# Patient Record
Sex: MALE | Race: WHITE | NOT HISPANIC OR LATINO | Employment: OTHER | URBAN - METROPOLITAN AREA
[De-identification: names, ages, dates, MRNs, and addresses within clinical notes are randomized per-mention and may not be internally consistent; named-entity substitution may affect disease eponyms.]

---

## 2017-02-06 RX ORDER — SUCRALFATE 1 G/1
TABLET ORAL
Qty: 60 TABLET | Refills: 0 | Status: SHIPPED | OUTPATIENT
Start: 2017-02-06 | End: 2017-08-15 | Stop reason: SDUPTHER

## 2017-02-07 RX ORDER — SUCRALFATE 1 G/1
TABLET ORAL
Qty: 60 TABLET | Refills: 0 | OUTPATIENT
Start: 2017-02-07

## 2017-02-07 NOTE — TELEPHONE ENCOUNTER
Duplicate request. Per [facility] pharmacy, they have Dr. Perrin's approval for the 1st request of Sucralfate 1 g tablet from 2/6/17.

## 2017-02-14 ENCOUNTER — TRANSCRIBE ORDERS (OUTPATIENT)
Dept: REHABILITATION | Facility: HOSPITAL | Age: 62
End: 2017-02-14

## 2017-02-14 DIAGNOSIS — Z47.89 ENCOUNTER FOR PROSTHETIC GAIT TRAINING: Primary | ICD-10-CM

## 2017-02-14 DIAGNOSIS — Z89.612 HISTORY OF LEFT ABOVE KNEE AMPUTATION (HCC): ICD-10-CM

## 2017-02-17 ENCOUNTER — HOSPITAL ENCOUNTER (OUTPATIENT)
Dept: PHYSICAL THERAPY | Facility: HOSPITAL | Age: 62
Setting detail: THERAPIES SERIES
Discharge: HOME | End: 2017-02-17
Attending: PHYSICAL MEDICINE & REHABILITATION

## 2017-02-17 DIAGNOSIS — Z47.89 ENCOUNTER FOR PROSTHETIC GAIT TRAINING: ICD-10-CM

## 2017-02-17 DIAGNOSIS — R26.89 FUNCTIONAL GAIT ABNORMALITY: ICD-10-CM

## 2017-02-17 DIAGNOSIS — Z89.612 STATUS POST ABOVE KNEE AMPUTATION OF LEFT LOWER EXTREMITY: Primary | ICD-10-CM

## 2017-02-17 PROCEDURE — 97162 PT EVAL MOD COMPLEX 30 MIN: CPT

## 2017-02-17 NOTE — PROGRESS NOTES
Outpatient Physical Therapy Neuro Initial Evaluation  [facility]     Patient Name: Sim Agustin  : 1955  MRN: [id number]  Today's Date: 2017      Visit Date: 2017    Patient Active Problem List   Diagnosis   • H/O angiodysplasia of intestinal tract   • Hypotension   • ARF (acute renal failure)   • COPD (chronic obstructive pulmonary disease)   • Atelectasis, left   • S/P colectomy   • Myonecrosis   • Peripheral arteriosclerosis   • S/P AKA (above knee amputation)   • Elevated troponin   • Chest pain   • CAD (coronary artery disease)   • Sepsis   • Nosocomial pneumonia   • ESRD (end stage renal disease) on dialysis   • Anemia in chronic kidney disease   • PVD (peripheral vascular disease)   • Hyperkalemia   • Pleural effusion on left   • S/P thoracentesis   • Acute upper respiratory infection   • Chronic obstructive pulmonary disease with acute exacerbation   • Urticaria   • Hypertension   • Fracture of patella        Past Medical History   Diagnosis Date   • [facility]      [facility]   • ARF (acute renal failure)      ON DIALYSIS SPRING 2016. HAS NOW SINCE RESOLVED. [facility]   • CAD (coronary artery disease) 2016   • Cataract    • CHF (congestive heart failure)      HISTORY..YRS AGO. D/T ANEMIA   • Colostomy in place      AS RESULT OF ISCHEMIC COLITIS   • Compartment syndrome      AFTER ILIAC SURGERY. ABOVE KNEE AMPUTATION   • COPD (chronic obstructive pulmonary disease)    • Cough      SINCE APRIL WITH PNEUMONIA.    • Disease of thyroid gland      HYPOTHYRODISM   • Gastrointestinal bleed      2016. AORTODUOD. FISTULA   • Hypertension    • Nausea    • Phantom limb pain      SL, WITH LEFT ABOUVE KNEE   • Pneumonia      SPRING 2016  AFTER SURGERY   • PVD (peripheral vascular disease)    • Sepsis      2016 AFTER ILIAC ARTERY SURGERY        Past Surgical History   Procedure Laterality Date   • Iliac artery stent Bilateral    • Iliac artery -  "femoral artery bypass graft Bilateral 2002   • Arterial thrombectomy  2001     throbectomy of arterial graft   • Above knee amputation Left 3/16/2016     Procedure: LT AMPUTATION ABOVE KNEE;  Surgeon: Jose Swann MD;  Location: [facility] MAIN OR;  Service:    • Thoracoscopy Left 4/18/2016     Procedure: LT THORACOSCOPY VIDEO ASSISTED WITH DECORDICATION;  Surgeon: Genaro Davila III, MD;  Location: [facility] MAIN OR;  Service:    • Colonoscopy N/A 2015     Dr. Laughlin   • Joint replacement Left    • Hemicolectomy w/ ostomy Left 02/28/2016   • Total hip arthroplasty Left    • Cataract extraction w/  intraocular lens implant     • Cataract extraction w/  intraocular lens implant       LEFT AND RIGHT   • Vascular surgery       MULTIPLE.   • Colonoscopy N/A 10/12/2016     Procedure: COLONOSCOPY TO 20 CM AND PER STOMA TO 30CM;  Surgeon: Genaro Perrin MD;  Location: [facility] ENDOSCOPY;  Service:    • Colostomy closure N/A 10/13/2016     Procedure: COLOSTOMY TAKEDOWN OR CLOSURE, APPENDECTOMY;  Surgeon: Genaro Perrin MD;  Location: [facility] MAIN OR;  Service:    • Endoscopy N/A 10/21/2016     Procedure: ESOPHAGOGASTRODUODENOSCOPY DONE AT BEDSIDE;  Surgeon: Genaro Perrin MD;  [facility];  Service:    • Colonoscopy N/A 10/21/2016     Procedure: COLONOSCOPY DONE AT BEDSIDE ONTO CECEM;  Surgeon: Genaro Perrin MD;  Location: [facility] ENDOSCOPY;  Service:          Visit Dx:     ICD-10-CM ICD-9-CM   1. Status post above knee amputation of left lower extremity Z89.612 V49.76   2. Encounter for prosthetic gait training Z47.89 V57.81   3. Functional gait abnormality R26.89 781.2             Patient History       02/17/17 0932          History    Chief Complaint Difficulty Walking;Other 1 (comment)   \"Doing good until I got this thing.\" (new socket)   -LB      Date Current Problem(s) Began 03/16/16   Pt received his new prosthetic socket on 02/08/2017   -LB      Brief Description of Current " "Complaint Pt received his new socket for his above knee prosthesis and reports ahs been having some trouble with walking. Orders for prosthetic training.   -LB      Onset Date- PT 02/17/2017  -LB      Patient/Caregiver Goals Other (comment)   \"Walking better.\" \"I'd like to walk without a cane.\"   -LB      Patient seeing anyone else for problem(s)? Yes   Osman Elams & [facility]  -LB      Pain     Pain Location Back  -LB      Pain at Present 4  -LB      Pain at Best 0  -LB      Pain Frequency Intermittent  -LB      What Performance Factors Make the Current Problem(s) WORSE? Being up and doing alot of activity.  -LB      Fall Risk Assessment    Any falls in the past year: Yes  -LB      Number of falls reported in the last 12 months 2   -LB      Factors that contributed to the fall: Other (comment);Fatigue   Pt reports not being injured.   -LB      Does patient have a fear of falling No  -LB      Services    Are you currently receiving Home Health services No  -LB      Daily Activities    Primary Language English  -LB      How does patient learn best? Listening  -LB      Barriers to learning None  -LB      Pt Participated in POC and Goals Yes  -LB      Safety    Are you being hurt, hit, or frightened by anyone at home or in your life? No  -LB      Are you being neglected by a caregiver No  -LB        User Key  (r) = Recorded By, (t) = Taken By, (c) = Cosigned By    Initials Name Provider Type    LB Amanda Hancock, PT Physical Therapist                    PT Neuro       02/17/17 0932          Subjective Comments    Subjective Comments \"I am not walking as well since I got this.\" (new socket) \"I have no trouble putting the prosthesis on.\"  -LB      Precautions and Contraindications    Precautions pt reports per vascular MD pt not allowed to use a gait belt   -LB      Subjective Pain    Able to rate subjective pain? yes  -LB      Pre-Treatment Pain Level 0  -LB      Post-Treatment Pain " Level 0  -LB      Pain Assessment    Pain Assessment No/denies pain  -LB      Home Living    Living Arrangements house  -LB      Home Accessibility stairs to enter home  -LB      Number of Stairs to Enter Home 4  -LB      Stair Railings at Home none  -LB      Home Equipment Rolling walker;Cane  -LB      Vision-Basic Assessment    Current Vision No visual deficits  -LB      Cognition    Overall Cognitive Status WFL  -LB      Orientation Level Oriented X4  -LB      Safety Judgment Good awareness of safety precautions  -LB      Sensation    Sensation WNL? WNL  -LB      ROM (Range of Motion)    General ROM no range of motion deficits identified  -LB      MMT (Manual Muscle Testing)    General MMT Assessment no strength deficits identified  -LB      Bed Mobility, Assessment/Treatment    Bed Mob, Supine to Sit, [place] independent  -LB      Bed Mob, Sit to Supine, [place] independent  -LB      Transfers    Transfers, Sit-Stand [place] supervision required   from armless chair  definite need of UE's   -LB      Transfers, Stand-Sit [place] independent   to armless chair (definite need to UE's)   -LB      Transfers, Sit-Stand-Sit, Assist Device straight cane  -LB      Transfer, Comment Requires use of UE's due to above knee prosthesis  -LB      Gait Assessment/Treatment    Gait, [place] Level conditional independence  -LB      Gait, Assistive Device prosthesis;straight cane  -LB      Gait, Distance (Feet) 150  -LB      Gait, Gait Deviations forward flexed posture;right:;step length decreased;left:;swing-to-stance ratio decreased;other (see comments);weight-shifting ability decreased   strong initial contact Left LE (prosthetic limb)   -LB      Gait, Comment Pt starts ambulation with R LE consistently with extremely large step length.   -LB      Stairs Assessment/Treatment    Number of Stairs 1   curb   -LB      Stairs, [place] Level contact guard assist  -LB      Stairs, Assistive Device  straight cane  -LB      Stairs, Impairments impaired balance  -LB      Orthotics Prosthetics    Additional Documentation Prosthetic Management (Group)  -LB      Prosthetic Management    Device (Prosthetic Management) transfemoral (above knee) prosthesis, left   new suction socket 02/08/2017, microprocessor knee   -LB      Training (Prosthetic Management) other (see comments)   advised pt to advance R LE first w/ ambulation when possible  -LB      Detail (Prosthetic Management) residual limb well healed with small invagination posteriorly   -LB        User Key  (r) = Recorded By, (t) = Taken By, (c) = Cosigned By    Initials Name Provider Type    DAKOTA Hancock PT Physical Therapist                        Therapy Education       02/17/17 1631    Therapy Education    Given Mobility training   Recommended pt step with R LE first when ambulating when possible to promote wt shift to the prosthetic limb and achieve left knee flexion with swing phase.   -LB    How Provided Verbal  -LB    Provided to Patient  -LB    Level of Understanding Verbalized;Demonstrated  -LB      User Key  (r) = Recorded By, (t) = Taken By, (c) = Cosigned By    Initials Name Provider Type    DAKOTA Hancock PT Physical Therapist                PT OP Goals       02/17/17 1600          PT Short Term Goals    STG Date to Achieve 03/17/17  -LB      STG 1 Pt to step with R LE first when ambulating ~ 75% during the PT session.   -LB      STG 2 Pt to score a 11/24 on the Dynamic Gait index.  -LB      STG 3 Pt to score a 13/28 on the Tinetti Balance Assessment Tool.  -LB      STG 4 Pt to ascend and descend a curb independently with a cane.   -LB      STG 5 Pt to ambulate with decreased left step length ~ 50%.   -LB      STG 6 Pt to ambualte with decreased trunk flexion with ambulation.   -LB      Long Term Goals    LTG Date to Achieve 04/28/17  -LB      LTG 1 Pt to score a 14/28 on the Dynamic Gait Index.  -LB      LTG 2 Pt to score a 17/28 on the  "Tinetti Balance Assessment Tool.   -LB      LTG 3 Pt to ascend and descend 3 steps independently with cane and no rails.   -LB      LTG 4 Pt to ambulate with increased right step length ~ 2\" past left LE ~ 50%.   -LB      LTG 5 Pt to ambualte with decreaed left step length 75%.  -LB      LTG 6 Pt to ambulate independently short distances within his home without a device .  -LB        User Key  (r) = Recorded By, (t) = Taken By, (c) = Cosigned By    Initials Name Provider Type    DAKOTA Hancock PT Physical Therapist                PT Assessment/Plan       02/17/17 1640          PT Assessment    Functional Limitations Impaired gait;Limitation in home management;Limitations in community activities  -LB      Impairments Balance;Gait  -LB      Assessment Comments Pt received a new socket for his left  above knee prosthesis and reports having trouble with walking. Pt has multiple gait deviations including extremely long step with hsi prosthesis with decreased weight shift over his prosthesis. Pt demonstrating deficits with gait and balance as indicated by the Tinetti and the Dynamic Gait Index.   -LB      Please refer to paper survey for additional self-reported information Yes  -LB      Rehab Potential Good  -LB      Patient/caregiver participated in establishment of treatment plan and goals Yes  -LB      Patient would benefit from skilled therapy intervention Yes  -LB      PT Plan    PT Frequency 1x/week  -LB      Predicted Duration of Therapy Intervention (days/wks) 10 weeks   -LB        User Key  (r) = Recorded By, (t) = Taken By, (c) = Cosigned By    Initials Name Provider Type    DAKOTA Hancock PT Physical Therapist                   Exercises       02/17/17 0932          Subjective Comments    Subjective Comments \"I am not walking as well since I got this.\" (new socket) \"I have no trouble putting the prosthesis on.\"  -LB      Subjective Pain    Able to rate subjective pain? yes  -LB      Pre-Treatment Pain " "Level 0  -LB      Post-Treatment Pain Level 0  -LB        User Key  (r) = Recorded By, (t) = Taken By, (c) = Cosigned By    Initials Name Provider Type    LB Amanda Hancock PT Physical Therapist                            Outcome Measures       02/17/17 1600          Dynamic Gait Index (DGI)    Gait Level Surface 2  -LB      Change in Gait Speed 1  -LB      Gait with Horizontal Head Turns 1  -LB      Gait with Vertical Head Turns 1  -LB      Gait and Pivot Turn 2  -LB      Step Over Obstacle 0  -LB      Step Around Obstacles 1  -LB      Steps 1  -LB      Dynamic Gait Index Score 9  -LB      Dynamic Gait Index Comments with straight cane   -LB      Tinetti Assessment    Sitting Balance 1  -LB      Arises 1  -LB      Attempts to Rise 2  -LB      Immediate Standing Balance (first 5 sec) 2  -LB      Standing Balance 1  -LB      Sternal Nudge (feet close together) 0  -LB      Eyes Closed (feet close together) 1  -LB      Turning 360 Degrees- Steps 0  -LB      Turning 360 Degrees- Steadiness 0  -LB      Sitting Down 1  -LB      Tinetti Balance Score 9  -LB      Gait Initiation (immediate after told \"go\") 0  -LB      Step Length- Right Swing 0  -LB      Step Length- Left Swing 1  -LB      Foot Clearance- Right Foot 1  -LB      Foot Clearance- Left Foot 1  -LB      Step Symmetry 0  -LB      Step Continuity 1  -LB      Path (excursion) 0  -LB      Trunk 0  -LB      Base of Support 0  -LB      Gait Score 4  -LB      Tinetti Total Score 13  -LB      Functional Assessment    Outcome Measure Options Dynamic Gait Index;Tinetti  -LB        User Key  (r) = Recorded By, (t) = Taken By, (c) = Cosigned By    Initials Name Provider Type    LB Amanda Hancock PT Physical Therapist          Time Calculation:   Start Time: 0932  Stop Time: 1017  Time Calculation (min): 45 min     Therapy Charges for Today     Code Description Service Date Service Provider Modifiers Qty    22957632169  PT EVAL MOD COMPLEXITY 3 2/17/2017 Amanda Hancock PT " GP 1          PT G-Codes  Outcome Measure Options: Dynamic Gait Index, Shea Hancock, PT  2/17/2017

## 2017-02-24 ENCOUNTER — HOSPITAL ENCOUNTER (OUTPATIENT)
Dept: PHYSICAL THERAPY | Facility: HOSPITAL | Age: 62
Setting detail: THERAPIES SERIES
Discharge: HOME | End: 2017-02-24
Attending: PHYSICAL MEDICINE & REHABILITATION

## 2017-02-24 DIAGNOSIS — Z89.612 STATUS POST ABOVE KNEE AMPUTATION OF LEFT LOWER EXTREMITY: Primary | ICD-10-CM

## 2017-02-24 DIAGNOSIS — Z47.89 ENCOUNTER FOR PROSTHETIC GAIT TRAINING: ICD-10-CM

## 2017-02-24 DIAGNOSIS — R26.89 FUNCTIONAL GAIT ABNORMALITY: ICD-10-CM

## 2017-02-24 PROCEDURE — 97116 GAIT TRAINING THERAPY: CPT

## 2017-02-24 NOTE — PROGRESS NOTES
"    Outpatient Physical Therapy Neuro Treatment Note  [facility]     Patient Name: Sim Agustin  : 1955  MRN: [id number]  Today's Date: 2017      Visit Date: 2017    Visit Dx:    ICD-10-CM ICD-9-CM   1. Status post above knee amputation of left lower extremity Z89.612 V49.76   2. Encounter for prosthetic gait training Z47.89 V57.81   3. Functional gait abnormality R26.89 781.2       Patient Active Problem List   Diagnosis   • H/O angiodysplasia of intestinal tract   • Hypotension   • ARF (acute renal failure)   • COPD (chronic obstructive pulmonary disease)   • Atelectasis, left   • S/P colectomy   • Myonecrosis   • Peripheral arteriosclerosis   • S/P AKA (above knee amputation)   • Elevated troponin   • Chest pain   • CAD (coronary artery disease)   • Sepsis   • Nosocomial pneumonia   • ESRD (end stage renal disease) on dialysis   • Anemia in chronic kidney disease   • PVD (peripheral vascular disease)   • Hyperkalemia   • Pleural effusion on left   • S/P thoracentesis   • Acute upper respiratory infection   • Chronic obstructive pulmonary disease with acute exacerbation   • Urticaria   • Hypertension   • Fracture of patella                 PT Neuro       17 0931          Subjective Comments    Subjective Comments \"I saw Kailash (Prosthetist) and he said to just wear 1 sock ply and it feels better.\"   -LB      Precautions and Contraindications    Precautions pt reports per vascular MD pt not allowed to use a gait belt   -LB      Subjective Pain    Able to rate subjective pain? yes  -LB      Pre-Treatment Pain Level 0  -LB      Post-Treatment Pain Level 0  -LB      Transfers    Transfers, Sit-Stand [place] stand by assist;verbal cues required  -LB      Transfers, Stand-Sit [place] stand by assist;verbal cues required  -LB      Transfers, Sit-Stand-Sit, Assist Device straight cane   with light weight on the cane and chair seat   -LB      Gait Assessment/Treatment    Gait, " [place] Level minimum assist (75% patient effort)  -LB      Gait, Assistive Device prosthesis  -LB      Gait, Distance (Feet) 60   x 3   -LB      Gait, Gait Deviations forward flexed posture;right:;step length decreased;left:;weight-shifting ability decreased;swing-to-stance ratio decreased  -LB      Gait, Impairments postural control impaired  -LB      Gait, Comment Pt ambulated ~ 100' x 2 with single point cane.  -LB      Stairs Assessment/Treatment    Number of Stairs 3  -LB      Stairs, Handrail Location none  -LB      Stairs, [place] Level minimum assist (75% patient effort)   HHA   -LB      Stairs, Assistive Device straight cane  -LB      Stairs, Technique Used step to step (ascending);step to step (descending)  -LB      Stairs, Safety Issues weight-shifting ability decreased  -LB      Stairs, Impairments impaired balance  -LB      Stairs, Comment curb with cane with CGA   -LB      Balance Skills Training    Standing-Level of Assistance Contact guard  -LB      Static Standing Balance Support No upper extremity supported  -LB      Standing-Balance Activities Reaching across midline    feet together   -LB      Orthotics Prosthetics    Additional Documentation Prosthetic Management (Group)  -LB      Prosthetic Management    Device (Prosthetic Management) transfemoral (above knee) prosthesis, left  -LB      Adjustment Detail (Prosthetic Management) per pt he was advised to only wear a one ply sock   -LB      Training (Prosthetic Management) other (see comments)   (advised pt to advance R LE first w/ ambulation when possibl  -LB      Detail (Prosthetic Management) residual limb well healed with small invagination posteriorly   -LB        User Key  (r) = Recorded By, (t) = Taken By, (c) = Cosigned By    Initials Name Provider Type    LB Amanda Hancock, PT Physical Therapist                        PT Assessment/Plan       02/24/17 1550       PT Assessment    Assessment Comments Pt was able to step length on  "the left LE ~ 50% with ambulaiton with the cane. Pt consistetnly flexing his prosthetic with the first step when leading with the R LE the first step.  -LB       User Key  (r) = Recorded By, (t) = Taken By, (c) = Cosigned By    Initials Name Provider Type    DAKOTA Hancock PT Physical Therapist                     Exercises       02/24/17 0931          Subjective Comments    Subjective Comments \"I saw Kailash (Prosthetist) and he said to just wear 1 sock ply and it feels better.\"   -LB      Subjective Pain    Able to rate subjective pain? yes  -LB      Pre-Treatment Pain Level 0  -LB      Post-Treatment Pain Level 0  -LB        User Key  (r) = Recorded By, (t) = Taken By, (c) = Cosigned By    Initials Name Provider Type    DAKOTA Hancock PT Physical Therapist                                  Therapy Education       02/24/17 1550          Therapy Education    Given Mobility training;Fall prevention and home safety  -LB      How Provided Verbal  -LB      Provided to Patient  -LB      Level of Understanding Verbalized;Demonstrated;Teach back education performed  -LB        User Key  (r) = Recorded By, (t) = Taken By, (c) = Cosigned By    Initials Name Provider Type    DAKOTA Hancock PT Physical Therapist                Time Calculation:   Start Time: 0931  Stop Time: 1016  Time Calculation (min): 45 min     Therapy Charges for Today     Code Description Service Date Service Provider Modifiers Qty    [id number] HC GAIT TRAINING EA 15 MIN 2/24/2017 Amanda Hancock, PT GP 3                    Amanda Hancock PT  2/24/2017     "

## 2017-03-03 ENCOUNTER — HOSPITAL ENCOUNTER (OUTPATIENT)
Dept: PHYSICAL THERAPY | Facility: HOSPITAL | Age: 62
Setting detail: THERAPIES SERIES
Discharge: HOME | End: 2017-03-03
Attending: PHYSICAL MEDICINE & REHABILITATION

## 2017-03-03 DIAGNOSIS — Z89.612 STATUS POST ABOVE KNEE AMPUTATION OF LEFT LOWER EXTREMITY: Primary | ICD-10-CM

## 2017-03-03 DIAGNOSIS — Z47.89 ENCOUNTER FOR PROSTHETIC GAIT TRAINING: ICD-10-CM

## 2017-03-03 DIAGNOSIS — R26.89 FUNCTIONAL GAIT ABNORMALITY: ICD-10-CM

## 2017-03-03 PROCEDURE — 97116 GAIT TRAINING THERAPY: CPT

## 2017-03-10 ENCOUNTER — APPOINTMENT (OUTPATIENT)
Dept: PHYSICAL THERAPY | Facility: HOSPITAL | Age: 62
End: 2017-03-10
Attending: PHYSICAL MEDICINE & REHABILITATION

## 2017-03-17 ENCOUNTER — HOSPITAL ENCOUNTER (OUTPATIENT)
Dept: PHYSICAL THERAPY | Facility: HOSPITAL | Age: 62
Setting detail: THERAPIES SERIES
Discharge: HOME | End: 2017-03-17
Attending: PHYSICAL MEDICINE & REHABILITATION

## 2017-03-17 PROCEDURE — 97116 GAIT TRAINING THERAPY: CPT

## 2017-03-17 NOTE — PROGRESS NOTES
"    Outpatient Physical Therapy Neuro Treatment Note  [facility]     Patient Name: Sim Agustin  : 1955  MRN: [id number]  Today's Date: 3/17/2017      Visit Date: 2017    Visit Dx:  No diagnosis found.    Patient Active Problem List   Diagnosis   • H/O angiodysplasia of intestinal tract   • Hypotension   • ARF (acute renal failure)   • COPD (chronic obstructive pulmonary disease)   • Atelectasis, left   • S/P colectomy   • Myonecrosis   • Peripheral arteriosclerosis   • S/P AKA (above knee amputation)   • Elevated troponin   • Chest pain   • CAD (coronary artery disease)   • Sepsis   • Nosocomial pneumonia   • ESRD (end stage renal disease) on dialysis   • Anemia in chronic kidney disease   • PVD (peripheral vascular disease)   • Hyperkalemia   • Pleural effusion on left   • S/P thoracentesis   • Acute upper respiratory infection   • Chronic obstructive pulmonary disease with acute exacerbation   • Urticaria   • Hypertension   • Fracture of patella                 PT Neuro       17 0929          Subjective Comments    Subjective Comments \"things are going okay, wearing 1 ply sock.  I will have the leg on most of the day\"  -LB      Precautions and Contraindications    Precautions pt reports per vascular MD pt not allowed to use a gait belt   -LB      Subjective Pain    Able to rate subjective pain? yes  -LB      Pre-Treatment Pain Level 4  -LB      Post-Treatment Pain Level 4  -LB      Subjective Pain Comment My back hurts if I wear my leg more  -LB      Transfers    Transfers, Sit-Stand [place] supervision required;conditional independence  -LB      Transfers, Stand-Sit [place] supervision required;conditional independence  -LB      Transfers, Sit-Stand-Sit, Assist Device straight cane;prosthesis  -LB      Gait Assessment/Treatment    Gait, [place] Level contact guard assist;minimum assist (75% patient effort)  -LB      Gait, Assistive Device prosthesis;other (see comments) "   HHA on the right  -LB      Gait, Distance (Feet) 50   80  -LB      Gait, Gait Deviations forward flexed posture;right:;step length decreased;left:;weight-shifting ability decreased   trunk flexion with stance on L LE (prosthesis)  -LB      Gait, Safety Issues weight-shifting ability decreased  -LB      Gait, Impairments postural control impaired   flex during stance on prosthesis  -LB      Gait, Comment pt amb 200 ft with STC, prosthesis and SBA with emphasis of hip extension and decreased weight bearing on STC  -LB      Balance Skills Training    Standing-Level of Assistance Close supervision;Contact guard  -LB      Static Standing Balance Support No upper extremity supported  -LB      Standing-Balance Activities Weight Shift A-P;Weight Shift R-L;Reaching for objects  -LB      Standing Balance # of Minutes 5   times 3  -LB      Stepping Strategy Assessment (Balance Skills) stood next to hemibars and had pt place right LE up on small stool with trying to keep L hip extension.  Performed times 10 reps - pt did have to place R UE on bar at times for support  -LB      Prosthetic Management    Device (Prosthetic Management) transfemoral (above knee) prosthesis, left  -LB      Detail (Prosthetic Management) pt is wearing 1 sock ply all the time, no c/o pain and feels fit is good.  Pt states he may wear prosthesis for about 6-7 hours today  -LB        User Key  (r) = Recorded By, (t) = Taken By, (c) = Cosigned By    Initials Name Provider Type    LB Shikha Lora, PT Physical Therapist                        PT Assessment/Plan       03/17/17 1126       PT Assessment    Assessment Comments Pt able to start with R LE (non-prosthetic) at least 75% of the time even when halting gait and restarting.  Pt demonstrating decreased weight shift and weight bearing on left prosthesis but is aware and attempting to increase step length slightly on R LE.    -LB       User Key  (r) = Recorded By, (t) = Taken By, (c) = Cosigned By     "Initials Name Provider Type    DAKOTA Lora PT Physical Therapist                     Exercises       03/17/17 0929          Subjective Comments    Subjective Comments \"things are going okay, wearing 1 ply sock.  I will have the leg on most of the day\"  -LB      Subjective Pain    Able to rate subjective pain? yes  -LB      Pre-Treatment Pain Level 4  -LB      Post-Treatment Pain Level 4  -LB      Subjective Pain Comment My back hurts if I wear my leg more  -LB        User Key  (r) = Recorded By, (t) = Taken By, (c) = Cosigned By    Initials Name Provider Type    DAKOTA Lora PT Physical Therapist                              PT OP Goals       03/17/17 1130       Time Calculation    PT Goal Re-Cert Due Date 03/24/17  -LB       User Key  (r) = Recorded By, (t) = Taken By, (c) = Cosigned By    Initials Name Provider Type    DAKOTA Lora PT Physical Therapist                Therapy Education       03/17/17 1118          Therapy Education    Given HEP   hip extension in standing without prosthesis  -LB      Program Reinforced  -LB      How Provided Verbal  -LB      Provided to Patient  -LB      Level of Understanding Verbalized  -LB        User Key  (r) = Recorded By, (t) = Taken By, (c) = Cosigned By    Initials Name Provider Type    DAKOTA Lora PT Physical Therapist                Time Calculation:   Start Time: 0916  Stop Time: 1003  Time Calculation (min): 47 min     Therapy Charges for Today     Code Description Service Date Service Provider Modifiers Qty    41884185901 HC GAIT TRAINING EA 15 MIN 3/17/2017 Shikha Lora, PT GP 3                    Shikha Lora PT  3/17/2017     "

## 2017-03-24 ENCOUNTER — HOSPITAL ENCOUNTER (OUTPATIENT)
Dept: PHYSICAL THERAPY | Facility: HOSPITAL | Age: 62
Setting detail: THERAPIES SERIES
Discharge: HOME | End: 2017-03-24
Attending: PHYSICAL MEDICINE & REHABILITATION

## 2017-03-24 DIAGNOSIS — R26.89 FUNCTIONAL GAIT ABNORMALITY: ICD-10-CM

## 2017-03-24 DIAGNOSIS — Z89.612 STATUS POST ABOVE KNEE AMPUTATION OF LEFT LOWER EXTREMITY: Primary | ICD-10-CM

## 2017-03-24 DIAGNOSIS — Z47.89 ENCOUNTER FOR PROSTHETIC GAIT TRAINING: ICD-10-CM

## 2017-03-24 PROCEDURE — 97116 GAIT TRAINING THERAPY: CPT

## 2017-03-24 NOTE — PROGRESS NOTES
Outpatient Physical Therapy Neuro Re-Evaluation  [facility]     Patient Name: Sim Agustin  : 1955  MRN: [id number]  Today's Date: 3/24/2017      Visit Date: 2017    Patient Active Problem List   Diagnosis   • H/O angiodysplasia of intestinal tract   • Hypotension   • ARF (acute renal failure)   • COPD (chronic obstructive pulmonary disease)   • Atelectasis, left   • S/P colectomy   • Myonecrosis   • Peripheral arteriosclerosis   • S/P AKA (above knee amputation)   • Elevated troponin   • Chest pain   • CAD (coronary artery disease)   • Sepsis   • Nosocomial pneumonia   • ESRD (end stage renal disease) on dialysis   • Anemia in chronic kidney disease   • PVD (peripheral vascular disease)   • Hyperkalemia   • Pleural effusion on left   • S/P thoracentesis   • Acute upper respiratory infection   • Chronic obstructive pulmonary disease with acute exacerbation   • Urticaria   • Hypertension   • Fracture of patella        Past Medical History:   Diagnosis Date   • Anemia     [facility]   • ARF (acute renal failure)     ON DIALYSIS SPRING 2016. HAS NOW SINCE RESOLVED. [facility]   • CAD (coronary artery disease) 2016   • Cataract    • CHF (congestive heart failure)     HISTORY..YRS AGO. D/T ANEMIA   • Colostomy in place     AS RESULT OF ISCHEMIC COLITIS   • Compartment syndrome     AFTER ILIAC SURGERY. ABOVE KNEE AMPUTATION   • COPD (chronic obstructive pulmonary disease)    • Cough     SINCE APRIL WITH PNEUMONIA.    • Disease of thyroid gland     HYPOTHYRODISM   • Gastrointestinal bleed     2016. AORTODUOD. FISTULA   • Hypertension    • Nausea    • Phantom limb pain     SL, WITH LEFT ABOUVE KNEE   • Pneumonia     SPRING 2016  AFTER SURGERY   • PVD (peripheral vascular disease)    • Sepsis     2016 AFTER ILIAC ARTERY SURGERY        Past Surgical History:   Procedure Laterality Date   • ABOVE KNEE AMPUTATION Left 3/16/2016    Procedure: LT AMPUTATION  "ABOVE KNEE;  Surgeon: Jose Swann MD;  Location: [facility] MAIN OR;  Service:    • ARTERIAL THROMBECTOMY  2001    throbectomy of arterial graft   • CATARACT EXTRACTION WITH INTRAOCULAR LENS IMPLANT     • CATARACT EXTRACTION WITH INTRAOCULAR LENS IMPLANT      LEFT AND RIGHT   • COLON RESECTION WITH COLOSTOMY Left 02/28/2016   • COLONOSCOPY N/A 2015    Dr. Laughlin   • COLONOSCOPY N/A 10/12/2016    Procedure: COLONOSCOPY TO 20 CM AND PER STOMA TO 30CM;  Surgeon: Genaro Perrin MD;  [facility];  Service:    • COLONOSCOPY N/A 10/21/2016    Procedure: COLONOSCOPY DONE AT BEDSIDE ONTO CECEM;  Surgeon: Genaro Perrin MD;  [facility];  Service:    • COLOSTOMY CLOSURE N/A 10/13/2016    Procedure: COLOSTOMY TAKEDOWN OR CLOSURE, APPENDECTOMY;  Surgeon: Genaro Perrin MD;  Location: [facility] MAIN OR;  Service:    • ENDOSCOPY N/A 10/21/2016    Procedure: ESOPHAGOGASTRODUODENOSCOPY DONE AT BEDSIDE;  Surgeon: Genaro Perrin MD;  Location: [facility] ENDOSCOPY;  Service:    • ILIAC ARTERY - FEMORAL ARTERY BYPASS GRAFT Bilateral 2002   • ILIAC ARTERY STENT Bilateral 2001   • JOINT REPLACEMENT Left    • THORACOSCOPY Left 4/18/2016    Procedure: LT THORACOSCOPY VIDEO ASSISTED WITH DECORDICATION;  Surgeon: Genaro Davila III, MD;  Location: [facility] MAIN OR;  Service:    • TOTAL HIP ARTHROPLASTY Left    • VASCULAR SURGERY      MULTIPLE.         Visit Dx:     ICD-10-CM ICD-9-CM   1. Status post above knee amputation of left lower extremity Z89.612 V49.76   2. Encounter for prosthetic gait training Z47.89 V57.81   3. Functional gait abnormality R26.89 781.2                     PT Neuro       03/24/17 0931          Subjective Comments    Subjective Comments \"I use the cane all the time.\"   -LB      Precautions and Contraindications    Precautions pt reports per vascular MD pt not allowed to use a gait belt   -LB      Subjective Pain    Able to rate subjective pain? yes  -LB      " Pre-Treatment Pain Level 0  -LB      Post-Treatment Pain Level 0  -LB      Subjective Pain Comment Pt reported pain in his low back during 2nd walk without the cane. Once returned to ambulating with a cane pt reported no pain.   -LB      Bed Mobility, Assessment/Treatment    Bed Mobility, Comment deferred   -LB      Transfers    Transfers, Sit-Stand [place] supervision required  -LB      Transfers, Stand-Sit [place] supervision required  -LB      Transfers, Sit-Stand-Sit, Assist Device prosthesis   w/o a device   -LB      Transfer, Comment Pt came to stand with cane independently.  -LB      Gait Assessment/Treatment    Gait, [place] Level contact guard assist  -LB      Gait, Assistive Device prosthesis   w/o a device   -LB      Gait, Distance (Feet) 70   x 1, then 30' x 1   -LB      Gait, Gait Deviations forward flexed posture;right:;step length decreased   trunk flexion noted when pt ambulating without a device  -LB      Gait, Safety Issues weight-shifting ability decreased  -LB      Gait, Impairments postural control impaired  -LB      Gait, Comment Pt ambulated ~ 90' x 3 with supervision and verbal cues.   -LB      Stairs Assessment/Treatment    Number of Stairs 1  -LB      Stairs, [place] Level contact guard assist   CGA 1st time, SBA 2nd time  -LB      Stairs, Assistive Device straight cane  -LB      Stairs, Impairments impaired balance  -LB      Balance Skills Training    Standing-Level of Assistance Close supervision  -LB      Static Standing Balance Support No upper extremity supported  -LB      Standing-Balance Activities Weight Shift A-P;Weight Shift R-L;Reaching for objects  -LB        User Key  (r) = Recorded By, (t) = Taken By, (c) = Cosigned By    Initials Name Provider Type    DAKOTA Hancock, PT Physical Therapist                        Therapy Education       03/24/17 1224          Therapy Education    Given Mobility training;Fall prevention and home safety   Discussed with pt  "the importance of weight shifting forward over his prosthetic limb.   -LB      How Provided Verbal  -LB      Provided to Patient  -LB      Level of Understanding Verbalized  -LB        User Key  (r) = Recorded By, (t) = Taken By, (c) = Cosigned By    Initials Name Provider Type    LB Amanda Hancock, PT Physical Therapist                PT OP Goals       03/24/17 1200       PT Short Term Goals    STG 1 Pt to step with R LE first when ambulating ~ 75% during the PT session.   -LB     STG 2 Pt to score a 11/24 on the Dynamic Gait index.  -LB     STG 2 Progress Ongoing  -LB     STG 3 Pt to score a 13/28 on the Tinetti Balance Assessment Tool.  -LB     STG 3 Progress Met  -LB     STG 3 Progress Comments Pt scored a 16/28.  -LB     STG 4 Pt to ascend and descend a curb independently with a cane.   -LB     STG 4 Progress Ongoing;Progressing  -LB     STG 5 Pt to ambulate with decreased left step length ~ 50%.   -LB     STG 5 Progress Met  -LB     STG 6 Pt to ambualte with decreased trunk flexion with ambulation.   -LB     STG 6 Progress Partially Met;Ongoing  -LB     Long Term Goals    LTG Date to Achieve 04/07/17  -LB     LTG 1 Pt to score a 14/28 on the Dynamic Gait Index.  -LB     LTG 1 Progress Ongoing  -LB     LTG 2 Pt to score a 17/28 on the Tinetti Balance Assessment Tool.   -LB     LTG 2 Progress Ongoing;Progressing  -LB     LTG 2 Progress Comments Pt scored a 16/28.   -LB     LTG 3 Pt to ascend and descend 3 steps independently with cane and no rails.   -LB     LTG 3 Progress Ongoing;Progressing  -LB     LTG 4 Pt to ambulate with increased right step length ~ 2\" past left LE ~ 50%.   -LB     LTG 4 Progress Ongoing;Progressing  -LB     LTG 5 Pt to ambualte with decreaed left step length 75%.  -LB     LTG 5 Progress Met  -LB     LTG 6 Pt to ambulate independently short distances within his home without a device .  -LB     LTG 6 Progress --   Due to pain & increased lat trunk flex w/o a cane goal d/c.  -LB       User " "Key  (r) = Recorded By, (t) = Taken By, (c) = Cosigned By    Initials Name Provider Type    DAKOTA Hancock PT Physical Therapist                PT Assessment/Plan       03/24/17 1233       PT Assessment    Assessment Comments Pt dmeonstrating improved gait and balance as indicated by the improved score on the Tinetti. Pt compelted the Tinetti gait section with a cane. Pt demonstrates increased gait deviations and reported residual limb when ambulating without a device. Therefore, the goal for ambulation without a cane was discontinued.   -LB     PT Plan    PT Frequency --  -LB     Predicted Duration of Therapy Intervention (days/wks) 2-3 weeks  -LB       User Key  (r) = Recorded By, (t) = Taken By, (c) = Cosigned By    Initials Name Provider Type    DAKOTA Hancock PT Physical Therapist                   Exercises       03/24/17 0931          Subjective Comments    Subjective Comments \"I use the cane all the time.\"   -LB      Subjective Pain    Able to rate subjective pain? yes  -LB      Pre-Treatment Pain Level 0  -LB      Post-Treatment Pain Level 0  -LB      Subjective Pain Comment Pt reported pain in his low back during 2nd walk without the cane. Once returned to ambulating with a cane pt reported no pain.   -LB        User Key  (r) = Recorded By, (t) = Taken By, (c) = Cosigned By    Initials Name Provider Type    DAKOTA Hancock PT Physical Therapist                            Outcome Measures       03/24/17 1200          Tinetti Assessment    Sitting Balance 1  -LB      Arises 1  -LB      Attempts to Rise 2  -LB      Immediate Standing Balance (first 5 sec) 2  -LB      Standing Balance 1  -LB      Sternal Nudge (feet close together) 0  -LB      Eyes Closed (feet close together) 1  -LB      Turning 360 Degrees- Steps 0  -LB      Turning 360 Degrees- Steadiness 1  -LB      Sitting Down 1  -LB      Tinetti Balance Score 10  -LB      Gait Initiation (immediate after told \"go\") 0  -LB      Step Length- Right " Swing 1  -LB      Step Length- Left Swing 1  -LB      Foot Clearance- Right Foot 1  -LB      Foot Clearance- Left Foot 1  -LB      Step Symmetry 0  -LB      Step Continuity 1  -LB      Path (excursion) 1  -LB      Trunk 0  -LB      Base of Support 0  -LB      Gait Score 6  -LB      Tinetti Total Score 16  -LB      Tinetti Assistive Device straight cane  -LB      Timed Up and Go (TUG)    TUG Test 1 29 seconds  -LB      Timed Up and Go Comments 26  -LB        User Key  (r) = Recorded By, (t) = Taken By, (c) = Cosigned By    Initials Name Provider Type    LB Amanda Hancock, PT Physical Therapist          Time Calculation:   Start Time: 0931  Stop Time: 1017  Time Calculation (min): 46 min     Therapy Charges for Today     Code Description Service Date Service Provider Modifiers Qty    [id number] HC GAIT TRAINING EA 15 MIN 3/24/2017 Amanda Hancock, PT GP 3                    Amanda Hancock, PT  3/24/2017

## 2017-03-31 ENCOUNTER — HOSPITAL ENCOUNTER (OUTPATIENT)
Dept: PHYSICAL THERAPY | Facility: HOSPITAL | Age: 62
Setting detail: THERAPIES SERIES
Discharge: HOME | End: 2017-03-31
Attending: PHYSICAL MEDICINE & REHABILITATION

## 2017-03-31 DIAGNOSIS — Z89.612 STATUS POST ABOVE KNEE AMPUTATION OF LEFT LOWER EXTREMITY: Primary | ICD-10-CM

## 2017-03-31 DIAGNOSIS — Z47.89 ENCOUNTER FOR PROSTHETIC GAIT TRAINING: ICD-10-CM

## 2017-03-31 DIAGNOSIS — R26.89 FUNCTIONAL GAIT ABNORMALITY: ICD-10-CM

## 2017-03-31 PROCEDURE — 97116 GAIT TRAINING THERAPY: CPT

## 2017-04-07 ENCOUNTER — HOSPITAL ENCOUNTER (OUTPATIENT)
Dept: PHYSICAL THERAPY | Facility: HOSPITAL | Age: 62
Setting detail: THERAPIES SERIES
Discharge: HOME | End: 2017-04-07
Attending: PHYSICAL MEDICINE & REHABILITATION

## 2017-04-07 DIAGNOSIS — R26.89 FUNCTIONAL GAIT ABNORMALITY: ICD-10-CM

## 2017-04-07 DIAGNOSIS — Z89.612 STATUS POST ABOVE KNEE AMPUTATION OF LEFT LOWER EXTREMITY: Primary | ICD-10-CM

## 2017-04-07 DIAGNOSIS — Z47.89 ENCOUNTER FOR PROSTHETIC GAIT TRAINING: ICD-10-CM

## 2017-04-07 PROCEDURE — 97116 GAIT TRAINING THERAPY: CPT

## 2017-04-07 NOTE — THERAPY DISCHARGE NOTE
"      Outpatient Physical Therapy Neuro Treatment Note/Discharge Summary  [facility]     Patient Name: Sim Agustin  : 1955  MRN: [id number]  Today's Date: 2017      Visit Date: 2017    Visit Dx:    ICD-10-CM ICD-9-CM   1. Status post above knee amputation of left lower extremity Z89.612 V49.76   2. Encounter for prosthetic gait training Z47.89 V57.81   3. Functional gait abnormality R26.89 781.2       Patient Active Problem List   Diagnosis   • H/O angiodysplasia of intestinal tract   • Hypotension   • ARF (acute renal failure)   • COPD (chronic obstructive pulmonary disease)   • Atelectasis, left   • S/P colectomy   • Myonecrosis   • Peripheral arteriosclerosis   • S/P AKA (above knee amputation)   • Elevated troponin   • Chest pain   • CAD (coronary artery disease)   • Sepsis   • Nosocomial pneumonia   • ESRD (end stage renal disease) on dialysis   • Anemia in chronic kidney disease   • PVD (peripheral vascular disease)   • Hyperkalemia   • Pleural effusion on left   • S/P thoracentesis   • Acute upper respiratory infection   • Chronic obstructive pulmonary disease with acute exacerbation   • Urticaria   • Hypertension   • Fracture of patella                  PT Neuro       17 0940          Subjective Comments    Subjective Comments \"I am tired today. I have been doing alot with my son this week on spring break.\" \"My back is better. I have been wearing a shoe at all times when I walk without the prosthesis.\" \"It has helped.\" \"I have been wearingthe prosthesis everyday.\" \"No trouble with my skin.\"    -LB      Precautions and Contraindications    Precautions pt reports per vascular MD pt not allowed to use a gait belt   -LB      Subjective Pain    Able to rate subjective pain? yes  -LB      Pre-Treatment Pain Level 1  -LB      Post-Treatment Pain Level 1  -LB      Subjective Pain Comment Pt reporting slight back pain.\"   -LB      Transfers    Transfers, Sit-Stand Lamar " conditional independence  -LB      Transfers, Stand-Sit Rooks conditional independence  -LB      Transfers, Sit-Stand-Sit, Assist Device prosthesis;straight cane  -LB      Transfer, Comment Pt demonstrating correct technique with to stand and to sit with and without tripod cane.   -LB      Gait Assessment/Treatment    Gait, Rooks Level conditional independence  -LB      Gait, Assistive Device straight cane  -LB      Gait, Distance (Feet) 120   x 2  (complaint of fatigue from doing activities w/ his son)  -LB      Gait, Gait Deviations right:;step length decreased;swing-to-stance ratio decreased;weight-shifting ability decreased  -LB      Gait, Safety Issues weight-shifting ability decreased  -LB      Gait, Impairments postural control impaired  -LB      Gait, Comment Pt's able to advance prosthetic limb with controlled length.   -LB      Stairs Assessment/Treatment    Number of Stairs 1  -LB      Stairs, Rooks Level conditional independence  -LB      Stairs, Assistive Device straight cane  -LB      Stairs, Impairments impaired balance  -LB      Orthotics Prosthetics    Additional Documentation Prosthetic Management (Group)  -LB      Prosthetic Management    Device (Prosthetic Management) transfemoral (above knee) prosthesis, left  -LB      Detail (Prosthetic Management) Pt reports he wears the one ply sock and is having no pain or skin issues.   -LB        User Key  (r) = Recorded By, (t) = Taken By, (c) = Cosigned By    Initials Name Provider Type    LB Amanda Hancock, PT Physical Therapist                        PT Assessment/Plan       04/07/17 1156       PT Assessment    Assessment Comments Pt reproting being overall fatigued today as has been going places with his 13 year old son. Pt reporting his back pain is decreased and he has been wearing a shoe when up on R LE only. Pt also reports he has been wearing his prosthesis more consistently. Pt has improved with gait and balance as indicated  "by the scoe on the Tinetti. Pt was not able to advance ot ambulation withot a device due to back pain and poor gait quality.   -LB       User Key  (r) = Recorded By, (t) = Taken By, (c) = Cosigned By    Initials Name Provider Type    DAKOTA Hancock PT Physical Therapist                     Exercises       04/07/17 0940          Subjective Comments    Subjective Comments \"I am tired today. I have been doing alot with my son this week on spring break.\" \"My back is better. I have been wearing a shoe at all times when I walk without the prosthesis.\" \"It has helped.\" \"I have been wearingthe prosthesis everyday.\" \"No trouble with my skin.\"    -LB      Subjective Pain    Able to rate subjective pain? yes  -LB      Pre-Treatment Pain Level 1  -LB      Post-Treatment Pain Level 1  -LB      Subjective Pain Comment Pt reporting slight back pain.\"   -LB        User Key  (r) = Recorded By, (t) = Taken By, (c) = Cosigned By    Initials Name Provider Type    DAKOTA Hancock PT Physical Therapist                              PT OP Goals       04/07/17 1100       PT Short Term Goals    STG 1 Pt to step with R LE first when ambulating ~ 75% during the PT session.   -LB     STG 1 Progress Met  -LB     STG 2 Pt to score a 11/24 on the Dynamic Gait index.  -LB     STG 2 Progress Not Met  -LB     STG 2 Progress Comments unable to re-eval due to pt being fatigued   -LB     STG 3 Pt to score a 13/28 on the Tinetti Balance Assessment Tool.  -LB     STG 3 Progress Met  -LB     STG 3 Progress Comments Pt scored a 17/28.  -LB     STG 4 Pt to ascend and descend a curb independently with a cane.   -LB     STG 4 Progress Met  -LB     STG 5 Pt to ambulate with decreased left step length ~ 50%.   -LB     STG 5 Progress Met  -LB     STG 6 Pt to ambualte with decreased trunk flexion with ambulation.   -LB     STG 6 Progress Met  -LB     Long Term Goals    LTG 1 Pt to score a 14/28 on the Dynamic Gait Index.  -LB     LTG 1 Progress Met  -LB     " "LTG 1 Progress Comments unable to re-eval as pt being fatigued   -LB     LTG 2 Pt to score a 17/28 on the Tinetti Balance Assessment Tool.   -LB     LTG 2 Progress Not Met  -LB     LTG 2 Progress Comments Pt scored a 17/28.  -LB     LTG 3 Pt to ascend and descend 3 steps independently with cane and no rails.   -LB     LTG 4 Pt to ambulate with increased right step length ~ 2\" past left LE ~ 50%.   -LB     LTG 4 Progress Met  -LB     LTG 5 Pt to ambualte with decreaed left step length 75%.  -LB     LTG 5 Progress Met  -LB     LTG 6 Pt to ambulate independently short distances within his home without a device .  -LB     LTG 6 Progress Not Met  -LB     LTG 6 Progress Comments Due to pain & increased lat trunk flex w/o a cane goal d/c.)  -LB       User Key  (r) = Recorded By, (t) = Taken By, (c) = Cosigned By    Initials Name Provider Type    DAKOTA Hancock PT Physical Therapist                Therapy Education       04/07/17 1150          Therapy Education    Given Mobility training   Reviewed with pt the importance of calling his prosthetist if any problems arise.   -LB      How Provided Verbal  -LB      Provided to Patient  -LB      Level of Understanding Verbalized  -LB        User Key  (r) = Recorded By, (t) = Taken By, (c) = Cosigned By    Initials Name Provider Type    DAKOTA Hancock PT Physical Therapist                Outcome Measures       04/07/17 1100          Tinetti Assessment    Sitting Balance 1  -LB      Arises 1  -LB      Attempts to Rise 2  -LB      Immediate Standing Balance (first 5 sec) 2  -LB      Standing Balance 1  -LB      Sternal Nudge (feet close together) 2  -LB      Eyes Closed (feet close together) 1  -LB      Turning 360 Degrees- Steps 0  -LB      Turning 360 Degrees- Steadiness 0  -LB      Sitting Down 1  -LB      Tinetti Balance Score 11  -LB      Gait Initiation (immediate after told \"go\") 0  -LB      Step Length- Right Swing 1  -LB      Step Length- Left Swing 1  -LB      Foot " Clearance- Right Foot 1  -LB      Foot Clearance- Left Foot 1  -LB      Step Symmetry 0  -LB      Step Continuity 1  -LB      Path (excursion) 1  -LB      Trunk 0  -LB      Base of Support 0  -LB      Gait Score 6  -LB      Tinetti Total Score 17  -LB      Tinetti Assistive Device straight cane  -LB      Tinetti Assessment Comments Pt ambulating with a wider base of support may have been due to pt being fatigued overall from a busy week.  -LB        User Key  (r) = Recorded By, (t) = Taken By, (c) = Cosigned By    Initials Name Provider Type    LB Amanda Hancock, PT Physical Therapist          Time Calculation:   Start Time: 0940  Stop Time: 1012  Time Calculation (min): 32 min     Therapy Charges for Today     Code Description Service Date Service Provider Modifiers Qty    01932206993 HC GAIT TRAINING EA 15 MIN 4/7/2017 Amanda Hancock, PT GP 2                         Amanda Hancock, PT  4/7/2017

## 2017-06-20 ENCOUNTER — OFFICE VISIT (OUTPATIENT)
Dept: SURGERY | Facility: CLINIC | Age: 62
End: 2017-06-20

## 2017-06-20 VITALS — BODY MASS INDEX: 26.36 KG/M2 | WEIGHT: 178 LBS | HEIGHT: 69 IN

## 2017-06-20 DIAGNOSIS — K43.2 INCISIONAL HERNIA, WITHOUT OBSTRUCTION OR GANGRENE: ICD-10-CM

## 2017-06-20 DIAGNOSIS — Z90.49 S/P COLECTOMY: Primary | ICD-10-CM

## 2017-06-20 PROCEDURE — 99213 OFFICE O/P EST LOW 20 MIN: CPT | Performed by: SURGERY

## 2017-07-13 PROBLEM — K43.2 INCISIONAL HERNIA, WITHOUT OBSTRUCTION OR GANGRENE: Status: ACTIVE | Noted: 2017-07-13

## 2017-08-15 RX ORDER — SUCRALFATE 1 G/1
TABLET ORAL
Qty: 60 TABLET | Refills: 0 | Status: SHIPPED | OUTPATIENT
Start: 2017-08-15 | End: 2017-10-16

## 2017-09-19 ENCOUNTER — OFFICE VISIT (OUTPATIENT)
Dept: SURGERY | Facility: CLINIC | Age: 62
End: 2017-09-19

## 2017-09-19 VITALS — HEART RATE: 52 BPM | HEIGHT: 69 IN | WEIGHT: 190.4 LBS | OXYGEN SATURATION: 94 % | BODY MASS INDEX: 28.2 KG/M2

## 2017-09-19 DIAGNOSIS — K43.2 INCISIONAL HERNIA, WITHOUT OBSTRUCTION OR GANGRENE: Primary | ICD-10-CM

## 2017-09-19 PROCEDURE — 99213 OFFICE O/P EST LOW 20 MIN: CPT | Performed by: SURGERY

## 2017-09-19 RX ORDER — DOXYCYCLINE HYCLATE 100 MG/1
100 CAPSULE ORAL DAILY
COMMUNITY
Start: 2017-07-21 | End: 2017-09-19

## 2017-09-19 RX ORDER — DOCUSATE SODIUM 100 MG/1
100 CAPSULE ORAL 2 TIMES DAILY
COMMUNITY
Start: 2017-07-27 | End: 2017-09-19

## 2017-09-19 RX ORDER — OMEPRAZOLE 20 MG/1
20 CAPSULE, DELAYED RELEASE ORAL EVERY MORNING
COMMUNITY
End: 2020-03-03 | Stop reason: SDUPTHER

## 2017-09-19 RX ORDER — LEVOTHYROXINE SODIUM 0.12 MG/1
125 TABLET ORAL DAILY
COMMUNITY
Start: 2017-09-13 | End: 2020-03-09 | Stop reason: SDUPTHER

## 2017-09-19 RX ORDER — LEVOFLOXACIN 5 MG/ML
500 INJECTION, SOLUTION INTRAVENOUS ONCE
Status: CANCELLED | OUTPATIENT
Start: 2017-10-19 | End: 2017-09-19

## 2017-09-19 RX ORDER — HYDROCODONE BITARTRATE AND ACETAMINOPHEN 5; 325 MG/1; MG/1
1 TABLET ORAL EVERY 4 HOURS PRN
COMMUNITY
Start: 2017-09-14 | End: 2017-10-26 | Stop reason: HOSPADM

## 2017-09-19 NOTE — PROGRESS NOTES
CHIEF COMPLAINT:  Follow-up from a hospitalization in which he underwent colostomy closure and incidental appendectomy on 10-.    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 The repair of the duodenum and removal of the infected aortobifemoral graft with placement of axillobifemoral bypass was on 2/15/2016 by Gerardo Perrin and Hue. He then required a subsequent left hemicolectomy for ischemia by Dr. Chaney on 2/28/2016. He also underwent left VATS with decortication on 4- by Dr. Davila. Unfortunately, he ultimately required a left above-knee amputation.     Since his last office visit, he has been ambulating well with his prosthesis. There have been no new problems that have come up since the last office visit.  He is having one or two bowel movements per day. He is taking Imodium and Metamucil daily.    At the last visit, I measured a 3 cm incisional hernia.  It is not tender.  He has not had any episodes of intense pain or altered GI function that would suggest an event of obstruction or incarceration.  He is not sure if it is enlarging much.      Past Medical History:   Diagnosis Date   • Anemia     per [facility] database   • ARF (acute renal failure)     ON DIALYSIS SPRING 2016. HAS NOW SINCE RESOLVED. [facility]   • CAD (coronary artery disease) 4/4/2016   • Cataract    • CHF (congestive heart failure)     HISTORY..YRS AGO. D/T ANEMIA   • Colon polyps    • Colostomy in place     AS RESULT OF ISCHEMIC COLITIS   • Compartment syndrome     AFTER ILIAC SURGERY. ABOVE KNEE AMPUTATION   • COPD (chronic obstructive pulmonary disease)    • Cough     SINCE APRIL WITH PNEUMONIA.    • Disease of thyroid gland     HYPOTHYRODISM   • Diverticulosis    • Gastrointestinal bleed     FEB 2016. AORTODUOD. FISTULA   • Hypertension    • Nausea    • Phantom limb pain     SL, WITH LEFT ABOUVE KNEE   • Pneumonia     SPRING 2016  AFTER  SURGERY   • PVD (peripheral vascular disease)    • Sepsis     FEB 2016 AFTER ILIAC ARTERY SURGERY       Past Surgical History:   Procedure Laterality Date   • ABOVE KNEE AMPUTATION Left 3/16/2016    Procedure: LT AMPUTATION ABOVE KNEE;  Surgeon: Jose Swann MD;  Location: [facility] OR;  Service:    • ARTERIAL THROMBECTOMY  2001    throbectomy of arterial graft   • AXILLARY FEMORAL BYPASS Right 02/15/2016    Exploratory lapartomy, repair aortoduodenal fistula, resection infected aortobifemoral bypass graft, axillobi-superficial femoral artery Hallam-Aneudy bypass graft from right axillary artery, Repair of duodenotomy 4th portion duodenum-Dr. Jose Swann, Dr. Genaro Perrin   • BRONCHOSCOPY Left 03/04/2016    Dr. NATALIIA Tate   • BRONCHOSCOPY RIGID / FLEXIBLE N/A 03/03/2016    Dr. NATALIIA Tate   • BRONCHOSCOPY RIGID / FLEXIBLE N/A 02/26/2016    Dr. NATALIIA Tate   • CATARACT EXTRACTION WITH INTRAOCULAR LENS IMPLANT Bilateral    • COLON RESECTION WITH COLOSTOMY Left 02/28/2016    Exploratory laparotomy with open left hemicolectomy, end-colostomy, G-tube and J-tube-Dr. Endy Chaney   • COLONOSCOPY N/A 2015    Dr. Laughlin   • COLONOSCOPY N/A 10/12/2016    Procedure: COLONOSCOPY TO 20 CM AND PER STOMA TO 30CM;  Surgeon: Genaro Perrin MD;  Location: [facility] ENDOSCOPY;  Service:    • COLONOSCOPY N/A 10/21/2016    Procedure: COLONOSCOPY DONE AT BEDSIDE ONTO CECEM;  Surgeon: Genaro Perrin MD;  Location: [facility] ENDOSCOPY;  Service:    • COLONOSCOPY N/A 02/10/2016    Diverticulosis in the entire examined colon, non-bleeding internal hemorrhoids-Dr. Taylor Pina   • COLONOSCOPY N/A 02/11/2016    Poor prep, blood in the entire examined colon, normal ileum-Dr. Taylor Pina   • COLONOSCOPY W/ POLYPECTOMY N/A 07/27/2015    Normal ileum, diverticulosis in the sigmoid colon: resected and retrieved, one 6 mm polyp in the descending colon: resected and retrieved, the distal rectum and anal verge are normal  on retroflexion view-Dr. Miguel Ángel Laughlin   • COLOSTOMY CLOSURE N/A 10/13/2016    Procedure: COLOSTOMY TAKEDOWN OR CLOSURE, APPENDECTOMY;  Surgeon: Genaro Perrin MD;  Location: [facility] OR;  Service:    • DEBRIDEMENT LEG Left 03/01/2016    Excisional debridement of the skin, subcutantous tissue, tendon and muscle left calf-Dr. Jose Swann   • DEBRIDEMENT LEG Left 02/25/2016    Excisional debridement full-thcikness skin and subcutaneous tissue and tendon and muscle, left medial and lateral calf muscles-Dr. Jose Swann   • ENDOSCOPY N/A 10/21/2016    Procedure: ESOPHAGOGASTRODUODENOSCOPY DONE AT BEDSIDE;  Surgeon: Genaro Perrin MD;  Location: [facility] ENDOSCOPY;  Service:    • ENDOSCOPY AND COLONOSCOPY N/A 02/28/2016    EGD and Colonoscopy with Candy ink injection-Dr. Endy Chaney   • ENTEROSCOPY SMALL BOWEL N/A 02/11/2016    Normal esophagus, normal stomach, normal duodenum, portion of the jejunum normal-Dr. Taylor Pina   • ILIAC ARTERY - FEMORAL ARTERY BYPASS GRAFT Bilateral 2002   • ILIAC ARTERY STENT Bilateral 2001   • INSERTION AND REMOVAL PERITONEAL DIALYSIS CATHETER Right 02/29/2016    Exchange right jugular 16 cm Mahurkar dialysis catheter-Dr. Jose Swann   • INSERTION PERITONEAL DIALYSIS CATHETER Left 03/01/2016    Ultrasound-guided access of the left jugular vein, 23 cm left jugular palindrome dialysis catheter placement-Dr. Jose Swann   • INSERTION PERITONEAL DIALYSIS CATHETER Right 02/18/2016    Right jugular Mahrkar catherer placement-Dr. Jose Swann   • JOINT REPLACEMENT Left    • THORACOSCOPY Left 4/18/2016    Procedure: LT THORACOSCOPY VIDEO ASSISTED WITH DECORDICATION;  Surgeon: Genaro Davila III, MD;  Location: [facility] OR;  Service:    • THROMBECTOMY Left 02/16/2016    Thombectomy of left femoral graft, left leg arteriogram, left calf forward compartment fasciotomy-Dr. Jose Swann   • TOTAL HIP ARTHROPLASTY Left    • UPPER GASTROINTESTINAL  ENDOSCOPY N/A 02/09/2016    Normal UGI-Dr. Ajay Parkinson   • UPPER GASTROINTESTINAL ENDOSCOPY N/A 11/28/2015    Small hiatal hernia, chronic gastritis: biopsied, non-bleeding angioectasias in the stomach: treated with argon plasma coagulation, multiple bleeding angioectasias in the dudenum: treated with argon plasma coagulation-Dr. Mykel Jaramillo   • VASCULAR SURGERY      MULTIPLE         Current Outpatient Prescriptions:   •  albuterol (PROVENTIL HFA;VENTOLIN HFA) 108 (90 BASE) MCG/ACT inhaler, Inhale 2 puffs Every 6 (Six) Hours As Needed for wheezing (RESCUE INHALER)., Disp: , Rfl:   •  ALPRAZolam (XANAX) 0.5 MG tablet, Take 0.5 mg by mouth 4 (four) times a day as needed for anxiety., Disp: , Rfl:   •  amLODIPine (NORVASC) 10 MG tablet, Take 10 mg by mouth Daily., Disp: , Rfl:   •  aspirin 81 MG chewable tablet, Chew 81 mg Daily. HELD PER MD CASTANON ORDERS, Disp: , Rfl:   •  carvedilol (COREG) 25 MG tablet, Take 12.5 mg by mouth 2 (Two) Times a Day., Disp: , Rfl:   •  Cholecalciferol (VITAMIN D3) 5000 UNITS tablet, Take 5,000 Units by mouth Daily., Disp: , Rfl:   •  clopidogrel (PLAVIX) 75 MG tablet, Take 75 mg by mouth Daily., Disp: , Rfl:   •  FERROUS SULFATE PO, Take 65 mg by mouth Daily., Disp: , Rfl:   •  gabapentin (NEURONTIN) 300 MG capsule, Take 300 mg by mouth 3 (Three) Times a Day., Disp: , Rfl:   •  HYDROcodone-acetaminophen (NORCO) 5-325 MG per tablet, Take 1 tablet by mouth Every 4 (Four) Hours As Needed., Disp: , Rfl:   •  INCRUSE ELLIPTA 62.5 MCG/INH aerosol powder , , Disp: , Rfl:   •  isosorbide mononitrate (IMDUR) 30 MG 24 hr tablet, Take 30 mg by mouth Daily., Disp: , Rfl:   •  levothyroxine (SYNTHROID, LEVOTHROID) 125 MCG tablet, Take 125 mcg by mouth Daily., Disp: , Rfl:   •  Multiple Vitamins-Minerals (MULTIVITAMIN ADULT PO), Take 1 tablet by mouth Daily., Disp: , Rfl:   •  NIACIN PO, Take 1 capsule by mouth Every Night., Disp: , Rfl:   •  omeprazole (priLOSEC) 20 MG capsule, Take 20 mg by  "mouth Daily., Disp: , Rfl:   •  Psyllium (METAMUCIL) wafer wafer, Take 2 wafers by mouth As Needed., Disp: , Rfl:   •  simvastatin (ZOCOR) 80 MG tablet, Take 1 tablet by mouth Daily., Disp: , Rfl:   •  sucralfate (CARAFATE) 1 g tablet, TAKE ONE TABLET BY MOUTH EVERY 12 HOURS, Disp: 60 tablet, Rfl: 0  •  Thiamine HCl (VITAMIN B-1 PO), Take 1 tablet by mouth Daily., Disp: , Rfl:     Allergies   Allergen Reactions   • Augmentin [Amoxicillin-Pot Clavulanate] Hives       Family History   Problem Relation Age of Onset   • Lung cancer Mother    • Heart disease Father        Social History     Social History   • Marital status:      Spouse name: N/A   • Number of children: N/A   • Years of education: N/A     Occupational History   • Not on file.     Social History Main Topics   • Smoking status: Former Smoker     Packs/day: 1.00     Years: 30.00     Types: Cigarettes   • Smokeless tobacco: Never Used      Comment: FEB 2016   • Alcohol use No   • Drug use: Yes     Special: Hydrocodone   • Sexual activity: Defer     Other Topics Concern   • Not on file     Social History Narrative       Review of Systems   All other systems reviewed and are negative.      Objective     Pulse 52  Ht 69\" (175.3 cm)  Wt 190 lb 6.4 oz (86.4 kg)  SpO2 94%  BMI 28.12 kg/m2    Physical Exam   Constitutional: He is oriented to person, place, and time. He appears well-developed and well-nourished. No distress.   HENT:   Head: Normocephalic and atraumatic.   Eyes: Conjunctivae are normal. No scleral icterus.   Neck: No JVD present.   Cardiovascular: Normal rate, regular rhythm, normal heart sounds and intact distal pulses.    No murmur heard.  Pulmonary/Chest: Effort normal and breath sounds normal. No respiratory distress. He has no wheezes. He has no rales.   Abdominal: Soft. Bowel sounds are normal. He exhibits no distension and no mass. There is no tenderness. There is no rebound and no guarding. A hernia is present. "       Musculoskeletal: He exhibits no edema, tenderness or deformity.   Left AKA wearing his prosthesis today   Neurological: He is alert and oriented to person, place, and time.   Skin: Skin is warm and dry.   Psychiatric: He has a normal mood and affect.   Nursing note and vitals reviewed.      ASSESSMENT:  Colostomy closure and incidental appendectomy on 10-.  Diarrhea well controlled on medication.  Aortoenteric fistula status post anatomic bypass  Ischemic colitis following aortic surgery  PVD  Incisional hernia- nontender, but enlarging.    PLAN:  The hernia is not tender, and not particularly difficult to reduce, but I'm concerned that the size is such that intestinal obstruction or incarceration can occur with a fascial defect of 3.5 cm.  I'm also concerned that it it is larger than at his last visit.  I have recommended that we proceed at this time with repair of the incisional hernia.  I discussed possible problems such as surgery site infections, bowel obstruction, and recurrent hernia.  Discussed the placement of mesh to achieve a tension-free repair.  Meanwhile I would like to continue Imodium and Metamucil daily.  Continue with physical therapy.  Currently taking aspirin, Plavix.  I'm going to continue these up to the day of surgery.

## 2017-10-16 ENCOUNTER — APPOINTMENT (OUTPATIENT)
Dept: PREADMISSION TESTING | Facility: HOSPITAL | Age: 62
End: 2017-10-16

## 2017-10-16 VITALS
HEART RATE: 49 BPM | SYSTOLIC BLOOD PRESSURE: 126 MMHG | DIASTOLIC BLOOD PRESSURE: 75 MMHG | RESPIRATION RATE: 18 BRPM | WEIGHT: 190 LBS | BODY MASS INDEX: 28.14 KG/M2 | HEIGHT: 69 IN | TEMPERATURE: 98.1 F | OXYGEN SATURATION: 94 %

## 2017-10-16 LAB
DEPRECATED RDW RBC AUTO: 51.9 FL (ref 37–54)
ERYTHROCYTE [DISTWIDTH] IN BLOOD BY AUTOMATED COUNT: 14.1 % (ref 11.5–14.5)
HCT VFR BLD AUTO: 56.1 % (ref 40.4–52.2)
HGB BLD-MCNC: 18.9 G/DL (ref 13.7–17.6)
MCH RBC QN AUTO: 33.7 PG (ref 27–32.7)
MCHC RBC AUTO-ENTMCNC: 33.7 G/DL (ref 32.6–36.4)
MCV RBC AUTO: 100 FL (ref 79.8–96.2)
PLATELET # BLD AUTO: 206 10*3/MM3 (ref 140–500)
PMV BLD AUTO: 10.6 FL (ref 6–12)
RBC # BLD AUTO: 5.61 10*6/MM3 (ref 4.6–6)
WBC NRBC COR # BLD: 7.61 10*3/MM3 (ref 4.5–10.7)

## 2017-10-16 PROCEDURE — 93010 ELECTROCARDIOGRAM REPORT: CPT | Performed by: INTERNAL MEDICINE

## 2017-10-16 RX ORDER — GLUCOSA SU 2KCL/CHONDROITIN SU 500-400 MG
400 CAPSULE ORAL NIGHTLY
COMMUNITY
End: 2019-06-05 | Stop reason: SDUPTHER

## 2017-10-16 RX ORDER — SUCRALFATE 1 G/1
1 TABLET ORAL AS NEEDED
COMMUNITY
End: 2019-06-05

## 2017-10-16 RX ORDER — GABAPENTIN 300 MG/1
600 CAPSULE ORAL NIGHTLY
COMMUNITY
End: 2017-11-15

## 2017-10-19 ENCOUNTER — HOSPITAL ENCOUNTER (INPATIENT)
Facility: HOSPITAL | Age: 62
LOS: 7 days | Discharge: HOME | End: 2017-10-26
Attending: SURGERY | Admitting: SURGERY

## 2017-10-19 ENCOUNTER — ANESTHESIA (OUTPATIENT)
Dept: PERIOP | Facility: HOSPITAL | Age: 62
End: 2017-10-19

## 2017-10-19 ENCOUNTER — ANESTHESIA EVENT (OUTPATIENT)
Dept: PERIOP | Facility: HOSPITAL | Age: 62
End: 2017-10-19

## 2017-10-19 DIAGNOSIS — K43.2 INCISIONAL HERNIA, WITHOUT OBSTRUCTION OR GANGRENE: ICD-10-CM

## 2017-10-19 PROCEDURE — C1781 MESH (IMPLANTABLE): HCPCS | Performed by: SURGERY

## 2017-10-19 PROCEDURE — 25010000002 FENTANYL CITRATE (PF) 100 MCG/2ML SOLUTION: Performed by: NURSE ANESTHETIST, CERTIFIED REGISTERED

## 2017-10-19 PROCEDURE — 49560 PR REPAIR INCISIONAL HERNIA,REDUCIBLE: CPT | Performed by: PHYSICIAN ASSISTANT

## 2017-10-19 PROCEDURE — 49560 PR REPAIR INCISIONAL HERNIA,REDUCIBLE: CPT | Performed by: SURGERY

## 2017-10-19 PROCEDURE — 25010000002 DEXAMETHASONE PER 1 MG: Performed by: NURSE ANESTHETIST, CERTIFIED REGISTERED

## 2017-10-19 PROCEDURE — 49568 PR IMPLANT MESH HERNIA REPAIR/DEBRIDEMENT CLOSURE: CPT | Performed by: SURGERY

## 2017-10-19 PROCEDURE — 25010000002 LEVOFLOXACIN PER 250 MG: Performed by: SURGERY

## 2017-10-19 PROCEDURE — 94640 AIRWAY INHALATION TREATMENT: CPT

## 2017-10-19 PROCEDURE — 49568 PR IMPLANT MESH HERNIA REPAIR/DEBRIDEMENT CLOSURE: CPT | Performed by: PHYSICIAN ASSISTANT

## 2017-10-19 PROCEDURE — 25010000002 HYDROMORPHONE PER 4 MG: Performed by: NURSE ANESTHETIST, CERTIFIED REGISTERED

## 2017-10-19 PROCEDURE — 25010000002 MIDAZOLAM PER 1 MG: Performed by: ANESTHESIOLOGY

## 2017-10-19 PROCEDURE — 94799 UNLISTED PULMONARY SVC/PX: CPT

## 2017-10-19 PROCEDURE — 25010000002 PHENYLEPHRINE PER 1 ML: Performed by: NURSE ANESTHETIST, CERTIFIED REGISTERED

## 2017-10-19 PROCEDURE — 25010000002 ONDANSETRON PER 1 MG: Performed by: NURSE ANESTHETIST, CERTIFIED REGISTERED

## 2017-10-19 PROCEDURE — 25010000002 PROPOFOL 10 MG/ML EMULSION: Performed by: NURSE ANESTHETIST, CERTIFIED REGISTERED

## 2017-10-19 PROCEDURE — 25810000003 SODIUM CHLORIDE 0.9 % WITH KCL 20 MEQ 20-0.9 MEQ/L-% SOLUTION: Performed by: SURGERY

## 2017-10-19 PROCEDURE — 25010000002 HYDROMORPHONE PER 4 MG: Performed by: SURGERY

## 2017-10-19 PROCEDURE — 25010000002 NEOSTIGMINE PER 0.5 MG: Performed by: NURSE ANESTHETIST, CERTIFIED REGISTERED

## 2017-10-19 PROCEDURE — 0WUF0JZ SUPPLEMENT ABDOMINAL WALL WITH SYNTHETIC SUBSTITUTE, OPEN APPROACH: ICD-10-PCS | Performed by: SURGERY

## 2017-10-19 DEVICE — VENTRALIGHT ST MESH
Type: IMPLANTABLE DEVICE | Site: ABDOMEN | Status: FUNCTIONAL
Brand: VENTRALIGHT ST

## 2017-10-19 RX ORDER — PROPOFOL 10 MG/ML
VIAL (ML) INTRAVENOUS AS NEEDED
Status: DISCONTINUED | OUTPATIENT
Start: 2017-10-19 | End: 2017-10-19 | Stop reason: SURG

## 2017-10-19 RX ORDER — FAMOTIDINE 10 MG/ML
20 INJECTION, SOLUTION INTRAVENOUS
Status: DISCONTINUED | OUTPATIENT
Start: 2017-10-19 | End: 2017-10-19 | Stop reason: HOSPADM

## 2017-10-19 RX ORDER — HYDROMORPHONE HYDROCHLORIDE 1 MG/ML
0.5 INJECTION, SOLUTION INTRAMUSCULAR; INTRAVENOUS; SUBCUTANEOUS
Status: DISCONTINUED | OUTPATIENT
Start: 2017-10-19 | End: 2017-10-19

## 2017-10-19 RX ORDER — FENTANYL CITRATE 50 UG/ML
50 INJECTION, SOLUTION INTRAMUSCULAR; INTRAVENOUS
Status: DISCONTINUED | OUTPATIENT
Start: 2017-10-19 | End: 2017-10-19

## 2017-10-19 RX ORDER — HYDROCODONE BITARTRATE AND ACETAMINOPHEN 7.5; 325 MG/1; MG/1
1 TABLET ORAL ONCE AS NEEDED
Status: DISCONTINUED | OUTPATIENT
Start: 2017-10-19 | End: 2017-10-19

## 2017-10-19 RX ORDER — DIPHENHYDRAMINE HYDROCHLORIDE 50 MG/ML
12.5 INJECTION INTRAMUSCULAR; INTRAVENOUS
Status: DISCONTINUED | OUTPATIENT
Start: 2017-10-19 | End: 2017-10-19

## 2017-10-19 RX ORDER — MIDAZOLAM HYDROCHLORIDE 1 MG/ML
2 INJECTION INTRAMUSCULAR; INTRAVENOUS
Status: DISCONTINUED | OUTPATIENT
Start: 2017-10-19 | End: 2017-10-19 | Stop reason: HOSPADM

## 2017-10-19 RX ORDER — CLOPIDOGREL BISULFATE 75 MG/1
75 TABLET ORAL DAILY
Status: DISCONTINUED | OUTPATIENT
Start: 2017-10-20 | End: 2017-10-20

## 2017-10-19 RX ORDER — ONDANSETRON 2 MG/ML
4 INJECTION INTRAMUSCULAR; INTRAVENOUS EVERY 6 HOURS PRN
Status: DISCONTINUED | OUTPATIENT
Start: 2017-10-19 | End: 2017-10-26 | Stop reason: HOSPADM

## 2017-10-19 RX ORDER — LABETALOL HYDROCHLORIDE 5 MG/ML
5 INJECTION, SOLUTION INTRAVENOUS
Status: DISCONTINUED | OUTPATIENT
Start: 2017-10-19 | End: 2017-10-19

## 2017-10-19 RX ORDER — OXYCODONE AND ACETAMINOPHEN 7.5; 325 MG/1; MG/1
1 TABLET ORAL ONCE AS NEEDED
Status: DISCONTINUED | OUTPATIENT
Start: 2017-10-19 | End: 2017-10-19

## 2017-10-19 RX ORDER — PROMETHAZINE HYDROCHLORIDE 25 MG/ML
12.5 INJECTION, SOLUTION INTRAMUSCULAR; INTRAVENOUS ONCE AS NEEDED
Status: DISCONTINUED | OUTPATIENT
Start: 2017-10-19 | End: 2017-10-19

## 2017-10-19 RX ORDER — ATROPINE SULFATE 0.4 MG/ML
AMPUL (ML) INJECTION AS NEEDED
Status: DISCONTINUED | OUTPATIENT
Start: 2017-10-19 | End: 2017-10-19 | Stop reason: SURG

## 2017-10-19 RX ORDER — ONDANSETRON 2 MG/ML
4 INJECTION INTRAMUSCULAR; INTRAVENOUS ONCE AS NEEDED
Status: DISCONTINUED | OUTPATIENT
Start: 2017-10-19 | End: 2017-10-19

## 2017-10-19 RX ORDER — PROMETHAZINE HYDROCHLORIDE 25 MG/1
12.5 TABLET ORAL ONCE AS NEEDED
Status: DISCONTINUED | OUTPATIENT
Start: 2017-10-19 | End: 2017-10-19

## 2017-10-19 RX ORDER — GLYCOPYRROLATE 0.2 MG/ML
INJECTION INTRAMUSCULAR; INTRAVENOUS AS NEEDED
Status: DISCONTINUED | OUTPATIENT
Start: 2017-10-19 | End: 2017-10-19 | Stop reason: SURG

## 2017-10-19 RX ORDER — ONDANSETRON 4 MG/1
4 TABLET, FILM COATED ORAL EVERY 6 HOURS PRN
Status: DISCONTINUED | OUTPATIENT
Start: 2017-10-19 | End: 2017-10-26 | Stop reason: HOSPADM

## 2017-10-19 RX ORDER — ATORVASTATIN CALCIUM 20 MG/1
40 TABLET, FILM COATED ORAL DAILY
Status: DISCONTINUED | OUTPATIENT
Start: 2017-10-19 | End: 2017-10-26 | Stop reason: HOSPADM

## 2017-10-19 RX ORDER — ALBUTEROL SULFATE 2.5 MG/3ML
2.5 SOLUTION RESPIRATORY (INHALATION) EVERY 6 HOURS PRN
Status: DISCONTINUED | OUTPATIENT
Start: 2017-10-19 | End: 2017-10-26 | Stop reason: HOSPADM

## 2017-10-19 RX ORDER — SODIUM CHLORIDE AND POTASSIUM CHLORIDE 150; 900 MG/100ML; MG/100ML
100 INJECTION, SOLUTION INTRAVENOUS CONTINUOUS
Status: DISCONTINUED | OUTPATIENT
Start: 2017-10-19 | End: 2017-10-20

## 2017-10-19 RX ORDER — GABAPENTIN 300 MG/1
600 CAPSULE ORAL NIGHTLY
Status: DISCONTINUED | OUTPATIENT
Start: 2017-10-19 | End: 2017-10-26 | Stop reason: HOSPADM

## 2017-10-19 RX ORDER — ONDANSETRON 4 MG/1
4 TABLET, ORALLY DISINTEGRATING ORAL EVERY 6 HOURS PRN
Status: DISCONTINUED | OUTPATIENT
Start: 2017-10-19 | End: 2017-10-26 | Stop reason: HOSPADM

## 2017-10-19 RX ORDER — LIDOCAINE HYDROCHLORIDE 20 MG/ML
INJECTION, SOLUTION INFILTRATION; PERINEURAL AS NEEDED
Status: DISCONTINUED | OUTPATIENT
Start: 2017-10-19 | End: 2017-10-19 | Stop reason: SURG

## 2017-10-19 RX ORDER — LEVOFLOXACIN 5 MG/ML
500 INJECTION, SOLUTION INTRAVENOUS EVERY 24 HOURS
Status: COMPLETED | OUTPATIENT
Start: 2017-10-20 | End: 2017-10-22

## 2017-10-19 RX ORDER — HYDROMORPHONE HYDROCHLORIDE 1 MG/ML
0.5 INJECTION, SOLUTION INTRAMUSCULAR; INTRAVENOUS; SUBCUTANEOUS
Status: DISCONTINUED | OUTPATIENT
Start: 2017-10-19 | End: 2017-10-26 | Stop reason: HOSPADM

## 2017-10-19 RX ORDER — MAGNESIUM HYDROXIDE 1200 MG/15ML
LIQUID ORAL AS NEEDED
Status: DISCONTINUED | OUTPATIENT
Start: 2017-10-19 | End: 2017-10-19 | Stop reason: HOSPADM

## 2017-10-19 RX ORDER — PROMETHAZINE HYDROCHLORIDE 25 MG/1
25 TABLET ORAL ONCE AS NEEDED
Status: DISCONTINUED | OUTPATIENT
Start: 2017-10-19 | End: 2017-10-19

## 2017-10-19 RX ORDER — SODIUM CHLORIDE 0.9 % (FLUSH) 0.9 %
1-10 SYRINGE (ML) INJECTION AS NEEDED
Status: DISCONTINUED | OUTPATIENT
Start: 2017-10-19 | End: 2017-10-19 | Stop reason: HOSPADM

## 2017-10-19 RX ORDER — PROMETHAZINE HYDROCHLORIDE 25 MG/1
25 SUPPOSITORY RECTAL ONCE AS NEEDED
Status: DISCONTINUED | OUTPATIENT
Start: 2017-10-19 | End: 2017-10-19

## 2017-10-19 RX ORDER — AMLODIPINE BESYLATE 10 MG/1
10 TABLET ORAL EVERY MORNING
Status: DISCONTINUED | OUTPATIENT
Start: 2017-10-20 | End: 2017-10-22

## 2017-10-19 RX ORDER — PANTOPRAZOLE SODIUM 40 MG/1
40 TABLET, DELAYED RELEASE ORAL EVERY MORNING
Status: DISCONTINUED | OUTPATIENT
Start: 2017-10-20 | End: 2017-10-26 | Stop reason: HOSPADM

## 2017-10-19 RX ORDER — GABAPENTIN 300 MG/1
300 CAPSULE ORAL EVERY MORNING
Status: DISCONTINUED | OUTPATIENT
Start: 2017-10-20 | End: 2017-10-26 | Stop reason: HOSPADM

## 2017-10-19 RX ORDER — BUPIVACAINE HYDROCHLORIDE AND EPINEPHRINE 5; 5 MG/ML; UG/ML
INJECTION, SOLUTION PERINEURAL AS NEEDED
Status: DISCONTINUED | OUTPATIENT
Start: 2017-10-19 | End: 2017-10-19 | Stop reason: HOSPADM

## 2017-10-19 RX ORDER — MIDAZOLAM HYDROCHLORIDE 1 MG/ML
1 INJECTION INTRAMUSCULAR; INTRAVENOUS
Status: DISCONTINUED | OUTPATIENT
Start: 2017-10-19 | End: 2017-10-19 | Stop reason: HOSPADM

## 2017-10-19 RX ORDER — ASPIRIN 81 MG/1
81 TABLET, CHEWABLE ORAL DAILY
Status: DISCONTINUED | OUTPATIENT
Start: 2017-10-19 | End: 2017-10-26 | Stop reason: HOSPADM

## 2017-10-19 RX ORDER — ROCURONIUM BROMIDE 10 MG/ML
INJECTION, SOLUTION INTRAVENOUS AS NEEDED
Status: DISCONTINUED | OUTPATIENT
Start: 2017-10-19 | End: 2017-10-19 | Stop reason: SURG

## 2017-10-19 RX ORDER — LEVOTHYROXINE SODIUM 0.12 MG/1
125 TABLET ORAL
Status: DISCONTINUED | OUTPATIENT
Start: 2017-10-20 | End: 2017-10-26 | Stop reason: HOSPADM

## 2017-10-19 RX ORDER — NALOXONE HCL 0.4 MG/ML
0.2 VIAL (ML) INJECTION AS NEEDED
Status: DISCONTINUED | OUTPATIENT
Start: 2017-10-19 | End: 2017-10-19

## 2017-10-19 RX ORDER — LEVOFLOXACIN 5 MG/ML
500 INJECTION, SOLUTION INTRAVENOUS ONCE
Status: COMPLETED | OUTPATIENT
Start: 2017-10-19 | End: 2017-10-19

## 2017-10-19 RX ORDER — FENTANYL CITRATE 50 UG/ML
INJECTION, SOLUTION INTRAMUSCULAR; INTRAVENOUS AS NEEDED
Status: DISCONTINUED | OUTPATIENT
Start: 2017-10-19 | End: 2017-10-19 | Stop reason: SURG

## 2017-10-19 RX ORDER — HYDRALAZINE HYDROCHLORIDE 20 MG/ML
5 INJECTION INTRAMUSCULAR; INTRAVENOUS
Status: DISCONTINUED | OUTPATIENT
Start: 2017-10-19 | End: 2017-10-19

## 2017-10-19 RX ORDER — EPHEDRINE SULFATE 50 MG/ML
INJECTION, SOLUTION INTRAVENOUS AS NEEDED
Status: DISCONTINUED | OUTPATIENT
Start: 2017-10-19 | End: 2017-10-19 | Stop reason: SURG

## 2017-10-19 RX ORDER — NALOXONE HCL 0.4 MG/ML
0.1 VIAL (ML) INJECTION
Status: DISCONTINUED | OUTPATIENT
Start: 2017-10-19 | End: 2017-10-26 | Stop reason: HOSPADM

## 2017-10-19 RX ORDER — ALBUTEROL SULFATE 90 UG/1
2 AEROSOL, METERED RESPIRATORY (INHALATION) EVERY 6 HOURS PRN
Status: DISCONTINUED | OUTPATIENT
Start: 2017-10-19 | End: 2017-10-19 | Stop reason: CLARIF

## 2017-10-19 RX ORDER — FLUMAZENIL 0.1 MG/ML
0.2 INJECTION INTRAVENOUS AS NEEDED
Status: DISCONTINUED | OUTPATIENT
Start: 2017-10-19 | End: 2017-10-19

## 2017-10-19 RX ORDER — BUPIVACAINE HYDROCHLORIDE 2.5 MG/ML
INJECTION, SOLUTION EPIDURAL; INFILTRATION; INTRACAUDAL AS NEEDED
Status: DISCONTINUED | OUTPATIENT
Start: 2017-10-19 | End: 2017-10-19 | Stop reason: HOSPADM

## 2017-10-19 RX ORDER — ISOSORBIDE MONONITRATE 30 MG/1
30 TABLET, EXTENDED RELEASE ORAL EVERY MORNING
Status: DISCONTINUED | OUTPATIENT
Start: 2017-10-20 | End: 2017-10-26 | Stop reason: HOSPADM

## 2017-10-19 RX ORDER — ONDANSETRON 2 MG/ML
INJECTION INTRAMUSCULAR; INTRAVENOUS AS NEEDED
Status: DISCONTINUED | OUTPATIENT
Start: 2017-10-19 | End: 2017-10-19 | Stop reason: SURG

## 2017-10-19 RX ORDER — SODIUM CHLORIDE, SODIUM LACTATE, POTASSIUM CHLORIDE, CALCIUM CHLORIDE 600; 310; 30; 20 MG/100ML; MG/100ML; MG/100ML; MG/100ML
9 INJECTION, SOLUTION INTRAVENOUS CONTINUOUS PRN
Status: DISCONTINUED | OUTPATIENT
Start: 2017-10-19 | End: 2017-10-19 | Stop reason: HOSPADM

## 2017-10-19 RX ORDER — OXYCODONE AND ACETAMINOPHEN 10; 325 MG/1; MG/1
1 TABLET ORAL EVERY 4 HOURS PRN
Status: DISCONTINUED | OUTPATIENT
Start: 2017-10-19 | End: 2017-10-26 | Stop reason: HOSPADM

## 2017-10-19 RX ORDER — DEXAMETHASONE SODIUM PHOSPHATE 10 MG/ML
INJECTION INTRAMUSCULAR; INTRAVENOUS AS NEEDED
Status: DISCONTINUED | OUTPATIENT
Start: 2017-10-19 | End: 2017-10-19 | Stop reason: SURG

## 2017-10-19 RX ORDER — CARVEDILOL 12.5 MG/1
12.5 TABLET ORAL 2 TIMES DAILY
Status: DISCONTINUED | OUTPATIENT
Start: 2017-10-19 | End: 2017-10-26 | Stop reason: HOSPADM

## 2017-10-19 RX ORDER — ALPRAZOLAM 0.5 MG/1
0.5 TABLET ORAL 4 TIMES DAILY PRN
Status: DISCONTINUED | OUTPATIENT
Start: 2017-10-19 | End: 2017-10-26 | Stop reason: HOSPADM

## 2017-10-19 RX ORDER — EPHEDRINE SULFATE 50 MG/ML
5 INJECTION, SOLUTION INTRAVENOUS ONCE AS NEEDED
Status: DISCONTINUED | OUTPATIENT
Start: 2017-10-19 | End: 2017-10-19

## 2017-10-19 RX ADMIN — POTASSIUM CHLORIDE AND SODIUM CHLORIDE 100 ML/HR: 900; 150 INJECTION, SOLUTION INTRAVENOUS at 14:50

## 2017-10-19 RX ADMIN — IPRATROPIUM BROMIDE 0.5 MG: 0.5 SOLUTION RESPIRATORY (INHALATION) at 20:57

## 2017-10-19 RX ADMIN — GABAPENTIN 600 MG: 300 CAPSULE ORAL at 20:09

## 2017-10-19 RX ADMIN — ROCURONIUM BROMIDE 10 MG: 10 INJECTION INTRAVENOUS at 11:07

## 2017-10-19 RX ADMIN — SODIUM CHLORIDE 500 ML: 9 INJECTION, SOLUTION INTRAVENOUS at 22:48

## 2017-10-19 RX ADMIN — SODIUM CHLORIDE, POTASSIUM CHLORIDE, SODIUM LACTATE AND CALCIUM CHLORIDE: 600; 310; 30; 20 INJECTION, SOLUTION INTRAVENOUS at 08:59

## 2017-10-19 RX ADMIN — GLYCOPYRROLATE 0.6 MG: 0.2 INJECTION INTRAMUSCULAR; INTRAVENOUS at 11:59

## 2017-10-19 RX ADMIN — HYDROMORPHONE HYDROCHLORIDE 0.5 MG: 1 INJECTION, SOLUTION INTRAMUSCULAR; INTRAVENOUS; SUBCUTANEOUS at 23:45

## 2017-10-19 RX ADMIN — PHENYLEPHRINE HYDROCHLORIDE 100 MCG: 10 INJECTION INTRAVENOUS at 09:54

## 2017-10-19 RX ADMIN — HYDROMORPHONE HYDROCHLORIDE 0.5 MG: 1 INJECTION, SOLUTION INTRAMUSCULAR; INTRAVENOUS; SUBCUTANEOUS at 15:03

## 2017-10-19 RX ADMIN — HYDROMORPHONE HYDROCHLORIDE 0.5 MG: 1 INJECTION, SOLUTION INTRAMUSCULAR; INTRAVENOUS; SUBCUTANEOUS at 20:08

## 2017-10-19 RX ADMIN — ROCURONIUM BROMIDE 10 MG: 10 INJECTION INTRAVENOUS at 10:12

## 2017-10-19 RX ADMIN — FENTANYL CITRATE 50 MCG: 50 INJECTION INTRAMUSCULAR; INTRAVENOUS at 11:09

## 2017-10-19 RX ADMIN — EPHEDRINE SULFATE 10 MG: 50 INJECTION INTRAMUSCULAR; INTRAVENOUS; SUBCUTANEOUS at 09:15

## 2017-10-19 RX ADMIN — ATROPINE SULFATE 0.4 MG: 0.4 INJECTION, SOLUTION INTRAMUSCULAR; INTRAVENOUS; SUBCUTANEOUS at 09:10

## 2017-10-19 RX ADMIN — NEOSTIGMINE METHYLSULFATE 3 MG: 1 INJECTION INTRAMUSCULAR; INTRAVENOUS; SUBCUTANEOUS at 11:59

## 2017-10-19 RX ADMIN — LIDOCAINE HYDROCHLORIDE 60 MG: 20 INJECTION, SOLUTION INFILTRATION; PERINEURAL at 09:03

## 2017-10-19 RX ADMIN — FENTANYL CITRATE 50 MCG: 50 INJECTION INTRAMUSCULAR; INTRAVENOUS at 09:05

## 2017-10-19 RX ADMIN — ROCURONIUM BROMIDE 35 MG: 10 INJECTION INTRAVENOUS at 09:03

## 2017-10-19 RX ADMIN — FAMOTIDINE 20 MG: 10 INJECTION, SOLUTION INTRAVENOUS at 08:24

## 2017-10-19 RX ADMIN — DEXAMETHASONE SODIUM PHOSPHATE 8 MG: 10 INJECTION INTRAMUSCULAR; INTRAVENOUS at 09:30

## 2017-10-19 RX ADMIN — HYDROMORPHONE HYDROCHLORIDE 0.5 MG: 1 INJECTION, SOLUTION INTRAMUSCULAR; INTRAVENOUS; SUBCUTANEOUS at 13:19

## 2017-10-19 RX ADMIN — ATORVASTATIN CALCIUM 40 MG: 20 TABLET, FILM COATED ORAL at 14:49

## 2017-10-19 RX ADMIN — OXYCODONE HYDROCHLORIDE AND ACETAMINOPHEN 1 TABLET: 10; 325 TABLET ORAL at 18:53

## 2017-10-19 RX ADMIN — MIDAZOLAM 2 MG: 1 INJECTION INTRAMUSCULAR; INTRAVENOUS at 08:24

## 2017-10-19 RX ADMIN — ONDANSETRON 4 MG: 2 INJECTION INTRAMUSCULAR; INTRAVENOUS at 11:45

## 2017-10-19 RX ADMIN — PHENYLEPHRINE HYDROCHLORIDE 100 MCG: 10 INJECTION INTRAVENOUS at 09:37

## 2017-10-19 RX ADMIN — IPRATROPIUM BROMIDE 0.5 MG: 0.5 SOLUTION RESPIRATORY (INHALATION) at 15:28

## 2017-10-19 RX ADMIN — PROPOFOL 150 MG: 10 INJECTION, EMULSION INTRAVENOUS at 09:03

## 2017-10-19 RX ADMIN — POTASSIUM CHLORIDE AND SODIUM CHLORIDE 100 ML/HR: 900; 150 INJECTION, SOLUTION INTRAVENOUS at 23:35

## 2017-10-19 RX ADMIN — PHENYLEPHRINE HYDROCHLORIDE 100 MCG: 10 INJECTION INTRAVENOUS at 11:32

## 2017-10-19 RX ADMIN — LEVOFLOXACIN 500 MG: 5 INJECTION, SOLUTION INTRAVENOUS at 07:54

## 2017-10-19 RX ADMIN — ASPIRIN 81 MG: 81 TABLET, CHEWABLE ORAL at 14:50

## 2017-10-19 RX ADMIN — HYDROMORPHONE HYDROCHLORIDE 0.5 MG: 1 INJECTION, SOLUTION INTRAMUSCULAR; INTRAVENOUS; SUBCUTANEOUS at 12:56

## 2017-10-19 RX ADMIN — ROCURONIUM BROMIDE 15 MG: 10 INJECTION INTRAVENOUS at 09:40

## 2017-10-19 RX ADMIN — SODIUM CHLORIDE, POTASSIUM CHLORIDE, SODIUM LACTATE AND CALCIUM CHLORIDE: 600; 310; 30; 20 INJECTION, SOLUTION INTRAVENOUS at 11:08

## 2017-10-19 RX ADMIN — FENTANYL CITRATE 50 MCG: 50 INJECTION INTRAMUSCULAR; INTRAVENOUS at 10:19

## 2017-10-19 NOTE — ANESTHESIA PROCEDURE NOTES
Airway  Urgency: elective    Date/Time: 10/19/2017 9:05 AM  Airway not difficult    General Information and Staff    Patient location during procedure: OR  Anesthesiologist: LIBIA MEAD  CRNA: JENNY COREY    Indications and Patient Condition  Indications for airway management: airway protection    Preoxygenated: yes  Mask difficulty assessment: 1 - vent by mask    Final Airway Details  Final airway type: endotracheal airway      Successful airway: ETT  Cuffed: yes   Successful intubation technique: direct laryngoscopy  Facilitating devices/methods: intubating stylet  Endotracheal tube insertion site: oral  Blade: Hoyt  Blade size: #2  ETT size: 7.5 mm  Cormack-Lehane Classification: grade I - full view of glottis  Placement verified by: chest auscultation and capnometry   Cuff volume (mL): 7  Measured from: teeth  ETT to teeth (cm): 21  Number of attempts at approach: 1

## 2017-10-19 NOTE — ANESTHESIA PREPROCEDURE EVALUATION
Anesthesia Evaluation     Patient summary reviewed          Airway   Mallampati: I  Neck ROM: full  no difficulty expected  Dental      Pulmonary    (+) COPD,   Cardiovascular     Rhythm: regular    (+) hypertension, PVD,       Neuro/Psych  GI/Hepatic/Renal/Endo      Musculoskeletal     Abdominal    Substance History      OB/GYN          Other                                        Anesthesia Plan    ASA 3     general     intravenous induction   Anesthetic plan and risks discussed with patient.  Use of blood products discussed with patient .

## 2017-10-19 NOTE — ANESTHESIA POSTPROCEDURE EVALUATION
Patient: Sim Agustin    Procedure Summary     Date Anesthesia Start Anesthesia Stop Room / Location    10/19/17 0859 1221  [facility] /  [facility]       Procedure Diagnosis Surgeon Provider    VENTRAL HERNIA REPAIR WITH MESH AND BILATERAL COMPONENT SEPARATION (N/A Abdomen) Incisional hernia, without obstruction or gangrene  (Incisional hernia, without obstruction or gangrene [K43.2]) MD Amanda Hi MD          Anesthesia Type: general  Last vitals  BP   145/77 (10/19/17 1320)   Temp   36.6 °C (97.9 °F) (10/19/17 1330)   Pulse   (!) 47 (10/19/17 1330)   Resp   16 (10/19/17 1320)     SpO2   93 % (10/19/17 1330)     Post Anesthesia Care and Evaluation    Patient location during evaluation: PACU  Patient participation: complete - patient participated  Level of consciousness: awake and alert  Pain management: adequate  Airway patency: patent  Anesthetic complications: No anesthetic complications    Cardiovascular status: acceptable  Respiratory status: acceptable  Hydration status: acceptable    Comments: ---------------------------               10/19/17                      1330         ---------------------------   BP:                         Pulse:       (!) 47         Resp:                       Temp:   36.6 °C (97.9 °F)   SpO2:          93%         ---------------------------

## 2017-10-20 ENCOUNTER — APPOINTMENT (OUTPATIENT)
Dept: GENERAL RADIOLOGY | Facility: HOSPITAL | Age: 62
End: 2017-10-20

## 2017-10-20 LAB
ANION GAP SERPL CALCULATED.3IONS-SCNC: 9.5 MMOL/L
BACTERIA UR QL AUTO: ABNORMAL /HPF
BASOPHILS # BLD AUTO: 0.02 10*3/MM3 (ref 0–0.2)
BASOPHILS NFR BLD AUTO: 0.1 % (ref 0–1.5)
BILIRUB UR QL STRIP: NEGATIVE
BUN BLD-MCNC: 32 MG/DL (ref 8–23)
BUN/CREAT SERPL: 11.9 (ref 7–25)
CALCIUM SPEC-SCNC: 7.3 MG/DL (ref 8.6–10.5)
CHLORIDE SERPL-SCNC: 103 MMOL/L (ref 98–107)
CLARITY UR: ABNORMAL
CO2 SERPL-SCNC: 21.5 MMOL/L (ref 22–29)
COD CRY URNS QL: ABNORMAL /HPF
COLOR UR: YELLOW
CREAT BLD-MCNC: 2.7 MG/DL (ref 0.76–1.27)
CREAT UR-MCNC: 162.9 MG/DL
DEPRECATED RDW RBC AUTO: 53.2 FL (ref 37–54)
DEPRECATED RDW RBC AUTO: 53.2 FL (ref 37–54)
EOSINOPHIL # BLD AUTO: 0.01 10*3/MM3 (ref 0–0.7)
EOSINOPHIL NFR BLD AUTO: 0.1 % (ref 0.3–6.2)
ERYTHROCYTE [DISTWIDTH] IN BLOOD BY AUTOMATED COUNT: 13.9 % (ref 11.5–14.5)
ERYTHROCYTE [DISTWIDTH] IN BLOOD BY AUTOMATED COUNT: 14.1 % (ref 11.5–14.5)
FINE GRAN CASTS URNS QL MICRO: ABNORMAL /LPF
GFR SERPL CREATININE-BSD FRML MDRD: 24 ML/MIN/1.73
GLUCOSE BLD-MCNC: 108 MG/DL (ref 65–99)
GLUCOSE BLDC GLUCOMTR-MCNC: 105 MG/DL (ref 70–130)
GLUCOSE BLDC GLUCOMTR-MCNC: 107 MG/DL (ref 70–130)
GLUCOSE BLDC GLUCOMTR-MCNC: 128 MG/DL (ref 70–130)
GLUCOSE BLDC GLUCOMTR-MCNC: 139 MG/DL (ref 70–130)
GLUCOSE UR STRIP-MCNC: NEGATIVE MG/DL
HCT VFR BLD AUTO: 40.9 % (ref 40.4–52.2)
HCT VFR BLD AUTO: 42.3 % (ref 40.4–52.2)
HGB BLD-MCNC: 13 G/DL (ref 13.7–17.6)
HGB BLD-MCNC: 13.2 G/DL (ref 13.7–17.6)
HGB UR QL STRIP.AUTO: ABNORMAL
HYALINE CASTS UR QL AUTO: ABNORMAL /LPF
IMM GRANULOCYTES # BLD: 0.05 10*3/MM3 (ref 0–0.03)
IMM GRANULOCYTES NFR BLD: 0.3 % (ref 0–0.5)
KETONES UR QL STRIP: NEGATIVE
LEUKOCYTE ESTERASE UR QL STRIP.AUTO: NEGATIVE
LYMPHOCYTES # BLD AUTO: 3.41 10*3/MM3 (ref 0.9–4.8)
LYMPHOCYTES NFR BLD AUTO: 19.1 % (ref 19.6–45.3)
MCH RBC QN AUTO: 32.6 PG (ref 27–32.7)
MCH RBC QN AUTO: 32.8 PG (ref 27–32.7)
MCHC RBC AUTO-ENTMCNC: 31.2 G/DL (ref 32.6–36.4)
MCHC RBC AUTO-ENTMCNC: 31.8 G/DL (ref 32.6–36.4)
MCV RBC AUTO: 103.3 FL (ref 79.8–96.2)
MCV RBC AUTO: 104.4 FL (ref 79.8–96.2)
MONOCYTES # BLD AUTO: 1.69 10*3/MM3 (ref 0.2–1.2)
MONOCYTES NFR BLD AUTO: 9.5 % (ref 5–12)
MUCOUS THREADS URNS QL MICRO: ABNORMAL /HPF
NEUTROPHILS # BLD AUTO: 12.63 10*3/MM3 (ref 1.9–8.1)
NEUTROPHILS NFR BLD AUTO: 70.9 % (ref 42.7–76)
NITRITE UR QL STRIP: NEGATIVE
PH UR STRIP.AUTO: <=5 [PH] (ref 5–8)
PLATELET # BLD AUTO: 190 10*3/MM3 (ref 140–500)
PLATELET # BLD AUTO: 195 10*3/MM3 (ref 140–500)
PMV BLD AUTO: 10.2 FL (ref 6–12)
PMV BLD AUTO: 10.5 FL (ref 6–12)
POTASSIUM BLD-SCNC: 5.3 MMOL/L (ref 3.5–5.2)
POTASSIUM BLD-SCNC: 5.3 MMOL/L (ref 3.5–5.2)
POTASSIUM BLD-SCNC: 8.1 MMOL/L (ref 3.5–5.2)
PROT UR QL STRIP: ABNORMAL
RBC # BLD AUTO: 3.96 10*6/MM3 (ref 4.6–6)
RBC # BLD AUTO: 4.05 10*6/MM3 (ref 4.6–6)
RBC # UR: ABNORMAL /HPF
REF LAB TEST METHOD: ABNORMAL
SODIUM BLD-SCNC: 134 MMOL/L (ref 136–145)
SODIUM UR-SCNC: 54 MMOL/L
SP GR UR STRIP: 1.02 (ref 1–1.03)
SQUAMOUS #/AREA URNS HPF: ABNORMAL /HPF
UROBILINOGEN UR QL STRIP: ABNORMAL
WBC NRBC COR # BLD: 15.53 10*3/MM3 (ref 4.5–10.7)
WBC NRBC COR # BLD: 17.81 10*3/MM3 (ref 4.5–10.7)
WBC UR QL AUTO: ABNORMAL /HPF

## 2017-10-20 PROCEDURE — 82570 ASSAY OF URINE CREATININE: CPT | Performed by: INTERNAL MEDICINE

## 2017-10-20 PROCEDURE — 99024 POSTOP FOLLOW-UP VISIT: CPT | Performed by: SURGERY

## 2017-10-20 PROCEDURE — 94799 UNLISTED PULMONARY SVC/PX: CPT

## 2017-10-20 PROCEDURE — 25010000002 HYDROMORPHONE PER 4 MG: Performed by: SURGERY

## 2017-10-20 PROCEDURE — 85025 COMPLETE CBC W/AUTO DIFF WBC: CPT | Performed by: SURGERY

## 2017-10-20 PROCEDURE — 84132 ASSAY OF SERUM POTASSIUM: CPT | Performed by: INTERNAL MEDICINE

## 2017-10-20 PROCEDURE — 25010000002 LEVOFLOXACIN PER 250 MG: Performed by: SURGERY

## 2017-10-20 PROCEDURE — 82962 GLUCOSE BLOOD TEST: CPT

## 2017-10-20 PROCEDURE — 25010000002 ONDANSETRON PER 1 MG: Performed by: SURGERY

## 2017-10-20 PROCEDURE — 71010 HC CHEST PA OR AP: CPT

## 2017-10-20 PROCEDURE — 85027 COMPLETE CBC AUTOMATED: CPT | Performed by: SURGERY

## 2017-10-20 PROCEDURE — 81001 URINALYSIS AUTO W/SCOPE: CPT | Performed by: INTERNAL MEDICINE

## 2017-10-20 PROCEDURE — 80048 BASIC METABOLIC PNL TOTAL CA: CPT | Performed by: SURGERY

## 2017-10-20 PROCEDURE — 25010000002 FUROSEMIDE PER 20 MG: Performed by: INTERNAL MEDICINE

## 2017-10-20 PROCEDURE — 25010000002 CALCIUM GLUCONATE PER 10 ML: Performed by: INTERNAL MEDICINE

## 2017-10-20 PROCEDURE — 25810000003 SODIUM CHLORIDE 0.9 % WITH KCL 20 MEQ 20-0.9 MEQ/L-% SOLUTION: Performed by: SURGERY

## 2017-10-20 PROCEDURE — 84300 ASSAY OF URINE SODIUM: CPT | Performed by: INTERNAL MEDICINE

## 2017-10-20 RX ORDER — IPRATROPIUM BROMIDE AND ALBUTEROL SULFATE 2.5; .5 MG/3ML; MG/3ML
3 SOLUTION RESPIRATORY (INHALATION)
Status: DISCONTINUED | OUTPATIENT
Start: 2017-10-20 | End: 2017-10-26 | Stop reason: HOSPADM

## 2017-10-20 RX ORDER — FAMOTIDINE 10 MG/ML
20 INJECTION, SOLUTION INTRAVENOUS EVERY 24 HOURS
Status: DISCONTINUED | OUTPATIENT
Start: 2017-10-20 | End: 2017-10-23 | Stop reason: SDUPTHER

## 2017-10-20 RX ORDER — SODIUM CHLORIDE 9 MG/ML
100 INJECTION, SOLUTION INTRAVENOUS CONTINUOUS
Status: DISCONTINUED | OUTPATIENT
Start: 2017-10-20 | End: 2017-10-22

## 2017-10-20 RX ORDER — SODIUM POLYSTYRENE SULFONATE 15 G/60ML
15 SUSPENSION ORAL; RECTAL ONCE
Status: DISCONTINUED | OUTPATIENT
Start: 2017-10-20 | End: 2017-10-20

## 2017-10-20 RX ORDER — FUROSEMIDE 10 MG/ML
80 INJECTION INTRAMUSCULAR; INTRAVENOUS ONCE
Status: COMPLETED | OUTPATIENT
Start: 2017-10-20 | End: 2017-10-20

## 2017-10-20 RX ADMIN — ONDANSETRON 4 MG: 2 INJECTION INTRAMUSCULAR; INTRAVENOUS at 00:18

## 2017-10-20 RX ADMIN — CLOPIDOGREL 75 MG: 75 TABLET, FILM COATED ORAL at 10:02

## 2017-10-20 RX ADMIN — IPRATROPIUM BROMIDE 0.5 MG: 0.5 SOLUTION RESPIRATORY (INHALATION) at 12:31

## 2017-10-20 RX ADMIN — IPRATROPIUM BROMIDE 0.5 MG: 0.5 SOLUTION RESPIRATORY (INHALATION) at 20:05

## 2017-10-20 RX ADMIN — HYDROMORPHONE HYDROCHLORIDE 0.5 MG: 1 INJECTION, SOLUTION INTRAMUSCULAR; INTRAVENOUS; SUBCUTANEOUS at 10:16

## 2017-10-20 RX ADMIN — OXYCODONE HYDROCHLORIDE AND ACETAMINOPHEN 1 TABLET: 10; 325 TABLET ORAL at 22:12

## 2017-10-20 RX ADMIN — ALPRAZOLAM 0.5 MG: 0.5 TABLET ORAL at 02:56

## 2017-10-20 RX ADMIN — LEVOFLOXACIN 500 MG: 500 INJECTION, SOLUTION INTRAVENOUS at 11:38

## 2017-10-20 RX ADMIN — ATORVASTATIN CALCIUM 40 MG: 20 TABLET, FILM COATED ORAL at 10:02

## 2017-10-20 RX ADMIN — ASPIRIN 81 MG: 81 TABLET, CHEWABLE ORAL at 10:02

## 2017-10-20 RX ADMIN — SODIUM CHLORIDE 500 ML: 9 INJECTION, SOLUTION INTRAVENOUS at 07:00

## 2017-10-20 RX ADMIN — FUROSEMIDE 80 MG: 10 INJECTION, SOLUTION INTRAMUSCULAR; INTRAVENOUS at 15:47

## 2017-10-20 RX ADMIN — CARVEDILOL 12.5 MG: 12.5 TABLET, FILM COATED ORAL at 18:45

## 2017-10-20 RX ADMIN — HYDROMORPHONE HYDROCHLORIDE 0.5 MG: 1 INJECTION, SOLUTION INTRAMUSCULAR; INTRAVENOUS; SUBCUTANEOUS at 20:03

## 2017-10-20 RX ADMIN — SODIUM CHLORIDE 100 ML/HR: 9 INJECTION, SOLUTION INTRAVENOUS at 12:42

## 2017-10-20 RX ADMIN — OXYCODONE HYDROCHLORIDE AND ACETAMINOPHEN 1 TABLET: 10; 325 TABLET ORAL at 00:20

## 2017-10-20 RX ADMIN — GABAPENTIN 600 MG: 300 CAPSULE ORAL at 22:12

## 2017-10-20 RX ADMIN — PANTOPRAZOLE SODIUM 40 MG: 40 TABLET, DELAYED RELEASE ORAL at 06:17

## 2017-10-20 RX ADMIN — GABAPENTIN 300 MG: 300 CAPSULE ORAL at 06:17

## 2017-10-20 RX ADMIN — HYDROMORPHONE HYDROCHLORIDE 0.5 MG: 1 INJECTION, SOLUTION INTRAMUSCULAR; INTRAVENOUS; SUBCUTANEOUS at 07:06

## 2017-10-20 RX ADMIN — SODIUM BICARBONATE 50 MEQ: 84 INJECTION, SOLUTION INTRAVENOUS at 13:46

## 2017-10-20 RX ADMIN — HYDROMORPHONE HYDROCHLORIDE 0.5 MG: 1 INJECTION, SOLUTION INTRAMUSCULAR; INTRAVENOUS; SUBCUTANEOUS at 16:00

## 2017-10-20 RX ADMIN — IPRATROPIUM BROMIDE 0.5 MG: 0.5 SOLUTION RESPIRATORY (INHALATION) at 15:47

## 2017-10-20 RX ADMIN — SODIUM CHLORIDE 500 ML: 9 INJECTION, SOLUTION INTRAVENOUS at 02:30

## 2017-10-20 RX ADMIN — ALPRAZOLAM 0.5 MG: 0.5 TABLET ORAL at 22:12

## 2017-10-20 RX ADMIN — CALCIUM GLUCONATE 2 G: 94 INJECTION, SOLUTION INTRAVENOUS at 13:41

## 2017-10-20 RX ADMIN — HYDROMORPHONE HYDROCHLORIDE 0.5 MG: 1 INJECTION, SOLUTION INTRAMUSCULAR; INTRAVENOUS; SUBCUTANEOUS at 13:25

## 2017-10-20 RX ADMIN — LEVOTHYROXINE SODIUM 125 MCG: 125 TABLET ORAL at 06:17

## 2017-10-20 RX ADMIN — ONDANSETRON 4 MG: 2 INJECTION INTRAMUSCULAR; INTRAVENOUS at 07:06

## 2017-10-21 LAB
ALBUMIN SERPL-MCNC: 3.5 G/DL (ref 3.5–5.2)
ANION GAP SERPL CALCULATED.3IONS-SCNC: 11.4 MMOL/L
BASOPHILS # BLD AUTO: 0.01 10*3/MM3 (ref 0–0.2)
BASOPHILS NFR BLD AUTO: 0.1 % (ref 0–1.5)
BUN BLD-MCNC: 34 MG/DL (ref 8–23)
BUN/CREAT SERPL: 15 (ref 7–25)
CALCIUM SPEC-SCNC: 8.3 MG/DL (ref 8.6–10.5)
CHLORIDE SERPL-SCNC: 100 MMOL/L (ref 98–107)
CO2 SERPL-SCNC: 21.6 MMOL/L (ref 22–29)
CREAT BLD-MCNC: 2.26 MG/DL (ref 0.76–1.27)
DEPRECATED RDW RBC AUTO: 51.4 FL (ref 37–54)
EOSINOPHIL # BLD AUTO: 0.03 10*3/MM3 (ref 0–0.7)
EOSINOPHIL NFR BLD AUTO: 0.2 % (ref 0.3–6.2)
ERYTHROCYTE [DISTWIDTH] IN BLOOD BY AUTOMATED COUNT: 13.6 % (ref 11.5–14.5)
GFR SERPL CREATININE-BSD FRML MDRD: 30 ML/MIN/1.73
GLUCOSE BLD-MCNC: 109 MG/DL (ref 65–99)
GLUCOSE BLDC GLUCOMTR-MCNC: 106 MG/DL (ref 70–130)
GLUCOSE BLDC GLUCOMTR-MCNC: 109 MG/DL (ref 70–130)
GLUCOSE BLDC GLUCOMTR-MCNC: 110 MG/DL (ref 70–130)
GLUCOSE BLDC GLUCOMTR-MCNC: 111 MG/DL (ref 70–130)
HCT VFR BLD AUTO: 40.5 % (ref 40.4–52.2)
HGB BLD-MCNC: 12.8 G/DL (ref 13.7–17.6)
IMM GRANULOCYTES # BLD: 0.06 10*3/MM3 (ref 0–0.03)
IMM GRANULOCYTES NFR BLD: 0.5 % (ref 0–0.5)
LYMPHOCYTES # BLD AUTO: 2.02 10*3/MM3 (ref 0.9–4.8)
LYMPHOCYTES NFR BLD AUTO: 15.7 % (ref 19.6–45.3)
MAGNESIUM SERPL-MCNC: 2 MG/DL (ref 1.6–2.4)
MCH RBC QN AUTO: 32.8 PG (ref 27–32.7)
MCHC RBC AUTO-ENTMCNC: 31.6 G/DL (ref 32.6–36.4)
MCV RBC AUTO: 103.8 FL (ref 79.8–96.2)
MONOCYTES # BLD AUTO: 1.58 10*3/MM3 (ref 0.2–1.2)
MONOCYTES NFR BLD AUTO: 12.3 % (ref 5–12)
NEUTROPHILS # BLD AUTO: 9.14 10*3/MM3 (ref 1.9–8.1)
NEUTROPHILS NFR BLD AUTO: 71.2 % (ref 42.7–76)
PHOSPHATE SERPL-MCNC: 4 MG/DL (ref 2.5–4.5)
PLATELET # BLD AUTO: 181 10*3/MM3 (ref 140–500)
PMV BLD AUTO: 10.9 FL (ref 6–12)
POTASSIUM BLD-SCNC: 5 MMOL/L (ref 3.5–5.2)
POTASSIUM BLD-SCNC: 5.6 MMOL/L (ref 3.5–5.2)
RBC # BLD AUTO: 3.9 10*6/MM3 (ref 4.6–6)
SODIUM BLD-SCNC: 133 MMOL/L (ref 136–145)
WBC NRBC COR # BLD: 12.84 10*3/MM3 (ref 4.5–10.7)

## 2017-10-21 PROCEDURE — 80069 RENAL FUNCTION PANEL: CPT | Performed by: INTERNAL MEDICINE

## 2017-10-21 PROCEDURE — 25010000002 LEVOFLOXACIN PER 250 MG: Performed by: SURGERY

## 2017-10-21 PROCEDURE — 94799 UNLISTED PULMONARY SVC/PX: CPT

## 2017-10-21 PROCEDURE — 85025 COMPLETE CBC W/AUTO DIFF WBC: CPT | Performed by: INTERNAL MEDICINE

## 2017-10-21 PROCEDURE — 83735 ASSAY OF MAGNESIUM: CPT | Performed by: INTERNAL MEDICINE

## 2017-10-21 PROCEDURE — 25010000002 FUROSEMIDE PER 20 MG: Performed by: INTERNAL MEDICINE

## 2017-10-21 PROCEDURE — 82962 GLUCOSE BLOOD TEST: CPT

## 2017-10-21 PROCEDURE — 84132 ASSAY OF SERUM POTASSIUM: CPT | Performed by: INTERNAL MEDICINE

## 2017-10-21 RX ORDER — GUAIFENESIN 600 MG/1
600 TABLET, EXTENDED RELEASE ORAL EVERY 12 HOURS SCHEDULED
Status: DISCONTINUED | OUTPATIENT
Start: 2017-10-21 | End: 2017-10-26 | Stop reason: HOSPADM

## 2017-10-21 RX ORDER — FUROSEMIDE 10 MG/ML
80 INJECTION INTRAMUSCULAR; INTRAVENOUS ONCE
Status: COMPLETED | OUTPATIENT
Start: 2017-10-21 | End: 2017-10-21

## 2017-10-21 RX ORDER — DEXTROSE MONOHYDRATE 25 G/50ML
25 INJECTION, SOLUTION INTRAVENOUS ONCE
Status: DISCONTINUED | OUTPATIENT
Start: 2017-10-21 | End: 2017-10-21

## 2017-10-21 RX ADMIN — SODIUM CHLORIDE 1000 ML: 9 INJECTION, SOLUTION INTRAVENOUS at 23:55

## 2017-10-21 RX ADMIN — IPRATROPIUM BROMIDE AND ALBUTEROL SULFATE 3 ML: .5; 3 SOLUTION RESPIRATORY (INHALATION) at 15:35

## 2017-10-21 RX ADMIN — IPRATROPIUM BROMIDE AND ALBUTEROL SULFATE 3 ML: .5; 3 SOLUTION RESPIRATORY (INHALATION) at 08:29

## 2017-10-21 RX ADMIN — ATORVASTATIN CALCIUM 40 MG: 20 TABLET, FILM COATED ORAL at 08:54

## 2017-10-21 RX ADMIN — GUAIFENESIN 600 MG: 600 TABLET, EXTENDED RELEASE ORAL at 21:15

## 2017-10-21 RX ADMIN — LEVOTHYROXINE SODIUM 125 MCG: 125 TABLET ORAL at 07:53

## 2017-10-21 RX ADMIN — PANTOPRAZOLE SODIUM 40 MG: 40 TABLET, DELAYED RELEASE ORAL at 06:31

## 2017-10-21 RX ADMIN — LEVOFLOXACIN 500 MG: 500 INJECTION, SOLUTION INTRAVENOUS at 09:00

## 2017-10-21 RX ADMIN — GABAPENTIN 300 MG: 300 CAPSULE ORAL at 06:31

## 2017-10-21 RX ADMIN — FAMOTIDINE 20 MG: 10 INJECTION, SOLUTION INTRAVENOUS at 00:04

## 2017-10-21 RX ADMIN — GABAPENTIN 600 MG: 300 CAPSULE ORAL at 21:15

## 2017-10-21 RX ADMIN — FUROSEMIDE 80 MG: 10 INJECTION, SOLUTION INTRAMUSCULAR; INTRAVENOUS at 11:35

## 2017-10-21 RX ADMIN — OXYCODONE HYDROCHLORIDE AND ACETAMINOPHEN 1 TABLET: 10; 325 TABLET ORAL at 21:15

## 2017-10-21 RX ADMIN — CARVEDILOL 12.5 MG: 12.5 TABLET, FILM COATED ORAL at 18:14

## 2017-10-21 RX ADMIN — AMLODIPINE BESYLATE 10 MG: 10 TABLET ORAL at 08:53

## 2017-10-21 RX ADMIN — ASPIRIN 81 MG: 81 TABLET, CHEWABLE ORAL at 08:55

## 2017-10-21 RX ADMIN — CARVEDILOL 12.5 MG: 12.5 TABLET, FILM COATED ORAL at 08:54

## 2017-10-21 RX ADMIN — ALPRAZOLAM 0.5 MG: 0.5 TABLET ORAL at 21:15

## 2017-10-21 RX ADMIN — IPRATROPIUM BROMIDE AND ALBUTEROL SULFATE 3 ML: .5; 3 SOLUTION RESPIRATORY (INHALATION) at 11:46

## 2017-10-21 RX ADMIN — IPRATROPIUM BROMIDE AND ALBUTEROL SULFATE 3 ML: .5; 3 SOLUTION RESPIRATORY (INHALATION) at 20:15

## 2017-10-21 RX ADMIN — OXYCODONE HYDROCHLORIDE AND ACETAMINOPHEN 1 TABLET: 10; 325 TABLET ORAL at 08:00

## 2017-10-21 RX ADMIN — ISOSORBIDE MONONITRATE 30 MG: 30 TABLET ORAL at 08:55

## 2017-10-21 RX ADMIN — GUAIFENESIN 600 MG: 600 TABLET, EXTENDED RELEASE ORAL at 11:42

## 2017-10-22 LAB
ALBUMIN SERPL-MCNC: 3 G/DL (ref 3.5–5.2)
ANION GAP SERPL CALCULATED.3IONS-SCNC: 7.7 MMOL/L
BUN BLD-MCNC: 32 MG/DL (ref 8–23)
BUN/CREAT SERPL: 16 (ref 7–25)
CALCIUM SPEC-SCNC: 7.9 MG/DL (ref 8.6–10.5)
CHLORIDE SERPL-SCNC: 100 MMOL/L (ref 98–107)
CO2 SERPL-SCNC: 26.3 MMOL/L (ref 22–29)
CREAT BLD-MCNC: 2 MG/DL (ref 0.76–1.27)
GFR SERPL CREATININE-BSD FRML MDRD: 34 ML/MIN/1.73
GLUCOSE BLD-MCNC: 96 MG/DL (ref 65–99)
PHOSPHATE SERPL-MCNC: 3.1 MG/DL (ref 2.5–4.5)
POTASSIUM BLD-SCNC: 4.5 MMOL/L (ref 3.5–5.2)
SODIUM BLD-SCNC: 134 MMOL/L (ref 136–145)

## 2017-10-22 PROCEDURE — 94799 UNLISTED PULMONARY SVC/PX: CPT

## 2017-10-22 PROCEDURE — 99024 POSTOP FOLLOW-UP VISIT: CPT | Performed by: SURGERY

## 2017-10-22 PROCEDURE — 25010000002 FUROSEMIDE PER 20 MG: Performed by: INTERNAL MEDICINE

## 2017-10-22 PROCEDURE — 80069 RENAL FUNCTION PANEL: CPT | Performed by: INTERNAL MEDICINE

## 2017-10-22 PROCEDURE — 25010000002 LEVOFLOXACIN PER 250 MG: Performed by: SURGERY

## 2017-10-22 RX ORDER — FUROSEMIDE 10 MG/ML
80 INJECTION INTRAMUSCULAR; INTRAVENOUS ONCE
Status: DISCONTINUED | OUTPATIENT
Start: 2017-10-22 | End: 2017-10-22

## 2017-10-22 RX ORDER — FUROSEMIDE 10 MG/ML
40 INJECTION INTRAMUSCULAR; INTRAVENOUS ONCE
Status: COMPLETED | OUTPATIENT
Start: 2017-10-22 | End: 2017-10-22

## 2017-10-22 RX ADMIN — PANTOPRAZOLE SODIUM 40 MG: 40 TABLET, DELAYED RELEASE ORAL at 06:19

## 2017-10-22 RX ADMIN — CARVEDILOL 12.5 MG: 12.5 TABLET, FILM COATED ORAL at 18:56

## 2017-10-22 RX ADMIN — ASPIRIN 81 MG: 81 TABLET, CHEWABLE ORAL at 08:51

## 2017-10-22 RX ADMIN — ATORVASTATIN CALCIUM 40 MG: 20 TABLET, FILM COATED ORAL at 08:51

## 2017-10-22 RX ADMIN — FUROSEMIDE 40 MG: 10 INJECTION, SOLUTION INTRAMUSCULAR; INTRAVENOUS at 11:27

## 2017-10-22 RX ADMIN — IPRATROPIUM BROMIDE AND ALBUTEROL SULFATE 3 ML: .5; 3 SOLUTION RESPIRATORY (INHALATION) at 17:05

## 2017-10-22 RX ADMIN — IPRATROPIUM BROMIDE AND ALBUTEROL SULFATE 3 ML: .5; 3 SOLUTION RESPIRATORY (INHALATION) at 08:13

## 2017-10-22 RX ADMIN — GUAIFENESIN 600 MG: 600 TABLET, EXTENDED RELEASE ORAL at 20:49

## 2017-10-22 RX ADMIN — FAMOTIDINE 20 MG: 10 INJECTION, SOLUTION INTRAVENOUS at 00:00

## 2017-10-22 RX ADMIN — IPRATROPIUM BROMIDE AND ALBUTEROL SULFATE 3 ML: .5; 3 SOLUTION RESPIRATORY (INHALATION) at 20:11

## 2017-10-22 RX ADMIN — CARVEDILOL 12.5 MG: 12.5 TABLET, FILM COATED ORAL at 08:51

## 2017-10-22 RX ADMIN — GABAPENTIN 300 MG: 300 CAPSULE ORAL at 08:55

## 2017-10-22 RX ADMIN — GABAPENTIN 600 MG: 300 CAPSULE ORAL at 20:49

## 2017-10-22 RX ADMIN — LEVOFLOXACIN 500 MG: 500 INJECTION, SOLUTION INTRAVENOUS at 08:51

## 2017-10-22 RX ADMIN — IPRATROPIUM BROMIDE AND ALBUTEROL SULFATE 3 ML: .5; 3 SOLUTION RESPIRATORY (INHALATION) at 12:50

## 2017-10-22 RX ADMIN — LEVOTHYROXINE SODIUM 125 MCG: 125 TABLET ORAL at 06:19

## 2017-10-22 RX ADMIN — ISOSORBIDE MONONITRATE 30 MG: 30 TABLET ORAL at 08:51

## 2017-10-22 RX ADMIN — GUAIFENESIN 600 MG: 600 TABLET, EXTENDED RELEASE ORAL at 08:51

## 2017-10-23 LAB
ALBUMIN SERPL-MCNC: 3.2 G/DL (ref 3.5–5.2)
ANION GAP SERPL CALCULATED.3IONS-SCNC: 11.9 MMOL/L
BASOPHILS # BLD AUTO: 0.01 10*3/MM3 (ref 0–0.2)
BASOPHILS NFR BLD AUTO: 0.1 % (ref 0–1.5)
BUN BLD-MCNC: 28 MG/DL (ref 8–23)
BUN/CREAT SERPL: 16.5 (ref 7–25)
CALCIUM SPEC-SCNC: 8.9 MG/DL (ref 8.6–10.5)
CHLORIDE SERPL-SCNC: 100 MMOL/L (ref 98–107)
CO2 SERPL-SCNC: 27.1 MMOL/L (ref 22–29)
CREAT BLD-MCNC: 1.7 MG/DL (ref 0.76–1.27)
DEPRECATED RDW RBC AUTO: 47.5 FL (ref 37–54)
EOSINOPHIL # BLD AUTO: 0.03 10*3/MM3 (ref 0–0.7)
EOSINOPHIL NFR BLD AUTO: 0.4 % (ref 0.3–6.2)
ERYTHROCYTE [DISTWIDTH] IN BLOOD BY AUTOMATED COUNT: 13.1 % (ref 11.5–14.5)
GFR SERPL CREATININE-BSD FRML MDRD: 41 ML/MIN/1.73
GLUCOSE BLD-MCNC: 97 MG/DL (ref 65–99)
HCT VFR BLD AUTO: 31.8 % (ref 40.4–52.2)
HGB BLD-MCNC: 10.5 G/DL (ref 13.7–17.6)
IMM GRANULOCYTES # BLD: 0.03 10*3/MM3 (ref 0–0.03)
IMM GRANULOCYTES NFR BLD: 0.4 % (ref 0–0.5)
LYMPHOCYTES # BLD AUTO: 1.76 10*3/MM3 (ref 0.9–4.8)
LYMPHOCYTES NFR BLD AUTO: 23.8 % (ref 19.6–45.3)
MAGNESIUM SERPL-MCNC: 1.6 MG/DL (ref 1.6–2.4)
MCH RBC QN AUTO: 32.9 PG (ref 27–32.7)
MCHC RBC AUTO-ENTMCNC: 33 G/DL (ref 32.6–36.4)
MCV RBC AUTO: 99.7 FL (ref 79.8–96.2)
MONOCYTES # BLD AUTO: 1.04 10*3/MM3 (ref 0.2–1.2)
MONOCYTES NFR BLD AUTO: 14.1 % (ref 5–12)
NEUTROPHILS # BLD AUTO: 4.51 10*3/MM3 (ref 1.9–8.1)
NEUTROPHILS NFR BLD AUTO: 61.2 % (ref 42.7–76)
PHOSPHATE SERPL-MCNC: 2.2 MG/DL (ref 2.5–4.5)
PLATELET # BLD AUTO: 208 10*3/MM3 (ref 140–500)
PMV BLD AUTO: 10.1 FL (ref 6–12)
POTASSIUM BLD-SCNC: 4.2 MMOL/L (ref 3.5–5.2)
RBC # BLD AUTO: 3.19 10*6/MM3 (ref 4.6–6)
SODIUM BLD-SCNC: 139 MMOL/L (ref 136–145)
WBC NRBC COR # BLD: 7.38 10*3/MM3 (ref 4.5–10.7)

## 2017-10-23 PROCEDURE — 85025 COMPLETE CBC W/AUTO DIFF WBC: CPT | Performed by: SURGERY

## 2017-10-23 PROCEDURE — 80069 RENAL FUNCTION PANEL: CPT | Performed by: INTERNAL MEDICINE

## 2017-10-23 PROCEDURE — 25010000002 MAGNESIUM SULFATE IN D5W 1G/100ML (PREMIX) 1-5 GM/100ML-% SOLUTION: Performed by: INTERNAL MEDICINE

## 2017-10-23 PROCEDURE — 99024 POSTOP FOLLOW-UP VISIT: CPT | Performed by: SURGERY

## 2017-10-23 PROCEDURE — 94799 UNLISTED PULMONARY SVC/PX: CPT

## 2017-10-23 PROCEDURE — 83735 ASSAY OF MAGNESIUM: CPT | Performed by: INTERNAL MEDICINE

## 2017-10-23 RX ORDER — MAGNESIUM SULFATE 1 G/100ML
1 INJECTION INTRAVENOUS
Status: COMPLETED | OUTPATIENT
Start: 2017-10-23 | End: 2017-10-23

## 2017-10-23 RX ADMIN — IPRATROPIUM BROMIDE AND ALBUTEROL SULFATE 3 ML: .5; 3 SOLUTION RESPIRATORY (INHALATION) at 09:14

## 2017-10-23 RX ADMIN — ALPRAZOLAM 0.5 MG: 0.5 TABLET ORAL at 10:09

## 2017-10-23 RX ADMIN — CARVEDILOL 12.5 MG: 12.5 TABLET, FILM COATED ORAL at 18:15

## 2017-10-23 RX ADMIN — GUAIFENESIN 600 MG: 600 TABLET, EXTENDED RELEASE ORAL at 21:51

## 2017-10-23 RX ADMIN — GABAPENTIN 600 MG: 300 CAPSULE ORAL at 21:51

## 2017-10-23 RX ADMIN — IPRATROPIUM BROMIDE AND ALBUTEROL SULFATE 3 ML: .5; 3 SOLUTION RESPIRATORY (INHALATION) at 12:11

## 2017-10-23 RX ADMIN — GABAPENTIN 300 MG: 300 CAPSULE ORAL at 06:32

## 2017-10-23 RX ADMIN — ASPIRIN 81 MG: 81 TABLET, CHEWABLE ORAL at 08:42

## 2017-10-23 RX ADMIN — IPRATROPIUM BROMIDE AND ALBUTEROL SULFATE 3 ML: .5; 3 SOLUTION RESPIRATORY (INHALATION) at 16:29

## 2017-10-23 RX ADMIN — MAGNESIUM SULFATE HEPTAHYDRATE 1 G: 1 INJECTION, SOLUTION INTRAVENOUS at 14:35

## 2017-10-23 RX ADMIN — SODIUM PHOSPHATE, MONOBASIC, MONOHYDRATE 10 MMOL: 276; 142 INJECTION, SOLUTION INTRAVENOUS at 18:14

## 2017-10-23 RX ADMIN — IPRATROPIUM BROMIDE AND ALBUTEROL SULFATE 3 ML: .5; 3 SOLUTION RESPIRATORY (INHALATION) at 19:52

## 2017-10-23 RX ADMIN — GUAIFENESIN 600 MG: 600 TABLET, EXTENDED RELEASE ORAL at 08:44

## 2017-10-23 RX ADMIN — LEVOTHYROXINE SODIUM 125 MCG: 125 TABLET ORAL at 06:33

## 2017-10-23 RX ADMIN — ISOSORBIDE MONONITRATE 30 MG: 30 TABLET ORAL at 08:42

## 2017-10-23 RX ADMIN — ATORVASTATIN CALCIUM 40 MG: 20 TABLET, FILM COATED ORAL at 08:42

## 2017-10-23 RX ADMIN — MAGNESIUM SULFATE HEPTAHYDRATE 1 G: 1 INJECTION, SOLUTION INTRAVENOUS at 16:15

## 2017-10-23 RX ADMIN — PANTOPRAZOLE SODIUM 40 MG: 40 TABLET, DELAYED RELEASE ORAL at 06:33

## 2017-10-23 RX ADMIN — CARVEDILOL 12.5 MG: 12.5 TABLET, FILM COATED ORAL at 08:43

## 2017-10-24 LAB
ALBUMIN SERPL-MCNC: 3.1 G/DL (ref 3.5–5.2)
ANION GAP SERPL CALCULATED.3IONS-SCNC: 9.7 MMOL/L
BUN BLD-MCNC: 24 MG/DL (ref 8–23)
BUN/CREAT SERPL: 17.1 (ref 7–25)
CALCIUM SPEC-SCNC: 8.8 MG/DL (ref 8.6–10.5)
CHLORIDE SERPL-SCNC: 101 MMOL/L (ref 98–107)
CO2 SERPL-SCNC: 28.3 MMOL/L (ref 22–29)
CREAT BLD-MCNC: 1.4 MG/DL (ref 0.76–1.27)
DEPRECATED RDW RBC AUTO: 47.7 FL (ref 37–54)
ERYTHROCYTE [DISTWIDTH] IN BLOOD BY AUTOMATED COUNT: 13.1 % (ref 11.5–14.5)
GFR SERPL CREATININE-BSD FRML MDRD: 52 ML/MIN/1.73
GLUCOSE BLD-MCNC: 112 MG/DL (ref 65–99)
HCT VFR BLD AUTO: 32.3 % (ref 40.4–52.2)
HGB BLD-MCNC: 10.5 G/DL (ref 13.7–17.6)
MAGNESIUM SERPL-MCNC: 2.2 MG/DL (ref 1.6–2.4)
MCH RBC QN AUTO: 32.7 PG (ref 27–32.7)
MCHC RBC AUTO-ENTMCNC: 32.5 G/DL (ref 32.6–36.4)
MCV RBC AUTO: 100.6 FL (ref 79.8–96.2)
PHOSPHATE SERPL-MCNC: 2.4 MG/DL (ref 2.5–4.5)
PLATELET # BLD AUTO: 247 10*3/MM3 (ref 140–500)
PMV BLD AUTO: 10.1 FL (ref 6–12)
POTASSIUM BLD-SCNC: 3.9 MMOL/L (ref 3.5–5.2)
RBC # BLD AUTO: 3.21 10*6/MM3 (ref 4.6–6)
SODIUM BLD-SCNC: 139 MMOL/L (ref 136–145)
WBC NRBC COR # BLD: 7.94 10*3/MM3 (ref 4.5–10.7)

## 2017-10-24 PROCEDURE — 94799 UNLISTED PULMONARY SVC/PX: CPT

## 2017-10-24 PROCEDURE — 83735 ASSAY OF MAGNESIUM: CPT | Performed by: INTERNAL MEDICINE

## 2017-10-24 PROCEDURE — 80069 RENAL FUNCTION PANEL: CPT | Performed by: INTERNAL MEDICINE

## 2017-10-24 PROCEDURE — 99024 POSTOP FOLLOW-UP VISIT: CPT | Performed by: SURGERY

## 2017-10-24 PROCEDURE — 85027 COMPLETE CBC AUTOMATED: CPT | Performed by: SURGERY

## 2017-10-24 RX ADMIN — GUAIFENESIN 600 MG: 600 TABLET, EXTENDED RELEASE ORAL at 21:24

## 2017-10-24 RX ADMIN — IPRATROPIUM BROMIDE AND ALBUTEROL SULFATE 3 ML: .5; 3 SOLUTION RESPIRATORY (INHALATION) at 17:17

## 2017-10-24 RX ADMIN — CARVEDILOL 12.5 MG: 12.5 TABLET, FILM COATED ORAL at 09:23

## 2017-10-24 RX ADMIN — ISOSORBIDE MONONITRATE 30 MG: 30 TABLET ORAL at 09:23

## 2017-10-24 RX ADMIN — CARVEDILOL 12.5 MG: 12.5 TABLET, FILM COATED ORAL at 18:22

## 2017-10-24 RX ADMIN — IPRATROPIUM BROMIDE AND ALBUTEROL SULFATE 3 ML: .5; 3 SOLUTION RESPIRATORY (INHALATION) at 07:03

## 2017-10-24 RX ADMIN — ATORVASTATIN CALCIUM 40 MG: 20 TABLET, FILM COATED ORAL at 09:23

## 2017-10-24 RX ADMIN — LEVOTHYROXINE SODIUM 125 MCG: 125 TABLET ORAL at 06:26

## 2017-10-24 RX ADMIN — ALPRAZOLAM 0.5 MG: 0.5 TABLET ORAL at 21:25

## 2017-10-24 RX ADMIN — GUAIFENESIN 600 MG: 600 TABLET, EXTENDED RELEASE ORAL at 09:23

## 2017-10-24 RX ADMIN — ALPRAZOLAM 0.5 MG: 0.5 TABLET ORAL at 00:13

## 2017-10-24 RX ADMIN — IPRATROPIUM BROMIDE AND ALBUTEROL SULFATE 3 ML: .5; 3 SOLUTION RESPIRATORY (INHALATION) at 20:04

## 2017-10-24 RX ADMIN — PANTOPRAZOLE SODIUM 40 MG: 40 TABLET, DELAYED RELEASE ORAL at 06:26

## 2017-10-24 RX ADMIN — ASPIRIN 81 MG: 81 TABLET, CHEWABLE ORAL at 09:23

## 2017-10-24 RX ADMIN — IPRATROPIUM BROMIDE AND ALBUTEROL SULFATE 3 ML: .5; 3 SOLUTION RESPIRATORY (INHALATION) at 12:35

## 2017-10-24 RX ADMIN — SODIUM PHOSPHATE, MONOBASIC, MONOHYDRATE 10 MMOL: 276; 142 INJECTION, SOLUTION INTRAVENOUS at 12:58

## 2017-10-24 RX ADMIN — GABAPENTIN 300 MG: 300 CAPSULE ORAL at 06:26

## 2017-10-24 RX ADMIN — GABAPENTIN 600 MG: 300 CAPSULE ORAL at 21:25

## 2017-10-25 LAB
ALBUMIN SERPL-MCNC: 3 G/DL (ref 3.5–5.2)
ANION GAP SERPL CALCULATED.3IONS-SCNC: 11.7 MMOL/L
BUN BLD-MCNC: 18 MG/DL (ref 8–23)
BUN/CREAT SERPL: 14.2 (ref 7–25)
CALCIUM SPEC-SCNC: 8.8 MG/DL (ref 8.6–10.5)
CHLORIDE SERPL-SCNC: 103 MMOL/L (ref 98–107)
CO2 SERPL-SCNC: 25.3 MMOL/L (ref 22–29)
CREAT BLD-MCNC: 1.27 MG/DL (ref 0.76–1.27)
DEPRECATED RDW RBC AUTO: 48.1 FL (ref 37–54)
ERYTHROCYTE [DISTWIDTH] IN BLOOD BY AUTOMATED COUNT: 13.1 % (ref 11.5–14.5)
GFR SERPL CREATININE-BSD FRML MDRD: 58 ML/MIN/1.73
GLUCOSE BLD-MCNC: 108 MG/DL (ref 65–99)
HCT VFR BLD AUTO: 32.9 % (ref 40.4–52.2)
HGB BLD-MCNC: 10.7 G/DL (ref 13.7–17.6)
MAGNESIUM SERPL-MCNC: 2.1 MG/DL (ref 1.6–2.4)
MCH RBC QN AUTO: 32.7 PG (ref 27–32.7)
MCHC RBC AUTO-ENTMCNC: 32.5 G/DL (ref 32.6–36.4)
MCV RBC AUTO: 100.6 FL (ref 79.8–96.2)
PHOSPHATE SERPL-MCNC: 3 MG/DL (ref 2.5–4.5)
PLATELET # BLD AUTO: 279 10*3/MM3 (ref 140–500)
PMV BLD AUTO: 9.6 FL (ref 6–12)
POTASSIUM BLD-SCNC: 4 MMOL/L (ref 3.5–5.2)
RBC # BLD AUTO: 3.27 10*6/MM3 (ref 4.6–6)
SODIUM BLD-SCNC: 140 MMOL/L (ref 136–145)
WBC NRBC COR # BLD: 7.88 10*3/MM3 (ref 4.5–10.7)

## 2017-10-25 PROCEDURE — 94799 UNLISTED PULMONARY SVC/PX: CPT

## 2017-10-25 PROCEDURE — 99024 POSTOP FOLLOW-UP VISIT: CPT | Performed by: SURGERY

## 2017-10-25 PROCEDURE — 85027 COMPLETE CBC AUTOMATED: CPT | Performed by: SURGERY

## 2017-10-25 PROCEDURE — 80069 RENAL FUNCTION PANEL: CPT | Performed by: INTERNAL MEDICINE

## 2017-10-25 PROCEDURE — 83735 ASSAY OF MAGNESIUM: CPT | Performed by: INTERNAL MEDICINE

## 2017-10-25 RX ADMIN — ATORVASTATIN CALCIUM 40 MG: 20 TABLET, FILM COATED ORAL at 08:32

## 2017-10-25 RX ADMIN — GABAPENTIN 600 MG: 300 CAPSULE ORAL at 20:33

## 2017-10-25 RX ADMIN — CARVEDILOL 12.5 MG: 12.5 TABLET, FILM COATED ORAL at 08:32

## 2017-10-25 RX ADMIN — ALPRAZOLAM 0.5 MG: 0.5 TABLET ORAL at 20:33

## 2017-10-25 RX ADMIN — GUAIFENESIN 600 MG: 600 TABLET, EXTENDED RELEASE ORAL at 20:32

## 2017-10-25 RX ADMIN — ASPIRIN 81 MG: 81 TABLET, CHEWABLE ORAL at 08:32

## 2017-10-25 RX ADMIN — LEVOTHYROXINE SODIUM 125 MCG: 125 TABLET ORAL at 07:36

## 2017-10-25 RX ADMIN — PANTOPRAZOLE SODIUM 40 MG: 40 TABLET, DELAYED RELEASE ORAL at 07:36

## 2017-10-25 RX ADMIN — IPRATROPIUM BROMIDE AND ALBUTEROL SULFATE 3 ML: .5; 3 SOLUTION RESPIRATORY (INHALATION) at 20:37

## 2017-10-25 RX ADMIN — ALPRAZOLAM 0.5 MG: 0.5 TABLET ORAL at 05:06

## 2017-10-25 RX ADMIN — GUAIFENESIN 600 MG: 600 TABLET, EXTENDED RELEASE ORAL at 08:33

## 2017-10-25 RX ADMIN — IPRATROPIUM BROMIDE AND ALBUTEROL SULFATE 3 ML: .5; 3 SOLUTION RESPIRATORY (INHALATION) at 11:48

## 2017-10-25 RX ADMIN — IPRATROPIUM BROMIDE AND ALBUTEROL SULFATE 3 ML: .5; 3 SOLUTION RESPIRATORY (INHALATION) at 08:01

## 2017-10-25 RX ADMIN — CARVEDILOL 12.5 MG: 12.5 TABLET, FILM COATED ORAL at 17:58

## 2017-10-25 RX ADMIN — GABAPENTIN 300 MG: 300 CAPSULE ORAL at 07:36

## 2017-10-25 RX ADMIN — IPRATROPIUM BROMIDE AND ALBUTEROL SULFATE 3 ML: .5; 3 SOLUTION RESPIRATORY (INHALATION) at 15:23

## 2017-10-25 RX ADMIN — ISOSORBIDE MONONITRATE 30 MG: 30 TABLET ORAL at 08:32

## 2017-10-26 VITALS
WEIGHT: 174.5 LBS | HEIGHT: 69 IN | HEART RATE: 64 BPM | RESPIRATION RATE: 16 BRPM | OXYGEN SATURATION: 92 % | TEMPERATURE: 97.3 F | DIASTOLIC BLOOD PRESSURE: 80 MMHG | BODY MASS INDEX: 25.84 KG/M2 | SYSTOLIC BLOOD PRESSURE: 135 MMHG

## 2017-10-26 PROCEDURE — 94799 UNLISTED PULMONARY SVC/PX: CPT

## 2017-10-26 RX ORDER — OXYCODONE AND ACETAMINOPHEN 10; 325 MG/1; MG/1
1 TABLET ORAL EVERY 6 HOURS PRN
Qty: 30 TABLET | Refills: 0 | Status: SHIPPED | OUTPATIENT
Start: 2017-10-26 | End: 2017-10-29

## 2017-10-26 RX ADMIN — ATORVASTATIN CALCIUM 40 MG: 20 TABLET, FILM COATED ORAL at 08:56

## 2017-10-26 RX ADMIN — PANTOPRAZOLE SODIUM 40 MG: 40 TABLET, DELAYED RELEASE ORAL at 06:32

## 2017-10-26 RX ADMIN — GUAIFENESIN 600 MG: 600 TABLET, EXTENDED RELEASE ORAL at 08:56

## 2017-10-26 RX ADMIN — ASPIRIN 81 MG: 81 TABLET, CHEWABLE ORAL at 08:57

## 2017-10-26 RX ADMIN — IPRATROPIUM BROMIDE AND ALBUTEROL SULFATE 3 ML: .5; 3 SOLUTION RESPIRATORY (INHALATION) at 08:29

## 2017-10-26 RX ADMIN — ISOSORBIDE MONONITRATE 30 MG: 30 TABLET ORAL at 08:56

## 2017-10-26 RX ADMIN — CARVEDILOL 12.5 MG: 12.5 TABLET, FILM COATED ORAL at 08:56

## 2017-10-26 RX ADMIN — GABAPENTIN 300 MG: 300 CAPSULE ORAL at 06:32

## 2017-10-26 RX ADMIN — LEVOTHYROXINE SODIUM 125 MCG: 125 TABLET ORAL at 06:32

## 2017-11-01 ENCOUNTER — OFFICE VISIT (OUTPATIENT)
Dept: SURGERY | Facility: CLINIC | Age: 62
End: 2017-11-01

## 2017-11-01 DIAGNOSIS — K43.2 INCISIONAL HERNIA, WITHOUT OBSTRUCTION OR GANGRENE: Primary | ICD-10-CM

## 2017-11-01 PROCEDURE — 99024 POSTOP FOLLOW-UP VISIT: CPT | Performed by: SURGERY

## 2017-11-01 RX ORDER — OXYCODONE AND ACETAMINOPHEN 10; 325 MG/1; MG/1
1 TABLET ORAL EVERY 6 HOURS PRN
Qty: 60 TABLET | Refills: 0 | Status: SHIPPED | OUTPATIENT
Start: 2017-11-01 | End: 2017-11-15 | Stop reason: SDUPTHER

## 2017-11-01 NOTE — PROGRESS NOTES
CHIEF COMPLAINT:    Follow-up from incisional hernia repair    HISTORY OF PRESENT ILLNESS:    Sim Agustin is a 61 y.o. male who underwent open repair of incisional hernia with mesh and component separation on 10/19/2017.  Because because of his severe peripheral vascular disease, I performed his operation on aspirin and Plavix.  The drains were evacuating bloody fluid during the first two postoperative days.  The deep drain was removed prior to discharge from the hospital.  The superficial drain remains in place.  He has been recording drainage outputs:    10/26/2017......45 mL  10/27/2017......45 mL  10/28/2017......40 mL  10/29/2017......120 mL(Estimated)  10/30/2017......30 mL  10/31/2017......40 mL  11/1/2017........35 mL    He's been tolerating a regular diet.  He has been ambulating on his prosthesis without assistance using his cane or walker.  He started driving today.      Vigorous activity and walking are difficult because of a stretching/tearing pain located at the hernia repair site.  He requested a refill on his pain medication today.    EXAM:    Alert. Oriented.  Lungs: Clear.  Heart: Regular.  Abdomen: Soft.  Nondistended.  No cellulitis. There is a seroma on the left.  The left sided abdominal drain contains old blood.  It is thin and watery.  It is rust colored.  The bulb was not pressurized when he came to the office today.  I was able to evacuate 360 mL of fluid during the office visit.    Extremities: Warm.    ASSESSMENT:    Status post open repair of incisional hernia with mesh and bilateral component separation on 10/19/2017  Abdominal wall seroma    PLAN:    I was able to evacuate 360 mL of watery rust-colored fluid today in the office.  There was substantial reduction in the size of the abdominal wall seroma where the hiatus.  I want him to keep the left sided (superficial) drain a few more days.  He will come back to see me in the office later this week for another wound check.  If the  seroma remains substantial I will order CT abdomen and pelvis to evaluate the extent and size of fluid collection.    I'm going to refill his Percocet for another 2 weeks.

## 2017-11-03 ENCOUNTER — TELEPHONE (OUTPATIENT)
Dept: SURGERY | Facility: CLINIC | Age: 62
End: 2017-11-03

## 2017-11-03 ENCOUNTER — OFFICE VISIT (OUTPATIENT)
Dept: SURGERY | Facility: CLINIC | Age: 62
End: 2017-11-03

## 2017-11-03 ENCOUNTER — HOSPITAL ENCOUNTER (OUTPATIENT)
Dept: CT IMAGING | Facility: HOSPITAL | Age: 62
Discharge: HOME | End: 2017-11-03
Attending: SURGERY | Admitting: SURGERY

## 2017-11-03 DIAGNOSIS — S30.1XXD ABDOMINAL WALL HEMATOMA, SUBSEQUENT ENCOUNTER: Primary | ICD-10-CM

## 2017-11-03 PROCEDURE — 99024 POSTOP FOLLOW-UP VISIT: CPT | Performed by: SURGERY

## 2017-11-03 PROCEDURE — 0 IOPAMIDOL 61 % SOLUTION: Performed by: SURGERY

## 2017-11-03 PROCEDURE — 0 DIATRIZOATE MEGLUMINE & SODIUM PER 1 ML: Performed by: SURGERY

## 2017-11-03 PROCEDURE — 82565 ASSAY OF CREATININE: CPT

## 2017-11-03 PROCEDURE — 74177 CT ABD & PELVIS W/CONTRAST: CPT

## 2017-11-03 RX ADMIN — DIATRIZOATE MEGLUMINE AND DIATRIZOATE SODIUM 30 ML: 660; 100 LIQUID ORAL; RECTAL at 15:17

## 2017-11-03 RX ADMIN — IOPAMIDOL 85 ML: 612 INJECTION, SOLUTION INTRAVENOUS at 15:17

## 2017-11-03 NOTE — TELEPHONE ENCOUNTER
Patient called the office stating that the radiologist at [facility] instructed the him to contact you today regarding his CT scan done today. Informed the patient I will let you know that he would like to speak to you regarding this.

## 2017-11-03 NOTE — PROGRESS NOTES
CHIEF COMPLAINT:    Follow-up from incisional hernia repair    HISTORY OF PRESENT ILLNESS:    Sim Agustin is a 61 y.o. male who underwent open repair of incisional hernia with mesh and component separation on 10/19/2017.  Because because of his severe peripheral vascular disease, I performed his operation on aspirin and Plavix.  The drains were evacuating bloody fluid during the first two postoperative days.  The deep drain was removed prior to discharge from the hospital.  The superficial drain remains in place. He's been tolerating a regular diet.  He has been ambulating on his prosthesis without assistance using his cane or walker.     EXAM:    Alert. Oriented.  Lungs: Clear.  Heart: Regular.  Abdomen: Soft.  Nondistended.  No cellulitis. There is a seroma on the left.  The left sided abdominal drain contains old blood.  It is thin and watery.  It is rust colored.  The bulb was not pressurized when he came to the office today.  I was able to evacuate 250mL of fluid during the office visit.    Extremities: Warm.    ASSESSMENT:    Status post open repair of incisional hernia with mesh and bilateral component separation on 10/19/2017  Abdominal wall seroma    PLAN:    I was able to evacuate 250 mL of watery rust-colored fluid today in the office. I want him to keep the drain a few more days. I am ordering a CT scan to look at the size of the hematoma. He will come back to see me in one week for another wound check.

## 2017-11-07 LAB — CREAT BLDA-MCNC: 1.2 MG/DL (ref 0.6–1.3)

## 2017-11-08 ENCOUNTER — OFFICE VISIT (OUTPATIENT)
Dept: SURGERY | Facility: CLINIC | Age: 62
End: 2017-11-08

## 2017-11-08 DIAGNOSIS — Z90.49 S/P COLECTOMY: ICD-10-CM

## 2017-11-08 DIAGNOSIS — K43.2 INCISIONAL HERNIA, WITHOUT OBSTRUCTION OR GANGRENE: Primary | ICD-10-CM

## 2017-11-08 PROCEDURE — 99024 POSTOP FOLLOW-UP VISIT: CPT | Performed by: SURGERY

## 2017-11-08 NOTE — PROGRESS NOTES
CHIEF COMPLAINT:    Follow-up from incisional hernia repair    HISTORY OF PRESENT ILLNESS:    Sim Agustin is a 61 y.o. male who underwent open repair of incisional hernia with mesh and component separation on 10/19/2017.  Because because of his severe peripheral vascular disease, I performed his operation on aspirin and Plavix.  The drains were evacuating bloody fluid during the first two postoperative days.  The deep drain was removed prior to discharge from the hospital.  The superficial drain remains in place.   At his last office visit, I sent him for a CT scan of the abdomen and pelvis to evaluate the hernia repair site for a seroma or hematoma. We reviewed his CT scan today in the office.  There is a hematoma the anterior abdominal wall.  It looks to be between fascial layers and not in contact with the existing drain.  The drain output over the last several days has ranged between 45 and 120 cc per day.  It is rust colored watery fluid.   He's been tolerating a regular diet.  He has been ambulating on his prosthesis without assistance using his cane or walker.     EXAM:    Alert. Oriented.  Lungs: Clear.  Heart: Regular.  Abdomen: Soft. Nondistended. No cellulitis of the anterior abdominal wall.  Minimal tenderness. Bowel sounds present. No palpable seroma in the subcutaneous tissues surrounding the drain. The left sided abdominal drain contains old blood.  It is thin and watery.  It is rust colored.   Extremities: Warm.    ASSESSMENT:    Status post open repair of incisional hernia with mesh and bilateral component separation on 10/19/2017  Abdominal wall seroma    PLAN:    I would like for him to keep the existing drain since I think some of the hematoma is evacuating through the drain.  I want to see him in the office next week area in I will remove the drain when the fluid is consistently below 50 mL today.

## 2017-11-15 ENCOUNTER — OFFICE VISIT (OUTPATIENT)
Dept: SURGERY | Facility: CLINIC | Age: 62
End: 2017-11-15

## 2017-11-15 DIAGNOSIS — K43.2 INCISIONAL HERNIA, WITHOUT OBSTRUCTION OR GANGRENE: Primary | ICD-10-CM

## 2017-11-15 PROCEDURE — 99024 POSTOP FOLLOW-UP VISIT: CPT | Performed by: SURGERY

## 2017-11-15 RX ORDER — OXYCODONE AND ACETAMINOPHEN 10; 325 MG/1; MG/1
1 TABLET ORAL EVERY 8 HOURS PRN
Qty: 42 TABLET | Refills: 0 | Status: SHIPPED | OUTPATIENT
Start: 2017-11-15 | End: 2018-01-30 | Stop reason: ALTCHOICE

## 2017-11-15 NOTE — PROGRESS NOTES
CHIEF COMPLAINT:    Follow-up from incisional hernia repair    HISTORY OF PRESENT ILLNESS:    Sim Agustin is a 61 y.o. male who underwent open repair of incisional hernia with mesh and component separation on 10/19/2017.  Because because of his severe peripheral vascular disease, I performed his operation on aspirin and Plavix.  The drains were evacuating bloody fluid during the first two postoperative days.  The deep drain was removed prior to discharge from the hospital.  The superficial drain remains in place. There is a hematoma the anterior abdominal wall used on recent CT scanning.  It looks to be between fascial layers and not in contact with the existing drain.  The drain output over the last several days has been about 60 mL per day.  It is rust colored watery fluid.   He's been tolerating a regular diet.  He has been ambulating on his prosthesis without assistance using his cane or walker.   His main complaint is that of abdominal pain at the hernia repair site with ambulation.    EXAM:    Alert. Oriented.  Lungs: Clear.  Heart: Regular.  Abdomen: Soft. Nondistended. No cellulitis, but the skin surrounding the drain site looks pretty irritated.  Extremities: Warm.    ASSESSMENT:    Status post open repair of incisional hernia with mesh and bilateral component separation on 10/19/2017  Abdominal wall hematoma    PLAN:    I removed the drain today.  We continue with his usual diet.  He is going to keep up with his usual activity except for no lifting over 20 pounds.  Follow-up in 1 month.  I instructed him to call sooner if there are issues with abdominal pain, drainage, erythema of the abdominal wall, or fever.  We talked about his continued abdominal pain.  We talked about the extent of surgery.  I think that for certain the effort of ambulating with his amputation is responsible for some of the increase in abdominal pain withactivity.  I am going to provide him a refill on Percocet today for his  continued postoperative hernia repair pain. I checked a [facility] report.  He is going to see Dr. Wilburn over the next couple weeks for a follow-up visit and will return to his usual baseline medications at that time.

## 2018-01-30 ENCOUNTER — OFFICE VISIT (OUTPATIENT)
Dept: SURGERY | Facility: CLINIC | Age: 63
End: 2018-01-30

## 2018-01-30 VITALS — HEART RATE: 58 BPM | BODY MASS INDEX: 27.4 KG/M2 | WEIGHT: 185 LBS | HEIGHT: 69 IN | OXYGEN SATURATION: 95 %

## 2018-01-30 DIAGNOSIS — Z90.49 S/P COLECTOMY: ICD-10-CM

## 2018-01-30 DIAGNOSIS — K43.2 INCISIONAL HERNIA, WITHOUT OBSTRUCTION OR GANGRENE: Primary | ICD-10-CM

## 2018-01-30 PROCEDURE — 99212 OFFICE O/P EST SF 10 MIN: CPT | Performed by: SURGERY

## 2018-01-30 RX ORDER — HYDROCODONE BITARTRATE AND ACETAMINOPHEN 5; 325 MG/1; MG/1
1 TABLET ORAL EVERY 6 HOURS PRN
COMMUNITY
Start: 2018-01-22 | End: 2019-05-28 | Stop reason: HOSPADM

## 2018-02-08 PROBLEM — K43.2 INCISIONAL HERNIA, WITHOUT OBSTRUCTION OR GANGRENE: Status: RESOLVED | Noted: 2017-07-13 | Resolved: 2018-02-08

## 2019-03-11 ENCOUNTER — HOSPITAL ENCOUNTER (INPATIENT)
Facility: HOSPITAL | Age: 64
LOS: 8 days | Discharge: HOME | End: 2019-03-19
Attending: EMERGENCY MEDICINE | Admitting: INTERNAL MEDICINE

## 2019-03-11 ENCOUNTER — APPOINTMENT (OUTPATIENT)
Dept: GENERAL RADIOLOGY | Facility: HOSPITAL | Age: 64
End: 2019-03-11

## 2019-03-11 DIAGNOSIS — G47.33 OSA (OBSTRUCTIVE SLEEP APNEA): ICD-10-CM

## 2019-03-11 DIAGNOSIS — J96.01 ACUTE RESPIRATORY FAILURE WITH HYPOXIA AND HYPERCARBIA (HCC): Primary | ICD-10-CM

## 2019-03-11 DIAGNOSIS — J96.02 ACUTE RESPIRATORY FAILURE WITH HYPOXIA AND HYPERCARBIA (HCC): Primary | ICD-10-CM

## 2019-03-11 DIAGNOSIS — J44.9 CHRONIC OBSTRUCTIVE PULMONARY DISEASE, UNSPECIFIED COPD TYPE (HCC): Chronic | ICD-10-CM

## 2019-03-11 DIAGNOSIS — J44.1 COPD WITH ACUTE EXACERBATION (HCC): ICD-10-CM

## 2019-03-11 DIAGNOSIS — D75.1 POLYCYTHEMIA: ICD-10-CM

## 2019-03-11 LAB
ALBUMIN SERPL-MCNC: 3.6 G/DL (ref 3.5–5.2)
ALBUMIN/GLOB SERPL: 1.3 G/DL
ALP SERPL-CCNC: 47 U/L (ref 39–117)
ALT SERPL W P-5'-P-CCNC: 17 U/L (ref 1–41)
ANION GAP SERPL CALCULATED.3IONS-SCNC: 8.2 MMOL/L
ARTERIAL PATENCY WRIST A: POSITIVE
ARTERIAL PATENCY WRIST A: POSITIVE
AST SERPL-CCNC: 23 U/L (ref 1–40)
ATMOSPHERIC PRESS: 762.5 MMHG
ATMOSPHERIC PRESS: 762.7 MMHG
B PARAPERT DNA SPEC QL NAA+PROBE: NOT DETECTED
B PERT DNA SPEC QL NAA+PROBE: NOT DETECTED
BASE EXCESS BLDA CALC-SCNC: 2.6 MMOL/L (ref 0–2)
BASE EXCESS BLDA CALC-SCNC: 3 MMOL/L (ref 0–2)
BASOPHILS # BLD MANUAL: 0.11 10*3/MM3 (ref 0–0.2)
BASOPHILS NFR BLD AUTO: 1 % (ref 0–1.5)
BDY SITE: ABNORMAL
BDY SITE: ABNORMAL
BILIRUB SERPL-MCNC: 0.6 MG/DL (ref 0.1–1.2)
BUN BLD-MCNC: 17 MG/DL (ref 8–23)
BUN/CREAT SERPL: 13.7 (ref 7–25)
C PNEUM DNA NPH QL NAA+NON-PROBE: NOT DETECTED
CALCIUM SPEC-SCNC: 9 MG/DL (ref 8.6–10.5)
CHLORIDE SERPL-SCNC: 107 MMOL/L (ref 98–107)
CO2 SERPL-SCNC: 30.8 MMOL/L (ref 22–29)
CREAT BLD-MCNC: 1.24 MG/DL (ref 0.76–1.27)
D-LACTATE SERPL-SCNC: 0.9 MMOL/L (ref 0.5–2)
DEPRECATED RDW RBC AUTO: 54.6 FL (ref 37–54)
ERYTHROCYTE [DISTWIDTH] IN BLOOD BY AUTOMATED COUNT: 13.7 % (ref 12.3–15.4)
FLUAV H1 2009 PAND RNA NPH QL NAA+PROBE: NOT DETECTED
FLUAV H1 HA GENE NPH QL NAA+PROBE: NOT DETECTED
FLUAV H3 RNA NPH QL NAA+PROBE: NOT DETECTED
FLUAV SUBTYP SPEC NAA+PROBE: NOT DETECTED
FLUBV RNA ISLT QL NAA+PROBE: NOT DETECTED
GAS FLOW AIRWAY: 4 LPM
GFR SERPL CREATININE-BSD FRML MDRD: 59 ML/MIN/1.73
GLOBULIN UR ELPH-MCNC: 2.7 GM/DL
GLUCOSE BLD-MCNC: 114 MG/DL (ref 65–99)
GLUCOSE BLDC GLUCOMTR-MCNC: 111 MG/DL (ref 70–130)
HADV DNA SPEC NAA+PROBE: NOT DETECTED
HCO3 BLDA-SCNC: 34.2 MMOL/L (ref 22–28)
HCO3 BLDA-SCNC: 34.6 MMOL/L (ref 22–28)
HCOV 229E RNA SPEC QL NAA+PROBE: NOT DETECTED
HCOV HKU1 RNA SPEC QL NAA+PROBE: NOT DETECTED
HCOV NL63 RNA SPEC QL NAA+PROBE: NOT DETECTED
HCOV OC43 RNA SPEC QL NAA+PROBE: NOT DETECTED
HCT VFR BLD AUTO: 61.6 % (ref 37.5–51)
HGB BLD-MCNC: 19.2 G/DL (ref 13–17.7)
HMPV RNA NPH QL NAA+NON-PROBE: NOT DETECTED
HOLD SPECIMEN: NORMAL
HOLD SPECIMEN: NORMAL
HOROWITZ INDEX BLD+IHG-RTO: 40 %
HPIV1 RNA SPEC QL NAA+PROBE: NOT DETECTED
HPIV2 RNA SPEC QL NAA+PROBE: NOT DETECTED
HPIV3 RNA NPH QL NAA+PROBE: NOT DETECTED
HPIV4 P GENE NPH QL NAA+PROBE: NOT DETECTED
LYMPHOCYTES # BLD MANUAL: 2.41 10*3/MM3 (ref 0.7–3.1)
LYMPHOCYTES NFR BLD MANUAL: 21 % (ref 19.6–45.3)
LYMPHOCYTES NFR BLD MANUAL: 4 % (ref 5–12)
M PNEUMO IGG SER IA-ACNC: NOT DETECTED
MCH RBC QN AUTO: 32.6 PG (ref 26.6–33)
MCHC RBC AUTO-ENTMCNC: 31.2 G/DL (ref 31.5–35.7)
MCV RBC AUTO: 104.6 FL (ref 79–97)
MODALITY: ABNORMAL
MODALITY: ABNORMAL
MONOCYTES # BLD AUTO: 0.46 10*3/MM3 (ref 0.1–0.9)
NEUTROPHILS # BLD AUTO: 8.5 10*3/MM3 (ref 1.4–7)
NEUTROPHILS NFR BLD MANUAL: 74 % (ref 42.7–76)
NT-PROBNP SERPL-MCNC: 1801 PG/ML (ref 5–900)
O2 A-A PPRESDIFF RESPIRATORY: 0.3 MMHG
PCO2 BLDA: 79.2 MM HG (ref 35–45)
PCO2 BLDA: 80.2 MM HG (ref 35–45)
PH BLDA: 7.24 PH UNITS (ref 7.35–7.45)
PH BLDA: 7.25 PH UNITS (ref 7.35–7.45)
PLAT MORPH BLD: NORMAL
PLATELET # BLD AUTO: 140 10*3/MM3 (ref 140–450)
PMV BLD AUTO: 10.5 FL (ref 6–12)
PO2 BLDA: 69.3 MM HG (ref 80–100)
PO2 BLDA: 73.2 MM HG (ref 80–100)
POTASSIUM BLD-SCNC: 5.1 MMOL/L (ref 3.5–5.2)
PROCALCITONIN SERPL-MCNC: 0.09 NG/ML (ref 0.1–0.25)
PROT SERPL-MCNC: 6.3 G/DL (ref 6–8.5)
RBC # BLD AUTO: 5.89 10*6/MM3 (ref 4.14–5.8)
RBC MORPH BLD: NORMAL
RHINOVIRUS RNA SPEC NAA+PROBE: NOT DETECTED
RSV RNA NPH QL NAA+NON-PROBE: NOT DETECTED
SAO2 % BLDCOA: 89.2 % (ref 92–99)
SAO2 % BLDCOA: 90.4 % (ref 92–99)
SODIUM BLD-SCNC: 146 MMOL/L (ref 136–145)
TOTAL RATE: 16 BREATHS/MINUTE
TOTAL RATE: 18 BREATHS/MINUTE
TROPONIN T SERPL-MCNC: <0.01 NG/ML (ref 0–0.03)
VENTILATOR MODE: ABNORMAL
VT ON VENT VENT: 517 ML
WBC MORPH BLD: NORMAL
WBC NRBC COR # BLD: 11.48 10*3/MM3 (ref 3.4–10.8)
WHOLE BLOOD HOLD SPECIMEN: NORMAL
WHOLE BLOOD HOLD SPECIMEN: NORMAL

## 2019-03-11 PROCEDURE — 25010000002 METHYLPREDNISOLONE PER 125 MG: Performed by: NURSE PRACTITIONER

## 2019-03-11 PROCEDURE — 85007 BL SMEAR W/DIFF WBC COUNT: CPT | Performed by: NURSE PRACTITIONER

## 2019-03-11 PROCEDURE — 99285 EMERGENCY DEPT VISIT HI MDM: CPT

## 2019-03-11 PROCEDURE — 87633 RESP VIRUS 12-25 TARGETS: CPT | Performed by: NURSE PRACTITIONER

## 2019-03-11 PROCEDURE — 87581 M.PNEUMON DNA AMP PROBE: CPT | Performed by: NURSE PRACTITIONER

## 2019-03-11 PROCEDURE — 93010 ELECTROCARDIOGRAM REPORT: CPT | Performed by: INTERNAL MEDICINE

## 2019-03-11 PROCEDURE — 82962 GLUCOSE BLOOD TEST: CPT

## 2019-03-11 PROCEDURE — 93005 ELECTROCARDIOGRAM TRACING: CPT | Performed by: NURSE PRACTITIONER

## 2019-03-11 PROCEDURE — 83880 ASSAY OF NATRIURETIC PEPTIDE: CPT | Performed by: NURSE PRACTITIONER

## 2019-03-11 PROCEDURE — 87486 CHLMYD PNEUM DNA AMP PROBE: CPT | Performed by: NURSE PRACTITIONER

## 2019-03-11 PROCEDURE — 85025 COMPLETE CBC W/AUTO DIFF WBC: CPT | Performed by: NURSE PRACTITIONER

## 2019-03-11 PROCEDURE — 94799 UNLISTED PULMONARY SVC/PX: CPT

## 2019-03-11 PROCEDURE — 84145 PROCALCITONIN (PCT): CPT | Performed by: NURSE PRACTITIONER

## 2019-03-11 PROCEDURE — 82803 BLOOD GASES ANY COMBINATION: CPT

## 2019-03-11 PROCEDURE — 94640 AIRWAY INHALATION TREATMENT: CPT

## 2019-03-11 PROCEDURE — 84484 ASSAY OF TROPONIN QUANT: CPT | Performed by: NURSE PRACTITIONER

## 2019-03-11 PROCEDURE — 36600 WITHDRAWAL OF ARTERIAL BLOOD: CPT

## 2019-03-11 PROCEDURE — 87798 DETECT AGENT NOS DNA AMP: CPT | Performed by: NURSE PRACTITIONER

## 2019-03-11 PROCEDURE — 80053 COMPREHEN METABOLIC PANEL: CPT | Performed by: NURSE PRACTITIONER

## 2019-03-11 PROCEDURE — 94660 CPAP INITIATION&MGMT: CPT

## 2019-03-11 PROCEDURE — 71046 X-RAY EXAM CHEST 2 VIEWS: CPT

## 2019-03-11 PROCEDURE — 83605 ASSAY OF LACTIC ACID: CPT | Performed by: NURSE PRACTITIONER

## 2019-03-11 RX ORDER — ALBUTEROL SULFATE 2.5 MG/3ML
2.5 SOLUTION RESPIRATORY (INHALATION) ONCE
Status: COMPLETED | OUTPATIENT
Start: 2019-03-11 | End: 2019-03-11

## 2019-03-11 RX ORDER — SODIUM CHLORIDE 0.9 % (FLUSH) 0.9 %
10 SYRINGE (ML) INJECTION AS NEEDED
Status: DISCONTINUED | OUTPATIENT
Start: 2019-03-11 | End: 2019-03-19 | Stop reason: HOSPADM

## 2019-03-11 RX ORDER — IPRATROPIUM BROMIDE AND ALBUTEROL SULFATE 2.5; .5 MG/3ML; MG/3ML
3 SOLUTION RESPIRATORY (INHALATION) ONCE
Status: COMPLETED | OUTPATIENT
Start: 2019-03-11 | End: 2019-03-11

## 2019-03-11 RX ORDER — METHYLPREDNISOLONE SODIUM SUCCINATE 125 MG/2ML
125 INJECTION, POWDER, LYOPHILIZED, FOR SOLUTION INTRAMUSCULAR; INTRAVENOUS ONCE
Status: COMPLETED | OUTPATIENT
Start: 2019-03-11 | End: 2019-03-11

## 2019-03-11 RX ADMIN — ALBUTEROL SULFATE 2.5 MG: 2.5 SOLUTION RESPIRATORY (INHALATION) at 20:42

## 2019-03-11 RX ADMIN — METHYLPREDNISOLONE SODIUM SUCCINATE 125 MG: 125 INJECTION, POWDER, FOR SOLUTION INTRAMUSCULAR; INTRAVENOUS at 20:17

## 2019-03-11 RX ADMIN — IPRATROPIUM BROMIDE AND ALBUTEROL SULFATE 3 ML: 2.5; .5 SOLUTION RESPIRATORY (INHALATION) at 20:04

## 2019-03-11 NOTE — ED PROVIDER NOTES
EMERGENCY DEPARTMENT ENCOUNTER    CHIEF COMPLAINT  Chief Complaint: SOA  History given by: Pt  History limited by: nothing  Room Number: N333/1  PMD: Mariusz Wilburn MD  Pulmonologist: Dr. Tate    HPI:  Pt is a 63 y.o. male who presents complaining of SOA that started a couple days ago. Pt admits to subjective fever. Pt denies cough, nasal congestions, diarrhea, vomiting, abd pain, CP, and dysuria. Pt states he had a partial amputation. Pt notes he is a smoker. Pt states he does take ASA and an inhaler. Pt denies EtOH or illicit drug use. Pt states he has a hx of kidney and heart disease. Pt denies hemoptysis and recent travel or hospitalizations.    Duration:  Couple days  Onset: gradual   Timing: constant  Quality: SOA  Intensity/Severity: mild  Progression: unchanged   Associated Symptoms: subjective fever  Aggravating Factors: none specified  Alleviating Factors: none specified  Previous Episodes: none specified  Treatment before arrival: Pt states he does take ASA and an inhaler.    PAST MEDICAL HISTORY  Active Ambulatory Problems     Diagnosis Date Noted   • H/O angiodysplasia of intestinal tract 03/04/2016   • Hypotension 03/04/2016   • ARF (acute renal failure) (CMS/[facility]) 03/04/2016   • COPD (chronic obstructive pulmonary disease) (CMS/[facility]) 03/04/2016   • Atelectasis, left 03/04/2016   • S/P colectomy 03/04/2016   • Myonecrosis 03/17/2016   • Peripheral arteriosclerosis (CMS/[facility]) 03/17/2016   • S/P AKA (above knee amputation) (CMS/[facility]) 03/28/2016   • Elevated troponin 04/03/2016   • Chest pain 04/04/2016   • CAD (coronary artery disease) 04/04/2016   • Sepsis (CMS/[facility]) 04/05/2016   • Nosocomial pneumonia 04/06/2016   • ESRD (end stage renal disease) on dialysis (CMS/[facility]) 04/06/2016   • Anemia in chronic kidney disease 04/06/2016   • PVD (peripheral vascular disease) (CMS/[facility]) 04/06/2016   • Hyperkalemia 04/11/2016   • Pleural effusion on left 04/11/2016   • S/P thoracentesis 04/11/2016   • Acute upper  respiratory infection 02/06/2013   • Chronic obstructive pulmonary disease with acute exacerbation (CMS/[facility]) 02/06/2013   • Urticaria 02/08/2014   • Hypertension 01/31/2014   • Fracture of patella 03/03/2015     Resolved Ambulatory Problems     Diagnosis Date Noted   • Acute upper GI bleed 03/04/2016   • 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CMS/[facility]) 03/05/2016   • Aortoenteric fistula (CMS/[facility]) 03/05/2016   • Compartment syndrome of left lower extremity (CMS/[facility]) 03/05/2016   • Ischemic colitis (CMS/[facility]) 05/24/2016   • Colostomy prolapse (CMS/[facility]) 05/24/2016   • Ischemia of large intestine (CMS/[facility]) 10/13/2016   • Incisional hernia, without obstruction or gangrene 07/13/2017     Past Medical History:   Diagnosis Date   • Anemia    • CAD (coronary artery disease) 4/4/2016   • Colon polyps    • Compartment syndrome (CMS/[facility])    • COPD (chronic obstructive pulmonary disease) (CMS/[facility])    • Cough    • Disease of thyroid gland    • Diverticulosis    • Employs prosthetic leg    • History of acute renal failure    • History of CHF (congestive heart failure)    • History of GI bleed 02/2016   • History of sepsis 02/2016   • History of transfusion    • Hypertension    • Phantom limb pain (CMS/[facility])    • Pneumonia    • PVD (peripheral vascular disease) (CMS/[facility])        PAST SURGICAL HISTORY  Past Surgical History:   Procedure Laterality Date   • ABOVE KNEE AMPUTATION Left 3/16/2016    Procedure: LT AMPUTATION ABOVE KNEE;  Surgeon: Jose Swann MD;  Location: [facility];  Service:    • ARTERIAL THROMBECTOMY  2001    throbectomy of arterial graft   • AXILLARY FEMORAL BYPASS Right 02/15/2016    Exploratory lapartomy, repair aortoduodenal fistula, resection infected aortobifemoral bypass graft, axillobi-superficial femoral artery [place]-Aneudy bypass graft from right axillary artery, Repair of duodenotomy 4th portion duodenum-Dr. Jose Swann, Dr. Genaro Perrin   • BRONCHOSCOPY Left 03/04/2016    Dr. NATALIIA Tate   •  BRONCHOSCOPY RIGID / FLEXIBLE N/A 03/03/2016    Dr. NATALIIA Tate   • BRONCHOSCOPY RIGID / FLEXIBLE N/A 02/26/2016    Dr. NATALIIA Tate   • CATARACT EXTRACTION WITH INTRAOCULAR LENS IMPLANT Bilateral    • COLON RESECTION WITH COLOSTOMY Left 02/28/2016    Exploratory laparotomy with open left hemicolectomy, end-colostomy, G-tube and J-tube-Dr. Endy Chaney   • COLONOSCOPY N/A 2015    Dr. Laughlin   • COLONOSCOPY N/A 10/12/2016    Procedure: COLONOSCOPY TO 20 CM AND PER STOMA TO 30CM;  Surgeon: Genaro Perrin MD;  Location: [facility] ENDOSCOPY;  Service:    • COLONOSCOPY N/A 10/21/2016    Procedure: COLONOSCOPY DONE AT BEDSIDE ONTO CECEM;  Surgeon: Genaro Perrin MD;  Location: [facility] ENDOSCOPY;  Service:    • COLONOSCOPY N/A 02/10/2016    Diverticulosis in the entire examined colon, non-bleeding internal hemorrhoids-Dr. Taylor Pina   • COLONOSCOPY N/A 02/11/2016    Poor prep, blood in the entire examined colon, normal ileum-Dr. Taylor Pina   • COLONOSCOPY W/ POLYPECTOMY N/A 07/27/2015    Normal ileum, diverticulosis in the sigmoid colon: resected and retrieved, one 6 mm polyp in the descending colon: resected and retrieved, the distal rectum and anal verge are normal on retroflexion view-Dr. Miguel Ángel Laughlin   • COLOSTOMY CLOSURE N/A 10/13/2016    Procedure: COLOSTOMY TAKEDOWN OR CLOSURE, APPENDECTOMY;  Surgeon: Genaro Perrin MD;  Location: [facility] OR;  Service:    • DEBRIDEMENT LEG Left 03/01/2016    Excisional debridement of the skin, subcutantous tissue, tendon and muscle left calf-Dr. Jose Swann   • DEBRIDEMENT LEG Left 02/25/2016    Excisional debridement full-thcikness skin and subcutaneous tissue and tendon and muscle, left medial and lateral calf muscles-Dr. Jose Swann   • ENDOSCOPY N/A 10/21/2016    Procedure: ESOPHAGOGASTRODUODENOSCOPY DONE AT BEDSIDE;  Surgeon: Genaro Perrin MD;  Location: [facility] ENDOSCOPY;  Service:    • ENDOSCOPY AND COLONOSCOPY N/A 02/28/2016     EGD and Colonoscopy with Candy ink injection-Dr. Endy Chaney   • ENTEROSCOPY SMALL BOWEL N/A 02/11/2016    Normal esophagus, normal stomach, normal duodenum, portion of the jejunum normal-Dr. Taylor Pina   • ILIAC ARTERY - FEMORAL ARTERY BYPASS GRAFT Bilateral 2002   • ILIAC ARTERY STENT Bilateral 2001   • INSERTION AND REMOVAL PERITONEAL DIALYSIS CATHETER Right 02/29/2016    Exchange right jugular 16 cm [place]kar dialysis catheter-Dr. Jose Swann   • INSERTION PERITONEAL DIALYSIS CATHETER Left 03/01/2016    Ultrasound-guided access of the left jugular vein, 23 cm left jugular palindrome dialysis catheter placement-Dr. Jose Swann   • INSERTION PERITONEAL DIALYSIS CATHETER Right 02/18/2016    Right jugular Mahrkar catherer placement-Dr. Jose Swann   • JOINT REPLACEMENT Left    • THORACOSCOPY Left 4/18/2016    Procedure: LT THORACOSCOPY VIDEO ASSISTED WITH DECORDICATION;  Surgeon: Genaro Davila III, MD;  Location: [facility];  Service:    • THROMBECTOMY Left 02/16/2016    Thombectomy of left femoral graft, left leg arteriogram, left calf forward compartment fasciotomy-Dr. Jose Swann   • TOTAL HIP ARTHROPLASTY Left    • UPPER GASTROINTESTINAL ENDOSCOPY N/A 02/09/2016    Normal UGI-Dr. Ajay Parkinson   • UPPER GASTROINTESTINAL ENDOSCOPY N/A 11/28/2015    Small hiatal hernia, chronic gastritis: biopsied, non-bleeding angioectasias in the stomach: treated with argon plasma coagulation, multiple bleeding angioectasias in the dudenum: treated with argon plasma coagulation-Dr. Mykel Jaramillo   • VASCULAR SURGERY      MULTIPLE   • VENTRAL/INCISIONAL HERNIA REPAIR N/A 10/19/2017    Procedure: VENTRAL HERNIA REPAIR WITH MESH AND BILATERAL COMPONENT SEPARATION;  Surgeon: Genaro Perrin MD;  Location: [facility] OR;  Service:    • WISDOM TOOTH EXTRACTION         FAMILY HISTORY  Family History   Problem Relation Age of Onset   • Lung cancer Mother    • Heart disease Father    • Malig  Hyperthermia Neg Hx        SOCIAL HISTORY  Social History     Socioeconomic History   • Marital status:      Spouse name: Not on file   • Number of children: Not on file   • Years of education: Not on file   • Highest education level: Not on file   Social Needs   • Financial resource strain: Not on file   • Food insecurity - worry: Not on file   • Food insecurity - inability: Not on file   • Transportation needs - medical: Not on file   • Transportation needs - non-medical: Not on file   Occupational History   • Not on file   Tobacco Use   • Smoking status: Former Smoker     Packs/day: 1.00     Years: 30.00     Pack years: 30.00     Types: Cigarettes     Last attempt to quit: 2016     Years since quitting: 3.1   • Smokeless tobacco: Never Used   Substance and Sexual Activity   • Alcohol use: No   • Drug use: No   • Sexual activity: Defer   Other Topics Concern   • Not on file   Social History Narrative   • Not on file       ALLERGIES  Augmentin [amoxicillin-pot clavulanate]    REVIEW OF SYSTEMS  Review of Systems   Constitutional: Positive for fever (subjective). Negative for fatigue.   Respiratory: Positive for shortness of breath. Negative for cough (hemoptysis).    Cardiovascular: Negative for chest pain.   Gastrointestinal: Negative for nausea and vomiting.       PHYSICAL EXAM  ED Triage Vitals [03/11/19 1853]   Temp Heart Rate Resp BP SpO2   99 °F (37.2 °C) 90 18 140/72 96 %      Temp src Heart Rate Source Patient Position BP Location FiO2 (%)   -- -- -- -- --       Physical Exam   Constitutional: He is oriented to person, place, and time. No distress.   HENT:   Head: Normocephalic and atraumatic.   Mouth/Throat: Mucous membranes are dry.   Eyes: EOM are normal. Pupils are equal, round, and reactive to light.   Neck: Normal range of motion. Neck supple.   Cardiovascular: Normal rate, regular rhythm and normal heart sounds.   Pulmonary/Chest: Effort normal. No respiratory distress. He has wheezes  (expiratory ). He has rhonchi (scattered ).   Abdominal: Soft. There is no tenderness. There is no rebound and no guarding.   Musculoskeletal: Normal range of motion. He exhibits no edema.   Pt has a left AKA.   Neurological: He is alert and oriented to person, place, and time. He has normal sensation and normal strength.   Skin: Skin is warm and dry.   Psychiatric: Mood and affect normal.   Nursing note and vitals reviewed.      LAB RESULTS  Lab Results (last 24 hours)     Procedure Component Value Units Date/Time    CBC & Differential [[id number]] Collected:  03/11/19 1948    Specimen:  Blood Updated:  03/11/19 2055    Narrative:       The following orders were created for panel order CBC & Differential.  Procedure                               Abnormality         Status                     ---------                               -----------         ------                     Manual Differential[[id number]]                                                         CBC Auto Differential[[id number]]        Abnormal            Final result                 Please view results for these tests on the individual orders.    BNP [[id number]]  (Abnormal) Collected:  03/11/19 1948    Specimen:  Blood Updated:  03/11/19 2046     proBNP 1,801.0 pg/mL     Narrative:       Among patients with dyspnea, NT-proBNP is highly sensitive for the detection of acute congestive heart failure. In addition NT-proBNP of <300 pg/ml effectively rules out acute congestive heart failure with 99% negative predictive value.    CBC Auto Differential [[id number]]  (Abnormal) Collected:  03/11/19 1948    Specimen:  Blood Updated:  03/11/19 2055     WBC 11.48 10*3/mm3      RBC 5.89 10*6/mm3      Hemoglobin 19.2 g/dL      Hematocrit 61.6 %      .6 fL      MCH 32.6 pg      MCHC 31.2 g/dL      RDW 13.7 %      RDW-SD 54.6 fl      MPV 10.5 fL      Platelets 140 10*3/mm3     Lactic Acid, Plasma [[id number]]  (Normal) Collected:  03/11/19 1948    Specimen:   "Blood Updated:  03/11/19 2026     Lactate 0.9 mmol/L     Procalcitonin [[id number]]  (Abnormal) Collected:  03/11/19 1948    Specimen:  Blood Updated:  03/11/19 2055     Procalcitonin 0.09 ng/mL     Narrative:       As a Marker for Sepsis (Non-Neonates):   1. <0.5 ng/mL represents a low risk of severe sepsis and/or septic shock.  1. >2 ng/mL represents a high risk of severe sepsis and/or septic shock.    As a Marker for Lower Respiratory Tract Infections that require antibiotic therapy:  PCT on Admission     Antibiotic Therapy             6-12 Hrs later  > 0.5                Strongly Recommended            >0.25 - <0.5         Recommended  0.1 - 0.25           Discouraged                   Remeasure/reassess PCT  <0.1                 Strongly Discouraged          Remeasure/reassess PCT      As 28 day mortality risk marker: \"Change in Procalcitonin Result\" (> 80 % or <=80 %) if Day 0 (or Day 1) and Day 4 values are available. Refer to [email or web address]-pct-[email or web address]/   Change in PCT <=80 %   A decrease of PCT levels below or equal to 80 % defines a positive change in PCT test result representing a higher risk for 28-day all-cause mortality of patients diagnosed with severe sepsis or septic shock.  Change in PCT > 80 %   A decrease of PCT levels of more than 80 % defines a negative change in PCT result representing a lower risk for 28-day all-cause mortality of patients diagnosed with severe sepsis or septic shock.                Manual Differential [[id number]]  (Abnormal) Collected:  03/11/19 1948    Specimen:  Blood Updated:  03/11/19 2055     Neutrophil % 74.0 %      Lymphocyte % 21.0 %      Monocyte % 4.0 %      Basophil % 1.0 %      Neutrophils Absolute 8.50 10*3/mm3      Lymphocytes Absolute 2.41 10*3/mm3      Monocytes Absolute 0.46 10*3/mm3      Basophils Absolute 0.11 10*3/mm3      RBC Morphology Normal     WBC Morphology Normal     Platelet Morphology Normal    Respiratory Panel, PCR - Swab, Nasopharynx " [[id number]]  (Normal) Collected:  03/11/19 2018    Specimen:  Swab from Nasopharynx Updated:  03/11/19 2139     ADENOVIRUS, PCR Not Detected     Coronavirus 229E Not Detected     Coronavirus HKU1 Not Detected     Coronavirus NL63 Not Detected     Coronavirus OC43 Not Detected     Human Metapneumovirus Not Detected     Human Rhinovirus/Enterovirus Not Detected     Influenza B PCR Not Detected     Parainfluenza Virus 1 Not Detected     Parainfluenza Virus 2 Not Detected     Parainfluenza Virus 3 Not Detected     Parainfluenza Virus 4 Not Detected     Bordetella pertussis pcr Not Detected     Influenza A H1 2009 PCR Not Detected     Chlamydophila pneumoniae PCR Not Detected     Mycoplasma pneumo by PCR Not Detected     Influenza A PCR Not Detected     Influenza A H3 Not Detected     Influenza A H1 Not Detected     RSV, PCR Not Detected     Bordetella parapertussis PCR Not Detected    Blood Gas, Arterial [[id number]]  (Abnormal) Collected:  03/11/19 2040    Specimen:  Arterial Blood Updated:  03/11/19 2046     Site Arterial: right radial     Shaq's Test Positive     pH, Arterial 7.238 pH units      Comment: Critical:Notify Dr gavin sanchez (11-Mar-19 20:45:06)Read back ok        pCO2, Arterial 80.2 mm Hg      Comment: Critical:Notify Dr gavin sanchez (11-Mar-19 20:45:06)Read back ok        pO2, Arterial 73.2 mm Hg      HCO3, Arterial 34.2 mmol/L      Base Excess, Arterial 2.6 mmol/L      O2 Saturation Calculated 90.4 %      Barometric Pressure for Blood Gas 762.7 mmHg      Modality Cannula     Flow Rate 4 lpm      Rate 18 Breaths/minute     Comprehensive Metabolic Panel [[id number]]  (Abnormal) Collected:  03/11/19 2109    Specimen:  Blood Updated:  03/11/19 2152     Glucose 114 mg/dL      BUN 17 mg/dL      Creatinine 1.24 mg/dL      Sodium 146 mmol/L      Potassium 5.1 mmol/L      Comment: Specimen hemolyzed.  Results may be affected.        Chloride 107 mmol/L      CO2 30.8 mmol/L      Calcium 9.0 mg/dL      Total Protein 6.3  g/dL      Albumin 3.60 g/dL      ALT (SGPT) 17 U/L      Comment: Specimen hemolyzed.  Results may be affected.        AST (SGOT) 23 U/L      Comment: Specimen hemolyzed.  Results may be affected.        Alkaline Phosphatase 47 U/L      Total Bilirubin 0.6 mg/dL      eGFR Non African Amer 59 mL/min/1.73      Globulin 2.7 gm/dL      A/G Ratio 1.3 g/dL      BUN/Creatinine Ratio 13.7     Anion Gap 8.2 mmol/L     Narrative:       GFR Normal >60  Chronic Kidney Disease <60  Kidney Failure <15    Troponin [[id number]]  (Normal) Collected:  03/11/19 2109    Specimen:  Blood Updated:  03/11/19 2147     Troponin T <0.010 ng/mL     Narrative:       Troponin T Reference Range:  <= 0.03 ng/mL-   Negative for AMI  >0.03 ng/mL-     Abnormal for myocardial necrosis.  Clinicians would have to utilize clinical acumen, EKG, Troponin and serial changes to determine if it is an Acute Myocardial Infarction or myocardial injury due to an underlying chronic condition.     Blood Gas, Arterial [[id number]]  (Abnormal) Collected:  03/11/19 2152    Specimen:  Arterial Blood Updated:  03/11/19 2156     Site Arterial: right radial     Shaq's Test Positive     pH, Arterial 7.248 pH units      Comment: Critical:Notify Dr gavin sanchez (11-Mar-19 21:54:49)Read back ok        pCO2, Arterial 79.2 mm Hg      Comment: Critical:Notify Dr gavin sanchez (11-Mar-19 21:54:49)Read back ok        pO2, Arterial 69.3 mm Hg      HCO3, Arterial 34.6 mmol/L      Base Excess, Arterial 3.0 mmol/L      O2 Saturation Calculated 89.2 %      A-a Gradiant 0.3 mmHg      Barometric Pressure for Blood Gas 762.5 mmHg      Modality BiPap     FIO2 40 %      Ventilator Mode BiLevel     Set Tidal Volume 517     Rate 16 Breaths/minute     POC Glucose Once [[id number]]  (Normal) Collected:  03/11/19 2345    Specimen:  Blood Updated:  03/11/19 2347     Glucose 111 mg/dL     Basic Metabolic Panel [[id number]]  (Abnormal) Collected:  03/12/19 0424    Specimen:  Blood Updated:  03/12/19 0664      Glucose 145 mg/dL      BUN 23 mg/dL      Creatinine 1.36 mg/dL      Sodium 146 mmol/L      Potassium 5.1 mmol/L      Chloride 106 mmol/L      CO2 26.5 mmol/L      Calcium 9.2 mg/dL      eGFR Non African Amer 53 mL/min/1.73      BUN/Creatinine Ratio 16.9     Anion Gap 13.5 mmol/L     Narrative:       GFR Normal >60  Chronic Kidney Disease <60  Kidney Failure <15          I ordered the above labs and reviewed the results    RADIOLOGY  XR Chest 2 View   Final Result           I ordered the above noted radiological studies. Interpreted by radiologist. Reviewed by me in PACS.       PROCEDURES  Procedures      PROGRESS AND CONSULTS     1930 DUO-NEB and SOLU-Medrol ordered for the pt. Labs ordered for further evaluation.    1939 Rechecked pt who is resting comfortably in NAD.     2003 Pt's care has been turned over to Domingo BRODY).    MEDICAL DECISION MAKING  Results were reviewed/discussed with the patient and they were also made aware of online access. Pt also made aware that some labs, such as cultures, will not be resulted during ER visit and follow up with PMD is necessary.     MDM  Number of Diagnoses or Management Options     Amount and/or Complexity of Data Reviewed  Clinical lab tests: ordered and reviewed (Pending)  Tests in the radiology section of CPT®: ordered and reviewed (CXR: negative acute)           DIAGNOSIS  Final diagnoses:   Polycythemia   Acute respiratory failure with hypoxia and hypercarbia (CMS/HCC)   COPD with acute exacerbation (CMS/HCC)       DISPOSITION  Pending    Latest Documented Vital Signs:  As of 7:28 PM  BP- 136/66 HR- 58 Temp- 97.4 °F (36.3 °C) (Oral) O2 sat- 91%    --  Documentation assistance provided by keara George for Mari Perez.  Information recorded by the keara was done at my direction and has been verified and validated by me.          Marleni George  03/11/19 2005       Mari Perez APRN  03/12/19 1929

## 2019-03-12 LAB
ANION GAP SERPL CALCULATED.3IONS-SCNC: 13.5 MMOL/L
BUN BLD-MCNC: 23 MG/DL (ref 8–23)
BUN/CREAT SERPL: 16.9 (ref 7–25)
CALCIUM SPEC-SCNC: 9.2 MG/DL (ref 8.6–10.5)
CHLORIDE SERPL-SCNC: 106 MMOL/L (ref 98–107)
CO2 SERPL-SCNC: 26.5 MMOL/L (ref 22–29)
CREAT BLD-MCNC: 1.36 MG/DL (ref 0.76–1.27)
GFR SERPL CREATININE-BSD FRML MDRD: 53 ML/MIN/1.73
GLUCOSE BLD-MCNC: 145 MG/DL (ref 65–99)
GLUCOSE BLDC GLUCOMTR-MCNC: 179 MG/DL (ref 70–130)
POTASSIUM BLD-SCNC: 5.1 MMOL/L (ref 3.5–5.2)
SODIUM BLD-SCNC: 146 MMOL/L (ref 136–145)

## 2019-03-12 PROCEDURE — 94799 UNLISTED PULMONARY SVC/PX: CPT

## 2019-03-12 PROCEDURE — 94660 CPAP INITIATION&MGMT: CPT

## 2019-03-12 PROCEDURE — 82962 GLUCOSE BLOOD TEST: CPT

## 2019-03-12 PROCEDURE — 63710000001 INSULIN LISPRO (HUMAN) PER 5 UNITS: Performed by: INTERNAL MEDICINE

## 2019-03-12 PROCEDURE — 25010000002 ENOXAPARIN PER 10 MG: Performed by: INTERNAL MEDICINE

## 2019-03-12 PROCEDURE — 25010000002 METHYLPREDNISOLONE PER 40 MG: Performed by: INTERNAL MEDICINE

## 2019-03-12 PROCEDURE — 80048 BASIC METABOLIC PNL TOTAL CA: CPT | Performed by: INTERNAL MEDICINE

## 2019-03-12 PROCEDURE — 94640 AIRWAY INHALATION TREATMENT: CPT

## 2019-03-12 RX ORDER — BISACODYL 5 MG/1
5 TABLET, DELAYED RELEASE ORAL DAILY PRN
Status: DISCONTINUED | OUTPATIENT
Start: 2019-03-12 | End: 2019-03-19

## 2019-03-12 RX ORDER — PANTOPRAZOLE SODIUM 40 MG/1
40 TABLET, DELAYED RELEASE ORAL EVERY MORNING
Status: DISCONTINUED | OUTPATIENT
Start: 2019-03-12 | End: 2019-03-19 | Stop reason: HOSPADM

## 2019-03-12 RX ORDER — AZITHROMYCIN 250 MG/1
500 TABLET, FILM COATED ORAL
Status: DISCONTINUED | OUTPATIENT
Start: 2019-03-12 | End: 2019-03-13

## 2019-03-12 RX ORDER — ONDANSETRON 2 MG/ML
4 INJECTION INTRAMUSCULAR; INTRAVENOUS EVERY 6 HOURS PRN
Status: DISCONTINUED | OUTPATIENT
Start: 2019-03-12 | End: 2019-03-19

## 2019-03-12 RX ORDER — METHYLPREDNISOLONE SODIUM SUCCINATE 40 MG/ML
40 INJECTION, POWDER, LYOPHILIZED, FOR SOLUTION INTRAMUSCULAR; INTRAVENOUS EVERY 8 HOURS
Status: DISCONTINUED | OUTPATIENT
Start: 2019-03-12 | End: 2019-03-13

## 2019-03-12 RX ORDER — AMLODIPINE BESYLATE 10 MG/1
10 TABLET ORAL EVERY MORNING
Status: DISCONTINUED | OUTPATIENT
Start: 2019-03-12 | End: 2019-03-16

## 2019-03-12 RX ORDER — GABAPENTIN 300 MG/1
300 CAPSULE ORAL EVERY MORNING
Status: DISCONTINUED | OUTPATIENT
Start: 2019-03-12 | End: 2019-03-16

## 2019-03-12 RX ORDER — SUCRALFATE 1 G/1
1 TABLET ORAL 3 TIMES DAILY PRN
Status: DISCONTINUED | OUTPATIENT
Start: 2019-03-12 | End: 2019-03-19

## 2019-03-12 RX ORDER — IPRATROPIUM BROMIDE AND ALBUTEROL SULFATE 2.5; .5 MG/3ML; MG/3ML
3 SOLUTION RESPIRATORY (INHALATION)
Status: DISCONTINUED | OUTPATIENT
Start: 2019-03-12 | End: 2019-03-19

## 2019-03-12 RX ORDER — HYDROCODONE BITARTRATE AND ACETAMINOPHEN 5; 325 MG/1; MG/1
1 TABLET ORAL EVERY 6 HOURS PRN
Status: DISCONTINUED | OUTPATIENT
Start: 2019-03-12 | End: 2019-03-19

## 2019-03-12 RX ORDER — ALPRAZOLAM 0.5 MG/1
1 TABLET ORAL 4 TIMES DAILY PRN
Status: DISCONTINUED | OUTPATIENT
Start: 2019-03-12 | End: 2019-03-19

## 2019-03-12 RX ORDER — ASPIRIN 81 MG/1
81 TABLET, CHEWABLE ORAL NIGHTLY
Status: DISCONTINUED | OUTPATIENT
Start: 2019-03-12 | End: 2019-03-19 | Stop reason: HOSPADM

## 2019-03-12 RX ORDER — ATORVASTATIN CALCIUM 20 MG/1
40 TABLET, FILM COATED ORAL DAILY
Status: DISCONTINUED | OUTPATIENT
Start: 2019-03-12 | End: 2019-03-19 | Stop reason: HOSPADM

## 2019-03-12 RX ORDER — ISOSORBIDE MONONITRATE 30 MG/1
30 TABLET, EXTENDED RELEASE ORAL EVERY MORNING
Status: DISCONTINUED | OUTPATIENT
Start: 2019-03-12 | End: 2019-03-19 | Stop reason: HOSPADM

## 2019-03-12 RX ORDER — DEXTROSE MONOHYDRATE 25 G/50ML
25 INJECTION, SOLUTION INTRAVENOUS
Status: DISCONTINUED | OUTPATIENT
Start: 2019-03-12 | End: 2019-03-19

## 2019-03-12 RX ORDER — SODIUM CHLORIDE 0.9 % (FLUSH) 0.9 %
3 SYRINGE (ML) INJECTION EVERY 12 HOURS SCHEDULED
Status: DISCONTINUED | OUTPATIENT
Start: 2019-03-12 | End: 2019-03-19 | Stop reason: HOSPADM

## 2019-03-12 RX ORDER — ONDANSETRON 4 MG/1
4 TABLET, FILM COATED ORAL EVERY 6 HOURS PRN
Status: DISCONTINUED | OUTPATIENT
Start: 2019-03-12 | End: 2019-03-19

## 2019-03-12 RX ORDER — BISACODYL 10 MG
10 SUPPOSITORY, RECTAL RECTAL DAILY PRN
Status: DISCONTINUED | OUTPATIENT
Start: 2019-03-12 | End: 2019-03-19

## 2019-03-12 RX ORDER — NICOTINE POLACRILEX 4 MG
15 LOZENGE BUCCAL
Status: DISCONTINUED | OUTPATIENT
Start: 2019-03-12 | End: 2019-03-19

## 2019-03-12 RX ORDER — ONDANSETRON 4 MG/1
4 TABLET, ORALLY DISINTEGRATING ORAL EVERY 6 HOURS PRN
Status: DISCONTINUED | OUTPATIENT
Start: 2019-03-12 | End: 2019-03-19

## 2019-03-12 RX ORDER — ALPRAZOLAM 1 MG/1
1 TABLET ORAL 3 TIMES DAILY PRN
COMMUNITY
End: 2020-03-03 | Stop reason: SDUPTHER

## 2019-03-12 RX ORDER — CARVEDILOL 12.5 MG/1
12.5 TABLET ORAL EVERY 12 HOURS SCHEDULED
Status: DISCONTINUED | OUTPATIENT
Start: 2019-03-12 | End: 2019-03-19 | Stop reason: HOSPADM

## 2019-03-12 RX ORDER — CLOPIDOGREL BISULFATE 75 MG/1
75 TABLET ORAL NIGHTLY
Status: DISCONTINUED | OUTPATIENT
Start: 2019-03-12 | End: 2019-03-19 | Stop reason: HOSPADM

## 2019-03-12 RX ORDER — SODIUM CHLORIDE 0.9 % (FLUSH) 0.9 %
3-10 SYRINGE (ML) INJECTION AS NEEDED
Status: DISCONTINUED | OUTPATIENT
Start: 2019-03-12 | End: 2019-03-19

## 2019-03-12 RX ORDER — THIAMINE MONONITRATE (VIT B1) 100 MG
100 TABLET ORAL DAILY
Status: DISCONTINUED | OUTPATIENT
Start: 2019-03-12 | End: 2019-03-19 | Stop reason: HOSPADM

## 2019-03-12 RX ORDER — LEVOTHYROXINE SODIUM 0.12 MG/1
125 TABLET ORAL
Status: DISCONTINUED | OUTPATIENT
Start: 2019-03-12 | End: 2019-03-19 | Stop reason: HOSPADM

## 2019-03-12 RX ADMIN — ISOSORBIDE MONONITRATE 30 MG: 30 TABLET ORAL at 06:36

## 2019-03-12 RX ADMIN — ATORVASTATIN CALCIUM 40 MG: 20 TABLET, FILM COATED ORAL at 08:24

## 2019-03-12 RX ADMIN — IPRATROPIUM BROMIDE AND ALBUTEROL SULFATE 3 ML: 2.5; .5 SOLUTION RESPIRATORY (INHALATION) at 15:45

## 2019-03-12 RX ADMIN — LEVOTHYROXINE SODIUM 125 MCG: 125 TABLET ORAL at 06:37

## 2019-03-12 RX ADMIN — CLOPIDOGREL 75 MG: 75 TABLET, FILM COATED ORAL at 02:44

## 2019-03-12 RX ADMIN — ASPIRIN 81 MG: 81 TABLET, CHEWABLE ORAL at 23:29

## 2019-03-12 RX ADMIN — CARVEDILOL 12.5 MG: 12.5 TABLET, FILM COATED ORAL at 08:24

## 2019-03-12 RX ADMIN — IPRATROPIUM BROMIDE AND ALBUTEROL SULFATE 3 ML: 2.5; .5 SOLUTION RESPIRATORY (INHALATION) at 03:56

## 2019-03-12 RX ADMIN — IPRATROPIUM BROMIDE AND ALBUTEROL SULFATE 3 ML: 2.5; .5 SOLUTION RESPIRATORY (INHALATION) at 12:00

## 2019-03-12 RX ADMIN — METHYLPREDNISOLONE SODIUM SUCCINATE 40 MG: 40 INJECTION, POWDER, LYOPHILIZED, FOR SOLUTION INTRAMUSCULAR; INTRAVENOUS at 18:03

## 2019-03-12 RX ADMIN — ASPIRIN 81 MG: 81 TABLET, CHEWABLE ORAL at 02:44

## 2019-03-12 RX ADMIN — SODIUM CHLORIDE, PRESERVATIVE FREE 3 ML: 5 INJECTION INTRAVENOUS at 21:29

## 2019-03-12 RX ADMIN — CARVEDILOL 12.5 MG: 12.5 TABLET, FILM COATED ORAL at 21:15

## 2019-03-12 RX ADMIN — ALPRAZOLAM 1 MG: 0.5 TABLET ORAL at 21:15

## 2019-03-12 RX ADMIN — CARVEDILOL 12.5 MG: 12.5 TABLET, FILM COATED ORAL at 02:44

## 2019-03-12 RX ADMIN — METHYLPREDNISOLONE SODIUM SUCCINATE 40 MG: 40 INJECTION, POWDER, LYOPHILIZED, FOR SOLUTION INTRAMUSCULAR; INTRAVENOUS at 02:44

## 2019-03-12 RX ADMIN — AMLODIPINE BESYLATE 10 MG: 10 TABLET ORAL at 06:37

## 2019-03-12 RX ADMIN — METHYLPREDNISOLONE SODIUM SUCCINATE 40 MG: 40 INJECTION, POWDER, LYOPHILIZED, FOR SOLUTION INTRAMUSCULAR; INTRAVENOUS at 10:18

## 2019-03-12 RX ADMIN — GABAPENTIN 300 MG: 300 CAPSULE ORAL at 06:37

## 2019-03-12 RX ADMIN — IPRATROPIUM BROMIDE AND ALBUTEROL SULFATE 3 ML: 2.5; .5 SOLUTION RESPIRATORY (INHALATION) at 20:38

## 2019-03-12 RX ADMIN — SODIUM CHLORIDE, PRESERVATIVE FREE 3 ML: 5 INJECTION INTRAVENOUS at 09:11

## 2019-03-12 RX ADMIN — AZITHROMYCIN 500 MG: 250 TABLET, FILM COATED ORAL at 02:44

## 2019-03-12 RX ADMIN — PANTOPRAZOLE SODIUM 40 MG: 40 TABLET, DELAYED RELEASE ORAL at 06:37

## 2019-03-12 RX ADMIN — Medication 100 MG: at 08:24

## 2019-03-12 RX ADMIN — IPRATROPIUM BROMIDE AND ALBUTEROL SULFATE 3 ML: 2.5; .5 SOLUTION RESPIRATORY (INHALATION) at 07:11

## 2019-03-12 RX ADMIN — CLOPIDOGREL 75 MG: 75 TABLET, FILM COATED ORAL at 21:15

## 2019-03-12 RX ADMIN — INSULIN LISPRO 2 UNITS: 100 INJECTION, SOLUTION INTRAVENOUS; SUBCUTANEOUS at 22:44

## 2019-03-12 RX ADMIN — SODIUM CHLORIDE, PRESERVATIVE FREE 3 ML: 5 INJECTION INTRAVENOUS at 02:49

## 2019-03-12 RX ADMIN — ENOXAPARIN SODIUM 40 MG: 40 INJECTION SUBCUTANEOUS at 08:26

## 2019-03-12 NOTE — ED PROVIDER NOTES
Pt presents to the ED complaining of SOA for the pasts several days. Pt confirms mild cough and fever. Pt states he has hx of dialysis treatment but his kidneys have since recovered. Per pt, he does not wear O2 at baseline but sees pulmonologist Dr. Tate.    On exam, pt is AOx3, in mild respiratory distress, PEARRL, dry mucous membranes, heart RRR, has wheezes and rhonchi bilaterally, abd soft, non tender, and non distended. Pt has no pedal edema and no calf tenderness to R leg, left leg has AKA. Pt has a normal neuro exam.     EKG    EKG time: 1906  Rhythm/Rate: NSR 65  No Acute Ischemia  Non-Specific ST-T changes    Unchanged compared to prior on 10/2017    Interpreted Contemporaneously by me.  Independently viewed by me    I agree with midlevel plan to evaluate labs and CXR for further plan of care.     2000: Pt care has been assume from MANDO Rowland, pending lab results.     2013: Albuterol ordered as further breathing treatment.     2103: Bipap ordered following review of pt's blood gas.     2124: Pt is currently on bipap, resting comfortably.     2153: Call placed to Pulmonology.     2154: Pt rechecked and resting comfortably on bipap, family at bedside. Pt is awake and gas is minimally improved, some changes were made on ventilator. Discussed with pt and family the plan for admission to ICU and further treatment of pt's respiratory failure and COPD exacerbation. Discussed with family the possibility of starting on bipap at home. Family states pt is noncompliant with plan for starting on cpap machine. Family informed of pt's hemoglobin level of 19.1. Pt and family understands and agrees with plan, all questions addressed.     2205: Discussed pt's case with Dr. VALENTINO (Pulmonology) who agreed to admit pt to ICU due to bipap.     PROCEDURES  Critical Care  Performed by: Ismael Valadez MD  Authorized by: Ismael Valadez MD     Critical care provider statement:     Critical care time (minutes):  30     Critical care start time:  3/11/2019 8:09 PM    Critical care end time:  3/11/2019 9:52 PM    Critical care time was exclusive of:  Separately billable procedures and treating other patients    Critical care was necessary to treat or prevent imminent or life-threatening deterioration of the following conditions:  Respiratory failure    Critical care was time spent personally by me on the following activities:  Blood draw for specimens, development of treatment plan with patient or surrogate, discussions with consultants, discussions with primary provider, evaluation of patient's response to treatment, examination of patient, obtaining history from patient or surrogate, ordering and performing treatments and interventions, ordering and review of laboratory studies, ordering and review of radiographic studies, pulse oximetry, re-evaluation of patient's condition, review of old charts and ventilator management    I assumed direction of critical care for this patient from another provider in my specialty: yes              DISPOSITION  ADMISSION    Discussed treatment plan and reason for admission with pt/family and admitting physician.  Pt/family voiced understanding of the plan for admission for further testing/treatment as needed.      DIAGNOSIS  Final diagnoses:   Polycythemia   Acute respiratory failure with hypoxia and hypercarbia (CMS/HCC)   COPD with acute exacerbation (CMS/[facility])       The PETER and I have discussed this patient's history, physical exam, and treatment plan.  I have reviewed the documentation and personally had a face to face interaction with the patient. I affirm the documentation and agree with the treatment and plan.  The attached note describes my personal findings.    Documentation assistance provided by keara Oro for Dr. Valadez.  Information recorded by the [email or web address] was done at my direction and has been verified and validated by me.            Chelsy Oro  03/11/19 5932        Ismael Valadez MD  03/11/19 5883

## 2019-03-12 NOTE — H&P
History and Physical    Patient Name: Sim Agustin  Age/Sex: 63 y.o. male  : 1955  MRN: [id number]    Date of Admission: 3/11/2019  Date of Encounter Visit: 19  Encounter Provider: Henry Lux MD  [facility]   Patient Care Team:  Mariusz Wilburn MD as PCP - General  Mariusz Wilburn MD as PCP - Family Medicine  Jose Swann MD as Consulting Physician (Vascular Surgery)  Krystal Ocampo MD as Consulting Physician (Cardiology)  Ishmael Tate MD as Consulting Physician (Pulmonary Disease)      Subjective:     Chief Complaint: Shortness of breath with respiratory failure requiring BiPAP    History of Present Illness:  Sim Agustin is a 63 y.o. male with history of very severe end-stage COPD, his FEV1 in  when it was checked was 22% along with significant emphysema and air trapping but no significant hyperinflation on that most recent PFT.  The patient follows with Dr. Adriel Tate but last time he saw him was 2017.  He is on incruise, he did not do well on the Advair.  He denies any hospitalization for lung infection since 2016 when he had severe infection and was in critical condition on the ventilator according to the patient.  His onset of symptoms started few days ago with progressive cough and subjective fever the cough was mainly dry nonproductive, there was gradual worsening of the symptoms over the last 2-3 days, subjective fever, no GI or  complaints, no confusion reported, no hemoptysis, he did have few pounds weight loss over the last year.  No loss of consciousness, he denies any chest pain,  The symptom became severe enough that he ended up in the emergency room, his ABG showed acute on chronic respiratory acidosis, patient was started on the BiPAP and was admitted to the intensive care for further management.    Stress test with myocardial perfusion 16:     · Compared to the prior study from 2016  · Left ventricular ejection fraction  is mildly reduced (Calculated EF = 52%).  · There is infarction of the apical cap and anterior apex. There is infarction of the inferior wall. There is mild yadiel-infarct ischemia in the inferior wall.  Pulmonary Functions Testing Results:    No results found for: FEV1, FVC, BOM1QFX, TLC, DLCO    Review of Systems:   Review of Systems  Constitutional: Positive for fever (subjective). Negative for fatigue.   Respiratory: Positive for shortness of breath. Negative for cough (hemoptysis).    Cardiovascular: Negative for chest pain.   Gastrointestinal: Negative for nausea and vomiting.       Past Medical History:  Past Medical History:   Diagnosis Date   • Anemia     per [facility] database   • CAD (coronary artery disease) 4/4/2016   • Colon polyps    • Compartment syndrome (CMS/HCC)     AFTER ILIAC SURGERY. ABOVE KNEE AMPUTATION   • COPD (chronic obstructive pulmonary disease) (CMS/HCC)    • Cough     SINCE APRIL WITH PNEUMONIA.    • Disease of thyroid gland     HYPOTHYRODISM   • Diverticulosis    • Employs prosthetic leg    • History of acute renal failure    • History of CHF (congestive heart failure)    • History of GI bleed 02/2016    AORTODUOD FISTULA   • History of sepsis 02/2016   • History of transfusion     MULTIPLE, 2016   • Hypertension    • Phantom limb pain (CMS/HCC)     SL, WITH LEFT ABOUVE KNEE   • Pneumonia     SPRING 2016  AFTER SURGERY   • PVD (peripheral vascular disease) (CMS/HCC)        Past Surgical History:   Procedure Laterality Date   • ABOVE KNEE AMPUTATION Left 3/16/2016    Procedure: LT AMPUTATION ABOVE KNEE;  Surgeon: Jose Swann MD;  Location: [facility];  Service:    • ARTERIAL THROMBECTOMY  2001    throbectomy of arterial graft   • AXILLARY FEMORAL BYPASS Right 02/15/2016    Exploratory lapartomy, repair aortoduodenal fistula, resection infected aortobifemoral bypass graft, axillobi-superficial femoral artery [place]-Aneudy bypass graft from right axillary artery, Repair of  duodenotomy 4th portion duodenum-Dr. Jose Swann, Dr. Genaro Perrin   • BRONCHOSCOPY Left 03/04/2016    Dr. NATALIIA Tate   • BRONCHOSCOPY RIGID / FLEXIBLE N/A 03/03/2016    Dr. NATALIIA Tate   • BRONCHOSCOPY RIGID / FLEXIBLE N/A 02/26/2016    Dr. NATALIIA Tate   • CATARACT EXTRACTION WITH INTRAOCULAR LENS IMPLANT Bilateral    • COLON RESECTION WITH COLOSTOMY Left 02/28/2016    Exploratory laparotomy with open left hemicolectomy, end-colostomy, G-tube and J-tube-Dr. Endy Chaney   • COLONOSCOPY N/A 2015    Dr. Laughlin   • COLONOSCOPY N/A 10/12/2016    Procedure: COLONOSCOPY TO 20 CM AND PER STOMA TO 30CM;  Surgeon: Genaro Perrin MD;  Location: [facility] ENDOSCOPY;  Service:    • COLONOSCOPY N/A 10/21/2016    Procedure: COLONOSCOPY DONE AT BEDSIDE ONTO CECEM;  Surgeon: Genaro Perrin MD;  Location: [facility] ENDOSCOPY;  Service:    • COLONOSCOPY N/A 02/10/2016    Diverticulosis in the entire examined colon, non-bleeding internal hemorrhoids-Dr. Taylor Pina   • COLONOSCOPY N/A 02/11/2016    Poor prep, blood in the entire examined colon, normal ileum-Dr. Taylor Pina   • COLONOSCOPY W/ POLYPECTOMY N/A 07/27/2015    Normal ileum, diverticulosis in the sigmoid colon: resected and retrieved, one 6 mm polyp in the descending colon: resected and retrieved, the distal rectum and anal verge are normal on retroflexion view-Dr. Miguel Ángel Laughlin   • COLOSTOMY CLOSURE N/A 10/13/2016    Procedure: COLOSTOMY TAKEDOWN OR CLOSURE, APPENDECTOMY;  Surgeon: Genaro Perrin MD;  Location: [facility] MAIN OR;  Service:    • DEBRIDEMENT LEG Left 03/01/2016    Excisional debridement of the skin, subcutantous tissue, tendon and muscle left calf-Dr. Jose Swann   • DEBRIDEMENT LEG Left 02/25/2016    Excisional debridement full-thcikness skin and subcutaneous tissue and tendon and muscle, left medial and lateral calf muscles-Dr. Jose Swann   • ENDOSCOPY N/A 10/21/2016    Procedure: ESOPHAGOGASTRODUODENOSCOPY DONE AT  BEDSIDE;  Surgeon: Genaro Perrin MD;  Location: [facility] ENDOSCOPY;  Service:    • ENDOSCOPY AND COLONOSCOPY N/A 02/28/2016    EGD and Colonoscopy with Candy ink injection-Dr. Endy Chaney   • ENTEROSCOPY SMALL BOWEL N/A 02/11/2016    Normal esophagus, normal stomach, normal duodenum, portion of the jejunum normal-Dr. Taylor Pina   • ILIAC ARTERY - FEMORAL ARTERY BYPASS GRAFT Bilateral 2002   • ILIAC ARTERY STENT Bilateral 2001   • INSERTION AND REMOVAL PERITONEAL DIALYSIS CATHETER Right 02/29/2016    Exchange right jugular 16 cm [place] dialysis catheter-Dr. Jose Swann   • INSERTION PERITONEAL DIALYSIS CATHETER Left 03/01/2016    Ultrasound-guided access of the left jugular vein, 23 cm left jugular palindrome dialysis catheter placement-Dr. Jose Swann   • INSERTION PERITONEAL DIALYSIS CATHETER Right 02/18/2016    Right jugular Mahrkar catherer placement-Dr. Jose Swann   • JOINT REPLACEMENT Left    • THORACOSCOPY Left 4/18/2016    Procedure: LT THORACOSCOPY VIDEO ASSISTED WITH DECORDICATION;  Surgeon: Genaro Davila III, MD;  Location: [facility] MAIN OR;  Service:    • THROMBECTOMY Left 02/16/2016    Thombectomy of left femoral graft, left leg arteriogram, left calf forward compartment fasciotomy-Dr. Jose Swann   • TOTAL HIP ARTHROPLASTY Left    • UPPER GASTROINTESTINAL ENDOSCOPY N/A 02/09/2016    Normal UGI-Dr. Ajay Parkinson   • UPPER GASTROINTESTINAL ENDOSCOPY N/A 11/28/2015    Small hiatal hernia, chronic gastritis: biopsied, non-bleeding angioectasias in the stomach: treated with argon plasma coagulation, multiple bleeding angioectasias in the dudenum: treated with argon plasma coagulation-Dr. Mykel Jaramillo   • VASCULAR SURGERY      MULTIPLE   • VENTRAL/INCISIONAL HERNIA REPAIR N/A 10/19/2017    Procedure: VENTRAL HERNIA REPAIR WITH MESH AND BILATERAL COMPONENT SEPARATION;  Surgeon: Genaro Perrin MD;  Location: [facility] MAIN OR;  Service:    • WISDOM TOOTH EXTRACTION          Home Medications:   Medications Prior to Admission   Medication Sig Dispense Refill Last Dose   • albuterol (PROVENTIL HFA;VENTOLIN HFA) 108 (90 BASE) MCG/ACT inhaler Inhale 2 puffs Every 6 (Six) Hours As Needed for wheezing (RESCUE INHALER).   3/10/2019 at Unknown time   • ALPRAZolam (XANAX) 0.5 MG tablet Take 0.5 mg by mouth 4 (four) times a day as needed for anxiety.   3/10/2019 at Unknown time   • amLODIPine (NORVASC) 10 MG tablet Take 10 mg by mouth Every Morning.   3/10/2019 at Unknown time   • aspirin 81 MG chewable tablet Chew 81 mg Every Night.   3/10/2019 at Unknown time   • carvedilol (COREG) 25 MG tablet Take 12.5 mg by mouth 2 (Two) Times a Day.   3/10/2019 at Unknown time   • Cholecalciferol (VITAMIN D3) 5000 UNITS tablet Take 5,000 Units by mouth Daily.   3/10/2019 at Unknown time   • clopidogrel (PLAVIX) 75 MG tablet Take 75 mg by mouth Every Night.   3/10/2019 at Unknown time   • Coenzyme Q10 (CO Q10) 100 MG capsule Take 400 mg by mouth Every Night.   3/10/2019 at Unknown time   • FERROUS SULFATE PO Take 130 mg by mouth Daily.   3/10/2019 at Unknown time   • gabapentin (NEURONTIN) 300 MG capsule Take 300 mg by mouth Every Morning.   3/10/2019 at Unknown time   • HYDROcodone-acetaminophen (NORCO) 5-325 MG per tablet Take 1 tablet by mouth Every 6 (Six) Hours As Needed.   3/10/2019 at Unknown time   • INCRUSE ELLIPTA 62.5 MCG/INH aerosol powder  Inhale 1 puff Every Morning.   3/10/2019 at Unknown time   • isosorbide mononitrate (IMDUR) 30 MG 24 hr tablet Take 30 mg by mouth Every Morning.   3/10/2019 at Unknown time   • levothyroxine (SYNTHROID, LEVOTHROID) 125 MCG tablet Take 125 mcg by mouth Daily.   3/10/2019 at Unknown time   • Multiple Vitamins-Minerals (MULTIVITAMIN ADULT PO) Take 1 tablet by mouth Daily.   3/10/2019 at Unknown time   • NIACIN PO Take 1 capsule by mouth Every Morning.   3/10/2019 at Unknown time   • omeprazole (priLOSEC) 20 MG capsule Take 20 mg by mouth Every Morning.    3/10/2019 at Unknown time   • simvastatin (ZOCOR) 80 MG tablet Take 80 mg by mouth Every Night.   3/10/2019 at Unknown time   • sucralfate (CARAFATE) 1 g tablet Take 1 g by mouth As Needed.   3/10/2019 at Unknown time   • Thiamine HCl (VITAMIN B-1 PO) Take 1 tablet by mouth Daily.   3/10/2019 at Unknown time       Inpatient Medications:  Scheduled Meds:   Continuous Infusions:   PRN Meds:.•  [COMPLETED] Insert peripheral IV **AND** sodium chloride    Allergies:  Allergies   Allergen Reactions   • Augmentin [Amoxicillin-Pot Clavulanate] Hives       Past Social History:  Social History     Socioeconomic History   • Marital status:      Spouse name: Not on file   • Number of children: Not on file   • Years of education: Not on file   • Highest education level: Not on file   Tobacco Use   • Smoking status: Former Smoker     Packs/day: 1.00     Years: 30.00     Pack years: 30.00     Types: Cigarettes     Last attempt to quit: 2016     Years since quitting: 3.1   • Smokeless tobacco: Never Used   Substance and Sexual Activity   • Alcohol use: No   • Drug use: No   • Sexual activity: Defer       Past Family History:  Family History   Problem Relation Age of Onset   • Lung cancer Mother    • Heart disease Father    • Malig Hyperthermia Neg Hx            Objective:   Temp:  [99 °F (37.2 °C)] 99 °F (37.2 °C)  Heart Rate:  [55-90] 55  Resp:  [18] 18  BP: (110-140)/(58-72) 110/58  FiO2 (%):  [40 %-65 %] 65 %   SpO2:  [90 %-96 %] 94 %  on  Flow (L/min):  [4] 4 Device (Oxygen Therapy): nasal cannula  No intake or output data in the 24 hours ending 03/12/19 0051  Body mass index is 25.92 kg/m².      03/11/19  1853   Weight: 79.4 kg (175 lb)     Weight change:     Physical Exam:   Physical Exam   General:   She is awake and responsive, in respiratory distress, doing better on the BiPAP however.  Able to answer question, very appropriate drop                   Head:    Normocephalic, atraumatic.   Eyes:          Conjunctivae  and sclerae normal, no icterus, PERRLA   Throat:   No oral lesions, no thrush, oral mucosa dry.    Neck:   Supple, trachea midline.  No JVD   Lungs:     Normal chest on inspection, patient is using accessory muscles, there is significant prolongation of the expiratory phase, significant reduction in breath sounds, positive and expiratory wheezes, no crackles.     Heart:    Regular rhythm and normal rate.  No murmurs, gallops, or rubs noted.   Abdomen:     Soft, non-tender, non-distended, positive bowel sounds.  Multiple scars from prior abdominal surgeries   Extremities:   No clubbing, cyanosis, trace pedal edema, no edema over the hip area.     Pulses:   Pulses palpable and equal bilaterally.  Left above knee amputation   Skin:   No bleeding or rash.   Neuro:   Non-focal.  Moves all extremities well.    Psychiatric:   Normal mood and affect.     Lab Review:   Results from last 7 days   Lab Units 03/11/19 2109   SODIUM mmol/L 146*   POTASSIUM mmol/L 5.1   CHLORIDE mmol/L 107   CO2 mmol/L 30.8*   BUN mg/dL 17   CREATININE mg/dL 1.24   GLUCOSE mg/dL 114*   CALCIUM mg/dL 9.0   AST (SGOT) U/L 23   ALT (SGPT) U/L 17   ALBUMIN g/dL 3.60     Results from last 7 days   Lab Units 03/11/19 2109   TROPONIN T ng/mL <0.010     Results from last 7 days   Lab Units 03/11/19 1948   WBC 10*3/mm3 11.48*   HEMOGLOBIN g/dL 19.2*   HEMATOCRIT % 61.6*   PLATELETS 10*3/mm3 140   MCV fL 104.6*   MCH pg 32.6   MCHC g/dL 31.2*   RDW % 13.7   RDW-SD fl 54.6*   MPV fL 10.5   NEUTROS ABS 10*3/mm3 8.50*   BASOS ABS 10*3/mm3 0.11                   Invalid input(s): LDLCALC  Results from last 7 days   Lab Units 03/11/19 1948   PROBNP pg/mL 1,801.0*         Results from last 7 days   Lab Units 03/11/19 2152   PH, ARTERIAL pH units 7.248*   PCO2, ARTERIAL mm Hg 79.2*   PO2 ART mm Hg 69.3*   HCO3 ART mmol/L 34.6*     Results from last 7 days   Lab Units 03/11/19 1948   PROCALCITONIN ng/mL 0.09*   LACTATE mmol/L 0.9                     Results  from last 7 days   Lab Units 03/11/19 2018   ADENOVIRUS DETECTION BY PCR  Not Detected   CORONAVIRUS 229E  Not Detected   CORONAVIRUS HKU1  Not Detected   CORONAVIRUS NL63  Not Detected   CORONAVIRUS OC43  Not Detected   HUMAN METAPNEUMOVIRUS  Not Detected   HUMAN RHINOVIRUS/ENTEROVIRUS  Not Detected   INFLUENZA B PCR  Not Detected   PARAINFLUENZA 1  Not Detected   PARAINFLUENZA VIRUS 2  Not Detected   PARAINFLUENZA VIRUS 3  Not Detected   PARAINFLUENZA VIRUS 4  Not Detected   BORDETELLA PERTUSSIS PCR  Not Detected   CHLAMYDOPHILA PNEUMONIAE PCR  Not Detected   MYCOPLAMA PNEUMO PCR  Not Detected   INFLUENZA A PCR  Not Detected   INFLUENZA A H3  Not Detected   INFLUENZA A H1  Not Detected   RSV, PCR  Not Detected           Imaging:  Imaging Results (most recent)     Procedure Component Value Units Date/Time    XR Chest 2 View [[id number]] Collected:  03/11/19 1937     Updated:  03/11/19 1942    Narrative:       AP AND LATERAL CHEST X-RAY     CLINICAL HISTORY: Dyspnea     Compared to the previous chest x-ray dated 10/20/2017.     The lungs are well-expanded and appear free of focal infiltrates. There  are no pleural effusions. The heart is normal in size.     IMPRESSIONS: No evidence of acute disease within the chest.     This report was finalized on 3/11/2019 7:39 PM by Dr. Everton Morrison M.D.             I personally viewed and interpreted the patient's imaging studies: There is some chronic changes on the left lower lobe and the right midlung area that were seen on the previous chest x-ray from 2016 otherwise no acute process.    Assessment:   1. Acute on chronic hypercapnic respiratory failure  2. Acute hypoxemia  3. Acute exacerbation of COPD  4. Severe end-stage COPD  5. Coronary artery disease  6. Systemic hypertension  7. Diastolic congestive heart failure by history  8. History of GI bleed  9. Peripheral arterial disease status post left above-knee amputation        Plan:     Shunt will be admitted to the  ICU and will be kept on the BiPAP overnight until we reversed some of the bronchospasm that is responsible for his respiratory failure  IV steroids 40 mg q. 8  Bronchodilator every 4 hours scheduled  Keep n.p.o. for the time being  Oxygen to maintain saturation in the lower 90th percentile  Antibiotic use is questionable, in his case I would use Zithromax for its immunomodulatory effect.  Discussed with the patient, he is a full code, discussed with the nursing staff, discussed with ER physician, will monitor in the CCU      Time: Critical care 35 min    Henry Lux MD  [facility]   03/12/19  12:51 AM    Dictated utilizing Dragon dictation

## 2019-03-12 NOTE — PROGRESS NOTES
Ruben Mccray MD                          [phone]      Patient ID:    Name:  Sim Agustin    MRN:  [id number]    1955   63 y.o.  male            Patient Care Team:  Mariusz Wilburn MD as PCP - General  Mariusz Wilburn MD as PCP - Family Medicine  Jose Swann MD as Consulting Physician (Vascular Surgery)  Krystal Ocampo MD as Consulting Physician (Cardiology)  Ishmael Tate MD as Consulting Physician (Pulmonary Disease)    CC/ Reason for visit: Per Assessment mentioned below    Subjective: Pt seen and examined this AM. Acute overnight events noted.  Still needing noninvasive ventilator.  Significant amount of supplemental oxygen requirement with breaks.  Unclear if he needs so much oxygen still.  States that he feels a little better but not clearly at baseline.  He saw Dr. Tate more than a year back.  Admits to smoking still.  Does have acne Ficken dyspnea on exertion and some presyncopal episodes as well.    ROS: Denies any subjective fevers, chest pain, nausea/ vomiting    PMH/ PSH/ FH/SH reviewed and edited as needed    Objective     Vital Signs past 24hrs    BP range: BP: ()/(53-72) 102/63  Pulse range: Heart Rate:  [46-90] 51  Resp rate range: Resp:  [18-20] 20  Temp range: Temp (24hrs), Av.1 °F (36.7 °C), Min:97.5 °F (36.4 °C), Max:99 °F (37.2 °C)      Ventilator/Non-Invasive Ventilation Settings (From admission, onward)    Start     Ordered    19 2100  NIPPV (CPAP or BIPAP)  Until Discontinued     Question Answer Comment   Indication: Acute Respiratory Failure    Type: BIPAP    NIPPV Mask Interface: Per Patient Preference    IPAP 20    EPAP 8    Oxygen FIO2    FIO2 % 40    Breath Rate 16    Titrate for SPO2 90%        19          Device (Oxygen Therapy): NPPV/NIV FiO2 (%): 50 %     80.2 kg (176 lb 12.9 oz); Body mass index is 26.88 kg/m².    No intake or output data in the 24 hours ending 19  0904    Exam:    GEN:  Middle-aged white male, lying in the bed, mild respiratory distress, + accessory muscle use  EYES:   EOMI, anicteric sclera bilat  ENT:    External ears/nose normal, OP clear  NECK:  Supple, midline trachea, No cervical lymphadenopathy  LUNGS: Bilateral breath sounds heard, decreased breath sounds bilaterally, + wheezes, Occ crackles heard  CV:  Regular rate and rhythm, No murmur  ABD:  Nontender, nondistended, no palpable liver or masses  EXT:  Extremities warm and well perfused, no peripheral/sacral edema.    Scheduled meds:    amLODIPine 10 mg Oral QAM   aspirin 81 mg Oral Nightly   atorvastatin 40 mg Oral Daily   azithromycin 500 mg Oral Q24H   carvedilol 12.5 mg Oral Q12H   clopidogrel 75 mg Oral Nightly   enoxaparin 40 mg Subcutaneous Q24H   gabapentin 300 mg Oral QAM   insulin lispro 0-9 Units Subcutaneous 4x Daily With Meals & Nightly   ipratropium-albuterol 3 mL Nebulization Q4H - RT   isosorbide mononitrate 30 mg Oral QAM   levothyroxine 125 mcg Oral Q AM   methylPREDNISolone sodium succinate 40 mg Intravenous Q8H   pantoprazole 40 mg Oral QAM   sodium chloride 3 mL Intravenous Q12H   thiamine 100 mg Oral Daily       IV meds:                           Data Review:      Results from last 7 days   Lab Units 03/12/19  0424 03/11/19  2109 03/11/19  1948   SODIUM mmol/L 146* 146*  --    POTASSIUM mmol/L 5.1 5.1  --    CHLORIDE mmol/L 106 107  --    CO2 mmol/L 26.5 30.8*  --    BUN mg/dL 23 17  --    CREATININE mg/dL 1.36* 1.24  --    CALCIUM mg/dL 9.2 9.0  --    BILIRUBIN mg/dL  --  0.6  --    ALK PHOS U/L  --  47  --    ALT (SGPT) U/L  --  17  --    AST (SGOT) U/L  --  23  --    GLUCOSE mg/dL 145* 114*  --    WBC 10*3/mm3  --   --  11.48*   HEMOGLOBIN g/dL  --   --  19.2*   PLATELETS 10*3/mm3  --   --  140   PROBNP pg/mL  --   --  1,801.0*   PROCALCITONIN ng/mL  --   --  0.09*       Lab Results   Component Value Date    CALCIUM 9.2 03/12/2019    PHOS 3.0 10/25/2017             Results  from last 7 days   Lab Units 03/11/19 2152 03/11/19 2040   PH, ARTERIAL pH units 7.248* 7.238*   PO2 ART mm Hg 69.3* 73.2*   PCO2, ARTERIAL mm Hg 79.2* 80.2*   HCO3 ART mmol/L 34.6* 34.2*        Results Review:    I have reviewed the relevant laboratory results and reviewed the chest imaging from the last 3 days personally and summarized it below    Assessment    1. Acute on chronic hypercapnic respiratory failure  2. Noninvasive ventilator use  3. Acute hypoxemic resp failure   4. Acute exacerbation of COPD  5. Acute kidney injury  6. Reactive erythrocytosis - ? Hypoxia related  7. CAD with h/o MI   8. Systemic hypertension  9. History of GI bleed  10. Peripheral arterial disease status post left above-knee amputation    PLAN:  Patient continues to have significantly increased work of breathing.  We will continue with noninvasive ventilator as needed with some breaks.  Wean supplemental oxygen as tolerated  Current workup noted.  We will get an echocardiogram given elevated troponin and suspected pulmonary hypertension due to noncompliance with oxygen supplementation  I suspect that he probably needed some oxygen even before but not get help.  Reactive erythrocytosis noted  Would consider long-term noninvasive ventilator if hypercapnia persists after acute issues resolve  Will follow renal function closely  Continue with close ICU monitoring.  DVT ppx    CC - 42 mins    I have discussed my findings and recommendations with patient and nursing staff.     Ruben Mccary MD  3/12/2019

## 2019-03-12 NOTE — PLAN OF CARE
Problem: Patient Care Overview  Goal: Plan of Care Review  Outcome: Ongoing (interventions implemented as appropriate)   03/12/19 0617   Coping/Psychosocial   Plan of Care Reviewed With patient;spouse   Plan of Care Review   Progress improving   OTHER   Outcome Summary Pt admited to CCU overnight from ED for increasing SOA, bipap required.   PT has an extensive health HX, was a pt here on the unit back in 2016 for an extended period of time.  Pt is a current smoker with COPD, does not use home O2.  He states that he has been feeling SOA for several days.  ABG when he got to the ED showed respiratory acidosis with a pH of 7.2    Pt is resting comfortably on the bipap at this time.  See Labs.  Pt has been bradycardic with low BP, however he states that this is normal for him.  Spoke with wife over the phone and she asked that we add their son to the contact list.  His name is John and he is 15, she works nights and is a very hard sleeper during the day.  She requested that we call him if we are unable to reach her.           Problem: Fall Risk (Adult)  Goal: Absence of Fall  Outcome: Ongoing (interventions implemented as appropriate)   03/12/19 0617   Fall Risk (Adult)   Absence of Fall making progress toward outcome       Problem: Skin Injury Risk (Adult)  Goal: Skin Health and Integrity  Outcome: Ongoing (interventions implemented as appropriate)   03/12/19 0617   Skin Injury Risk (Adult)   Skin Health and Integrity making progress toward outcome       Problem: NPPV/CPAP (Adult)  Goal: Signs and Symptoms of Listed Potential Problems Will be Absent, Minimized or Managed (NPPV/CPAP)  Outcome: Ongoing (interventions implemented as appropriate)   03/12/19 0617   Goal/Outcome Evaluation   Problems Assessed (NPPV/CPAP) all   Problems Present (NPPV/CPAP) dry mucous membranes

## 2019-03-12 NOTE — CONSULTS
Adult Nutrition  Assessment/PES    Patient Name:  Sim Agustin  YOB: 1955  MRN: [id number]  Admit Date:  3/11/2019    Assessment Date:  3/12/2019    Comments:  Nutrition screen completed. Pt NPO at this time.    Reason for Assessment     Row Name 03/12/19 0901          Reason for Assessment    Reason For Assessment  diagnosis/disease state     Diagnosis  -- respiratory failure on BIPAP, COPD, smoker           Anthropometrics     Row Name 03/12/19 0901          Body Mass Index (BMI)    BMI Assessment  BMI 25-29.9: overweight         Labs/Tests/Procedures/Meds     Row Name 03/12/19 0902          Labs/Procedures/Meds    Lab Results Reviewed  reviewed     Lab Results Comments  na, glu, cr, H/H, wbc        Diagnostic Tests/Procedures    Diagnostic Test/Procedure Reviewed  reviewed        Medications    Pertinent Medications Reviewed  reviewed     Pertinent Medications Comments  abx, lovenox, insulin, protonix, thiamin         Physical Findings     Row Name 03/12/19 0903          Physical Findings    Overall Physical Appearance  on oxygen therapy     Skin  -- BM 19           Nutrition Prescription Ordered     Row Name 03/12/19 0904          Nutrition Prescription PO    Current PO Diet  NPO                 Problem/Interventions:  Problem 1     Row Name 03/12/19 0904          Nutrition Diagnoses Problem 1    Problem 1  Nutrition Appropriate for Condition at this Time     Etiology (related to)  -- respiratory failure                 Intervention Goal     Row Name 03/12/19 0904          Intervention Goal    General  Maintain nutrition     PO  Initiate feeding     Weight  No significant weight loss         Nutrition Intervention     Row Name 03/12/19 0904          Nutrition Intervention    RD/Tech Action  Follow Tx progress;Care plan reviewd           Education/Evaluation     Row Name 03/12/19 0904          Education    Education  Will Instruct as appropriate        Monitor/Evaluation    Monitor  Per protocol            Electronically signed by:  Lesli Ortiz RD  03/12/19 9:05 AM

## 2019-03-12 NOTE — PROGRESS NOTES
Clinical Pharmacy Services: Medication History    Sim Agustin is a 63 y.o. male presenting to [facility] for Polycythemia [D75.1]  COPD with acute exacerbation (CMS/[facility]) [J44.1]  Acute respiratory failure with hypoxia and hypercarbia (CMS/[facility]) [J96.01, J96.02]    He  has a past medical history of Anemia, CAD (coronary artery disease) (4/4/2016), Colon polyps, Compartment syndrome (CMS/[facility]), COPD (chronic obstructive pulmonary disease) (CMS/[facility]), Cough, Disease of thyroid gland, Diverticulosis, Employs prosthetic leg, History of acute renal failure, History of CHF (congestive heart failure), History of GI bleed (02/2016), History of sepsis (02/2016), History of transfusion, Hypertension, Phantom limb pain (CMS/[facility]), Pneumonia, and PVD (peripheral vascular disease) (CMS/[facility]).    Allergies as of 03/11/2019 - Reviewed 03/11/2019   Allergen Reaction Noted   • Augmentin [amoxicillin-pot clavulanate] Hives 03/03/2016       Medication information was obtained from: Patient  Pharmacy and Phone Number: [facility] [phone]    Prior to Admission Medications     Prescriptions Last Dose Informant Patient Reported? Taking?    albuterol (PROVENTIL HFA;VENTOLIN HFA) 108 (90 BASE) MCG/ACT inhaler 3/10/2019 Self Yes Yes    Inhale 2 puffs Every 6 (Six) Hours As Needed for wheezing (RESCUE INHALER).    ALPRAZolam (XANAX) 1 MG tablet 3/10/2019 Self Yes Yes    Take 1 mg by mouth 4 (Four) Times a Day As Needed for Anxiety.    amLODIPine (NORVASC) 10 MG tablet 3/10/2019 Self Yes Yes    Take 10 mg by mouth Every Morning.    aspirin 81 MG chewable tablet 3/10/2019 Self Yes Yes    Chew 81 mg Every Night.    carvedilol (COREG) 25 MG tablet 3/10/2019 Self Yes Yes    Take 12.5 mg by mouth 2 (Two) Times a Day.    Cholecalciferol (VITAMIN D3) 5000 UNITS tablet 3/10/2019 Self Yes Yes    Take 5,000 Units by mouth Daily.    clopidogrel (PLAVIX) 75 MG tablet 3/10/2019 Self Yes Yes    Take 75 mg by mouth Every Night.    Coenzyme Q10  (CO Q10) 100 MG capsule 3/10/2019 Self Yes Yes    Take 400 mg by mouth Every Night.    FERROUS SULFATE PO 3/10/2019 Self Yes Yes    Take 130 mg by mouth Daily.    Fluticasone Furoate-Vilanterol (BREO ELLIPTA IN) 3/11/2019 Self Yes Yes    Inhale 1 puff Every Morning.    gabapentin (NEURONTIN) 300 MG capsule 3/10/2019 Self Yes Yes    Take 300 mg by mouth 3 (Three) Times a Day.    HYDROcodone-acetaminophen (NORCO) 5-325 MG per tablet 3/10/2019  Yes Yes    Take 1 tablet by mouth Every 6 (Six) Hours As Needed.    isosorbide mononitrate (IMDUR) 30 MG 24 hr tablet 3/10/2019 Self Yes Yes    Take 30 mg by mouth Every Morning.    levothyroxine (SYNTHROID, LEVOTHROID) 125 MCG tablet 3/10/2019 Self Yes Yes    Take 125 mcg by mouth Daily.    Multiple Vitamins-Minerals (MULTIVITAMIN ADULT PO) 3/10/2019 Self Yes Yes    Take 1 tablet by mouth Daily.    NIACIN PO 3/10/2019 Self Yes Yes    Take 1 capsule by mouth Every Morning.    omeprazole (priLOSEC) 20 MG capsule 3/10/2019 Self Yes Yes    Take 20 mg by mouth Every Morning.    simvastatin (ZOCOR) 40 MG tablet 3/10/2019 Self Yes Yes    Take 40 mg by mouth Every Night.    Thiamine HCl (VITAMIN B-1 PO) 3/10/2019 Self Yes Yes    Take 1 tablet by mouth Daily.    INCRUSE ELLIPTA 62.5 MCG/INH aerosol powder   Self Yes No    Inhale 1 puff Every Morning.    sucralfate (CARAFATE) 1 g tablet  Self Yes No    Take 1 g by mouth As Needed.            Medication notes: Verified medication list with patient. Of note, he states he hasn't been using Incruse Ellipta inhaler and has been using Breo inhaler, but gets samples from primary care doctor and isn't sure what strength he's using. He also takes gabapentin 300mg TID now.     This medication list is complete to the best of my knowledge as of 3/12/2019    Please call if questions.    Mary Almaraz, Pharmacy Intern  3/12/2019 2:10 PM

## 2019-03-13 LAB
ANION GAP SERPL CALCULATED.3IONS-SCNC: 10.2 MMOL/L
ARTERIAL PATENCY WRIST A: ABNORMAL
ATMOSPHERIC PRESS: 748.1 MMHG
BASE EXCESS BLDA CALC-SCNC: 4.6 MMOL/L (ref 0–2)
BDY SITE: ABNORMAL
BUN BLD-MCNC: 32 MG/DL (ref 8–23)
BUN/CREAT SERPL: 29.1 (ref 7–25)
CALCIUM SPEC-SCNC: 8.8 MG/DL (ref 8.6–10.5)
CHLORIDE SERPL-SCNC: 99 MMOL/L (ref 98–107)
CO2 SERPL-SCNC: 26.8 MMOL/L (ref 22–29)
CREAT BLD-MCNC: 1.1 MG/DL (ref 0.76–1.27)
GAS FLOW AIRWAY: 4 LPM
GFR SERPL CREATININE-BSD FRML MDRD: 68 ML/MIN/1.73
GLUCOSE BLD-MCNC: 144 MG/DL (ref 65–99)
GLUCOSE BLDC GLUCOMTR-MCNC: 117 MG/DL (ref 70–130)
GLUCOSE BLDC GLUCOMTR-MCNC: 122 MG/DL (ref 70–130)
GLUCOSE BLDC GLUCOMTR-MCNC: 141 MG/DL (ref 70–130)
HCO3 BLDA-SCNC: 30.5 MMOL/L (ref 22–28)
MODALITY: ABNORMAL
PCO2 BLDA: 47.3 MM HG (ref 35–45)
PH BLDA: 7.42 PH UNITS (ref 7.35–7.45)
PO2 BLDA: 57 MM HG (ref 80–100)
POTASSIUM BLD-SCNC: 4.7 MMOL/L (ref 3.5–5.2)
SAO2 % BLDCOA: 89.3 % (ref 92–99)
SODIUM BLD-SCNC: 136 MMOL/L (ref 136–145)
TOTAL RATE: 18 BREATHS/MINUTE

## 2019-03-13 PROCEDURE — 94799 UNLISTED PULMONARY SVC/PX: CPT

## 2019-03-13 PROCEDURE — 63710000001 INSULIN LISPRO (HUMAN) PER 5 UNITS: Performed by: INTERNAL MEDICINE

## 2019-03-13 PROCEDURE — 25010000002 METHYLPREDNISOLONE PER 40 MG: Performed by: INTERNAL MEDICINE

## 2019-03-13 PROCEDURE — 82962 GLUCOSE BLOOD TEST: CPT

## 2019-03-13 PROCEDURE — 80048 BASIC METABOLIC PNL TOTAL CA: CPT | Performed by: INTERNAL MEDICINE

## 2019-03-13 PROCEDURE — 25010000002 ENOXAPARIN PER 10 MG: Performed by: INTERNAL MEDICINE

## 2019-03-13 PROCEDURE — 82803 BLOOD GASES ANY COMBINATION: CPT

## 2019-03-13 RX ORDER — SODIUM CHLORIDE FOR INHALATION 7 %
4 VIAL, NEBULIZER (ML) INHALATION
Status: DISCONTINUED | OUTPATIENT
Start: 2019-03-13 | End: 2019-03-19

## 2019-03-13 RX ORDER — PREDNISONE 20 MG/1
40 TABLET ORAL
Status: DISCONTINUED | OUTPATIENT
Start: 2019-03-14 | End: 2019-03-14

## 2019-03-13 RX ADMIN — SODIUM CHLORIDE 4 ML: 7 NEBU SOLN,3 % NEBU at 19:20

## 2019-03-13 RX ADMIN — AMLODIPINE BESYLATE 10 MG: 10 TABLET ORAL at 06:57

## 2019-03-13 RX ADMIN — ISOSORBIDE MONONITRATE 30 MG: 30 TABLET ORAL at 06:57

## 2019-03-13 RX ADMIN — ALPRAZOLAM 1 MG: 0.5 TABLET ORAL at 14:40

## 2019-03-13 RX ADMIN — CLOPIDOGREL 75 MG: 75 TABLET, FILM COATED ORAL at 21:41

## 2019-03-13 RX ADMIN — PANTOPRAZOLE SODIUM 40 MG: 40 TABLET, DELAYED RELEASE ORAL at 08:41

## 2019-03-13 RX ADMIN — GABAPENTIN 300 MG: 300 CAPSULE ORAL at 06:57

## 2019-03-13 RX ADMIN — LEVOTHYROXINE SODIUM 125 MCG: 125 TABLET ORAL at 06:57

## 2019-03-13 RX ADMIN — IPRATROPIUM BROMIDE AND ALBUTEROL SULFATE 3 ML: 2.5; .5 SOLUTION RESPIRATORY (INHALATION) at 10:48

## 2019-03-13 RX ADMIN — METHYLPREDNISOLONE SODIUM SUCCINATE 40 MG: 40 INJECTION, POWDER, LYOPHILIZED, FOR SOLUTION INTRAMUSCULAR; INTRAVENOUS at 11:21

## 2019-03-13 RX ADMIN — Medication 100 MG: at 08:41

## 2019-03-13 RX ADMIN — IPRATROPIUM BROMIDE AND ALBUTEROL SULFATE 3 ML: 2.5; .5 SOLUTION RESPIRATORY (INHALATION) at 14:49

## 2019-03-13 RX ADMIN — INSULIN LISPRO 2 UNITS: 100 INJECTION, SOLUTION INTRAVENOUS; SUBCUTANEOUS at 08:41

## 2019-03-13 RX ADMIN — AZITHROMYCIN 500 MG: 250 TABLET, FILM COATED ORAL at 08:42

## 2019-03-13 RX ADMIN — ENOXAPARIN SODIUM 40 MG: 40 INJECTION SUBCUTANEOUS at 08:40

## 2019-03-13 RX ADMIN — CARVEDILOL 12.5 MG: 12.5 TABLET, FILM COATED ORAL at 21:41

## 2019-03-13 RX ADMIN — ALPRAZOLAM 1 MG: 0.5 TABLET ORAL at 21:41

## 2019-03-13 RX ADMIN — SODIUM CHLORIDE, PRESERVATIVE FREE 3 ML: 5 INJECTION INTRAVENOUS at 21:41

## 2019-03-13 RX ADMIN — IPRATROPIUM BROMIDE AND ALBUTEROL SULFATE 3 ML: 2.5; .5 SOLUTION RESPIRATORY (INHALATION) at 19:20

## 2019-03-13 RX ADMIN — ASPIRIN 81 MG: 81 TABLET, CHEWABLE ORAL at 21:41

## 2019-03-13 RX ADMIN — METHYLPREDNISOLONE SODIUM SUCCINATE 40 MG: 40 INJECTION, POWDER, LYOPHILIZED, FOR SOLUTION INTRAMUSCULAR; INTRAVENOUS at 01:36

## 2019-03-13 RX ADMIN — IPRATROPIUM BROMIDE AND ALBUTEROL SULFATE 3 ML: 2.5; .5 SOLUTION RESPIRATORY (INHALATION) at 03:40

## 2019-03-13 RX ADMIN — IPRATROPIUM BROMIDE AND ALBUTEROL SULFATE 3 ML: 2.5; .5 SOLUTION RESPIRATORY (INHALATION) at 06:38

## 2019-03-13 RX ADMIN — CARVEDILOL 12.5 MG: 12.5 TABLET, FILM COATED ORAL at 08:41

## 2019-03-13 RX ADMIN — IPRATROPIUM BROMIDE AND ALBUTEROL SULFATE 3 ML: 2.5; .5 SOLUTION RESPIRATORY (INHALATION) at 00:03

## 2019-03-13 RX ADMIN — IPRATROPIUM BROMIDE AND ALBUTEROL SULFATE 3 ML: 2.5; .5 SOLUTION RESPIRATORY (INHALATION) at 23:22

## 2019-03-13 RX ADMIN — ATORVASTATIN CALCIUM 40 MG: 20 TABLET, FILM COATED ORAL at 08:41

## 2019-03-13 RX ADMIN — SODIUM CHLORIDE, PRESERVATIVE FREE 3 ML: 5 INJECTION INTRAVENOUS at 08:42

## 2019-03-13 NOTE — PLAN OF CARE
Problem: Patient Care Overview  Goal: Plan of Care Review  Outcome: Ongoing (interventions implemented as appropriate)   03/13/19 0417   Coping/Psychosocial   Plan of Care Reviewed With patient   Plan of Care Review   Progress improving   OTHER   Outcome Summary Tx from CCU, 02 at 3L N/C, Bladder scan for 754, order noted and st cath for 600 cc of dark todd urine. Bipap on while sleeping, no resp distress. Safety maintained.      Goal: Discharge Needs Assessment  Outcome: Ongoing (interventions implemented as appropriate)      Problem: Fall Risk (Adult)  Goal: Identify Related Risk Factors and Signs and Symptoms  Outcome: Outcome(s) achieved Date Met: 03/13/19    Goal: Absence of Fall  Outcome: Ongoing (interventions implemented as appropriate)      Problem: Skin Injury Risk (Adult)  Goal: Identify Related Risk Factors and Signs and Symptoms  Outcome: Outcome(s) achieved Date Met: 03/13/19    Goal: Skin Health and Integrity  Outcome: Ongoing (interventions implemented as appropriate)      Problem: NPPV/CPAP (Adult)  Goal: Signs and Symptoms of Listed Potential Problems Will be Absent, Minimized or Managed (NPPV/CPAP)  Outcome: Ongoing (interventions implemented as appropriate)

## 2019-03-13 NOTE — PROGRESS NOTES
Discharge Planning Assessment  [facility]     Patient Name: Sim Agustin  MRN: [id number]  Today's Date: 3/13/2019    Admit Date: 3/11/2019    Discharge Needs Assessment     Row Name 03/13/19 1558       Living Environment    Lives With  child(basim), dependent;spouse    Current Living Arrangements  home/apartment/condo    Primary Care Provided by  self;spouse/significant other    Provides Primary Care For  no one    Family Caregiver if Needed  spouse    Quality of Family Relationships  supportive    Able to Return to Prior Arrangements  yes       Resource/Environmental Concerns    Resource/Environmental Concerns  none    Transportation Concerns  car, none       Transition Planning    Patient/Family Anticipates Transition to  home with family    Patient/Family Anticipated Services at Transition  none    Transportation Anticipated  car, drives self       Discharge Needs Assessment    Concerns to be Addressed  basic needs    Equipment Currently Used at Home  bath bench;walker, rolling;prosthesis    Anticipated Changes Related to Illness  none    Equipment Needed After Discharge  oxygen;bipap/cpap        Discharge Plan     Row Name 03/13/19 1559       Plan    Plan  home  [facility] for oxygen and possibe respiratory needs    Plan Comments  CCP met with pt to discuss d/c planning. Face sheet verified. CCP role explained. Pt resides with his wife Cassie, [phone]. He uses a rolling walker to ambulate.  He is independent with ADL's.  Pt reports a history of home health with [facility].  He would like a referral. Ann banegas.  Pt has no rehab history.    Pt obtains his medications from [facility] pharmacy  on [facility].  Pt would like [facility] for possible oxygen and possible other resp need  Christ[facility] / [facility] notified    Pt denies additional needs at this time. CCP to follow to assist with any d/c planning needs        Destination      No service coordination in this encounter.      Durable Medical Equipment       No service coordination in this encounter.      Dialysis/Infusion      No service coordination in this encounter.      Home Medical Care      No service coordination in this encounter.      Community Resources      No service coordination in this encounter.          Demographic Summary    No documentation.       Functional Status     Row Name 03/13/19 1558       Functional Status, IADL    Medications  independent       Mental Status    General Appearance WDL  WDL       Mental Status Summary    Recent Changes in Mental Status/Cognitive Functioning  no changes       Employment/    Employment Status  retired        Psychosocial    No documentation.       Abuse/Neglect    No documentation.       Legal    No documentation.       Substance Abuse    No documentation.       Patient Forms    No documentation.           Mariel Abraham RN

## 2019-03-13 NOTE — PLAN OF CARE
Problem: Patient Care Overview  Goal: Plan of Care Review  Outcome: Ongoing (interventions implemented as appropriate)   03/13/19 1321 03/13/19 1711   Coping/Psychosocial   Plan of Care Reviewed With patient --    Plan of Care Review   Progress --  improving   OTHER   Outcome Summary --  VSS; no complaints of pain or discomfort; breathing tx; up to the chair x1; wean O2; repeat ABGs; bladder scan; continue to monitor       Problem: Fall Risk (Adult)  Goal: Absence of Fall  Outcome: Ongoing (interventions implemented as appropriate)      Problem: Skin Injury Risk (Adult)  Goal: Skin Health and Integrity  Outcome: Ongoing (interventions implemented as appropriate)      Problem: NPPV/CPAP (Adult)  Goal: Signs and Symptoms of Listed Potential Problems Will be Absent, Minimized or Managed (NPPV/CPAP)  Outcome: Ongoing (interventions implemented as appropriate)

## 2019-03-13 NOTE — PLAN OF CARE
Problem: Patient Care Overview  Goal: Plan of Care Review  Outcome: Ongoing (interventions implemented as appropriate)   03/13/19 1555   Coping/Psychosocial   Plan of Care Reviewed With patient   Plan of Care Review   Progress improving

## 2019-03-13 NOTE — PROGRESS NOTES
Ruben Mccray MD                          [phone]      Patient ID:    Name:  Sim Agustin    MRN:  [id number]    1955   63 y.o.  male            Patient Care Team:  Mariusz Wilburn MD as PCP - General  Mariusz Wilburn MD as PCP - Family Medicine  Jose Swann MD as Consulting Physician (Vascular Surgery)  Krystal Ocampo MD as Consulting Physician (Cardiology)  Ishmael Tate MD as Consulting Physician (Pulmonary Disease)    CC/ Reason for visit: Per Assessment mentioned below    Subjective: Pt seen and examined this AM. No acute overnight events noted.  States that he is doing better.  Needed noninvasive ventilator only for a few minutes to an hour overnight.  Still requiring significant supplemental oxygen.  Lying in the bed    ROS: Denies any subjective fevers, chest pain, nausea/ vomiting    PMH/ PSH/ FH/SH reviewed and edited as needed    Objective     Vital Signs past 24hrs    BP range: BP: (116-136)/(60-74) 122/69  Pulse range: Heart Rate:  [52-63] 59  Resp rate range: Resp:  [18-20] 18  Temp range: Temp (24hrs), Av.7 °F (36.5 °C), Min:97.4 °F (36.3 °C), Max:97.8 °F (36.6 °C)      Ventilator/Non-Invasive Ventilation Settings (From admission, onward)    Start     Ordered    19 2100  NIPPV (CPAP or BIPAP)  Until Discontinued     Question Answer Comment   Indication: Acute Respiratory Failure    Type: BIPAP    NIPPV Mask Interface: Per Patient Preference    IPAP 20    EPAP 8    Oxygen FIO2    FIO2 % 40    Breath Rate 16    Titrate for SPO2 90%        19 2103          Device (Oxygen Therapy): nasal cannula FiO2 (%): 40 %     82.6 kg (182 lb); Body mass index is 27.67 kg/m².      Intake/Output Summary (Last 24 hours) at 3/13/2019 1659  Last data filed at 3/13/2019 1341  Gross per 24 hour   Intake 740 ml   Output 600 ml   Net 140 ml       Exam:    GEN:  Middle-aged white male, lying in the bed, mild respiratory distress, + accessory muscle  use  EYES:   EOMI, anicteric sclera bilat  ENT:    External ears/nose normal, OP clear  NECK:  Supple, midline trachea, No cervical lymphadenopathy  LUNGS: Bilateral breath sounds heard, decreased breath sounds bilaterally, No wheezes, Occ crackles heard  CV:  Regular rate and rhythm, No murmur  ABD:  Nontender, nondistended, no palpable liver or masses  EXT:  Extremities warm and well perfused, no peripheral/sacral edema.    Scheduled meds:      amLODIPine 10 mg Oral QAM   aspirin 81 mg Oral Nightly   atorvastatin 40 mg Oral Daily   carvedilol 12.5 mg Oral Q12H   clopidogrel 75 mg Oral Nightly   enoxaparin 40 mg Subcutaneous Q24H   gabapentin 300 mg Oral QAM   insulin lispro 0-9 Units Subcutaneous 4x Daily With Meals & Nightly   ipratropium-albuterol 3 mL Nebulization Q4H - RT   isosorbide mononitrate 30 mg Oral QAM   levothyroxine 125 mcg Oral Q AM   pantoprazole 40 mg Oral QAM   [START ON 3/14/2019] predniSONE 40 mg Oral Daily With Breakfast   sodium chloride 3 mL Intravenous Q12H   thiamine 100 mg Oral Daily       IV meds:                           Data Review:      Results from last 7 days   Lab Units 03/12/19  0424 03/11/19  2109 03/11/19  1948   SODIUM mmol/L 146* 146*  --    POTASSIUM mmol/L 5.1 5.1  --    CHLORIDE mmol/L 106 107  --    CO2 mmol/L 26.5 30.8*  --    BUN mg/dL 23 17  --    CREATININE mg/dL 1.36* 1.24  --    CALCIUM mg/dL 9.2 9.0  --    BILIRUBIN mg/dL  --  0.6  --    ALK PHOS U/L  --  47  --    ALT (SGPT) U/L  --  17  --    AST (SGOT) U/L  --  23  --    GLUCOSE mg/dL 145* 114*  --    WBC 10*3/mm3  --   --  11.48*   HEMOGLOBIN g/dL  --   --  19.2*   PLATELETS 10*3/mm3  --   --  140   PROBNP pg/mL  --   --  1,801.0*   PROCALCITONIN ng/mL  --   --  0.09*       Lab Results   Component Value Date    CALCIUM 9.2 03/12/2019    PHOS 3.0 10/25/2017             Results from last 7 days   Lab Units 03/11/19 2152 03/11/19 2040   PH, ARTERIAL pH units 7.248* 7.238*   PO2 ART mm Hg 69.3* 73.2*   PCO2,  ARTERIAL mm Hg 79.2* 80.2*   HCO3 ART mmol/L 34.6* 34.2*        Results Review:    I have reviewed the relevant laboratory results and reviewed the chest imaging from the last 3 days personally and summarized it below    Assessment    1. Acute on chronic hypercapnic respiratory failure  2. Noninvasive ventilator use  3. Acute hypoxemic resp failure   4. Acute exacerbation of COPD  5. Acute kidney injury  6. Reactive erythrocytosis - ? Hypoxia related  7. CAD with h/o MI   8. Systemic hypertension  9. History of GI bleed  10. Peripheral arterial disease status post left above-knee amputation    PLAN:  Making steady progress.  Not requiring noninvasive ventilator as much.  Used it for a few minutes to an hour overnight that he felt out of breath  Wean supplemental oxygen as tolerated  We will get an ABG to check for any chronic respiratory acidosis  Echocardiogram ordered.  I suspect that he probably needed some oxygen even before but not get help.  Reactive erythrocytosis noted  Will follow renal function closely    DVT ppx    I have discussed my findings and recommendations with patient and nursing staff.     Ruben Mccray MD  3/13/2019

## 2019-03-14 ENCOUNTER — APPOINTMENT (OUTPATIENT)
Dept: CARDIOLOGY | Facility: HOSPITAL | Age: 64
End: 2019-03-14

## 2019-03-14 LAB
ANION GAP SERPL CALCULATED.3IONS-SCNC: 10.7 MMOL/L
BASOPHILS # BLD AUTO: 0.02 10*3/MM3 (ref 0–0.2)
BASOPHILS NFR BLD AUTO: 0.1 % (ref 0–1.5)
BUN BLD-MCNC: 30 MG/DL (ref 8–23)
BUN/CREAT SERPL: 32.3 (ref 7–25)
CALCIUM SPEC-SCNC: 8.6 MG/DL (ref 8.6–10.5)
CHLORIDE SERPL-SCNC: 101 MMOL/L (ref 98–107)
CO2 SERPL-SCNC: 27.3 MMOL/L (ref 22–29)
CREAT BLD-MCNC: 0.93 MG/DL (ref 0.76–1.27)
DEPRECATED RDW RBC AUTO: 50.8 FL (ref 37–54)
DEPRECATED RDW RBC AUTO: 52.5 FL (ref 37–54)
EOSINOPHIL # BLD AUTO: 0 10*3/MM3 (ref 0–0.4)
EOSINOPHIL NFR BLD AUTO: 0 % (ref 0.3–6.2)
ERYTHROCYTE [DISTWIDTH] IN BLOOD BY AUTOMATED COUNT: 13.7 % (ref 12.3–15.4)
ERYTHROCYTE [DISTWIDTH] IN BLOOD BY AUTOMATED COUNT: 14.3 % (ref 12.3–15.4)
GFR SERPL CREATININE-BSD FRML MDRD: 82 ML/MIN/1.73
GLUCOSE BLD-MCNC: 103 MG/DL (ref 65–99)
GLUCOSE BLDC GLUCOMTR-MCNC: 101 MG/DL (ref 70–130)
GLUCOSE BLDC GLUCOMTR-MCNC: 143 MG/DL (ref 70–130)
GLUCOSE BLDC GLUCOMTR-MCNC: 86 MG/DL (ref 70–130)
GLUCOSE BLDC GLUCOMTR-MCNC: 87 MG/DL (ref 70–130)
HCT VFR BLD AUTO: 52 % (ref 37.5–51)
HCT VFR BLD AUTO: 52.3 % (ref 37.5–51)
HGB BLD-MCNC: 16.4 G/DL (ref 13–17.7)
HGB BLD-MCNC: 16.5 G/DL (ref 13–17.7)
IMM GRANULOCYTES # BLD AUTO: 0.09 10*3/MM3 (ref 0–0.05)
IMM GRANULOCYTES NFR BLD AUTO: 0.6 % (ref 0–0.5)
LYMPHOCYTES # BLD AUTO: 0.89 10*3/MM3 (ref 0.7–3.1)
LYMPHOCYTES # BLD MANUAL: 0.49 10*3/MM3 (ref 0.7–3.1)
LYMPHOCYTES NFR BLD AUTO: 6.2 % (ref 19.6–45.3)
LYMPHOCYTES NFR BLD MANUAL: 2 % (ref 19.6–45.3)
LYMPHOCYTES NFR BLD MANUAL: 2 % (ref 5–12)
MCH RBC QN AUTO: 31.7 PG (ref 26.6–33)
MCH RBC QN AUTO: 31.8 PG (ref 26.6–33)
MCHC RBC AUTO-ENTMCNC: 31.5 G/DL (ref 31.5–35.7)
MCHC RBC AUTO-ENTMCNC: 31.5 G/DL (ref 31.5–35.7)
MCV RBC AUTO: 100.4 FL (ref 79–97)
MCV RBC AUTO: 101 FL (ref 79–97)
MONOCYTES # BLD AUTO: 0.49 10*3/MM3 (ref 0.1–0.9)
MONOCYTES # BLD AUTO: 0.76 10*3/MM3 (ref 0.1–0.9)
MONOCYTES NFR BLD AUTO: 5.3 % (ref 5–12)
NEUTROPHILS # BLD AUTO: 12.59 10*3/MM3 (ref 1.4–7)
NEUTROPHILS # BLD AUTO: 23.29 10*3/MM3 (ref 1.4–7)
NEUTROPHILS NFR BLD AUTO: 87.8 % (ref 42.7–76)
NEUTROPHILS NFR BLD MANUAL: 96 % (ref 42.7–76)
NRBC BLD AUTO-RTO: 0.1 /100 WBC (ref 0–0)
PLAT MORPH BLD: NORMAL
PLATELET # BLD AUTO: 144 10*3/MM3 (ref 140–450)
PLATELET # BLD AUTO: 168 10*3/MM3 (ref 140–450)
PMV BLD AUTO: 10 FL (ref 6–12)
PMV BLD AUTO: 10.8 FL (ref 6–12)
POTASSIUM BLD-SCNC: 4.5 MMOL/L (ref 3.5–5.2)
RBC # BLD AUTO: 5.15 10*6/MM3 (ref 4.14–5.8)
RBC # BLD AUTO: 5.21 10*6/MM3 (ref 4.14–5.8)
RBC MORPH BLD: NORMAL
SODIUM BLD-SCNC: 139 MMOL/L (ref 136–145)
WBC MORPH BLD: NORMAL
WBC NRBC COR # BLD: 14.35 10*3/MM3 (ref 3.4–10.8)
WBC NRBC COR # BLD: 24.26 10*3/MM3 (ref 3.4–10.8)

## 2019-03-14 PROCEDURE — 63710000001 PREDNISONE PER 1 MG: Performed by: INTERNAL MEDICINE

## 2019-03-14 PROCEDURE — 82962 GLUCOSE BLOOD TEST: CPT

## 2019-03-14 PROCEDURE — 85025 COMPLETE CBC W/AUTO DIFF WBC: CPT | Performed by: INTERNAL MEDICINE

## 2019-03-14 PROCEDURE — 93306 TTE W/DOPPLER COMPLETE: CPT

## 2019-03-14 PROCEDURE — 93306 TTE W/DOPPLER COMPLETE: CPT | Performed by: INTERNAL MEDICINE

## 2019-03-14 PROCEDURE — 94799 UNLISTED PULMONARY SVC/PX: CPT

## 2019-03-14 PROCEDURE — 85007 BL SMEAR W/DIFF WBC COUNT: CPT | Performed by: INTERNAL MEDICINE

## 2019-03-14 PROCEDURE — 25010000002 ENOXAPARIN PER 10 MG: Performed by: INTERNAL MEDICINE

## 2019-03-14 PROCEDURE — 80048 BASIC METABOLIC PNL TOTAL CA: CPT | Performed by: INTERNAL MEDICINE

## 2019-03-14 PROCEDURE — 94660 CPAP INITIATION&MGMT: CPT

## 2019-03-14 RX ORDER — OXYMETAZOLINE HYDROCHLORIDE 0.05 G/100ML
2 SPRAY NASAL 2 TIMES DAILY
Status: DISPENSED | OUTPATIENT
Start: 2019-03-14 | End: 2019-03-17

## 2019-03-14 RX ORDER — NICOTINE 21 MG/24HR
1 PATCH, TRANSDERMAL 24 HOURS TRANSDERMAL EVERY 24 HOURS
Status: DISCONTINUED | OUTPATIENT
Start: 2019-03-14 | End: 2019-03-19 | Stop reason: HOSPADM

## 2019-03-14 RX ORDER — ECHINACEA PURPUREA EXTRACT 125 MG
2 TABLET ORAL AS NEEDED
Status: DISCONTINUED | OUTPATIENT
Start: 2019-03-14 | End: 2019-03-19

## 2019-03-14 RX ADMIN — SODIUM CHLORIDE 4 ML: 7 NEBU SOLN,3 % NEBU at 19:34

## 2019-03-14 RX ADMIN — ATORVASTATIN CALCIUM 40 MG: 20 TABLET, FILM COATED ORAL at 09:36

## 2019-03-14 RX ADMIN — IPRATROPIUM BROMIDE AND ALBUTEROL SULFATE 3 ML: 2.5; .5 SOLUTION RESPIRATORY (INHALATION) at 11:53

## 2019-03-14 RX ADMIN — IPRATROPIUM BROMIDE AND ALBUTEROL SULFATE 3 ML: 2.5; .5 SOLUTION RESPIRATORY (INHALATION) at 03:37

## 2019-03-14 RX ADMIN — IPRATROPIUM BROMIDE AND ALBUTEROL SULFATE 3 ML: 2.5; .5 SOLUTION RESPIRATORY (INHALATION) at 16:15

## 2019-03-14 RX ADMIN — PANTOPRAZOLE SODIUM 40 MG: 40 TABLET, DELAYED RELEASE ORAL at 09:36

## 2019-03-14 RX ADMIN — ISOSORBIDE MONONITRATE 30 MG: 30 TABLET ORAL at 07:50

## 2019-03-14 RX ADMIN — PREDNISONE 40 MG: 20 TABLET ORAL at 09:37

## 2019-03-14 RX ADMIN — CARVEDILOL 12.5 MG: 12.5 TABLET, FILM COATED ORAL at 09:36

## 2019-03-14 RX ADMIN — AMLODIPINE BESYLATE 10 MG: 10 TABLET ORAL at 07:50

## 2019-03-14 RX ADMIN — SODIUM CHLORIDE 4 ML: 7 NEBU SOLN,3 % NEBU at 07:33

## 2019-03-14 RX ADMIN — Medication 2 SPRAY: at 20:55

## 2019-03-14 RX ADMIN — IPRATROPIUM BROMIDE AND ALBUTEROL SULFATE 3 ML: 2.5; .5 SOLUTION RESPIRATORY (INHALATION) at 07:27

## 2019-03-14 RX ADMIN — ALPRAZOLAM 1 MG: 0.5 TABLET ORAL at 09:46

## 2019-03-14 RX ADMIN — LEVOTHYROXINE SODIUM 125 MCG: 125 TABLET ORAL at 07:50

## 2019-03-14 RX ADMIN — IPRATROPIUM BROMIDE AND ALBUTEROL SULFATE 3 ML: 2.5; .5 SOLUTION RESPIRATORY (INHALATION) at 19:34

## 2019-03-14 RX ADMIN — ALPRAZOLAM 1 MG: 0.5 TABLET ORAL at 20:21

## 2019-03-14 RX ADMIN — SALINE NASAL SPRAY 2 SPRAY: 1.5 SOLUTION NASAL at 14:33

## 2019-03-14 RX ADMIN — ASPIRIN 81 MG: 81 TABLET, CHEWABLE ORAL at 20:19

## 2019-03-14 RX ADMIN — SODIUM CHLORIDE 1000 ML: 9 INJECTION, SOLUTION INTRAVENOUS at 15:35

## 2019-03-14 RX ADMIN — CLOPIDOGREL 75 MG: 75 TABLET, FILM COATED ORAL at 20:18

## 2019-03-14 RX ADMIN — Medication 100 MG: at 09:37

## 2019-03-14 RX ADMIN — NICOTINE 1 PATCH: 14 PATCH, EXTENDED RELEASE TRANSDERMAL at 15:12

## 2019-03-14 RX ADMIN — SODIUM CHLORIDE, PRESERVATIVE FREE 3 ML: 5 INJECTION INTRAVENOUS at 20:19

## 2019-03-14 RX ADMIN — GABAPENTIN 300 MG: 300 CAPSULE ORAL at 07:50

## 2019-03-14 RX ADMIN — ENOXAPARIN SODIUM 40 MG: 40 INJECTION SUBCUTANEOUS at 09:37

## 2019-03-14 RX ADMIN — SODIUM CHLORIDE, PRESERVATIVE FREE 3 ML: 5 INJECTION INTRAVENOUS at 09:37

## 2019-03-14 NOTE — PROGRESS NOTES
Ruben Mccray MD                          [phone]      Patient ID:    Name:  Sim Agustin    MRN:  [id number]    1955   63 y.o.  male            Patient Care Team:  Mariusz Wilburn MD as PCP - General  Mariusz Wilburn MD as PCP - Family Medicine  Jose Swann MD as Consulting Physician (Vascular Surgery)  Krystal Ocampo MD as Consulting Physician (Cardiology)  Ishmael Tate MD as Consulting Physician (Pulmonary Disease)    CC/ Reason for visit: Per Assessment mentioned below    Subjective: Pt seen and examined this AM. No acute overnight events noted.  This morning patient have severe epistaxis along with some cough and bloody sputum production.  His oxygen was humidified which showed improvement of the symptoms.  He was then notified to be hypotensive.  1 L of IV fluid bolus was given.  Patient states that he feels okay.  Generally weak but still requiring significant supplemental oxygen    ROS: Denies any subjective fevers, chest pain, nausea/ vomiting    PMH/ PSH/ FH/SH reviewed and edited as needed    Objective     Vital Signs past 24hrs    BP range: BP: ()/(47-77) 79/59  Pulse range: Heart Rate:  [51-93] 73  Resp rate range: Resp:  [16-20] 20  Temp range: Temp (24hrs), Av.4 °F (36.9 °C), Min:98.1 °F (36.7 °C), Max:98.7 °F (37.1 °C)      Ventilator/Non-Invasive Ventilation Settings (From admission, onward)    Start     Ordered    19 2100  NIPPV (CPAP or BIPAP)  Until Discontinued     Question Answer Comment   Indication: Acute Respiratory Failure    Type: BIPAP    NIPPV Mask Interface: Per Patient Preference    IPAP 20    EPAP 8    Oxygen FIO2    FIO2 % 40    Breath Rate 16    Titrate for SPO2 90%        19          Device (Oxygen Therapy): nasal cannula;humidified FiO2 (%): 40 %     80.9 kg (178 lb 6.4 oz); Body mass index is 27.13 kg/m².      Intake/Output Summary (Last 24 hours) at 3/14/2019 9150  Last data filed at  3/14/2019 0936  Gross per 24 hour   Intake 860 ml   Output 2175 ml   Net -1315 ml       Exam:    GEN:  Middle-aged white male, lying in the bed, mild respiratory distress, + accessory muscle use  EYES:   EOMI, anicteric sclera bilat  ENT:    External ears/nose normal, Dry blood in nostril. No active bleeding, OP with dried blood as well.   NECK:  Supple, midline trachea, No cervical lymphadenopathy  LUNGS: Bilateral breath sounds heard, decreased breath sounds bilaterally, No wheezes, Occ crackles heard  CV:  Regular rate and rhythm, No murmur  ABD:  Nontender, nondistended, no palpable liver or masses  EXT:  Extremities warm and well perfused, no peripheral/sacral edema.    Scheduled meds:      amLODIPine 10 mg Oral QAM   aspirin 81 mg Oral Nightly   atorvastatin 40 mg Oral Daily   carvedilol 12.5 mg Oral Q12H   clopidogrel 75 mg Oral Nightly   enoxaparin 40 mg Subcutaneous Q24H   gabapentin 300 mg Oral QAM   insulin lispro 0-9 Units Subcutaneous 4x Daily With Meals & Nightly   ipratropium-albuterol 3 mL Nebulization Q4H - RT   isosorbide mononitrate 30 mg Oral QAM   levothyroxine 125 mcg Oral Q AM   nicotine 1 patch Transdermal Q24H   pantoprazole 40 mg Oral QAM   predniSONE 40 mg Oral Daily With Breakfast   sodium chloride 1,000 mL Intravenous Once   sodium chloride 3 mL Intravenous Q12H   sodium chloride 4 mL Nebulization BID - RT   thiamine 100 mg Oral Daily       IV meds:                           Data Review:      Results from last 7 days   Lab Units 03/14/19  0435 03/13/19  1709 03/12/19  0424 03/11/19  2109  03/11/19  1948   SODIUM mmol/L 139 136 146* 146*   < >  --    POTASSIUM mmol/L 4.5 4.7 5.1 5.1   < >  --    CHLORIDE mmol/L 101 99 106 107   < >  --    CO2 mmol/L 27.3 26.8 26.5 30.8*   < >  --    BUN mg/dL 30* 32* 23 17   < >  --    CREATININE mg/dL 0.93 1.10 1.36* 1.24   < >  --    CALCIUM mg/dL 8.6 8.8 9.2 9.0   < >  --    BILIRUBIN mg/dL  --   --   --  0.6  --   --    ALK PHOS U/L  --   --   --  47   --   --    ALT (SGPT) U/L  --   --   --  17  --   --    AST (SGOT) U/L  --   --   --  23  --   --    GLUCOSE mg/dL 103* 144* 145* 114*   < >  --    WBC 10*3/mm3 14.35*  --   --   --   --  11.48*   HEMOGLOBIN g/dL 16.5  --   --   --   --  19.2*   PLATELETS 10*3/mm3 168  --   --   --   --  140   PROBNP pg/mL  --   --   --   --   --  1,801.0*   PROCALCITONIN ng/mL  --   --   --   --   --  0.09*    < > = values in this interval not displayed.       Lab Results   Component Value Date    CALCIUM 8.6 03/14/2019    PHOS 3.0 10/25/2017             Results from last 7 days   Lab Units 03/13/19  1724 03/11/19  2152 03/11/19  2040   PH, ARTERIAL pH units 7.418 7.248* 7.238*   PO2 ART mm Hg 57.0* 69.3* 73.2*   PCO2, ARTERIAL mm Hg 47.3* 79.2* 80.2*   HCO3 ART mmol/L 30.5* 34.6* 34.2*        Results Review:    I have reviewed the relevant laboratory results and reviewed the chest imaging from the last 3 days personally and summarized it below    Assessment    1. Acute on chronic hypercapnic respiratory failure  2. Noninvasive ventilator use  3. Acute hypoxemic resp failure   4. Acute exacerbation of COPD  5. Acute kidney injury  6. Reactive erythrocytosis - ? Hypoxia related  7. Epistaxis  8. Hypotension  9. CAD with h/o MI   10. Systemic hypertension  11. History of GI bleed  12. Peripheral arterial disease status post left above-knee amputation    PLAN:  Epistaxis likely from dry nose from high flow oxygen without humidification.  We will at this morning.  We will also add Afrin nasal spray.  Noted hypotension low suspicion of hemorrhagic shock given the bleeding as stopped now.  Will send a CBC and transfuse for PRBC goal greater than 7.  No coagulopathy noted  Wean supplemental oxygen as tolerated  ABG shows mild respiratory acidosis.  Echocardiogram is pending.    Reactive erythrocytosis noted and improving    DVT ppx    I have discussed my findings and recommendations with patient and nursing staff.     Ruben Chirinos  MD [facility]  3/14/2019

## 2019-03-14 NOTE — PLAN OF CARE
Problem: Patient Care Overview  Goal: Plan of Care Review  Outcome: Outcome(s) achieved Date Met: 03/14/19 03/14/19 8283   Coping/Psychosocial   Plan of Care Reviewed With patient   Plan of Care Review   Progress improving   OTHER   Outcome Summary Safety maintained, occ productive cough of white secretions. Using IS on own. No c/o's of pain or discomfort. BIPAP worn for 2 1/2 hours during noc while sleeping. Will continue to monitor for void.     Goal: Individualization and Mutuality  Outcome: Ongoing (interventions implemented as appropriate)    Goal: Discharge Needs Assessment  Outcome: Ongoing (interventions implemented as appropriate)      Problem: Fall Risk (Adult)  Goal: Absence of Fall  Outcome: Ongoing (interventions implemented as appropriate)      Problem: Skin Injury Risk (Adult)  Goal: Skin Health and Integrity  Outcome: Ongoing (interventions implemented as appropriate)      Problem: NPPV/CPAP (Adult)  Goal: Signs and Symptoms of Listed Potential Problems Will be Absent, Minimized or Managed (NPPV/CPAP)  Outcome: Ongoing (interventions implemented as appropriate)

## 2019-03-14 NOTE — PLAN OF CARE
Problem: Patient Care Overview  Goal: Plan of Care Review  Outcome: Ongoing (interventions implemented as appropriate)   03/14/19 8244   Coping/Psychosocial   Plan of Care Reviewed With patient   OTHER   Outcome Summary Pt up in chair for hours, reported nosebleed, blood streaked sputum, large bruise where lovenox given: Dr. SARAVIA notified while in room. SBP later 60s/70s and 1 L bolus given. Bladder scan after void showed 20 ml urine. Oxygen sats 89-90% today (Dr. SARAVIA. aware). Went for echo today.        Problem: Fall Risk (Adult)  Goal: Absence of Fall  Outcome: Ongoing (interventions implemented as appropriate)      Problem: Skin Injury Risk (Adult)  Goal: Skin Health and Integrity  Outcome: Ongoing (interventions implemented as appropriate)      Problem: Chronic Obstructive Pulmonary Disease (Adult)  Goal: Signs and Symptoms of Listed Potential Problems Will be Absent, Minimized or Managed (Chronic Obstructive Pulmonary Disease)  Outcome: Ongoing (interventions implemented as appropriate)

## 2019-03-15 LAB
ANION GAP SERPL CALCULATED.3IONS-SCNC: 10.5 MMOL/L
AORTIC DIMENSIONLESS INDEX: 0.5 (DI)
BASOPHILS # BLD AUTO: 0.02 10*3/MM3 (ref 0–0.2)
BASOPHILS NFR BLD AUTO: 0.1 % (ref 0–1.5)
BH CV ECHO MEAS - ACS: 1.7 CM
BH CV ECHO MEAS - AO MAX PG (FULL): 5 MMHG
BH CV ECHO MEAS - AO MAX PG: 6.7 MMHG
BH CV ECHO MEAS - AO MEAN PG (FULL): 3 MMHG
BH CV ECHO MEAS - AO MEAN PG: 4 MMHG
BH CV ECHO MEAS - AO ROOT AREA (BSA CORRECTED): 1.8
BH CV ECHO MEAS - AO ROOT AREA: 9.6 CM^2
BH CV ECHO MEAS - AO ROOT DIAM: 3.5 CM
BH CV ECHO MEAS - AO V2 MAX: 129 CM/SEC
BH CV ECHO MEAS - AO V2 MEAN: 90.3 CM/SEC
BH CV ECHO MEAS - AO V2 VTI: 30.6 CM
BH CV ECHO MEAS - ASC AORTA: 3.3 CM
BH CV ECHO MEAS - AVA(I,A): 2 CM^2
BH CV ECHO MEAS - AVA(I,D): 2 CM^2
BH CV ECHO MEAS - AVA(V,A): 2.1 CM^2
BH CV ECHO MEAS - AVA(V,D): 2.1 CM^2
BH CV ECHO MEAS - BSA(HAYCOCK): 2 M^2
BH CV ECHO MEAS - BSA: 1.9 M^2
BH CV ECHO MEAS - BZI_BMI: 27 KILOGRAMS/M^2
BH CV ECHO MEAS - BZI_METRIC_HEIGHT: 172 CM
BH CV ECHO MEAS - BZI_METRIC_WEIGHT: 80 KG
BH CV ECHO MEAS - EDV(CUBED): 125 ML
BH CV ECHO MEAS - EDV(MOD-SP2): 135 ML
BH CV ECHO MEAS - EDV(MOD-SP4): 140 ML
BH CV ECHO MEAS - EDV(TEICH): 118.2 ML
BH CV ECHO MEAS - EF(CUBED): 59.5 %
BH CV ECHO MEAS - EF(MOD-BP): 29 %
BH CV ECHO MEAS - EF(MOD-SP2): 20.7 %
BH CV ECHO MEAS - EF(MOD-SP4): 43.6 %
BH CV ECHO MEAS - EF(TEICH): 50.8 %
BH CV ECHO MEAS - ESV(CUBED): 50.7 ML
BH CV ECHO MEAS - ESV(MOD-SP2): 107 ML
BH CV ECHO MEAS - ESV(MOD-SP4): 79 ML
BH CV ECHO MEAS - ESV(TEICH): 58.1 ML
BH CV ECHO MEAS - FS: 26 %
BH CV ECHO MEAS - IVS/LVPW: 1.1
BH CV ECHO MEAS - IVSD: 1.1 CM
BH CV ECHO MEAS - LAT PEAK E' VEL: 8.3 CM/SEC
BH CV ECHO MEAS - LV DIASTOLIC VOL/BSA (35-75): 72.5 ML/M^2
BH CV ECHO MEAS - LV MASS(C)D: 194.4 GRAMS
BH CV ECHO MEAS - LV MASS(C)DI: 100.6 GRAMS/M^2
BH CV ECHO MEAS - LV MAX PG: 1.6 MMHG
BH CV ECHO MEAS - LV MEAN PG: 1 MMHG
BH CV ECHO MEAS - LV SYSTOLIC VOL/BSA (12-30): 40.9 ML/M^2
BH CV ECHO MEAS - LV V1 MAX: 64 CM/SEC
BH CV ECHO MEAS - LV V1 MEAN: 50.8 CM/SEC
BH CV ECHO MEAS - LV V1 VTI: 14.4 CM
BH CV ECHO MEAS - LVIDD: 5 CM
BH CV ECHO MEAS - LVIDS: 3.7 CM
BH CV ECHO MEAS - LVLD AP2: 9.6 CM
BH CV ECHO MEAS - LVLD AP4: 9.6 CM
BH CV ECHO MEAS - LVLS AP2: 8.9 CM
BH CV ECHO MEAS - LVLS AP4: 8.5 CM
BH CV ECHO MEAS - LVOT AREA (M): 4.2 CM^2
BH CV ECHO MEAS - LVOT AREA: 4.2 CM^2
BH CV ECHO MEAS - LVOT DIAM: 2.3 CM
BH CV ECHO MEAS - LVPWD: 1 CM
BH CV ECHO MEAS - MED PEAK E' VEL: 5.4 CM/SEC
BH CV ECHO MEAS - MV A DUR: 132 SEC
BH CV ECHO MEAS - MV A MAX VEL: 77.6 CM/SEC
BH CV ECHO MEAS - MV DEC SLOPE: 184 CM/SEC^2
BH CV ECHO MEAS - MV DEC TIME: 301 SEC
BH CV ECHO MEAS - MV E MAX VEL: 55.3 CM/SEC
BH CV ECHO MEAS - MV E/A: 0.71
BH CV ECHO MEAS - MV MAX PG: 2.9 MMHG
BH CV ECHO MEAS - MV MEAN PG: 1 MMHG
BH CV ECHO MEAS - MV V2 MAX: 85.7 CM/SEC
BH CV ECHO MEAS - MV V2 MEAN: 50.4 CM/SEC
BH CV ECHO MEAS - MV V2 VTI: 33.2 CM
BH CV ECHO MEAS - MVA(VTI): 1.8 CM^2
BH CV ECHO MEAS - PA ACC TIME: 0.1 SEC
BH CV ECHO MEAS - PA MAX PG (FULL): 2 MMHG
BH CV ECHO MEAS - PA MAX PG: 3.1 MMHG
BH CV ECHO MEAS - PA PR(ACCEL): 34.5 MMHG
BH CV ECHO MEAS - PA V2 MAX: 87.6 CM/SEC
BH CV ECHO MEAS - PULM A REVS DUR: 0.12 SEC
BH CV ECHO MEAS - PULM A REVS VEL: 32.8 CM/SEC
BH CV ECHO MEAS - PULM DIAS VEL: 48.8 CM/SEC
BH CV ECHO MEAS - PULM S/D: 0.99
BH CV ECHO MEAS - PULM SYS VEL: 48.3 CM/SEC
BH CV ECHO MEAS - RV MAX PG: 1.1 MMHG
BH CV ECHO MEAS - RV MEAN PG: 1 MMHG
BH CV ECHO MEAS - RV V1 MAX: 51.4 CM/SEC
BH CV ECHO MEAS - RV V1 MEAN: 32.5 CM/SEC
BH CV ECHO MEAS - RV V1 VTI: 8.8 CM
BH CV ECHO MEAS - SI(AO): 152.4 ML/M^2
BH CV ECHO MEAS - SI(CUBED): 38.5 ML/M^2
BH CV ECHO MEAS - SI(LVOT): 31 ML/M^2
BH CV ECHO MEAS - SI(MOD-SP2): 14.5 ML/M^2
BH CV ECHO MEAS - SI(MOD-SP4): 31.6 ML/M^2
BH CV ECHO MEAS - SI(TEICH): 31.1 ML/M^2
BH CV ECHO MEAS - SV(AO): 294.4 ML
BH CV ECHO MEAS - SV(CUBED): 74.3 ML
BH CV ECHO MEAS - SV(LVOT): 59.8 ML
BH CV ECHO MEAS - SV(MOD-SP2): 28 ML
BH CV ECHO MEAS - SV(MOD-SP4): 61 ML
BH CV ECHO MEAS - SV(TEICH): 60.1 ML
BH CV ECHO MEAS - TAPSE (>1.6): 2.3 CM2
BH CV ECHO MEAS - TR MAX PG: 42
BH CV ECHO MEAS - TR MAX VEL: 323 CM/SEC
BH CV ECHO MEASUREMENTS AVERAGE E/E' RATIO: 8.07
BH CV VAS BP RIGHT ARM: NORMAL MMHG
BH CV XLRA - RV BASE: 4.75 CM
BH CV XLRA - TDI S': 9.3 CM/SEC
BUN BLD-MCNC: 33 MG/DL (ref 8–23)
BUN/CREAT SERPL: 33.7 (ref 7–25)
CALCIUM SPEC-SCNC: 8.3 MG/DL (ref 8.6–10.5)
CHLORIDE SERPL-SCNC: 103 MMOL/L (ref 98–107)
CO2 SERPL-SCNC: 25.5 MMOL/L (ref 22–29)
CREAT BLD-MCNC: 0.98 MG/DL (ref 0.76–1.27)
DEPRECATED RDW RBC AUTO: 52.9 FL (ref 37–54)
DIFFERENTIAL METHOD BLD: ABNORMAL
EOSINOPHIL # BLD AUTO: 0.01 10*3/MM3 (ref 0–0.4)
EOSINOPHIL NFR BLD AUTO: 0.1 % (ref 0.3–6.2)
ERYTHROCYTE [DISTWIDTH] IN BLOOD BY AUTOMATED COUNT: 14.3 % (ref 12.3–15.4)
GFR SERPL CREATININE-BSD FRML MDRD: 77 ML/MIN/1.73
GLUCOSE BLD-MCNC: 117 MG/DL (ref 65–99)
GLUCOSE BLDC GLUCOMTR-MCNC: 80 MG/DL (ref 70–130)
GLUCOSE BLDC GLUCOMTR-MCNC: 94 MG/DL (ref 70–130)
GLUCOSE BLDC GLUCOMTR-MCNC: 97 MG/DL (ref 70–130)
HCT VFR BLD AUTO: 52 % (ref 37.5–51)
HGB BLD-MCNC: 16.7 G/DL (ref 13–17.7)
IMM GRANULOCYTES # BLD AUTO: 0.21 10*3/MM3 (ref 0–0.05)
IMM GRANULOCYTES NFR BLD AUTO: 1.3 % (ref 0–0.5)
LASC: ABNORMAL
LEFT ATRIUM VOLUME INDEX: 28.5 ML/M2
LYMPHOCYTES # BLD AUTO: 0.53 10*3/MM3 (ref 0.7–3.1)
LYMPHOCYTES NFR BLD AUTO: 3.2 % (ref 19.6–45.3)
MCH RBC QN AUTO: 32.4 PG (ref 26.6–33)
MCHC RBC AUTO-ENTMCNC: 32.1 G/DL (ref 31.5–35.7)
MCV RBC AUTO: 100.8 FL (ref 79–97)
MONOCYTES # BLD AUTO: 0.74 10*3/MM3 (ref 0.1–0.9)
MONOCYTES NFR BLD AUTO: 4.4 % (ref 5–12)
NEUTROPHILS # BLD AUTO: 15.23 10*3/MM3 (ref 1.4–7)
NEUTROPHILS NFR BLD AUTO: 90.9 % (ref 42.7–76)
NRBC # BLD AUTO: 0.01 10*3/MM3 (ref 0–0)
NRBC BLD AUTO-RTO: 0.1 /100 WBC (ref 0–0)
NRBC SPEC MANUAL: ABNORMAL /100 WBC (ref 0–0)
NUCLEATED ERYTHROCYTES/?100 LEUKOCYTES IN BLOOD BY MANUAL COUNT: ABNORMAL
PLATELET # BLD AUTO: 128 10*3/MM3 (ref 140–450)
PMV BLD AUTO: 11 FL (ref 6–12)
POTASSIUM BLD-SCNC: 4.6 MMOL/L (ref 3.5–5.2)
RBC # BLD AUTO: 5.16 10*6/MM3 (ref 4.14–5.8)
SODIUM BLD-SCNC: 139 MMOL/L (ref 136–145)
TOTAL COUNTED: ABNORMAL
WBC # BLD AUTO: 16.74 10*3/MM3 (ref 3.4–10.8)
WBC NRBC COR # BLD: 16.74 10*3/MM3 (ref 3.4–10.8)

## 2019-03-15 PROCEDURE — 82962 GLUCOSE BLOOD TEST: CPT

## 2019-03-15 PROCEDURE — 94799 UNLISTED PULMONARY SVC/PX: CPT

## 2019-03-15 PROCEDURE — 94660 CPAP INITIATION&MGMT: CPT

## 2019-03-15 PROCEDURE — 80048 BASIC METABOLIC PNL TOTAL CA: CPT | Performed by: INTERNAL MEDICINE

## 2019-03-15 PROCEDURE — 85025 COMPLETE CBC W/AUTO DIFF WBC: CPT | Performed by: INTERNAL MEDICINE

## 2019-03-15 RX ADMIN — NICOTINE 1 PATCH: 14 PATCH, EXTENDED RELEASE TRANSDERMAL at 09:13

## 2019-03-15 RX ADMIN — Medication 100 MG: at 09:09

## 2019-03-15 RX ADMIN — SODIUM CHLORIDE 4 ML: 7 NEBU SOLN,3 % NEBU at 07:26

## 2019-03-15 RX ADMIN — SODIUM CHLORIDE 4 ML: 7 NEBU SOLN,3 % NEBU at 19:43

## 2019-03-15 RX ADMIN — ALPRAZOLAM 1 MG: 0.5 TABLET ORAL at 20:23

## 2019-03-15 RX ADMIN — IPRATROPIUM BROMIDE AND ALBUTEROL SULFATE 3 ML: 2.5; .5 SOLUTION RESPIRATORY (INHALATION) at 11:54

## 2019-03-15 RX ADMIN — GABAPENTIN 300 MG: 300 CAPSULE ORAL at 09:16

## 2019-03-15 RX ADMIN — SODIUM CHLORIDE, PRESERVATIVE FREE 3 ML: 5 INJECTION INTRAVENOUS at 09:14

## 2019-03-15 RX ADMIN — ASPIRIN 81 MG: 81 TABLET, CHEWABLE ORAL at 20:23

## 2019-03-15 RX ADMIN — IPRATROPIUM BROMIDE AND ALBUTEROL SULFATE 3 ML: 2.5; .5 SOLUTION RESPIRATORY (INHALATION) at 15:32

## 2019-03-15 RX ADMIN — CARVEDILOL 12.5 MG: 12.5 TABLET, FILM COATED ORAL at 09:09

## 2019-03-15 RX ADMIN — IPRATROPIUM BROMIDE AND ALBUTEROL SULFATE 3 ML: 2.5; .5 SOLUTION RESPIRATORY (INHALATION) at 07:21

## 2019-03-15 RX ADMIN — IPRATROPIUM BROMIDE AND ALBUTEROL SULFATE 3 ML: 2.5; .5 SOLUTION RESPIRATORY (INHALATION) at 00:24

## 2019-03-15 RX ADMIN — SODIUM CHLORIDE, PRESERVATIVE FREE 3 ML: 5 INJECTION INTRAVENOUS at 20:31

## 2019-03-15 RX ADMIN — Medication 2 SPRAY: at 20:31

## 2019-03-15 RX ADMIN — CLOPIDOGREL 75 MG: 75 TABLET, FILM COATED ORAL at 20:23

## 2019-03-15 RX ADMIN — PANTOPRAZOLE SODIUM 40 MG: 40 TABLET, DELAYED RELEASE ORAL at 09:16

## 2019-03-15 RX ADMIN — ATORVASTATIN CALCIUM 40 MG: 20 TABLET, FILM COATED ORAL at 09:09

## 2019-03-15 RX ADMIN — ISOSORBIDE MONONITRATE 30 MG: 30 TABLET ORAL at 09:08

## 2019-03-15 RX ADMIN — IPRATROPIUM BROMIDE AND ALBUTEROL SULFATE 3 ML: 2.5; .5 SOLUTION RESPIRATORY (INHALATION) at 03:41

## 2019-03-15 RX ADMIN — NICOTINE 1 PATCH: 14 PATCH, EXTENDED RELEASE TRANSDERMAL at 09:00

## 2019-03-15 RX ADMIN — LEVOTHYROXINE SODIUM 125 MCG: 125 TABLET ORAL at 07:05

## 2019-03-15 RX ADMIN — CARVEDILOL 12.5 MG: 12.5 TABLET, FILM COATED ORAL at 20:23

## 2019-03-15 RX ADMIN — IPRATROPIUM BROMIDE AND ALBUTEROL SULFATE 3 ML: 2.5; .5 SOLUTION RESPIRATORY (INHALATION) at 19:43

## 2019-03-15 RX ADMIN — AMLODIPINE BESYLATE 10 MG: 10 TABLET ORAL at 09:08

## 2019-03-15 NOTE — PROGRESS NOTES
Ruben Mccray MD                          [phone]      Patient ID:    Name:  Sim Agustin    MRN:  [id number]    1955   63 y.o.  male            Patient Care Team:  Mariusz Wilburn MD as PCP - General  Mariusz Wilburn MD as PCP - Family Medicine  Jose Swann MD as Consulting Physician (Vascular Surgery)  Krystal Ocampo MD as Consulting Physician (Cardiology)  Ishmael Tate MD as Consulting Physician (Pulmonary Disease)    CC/ Reason for visit: Per Assessment mentioned below    Subjective: Pt seen and examined this AM. No acute overnight events noted. Sitting up In a chair.  His wife could not get the prosthesis yet.  Epistaxis is resolved.  Still requiring significant oxygen    ROS: Denies any subjective fevers, chest pain, nausea/ vomiting    PMH/ PSH/ FH/SH reviewed and edited as needed    Objective     Vital Signs past 24hrs    BP range: BP: ()/(57-72) 124/60  Pulse range: Heart Rate:  [50-64] 54  Resp rate range: Resp:  [16-20] 16  Temp range: Temp (24hrs), Av.5 °F (36.4 °C), Min:97.4 °F (36.3 °C), Max:97.6 °F (36.4 °C)      Ventilator/Non-Invasive Ventilation Settings (From admission, onward)    Start     Ordered    19 2100  NIPPV (CPAP or BIPAP)  Until Discontinued     Question Answer Comment   Indication: Acute Respiratory Failure    Type: BIPAP    NIPPV Mask Interface: Per Patient Preference    IPAP 20    EPAP 8    Oxygen FIO2    FIO2 % 40    Breath Rate 16    Titrate for SPO2 90%        19 2103          Device (Oxygen Therapy): nasal cannula FiO2 (%): 40 %     80 kg (176 lb 5.9 oz); Body mass index is 27.04 kg/m².      Intake/Output Summary (Last 24 hours) at 3/15/2019 1653  Last data filed at 3/15/2019 0700  Gross per 24 hour   Intake --   Output 1250 ml   Net -1250 ml       Exam:    GEN:  Middle-aged white male, lying in the bed, mild respiratory distress, + accessory muscle use  EYES:   EOMI, anicteric sclera bilat  ENT:     External ears/nose normal, Dry blood in nostril. NECK:  Supple, midline trachea, No cervical lymphadenopathy  LUNGS: Bilateral breath sounds heard, decreased breath sounds bilaterally, No wheezes, Occ crackles heard  CV:  Regular rate and rhythm, No murmur  ABD:  Nontender, nondistended, no palpable liver or masses  EXT:  Extremities warm and well perfused, no peripheral/sacral edema. Lt AKA    Scheduled meds:      amLODIPine 10 mg Oral QAM   aspirin 81 mg Oral Nightly   atorvastatin 40 mg Oral Daily   carvedilol 12.5 mg Oral Q12H   clopidogrel 75 mg Oral Nightly   enoxaparin 40 mg Subcutaneous Q24H   gabapentin 300 mg Oral QAM   insulin lispro 0-9 Units Subcutaneous 4x Daily With Meals & Nightly   ipratropium-albuterol 3 mL Nebulization Q4H - RT   isosorbide mononitrate 30 mg Oral QAM   levothyroxine 125 mcg Oral Q AM   nicotine 1 patch Transdermal Q24H   oxymetazoline 2 spray Each Nare BID   pantoprazole 40 mg Oral QAM   sodium chloride 3 mL Intravenous Q12H   sodium chloride 4 mL Nebulization BID - RT   thiamine 100 mg Oral Daily       IV meds:                           Data Review:      Results from last 7 days   Lab Units 03/15/19  0516 03/14/19  1517 03/14/19  0435 03/13/19  1709  03/11/19  2109 03/11/19  1948   SODIUM mmol/L 139  --  139 136   < > 146*  --    POTASSIUM mmol/L 4.6  --  4.5 4.7   < > 5.1  --    CHLORIDE mmol/L 103  --  101 99   < > 107  --    CO2 mmol/L 25.5  --  27.3 26.8   < > 30.8*  --    BUN mg/dL 33*  --  30* 32*   < > 17  --    CREATININE mg/dL 0.98  --  0.93 1.10   < > 1.24  --    CALCIUM mg/dL 8.3*  --  8.6 8.8   < > 9.0  --    BILIRUBIN mg/dL  --   --   --   --   --  0.6  --    ALK PHOS U/L  --   --   --   --   --  47  --    ALT (SGPT) U/L  --   --   --   --   --  17  --    AST (SGOT) U/L  --   --   --   --   --  23  --    GLUCOSE mg/dL 117*  --  103* 144*   < > 114*  --    WBC 10*3/mm3 16.74*  16.74* 24.26* 14.35*  --   --   --  11.48*   HEMOGLOBIN g/dL 16.7 16.4 16.5  --   --   --   19.2*   PLATELETS 10*3/mm3 128* 144 168  --   --   --  140   PROBNP pg/mL  --   --   --   --   --   --  1,801.0*   PROCALCITONIN ng/mL  --   --   --   --   --   --  0.09*    < > = values in this interval not displayed.       Lab Results   Component Value Date    CALCIUM 8.3 (L) 03/15/2019    PHOS 3.0 10/25/2017             Results from last 7 days   Lab Units 03/13/19  1724 03/11/19  2152 03/11/19  2040   PH, ARTERIAL pH units 7.418 7.248* 7.238*   PO2 ART mm Hg 57.0* 69.3* 73.2*   PCO2, ARTERIAL mm Hg 47.3* 79.2* 80.2*   HCO3 ART mmol/L 30.5* 34.6* 34.2*        Results Review:    I have reviewed the relevant laboratory results and reviewed the chest imaging from the last 3 days personally and summarized it below    Assessment    1. Acute on chronic hypercapnic respiratory failure  2. Noninvasive ventilator use  3. Acute hypoxemic resp failure   4. Acute exacerbation of COPD  5. Acute kidney injury  6. Reactive erythrocytosis - ? Hypoxia related  7. 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 Moderate pulmonary hypertension  9. Epistaxis  10. Hypotension  11. CAD with h/o MI   12. Systemic hypertension  13. History of GI bleed  14. Peripheral arterial disease status post left above-knee amputation    PLAN:  Epistaxis is resolved now.  Echocardiogram changes noted.  We will consult cardiology.  Moderate pulmonary hypertension expected due to noncompliance with oxygen and severe COPD  Wean supplemental oxygen as tolerated  Reactive erythrocytosis noted and improving    DVT ppx    I have discussed my findings and recommendations with patient and nursing staff.     Ruben Mccray MD  3/15/2019

## 2019-03-15 NOTE — PLAN OF CARE
Problem: Patient Care Overview  Goal: Plan of Care Review  Outcome: Ongoing (interventions implemented as appropriate)   03/15/19 0123   Coping/Psychosocial   Plan of Care Reviewed With patient   Plan of Care Review   Progress improving   OTHER   Outcome Summary Pt up in chair for dinner. Pt had no c/o discomfort or pain. Xanax given. BP better from earlier in day. O2 sats 90-92 on 4L humidified after nosebleed. Afrin for nosebleed. Bruises on belly from Lovenox. Pt rested well overnight. Cont to monitor safety maintained.        Problem: Fall Risk (Adult)  Goal: Absence of Fall  Outcome: Ongoing (interventions implemented as appropriate)      Problem: Skin Injury Risk (Adult)  Goal: Skin Health and Integrity  Outcome: Ongoing (interventions implemented as appropriate)      Problem: NPPV/CPAP (Adult)  Goal: Signs and Symptoms of Listed Potential Problems Will be Absent, Minimized or Managed (NPPV/CPAP)  Outcome: Ongoing (interventions implemented as appropriate)      Problem: Chronic Obstructive Pulmonary Disease (Adult)  Goal: Signs and Symptoms of Listed Potential Problems Will be Absent, Minimized or Managed (Chronic Obstructive Pulmonary Disease)  Outcome: Ongoing (interventions implemented as appropriate)

## 2019-03-15 NOTE — PROGRESS NOTES
Continued Stay Note  [facility]     Patient Name: Sim Agustin  MRN: [id number]  Today's Date: 3/15/2019    Admit Date: 3/11/2019    Discharge Plan     Row Name 03/15/19 1425       Plan    Plan  Home    Plan Comments  [facility] following for home oxygen needs.  Pt denies other need        Discharge Codes    No documentation.             Mariel Abraham RN

## 2019-03-16 LAB
ANION GAP SERPL CALCULATED.3IONS-SCNC: 8.6 MMOL/L
BASOPHILS # BLD AUTO: 0.01 10*3/MM3 (ref 0–0.2)
BASOPHILS NFR BLD AUTO: 0.1 % (ref 0–1.5)
BUN BLD-MCNC: 24 MG/DL (ref 8–23)
BUN/CREAT SERPL: 26.4 (ref 7–25)
CALCIUM SPEC-SCNC: 8.4 MG/DL (ref 8.6–10.5)
CHLORIDE SERPL-SCNC: 102 MMOL/L (ref 98–107)
CO2 SERPL-SCNC: 28.4 MMOL/L (ref 22–29)
CREAT BLD-MCNC: 0.91 MG/DL (ref 0.76–1.27)
DEPRECATED RDW RBC AUTO: 53.9 FL (ref 37–54)
EOSINOPHIL # BLD AUTO: 0.07 10*3/MM3 (ref 0–0.4)
EOSINOPHIL NFR BLD AUTO: 0.6 % (ref 0.3–6.2)
ERYTHROCYTE [DISTWIDTH] IN BLOOD BY AUTOMATED COUNT: 14.2 % (ref 12.3–15.4)
GFR SERPL CREATININE-BSD FRML MDRD: 84 ML/MIN/1.73
GLUCOSE BLD-MCNC: 87 MG/DL (ref 65–99)
GLUCOSE BLDC GLUCOMTR-MCNC: 121 MG/DL (ref 70–130)
GLUCOSE BLDC GLUCOMTR-MCNC: 84 MG/DL (ref 70–130)
GLUCOSE BLDC GLUCOMTR-MCNC: 84 MG/DL (ref 70–130)
GLUCOSE BLDC GLUCOMTR-MCNC: 85 MG/DL (ref 70–130)
HCT VFR BLD AUTO: 49.1 % (ref 37.5–51)
HGB BLD-MCNC: 15.4 G/DL (ref 13–17.7)
IMM GRANULOCYTES # BLD AUTO: 0.07 10*3/MM3 (ref 0–0.05)
IMM GRANULOCYTES NFR BLD AUTO: 0.6 % (ref 0–0.5)
LYMPHOCYTES # BLD AUTO: 0.74 10*3/MM3 (ref 0.7–3.1)
LYMPHOCYTES NFR BLD AUTO: 6.8 % (ref 19.6–45.3)
MCH RBC QN AUTO: 32.2 PG (ref 26.6–33)
MCHC RBC AUTO-ENTMCNC: 31.4 G/DL (ref 31.5–35.7)
MCV RBC AUTO: 102.5 FL (ref 79–97)
MONOCYTES # BLD AUTO: 0.62 10*3/MM3 (ref 0.1–0.9)
MONOCYTES NFR BLD AUTO: 5.7 % (ref 5–12)
NEUTROPHILS # BLD AUTO: 9.36 10*3/MM3 (ref 1.4–7)
NEUTROPHILS NFR BLD AUTO: 86.2 % (ref 42.7–76)
NRBC BLD AUTO-RTO: 0 /100 WBC (ref 0–0)
PLATELET # BLD AUTO: 112 10*3/MM3 (ref 140–450)
PMV BLD AUTO: 11 FL (ref 6–12)
POTASSIUM BLD-SCNC: 4.2 MMOL/L (ref 3.5–5.2)
RBC # BLD AUTO: 4.79 10*6/MM3 (ref 4.14–5.8)
SODIUM BLD-SCNC: 139 MMOL/L (ref 136–145)
WBC NRBC COR # BLD: 10.87 10*3/MM3 (ref 3.4–10.8)

## 2019-03-16 PROCEDURE — 94799 UNLISTED PULMONARY SVC/PX: CPT

## 2019-03-16 PROCEDURE — 99222 1ST HOSP IP/OBS MODERATE 55: CPT | Performed by: INTERNAL MEDICINE

## 2019-03-16 PROCEDURE — 82962 GLUCOSE BLOOD TEST: CPT

## 2019-03-16 PROCEDURE — 80048 BASIC METABOLIC PNL TOTAL CA: CPT | Performed by: INTERNAL MEDICINE

## 2019-03-16 PROCEDURE — 85025 COMPLETE CBC W/AUTO DIFF WBC: CPT | Performed by: INTERNAL MEDICINE

## 2019-03-16 RX ORDER — GABAPENTIN 300 MG/1
300 CAPSULE ORAL 2 TIMES DAILY
Status: DISCONTINUED | OUTPATIENT
Start: 2019-03-16 | End: 2019-03-19 | Stop reason: HOSPADM

## 2019-03-16 RX ADMIN — CARVEDILOL 12.5 MG: 12.5 TABLET, FILM COATED ORAL at 20:26

## 2019-03-16 RX ADMIN — ALPRAZOLAM 1 MG: 0.5 TABLET ORAL at 22:52

## 2019-03-16 RX ADMIN — IPRATROPIUM BROMIDE AND ALBUTEROL SULFATE 3 ML: 2.5; .5 SOLUTION RESPIRATORY (INHALATION) at 20:07

## 2019-03-16 RX ADMIN — SODIUM CHLORIDE, PRESERVATIVE FREE 3 ML: 5 INJECTION INTRAVENOUS at 20:28

## 2019-03-16 RX ADMIN — ASPIRIN 81 MG: 81 TABLET, CHEWABLE ORAL at 20:26

## 2019-03-16 RX ADMIN — Medication 2 SPRAY: at 20:28

## 2019-03-16 RX ADMIN — ATORVASTATIN CALCIUM 40 MG: 20 TABLET, FILM COATED ORAL at 09:01

## 2019-03-16 RX ADMIN — IPRATROPIUM BROMIDE AND ALBUTEROL SULFATE 3 ML: 2.5; .5 SOLUTION RESPIRATORY (INHALATION) at 16:03

## 2019-03-16 RX ADMIN — SODIUM CHLORIDE, PRESERVATIVE FREE 3 ML: 5 INJECTION INTRAVENOUS at 11:59

## 2019-03-16 RX ADMIN — IPRATROPIUM BROMIDE AND ALBUTEROL SULFATE 3 ML: 2.5; .5 SOLUTION RESPIRATORY (INHALATION) at 07:28

## 2019-03-16 RX ADMIN — ISOSORBIDE MONONITRATE 30 MG: 30 TABLET ORAL at 07:38

## 2019-03-16 RX ADMIN — NICOTINE 1 PATCH: 14 PATCH, EXTENDED RELEASE TRANSDERMAL at 16:37

## 2019-03-16 RX ADMIN — ALPRAZOLAM 1 MG: 0.5 TABLET ORAL at 03:27

## 2019-03-16 RX ADMIN — GABAPENTIN 300 MG: 300 CAPSULE ORAL at 20:26

## 2019-03-16 RX ADMIN — IPRATROPIUM BROMIDE AND ALBUTEROL SULFATE 3 ML: 2.5; .5 SOLUTION RESPIRATORY (INHALATION) at 00:07

## 2019-03-16 RX ADMIN — Medication 100 MG: at 09:01

## 2019-03-16 RX ADMIN — GABAPENTIN 300 MG: 300 CAPSULE ORAL at 07:38

## 2019-03-16 RX ADMIN — SODIUM CHLORIDE 4 ML: 7 NEBU SOLN,3 % NEBU at 07:28

## 2019-03-16 RX ADMIN — CLOPIDOGREL 75 MG: 75 TABLET, FILM COATED ORAL at 20:26

## 2019-03-16 RX ADMIN — IPRATROPIUM BROMIDE AND ALBUTEROL SULFATE 3 ML: 2.5; .5 SOLUTION RESPIRATORY (INHALATION) at 12:20

## 2019-03-16 RX ADMIN — SODIUM CHLORIDE 4 ML: 7 NEBU SOLN,3 % NEBU at 20:07

## 2019-03-16 RX ADMIN — LEVOTHYROXINE SODIUM 125 MCG: 125 TABLET ORAL at 07:38

## 2019-03-16 RX ADMIN — AMLODIPINE BESYLATE 10 MG: 10 TABLET ORAL at 07:38

## 2019-03-16 RX ADMIN — ALPRAZOLAM 1 MG: 0.5 TABLET ORAL at 16:36

## 2019-03-16 RX ADMIN — PANTOPRAZOLE SODIUM 40 MG: 40 TABLET, DELAYED RELEASE ORAL at 07:38

## 2019-03-16 NOTE — CONSULTS
Date of Hospital Visit: 19  Encounter Provider: Ross Khan MD  [facility] of Service: [facility]  Patient Name: Sim Agustin  :1955  [id number]  Referral Provider: Henry Lux MD    Chief complaint: cough, dyspnea    History of Present Illness:  Mr. Agustin is a 63 year old man with history of heart failure, COPD, peripheral vascular disease, hypothyroidism, ESRD on hemodialysis, and left above the knee amputation. He followed with [facility] over 15 years ago. He was seen by Dr. Sood in 2015 in hospital. Echocardiogram showed EF 55%, mild TR, and RVSP 57mmHg. He had a normal nuclear stress test.     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 He had a complicated recovery and received multiple transfusions. He was noted to have intermittent ST elevations felt to be related to acute plaque rupture; he was too ill at that time to go to cath lab.     He was admitted in 2016 with back pain and dyspnea. He was noted to have T wave inversions in V1 an V2 which were new. A nuclear stress test showed EF 52% and infarction in the apical cap and anterior apex, infarction in inferior wall, and mild yadiel-infarct ischemia in the inferior wall. He was not having any chest pain with exertion and he was treated medically.    He was admitted on 3/11/19 with progressive cough, subjective fever, and dyspnea. He was admitted to ICU for BiPAP. An echocardiogram was ordered to evaluate increased oxygen needs. It showed EF 29%, grade I diastolic dysfunction, hypokinesis of apical anterior, mid inferoseptal, and apex, akinesis of apical septal wall, mild thickening of the aortic valve, and moderate pulmonary hypertension with RVSP 45mmHg.     I have reviewed the above HPI and agree with it.  He is a gentleman who has had in the past, dialysis.  He is not on dialysis now.  He has had COPD.  He has had history of  coronary disease.  I put stents in him a long time ago.  Now he comes in with what initially was thought to be COPD, but it seemed like it was a little bit more than what was expected from his lung disease.  He was not quite responding well.  Echo was done and he has got severely reduced LV function.  He does not really have chest pain.  His breathing is better.  No PND, orthopnea.  He did stop smoking.  He does not have diabetes.  He is not on dialysis.        Cardiac Testing:  Echocardiogram 3/14/19  Interpretation Summary     · Calculated EF = 29.0%. Estimated EF was in agreement with the calculated EF. Estimated EF appears to be in the range of 31 - 35%. Normal left ventricular cavity size noted. Left ventricular wall thickness is consistent with mild concentric hypertrophy. Left ventricular diastolic dysfunction is noted (grade I) consistent with impaired relaxation.  · The following left ventricular wall segments are hypokinetic: apical anterior, mid inferoseptal and apex hypokinetic. The following left ventricular wall segments are akinetic: apical septal.  · The aortic valve is abnormal in structure. There is mild thickening of the aortic valve.  · Trace mitral valve regurgitation is present.  · Physiologic tricuspid valve regurgitation is present. Moderate pulmonary hypertension with an RV systolic pressure estimated at at least 45 mmHg assuming an RA pressure of 3 mmHg. The IVC was not visualized and therefore a true right atrial pressure cannot be determined on this study..       Nuclear Stress Test 4/4/16  Interpretation Summary     · Compared to the prior study from 1/1/2016  · Left ventricular ejection fraction is mildly reduced (Calculated EF = 52%).  · There is infarction of the apical cap and anterior apex. There is infarction of the inferior wall. There is mild yadiel-infarct ischemia in the inferior wall.         Past Medical History:   Diagnosis Date   • Anemia     per [place] database   • CAD  (coronary artery disease) 4/4/2016   • Colon polyps    • Compartment syndrome (CMS/HCC)     AFTER ILIAC SURGERY. ABOVE KNEE AMPUTATION   • COPD (chronic obstructive pulmonary disease) (CMS/HCC)    • Cough     SINCE APRIL WITH PNEUMONIA.    • Disease of thyroid gland     HYPOTHYRODISM   • Diverticulosis    • Employs prosthetic leg    • History of acute renal failure    • History of CHF (congestive heart failure)    • History of GI bleed 02/2016    AORTODUOD FISTULA   • History of sepsis 02/2016   • History of transfusion     MULTIPLE, 2016   • Hypertension    • Phantom limb pain (CMS/HCC)     SL, WITH LEFT ABOUVE KNEE   • Pneumonia     SPRING 2016  AFTER SURGERY   • PVD (peripheral vascular disease) (CMS/HCC)        Past Surgical History:   Procedure Laterality Date   • ABOVE KNEE AMPUTATION Left 3/16/2016    Procedure: LT AMPUTATION ABOVE KNEE;  Surgeon: Jose Swann MD;  Location: [facility] OR;  Service:    • ARTERIAL THROMBECTOMY  2001    throbectomy of arterial graft   • AXILLARY FEMORAL BYPASS Right 02/15/2016    Exploratory lapartomy, repair aortoduodenal fistula, resection infected aortobifemoral bypass graft, axillobi-superficial femoral artery [place]-Aneudy bypass graft from right axillary artery, Repair of duodenotomy 4th portion duodenum-Dr. Jose Swann, Dr. Genaro Perrin   • BRONCHOSCOPY Left 03/04/2016    Dr. NATALIIA Tate   • BRONCHOSCOPY RIGID / FLEXIBLE N/A 03/03/2016    Dr. NATALIIA Tate   • BRONCHOSCOPY RIGID / FLEXIBLE N/A 02/26/2016    Dr. NATALIIA Tate   • CATARACT EXTRACTION WITH INTRAOCULAR LENS IMPLANT Bilateral    • COLON RESECTION WITH COLOSTOMY Left 02/28/2016    Exploratory laparotomy with open left hemicolectomy, end-colostomy, G-tube and J-tube-Dr. Endy Chaney   • COLONOSCOPY N/A 2015    Dr. Laughlin   • COLONOSCOPY N/A 10/12/2016    Procedure: COLONOSCOPY TO 20 CM AND PER STOMA TO 30CM;  Surgeon: Genaro Perrin MD;  Location: [facility] ENDOSCOPY;  Service:    •  COLONOSCOPY N/A 10/21/2016    Procedure: COLONOSCOPY DONE AT BEDSIDE ONTO CECEM;  Surgeon: Genaro Perrin MD;  Location: [facility] ENDOSCOPY;  Service:    • COLONOSCOPY N/A 02/10/2016    Diverticulosis in the entire examined colon, non-bleeding internal hemorrhoids-Dr. Taylor Pina   • COLONOSCOPY N/A 02/11/2016    Poor prep, blood in the entire examined colon, normal ileum-Dr. Taylor Pina   • COLONOSCOPY W/ POLYPECTOMY N/A 07/27/2015    Normal ileum, diverticulosis in the sigmoid colon: resected and retrieved, one 6 mm polyp in the descending colon: resected and retrieved, the distal rectum and anal verge are normal on retroflexion view-Dr. Miguel Ángel Laughlin   • COLOSTOMY CLOSURE N/A 10/13/2016    Procedure: COLOSTOMY TAKEDOWN OR CLOSURE, APPENDECTOMY;  Surgeon: Genaro Perrin MD;  Location: [facility] OR;  Service:    • DEBRIDEMENT LEG Left 03/01/2016    Excisional debridement of the skin, subcutantous tissue, tendon and muscle left calf-Dr. Jose Swann   • DEBRIDEMENT LEG Left 02/25/2016    Excisional debridement full-thcikness skin and subcutaneous tissue and tendon and muscle, left medial and lateral calf muscles-Dr. Jose Swann   • ENDOSCOPY N/A 10/21/2016    Procedure: ESOPHAGOGASTRODUODENOSCOPY DONE AT BEDSIDE;  Surgeon: Genaro Perrin MD;  Location: [facility] ENDOSCOPY;  Service:    • ENDOSCOPY AND COLONOSCOPY N/A 02/28/2016    EGD and Colonoscopy with Candy ink injection-Dr. Endy Chaney   • ENTEROSCOPY SMALL BOWEL N/A 02/11/2016    Normal esophagus, normal stomach, normal duodenum, portion of the jejunum normal-Dr. Taylor Pina   • ILIAC ARTERY - FEMORAL ARTERY BYPASS GRAFT Bilateral 2002   • ILIAC ARTERY STENT Bilateral 2001   • INSERTION AND REMOVAL PERITONEAL DIALYSIS CATHETER Right 02/29/2016    Exchange right jugular 16 cm Mahurkar dialysis catheter-Dr. Jose Swann   • INSERTION PERITONEAL DIALYSIS CATHETER Left 03/01/2016    Ultrasound-guided access of the left jugular  vein, 23 cm left jugular palindrome dialysis catheter placement-Dr. Jose Swann   • INSERTION PERITONEAL DIALYSIS CATHETER Right 02/18/2016    Right jugular Mahrkar catherer placement-Dr. Jose Swann   • JOINT REPLACEMENT Left    • THORACOSCOPY Left 4/18/2016    Procedure: LT THORACOSCOPY VIDEO ASSISTED WITH DECORDICATION;  Surgeon: Genaro Davila III, MD;  Location: [facility];  Service:    • THROMBECTOMY Left 02/16/2016    Thombectomy of left femoral graft, left leg arteriogram, left calf forward compartment fasciotomy-Dr. Jose Swann   • TOTAL HIP ARTHROPLASTY Left    • UPPER GASTROINTESTINAL ENDOSCOPY N/A 02/09/2016    Normal UGI-Dr. Ajay Parkinson   • UPPER GASTROINTESTINAL ENDOSCOPY N/A 11/28/2015    Small hiatal hernia, chronic gastritis: biopsied, non-bleeding angioectasias in the stomach: treated with argon plasma coagulation, multiple bleeding angioectasias in the dudenum: treated with argon plasma coagulation-Dr. Mykel Jaramillo   • VASCULAR SURGERY      MULTIPLE   • VENTRAL/INCISIONAL HERNIA REPAIR N/A 10/19/2017    Procedure: VENTRAL HERNIA REPAIR WITH MESH AND BILATERAL COMPONENT SEPARATION;  Surgeon: Genaro Perrin MD;  Location: [facility];  Service:    • WISDOM TOOTH EXTRACTION         Medications Prior to Admission   Medication Sig Dispense Refill Last Dose   • albuterol (PROVENTIL HFA;VENTOLIN HFA) 108 (90 BASE) MCG/ACT inhaler Inhale 2 puffs Every 6 (Six) Hours As Needed for wheezing (RESCUE INHALER).   3/10/2019 at Unknown time   • ALPRAZolam (XANAX) 1 MG tablet Take 1 mg by mouth 4 (Four) Times a Day As Needed for Anxiety.   3/10/2019 at Unknown time   • amLODIPine (NORVASC) 10 MG tablet Take 10 mg by mouth Every Morning.   3/10/2019 at Unknown time   • aspirin 81 MG chewable tablet Chew 81 mg Every Night.   3/10/2019 at Unknown time   • carvedilol (COREG) 25 MG tablet Take 12.5 mg by mouth 2 (Two) Times a Day.   3/10/2019 at Unknown time   • Cholecalciferol (VITAMIN  D3) 5000 UNITS tablet Take 5,000 Units by mouth Daily.   3/10/2019 at Unknown time   • clopidogrel (PLAVIX) 75 MG tablet Take 75 mg by mouth Every Night.   3/10/2019 at Unknown time   • Coenzyme Q10 (CO Q10) 100 MG capsule Take 400 mg by mouth Every Night.   3/10/2019 at Unknown time   • FERROUS SULFATE PO Take 130 mg by mouth Daily.   3/10/2019 at Unknown time   • Fluticasone Furoate-Vilanterol (BREO ELLIPTA IN) Inhale 1 puff Every Morning.   3/11/2019 at Unknown time   • gabapentin (NEURONTIN) 300 MG capsule Take 300 mg by mouth 3 (Three) Times a Day.   3/10/2019 at Unknown time   • HYDROcodone-acetaminophen (NORCO) 5-325 MG per tablet Take 1 tablet by mouth Every 6 (Six) Hours As Needed.   3/10/2019 at Unknown time   • isosorbide mononitrate (IMDUR) 30 MG 24 hr tablet Take 30 mg by mouth Every Morning.   3/10/2019 at Unknown time   • levothyroxine (SYNTHROID, LEVOTHROID) 125 MCG tablet Take 125 mcg by mouth Daily.   3/10/2019 at Unknown time   • Multiple Vitamins-Minerals (MULTIVITAMIN ADULT PO) Take 1 tablet by mouth Daily.   3/10/2019 at Unknown time   • NIACIN PO Take 1 capsule by mouth Every Morning.   3/10/2019 at Unknown time   • omeprazole (priLOSEC) 20 MG capsule Take 20 mg by mouth Every Morning.   3/10/2019 at Unknown time   • simvastatin (ZOCOR) 40 MG tablet Take 40 mg by mouth Every Night.   3/10/2019 at Unknown time   • Thiamine HCl (VITAMIN B-1 PO) Take 1 tablet by mouth Daily.   3/10/2019 at Unknown time   • ALPRAZolam (XANAX) 1 MG tablet Take 1 mg by mouth 4 (Four) Times a Day As Needed.      • INCRUSE ELLIPTA 62.5 MCG/INH aerosol powder  Inhale 1 puff Every Morning.      • sucralfate (CARAFATE) 1 g tablet Take 1 g by mouth As Needed.          Current Meds  Scheduled Meds:    amLODIPine 10 mg Oral QAM   aspirin 81 mg Oral Nightly   atorvastatin 40 mg Oral Daily   carvedilol 12.5 mg Oral Q12H   clopidogrel 75 mg Oral Nightly   enoxaparin 40 mg Subcutaneous Q24H   gabapentin 300 mg Oral QAM    insulin lispro 0-9 Units Subcutaneous 4x Daily With Meals & Nightly   ipratropium-albuterol 3 mL Nebulization Q4H - RT   isosorbide mononitrate 30 mg Oral QAM   levothyroxine 125 mcg Oral Q AM   nicotine 1 patch Transdermal Q24H   oxymetazoline 2 spray Each Nare BID   pantoprazole 40 mg Oral QAM   sodium chloride 3 mL Intravenous Q12H   sodium chloride 4 mL Nebulization BID - RT   thiamine 100 mg Oral Daily     Continuous Infusions:   PRN Meds:.•  ALPRAZolam  •  bisacodyl  •  bisacodyl  •  dextrose  •  dextrose  •  glucagon (human recombinant)  •  HYDROcodone-acetaminophen  •  ondansetron **OR** ondansetron ODT **OR** ondansetron  •  sodium chloride  •  [COMPLETED] Insert peripheral IV **AND** sodium chloride  •  sodium chloride  •  sucralfate    Allergies as of 03/11/2019 - Reviewed 03/11/2019   Allergen Reaction Noted   • Augmentin [amoxicillin-pot clavulanate] Hives 03/03/2016       Social History     Socioeconomic History   • Marital status:      Spouse name: Not on file   • Number of children: Not on file   • Years of education: Not on file   • Highest education level: Not on file   Social Needs   • Financial resource strain: Not on file   • Food insecurity - worry: Not on file   • Food insecurity - inability: Not on file   • Transportation needs - medical: Not on file   • Transportation needs - non-medical: Not on file   Occupational History   • Not on file   Tobacco Use   • Smoking status: Former Smoker     Packs/day: 1.00     Years: 30.00     Pack years: 30.00     Types: Cigarettes     Last attempt to quit: 2016     Years since quitting: 3.2   • Smokeless tobacco: Never Used   Substance and Sexual Activity   • Alcohol use: No   • Drug use: No   • Sexual activity: Defer   Other Topics Concern   • Not on file   Social History Narrative   • Not on file       Family History   Problem Relation Age of Onset   • Lung cancer Mother    • Heart disease Father    • Malig Hyperthermia Neg Hx        REVIEW OF  "SYSTEMS:   ROS was performed and is negative except as outlined in HPI     REVIEW OF SYSTEMS:   CONSTITUTIONAL: No weight loss, fever, chills, weakness or fatigue.   HEENT: Eyes: No visual loss, blurred vision, double vision or yellow sclerae. Ears, Nose, Throat: No hearing loss, sneezing, congestion, runny nose or sore throat.   SKIN: No rash or itching.     RESPIRATORY: No shortness of breath, hemoptysis, cough or sputum.   GASTROINTESTINAL: No anorexia, nausea, vomiting or diarrhea. No abdominal pain, bright red blood per rectum or melena.  GENITOURINARY: No burning on urination, hematuria or increased frequency.  NEUROLOGICAL: No headache, dizziness, syncope, paralysis, ataxia, numbness or tingling in the extremities. No change in bowel or bladder control.   MUSCULOSKELETAL: No muscle, back pain, joint pain or stiffness.   HEMATOLOGIC: No anemia, bleeding or bruising.   LYMPHATICS: No enlarged nodes. No history of splenectomy.   PSYCHIATRIC: No history of depression, anxiety, hallucinations.   ENDOCRINOLOGIC: No reports of sweating, cold or heat intolerance. No polyuria or polydipsia.        Objective:   Temp:  [97.2 °F (36.2 °C)-98.4 °F (36.9 °C)] 97.2 °F (36.2 °C)  Heart Rate:  [54-64] 58  Resp:  [16-20] 20  BP: ()/(50-79) 99/61  Body mass index is 27.04 kg/m².  Flowsheet Rows      First Filed Value   Admission Height  175 cm (68.9\") Documented at 03/11/2019 1853   Admission Weight  79.4 kg (175 lb) Documented at 03/11/2019 1853        Vitals:    03/16/19 1603   BP:    Pulse: 58   Resp: 20   Temp:    SpO2: 95%       Head:    Normocephalic, without obvious abnormality, atraumatic   Eyes:            Lids and lashes normal, conjunctivae and sclerae normal, no   icterus, no pallor   Ears:    Ears appear intact with no abnormalities noted   Throat:   No oral lesions, dentition good   Neck:   No adenopathy, supple, trachea midline, no thyromegaly, no   carotid bruit, no JVD   Lungs:     Breath sounds are equal " and clear to auscultation    Heart:    Normal S1 and S2, RRR, No M/G/R   Abdomen:     Normal bowel sounds, no masses, no organomegaly, soft        non-tender, non-distended, no guarding   Extremities:   Moves all extremities well, no edema, no cyanosis, no redness   Pulses:   Pulses palpable and equal bilaterally.    Skin:  Psychiatric:   No bleeding, bruising or rash    Awake, alert and oriented x 3, normal mood and affect           Telemetry:        EKG:          I personally viewed and interpreted the patient's EKG/Telemetry data    Assessment:  Active Hospital Problems    Diagnosis  POA   • Acute respiratory failure with hypoxia and hypercarbia (CMS/HCC) [J96.01, J96.02]  Yes      Resolved Hospital Problems   No resolved problems to display.       Plan:This is a gentleman with known coronary disease, ongoing tobacco use until recently, now comes in with COPD exacerbation, but has markedly reduced LV function, cardiomyopathy.  I think we probably need to do a left and right heart cath on him to further evaluate the etiology of his myopathy.  I think he is at significant risk for coronary disease.  I explained this to him.  He agrees and understands.  We will do this on Monday.        Ross Khan MD  03/16/19  4:16 PM.

## 2019-03-16 NOTE — PROGRESS NOTES
Discussed COPD with patient - CO2 retention/pursed lip breathing/oxygen.Also discussed Smoking cessation-patient willing to try.

## 2019-03-16 NOTE — PLAN OF CARE
Problem: Patient Care Overview  Goal: Plan of Care Review  Outcome: Ongoing (interventions implemented as appropriate)   03/15/19 2044   Coping/Psychosocial   Plan of Care Reviewed With patient   Plan of Care Review   Progress improving   OTHER   Outcome Summary weaned O2 down to 2L/NC. Lungs sound coarse with crackles and wheezes in RLL. Pt up per self in room with minimal assist.      Goal: Individualization and Mutuality  Outcome: Ongoing (interventions implemented as appropriate)    Goal: Discharge Needs Assessment  Outcome: Ongoing (interventions implemented as appropriate)    Goal: Interprofessional Rounds/Family Conf  Outcome: Ongoing (interventions implemented as appropriate)      Problem: Fall Risk (Adult)  Goal: Absence of Fall  Outcome: Ongoing (interventions implemented as appropriate)      Problem: Skin Injury Risk (Adult)  Goal: Skin Health and Integrity  Outcome: Ongoing (interventions implemented as appropriate)      Problem: NPPV/CPAP (Adult)  Goal: Signs and Symptoms of Listed Potential Problems Will be Absent, Minimized or Managed (NPPV/CPAP)  Outcome: Ongoing (interventions implemented as appropriate)      Problem: Chronic Obstructive Pulmonary Disease (Adult)  Goal: Signs and Symptoms of Listed Potential Problems Will be Absent, Minimized or Managed (Chronic Obstructive Pulmonary Disease)  Outcome: Ongoing (interventions implemented as appropriate)

## 2019-03-16 NOTE — PROGRESS NOTES
Discrepancy in administration time - due to MD arrived to see patient just prior to starting nebulizer-advised patient that RT would return after.

## 2019-03-16 NOTE — PLAN OF CARE
Problem: Patient Care Overview  Goal: Plan of Care Review  Outcome: Ongoing (interventions implemented as appropriate)   03/15/19 2044 03/16/19 0310   Coping/Psychosocial   Plan of Care Reviewed With patient --    Plan of Care Review   Progress --  improving   OTHER   Outcome Summary --  O2 sats low-mid 90s on 2L N/C with humidity. Lungs coarse, diminished, sounded like there was more air movement on second assessment. Up per self in room with minimal assist. Abnormal ECHO results-cards has been consulted, to see today. C/o increased phantom limb pain, pt requesting home regimen of Gabapentin, left note for MD regarding this. Will continue to monitor.      Goal: Individualization and Mutuality  Outcome: Ongoing (interventions implemented as appropriate)      Problem: Fall Risk (Adult)  Goal: Absence of Fall  Outcome: Ongoing (interventions implemented as appropriate)   03/16/19 0310   Fall Risk (Adult)   Absence of Fall achieves outcome  (SAFETY MAINTAINED)       Problem: Skin Injury Risk (Adult)  Goal: Skin Health and Integrity  Outcome: Ongoing (interventions implemented as appropriate)   03/16/19 0310   Skin Injury Risk (Adult)   Skin Health and Integrity making progress toward outcome  (CONTINUE TO MONITOR FOR S/S SKIN BREAKDOWN)       Problem: NPPV/CPAP (Adult)  Goal: Signs and Symptoms of Listed Potential Problems Will be Absent, Minimized or Managed (NPPV/CPAP)  Outcome: Ongoing (interventions implemented as appropriate)   03/16/19 0310   Goal/Outcome Evaluation   Problems Assessed (NPPV/CPAP) (PT WAS TOLD BY DR SIMMONS THAT HE DID NOT NEED TO WEAR BIPAP @[facility])       Problem: Chronic Obstructive Pulmonary Disease (Adult)  Goal: Signs and Symptoms of Listed Potential Problems Will be Absent, Minimized or Managed (Chronic Obstructive Pulmonary Disease)  Outcome: Ongoing (interventions implemented as appropriate)   03/16/19 0310   Goal/Outcome Evaluation   Problems Assessed (Chronic Obstructive Pulmonary Disease  (COPD)) all   Problems Present (COPD, Bronch/Emphy) respiratory compromise

## 2019-03-16 NOTE — PLAN OF CARE
Problem: Patient Care Overview  Goal: Plan of Care Review  Outcome: Ongoing (interventions implemented as appropriate)      Problem: Fall Risk (Adult)  Goal: Absence of Fall  Outcome: Ongoing (interventions implemented as appropriate)      Problem: Skin Injury Risk (Adult)  Goal: Skin Health and Integrity  Outcome: Ongoing (interventions implemented as appropriate)      Problem: NPPV/CPAP (Adult)  Goal: Signs and Symptoms of Listed Potential Problems Will be Absent, Minimized or Managed (NPPV/CPAP)  Outcome: Ongoing (interventions implemented as appropriate)      Problem: Chronic Obstructive Pulmonary Disease (Adult)  Goal: Signs and Symptoms of Listed Potential Problems Will be Absent, Minimized or Managed (Chronic Obstructive Pulmonary Disease)  Outcome: Ongoing (interventions implemented as appropriate)

## 2019-03-17 LAB
ANION GAP SERPL CALCULATED.3IONS-SCNC: 9.5 MMOL/L
BASOPHILS # BLD AUTO: 0.02 10*3/MM3 (ref 0–0.2)
BASOPHILS NFR BLD AUTO: 0.2 % (ref 0–1.5)
BUN BLD-MCNC: 16 MG/DL (ref 8–23)
BUN/CREAT SERPL: 17.4 (ref 7–25)
CALCIUM SPEC-SCNC: 9 MG/DL (ref 8.6–10.5)
CHLORIDE SERPL-SCNC: 100 MMOL/L (ref 98–107)
CO2 SERPL-SCNC: 29.5 MMOL/L (ref 22–29)
CREAT BLD-MCNC: 0.92 MG/DL (ref 0.76–1.27)
DEPRECATED RDW RBC AUTO: 51.4 FL (ref 37–54)
EOSINOPHIL # BLD AUTO: 0.06 10*3/MM3 (ref 0–0.4)
EOSINOPHIL NFR BLD AUTO: 0.5 % (ref 0.3–6.2)
ERYTHROCYTE [DISTWIDTH] IN BLOOD BY AUTOMATED COUNT: 13.6 % (ref 12.3–15.4)
GFR SERPL CREATININE-BSD FRML MDRD: 83 ML/MIN/1.73
GLUCOSE BLD-MCNC: 93 MG/DL (ref 65–99)
GLUCOSE BLDC GLUCOMTR-MCNC: 83 MG/DL (ref 70–130)
GLUCOSE BLDC GLUCOMTR-MCNC: 89 MG/DL (ref 70–130)
GLUCOSE BLDC GLUCOMTR-MCNC: 95 MG/DL (ref 70–130)
GLUCOSE BLDC GLUCOMTR-MCNC: 95 MG/DL (ref 70–130)
HCT VFR BLD AUTO: 51.9 % (ref 37.5–51)
HGB BLD-MCNC: 16.3 G/DL (ref 13–17.7)
IMM GRANULOCYTES # BLD AUTO: 0.07 10*3/MM3 (ref 0–0.05)
IMM GRANULOCYTES NFR BLD AUTO: 0.5 % (ref 0–0.5)
LYMPHOCYTES # BLD AUTO: 0.98 10*3/MM3 (ref 0.7–3.1)
LYMPHOCYTES NFR BLD AUTO: 7.7 % (ref 19.6–45.3)
MCH RBC QN AUTO: 31.8 PG (ref 26.6–33)
MCHC RBC AUTO-ENTMCNC: 31.4 G/DL (ref 31.5–35.7)
MCV RBC AUTO: 101.4 FL (ref 79–97)
MONOCYTES # BLD AUTO: 0.76 10*3/MM3 (ref 0.1–0.9)
MONOCYTES NFR BLD AUTO: 5.9 % (ref 5–12)
NEUTROPHILS # BLD AUTO: 10.92 10*3/MM3 (ref 1.4–7)
NEUTROPHILS NFR BLD AUTO: 85.2 % (ref 42.7–76)
NRBC BLD AUTO-RTO: 0.1 /100 WBC (ref 0–0)
PLATELET # BLD AUTO: 118 10*3/MM3 (ref 140–450)
PMV BLD AUTO: 10.7 FL (ref 6–12)
POTASSIUM BLD-SCNC: 4.4 MMOL/L (ref 3.5–5.2)
RBC # BLD AUTO: 5.12 10*6/MM3 (ref 4.14–5.8)
SODIUM BLD-SCNC: 139 MMOL/L (ref 136–145)
WBC NRBC COR # BLD: 12.81 10*3/MM3 (ref 3.4–10.8)

## 2019-03-17 PROCEDURE — 94799 UNLISTED PULMONARY SVC/PX: CPT

## 2019-03-17 PROCEDURE — 85025 COMPLETE CBC W/AUTO DIFF WBC: CPT | Performed by: INTERNAL MEDICINE

## 2019-03-17 PROCEDURE — 80048 BASIC METABOLIC PNL TOTAL CA: CPT | Performed by: INTERNAL MEDICINE

## 2019-03-17 PROCEDURE — 99232 SBSQ HOSP IP/OBS MODERATE 35: CPT | Performed by: INTERNAL MEDICINE

## 2019-03-17 PROCEDURE — 82962 GLUCOSE BLOOD TEST: CPT

## 2019-03-17 RX ADMIN — CLOPIDOGREL 75 MG: 75 TABLET, FILM COATED ORAL at 20:51

## 2019-03-17 RX ADMIN — GABAPENTIN 300 MG: 300 CAPSULE ORAL at 20:51

## 2019-03-17 RX ADMIN — SODIUM CHLORIDE 4 ML: 7 NEBU SOLN,3 % NEBU at 10:46

## 2019-03-17 RX ADMIN — IPRATROPIUM BROMIDE AND ALBUTEROL SULFATE 3 ML: 2.5; .5 SOLUTION RESPIRATORY (INHALATION) at 10:46

## 2019-03-17 RX ADMIN — SODIUM CHLORIDE, PRESERVATIVE FREE 3 ML: 5 INJECTION INTRAVENOUS at 20:52

## 2019-03-17 RX ADMIN — Medication 100 MG: at 08:11

## 2019-03-17 RX ADMIN — LEVOTHYROXINE SODIUM 125 MCG: 125 TABLET ORAL at 08:11

## 2019-03-17 RX ADMIN — SODIUM CHLORIDE 4 ML: 7 NEBU SOLN,3 % NEBU at 20:33

## 2019-03-17 RX ADMIN — ATORVASTATIN CALCIUM 40 MG: 20 TABLET, FILM COATED ORAL at 08:11

## 2019-03-17 RX ADMIN — CARVEDILOL 12.5 MG: 12.5 TABLET, FILM COATED ORAL at 20:51

## 2019-03-17 RX ADMIN — BISACODYL 5 MG: 5 TABLET, COATED ORAL at 14:09

## 2019-03-17 RX ADMIN — CARVEDILOL 12.5 MG: 12.5 TABLET, FILM COATED ORAL at 10:40

## 2019-03-17 RX ADMIN — IPRATROPIUM BROMIDE AND ALBUTEROL SULFATE 3 ML: 2.5; .5 SOLUTION RESPIRATORY (INHALATION) at 15:10

## 2019-03-17 RX ADMIN — IPRATROPIUM BROMIDE AND ALBUTEROL SULFATE 3 ML: 2.5; .5 SOLUTION RESPIRATORY (INHALATION) at 20:33

## 2019-03-17 RX ADMIN — ASPIRIN 81 MG: 81 TABLET, CHEWABLE ORAL at 20:51

## 2019-03-17 RX ADMIN — ISOSORBIDE MONONITRATE 30 MG: 30 TABLET ORAL at 08:11

## 2019-03-17 RX ADMIN — ALPRAZOLAM 1 MG: 0.5 TABLET ORAL at 20:51

## 2019-03-17 RX ADMIN — NICOTINE 1 PATCH: 14 PATCH, EXTENDED RELEASE TRANSDERMAL at 14:02

## 2019-03-17 RX ADMIN — SODIUM CHLORIDE, PRESERVATIVE FREE 3 ML: 5 INJECTION INTRAVENOUS at 08:12

## 2019-03-17 RX ADMIN — GABAPENTIN 300 MG: 300 CAPSULE ORAL at 08:11

## 2019-03-17 RX ADMIN — IPRATROPIUM BROMIDE AND ALBUTEROL SULFATE 3 ML: 2.5; .5 SOLUTION RESPIRATORY (INHALATION) at 06:59

## 2019-03-17 RX ADMIN — PANTOPRAZOLE SODIUM 40 MG: 40 TABLET, DELAYED RELEASE ORAL at 08:10

## 2019-03-17 RX ADMIN — IPRATROPIUM BROMIDE AND ALBUTEROL SULFATE 3 ML: 2.5; .5 SOLUTION RESPIRATORY (INHALATION) at 23:18

## 2019-03-17 NOTE — PROGRESS NOTES
"Sim Agustin  1955 63 y.o.  [id number]      Patient Care Team:  Mariusz Wilburn MD as PCP - General  Mariusz Wilburn MD as PCP - Family Medicine  Jose Swann MD as Consulting Physician (Vascular Surgery)  Krystal Ocampo MD as Consulting Physician (Cardiology)  Ishmael Tate MD as Consulting Physician (Pulmonary Disease)    CC: Cardiomyopathy, COPD    Interval History: Doing well    Objective   Vital Signs  Temp:  [97.2 °F (36.2 °C)-98.8 °F (37.1 °C)] 98.8 °F (37.1 °C)  Heart Rate:  [54-63] 55  Resp:  [16-20] 18  BP: ()/(61-76) 117/67    Intake/Output Summary (Last 24 hours) at 3/17/2019 1306  Last data filed at 3/17/2019 1244  Gross per 24 hour   Intake 1460 ml   Output 1775 ml   Net -315 ml     Flowsheet Rows      First Filed Value   Admission Height  175 cm (68.9\") Documented at 03/11/2019 1853   Admission Weight  79.4 kg (175 lb) Documented at 03/11/2019 1853          Physical Exam:   General Appearance:    Alert,oriented, in no acute distress   Lungs:     Clear to auscultation,BS are equal    Heart:    Normal S1 and S2, RRR without murmur, gallop or rub   HEENT:    Sclerae are clear, no JVD or adenopathy   Abdomen:     Normal bowel sounds, soft non-tender, non-distended, no HSM   Extremities:   Moves all extremities well, no edema, no cyanosis, no             Redness, no rash     Medication Review:        aspirin 81 mg Oral Nightly   atorvastatin 40 mg Oral Daily   carvedilol 12.5 mg Oral Q12H   clopidogrel 75 mg Oral Nightly   enoxaparin 40 mg Subcutaneous Q24H   gabapentin 300 mg Oral BID   insulin lispro 0-9 Units Subcutaneous 4x Daily With Meals & Nightly   ipratropium-albuterol 3 mL Nebulization Q4H - RT   isosorbide mononitrate 30 mg Oral QAM   levothyroxine 125 mcg Oral Q AM   nicotine 1 patch Transdermal Q24H   oxymetazoline 2 spray Each Nare BID   pantoprazole 40 mg Oral QAM   sodium chloride 3 mL Intravenous Q12H   sodium chloride 4 mL Nebulization BID - RT   thiamine 100 mg Oral " Daily            I reviewed the patient's new clinical results.  I personally viewed and interpreted the patient's EKG/Telemetry data    Assessment/Plan  Active Hospital Problems    Diagnosis  POA   • Acute respiratory failure with hypoxia and hypercarbia (CMS/HCC) [J96.01, J96.02]  Yes      Resolved Hospital Problems   No resolved problems to display.       We will do left and right heart catheter tomorrow to evaluate his cardiomyopathy he has a history of coronary disease.  I discussed the risk versus benefits of this approach with him and he agrees and understands    Ross Khan MD  03/17/19  1:06 PM

## 2019-03-17 NOTE — PROGRESS NOTES
"                                              LOS: 6 days   Patient Care Team:  Mariusz Wilburn MD as PCP - General  Mariusz Wilburn MD as PCP - Family Medicine  Jose Swann MD as Consulting Physician (Vascular Surgery)  Krystal Ocampo MD as Consulting Physician (Cardiology)  Ishmael Tate MD as Consulting Physician (Pulmonary Disease)    Chief Complaint:  F/up hypoxia, respiratory failure and medical problems listed below    Subjective   Interval History  On room air today.  Reported improved breathing.  Has mild intermittent cough.  Wants to walk in the room    Ventilator/Non-Invasive Ventilation Settings (From admission, onward)    Start     Ordered    03/11/19 2100  NIPPV (CPAP or BIPAP)  Until Discontinued     Question Answer Comment   Indication: Acute Respiratory Failure    Type: BIPAP    NIPPV Mask Interface: Per Patient Preference    IPAP 20    EPAP 8    Oxygen FIO2    FIO2 % 40    Breath Rate 16    Titrate for SPO2 90%        03/11/19 2103                Physical Exam:     Vital Signs  Temp:  [98.5 °F (36.9 °C)-98.8 °F (37.1 °C)] 98.8 °F (37.1 °C)  Heart Rate:  [52-63] 56  Resp:  [16-20] 16  BP: (117-134)/(67-76) 117/67    Intake/Output Summary (Last 24 hours) at 3/17/2019 1602  Last data filed at 3/17/2019 1244  Gross per 24 hour   Intake 1100 ml   Output 1425 ml   Net -325 ml     Flowsheet Rows      First Filed Value   Admission Height  175 cm (68.9\") Documented at 03/11/2019 1853   Admission Weight  79.4 kg (175 lb) Documented at 03/11/2019 1853          General Appearance:    Alert, cooperative, in no acute distress   HEENT:  Mallampati score 3, moist mucous membrane   Neck:   Large. Trachea midline. No thyromegaly.   Lungs:    Mild fine crackles at the bases.  No wheezing,respirations regular, even and unlabored    Heart:    Regular rhythm and normal rate, normal S1 and S2, no            murmur   Skin:    No abnormalities observed   Abdomen:    Overweight abdomen. Soft. No tenderness. No " HSM.  Large area of bruising on the left flank.   Neuro:   Conscious, alert, oriented x3   Extremities:   Moves all extremities well, mild edema right leg, BKA on the left        Results Review:        Results from last 7 days   Lab Units 03/17/19  0630 03/16/19  0554 03/15/19  0516   SODIUM mmol/L 139 139 139   POTASSIUM mmol/L 4.4 4.2 4.6   CHLORIDE mmol/L 100 102 103   CO2 mmol/L 29.5* 28.4 25.5   BUN mg/dL 16 24* 33*   CREATININE mg/dL 0.92 0.91 0.98   GLUCOSE mg/dL 93 87 117*   CALCIUM mg/dL 9.0 8.4* 8.3*     Results from last 7 days   Lab Units 03/11/19  2109   TROPONIN T ng/mL <0.010     Results from last 7 days   Lab Units 03/17/19  0630 03/16/19  0554 03/15/19  0516   WBC 10*3/mm3 12.81* 10.87* 16.74*  16.74*   HEMOGLOBIN g/dL 16.3 15.4 16.7   HEMATOCRIT % 51.9* 49.1 52.0*   PLATELETS 10*3/mm3 118* 112* 128*         Results from last 7 days   Lab Units 03/11/19  1948   PROBNP pg/mL 1,801.0*       I reviewed the patient's new clinical results.  I personally viewed and interpreted the patient's CXR        Medication Review:     aspirin 81 mg Oral Nightly   atorvastatin 40 mg Oral Daily   carvedilol 12.5 mg Oral Q12H   clopidogrel 75 mg Oral Nightly   enoxaparin 40 mg Subcutaneous Q24H   gabapentin 300 mg Oral BID   insulin lispro 0-9 Units Subcutaneous 4x Daily With Meals & Nightly   ipratropium-albuterol 3 mL Nebulization Q4H - RT   isosorbide mononitrate 30 mg Oral QAM   levothyroxine 125 mcg Oral Q AM   nicotine 1 patch Transdermal Q24H   oxymetazoline 2 spray Each Nare BID   pantoprazole 40 mg Oral QAM   sodium chloride 3 mL Intravenous Q12H   sodium chloride 4 mL Nebulization BID - RT   thiamine 100 mg Oral Daily            Diagnostic imaging:  I personally and independently reviewed the following images:  CXR 3/11/19, mild bilateral basal pulmonary infiltrates    Assessment   1. Acute on chronic hypercapnic respiratory failure, required noninvasive ventilation,   2. Acute hypoxemic resp failure,  currently on oxygen that she does not use oxygen at baseline  3. Acute exacerbation of COPD  4. Acute kidney injury  5. Reactive erythrocytosis - ? Hypoxia related  6. New cardiomyopathy with EF of 29%  7. Moderate pulmonary hypertension  8. Epistaxis, resolved  9. Hypotension, resolved  10. CAD with h/o MI   11. Essential hypertension  12. History of GI bleed  13. Peripheral arterial disease status post left above-knee amputation   14. phantom pain Left leg      Plan     · Plan for left and right heart catheterization by cardiology on Monday. Agree with the medical necessity of these procedures  · Continue oxygen by nasal cannula.  Will get a walking oximetry prior to discharge  · Continue nebulizer for COPD        Jemima Tran MD  03/17/19  4:02 PM          This note was dictated utilizing Dragon dictation

## 2019-03-17 NOTE — PLAN OF CARE
Problem: Patient Care Overview  Goal: Plan of Care Review  Outcome: Ongoing (interventions implemented as appropriate)   03/16/19 2315 03/17/19 0403   Coping/Psychosocial   Plan of Care Reviewed With patient --    Plan of Care Review   Progress --  improving   OTHER   Outcome Summary --  No new complaints overnight. Remains on 2L O2 N/C. Norvasc D/C'd per primary team r/t hypotension. Gabapentin changed to BID r/t increased phantom limb pain. Plan is for cardiac cath on Monday. Continue to monitor.      Goal: Individualization and Mutuality  Outcome: Ongoing (interventions implemented as appropriate)      Problem: Fall Risk (Adult)  Goal: Absence of Fall  Outcome: Ongoing (interventions implemented as appropriate)   03/17/19 0403   Fall Risk (Adult)   Absence of Fall achieves outcome  (SAFETY MAINTAINED)       Problem: Skin Injury Risk (Adult)  Goal: Skin Health and Integrity  Outcome: Ongoing (interventions implemented as appropriate)   03/17/19 0403   Skin Injury Risk (Adult)   Skin Health and Integrity making progress toward outcome  (NO S/S SKIN BREAKDOWN)       Problem: NPPV/CPAP (Adult)  Goal: Signs and Symptoms of Listed Potential Problems Will be Absent, Minimized or Managed (NPPV/CPAP)  Outcome: Outcome(s) achieved Date Met: 03/17/19      Problem: Chronic Obstructive Pulmonary Disease (Adult)  Goal: Signs and Symptoms of Listed Potential Problems Will be Absent, Minimized or Managed (Chronic Obstructive Pulmonary Disease)  Outcome: Ongoing (interventions implemented as appropriate)   03/17/19 0403   Goal/Outcome Evaluation   Problems Assessed (Chronic Obstructive Pulmonary Disease (COPD)) all   Problems Present (COPD, Bronch/Emphy) respiratory compromise;situational response

## 2019-03-17 NOTE — PROGRESS NOTES
LOS: 5 days   Patient Care Team:  Mariusz Wilburn MD as PCP - General  Mariusz Wilburn MD as PCP - Family Medicine  Jose Swann MD as Consulting Physician (Vascular Surgery)  Krystal Ocampo MD as Consulting Physician (Cardiology)  Ishmael Tate MD as Consulting Physician (Pulmonary Disease)    Chief Complaint:  F/up hypoxia, respiratory failure and medical problems listed below    Subjective   Interval History  I reviewed the admission note, PMH, PSH, Family hx, social history, imagings and prior records on this admission, summarized the finding in my note and formulated a transition of care plan.     On oxygen 3 L/min.  He does not use oxygen at home.  He is a former smoker with history of COPD.  He reported occasional cough with clear phlegm.  No shortness of breath at rest.    REVIEW OF SYSTEMS:   CARDIOVASCULAR: Had an episode of hypotension earlier after taking amlodipine and indoor.  His blood pressure went down to 70/50.  No chest pain.  No palpitation    Musculoskeletal:  reported phantom pain in the Amputated left leg which is worse than usual.  It used to be relieved by Neurontin    Ventilator/Non-Invasive Ventilation Settings (From admission, onward)    Start     Ordered    03/11/19 2100  NIPPV (CPAP or BIPAP)  Until Discontinued     Question Answer Comment   Indication: Acute Respiratory Failure    Type: BIPAP    NIPPV Mask Interface: Per Patient Preference    IPAP 20    EPAP 8    Oxygen FIO2    FIO2 % 40    Breath Rate 16    Titrate for SPO2 90%        03/11/19 2103                Physical Exam:     Vital Signs  Temp:  [97.2 °F (36.2 °C)-98.7 °F (37.1 °C)] 98.7 °F (37.1 °C)  Heart Rate:  [54-64] 54  Resp:  [16-20] 18  BP: ()/(50-79) 134/74    Intake/Output Summary (Last 24 hours) at 3/16/2019 2277  Last data filed at 3/16/2019 1747  Gross per 24 hour   Intake 1000 ml   Output 1400 ml   Net -400 ml     Flowsheet Rows      First Filed Value  "  Admission Height  175 cm (68.9\") Documented at 03/11/2019 1853   Admission Weight  79.4 kg (175 lb) Documented at 03/11/2019 1853          General Appearance:    Alert, cooperative, in no acute distress   HEENT:  Mallampati score 3, moist mucous membrane   Neck:   Large. Trachea midline. No thyromegaly.   Lungs:    Mild fine crackles at the bases.  No wheezing,respirations regular, even and unlabored    Heart:    Regular rhythm and normal rate, normal S1 and S2, no            murmur   Skin:    No abnormalities observed   Abdomen:    Overweight abdomen. Soft. No tenderness. No HSM.  Large area of bruising on the left flank.   Neuro:   Conscious, alert, oriented x3   Extremities:   Moves all extremities well, mild edema right leg, BKA on the left        Results Review:        Results from last 7 days   Lab Units 03/16/19  0554 03/15/19  0516 03/14/19  0435   SODIUM mmol/L 139 139 139   POTASSIUM mmol/L 4.2 4.6 4.5   CHLORIDE mmol/L 102 103 101   CO2 mmol/L 28.4 25.5 27.3   BUN mg/dL 24* 33* 30*   CREATININE mg/dL 0.91 0.98 0.93   GLUCOSE mg/dL 87 117* 103*   CALCIUM mg/dL 8.4* 8.3* 8.6     Results from last 7 days   Lab Units 03/11/19  2109   TROPONIN T ng/mL <0.010     Results from last 7 days   Lab Units 03/16/19  0554 03/15/19  0516 03/14/19  1517   WBC 10*3/mm3 10.87* 16.74*  16.74* 24.26*   HEMOGLOBIN g/dL 15.4 16.7 16.4   HEMATOCRIT % 49.1 52.0* 52.0*   PLATELETS 10*3/mm3 112* 128* 144         Results from last 7 days   Lab Units 03/11/19  1948   PROBNP pg/mL 1,801.0*       I reviewed the patient's new clinical results.  I personally viewed and interpreted the patient's CXR        Medication Review:     aspirin 81 mg Oral Nightly   atorvastatin 40 mg Oral Daily   carvedilol 12.5 mg Oral Q12H   clopidogrel 75 mg Oral Nightly   enoxaparin 40 mg Subcutaneous Q24H   gabapentin 300 mg Oral BID   insulin lispro 0-9 Units Subcutaneous 4x Daily With Meals & Nightly   ipratropium-albuterol 3 mL Nebulization Q4H - RT "   isosorbide mononitrate 30 mg Oral QAM   levothyroxine 125 mcg Oral Q AM   nicotine 1 patch Transdermal Q24H   oxymetazoline 2 spray Each Nare BID   pantoprazole 40 mg Oral QAM   sodium chloride 3 mL Intravenous Q12H   sodium chloride 4 mL Nebulization BID - RT   thiamine 100 mg Oral Daily            Diagnostic imaging:  I personally and independently reviewed the following images:  CXR 3/11/19, mild bilateral basal pulmonary infiltrates    Assessment   1. Acute on chronic hypercapnic respiratory failure, required noninvasive ventilation,   2. Acute hypoxemic resp failure, currently on oxygen that she does not use oxygen at baseline  3. Acute exacerbation of COPD  4. Acute kidney injury  5. Reactive erythrocytosis - ? Hypoxia related  6. New cardiomyopathy with EF of 29%  7. Moderate pulmonary hypertension  8. Epistaxis, resolved  9. Hypotension, NEW  10. CAD with h/o MI   11. Essential hypertension  12. History of GI bleed  13. Peripheral arterial disease status post left above-knee amputation   14. phantom pain Left leg, NEW      Plan   · Stop amlodipine due to hypotension  · Change Neurontin to twice daily per home dose due to phantom pain  · Plan for left heart catheterization by cardiology on Monday.  · Continue oxygen by nasal cannula  · PRN BiPAP at night  · Continue nebulizer for COPD        Jemima Tran MD  03/16/19  11:56 PM          This note was dictated utilizing Dragon dictation

## 2019-03-17 NOTE — PLAN OF CARE
Problem: Chronic Obstructive Pulmonary Disease (Adult)  Goal: Signs and Symptoms of Listed Potential Problems Will be Absent, Minimized or Managed (Chronic Obstructive Pulmonary Disease)  Outcome: Ongoing (interventions implemented as appropriate)  Pt continues on scheduled mn txs and 3lpm NC

## 2019-03-18 LAB
ANION GAP SERPL CALCULATED.3IONS-SCNC: 9.9 MMOL/L
BASOPHILS # BLD AUTO: 0.01 10*3/MM3 (ref 0–0.2)
BASOPHILS NFR BLD AUTO: 0.1 % (ref 0–1.5)
BUN BLD-MCNC: 18 MG/DL (ref 8–23)
BUN/CREAT SERPL: 17.1 (ref 7–25)
CALCIUM SPEC-SCNC: 8.7 MG/DL (ref 8.6–10.5)
CHLORIDE SERPL-SCNC: 99 MMOL/L (ref 98–107)
CO2 SERPL-SCNC: 30.1 MMOL/L (ref 22–29)
CREAT BLD-MCNC: 1.05 MG/DL (ref 0.76–1.27)
DEPRECATED RDW RBC AUTO: 50.2 FL (ref 37–54)
EOSINOPHIL # BLD AUTO: 0.08 10*3/MM3 (ref 0–0.4)
EOSINOPHIL NFR BLD AUTO: 0.9 % (ref 0.3–6.2)
ERYTHROCYTE [DISTWIDTH] IN BLOOD BY AUTOMATED COUNT: 13.2 % (ref 12.3–15.4)
GFR SERPL CREATININE-BSD FRML MDRD: 71 ML/MIN/1.73
GLUCOSE BLD-MCNC: 110 MG/DL (ref 65–99)
GLUCOSE BLDC GLUCOMTR-MCNC: 85 MG/DL (ref 70–130)
HCT VFR BLD AUTO: 48.8 % (ref 37.5–51)
HGB BLD-MCNC: 15.5 G/DL (ref 13–17.7)
IMM GRANULOCYTES # BLD AUTO: 0.07 10*3/MM3 (ref 0–0.05)
IMM GRANULOCYTES NFR BLD AUTO: 0.8 % (ref 0–0.5)
LYMPHOCYTES # BLD AUTO: 1.34 10*3/MM3 (ref 0.7–3.1)
LYMPHOCYTES NFR BLD AUTO: 15.5 % (ref 19.6–45.3)
MCH RBC QN AUTO: 32.4 PG (ref 26.6–33)
MCHC RBC AUTO-ENTMCNC: 31.8 G/DL (ref 31.5–35.7)
MCV RBC AUTO: 102.1 FL (ref 79–97)
MONOCYTES # BLD AUTO: 0.75 10*3/MM3 (ref 0.1–0.9)
MONOCYTES NFR BLD AUTO: 8.7 % (ref 5–12)
NEUTROPHILS # BLD AUTO: 6.4 10*3/MM3 (ref 1.4–7)
NEUTROPHILS NFR BLD AUTO: 74 % (ref 42.7–76)
NRBC BLD AUTO-RTO: 0 /100 WBC (ref 0–0)
PLATELET # BLD AUTO: 122 10*3/MM3 (ref 140–450)
PMV BLD AUTO: 10.8 FL (ref 6–12)
POTASSIUM BLD-SCNC: 4.2 MMOL/L (ref 3.5–5.2)
RBC # BLD AUTO: 4.78 10*6/MM3 (ref 4.14–5.8)
SODIUM BLD-SCNC: 139 MMOL/L (ref 136–145)
WBC NRBC COR # BLD: 8.65 10*3/MM3 (ref 3.4–10.8)

## 2019-03-18 PROCEDURE — 93460 R&L HRT ART/VENTRICLE ANGIO: CPT | Performed by: INTERNAL MEDICINE

## 2019-03-18 PROCEDURE — 94799 UNLISTED PULMONARY SVC/PX: CPT

## 2019-03-18 PROCEDURE — C1894 INTRO/SHEATH, NON-LASER: HCPCS | Performed by: INTERNAL MEDICINE

## 2019-03-18 PROCEDURE — 85025 COMPLETE CBC W/AUTO DIFF WBC: CPT | Performed by: INTERNAL MEDICINE

## 2019-03-18 PROCEDURE — 25010000002 MIDAZOLAM PER 1 MG: Performed by: INTERNAL MEDICINE

## 2019-03-18 PROCEDURE — 4A023N8 MEASUREMENT OF CARDIAC SAMPLING AND PRESSURE, BILATERAL, PERCUTANEOUS APPROACH: ICD-10-PCS | Performed by: INTERNAL MEDICINE

## 2019-03-18 PROCEDURE — 0 IOPAMIDOL PER 1 ML: Performed by: INTERNAL MEDICINE

## 2019-03-18 PROCEDURE — 82962 GLUCOSE BLOOD TEST: CPT

## 2019-03-18 PROCEDURE — 99152 MOD SED SAME PHYS/QHP 5/>YRS: CPT | Performed by: INTERNAL MEDICINE

## 2019-03-18 PROCEDURE — B2161ZZ FLUOROSCOPY OF RIGHT AND LEFT HEART USING LOW OSMOLAR CONTRAST: ICD-10-PCS | Performed by: INTERNAL MEDICINE

## 2019-03-18 PROCEDURE — B2111ZZ FLUOROSCOPY OF MULTIPLE CORONARY ARTERIES USING LOW OSMOLAR CONTRAST: ICD-10-PCS | Performed by: INTERNAL MEDICINE

## 2019-03-18 PROCEDURE — 25010000002 FENTANYL CITRATE (PF) 100 MCG/2ML SOLUTION: Performed by: INTERNAL MEDICINE

## 2019-03-18 PROCEDURE — C1769 GUIDE WIRE: HCPCS | Performed by: INTERNAL MEDICINE

## 2019-03-18 PROCEDURE — 99232 SBSQ HOSP IP/OBS MODERATE 35: CPT | Performed by: INTERNAL MEDICINE

## 2019-03-18 PROCEDURE — 99153 MOD SED SAME PHYS/QHP EA: CPT | Performed by: INTERNAL MEDICINE

## 2019-03-18 PROCEDURE — 85018 HEMOGLOBIN: CPT

## 2019-03-18 PROCEDURE — 80048 BASIC METABOLIC PNL TOTAL CA: CPT | Performed by: INTERNAL MEDICINE

## 2019-03-18 PROCEDURE — 25010000002 HEPARIN (PORCINE) PER 1000 UNITS: Performed by: INTERNAL MEDICINE

## 2019-03-18 PROCEDURE — 85014 HEMATOCRIT: CPT

## 2019-03-18 RX ORDER — ONDANSETRON 4 MG/1
4 TABLET, ORALLY DISINTEGRATING ORAL EVERY 6 HOURS PRN
Status: DISCONTINUED | OUTPATIENT
Start: 2019-03-18 | End: 2019-03-19

## 2019-03-18 RX ORDER — SODIUM CHLORIDE 9 MG/ML
75 INJECTION, SOLUTION INTRAVENOUS CONTINUOUS
Status: DISCONTINUED | OUTPATIENT
Start: 2019-03-18 | End: 2019-03-19

## 2019-03-18 RX ORDER — NALOXONE HCL 0.4 MG/ML
0.4 VIAL (ML) INJECTION
Status: DISCONTINUED | OUTPATIENT
Start: 2019-03-18 | End: 2019-03-19

## 2019-03-18 RX ORDER — ACETAMINOPHEN 325 MG/1
650 TABLET ORAL EVERY 4 HOURS PRN
Status: DISCONTINUED | OUTPATIENT
Start: 2019-03-18 | End: 2019-03-19

## 2019-03-18 RX ORDER — ONDANSETRON 4 MG/1
4 TABLET, FILM COATED ORAL EVERY 6 HOURS PRN
Status: DISCONTINUED | OUTPATIENT
Start: 2019-03-18 | End: 2019-03-19

## 2019-03-18 RX ORDER — HYDROCODONE BITARTRATE AND ACETAMINOPHEN 5; 325 MG/1; MG/1
1 TABLET ORAL EVERY 4 HOURS PRN
Status: DISCONTINUED | OUTPATIENT
Start: 2019-03-18 | End: 2019-03-19

## 2019-03-18 RX ORDER — LIDOCAINE HYDROCHLORIDE 20 MG/ML
INJECTION, SOLUTION INFILTRATION; PERINEURAL AS NEEDED
Status: DISCONTINUED | OUTPATIENT
Start: 2019-03-18 | End: 2019-03-18 | Stop reason: HOSPADM

## 2019-03-18 RX ORDER — ONDANSETRON 2 MG/ML
4 INJECTION INTRAMUSCULAR; INTRAVENOUS EVERY 6 HOURS PRN
Status: DISCONTINUED | OUTPATIENT
Start: 2019-03-18 | End: 2019-03-19

## 2019-03-18 RX ORDER — MORPHINE SULFATE 2 MG/ML
1 INJECTION, SOLUTION INTRAMUSCULAR; INTRAVENOUS EVERY 4 HOURS PRN
Status: DISCONTINUED | OUTPATIENT
Start: 2019-03-18 | End: 2019-03-19

## 2019-03-18 RX ORDER — FENTANYL CITRATE 50 UG/ML
INJECTION, SOLUTION INTRAMUSCULAR; INTRAVENOUS AS NEEDED
Status: DISCONTINUED | OUTPATIENT
Start: 2019-03-18 | End: 2019-03-18 | Stop reason: HOSPADM

## 2019-03-18 RX ORDER — SODIUM CHLORIDE 9 MG/ML
INJECTION, SOLUTION INTRAVENOUS CONTINUOUS PRN
Status: COMPLETED | OUTPATIENT
Start: 2019-03-18 | End: 2019-03-18

## 2019-03-18 RX ORDER — MIDAZOLAM HYDROCHLORIDE 1 MG/ML
INJECTION INTRAMUSCULAR; INTRAVENOUS AS NEEDED
Status: DISCONTINUED | OUTPATIENT
Start: 2019-03-18 | End: 2019-03-18 | Stop reason: HOSPADM

## 2019-03-18 RX ADMIN — IPRATROPIUM BROMIDE AND ALBUTEROL SULFATE 3 ML: 2.5; .5 SOLUTION RESPIRATORY (INHALATION) at 23:30

## 2019-03-18 RX ADMIN — CLOPIDOGREL 75 MG: 75 TABLET, FILM COATED ORAL at 23:43

## 2019-03-18 RX ADMIN — SODIUM CHLORIDE 4 ML: 7 NEBU SOLN,3 % NEBU at 10:51

## 2019-03-18 RX ADMIN — SODIUM CHLORIDE 75 ML/HR: 9 INJECTION, SOLUTION INTRAVENOUS at 20:29

## 2019-03-18 RX ADMIN — IPRATROPIUM BROMIDE AND ALBUTEROL SULFATE 3 ML: 2.5; .5 SOLUTION RESPIRATORY (INHALATION) at 10:51

## 2019-03-18 RX ADMIN — PANTOPRAZOLE SODIUM 40 MG: 40 TABLET, DELAYED RELEASE ORAL at 06:44

## 2019-03-18 RX ADMIN — LEVOTHYROXINE SODIUM 125 MCG: 125 TABLET ORAL at 06:44

## 2019-03-18 RX ADMIN — NICOTINE 1 PATCH: 14 PATCH, EXTENDED RELEASE TRANSDERMAL at 14:36

## 2019-03-18 RX ADMIN — IPRATROPIUM BROMIDE AND ALBUTEROL SULFATE 3 ML: 2.5; .5 SOLUTION RESPIRATORY (INHALATION) at 19:32

## 2019-03-18 RX ADMIN — IPRATROPIUM BROMIDE AND ALBUTEROL SULFATE 3 ML: 2.5; .5 SOLUTION RESPIRATORY (INHALATION) at 14:43

## 2019-03-18 RX ADMIN — IPRATROPIUM BROMIDE AND ALBUTEROL SULFATE 3 ML: 2.5; .5 SOLUTION RESPIRATORY (INHALATION) at 07:03

## 2019-03-18 RX ADMIN — CARVEDILOL 12.5 MG: 12.5 TABLET, FILM COATED ORAL at 23:44

## 2019-03-18 RX ADMIN — ALPRAZOLAM 1 MG: 0.5 TABLET ORAL at 07:53

## 2019-03-18 RX ADMIN — SODIUM CHLORIDE, PRESERVATIVE FREE 3 ML: 5 INJECTION INTRAVENOUS at 09:43

## 2019-03-18 RX ADMIN — SODIUM CHLORIDE 4 ML: 7 NEBU SOLN,3 % NEBU at 19:32

## 2019-03-18 RX ADMIN — ISOSORBIDE MONONITRATE 30 MG: 30 TABLET ORAL at 06:44

## 2019-03-18 RX ADMIN — ASPIRIN 81 MG: 81 TABLET, CHEWABLE ORAL at 23:44

## 2019-03-18 RX ADMIN — GABAPENTIN 300 MG: 300 CAPSULE ORAL at 23:43

## 2019-03-18 RX ADMIN — ALPRAZOLAM 1 MG: 0.5 TABLET ORAL at 23:43

## 2019-03-18 NOTE — PLAN OF CARE
Problem: Patient Care Overview  Goal: Plan of Care Review  Outcome: Ongoing (interventions implemented as appropriate)   03/18/19 6881   Coping/Psychosocial   Plan of Care Reviewed With patient   OTHER   Outcome Summary Pt to cardiac cath @1545. No significant findings, no interventions. Done through R radial site. Pt up in chair in AM, O2 on 2 L, lungs with rhonchi today.        Problem: Fall Risk (Adult)  Goal: Absence of Fall  Outcome: Ongoing (interventions implemented as appropriate)      Problem: Chronic Obstructive Pulmonary Disease (Adult)  Goal: Signs and Symptoms of Listed Potential Problems Will be Absent, Minimized or Managed (Chronic Obstructive Pulmonary Disease)  Outcome: Ongoing (interventions implemented as appropriate)

## 2019-03-18 NOTE — DISCHARGE INSTRUCTIONS
[facility]  [address] [place] [address]    Coronary Angiogram with Stent (Radial Approach) After Care    Refer to this sheet in the next few weeks. These instructions provide you with information on caring for yourself after your procedure. Your health care provider may also give you more specific instructions. Your treatment has been planned according to current medical practices, but problems sometimes occur. Call your health care provider if you have any problems or questions after your procedure.       Home Care Instructions:  · You may shower the day after the procedure. Remove the bandage (dressing) and gently wash the site with plain soap and water. Gently pat the site dry. You may apply a band aid daily for 2 days if desired.    · Do not apply powder or lotion to the site.  · Do not submerge the affected site in water for 3 to 5 days or until the site is completely healed.   · Do not flex or bend the affected arm for 24 hours.  · Do not lift, push or pull anything over 10 pounds for 2 days after your procedure.  · Do not operate machinery or power tools for 24 hours.  · Inspect the site at least twice daily. You may notice some bruising at the site and it may be tender for 1 to 2 weeks.     · Increase your fluid intake for the next 2 days.    · Keep arm elevated for 24 hours.  For the remainder of the day, keep your arm in the “Pledge of Allegiance” position when up and about.    · Limit your activity for the next 48 hours and avoid strenuous activity as directed by your physician.   · Cardiac Rehab may or may not be ordered.  Please consult with your physician  · You may drive 24 hours after the procedure unless otherwise instructed by your caregiver.  · A responsible adult should be with you for the first 24 hours after you arrive home.   · Do not make any important legal decisions or sign legal papers for 24 hours. Do not drink alcohol for 24 hours.    · Take medicines only as  directed by your health care provider.  Blood thinners may be prescribed after your procedure to improve blood flow through the stent.    · Metformin or any medications containing Metformin should not be taken for 48 hours after your procedure.    · Eat a heart-healthy diet. This should include plenty of fresh fruits and vegetables. Meat should be lean cuts. Avoid the following types of food:    ¨ Food that is high in salt.    ¨ Canned or highly processed food.    ¨ Food that is high in saturated fat or sugar.    ¨ Fried food.    · Make any other lifestyle changes recommended by your health care provider. This may include:    ¨ Not using any tobacco products including cigarettes, chewing tobacco, or electronic cigarettes.   ¨ Managing your weight.    ¨ Getting regular exercise.    ¨ Managing your blood pressure.    ¨ Limiting your alcohol intake.    ¨ Managing other health problems, such as diabetes.    · If you need an MRI after your heart stent was placed, be sure to tell the health care provider who orders the MRI that you have a heart stent.    · Keep all follow-up visits as directed by your health care provider.    ·   Call Your Doctor If:  · You have unusual pain at the radial/ulnar (wrist) site.  · You have redness, warmth, swelling, or pain at the radial/ulnar (wrist) site.  · You have drainage (other than a small amount of blood on the dressing).  · `You have chills or a fever > 101.  · Your arm becomes pale or dark, cool, tingly, or numb.  · You develop chest pain, shortness of breath, feel faint or pass out.    · You have heavy bleeding from the site, hold pressure on the site for 20 minutes.  If the bleeding stops, apply a fresh bandage and call your cardiologist.  However, if you continue to have bleeding, call 911.        Make Sure You:   · Understand these instructions.  · Will watch your condition.  · Will get help right away if you are not doing well or get worse.

## 2019-03-18 NOTE — PROGRESS NOTES
"                                              LOS: 7 days   Patient Care Team:  Mariusz Wilburn MD as PCP - General  Mariusz Wilburn MD as PCP - Family Medicine  Jose Swann MD as Consulting Physician (Vascular Surgery)  Kyrstal Ocampo MD as Consulting Physician (Cardiology)  Ishmael Tate MD as Consulting Physician (Pulmonary Disease)    Chief Complaint:  F/up hypoxia, respiratory failure and medical problems listed below    Subjective   Interval History  Requiring 2 L oxygen at times but mostly on room air.  Has mild cough with productive phlegm    Ventilator/Non-Invasive Ventilation Settings (From admission, onward)    Start     Ordered    03/11/19 2100  NIPPV (CPAP or BIPAP)  Until Discontinued     Question Answer Comment   Indication: Acute Respiratory Failure    Type: BIPAP    NIPPV Mask Interface: Per Patient Preference    IPAP 20    EPAP 8    Oxygen FIO2    FIO2 % 40    Breath Rate 16    Titrate for SPO2 90%        03/11/19 2103                Physical Exam:     Vital Signs  Temp:  [97.5 °F (36.4 °C)-98.1 °F (36.7 °C)] 97.6 °F (36.4 °C)  Heart Rate:  [48-57] 52  Resp:  [16-18] 18  BP: (113-120)/(66-75) 119/73    Intake/Output Summary (Last 24 hours) at 3/18/2019 1611  Last data filed at 3/17/2019 2333  Gross per 24 hour   Intake 320 ml   Output 675 ml   Net -355 ml     Flowsheet Rows      First Filed Value   Admission Height  175 cm (68.9\") Documented at 03/11/2019 1853   Admission Weight  79.4 kg (175 lb) Documented at 03/11/2019 1853          General Appearance:    Alert, cooperative, in no acute distress   HEENT:  Mallampati score 3, moist mucous membrane   Neck:   Large. Trachea midline. No thyromegaly.   Lungs:    Mild fine crackles at the bases.  No wheezing,respirations regular, even and unlabored    Heart:    Regular rhythm and normal rate, normal S1 and S2, no            murmur   Skin:    No abnormalities observed   Abdomen:    Overweight abdomen. Soft. No tenderness. No HSM.  Large area " of bruising on the left flank.   Neuro:   Conscious, alert, oriented x3   Extremities:   Moves all extremities well, mild edema right leg, BKA on the left        Results Review:        Results from last 7 days   Lab Units 03/18/19  0329 03/17/19  0630 03/16/19  0554   SODIUM mmol/L 139 139 139   POTASSIUM mmol/L 4.2 4.4 4.2   CHLORIDE mmol/L 99 100 102   CO2 mmol/L 30.1* 29.5* 28.4   BUN mg/dL 18 16 24*   CREATININE mg/dL 1.05 0.92 0.91   GLUCOSE mg/dL 110* 93 87   CALCIUM mg/dL 8.7 9.0 8.4*     Results from last 7 days   Lab Units 03/11/19  2109   TROPONIN T ng/mL <0.010     Results from last 7 days   Lab Units 03/18/19 0329 03/17/19  0630 03/16/19  0554   WBC 10*3/mm3 8.65 12.81* 10.87*   HEMOGLOBIN g/dL 15.5 16.3 15.4   HEMATOCRIT % 48.8 51.9* 49.1   PLATELETS 10*3/mm3 122* 118* 112*         Results from last 7 days   Lab Units 03/11/19  1948   PROBNP pg/mL 1,801.0*       I reviewed the patient's new clinical results.  I personally viewed and interpreted the patient's CXR        Medication Review:     [MAR Hold] aspirin 81 mg Oral Nightly   [MAR Hold] atorvastatin 40 mg Oral Daily   carvedilol 12.5 mg Oral Q12H   [MAR Hold] clopidogrel 75 mg Oral Nightly   [MAR Hold] enoxaparin 40 mg Subcutaneous Q24H   gabapentin 300 mg Oral BID   [MAR Hold] insulin lispro 0-9 Units Subcutaneous 4x Daily With Meals & Nightly   [MAR Hold] ipratropium-albuterol 3 mL Nebulization Q4H - RT   [MAR Hold] isosorbide mononitrate 30 mg Oral QAM   [MAR Hold] levothyroxine 125 mcg Oral Q AM   [MAR Hold] nicotine 1 patch Transdermal Q24H   [MAR Hold] pantoprazole 40 mg Oral QAM   [MAR Hold] sodium chloride 3 mL Intravenous Q12H   [MAR Hold] sodium chloride 4 mL Nebulization BID - RT   [MAR Hold] thiamine 100 mg Oral Daily            Diagnostic imaging:  I personally and independently reviewed the following images:  CXR 3/11/19, mild bilateral basal pulmonary infiltrates    Assessment   1. Acute on chronic hypercapnic respiratory failure,  required noninvasive ventilation,   2. Acute hypoxemic resp failure, currently on oxygen that she does not use oxygen at baseline  3. Acute exacerbation of COPD  4. Acute kidney injury  5. Reactive erythrocytosis - ? Hypoxia related  6. New cardiomyopathy with EF of 29%  7. Moderate pulmonary hypertension  8. Epistaxis, resolved  9. Hypotension, resolved  10. CAD with h/o MI   11. Essential hypertension  12. History of GI bleed  13. Peripheral arterial disease status post left above-knee amputation   14. phantom pain Left leg      Plan     · Awaiting heart catheterization results.  Likely can go home tomorrow if everything is okay  · Continue oxygen by nasal cannula.  Walking oximetry in a.m.  · Continue nebulizer for COPD         Jemima Tran MD  03/18/19  4:11 PM          This note was dictated utilizing Dragon dictation

## 2019-03-18 NOTE — PROGRESS NOTES
"Patient Name: Sim Agustin  :1955  63 y.o.      Patient Care Team:  Mariusz Wilburn MD as PCP - General  Mariusz Wilburn MD as PCP - Family Medicine  Jose Swann MD as Consulting Physician (Vascular Surgery)  Krystal Ocampo MD as Consulting Physician (Cardiology)  Ishmael Tate MD as Consulting Physician (Pulmonary Disease)    Interval History:   Echo shows a new onset of a cardiomyopathy    Subjective:  Following for cardiomyopathy and coronary artery disease.    Objective   Vital Signs  Temp:  [97.5 °F (36.4 °C)-98.8 °F (37.1 °C)] 97.5 °F (36.4 °C)  Heart Rate:  [48-57] 57  Resp:  [16-18] 16  BP: (113-120)/(66-75) 117/66    Intake/Output Summary (Last 24 hours) at 3/18/2019 1133  Last data filed at 3/17/2019 2333  Gross per 24 hour   Intake 680 ml   Output 1000 ml   Net -320 ml     Flowsheet Rows      First Filed Value   Admission Height  175 cm (68.9\") Documented at 2019 1853   Admission Weight  79.4 kg (175 lb) Documented at 2019 1853          Physical Exam:   General Appearance:    Alert, cooperative, in no acute distress   Lungs:    On oxygen.  Wheezes and rhonchi bilaterally.    Heart:    Regular rhythm and normal rate, normal S1 and S2, no murmurs, gallops or rubs.     Chest Wall:    No abnormalities observed   Abdomen:     Soft, nontender, positive bowel sounds.     Extremities:   no cyanosis, clubbing or edema.  No marked joint deformities.  Adequate musculoskeletal strength.       Results Review:    Results from last 7 days   Lab Units 19  0329   SODIUM mmol/L 139   POTASSIUM mmol/L 4.2   CHLORIDE mmol/L 99   CO2 mmol/L 30.1*   BUN mg/dL 18   CREATININE mg/dL 1.05   GLUCOSE mg/dL 110*   CALCIUM mg/dL 8.7     Results from last 7 days   Lab Units 19  2109   TROPONIN T ng/mL <0.010     Results from last 7 days   Lab Units 19  0329   WBC 10*3/mm3 8.65   HEMOGLOBIN g/dL 15.5   HEMATOCRIT % 48.8   PLATELETS 10*3/mm3 122*                         Medication " Review:     aspirin 81 mg Oral Nightly   atorvastatin 40 mg Oral Daily   carvedilol 12.5 mg Oral Q12H   clopidogrel 75 mg Oral Nightly   enoxaparin 40 mg Subcutaneous Q24H   gabapentin 300 mg Oral BID   insulin lispro 0-9 Units Subcutaneous 4x Daily With Meals & Nightly   ipratropium-albuterol 3 mL Nebulization Q4H - RT   isosorbide mononitrate 30 mg Oral QAM   levothyroxine 125 mcg Oral Q AM   nicotine 1 patch Transdermal Q24H   pantoprazole 40 mg Oral QAM   sodium chloride 3 mL Intravenous Q12H   sodium chloride 4 mL Nebulization BID - RT   thiamine 100 mg Oral Daily             Assessment/Plan     1.  New onset cardiomyopathy.  His ejection fraction on nuclear stress test in April 2016 was 52%.  Plan is for right and left heart catheterization today.  2.  Coronary artery disease.  3.  Tobacco use  4.  COPD  5. End-stage renal disease on hemodialysis.  6.  Peripheral vascular disease  7.  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 He did have an ischemic colon which required a hemicolectomy.  8.  Systemic hypertension.    Mari Sood MD, [facility]  03/18/19  11:33 AM

## 2019-03-18 NOTE — PLAN OF CARE
Problem: Patient Care Overview  Goal: Plan of Care Review  Outcome: Ongoing (interventions implemented as appropriate)   03/18/19 0357   Coping/Psychosocial   Plan of Care Reviewed With patient   Plan of Care Review   Progress improving   OTHER   Outcome Summary 2L N/C with loose cough. NPO for cardiac cath in am, no c/o's voiced. Poss will need O2 at D/C . Safety maintained.      Goal: Individualization and Mutuality  Outcome: Ongoing (interventions implemented as appropriate)    Goal: Discharge Needs Assessment  Outcome: Ongoing (interventions implemented as appropriate)      Problem: Fall Risk (Adult)  Goal: Absence of Fall  Outcome: Ongoing (interventions implemented as appropriate)      Problem: Skin Injury Risk (Adult)  Goal: Skin Health and Integrity  Outcome: Ongoing (interventions implemented as appropriate)      Problem: Chronic Obstructive Pulmonary Disease (Adult)  Goal: Signs and Symptoms of Listed Potential Problems Will be Absent, Minimized or Managed (Chronic Obstructive Pulmonary Disease)  Outcome: Ongoing (interventions implemented as appropriate)

## 2019-03-18 NOTE — PLAN OF CARE
Problem: Wound (Includes Pressure Injury) (Adult)  Goal: Signs and Symptoms of Listed Potential Problems Will be Absent, Minimized or Managed (Wound)  Outcome: Ongoing (interventions implemented as appropriate)  Pt instructed post cardiac cath to keep R arm straight and try not to use it. Report any bleeding, numbness, tingling, hematoma to staff quickly. Verbalized understanding

## 2019-03-19 VITALS
SYSTOLIC BLOOD PRESSURE: 93 MMHG | TEMPERATURE: 98 F | HEIGHT: 68 IN | DIASTOLIC BLOOD PRESSURE: 62 MMHG | OXYGEN SATURATION: 92 % | RESPIRATION RATE: 20 BRPM | HEART RATE: 53 BPM | WEIGHT: 176.37 LBS | BODY MASS INDEX: 26.73 KG/M2

## 2019-03-19 LAB
HCT VFR BLDA CALC: 50 % (ref 38–51)
HCT VFR BLDA CALC: 50 % (ref 38–51)
HGB BLDA-MCNC: 17 G/DL (ref 12–17)
HGB BLDA-MCNC: 17 G/DL (ref 12–17)
SAO2 % BLDA: 72 % (ref 95–98)
SAO2 % BLDA: 92 % (ref 95–98)

## 2019-03-19 PROCEDURE — 94618 PULMONARY STRESS TESTING: CPT

## 2019-03-19 PROCEDURE — 99232 SBSQ HOSP IP/OBS MODERATE 35: CPT | Performed by: NURSE PRACTITIONER

## 2019-03-19 PROCEDURE — 94799 UNLISTED PULMONARY SVC/PX: CPT

## 2019-03-19 RX ADMIN — SODIUM CHLORIDE 4 ML: 7 NEBU SOLN,3 % NEBU at 07:23

## 2019-03-19 RX ADMIN — SODIUM CHLORIDE, PRESERVATIVE FREE 3 ML: 5 INJECTION INTRAVENOUS at 10:13

## 2019-03-19 RX ADMIN — IPRATROPIUM BROMIDE AND ALBUTEROL SULFATE 3 ML: 2.5; .5 SOLUTION RESPIRATORY (INHALATION) at 07:15

## 2019-03-19 RX ADMIN — HYDROCODONE BITARTRATE AND ACETAMINOPHEN 1 TABLET: 5; 325 TABLET ORAL at 00:36

## 2019-03-19 RX ADMIN — GABAPENTIN 300 MG: 300 CAPSULE ORAL at 09:21

## 2019-03-19 RX ADMIN — IPRATROPIUM BROMIDE AND ALBUTEROL SULFATE 3 ML: 2.5; .5 SOLUTION RESPIRATORY (INHALATION) at 15:54

## 2019-03-19 RX ADMIN — HYDROCODONE BITARTRATE AND ACETAMINOPHEN 1 TABLET: 5; 325 TABLET ORAL at 14:50

## 2019-03-19 RX ADMIN — NICOTINE 1 PATCH: 14 PATCH, EXTENDED RELEASE TRANSDERMAL at 14:49

## 2019-03-19 RX ADMIN — BISACODYL 5 MG: 5 TABLET, COATED ORAL at 15:00

## 2019-03-19 RX ADMIN — SODIUM CHLORIDE, PRESERVATIVE FREE 3 ML: 5 INJECTION INTRAVENOUS at 00:38

## 2019-03-19 RX ADMIN — ISOSORBIDE MONONITRATE 30 MG: 30 TABLET ORAL at 06:51

## 2019-03-19 RX ADMIN — CARVEDILOL 12.5 MG: 12.5 TABLET, FILM COATED ORAL at 09:21

## 2019-03-19 RX ADMIN — PANTOPRAZOLE SODIUM 40 MG: 40 TABLET, DELAYED RELEASE ORAL at 06:51

## 2019-03-19 RX ADMIN — ATORVASTATIN CALCIUM 40 MG: 20 TABLET, FILM COATED ORAL at 09:21

## 2019-03-19 RX ADMIN — IPRATROPIUM BROMIDE AND ALBUTEROL SULFATE 3 ML: 2.5; .5 SOLUTION RESPIRATORY (INHALATION) at 12:25

## 2019-03-19 RX ADMIN — Medication 100 MG: at 09:21

## 2019-03-19 RX ADMIN — LEVOTHYROXINE SODIUM 125 MCG: 125 TABLET ORAL at 06:51

## 2019-03-19 NOTE — PROGRESS NOTES
Exercise Oximetry    Patient Name:Sim Agustin   MRN: [id number]   Date: 03/19/19             ROOM AIR BASELINE   SpO2% 92   Heart Rate 87   Blood Pressure      EXERCISE ON ROOM AIR SpO2% EXERCISE ON O2 @ 2  LPM SpO2%   1 MINUTE    89  1 MINUTE    2 MINUTES  85  2 MINUTES    3 MINUTES  84  3 MINUTES    4 MINUTES  4 MINUTES  88   5 MINUTES  5 MINUTES  89   6 MINUTES  6 MINUTES  89              Distance Walked 6 min Distance Walked   Dyspnea (Felicia Scale)   Dyspnea (Felicia Scale)   Fatigue (Felicia Scale)   Fatigue (Felicia Scale)   SpO2% Post Exercise  SpO2% Post Exercise  92   HR Post Exercise  HR Post Exercise  69   Time to Recovery   Time to Recovery  2 min     Comments: Patient walked for 6 min.  Patient needed supplemental oxygen at 3 min into walk.  Saturation was 84% at 3min.  Pt walked the remaining 3 minutes on 2lpm oxygen maintaining a saturation of 89-90%.  Pt tolerated well.

## 2019-03-19 NOTE — PLAN OF CARE
Problem: Patient Care Overview  Goal: Plan of Care Review  Outcome: Ongoing (interventions implemented as appropriate)   03/19/19 0551   Coping/Psychosocial   Plan of Care Reviewed With patient   Plan of Care Review   Progress improving   OTHER   Outcome Summary Post RAC and R radial cath sites clear, no c/os voiced, less cough. Denies pain. Safety maintained, unable to wean, sats occ drop into low 80's with 02 during noc, safety maintained      Goal: Individualization and Mutuality  Outcome: Ongoing (interventions implemented as appropriate)    Goal: Discharge Needs Assessment  Outcome: Ongoing (interventions implemented as appropriate)      Problem: Fall Risk (Adult)  Goal: Absence of Fall  Outcome: Ongoing (interventions implemented as appropriate)      Problem: Skin Injury Risk (Adult)  Goal: Skin Health and Integrity  Outcome: Ongoing (interventions implemented as appropriate)      Problem: Chronic Obstructive Pulmonary Disease (Adult)  Goal: Signs and Symptoms of Listed Potential Problems Will be Absent, Minimized or Managed (Chronic Obstructive Pulmonary Disease)  Outcome: Ongoing (interventions implemented as appropriate)      Problem: Wound (Includes Pressure Injury) (Adult)  Goal: Signs and Symptoms of Listed Potential Problems Will be Absent, Minimized or Managed (Wound)  Outcome: Ongoing (interventions implemented as appropriate)

## 2019-03-19 NOTE — PROGRESS NOTES
"    Patient Name: Sim Agustin  :1955  63 y.o.      Patient Care Team:  Mariusz Wilburn MD as PCP - General  Mariusz Wilburn MD as PCP - Family Medicine  Jose Swann MD as Consulting Physician (Vascular Surgery)  Krystal Ocampo MD as Consulting Physician (Cardiology)  Ishmael Tate MD as Consulting Physician (Pulmonary Disease)    Chief Complaint: follow up cardiomyopathy    Interval History: cath yesterday with just small vessel distal LAD disease and mid RCA disease. EF 45% per LV gram. LVEDP 10.        Objective   Vital Signs  Temp:  [97.6 °F (36.4 °C)-98.5 °F (36.9 °C)] 97.7 °F (36.5 °C)  Heart Rate:  [52-66] 61  Resp:  [16-20] 18  BP: (104-172)/(60-93) 109/63    Intake/Output Summary (Last 24 hours) at 3/19/2019 1143  Last data filed at 3/19/2019 0900  Gross per 24 hour   Intake 1275 ml   Output 875 ml   Net 400 ml     Flowsheet Rows      First Filed Value   Admission Height  175 cm (68.9\") Documented at 2019 1853   Admission Weight  79.4 kg (175 lb) Documented at 2019 1853          Physical Exam:   General Appearance:    Alert, cooperative, in no acute distress   Lungs:    diminished to auscultation.  Normal respiratory effort and rate.      Heart:    Regular rhythm and normal rate, normal S1 and S2, no murmurs, gallops or rubs.     Chest Wall:    No abnormalities observed   Abdomen:     Soft, nontender, positive bowel sounds.     Extremities:   no cyanosis, clubbing or edema.  No marked joint deformities.  Adequate musculoskeletal strength.   Right radial cath site - soft, no hematoma. Mild bruising.       Results Review:    Results from last 7 days   Lab Units 19  0329   SODIUM mmol/L 139   POTASSIUM mmol/L 4.2   CHLORIDE mmol/L 99   CO2 mmol/L 30.1*   BUN mg/dL 18   CREATININE mg/dL 1.05   GLUCOSE mg/dL 110*   CALCIUM mg/dL 8.7         Results from last 7 days   Lab Units 19  1627  19  0329   WBC 10*3/mm3  --   --  8.65   HEMOGLOBIN g/dL  --   --  15.5 "   HEMOGLOBIN, POC g/dL 17.0   < >  --    HEMATOCRIT %  --   --  48.8   HEMATOCRIT POC % 50   < >  --    PLATELETS 10*3/mm3  --   --  122*    < > = values in this interval not displayed.                           Medication Review:     aspirin 81 mg Oral Nightly   atorvastatin 40 mg Oral Daily   carvedilol 12.5 mg Oral Q12H   clopidogrel 75 mg Oral Nightly   gabapentin 300 mg Oral BID   insulin lispro 0-9 Units Subcutaneous 4x Daily With Meals & Nightly   ipratropium-albuterol 3 mL Nebulization Q4H - RT   isosorbide mononitrate 30 mg Oral QAM   levothyroxine 125 mcg Oral Q AM   nicotine 1 patch Transdermal Q24H   pantoprazole 40 mg Oral QAM   sodium chloride 3 mL Intravenous Q12H   sodium chloride 4 mL Nebulization BID - RT   thiamine 100 mg Oral Daily             Assessment/Plan   1. New onset cardiomyopathy - nonischemic. Cath yesterday with distal LAD disease (small caliber) and otherwise mild non obstructive disease. Normal filling pressures. Continue carvedilol. BP has been on the low side. Will hold off on starting ACE/ARB for now.   2. Coronary artery disease - medical therapy  3. Tobacco abuse - he says he is done.   4. ESRD on HD  5. 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 He did have an ischemic colon which required a hemicolectomy.  6. Hypertension - has actually been on the low side. He is on amlodipine at home which is on hold. Would hold this indefinitely and add ACE/ARB if BP increases.     MANDO Meek  [facility]  03/19/19  11:43 AM

## 2019-03-19 NOTE — DISCHARGE SUMMARY
DISCHARGE SUMMARY    Patient Name: Sim Agustin  Age/Sex: 63 y.o. male  : 1955  MRN: [id number]  Patient Care Team:  Mariusz Wilburn MD as PCP - General  Mariusz Wilburn MD as PCP - Family Medicine  Jose Swann MD as Consulting Physician (Vascular Surgery)  Krystal Ocampo MD as Consulting Physician (Cardiology)  Ishmael Tate MD as Consulting Physician (Pulmonary Disease)       Date of Admit: 3/11/2019  Date of Discharge:  19  Discharge Condition: Good    Discharge Diagnoses:  1. Acute on chronic hypercapnic respiratory failure, required noninvasive ventilation, on O2 on discharge  2. Acute hypoxemic resp failure, currently on oxygen that she does not use oxygen at baseline  3. Acute exacerbation of COPD  4. Acute kidney injury  5. Reactive erythrocytosis, related to chronic hypoxemia and perhaps sleep apnea  6. New cardiomyopathy with EF of 29%  7. Moderate pulmonary hypertension  8. Epistaxis, resolved  9. Hypotension, resolved  10. CAD with h/o MI   11. Essential hypertension  12. History of GI bleed  13. Peripheral arterial disease status post left above-knee amputation   14. phantom pain Left leg  15. Overweight (BMI 27)  16. Snoring/apnea, highly suspected sleep    History of present illness from H&P from 3/11/2019: per Dr. Lux  Sim Agustin is a 63 y.o. male with history of very severe end-stage COPD, his FEV1 in  when it was checked was 22% along with significant emphysema and air trapping but no significant hyperinflation on that most recent PFT.  The patient follows with Dr. Adriel Tate but last time he saw him was 2017.  He is on incruise, he did not do well on the Advair.  He denies any hospitalization for lung infection since 2016 when he had severe infection and was in critical condition on the ventilator according to the patient.  His onset of symptoms started few days ago with progressive cough and subjective fever the cough was mainly dry nonproductive,  there was gradual worsening of the symptoms over the last 2-3 days, subjective fever, no GI or  complaints, no confusion reported, no hemoptysis, he did have few pounds weight loss over the last year.  No loss of consciousness, he denies any chest pain,  The symptom became severe enough that he ended up in the emergency room, his ABG showed acute on chronic respiratory acidosis, patient was started on the BiPAP and was admitted to the intensive care for further management.    Hospital Course:   Patient was treated with noninvasive ventilation.  He was ultimately weaned off and placed on oxygen only.  On the day of discharge, his SPO2 was 92% on room air but dropped after 1 minute of exertion requiring the addition of oxygen 2 L/min.    Will be discharged home on Trelegy inhaler for COPD.  Follow-up with Dr. Tate.    He underwent left heart catheterization on 3/18/19 which showed nonobstructive disease.  Medical treatment was recommended by cardiology.  Amlodipine was stopped and patient will continue on carvedilol.  ACE/ARB not added due to borderline blood pressure.    Patient has symptoms suggestive of sleep apnea.  He reported loud snoring which disturb his wife who witnessed apnea as well.  He wakes up once or twice at night to use the bathroom and he reported excessive daytime sleepiness.  He has Mallampati 3 with upper airway anatomy suggestive of sleep apnea.  Due to severe COPD hypoxic respiratory failure, he requires in laboratory split-night polysomnography which we will arrange for.    Consults:   IP CONSULT TO PULMONOLOGY  IP CONSULT TO CARDIOLOGY    Significant Discharge Diagnostics   Procedures Performed:  Procedure(s):  Right and Left Heart Cath       Pertinent Lab Results:  Results from last 7 days   Lab Units 03/18/19  0329 03/17/19  0630 03/16/19  0554 03/15/19  0516 03/14/19  0435 03/13/19  1709   SODIUM mmol/L 139 139 139 139 139 136   POTASSIUM mmol/L 4.2 4.4 4.2 4.6 4.5 4.7   CHLORIDE  mmol/L 99 100 102 103 101 99   CO2 mmol/L 30.1* 29.5* 28.4 25.5 27.3 26.8   BUN mg/dL 18 16 24* 33* 30* 32*   CREATININE mg/dL 1.05 0.92 0.91 0.98 0.93 1.10   GLUCOSE mg/dL 110* 93 87 117* 103* 144*   CALCIUM mg/dL 8.7 9.0 8.4* 8.3* 8.6 8.8         Results from last 7 days   Lab Units 03/18/19  1627 03/18/19  1623 03/18/19  0329 03/17/19  0630 03/16/19  0554 03/15/19  0516 03/14/19  1517 03/14/19  0435   WBC 10*3/mm3  --   --  8.65 12.81* 10.87* 16.74*  16.74* 24.26* 14.35*   HEMOGLOBIN g/dL  --   --  15.5 16.3 15.4 16.7 16.4 16.5   HEMOGLOBIN, POC g/dL 17.0 17.0  --   --   --   --   --   --    HEMATOCRIT %  --   --  48.8 51.9* 49.1 52.0* 52.0* 52.3*   HEMATOCRIT POC % 50 50  --   --   --   --   --   --    PLATELETS 10*3/mm3  --   --  122* 118* 112* 128* 144 168   MCV fL  --   --  102.1* 101.4* 102.5* 100.8* 101.0* 100.4*   MCH pg  --   --  32.4 31.8 32.2 32.4 31.8 31.7   MCHC g/dL  --   --  31.8 31.4* 31.4* 32.1 31.5 31.5   RDW %  --   --  13.2 13.6 14.2 14.3 14.3 13.7   RDW-SD fl  --   --  50.2 51.4 53.9 52.9 52.5 50.8   MPV fL  --   --  10.8 10.7 11.0 11.0 10.0 10.8   NEUTROPHIL % %  --   --  74.0 85.2* 86.2* 90.9*  --  87.8*   LYMPHOCYTE % %  --   --  15.5* 7.7* 6.8* 3.2*  --  6.2*   MONOCYTES % %  --   --  8.7 5.9 5.7 4.4*  --  5.3   EOSINOPHIL % %  --   --  0.9 0.5 0.6 0.1*  --  0.0*   BASOPHIL % %  --   --  0.1 0.2 0.1 0.1  --  0.1   IMM GRAN % %  --   --  0.8* 0.5 0.6* 1.3*  --  0.6*   NEUTROS ABS 10*3/mm3  --   --  6.40 10.92* 9.36* 15.23* 23.29* 12.59*   LYMPHS ABS 10*3/mm3  --   --  1.34 0.98 0.74 0.53*  --  0.89   MONOS ABS 10*3/mm3  --   --  0.75 0.76 0.62 0.74  --  0.76   EOS ABS 10*3/mm3  --   --  0.08 0.06 0.07 0.01  --  0.00   BASOS ABS 10*3/mm3  --   --  0.01 0.02 0.01 0.02  --  0.02   IMMATURE GRANS (ABS) 10*3/mm3  --   --  0.07* 0.07* 0.07* 0.21*  --  0.09*   NRBC /100 WBC  --   --  0.0 0.1* 0.0 0.1*  --  0.1*                         Results from last 7 days   Lab Units 03/13/19  1724   PH,  ARTERIAL pH units 7.418   PCO2, ARTERIAL mm Hg 47.3*   PO2 ART mm Hg 57.0*   HCO3 ART mmol/L 30.5*                                   Imaging Results:  Imaging Results (all)     Procedure Component Value Units Date/Time    XR Chest 2 View [[id number]] Collected:  03/11/19 1937     Updated:  03/11/19 1942    Narrative:       AP AND LATERAL CHEST X-RAY     CLINICAL HISTORY: Dyspnea     Compared to the previous chest x-ray dated 10/20/2017.     The lungs are well-expanded and appear free of focal infiltrates. There  are no pleural effusions. The heart is normal in size.     IMPRESSIONS: No evidence of acute disease within the chest.     This report was finalized on 3/11/2019 7:39 PM by Dr. Everton Morrison M.D.             Objective:   Temp:  [97.7 °F (36.5 °C)-98.5 °F (36.9 °C)] 98 °F (36.7 °C)  Heart Rate:  [52-66] 53  Resp:  [16-20] 20  BP: ()/(60-93) 93/62   SpO2:  [90 %-100 %] 92 %  on  Flow (L/min):  [1.5-2] 2 Device (Oxygen Therapy): nasal cannula;humidified    Intake/Output Summary (Last 24 hours) at 3/19/2019 1712  Last data filed at 3/19/2019 1227  Gross per 24 hour   Intake 1360 ml   Output 875 ml   Net 485 ml     Body mass index is 27.04 kg/m².      03/12/19  2340 03/14/19  0703 03/14/19  1827   Weight: 82.6 kg (182 lb) 80.9 kg (178 lb 6.4 oz) 80 kg (176 lb 5.9 oz)     Weight change:     Physical Exam:  General Appearance:    Alert, cooperative, in no acute distress   HEENT:  Mallampati score 3, moist mucous membrane   Neck:   Large. Trachea midline. No thyromegaly.   Lungs:    Mild fine crackles at the bases.  No wheezing,respirations regular, even and unlabored    Heart:    Regular rhythm and normal rate, normal S1 and S2, no            murmur   Skin:    No abnormalities observed   Abdomen:    Overweight abdomen. Soft. No tenderness. No HSM.  Large area of bruising on the left flank.   Neuro:   Conscious, alert, oriented x3   Extremities:   Moves all extremities well, mild edema right leg, BKA on the  left       Discharge Medications and Instructions:     Discharge Medications     Discharge Medications      New Medications      Instructions Start Date   Fluticasone-Umeclidin-Vilant 100-62.5-25 MCG/INH aerosol powder   Commonly known as:  TRELEGY ELLIPTA   1 each, Inhalation, Daily         Continue These Medications      Instructions Start Date   albuterol sulfate  (90 Base) MCG/ACT inhaler  Commonly known as:  PROVENTIL HFA;VENTOLIN HFA;PROAIR HFA   2 puffs, Inhalation, Every 6 Hours PRN      ALPRAZolam 1 MG tablet  Commonly known as:  XANAX   1 mg, Oral, 4 Times Daily PRN      ALPRAZolam 1 MG tablet  Commonly known as:  XANAX   1 mg, Oral, 4 Times Daily PRN      aspirin 81 MG chewable tablet   81 mg, Oral, Nightly      carvedilol 25 MG tablet  Commonly known as:  COREG   12.5 mg, Oral, 2 Times Daily      clopidogrel 75 MG tablet  Commonly known as:  PLAVIX   75 mg, Oral, Nightly      Co Q10 100 MG capsule   400 mg, Oral, Nightly      FERROUS SULFATE PO   130 mg, Oral, Daily      gabapentin 300 MG capsule  Commonly known as:  NEURONTIN   300 mg, Oral, 3 Times Daily      HYDROcodone-acetaminophen 5-325 MG per tablet  Commonly known as:  NORCO   1 tablet, Oral, Every 6 Hours PRN      isosorbide mononitrate 30 MG 24 hr tablet  Commonly known as:  IMDUR   30 mg, Oral, Every Morning      levothyroxine 125 MCG tablet  Commonly known as:  SYNTHROID, LEVOTHROID   125 mcg, Oral, Daily      MULTIVITAMIN ADULT PO   1 tablet, Oral, Daily      NIACIN PO   1 capsule, Oral, Every Morning      omeprazole 20 MG capsule  Commonly known as:  priLOSEC   20 mg, Oral, Every Morning      simvastatin 40 MG tablet  Commonly known as:  ZOCOR   40 mg, Oral, Nightly      sucralfate 1 g tablet  Commonly known as:  CARAFATE   1 g, Oral, As Needed      VITAMIN B-1 PO   1 tablet, Oral, Daily      vitamin D3 5000 units tablet tablet   5,000 Units, Oral, Daily         Stop These Medications    amLODIPine 10 MG tablet  Commonly known as:   NORVASC     BREO ELLIPTA IN     INCRUSE ELLIPTA 62.5 MCG/INH aerosol powder   Generic drug:  Umeclidinium Bromide            Discharge Diet:    Dietary Orders (From admission, onward)    Start     Ordered    03/18/19 1903  Diet Regular; Consistent Carbohydrate, Cardiac  Diet Effective Now     Question Answer Comment   Diet Texture / Consistency Regular    Common Modifiers Consistent Carbohydrate    Common Modifiers Cardiac        03/18/19 1902          Activity at Discharge:   as tolerated    Discharge disposition: Home    Discharge Instructions and Follow ups:    Follow-up Information     Mariusz Wilburn MD Follow up in 7 day(s).    Specialties:  Internal Medicine, Hospitalist  Why:  3/22/2019 at 13:15 p.m.   Contact information:  [address] [facility] [address]  [place] [address]  [phone]             Mari Sood MD. Schedule an appointment as soon as possible for a visit in 1 week(s).    Specialty:  Cardiology  Why:  Call to make an appointment with Nurse Practioner Savana Sarabia in 1 week.   Contact information:  [address] [facility] [address]  [place] [address]  [phone]             Mariusz Wilburn MD Follow up.    Specialties:  Internal Medicine, Hospitalist  Contact information:  [address] [facility] [address]  [place] [address]  [phone]             Ishmael Tate MD Follow up.    Specialty:  Pulmonary Disease  Contact information:  [address] [facility] [address]  [place] [address]  [phone]                 No future appointments.     Medication Reconciliation: Please see electronically completed Med Rec.    Total time spent discharging patient including evaluation, medication reconciliation, arranging follow up, and post hospitalization instructions and education total time exceeds 30 minutes.     Jemima Tran MD  03/19/19  5:12 PM      Dictated utilizing Dragon dictation

## 2019-03-20 ENCOUNTER — READMISSION MANAGEMENT (OUTPATIENT)
Dept: CALL CENTER | Facility: HOSPITAL | Age: 64
End: 2019-03-20

## 2019-03-20 NOTE — OUTREACH NOTE
Prep Survey      Responses   Facility patient discharged from?  [place]   Is patient eligible?  Yes   Discharge diagnosis  severe end-stage COPD   Does the patient have one of the following disease processes/diagnoses(primary or secondary)?  COPD/Pneumonia   Does the patient have Home health ordered?  No   Is there a DME ordered?  Yes   What DME was ordered?  [facility] for oxygen    Prep survey completed?  Yes          Sapphire Villalobos RN

## 2019-03-21 ENCOUNTER — READMISSION MANAGEMENT (OUTPATIENT)
Dept: CALL CENTER | Facility: HOSPITAL | Age: 64
End: 2019-03-21

## 2019-03-21 NOTE — OUTREACH NOTE
COPD/PN Week 1 Survey      Responses   Facility patient discharged from?  [place]   Does the patient have one of the following disease processes/diagnoses(primary or secondary)?  COPD/Pneumonia   Is there a successful TCM telephone encounter documented?  No   Was the primary reason for admission:  COPD exacerbation   Week 1 attempt successful?  No   Unsuccessful attempts  Attempt 1          Nilsa Molina RN

## 2019-03-22 ENCOUNTER — READMISSION MANAGEMENT (OUTPATIENT)
Dept: CALL CENTER | Facility: HOSPITAL | Age: 64
End: 2019-03-22

## 2019-03-22 NOTE — OUTREACH NOTE
COPD/PN Week 1 Survey      Responses   Facility patient discharged from?  [place]   Does the patient have one of the following disease processes/diagnoses(primary or secondary)?  COPD/Pneumonia   Is there a successful TCM telephone encounter documented?  No   Was the primary reason for admission:  COPD exacerbation   Week 1 attempt successful?  Yes   Call start time  1316   Rescheduled  Rescheduled-pt requested   Call end time  1318          Hilaria Schmidt RN

## 2019-03-23 ENCOUNTER — READMISSION MANAGEMENT (OUTPATIENT)
Dept: CALL CENTER | Facility: HOSPITAL | Age: 64
End: 2019-03-23

## 2019-03-23 NOTE — OUTREACH NOTE
COPD/PN Week 1 Survey      Responses   Facility patient discharged from?  [facility]   Does the patient have one of the following disease processes/diagnoses(primary or secondary)?  COPD/Pneumonia   Is there a successful TCM telephone encounter documented?  No   Was the primary reason for admission:  COPD exacerbation   Week 1 attempt successful?  No   Unsuccessful attempts  Attempt 1   Revoke  Decline to participate [Did not answer after requesting to be rescheduled. ]          Sahara Reno RN

## 2019-03-28 ENCOUNTER — OFFICE VISIT (OUTPATIENT)
Dept: CARDIOLOGY | Facility: CLINIC | Age: 64
End: 2019-03-28

## 2019-03-28 VITALS
SYSTOLIC BLOOD PRESSURE: 78 MMHG | BODY MASS INDEX: 26.98 KG/M2 | HEART RATE: 51 BPM | DIASTOLIC BLOOD PRESSURE: 58 MMHG | HEIGHT: 68 IN | WEIGHT: 178 LBS

## 2019-03-28 DIAGNOSIS — I25.10 CORONARY ARTERY DISEASE INVOLVING NATIVE CORONARY ARTERY OF NATIVE HEART WITHOUT ANGINA PECTORIS: ICD-10-CM

## 2019-03-28 DIAGNOSIS — I42.8 NON-ISCHEMIC CARDIOMYOPATHY (HCC): Primary | ICD-10-CM

## 2019-03-28 DIAGNOSIS — Z89.619 STATUS POST ABOVE KNEE AMPUTATION, UNSPECIFIED LATERALITY (HCC): ICD-10-CM

## 2019-03-28 DIAGNOSIS — Z87.19 H/O ANGIODYSPLASIA OF INTESTINAL TRACT: Chronic | ICD-10-CM

## 2019-03-28 DIAGNOSIS — I95.9 HYPOTENSION, UNSPECIFIED HYPOTENSION TYPE: ICD-10-CM

## 2019-03-28 DIAGNOSIS — I73.9 PVD (PERIPHERAL VASCULAR DISEASE) (HCC): ICD-10-CM

## 2019-03-28 DIAGNOSIS — J44.9 CHRONIC OBSTRUCTIVE PULMONARY DISEASE, UNSPECIFIED COPD TYPE (HCC): Chronic | ICD-10-CM

## 2019-03-28 PROCEDURE — 99214 OFFICE O/P EST MOD 30 MIN: CPT | Performed by: NURSE PRACTITIONER

## 2019-03-28 PROCEDURE — 93000 ELECTROCARDIOGRAM COMPLETE: CPT | Performed by: NURSE PRACTITIONER

## 2019-03-28 RX ORDER — CARVEDILOL 12.5 MG/1
6.25 TABLET ORAL 2 TIMES DAILY
Qty: 30 TABLET | Refills: 3 | Status: SHIPPED | OUTPATIENT
Start: 2019-03-28 | End: 2019-05-28 | Stop reason: HOSPADM

## 2019-03-28 NOTE — PROGRESS NOTES
Date of Office Visit: 2019  Encounter Provider: Jolene Sarabia, GABRIEL, APRN  Place of Service: [facility] CARDIOLOGY  Patient Name: Sim Agustin  :1955        Subjective:     Chief Complaint:  Follow-up, cardiomyopathy, heart failure, coronary artery disease.      History of Present Illness:  Sim Agustin is a 63 y.o. male patient of Dr. Sood.  This is my first time seeing this patient in the office today, and I have reviewed his records.    Patient has a history of heart failure, COPD, peripheral vascular disease, hypothyroidism, end-stage renal disease on hemodialysis, left above-knee amputation, CVA over 15 years ago.    Patient was seen by Dr. Sood 2015 in the hospital.  Echocardiogram showed EF of 55%, mild TR, RVSP 57 mmHg.  He had a normal nuclear stress test.    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  He had comp located recovery and received multiple transfusions.  He was noted to have intermittent ST elevation felt to be related to acute plaque rupture.  He was too ill at that time to go the Cath Lab.    He was admitted 2016 with dyspnea and back pain.  He was noted to have T wave inversions in V1 and V2 which were new.  Nuclear stress test showed EF 52% and infarction in the apical And anterior apex, infarction in the inferior wall, and mild yadiel-infarct ischemia in the inferior wall.  He was not having chest pain with exertion and was treated medically.    Patient was admitted 3/11/19 with progressive cough, subjective fever, and dyspnea.  He was admitted to the ICU for BiPAP use.  Echocardiogram was ordered due to increased oxygen demand.  It showed ejection fraction 29%, grade 1 diastolic dysfunction, hypokinesis of the apical anterior, mid inferior septal, and apex, akinesis of the apical septal wall, mild thickening of the aortic valve, and moderate pulmonary hypertension with RVSP  of 45 mmHg.    Cardiology was consulted.  It was reported that patient had been on dialysis in the past but is not currently on dialysis.  He also has COPD.  History of coronary artery disease.  Stents have been put in him a long time ago.  Now he had come to the hospital with what first sounded like COPD but now seems like more than would be expected for just lung disease.  He denies chest pain.  Breathing has improved.  No PND orthopnea.  He did stop smoking.  Left and right heart cath were recommended.    Patient had heart cath 3/18/19 showing mild nonobstructive and severe small vessel coronary artery disease.  Severe pulmonary hypertension with normal left-sided filling pressures were noted.  EF appeared to be mildly depressed at 45%.  There appeared to be normal contraction of the basal and mid segments but hypokinesis of the apical walls.  Continue medical management of cardiomyopathy was recommended.      Patient presents to office today for follow-up appointment.  Patient reports he has been feeling great since hospital discharge.  He reports that he has not been smoking in almost 1 month, he has thrown away all of his ashtrays, and he has no plans to restart smoking.  Highly encouraged that he do not restart smoking, especially given heart history.  Patient reports he is not currently on dialysis.  He has not been on it since 2016 at which point he was only on it for 3-4 weeks total after sepsis, but then kidney function returned to normal.  He has some chronic shortness of breath with heavier exertion that is not new or worsening.  He actually reports it has improved since he has stopped smoking.  He reports he was having a little bit of lower extremity swelling that has since resolved.  He has some mild fatigue, however this has improved since hospitalization.  He feels like he is now able to do more.  He denies any chest pain, cough, fevers, palpitations, dizziness, syncope, near syncope, falls, or  abnormal bleeding.  Systolic blood pressure was staying in the 90s in the hospital.  Today blood pressure is 78/54 mmHg and heart rate is 51 bpm.  Patient is completely asymptomatic.  Plan of care discussed with Dr. Sood.  At this point we will decrease his Coreg to 6.25 mg twice a day.  He will call if systolic blood pressure staying below 90 mmHg.  He reports his wife is a nurse and he does have a blood pressure cuff at home and he will start checking regularly.  Since he is completely asymptomatic he will recheck blood pressure at home and call if remains low.  Patient also to increase water intake, though he does report that he drinks a decent amount of water.    Patient to schedule 3 month hospital follow-up appointment with Dr. Sood or follow-up sooner if needed for any new, recurrent, or worsening symptoms or other problems/concerns.        Past Medical History:   Diagnosis Date   • Anemia     per [place] database   • CAD (coronary artery disease) 4/4/2016   • Colon polyps    • Compartment syndrome (CMS/HCC)     AFTER ILIAC SURGERY. ABOVE KNEE AMPUTATION   • COPD (chronic obstructive pulmonary disease) (CMS/HCC)    • Cough     SINCE APRIL WITH PNEUMONIA.    • Disease of thyroid gland     HYPOTHYRODISM   • Diverticulosis    • Employs prosthetic leg    • History of acute renal failure    • History of CHF (congestive heart failure)    • History of GI bleed 02/2016    AORTODUOD FISTULA   • History of sepsis 02/2016   • History of transfusion     MULTIPLE, 2016   • Hypertension    • Phantom limb pain (CMS/HCC)     SL, WITH LEFT ABOUVE KNEE   • Pneumonia     SPRING 2016  AFTER SURGERY   • PVD (peripheral vascular disease) (CMS/HCC)      Past Surgical History:   Procedure Laterality Date   • ABOVE KNEE AMPUTATION Left 3/16/2016    Procedure: LT AMPUTATION ABOVE KNEE;  Surgeon: Jose Swann MD;  Location: [facility] OR;  Service:    • ARTERIAL THROMBECTOMY  2001    throbectomy of arterial graft   • AXILLARY  FEMORAL BYPASS Right 02/15/2016    Exploratory lapartomy, repair aortoduodenal fistula, resection infected aortobifemoral bypass graft, axillobi-superficial femoral artery [place]-Aneudy bypass graft from right axillary artery, Repair of duodenotomy 4th portion duodenum-Dr. Jose Swann, Dr. Genaro Perrin   • BRONCHOSCOPY Left 03/04/2016    Dr. NATALIIA Tate   • BRONCHOSCOPY RIGID / FLEXIBLE N/A 03/03/2016    Dr. NATALIIA Tate   • BRONCHOSCOPY RIGID / FLEXIBLE N/A 02/26/2016    Dr. NATALIIA Tate   • CARDIAC CATHETERIZATION N/A 3/18/2019    Procedure: Right and Left Heart Cath;  Surgeon: Nina Storey MD;  Location: [facility] CATH INVASIVE LOCATION;  Service: Cardiovascular   • CATARACT EXTRACTION WITH INTRAOCULAR LENS IMPLANT Bilateral    • COLON RESECTION WITH COLOSTOMY Left 02/28/2016    Exploratory laparotomy with open left hemicolectomy, end-colostomy, G-tube and J-tube-Dr. Endy Chaney   • COLONOSCOPY N/A 2015    Dr. Laughlin   • COLONOSCOPY N/A 10/12/2016    Procedure: COLONOSCOPY TO 20 CM AND PER STOMA TO 30CM;  Surgeon: Genaro Perrin MD;  Location: [facility] ENDOSCOPY;  Service:    • COLONOSCOPY N/A 10/21/2016    Procedure: COLONOSCOPY DONE AT BEDSIDE ONTO CECEM;  Surgeon: Genaro Perrin MD;  Location: [facility] ENDOSCOPY;  Service:    • COLONOSCOPY N/A 02/10/2016    Diverticulosis in the entire examined colon, non-bleeding internal hemorrhoids-Dr. Taylor Pina   • COLONOSCOPY N/A 02/11/2016    Poor prep, blood in the entire examined colon, normal ileum-Dr. Taylor Pina   • COLONOSCOPY W/ POLYPECTOMY N/A 07/27/2015    Normal ileum, diverticulosis in the sigmoid colon: resected and retrieved, one 6 mm polyp in the descending colon: resected and retrieved, the distal rectum and anal verge are normal on retroflexion view-Dr. Miguel Ángel Laughlin   • COLOSTOMY CLOSURE N/A 10/13/2016    Procedure: COLOSTOMY TAKEDOWN OR CLOSURE, APPENDECTOMY;  Surgeon: Genaro Perrin MD;  Location: [facility] OR;   Service:    • DEBRIDEMENT LEG Left 03/01/2016    Excisional debridement of the skin, subcutantous tissue, tendon and muscle left calf-Dr. Jose Swann   • DEBRIDEMENT LEG Left 02/25/2016    Excisional debridement full-thcikness skin and subcutaneous tissue and tendon and muscle, left medial and lateral calf muscles-Dr. Jose Swann   • ENDOSCOPY N/A 10/21/2016    Procedure: ESOPHAGOGASTRODUODENOSCOPY DONE AT BEDSIDE;  Surgeon: Genaro Perrin MD;  Location: [facility] ENDOSCOPY;  Service:    • ENDOSCOPY AND COLONOSCOPY N/A 02/28/2016    EGD and Colonoscopy with Candy ink injection-Dr. Endy Chaney   • ENTEROSCOPY SMALL BOWEL N/A 02/11/2016    Normal esophagus, normal stomach, normal duodenum, portion of the jejunum normal-Dr. Taylor Pian   • ILIAC ARTERY - FEMORAL ARTERY BYPASS GRAFT Bilateral 2002   • ILIAC ARTERY STENT Bilateral 2001   • INSERTION AND REMOVAL PERITONEAL DIALYSIS CATHETER Right 02/29/2016    Exchange right jugular 16 cm [place] dialysis catheter-Dr. Jose Swann   • INSERTION PERITONEAL DIALYSIS CATHETER Left 03/01/2016    Ultrasound-guided access of the left jugular vein, 23 cm left jugular palindrome dialysis catheter placement-Dr. Jose Swann   • INSERTION PERITONEAL DIALYSIS CATHETER Right 02/18/2016    Right jugular Mahrkar catherer placement-Dr. Jose Swann   • JOINT REPLACEMENT Left    • THORACOSCOPY Left 4/18/2016    Procedure: LT THORACOSCOPY VIDEO ASSISTED WITH DECORDICATION;  Surgeon: Genaro Davila III, MD;  Location: [facility] OR;  Service:    • THROMBECTOMY Left 02/16/2016    Thombectomy of left femoral graft, left leg arteriogram, left calf forward compartment fasciotomy-Dr. Jose Swann   • TOTAL HIP ARTHROPLASTY Left    • UPPER GASTROINTESTINAL ENDOSCOPY N/A 02/09/2016    Normal UGI-Dr. Ajay Parkinson   • UPPER GASTROINTESTINAL ENDOSCOPY N/A 11/28/2015    Small hiatal hernia, chronic gastritis: biopsied, non-bleeding angioectasias in the stomach:  treated with argon plasma coagulation, multiple bleeding angioectasias in the dudenum: treated with argon plasma coagulation-Dr. Mykel Jaramillo   • VASCULAR SURGERY      MULTIPLE   • VENTRAL/INCISIONAL HERNIA REPAIR N/A 10/19/2017    Procedure: VENTRAL HERNIA REPAIR WITH MESH AND BILATERAL COMPONENT SEPARATION;  Surgeon: Genaro Perrin MD;  Location: [facility];  Service:    • WISDOM TOOTH EXTRACTION       Outpatient Medications Prior to Visit   Medication Sig Dispense Refill   • albuterol (PROVENTIL HFA;VENTOLIN HFA) 108 (90 BASE) MCG/ACT inhaler Inhale 2 puffs Every 6 (Six) Hours As Needed for wheezing (RESCUE INHALER).     • ALPRAZolam (XANAX) 1 MG tablet Take 1 mg by mouth 4 (Four) Times a Day As Needed.     • aspirin 81 MG chewable tablet Chew 81 mg Every Night.     • Cholecalciferol (VITAMIN D3) 5000 UNITS tablet Take 5,000 Units by mouth Daily.     • clopidogrel (PLAVIX) 75 MG tablet Take 75 mg by mouth Every Night.     • Coenzyme Q10 (CO Q10) 100 MG capsule Take 400 mg by mouth Every Night.     • FERROUS SULFATE PO Take 130 mg by mouth Daily.     • Fluticasone-Umeclidin-Vilant (TRELEGY ELLIPTA) 100-62.5-25 MCG/INH aerosol powder  Inhale 1 each Daily. 28 each 2   • gabapentin (NEURONTIN) 300 MG capsule Take 300 mg by mouth 3 (Three) Times a Day.     • HYDROcodone-acetaminophen (NORCO) 5-325 MG per tablet Take 1 tablet by mouth Every 6 (Six) Hours As Needed.     • isosorbide mononitrate (IMDUR) 30 MG 24 hr tablet Take 30 mg by mouth Every Morning.     • levothyroxine (SYNTHROID, LEVOTHROID) 125 MCG tablet Take 125 mcg by mouth Daily.     • Multiple Vitamins-Minerals (MULTIVITAMIN ADULT PO) Take 1 tablet by mouth Daily.     • NIACIN PO Take 1 capsule by mouth Every Morning.     • omeprazole (priLOSEC) 20 MG capsule Take 20 mg by mouth Every Morning.     • simvastatin (ZOCOR) 40 MG tablet Take 40 mg by mouth Every Night.     • sucralfate (CARAFATE) 1 g tablet Take 1 g by mouth As Needed.     • Thiamine HCl  (VITAMIN B-1 PO) Take 1 tablet by mouth Daily.     • carvedilol (COREG) 25 MG tablet Take 12.5 mg by mouth 2 (Two) Times a Day.     • ALPRAZolam (XANAX) 1 MG tablet Take 1 mg by mouth 4 (Four) Times a Day As Needed for Anxiety.       No facility-administered medications prior to visit.        Allergies as of 03/28/2019 - Reviewed 03/28/2019   Allergen Reaction Noted   • Augmentin [amoxicillin-pot clavulanate] Hives 03/03/2016     Social History     Socioeconomic History   • Marital status:      Spouse name: Not on file   • Number of children: Not on file   • Years of education: Not on file   • Highest education level: Not on file   Tobacco Use   • Smoking status: Former Smoker     Packs/day: 1.00     Years: 30.00     Pack years: 30.00     Types: Cigarettes     Last attempt to quit: 2016     Years since quitting: 3.2   • Smokeless tobacco: Never Used   Substance and Sexual Activity   • Alcohol use: No   • Drug use: No   • Sexual activity: Defer     Family History   Problem Relation Age of Onset   • Lung cancer Mother    • Heart disease Father    • Malig Hyperthermia Neg Hx        Review of Systems   Constitution: Positive for malaise/fatigue (IMPROVING). Negative for chills, fever, weight gain and weight loss.   HENT: Negative for ear pain, hearing loss, nosebleeds and sore throat.    Eyes: Negative for blurred vision, double vision, redness, vision loss in left eye, vision loss in right eye and visual disturbance.   Cardiovascular: Positive for dyspnea on exertion (Chronic, not new, improved since stopped smoking ).        SEE HPI.    Respiratory: Negative for cough, shortness of breath, snoring and wheezing.    Endocrine: Negative for cold intolerance and heat intolerance.   Hematologic/Lymphatic: Negative for bleeding problem.   Skin: Negative for itching, rash and suspicious lesions.   Musculoskeletal: Negative for falls, joint pain, joint swelling and myalgias.   Gastrointestinal: Negative for abdominal  "pain, diarrhea, hematemesis, melena, nausea and vomiting.   Genitourinary: Negative for dysuria, frequency and hematuria.   Neurological: Negative for dizziness, headaches, numbness, paresthesias and seizures.   Psychiatric/Behavioral: Negative for altered mental status and depression. The patient is not nervous/anxious.           Objective:     Vitals:    03/28/19 0944   BP: (!) 78/58   BP Location: Left arm   Pulse: 51   Weight: 80.7 kg (178 lb)   Height: 172.7 cm (68\")   REPEAT 78/54 mmHg.    Body mass index is 27.06 kg/m².      PHYSICAL EXAM:  Physical Exam   Constitutional: He is oriented to person, place, and time. He appears well-developed and well-nourished. No distress.   HENT:   Head: Normocephalic and atraumatic.   Eyes: Pupils are equal, round, and reactive to light.   Neck: Neck supple. No JVD present. Carotid bruit is not present. No tracheal deviation present.   Cardiovascular: Normal rate, regular rhythm, normal heart sounds and intact distal pulses. Exam reveals no gallop and no friction rub.   No murmur heard.  Pulses:       Radial pulses are 2+ on the right side, and 2+ on the left side.        Posterior tibial pulses are 2+ on the right side.   Pulmonary/Chest: Effort normal and breath sounds normal. No respiratory distress. He has no wheezes. He has no rales.   Abdominal: Soft. Bowel sounds are normal. He exhibits no distension. There is no tenderness.   Musculoskeletal: He exhibits no edema, tenderness or deformity.   Left below-knee amputation.   Neurological: He is alert and oriented to person, place, and time.   Skin: Skin is warm and dry. No rash noted. He is not diaphoretic. No erythema.   Psychiatric: He has a normal mood and affect. His behavior is normal. Judgment normal.           ECG 12 Lead  Date/Time: 3/28/2019 10:53 AM  Performed by: Jolene Sarabia DNP, MANDO  Authorized by: Jolene Sarabia DNP, MANDO   Comparison: compared with previous ECG from 3/11/2019  Similar to previous " ECG  Comparison to previous ECG: Heart rate slightly lower on today's ECG compared with previous.  Rhythm: sinus rhythm  Rate: bradycardic  BPM: 51    Clinical impression: non-specific ECG              Assessment:       Diagnosis Plan   1. Non-ischemic cardiomyopathy (CMS/HCC)  carvedilol (COREG) 12.5 MG tablet   2. Hypotension, unspecified hypotension type     3. Coronary artery disease involving native coronary artery of native heart without angina pectoris  carvedilol (COREG) 12.5 MG tablet   4. Chronic obstructive pulmonary disease, unspecified COPD type (CMS/HCC)     5. Status post above knee amputation, unspecified laterality (CMS/[facility])     6. H/O angiodysplasia of intestinal tract     7. PVD (peripheral vascular disease) (CMS/[facility])           Plan:     1. Hypotension: Patient completely asymptomatic.  Will reduce carvedilol from 12.5 mg twice daily to 6.25 mg twice daily.  Patient to monitor blood pressure at home and call if systolic blood pressure staying below 90 mmHg, as this is what it was running in the hospital.  He will also call if he develops any symptoms or concerns.  2. Cardiomyopathy: Nonischemic.  Normal filling pressures.  EF closer to 45% on heart cath.  Continue carvedilol.  Will reduce carvedilol dose, as above.  Blood pressure too low at this time for ACE or ARB.  We will continue to monitor.  3. Coronary artery disease: Nonobstructive and severe small vessel disease seen on 3/2019 heart cath.  Medical therapy recommended.  On Imdur 30 mg.  4. Pulmonary hypertension: Severe on 3/2019 heart cath with normal left sided filling pressures.  5. Hypertension: Blood pressure on the low side in the hospital.  Amlodipine was held.  6. COPD: Managed by outside provider.  7. Peripheral vascular disease  8. 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  9. Former smoker: Reports he quit almost 1 month ago.  Has thrown away all of his  nitin.  Wife also quit.  He does not plan to restart.  He reports he is done smoking.  Highly encouraged not to restart smoking.  10. History of end-stage renal disease: Resolved.  This was in 2016 after sepsis.  Was only on hemodialysis 3-4 weeks total.  Reports that kidney function then returned to normal.  He has not had end-stage renal disease or been on dialysis since 2016.  Kidney function remained normal while in the hospital.    Plan of care reviewed with Dr. Indigo MD.    Patient to schedule 3 month hospital follow-up appointment with Dr. Sood in June.  He will follow-up sooner if needed for any new, recurrent, or worsening symptoms or other problems/concerns.           Your medication list           Accurate as of 3/28/19 10:59 AM. If you have any questions, ask your nurse or doctor.               CHANGE how you take these medications      Instructions Last Dose Given Next Dose Due   carvedilol 12.5 MG tablet  Commonly known as:  COREG  What changed:    · medication strength  · how much to take  · when to take this  Changed by:  Jolene aSrabia, DNP, APRN      Take 0.5 tablets by mouth 2 (Two) Times a Day.          CONTINUE taking these medications      Instructions Last Dose Given Next Dose Due   albuterol sulfate  (90 Base) MCG/ACT inhaler  Commonly known as:  PROVENTIL HFA;VENTOLIN HFA;PROAIR HFA      Inhale 2 puffs Every 6 (Six) Hours As Needed for wheezing (RESCUE INHALER).       ALPRAZolam 1 MG tablet  Commonly known as:  XANAX      Take 1 mg by mouth 4 (Four) Times a Day As Needed.       aspirin 81 MG chewable tablet      Chew 81 mg Every Night.       clopidogrel 75 MG tablet  Commonly known as:  PLAVIX      Take 75 mg by mouth Every Night.       Co Q10 100 MG capsule      Take 400 mg by mouth Every Night.       FERROUS SULFATE PO      Take 130 mg by mouth Daily.       Fluticasone-Umeclidin-Vilant 100-62.5-25 MCG/INH aerosol powder   Commonly known as:  TRELEGY ELLIPTA      Inhale 1 each  Daily.       gabapentin 300 MG capsule  Commonly known as:  NEURONTIN      Take 300 mg by mouth 3 (Three) Times a Day.       HYDROcodone-acetaminophen 5-325 MG per tablet  Commonly known as:  NORCO      Take 1 tablet by mouth Every 6 (Six) Hours As Needed.       isosorbide mononitrate 30 MG 24 hr tablet  Commonly known as:  IMDUR      Take 30 mg by mouth Every Morning.       levothyroxine 125 MCG tablet  Commonly known as:  SYNTHROID, LEVOTHROID      Take 125 mcg by mouth Daily.       MULTIVITAMIN ADULT PO      Take 1 tablet by mouth Daily.       NIACIN PO      Take 1 capsule by mouth Every Morning.       omeprazole 20 MG capsule  Commonly known as:  priLOSEC      Take 20 mg by mouth Every Morning.       simvastatin 40 MG tablet  Commonly known as:  ZOCOR      Take 40 mg by mouth Every Night.       sucralfate 1 g tablet  Commonly known as:  CARAFATE      Take 1 g by mouth As Needed.       VITAMIN B-1 PO      Take 1 tablet by mouth Daily.       vitamin D3 5000 units tablet tablet      Take 5,000 Units by mouth Daily.             Where to Get Your Medications      These medications were sent to [facility] [address] - [place], [place] - [address] [facility] [phone]  - [phone]   [address] [facility] [address]    Phone:  [phone]   · carvedilol 12.5 MG tablet         The above medication changes may not have been made by this provider.  Patient's medication list was updated to reflect medications they are currently taking including medication changes made by other providers.            Thanks,    Jolene Sarabia, GABRIEL, APRN  03/28/2019             Dictated utilizing Dragon dictation

## 2019-04-15 ENCOUNTER — HOSPITAL ENCOUNTER (OUTPATIENT)
Dept: SLEEP MEDICINE | Facility: HOSPITAL | Age: 64
Discharge: HOME | End: 2019-04-15
Admitting: INTERNAL MEDICINE

## 2019-04-15 DIAGNOSIS — G47.33 OSA (OBSTRUCTIVE SLEEP APNEA): ICD-10-CM

## 2019-04-15 PROCEDURE — 95811 POLYSOM 6/>YRS CPAP 4/> PARM: CPT

## 2019-04-24 ENCOUNTER — TELEPHONE (OUTPATIENT)
Dept: SLEEP MEDICINE | Facility: HOSPITAL | Age: 64
End: 2019-04-24

## 2019-04-24 NOTE — TELEPHONE ENCOUNTER
Tech called home #, no answer. Left vm to return call to review study results and confirm DME choice. Patient is to follow up at [facility]. MAB

## 2019-04-25 ENCOUNTER — TELEPHONE (OUTPATIENT)
Dept: SLEEP MEDICINE | Facility: HOSPITAL | Age: 64
End: 2019-04-25

## 2019-04-25 NOTE — TELEPHONE ENCOUNTER
Patient called back into sleep center. Tech went over study results and 's impression. Patient confirmed DME of [facility] and verbalized his understanding to Follow up with [facility]. Mailed results letter to patient as well. MAB

## 2019-05-01 ENCOUNTER — DOCUMENTATION (OUTPATIENT)
Dept: SLEEP MEDICINE | Facility: HOSPITAL | Age: 64
End: 2019-05-01

## 2019-05-01 NOTE — PROGRESS NOTES
Patient was tried on CPAP during the titration study but he failed with persistent respiratory events requiring BiPAP treatment.

## 2019-05-23 ENCOUNTER — APPOINTMENT (OUTPATIENT)
Dept: GENERAL RADIOLOGY | Facility: HOSPITAL | Age: 64
End: 2019-05-23

## 2019-05-23 ENCOUNTER — HOSPITAL ENCOUNTER (INPATIENT)
Facility: HOSPITAL | Age: 64
LOS: 5 days | Discharge: HOME | End: 2019-05-28
Attending: EMERGENCY MEDICINE | Admitting: SPECIALIST

## 2019-05-23 DIAGNOSIS — N39.0 ACUTE UTI: Primary | ICD-10-CM

## 2019-05-23 DIAGNOSIS — J44.1 COPD WITH ACUTE EXACERBATION (HCC): ICD-10-CM

## 2019-05-23 DIAGNOSIS — G93.41 METABOLIC ENCEPHALOPATHY: ICD-10-CM

## 2019-05-23 DIAGNOSIS — R00.1 SINUS BRADYCARDIA: ICD-10-CM

## 2019-05-23 PROBLEM — J44.9 COPD (CHRONIC OBSTRUCTIVE PULMONARY DISEASE): Status: ACTIVE | Noted: 2019-05-23

## 2019-05-23 PROBLEM — R09.02 COPD WITH HYPOXIA (HCC): Status: ACTIVE | Noted: 2019-05-23

## 2019-05-23 LAB
ALBUMIN SERPL-MCNC: 4.4 G/DL (ref 3.5–5.2)
ALBUMIN/GLOB SERPL: 1.6 G/DL
ALP SERPL-CCNC: 62 U/L (ref 39–117)
ALT SERPL W P-5'-P-CCNC: 18 U/L (ref 1–41)
AMPHET+METHAMPHET UR QL: NEGATIVE
ANION GAP SERPL CALCULATED.3IONS-SCNC: 11.5 MMOL/L
APTT PPP: 22.6 SECONDS (ref 22.7–35.4)
ARTERIAL PATENCY WRIST A: POSITIVE
AST SERPL-CCNC: 20 U/L (ref 1–40)
ATMOSPHERIC PRESS: 755.7 MMHG
B PARAPERT DNA SPEC QL NAA+PROBE: NOT DETECTED
B PERT DNA SPEC QL NAA+PROBE: NOT DETECTED
BACTERIA UR QL AUTO: ABNORMAL /HPF
BARBITURATES UR QL SCN: NEGATIVE
BASE EXCESS BLDA CALC-SCNC: 1 MMOL/L (ref 0–2)
BASOPHILS # BLD AUTO: 0.02 10*3/MM3 (ref 0–0.2)
BASOPHILS NFR BLD AUTO: 0.2 % (ref 0–1.5)
BDY SITE: ABNORMAL
BENZODIAZ UR QL SCN: POSITIVE
BILIRUB SERPL-MCNC: 0.8 MG/DL (ref 0.2–1.2)
BILIRUB UR QL STRIP: NEGATIVE
BUN BLD-MCNC: 14 MG/DL (ref 8–23)
BUN/CREAT SERPL: 13 (ref 7–25)
C PNEUM DNA NPH QL NAA+NON-PROBE: NOT DETECTED
CALCIUM SPEC-SCNC: 9.3 MG/DL (ref 8.6–10.5)
CANNABINOIDS SERPL QL: NEGATIVE
CHLORIDE SERPL-SCNC: 98 MMOL/L (ref 98–107)
CLARITY UR: ABNORMAL
CO2 SERPL-SCNC: 27.5 MMOL/L (ref 22–29)
COCAINE UR QL: NEGATIVE
COLOR UR: YELLOW
CREAT BLD-MCNC: 1.08 MG/DL (ref 0.76–1.27)
DEPRECATED RDW RBC AUTO: 46.8 FL (ref 37–54)
EOSINOPHIL # BLD AUTO: 0.06 10*3/MM3 (ref 0–0.4)
EOSINOPHIL NFR BLD AUTO: 0.5 % (ref 0.3–6.2)
ERYTHROCYTE [DISTWIDTH] IN BLOOD BY AUTOMATED COUNT: 13 % (ref 12.3–15.4)
ETHANOL BLD-MCNC: <10 MG/DL (ref 0–10)
ETHANOL UR QL: <0.01 %
FLUAV H1 2009 PAND RNA NPH QL NAA+PROBE: NOT DETECTED
FLUAV H1 HA GENE NPH QL NAA+PROBE: NOT DETECTED
FLUAV H3 RNA NPH QL NAA+PROBE: NOT DETECTED
FLUAV SUBTYP SPEC NAA+PROBE: NOT DETECTED
FLUBV RNA ISLT QL NAA+PROBE: NOT DETECTED
GAS FLOW AIRWAY: 2.5 LPM
GFR SERPL CREATININE-BSD FRML MDRD: 69 ML/MIN/1.73
GLOBULIN UR ELPH-MCNC: 2.8 GM/DL
GLUCOSE BLD-MCNC: 127 MG/DL (ref 65–99)
GLUCOSE UR STRIP-MCNC: NEGATIVE MG/DL
HADV DNA SPEC NAA+PROBE: NOT DETECTED
HCO3 BLDA-SCNC: 27.9 MMOL/L (ref 22–28)
HCOV 229E RNA SPEC QL NAA+PROBE: NOT DETECTED
HCOV HKU1 RNA SPEC QL NAA+PROBE: NOT DETECTED
HCOV NL63 RNA SPEC QL NAA+PROBE: NOT DETECTED
HCOV OC43 RNA SPEC QL NAA+PROBE: NOT DETECTED
HCT VFR BLD AUTO: 56.5 % (ref 37.5–51)
HGB BLD-MCNC: 17.9 G/DL (ref 13–17.7)
HGB UR QL STRIP.AUTO: ABNORMAL
HMPV RNA NPH QL NAA+NON-PROBE: NOT DETECTED
HPIV1 RNA SPEC QL NAA+PROBE: NOT DETECTED
HPIV2 RNA SPEC QL NAA+PROBE: NOT DETECTED
HPIV3 RNA NPH QL NAA+PROBE: NOT DETECTED
HPIV4 P GENE NPH QL NAA+PROBE: NOT DETECTED
HYALINE CASTS UR QL AUTO: ABNORMAL /LPF
IMM GRANULOCYTES # BLD AUTO: 0.04 10*3/MM3 (ref 0–0.05)
IMM GRANULOCYTES NFR BLD AUTO: 0.4 % (ref 0–0.5)
INR PPP: 0.92 (ref 0.9–1.1)
KETONES UR QL STRIP: NEGATIVE
LEUKOCYTE ESTERASE UR QL STRIP.AUTO: ABNORMAL
LYMPHOCYTES # BLD AUTO: 1.54 10*3/MM3 (ref 0.7–3.1)
LYMPHOCYTES NFR BLD AUTO: 14 % (ref 19.6–45.3)
M PNEUMO IGG SER IA-ACNC: NOT DETECTED
MAGNESIUM SERPL-MCNC: 1.6 MG/DL (ref 1.6–2.4)
MCH RBC QN AUTO: 31.2 PG (ref 26.6–33)
MCHC RBC AUTO-ENTMCNC: 31.7 G/DL (ref 31.5–35.7)
MCV RBC AUTO: 98.6 FL (ref 79–97)
METHADONE UR QL SCN: NEGATIVE
MODALITY: ABNORMAL
MONOCYTES # BLD AUTO: 0.72 10*3/MM3 (ref 0.1–0.9)
MONOCYTES NFR BLD AUTO: 6.6 % (ref 5–12)
NEUTROPHILS # BLD AUTO: 8.6 10*3/MM3 (ref 1.7–7)
NEUTROPHILS NFR BLD AUTO: 78.3 % (ref 42.7–76)
NITRITE UR QL STRIP: POSITIVE
NRBC BLD AUTO-RTO: 0 /100 WBC (ref 0–0.2)
NT-PROBNP SERPL-MCNC: 1305 PG/ML (ref 5–900)
OPIATES UR QL: NEGATIVE
OXYCODONE UR QL SCN: NEGATIVE
PCO2 BLDA: 50.5 MM HG (ref 35–45)
PH BLDA: 7.35 PH UNITS (ref 7.35–7.45)
PH UR STRIP.AUTO: 6 [PH] (ref 5–8)
PLATELET # BLD AUTO: 201 10*3/MM3 (ref 140–450)
PMV BLD AUTO: 9.9 FL (ref 6–12)
PO2 BLDA: 92.3 MM HG (ref 80–100)
POTASSIUM BLD-SCNC: 3.8 MMOL/L (ref 3.5–5.2)
PROT SERPL-MCNC: 7.2 G/DL (ref 6–8.5)
PROT UR QL STRIP: ABNORMAL
PROTHROMBIN TIME: 12.1 SECONDS (ref 11.7–14.2)
RBC # BLD AUTO: 5.73 10*6/MM3 (ref 4.14–5.8)
RBC # UR: ABNORMAL /HPF
REF LAB TEST METHOD: ABNORMAL
RHINOVIRUS RNA SPEC NAA+PROBE: NOT DETECTED
RSV RNA NPH QL NAA+NON-PROBE: NOT DETECTED
SAO2 % BLDCOA: 96.6 % (ref 92–99)
SODIUM BLD-SCNC: 137 MMOL/L (ref 136–145)
SP GR UR STRIP: >=1.03 (ref 1–1.03)
SQUAMOUS #/AREA URNS HPF: ABNORMAL /HPF
TOTAL RATE: 24 BREATHS/MINUTE
TROPONIN T SERPL-MCNC: <0.01 NG/ML (ref 0–0.03)
TSH SERPL DL<=0.05 MIU/L-ACNC: 4.39 MIU/ML (ref 0.27–4.2)
UROBILINOGEN UR QL STRIP: ABNORMAL
WBC NRBC COR # BLD: 10.98 10*3/MM3 (ref 3.4–10.8)
WBC UR QL AUTO: ABNORMAL /HPF

## 2019-05-23 PROCEDURE — 25010000002 METHYLPREDNISOLONE PER 125 MG: Performed by: EMERGENCY MEDICINE

## 2019-05-23 PROCEDURE — 94799 UNLISTED PULMONARY SVC/PX: CPT

## 2019-05-23 PROCEDURE — 80307 DRUG TEST PRSMV CHEM ANLYZR: CPT | Performed by: EMERGENCY MEDICINE

## 2019-05-23 PROCEDURE — 94640 AIRWAY INHALATION TREATMENT: CPT

## 2019-05-23 PROCEDURE — 25010000002 ENOXAPARIN PER 10 MG: Performed by: SPECIALIST

## 2019-05-23 PROCEDURE — 93010 ELECTROCARDIOGRAM REPORT: CPT | Performed by: INTERNAL MEDICINE

## 2019-05-23 PROCEDURE — 87486 CHLMYD PNEUM DNA AMP PROBE: CPT | Performed by: INTERNAL MEDICINE

## 2019-05-23 PROCEDURE — 85025 COMPLETE CBC W/AUTO DIFF WBC: CPT | Performed by: EMERGENCY MEDICINE

## 2019-05-23 PROCEDURE — 84443 ASSAY THYROID STIM HORMONE: CPT | Performed by: INTERNAL MEDICINE

## 2019-05-23 PROCEDURE — 83735 ASSAY OF MAGNESIUM: CPT | Performed by: EMERGENCY MEDICINE

## 2019-05-23 PROCEDURE — 81001 URINALYSIS AUTO W/SCOPE: CPT | Performed by: EMERGENCY MEDICINE

## 2019-05-23 PROCEDURE — 85610 PROTHROMBIN TIME: CPT | Performed by: EMERGENCY MEDICINE

## 2019-05-23 PROCEDURE — 87798 DETECT AGENT NOS DNA AMP: CPT | Performed by: INTERNAL MEDICINE

## 2019-05-23 PROCEDURE — 99291 CRITICAL CARE FIRST HOUR: CPT

## 2019-05-23 PROCEDURE — 71045 X-RAY EXAM CHEST 1 VIEW: CPT

## 2019-05-23 PROCEDURE — 87581 M.PNEUMON DNA AMP PROBE: CPT | Performed by: INTERNAL MEDICINE

## 2019-05-23 PROCEDURE — 82803 BLOOD GASES ANY COMBINATION: CPT

## 2019-05-23 PROCEDURE — 99222 1ST HOSP IP/OBS MODERATE 55: CPT | Performed by: INTERNAL MEDICINE

## 2019-05-23 PROCEDURE — 25010000002 CEFTRIAXONE PER 250 MG: Performed by: EMERGENCY MEDICINE

## 2019-05-23 PROCEDURE — 36600 WITHDRAWAL OF ARTERIAL BLOOD: CPT

## 2019-05-23 PROCEDURE — 80053 COMPREHEN METABOLIC PANEL: CPT | Performed by: EMERGENCY MEDICINE

## 2019-05-23 PROCEDURE — 93005 ELECTROCARDIOGRAM TRACING: CPT | Performed by: EMERGENCY MEDICINE

## 2019-05-23 PROCEDURE — 84484 ASSAY OF TROPONIN QUANT: CPT | Performed by: EMERGENCY MEDICINE

## 2019-05-23 PROCEDURE — 83880 ASSAY OF NATRIURETIC PEPTIDE: CPT | Performed by: EMERGENCY MEDICINE

## 2019-05-23 PROCEDURE — 87633 RESP VIRUS 12-25 TARGETS: CPT | Performed by: INTERNAL MEDICINE

## 2019-05-23 PROCEDURE — 85730 THROMBOPLASTIN TIME PARTIAL: CPT | Performed by: EMERGENCY MEDICINE

## 2019-05-23 RX ORDER — SUCRALFATE 1 G/1
1 TABLET ORAL
Status: DISCONTINUED | OUTPATIENT
Start: 2019-05-23 | End: 2019-05-28 | Stop reason: HOSPADM

## 2019-05-23 RX ORDER — CARVEDILOL 12.5 MG/1
12.5 TABLET ORAL EVERY 12 HOURS SCHEDULED
Status: DISCONTINUED | OUTPATIENT
Start: 2019-05-23 | End: 2019-05-23

## 2019-05-23 RX ORDER — ATORVASTATIN CALCIUM 10 MG/1
10 TABLET, FILM COATED ORAL DAILY
Status: DISCONTINUED | OUTPATIENT
Start: 2019-05-23 | End: 2019-05-28 | Stop reason: HOSPADM

## 2019-05-23 RX ORDER — ISOSORBIDE MONONITRATE 30 MG/1
30 TABLET, EXTENDED RELEASE ORAL
Status: DISCONTINUED | OUTPATIENT
Start: 2019-05-23 | End: 2019-05-28 | Stop reason: HOSPADM

## 2019-05-23 RX ORDER — CEFTRIAXONE SODIUM 1 G/50ML
1 INJECTION, SOLUTION INTRAVENOUS EVERY 24 HOURS
Status: DISCONTINUED | OUTPATIENT
Start: 2019-05-24 | End: 2019-05-28 | Stop reason: HOSPADM

## 2019-05-23 RX ORDER — ALPRAZOLAM 0.5 MG/1
1 TABLET ORAL 3 TIMES DAILY PRN
Status: DISCONTINUED | OUTPATIENT
Start: 2019-05-23 | End: 2019-05-28 | Stop reason: HOSPADM

## 2019-05-23 RX ORDER — ACETAMINOPHEN 325 MG/1
650 TABLET ORAL EVERY 4 HOURS PRN
Status: DISCONTINUED | OUTPATIENT
Start: 2019-05-23 | End: 2019-05-28 | Stop reason: HOSPADM

## 2019-05-23 RX ORDER — METHYLPREDNISOLONE SODIUM SUCCINATE 125 MG/2ML
125 INJECTION, POWDER, LYOPHILIZED, FOR SOLUTION INTRAMUSCULAR; INTRAVENOUS ONCE
Status: COMPLETED | OUTPATIENT
Start: 2019-05-23 | End: 2019-05-23

## 2019-05-23 RX ORDER — IPRATROPIUM BROMIDE AND ALBUTEROL SULFATE 2.5; .5 MG/3ML; MG/3ML
3 SOLUTION RESPIRATORY (INHALATION) ONCE
Status: COMPLETED | OUTPATIENT
Start: 2019-05-23 | End: 2019-05-23

## 2019-05-23 RX ORDER — SODIUM CHLORIDE 0.9 % (FLUSH) 0.9 %
3-10 SYRINGE (ML) INJECTION AS NEEDED
Status: DISCONTINUED | OUTPATIENT
Start: 2019-05-23 | End: 2019-05-28 | Stop reason: HOSPADM

## 2019-05-23 RX ORDER — ISOSORBIDE MONONITRATE 30 MG/1
30 TABLET, EXTENDED RELEASE ORAL EVERY MORNING
Status: DISCONTINUED | OUTPATIENT
Start: 2019-05-24 | End: 2019-05-23 | Stop reason: SDUPTHER

## 2019-05-23 RX ORDER — LEVOTHYROXINE SODIUM 0.12 MG/1
125 TABLET ORAL DAILY
Status: DISCONTINUED | OUTPATIENT
Start: 2019-05-23 | End: 2019-05-28 | Stop reason: HOSPADM

## 2019-05-23 RX ORDER — SODIUM CHLORIDE 0.9 % (FLUSH) 0.9 %
3 SYRINGE (ML) INJECTION EVERY 12 HOURS SCHEDULED
Status: DISCONTINUED | OUTPATIENT
Start: 2019-05-23 | End: 2019-05-28 | Stop reason: HOSPADM

## 2019-05-23 RX ORDER — CLONIDINE HYDROCHLORIDE 0.1 MG/1
0.1 TABLET ORAL ONCE
Status: COMPLETED | OUTPATIENT
Start: 2019-05-23 | End: 2019-05-23

## 2019-05-23 RX ORDER — CARVEDILOL 3.12 MG/1
3.12 TABLET ORAL EVERY 12 HOURS SCHEDULED
Status: DISCONTINUED | OUTPATIENT
Start: 2019-05-24 | End: 2019-05-24

## 2019-05-23 RX ORDER — IPRATROPIUM BROMIDE AND ALBUTEROL SULFATE 2.5; .5 MG/3ML; MG/3ML
3 SOLUTION RESPIRATORY (INHALATION)
Status: DISCONTINUED | OUTPATIENT
Start: 2019-05-23 | End: 2019-05-28 | Stop reason: HOSPADM

## 2019-05-23 RX ORDER — SODIUM CHLORIDE 9 MG/ML
75 INJECTION, SOLUTION INTRAVENOUS CONTINUOUS
Status: DISCONTINUED | OUTPATIENT
Start: 2019-05-23 | End: 2019-05-23

## 2019-05-23 RX ORDER — ALBUTEROL SULFATE 2.5 MG/3ML
2.5 SOLUTION RESPIRATORY (INHALATION) EVERY 6 HOURS PRN
Status: DISCONTINUED | OUTPATIENT
Start: 2019-05-23 | End: 2019-05-28 | Stop reason: HOSPADM

## 2019-05-23 RX ORDER — CLOPIDOGREL BISULFATE 75 MG/1
75 TABLET ORAL NIGHTLY
Status: DISCONTINUED | OUTPATIENT
Start: 2019-05-23 | End: 2019-05-28 | Stop reason: HOSPADM

## 2019-05-23 RX ORDER — LOSARTAN POTASSIUM 25 MG/1
25 TABLET ORAL
Status: DISCONTINUED | OUTPATIENT
Start: 2019-05-23 | End: 2019-05-24

## 2019-05-23 RX ORDER — ASPIRIN 81 MG/1
81 TABLET, CHEWABLE ORAL NIGHTLY
Status: DISCONTINUED | OUTPATIENT
Start: 2019-05-23 | End: 2019-05-28 | Stop reason: HOSPADM

## 2019-05-23 RX ORDER — PANTOPRAZOLE SODIUM 40 MG/1
40 TABLET, DELAYED RELEASE ORAL EVERY MORNING
Status: DISCONTINUED | OUTPATIENT
Start: 2019-05-24 | End: 2019-05-28 | Stop reason: HOSPADM

## 2019-05-23 RX ORDER — CEFTRIAXONE SODIUM 1 G/50ML
1 INJECTION, SOLUTION INTRAVENOUS ONCE
Status: COMPLETED | OUTPATIENT
Start: 2019-05-23 | End: 2019-05-23

## 2019-05-23 RX ORDER — SODIUM CHLORIDE 0.9 % (FLUSH) 0.9 %
10 SYRINGE (ML) INJECTION AS NEEDED
Status: DISCONTINUED | OUTPATIENT
Start: 2019-05-23 | End: 2019-05-28 | Stop reason: HOSPADM

## 2019-05-23 RX ADMIN — ISOSORBIDE MONONITRATE 30 MG: 30 TABLET ORAL at 14:33

## 2019-05-23 RX ADMIN — CLONIDINE HYDROCHLORIDE 0.1 MG: 0.1 TABLET ORAL at 12:21

## 2019-05-23 RX ADMIN — LOSARTAN POTASSIUM 25 MG: 25 TABLET, FILM COATED ORAL at 18:42

## 2019-05-23 RX ADMIN — SODIUM CHLORIDE, PRESERVATIVE FREE 3 ML: 5 INJECTION INTRAVENOUS at 21:12

## 2019-05-23 RX ADMIN — SUCRALFATE 1 G: 1 TABLET ORAL at 20:59

## 2019-05-23 RX ADMIN — IPRATROPIUM BROMIDE AND ALBUTEROL SULFATE 3 ML: 2.5; .5 SOLUTION RESPIRATORY (INHALATION) at 19:43

## 2019-05-23 RX ADMIN — CEFTRIAXONE SODIUM 1 G: 1 INJECTION, SOLUTION INTRAVENOUS at 09:52

## 2019-05-23 RX ADMIN — IPRATROPIUM BROMIDE AND ALBUTEROL SULFATE 3 ML: .5; 3 SOLUTION RESPIRATORY (INHALATION) at 07:55

## 2019-05-23 RX ADMIN — LEVOTHYROXINE SODIUM 125 MCG: 125 TABLET ORAL at 18:42

## 2019-05-23 RX ADMIN — CARVEDILOL 12.5 MG: 12.5 TABLET, FILM COATED ORAL at 14:34

## 2019-05-23 RX ADMIN — ALPRAZOLAM 1 MG: 0.5 TABLET ORAL at 18:50

## 2019-05-23 RX ADMIN — ATORVASTATIN CALCIUM 10 MG: 10 TABLET, FILM COATED ORAL at 18:42

## 2019-05-23 RX ADMIN — METHYLPREDNISOLONE SODIUM SUCCINATE 125 MG: 125 INJECTION, POWDER, FOR SOLUTION INTRAMUSCULAR; INTRAVENOUS at 09:19

## 2019-05-23 RX ADMIN — CLOPIDOGREL 75 MG: 75 TABLET, FILM COATED ORAL at 20:59

## 2019-05-23 RX ADMIN — ASPIRIN 81 MG: 81 TABLET, CHEWABLE ORAL at 20:59

## 2019-05-23 RX ADMIN — IPRATROPIUM BROMIDE AND ALBUTEROL SULFATE 3 ML: 2.5; .5 SOLUTION RESPIRATORY (INHALATION) at 16:40

## 2019-05-24 LAB
ALBUMIN SERPL-MCNC: 3.5 G/DL (ref 3.5–5.2)
ALBUMIN/GLOB SERPL: 1.2 G/DL
ALP SERPL-CCNC: 55 U/L (ref 39–117)
ALT SERPL W P-5'-P-CCNC: 14 U/L (ref 1–41)
ANION GAP SERPL CALCULATED.3IONS-SCNC: 16.2 MMOL/L
AST SERPL-CCNC: 21 U/L (ref 1–40)
BASOPHILS # BLD AUTO: 0.01 10*3/MM3 (ref 0–0.2)
BASOPHILS NFR BLD AUTO: 0.1 % (ref 0–1.5)
BILIRUB SERPL-MCNC: 0.5 MG/DL (ref 0.2–1.2)
BUN BLD-MCNC: 19 MG/DL (ref 8–23)
BUN/CREAT SERPL: 20 (ref 7–25)
CALCIUM SPEC-SCNC: 9.1 MG/DL (ref 8.6–10.5)
CHLORIDE SERPL-SCNC: 101 MMOL/L (ref 98–107)
CO2 SERPL-SCNC: 18.8 MMOL/L (ref 22–29)
CREAT BLD-MCNC: 0.95 MG/DL (ref 0.76–1.27)
DEPRECATED RDW RBC AUTO: 47.3 FL (ref 37–54)
EOSINOPHIL # BLD AUTO: 0 10*3/MM3 (ref 0–0.4)
EOSINOPHIL NFR BLD AUTO: 0 % (ref 0.3–6.2)
ERYTHROCYTE [DISTWIDTH] IN BLOOD BY AUTOMATED COUNT: 12.8 % (ref 12.3–15.4)
GFR SERPL CREATININE-BSD FRML MDRD: 80 ML/MIN/1.73
GLOBULIN UR ELPH-MCNC: 2.9 GM/DL
GLUCOSE BLD-MCNC: 102 MG/DL (ref 65–99)
HCT VFR BLD AUTO: 53.8 % (ref 37.5–51)
HGB BLD-MCNC: 16.7 G/DL (ref 13–17.7)
IMM GRANULOCYTES # BLD AUTO: 0.04 10*3/MM3 (ref 0–0.05)
IMM GRANULOCYTES NFR BLD AUTO: 0.3 % (ref 0–0.5)
LYMPHOCYTES # BLD AUTO: 1.95 10*3/MM3 (ref 0.7–3.1)
LYMPHOCYTES NFR BLD AUTO: 15 % (ref 19.6–45.3)
MCH RBC QN AUTO: 31 PG (ref 26.6–33)
MCHC RBC AUTO-ENTMCNC: 31 G/DL (ref 31.5–35.7)
MCV RBC AUTO: 100 FL (ref 79–97)
MONOCYTES # BLD AUTO: 0.71 10*3/MM3 (ref 0.1–0.9)
MONOCYTES NFR BLD AUTO: 5.5 % (ref 5–12)
NEUTROPHILS # BLD AUTO: 10.26 10*3/MM3 (ref 1.7–7)
NEUTROPHILS NFR BLD AUTO: 79.1 % (ref 42.7–76)
NRBC BLD AUTO-RTO: 0.1 /100 WBC (ref 0–0.2)
PLATELET # BLD AUTO: 178 10*3/MM3 (ref 140–450)
PMV BLD AUTO: 9.8 FL (ref 6–12)
POTASSIUM BLD-SCNC: 5 MMOL/L (ref 3.5–5.2)
PROT SERPL-MCNC: 6.4 G/DL (ref 6–8.5)
RBC # BLD AUTO: 5.38 10*6/MM3 (ref 4.14–5.8)
SODIUM BLD-SCNC: 136 MMOL/L (ref 136–145)
WBC NRBC COR # BLD: 12.97 10*3/MM3 (ref 3.4–10.8)

## 2019-05-24 PROCEDURE — 80053 COMPREHEN METABOLIC PANEL: CPT | Performed by: SPECIALIST

## 2019-05-24 PROCEDURE — 94799 UNLISTED PULMONARY SVC/PX: CPT

## 2019-05-24 PROCEDURE — 85025 COMPLETE CBC W/AUTO DIFF WBC: CPT | Performed by: SPECIALIST

## 2019-05-24 PROCEDURE — 25010000002 CEFTRIAXONE PER 250 MG: Performed by: SPECIALIST

## 2019-05-24 PROCEDURE — 99232 SBSQ HOSP IP/OBS MODERATE 35: CPT | Performed by: NURSE PRACTITIONER

## 2019-05-24 RX ORDER — LOSARTAN POTASSIUM 50 MG/1
50 TABLET ORAL
Status: DISCONTINUED | OUTPATIENT
Start: 2019-05-25 | End: 2019-05-25

## 2019-05-24 RX ORDER — LOSARTAN POTASSIUM 25 MG/1
25 TABLET ORAL ONCE
Status: COMPLETED | OUTPATIENT
Start: 2019-05-24 | End: 2019-05-24

## 2019-05-24 RX ADMIN — LOSARTAN POTASSIUM 25 MG: 25 TABLET, FILM COATED ORAL at 19:15

## 2019-05-24 RX ADMIN — ALPRAZOLAM 1 MG: 0.5 TABLET ORAL at 03:46

## 2019-05-24 RX ADMIN — IPRATROPIUM BROMIDE AND ALBUTEROL SULFATE 3 ML: 2.5; .5 SOLUTION RESPIRATORY (INHALATION) at 16:08

## 2019-05-24 RX ADMIN — ATORVASTATIN CALCIUM 10 MG: 10 TABLET, FILM COATED ORAL at 09:25

## 2019-05-24 RX ADMIN — SODIUM CHLORIDE, PRESERVATIVE FREE 3 ML: 5 INJECTION INTRAVENOUS at 09:25

## 2019-05-24 RX ADMIN — CLOPIDOGREL 75 MG: 75 TABLET, FILM COATED ORAL at 21:49

## 2019-05-24 RX ADMIN — IPRATROPIUM BROMIDE AND ALBUTEROL SULFATE 3 ML: 2.5; .5 SOLUTION RESPIRATORY (INHALATION) at 20:16

## 2019-05-24 RX ADMIN — LEVOTHYROXINE SODIUM 125 MCG: 125 TABLET ORAL at 09:25

## 2019-05-24 RX ADMIN — ISOSORBIDE MONONITRATE 30 MG: 30 TABLET ORAL at 09:24

## 2019-05-24 RX ADMIN — SUCRALFATE 1 G: 1 TABLET ORAL at 21:49

## 2019-05-24 RX ADMIN — ASPIRIN 81 MG: 81 TABLET, CHEWABLE ORAL at 21:49

## 2019-05-24 RX ADMIN — SODIUM CHLORIDE, PRESERVATIVE FREE 3 ML: 5 INJECTION INTRAVENOUS at 21:50

## 2019-05-24 RX ADMIN — IPRATROPIUM BROMIDE AND ALBUTEROL SULFATE 3 ML: 2.5; .5 SOLUTION RESPIRATORY (INHALATION) at 08:39

## 2019-05-24 RX ADMIN — SUCRALFATE 1 G: 1 TABLET ORAL at 06:05

## 2019-05-24 RX ADMIN — SUCRALFATE 1 G: 1 TABLET ORAL at 17:25

## 2019-05-24 RX ADMIN — CEFTRIAXONE SODIUM 1 G: 1 INJECTION, SOLUTION INTRAVENOUS at 09:27

## 2019-05-24 RX ADMIN — IPRATROPIUM BROMIDE AND ALBUTEROL SULFATE 3 ML: 2.5; .5 SOLUTION RESPIRATORY (INHALATION) at 11:58

## 2019-05-24 RX ADMIN — ALPRAZOLAM 1 MG: 0.5 TABLET ORAL at 21:49

## 2019-05-24 RX ADMIN — PANTOPRAZOLE SODIUM 40 MG: 40 TABLET, DELAYED RELEASE ORAL at 06:05

## 2019-05-24 RX ADMIN — SUCRALFATE 1 G: 1 TABLET ORAL at 11:51

## 2019-05-24 RX ADMIN — LOSARTAN POTASSIUM 25 MG: 25 TABLET, FILM COATED ORAL at 09:25

## 2019-05-25 LAB
ALBUMIN SERPL-MCNC: 3.7 G/DL (ref 3.5–5.2)
ALBUMIN/GLOB SERPL: 1.4 G/DL
ALP SERPL-CCNC: 53 U/L (ref 39–117)
ALT SERPL W P-5'-P-CCNC: 15 U/L (ref 1–41)
ANION GAP SERPL CALCULATED.3IONS-SCNC: 13.4 MMOL/L
AST SERPL-CCNC: 15 U/L (ref 1–40)
BASOPHILS # BLD AUTO: 0.03 10*3/MM3 (ref 0–0.2)
BASOPHILS NFR BLD AUTO: 0.3 % (ref 0–1.5)
BILIRUB SERPL-MCNC: 0.6 MG/DL (ref 0.2–1.2)
BUN BLD-MCNC: 23 MG/DL (ref 8–23)
BUN/CREAT SERPL: 26.7 (ref 7–25)
CALCIUM SPEC-SCNC: 9.4 MG/DL (ref 8.6–10.5)
CHLORIDE SERPL-SCNC: 101 MMOL/L (ref 98–107)
CO2 SERPL-SCNC: 25.6 MMOL/L (ref 22–29)
CREAT BLD-MCNC: 0.86 MG/DL (ref 0.76–1.27)
DEPRECATED RDW RBC AUTO: 46.4 FL (ref 37–54)
EOSINOPHIL # BLD AUTO: 0.08 10*3/MM3 (ref 0–0.4)
EOSINOPHIL NFR BLD AUTO: 0.7 % (ref 0.3–6.2)
ERYTHROCYTE [DISTWIDTH] IN BLOOD BY AUTOMATED COUNT: 13 % (ref 12.3–15.4)
GFR SERPL CREATININE-BSD FRML MDRD: 90 ML/MIN/1.73
GLOBULIN UR ELPH-MCNC: 2.6 GM/DL
GLUCOSE BLD-MCNC: 106 MG/DL (ref 65–99)
HCT VFR BLD AUTO: 53.4 % (ref 37.5–51)
HGB BLD-MCNC: 17.3 G/DL (ref 13–17.7)
IMM GRANULOCYTES # BLD AUTO: 0.05 10*3/MM3 (ref 0–0.05)
IMM GRANULOCYTES NFR BLD AUTO: 0.5 % (ref 0–0.5)
LYMPHOCYTES # BLD AUTO: 2.53 10*3/MM3 (ref 0.7–3.1)
LYMPHOCYTES NFR BLD AUTO: 22.8 % (ref 19.6–45.3)
MAGNESIUM SERPL-MCNC: 1.8 MG/DL (ref 1.6–2.4)
MCH RBC QN AUTO: 31.5 PG (ref 26.6–33)
MCHC RBC AUTO-ENTMCNC: 32.4 G/DL (ref 31.5–35.7)
MCV RBC AUTO: 97.1 FL (ref 79–97)
MONOCYTES # BLD AUTO: 0.96 10*3/MM3 (ref 0.1–0.9)
MONOCYTES NFR BLD AUTO: 8.7 % (ref 5–12)
NEUTROPHILS # BLD AUTO: 7.44 10*3/MM3 (ref 1.7–7)
NEUTROPHILS NFR BLD AUTO: 67 % (ref 42.7–76)
NRBC BLD AUTO-RTO: 0 /100 WBC (ref 0–0.2)
PLATELET # BLD AUTO: 216 10*3/MM3 (ref 140–450)
PMV BLD AUTO: 10 FL (ref 6–12)
POTASSIUM BLD-SCNC: 3.8 MMOL/L (ref 3.5–5.2)
PROT SERPL-MCNC: 6.3 G/DL (ref 6–8.5)
RBC # BLD AUTO: 5.5 10*6/MM3 (ref 4.14–5.8)
SODIUM BLD-SCNC: 140 MMOL/L (ref 136–145)
WBC NRBC COR # BLD: 11.09 10*3/MM3 (ref 3.4–10.8)

## 2019-05-25 PROCEDURE — 99232 SBSQ HOSP IP/OBS MODERATE 35: CPT | Performed by: INTERNAL MEDICINE

## 2019-05-25 PROCEDURE — 85025 COMPLETE CBC W/AUTO DIFF WBC: CPT | Performed by: SPECIALIST

## 2019-05-25 PROCEDURE — 25010000002 CEFTRIAXONE PER 250 MG: Performed by: SPECIALIST

## 2019-05-25 PROCEDURE — 83735 ASSAY OF MAGNESIUM: CPT | Performed by: SPECIALIST

## 2019-05-25 PROCEDURE — 25010000002 HYDRALAZINE PER 20 MG: Performed by: INTERNAL MEDICINE

## 2019-05-25 PROCEDURE — 80053 COMPREHEN METABOLIC PANEL: CPT | Performed by: SPECIALIST

## 2019-05-25 PROCEDURE — 94799 UNLISTED PULMONARY SVC/PX: CPT

## 2019-05-25 PROCEDURE — 25010000002 ENOXAPARIN PER 10 MG: Performed by: SPECIALIST

## 2019-05-25 RX ORDER — LOSARTAN POTASSIUM 50 MG/1
50 TABLET ORAL ONCE
Status: COMPLETED | OUTPATIENT
Start: 2019-05-25 | End: 2019-05-25

## 2019-05-25 RX ORDER — LOSARTAN POTASSIUM 100 MG/1
100 TABLET ORAL
Status: DISCONTINUED | OUTPATIENT
Start: 2019-05-26 | End: 2019-05-26

## 2019-05-25 RX ORDER — HYDRALAZINE HYDROCHLORIDE 20 MG/ML
20 INJECTION INTRAMUSCULAR; INTRAVENOUS EVERY 6 HOURS PRN
Status: DISCONTINUED | OUTPATIENT
Start: 2019-05-25 | End: 2019-05-26

## 2019-05-25 RX ORDER — AMLODIPINE BESYLATE 10 MG/1
10 TABLET ORAL
Status: DISCONTINUED | OUTPATIENT
Start: 2019-05-25 | End: 2019-05-28 | Stop reason: HOSPADM

## 2019-05-25 RX ADMIN — HYDRALAZINE HYDROCHLORIDE 20 MG: 20 INJECTION INTRAMUSCULAR; INTRAVENOUS at 10:28

## 2019-05-25 RX ADMIN — CLOPIDOGREL 75 MG: 75 TABLET, FILM COATED ORAL at 21:28

## 2019-05-25 RX ADMIN — AMLODIPINE BESYLATE 10 MG: 10 TABLET ORAL at 09:11

## 2019-05-25 RX ADMIN — SODIUM CHLORIDE, PRESERVATIVE FREE 3 ML: 5 INJECTION INTRAVENOUS at 08:28

## 2019-05-25 RX ADMIN — SODIUM CHLORIDE, PRESERVATIVE FREE 3 ML: 5 INJECTION INTRAVENOUS at 21:28

## 2019-05-25 RX ADMIN — IPRATROPIUM BROMIDE AND ALBUTEROL SULFATE 3 ML: 2.5; .5 SOLUTION RESPIRATORY (INHALATION) at 12:18

## 2019-05-25 RX ADMIN — LEVOTHYROXINE SODIUM 125 MCG: 125 TABLET ORAL at 08:13

## 2019-05-25 RX ADMIN — IPRATROPIUM BROMIDE AND ALBUTEROL SULFATE 3 ML: 2.5; .5 SOLUTION RESPIRATORY (INHALATION) at 20:28

## 2019-05-25 RX ADMIN — CEFTRIAXONE SODIUM 1 G: 1 INJECTION, SOLUTION INTRAVENOUS at 08:13

## 2019-05-25 RX ADMIN — ATORVASTATIN CALCIUM 10 MG: 10 TABLET, FILM COATED ORAL at 08:14

## 2019-05-25 RX ADMIN — ALPRAZOLAM 1 MG: 0.5 TABLET ORAL at 15:49

## 2019-05-25 RX ADMIN — ALPRAZOLAM 1 MG: 0.5 TABLET ORAL at 08:13

## 2019-05-25 RX ADMIN — LOSARTAN POTASSIUM 50 MG: 50 TABLET, FILM COATED ORAL at 15:49

## 2019-05-25 RX ADMIN — ASPIRIN 81 MG: 81 TABLET, CHEWABLE ORAL at 21:28

## 2019-05-25 RX ADMIN — IPRATROPIUM BROMIDE AND ALBUTEROL SULFATE 3 ML: 2.5; .5 SOLUTION RESPIRATORY (INHALATION) at 07:14

## 2019-05-25 RX ADMIN — ENOXAPARIN SODIUM 40 MG: 40 INJECTION SUBCUTANEOUS at 17:21

## 2019-05-25 RX ADMIN — ISOSORBIDE MONONITRATE 30 MG: 30 TABLET ORAL at 08:14

## 2019-05-25 RX ADMIN — SUCRALFATE 1 G: 1 TABLET ORAL at 06:37

## 2019-05-25 RX ADMIN — SUCRALFATE 1 G: 1 TABLET ORAL at 21:28

## 2019-05-25 RX ADMIN — PANTOPRAZOLE SODIUM 40 MG: 40 TABLET, DELAYED RELEASE ORAL at 06:37

## 2019-05-25 RX ADMIN — IPRATROPIUM BROMIDE AND ALBUTEROL SULFATE 3 ML: 2.5; .5 SOLUTION RESPIRATORY (INHALATION) at 16:56

## 2019-05-25 RX ADMIN — LOSARTAN POTASSIUM 50 MG: 50 TABLET, FILM COATED ORAL at 08:13

## 2019-05-26 LAB
ANION GAP SERPL CALCULATED.3IONS-SCNC: 14.6 MMOL/L
BUN BLD-MCNC: 27 MG/DL (ref 8–23)
BUN/CREAT SERPL: 18.8 (ref 7–25)
CALCIUM SPEC-SCNC: 9.4 MG/DL (ref 8.6–10.5)
CHLORIDE SERPL-SCNC: 101 MMOL/L (ref 98–107)
CO2 SERPL-SCNC: 25.4 MMOL/L (ref 22–29)
CREAT BLD-MCNC: 1.44 MG/DL (ref 0.76–1.27)
DEPRECATED RDW RBC AUTO: 49.3 FL (ref 37–54)
ERYTHROCYTE [DISTWIDTH] IN BLOOD BY AUTOMATED COUNT: 13.3 % (ref 12.3–15.4)
GFR SERPL CREATININE-BSD FRML MDRD: 50 ML/MIN/1.73
GLUCOSE BLD-MCNC: 111 MG/DL (ref 65–99)
HCT VFR BLD AUTO: 53.5 % (ref 37.5–51)
HGB BLD-MCNC: 16.9 G/DL (ref 13–17.7)
MCH RBC QN AUTO: 31.4 PG (ref 26.6–33)
MCHC RBC AUTO-ENTMCNC: 31.6 G/DL (ref 31.5–35.7)
MCV RBC AUTO: 99.4 FL (ref 79–97)
PLATELET # BLD AUTO: 225 10*3/MM3 (ref 140–450)
PMV BLD AUTO: 10.4 FL (ref 6–12)
POTASSIUM BLD-SCNC: 3.9 MMOL/L (ref 3.5–5.2)
RBC # BLD AUTO: 5.38 10*6/MM3 (ref 4.14–5.8)
SODIUM BLD-SCNC: 141 MMOL/L (ref 136–145)
WBC NRBC COR # BLD: 8.56 10*3/MM3 (ref 3.4–10.8)

## 2019-05-26 PROCEDURE — 94799 UNLISTED PULMONARY SVC/PX: CPT

## 2019-05-26 PROCEDURE — 80048 BASIC METABOLIC PNL TOTAL CA: CPT | Performed by: INTERNAL MEDICINE

## 2019-05-26 PROCEDURE — 25010000002 ENOXAPARIN PER 10 MG: Performed by: SPECIALIST

## 2019-05-26 PROCEDURE — 85027 COMPLETE CBC AUTOMATED: CPT | Performed by: INTERNAL MEDICINE

## 2019-05-26 PROCEDURE — 99232 SBSQ HOSP IP/OBS MODERATE 35: CPT | Performed by: INTERNAL MEDICINE

## 2019-05-26 PROCEDURE — 94640 AIRWAY INHALATION TREATMENT: CPT

## 2019-05-26 PROCEDURE — 25010000002 CEFTRIAXONE PER 250 MG: Performed by: SPECIALIST

## 2019-05-26 RX ORDER — BUDESONIDE AND FORMOTEROL FUMARATE DIHYDRATE 160; 4.5 UG/1; UG/1
2 AEROSOL RESPIRATORY (INHALATION)
Status: DISCONTINUED | OUTPATIENT
Start: 2019-05-26 | End: 2019-05-28 | Stop reason: HOSPADM

## 2019-05-26 RX ORDER — ECHINACEA PURPUREA EXTRACT 125 MG
2 TABLET ORAL AS NEEDED
Status: DISCONTINUED | OUTPATIENT
Start: 2019-05-26 | End: 2019-05-28 | Stop reason: HOSPADM

## 2019-05-26 RX ORDER — HYDRALAZINE HYDROCHLORIDE 20 MG/ML
10 INJECTION INTRAMUSCULAR; INTRAVENOUS EVERY 6 HOURS PRN
Status: DISCONTINUED | OUTPATIENT
Start: 2019-05-26 | End: 2019-05-28 | Stop reason: HOSPADM

## 2019-05-26 RX ADMIN — ALPRAZOLAM 1 MG: 0.5 TABLET ORAL at 08:44

## 2019-05-26 RX ADMIN — PANTOPRAZOLE SODIUM 40 MG: 40 TABLET, DELAYED RELEASE ORAL at 06:25

## 2019-05-26 RX ADMIN — ATORVASTATIN CALCIUM 10 MG: 10 TABLET, FILM COATED ORAL at 08:45

## 2019-05-26 RX ADMIN — SODIUM CHLORIDE, PRESERVATIVE FREE 3 ML: 5 INJECTION INTRAVENOUS at 21:14

## 2019-05-26 RX ADMIN — SALINE NASAL SPRAY 2 SPRAY: 1.5 SOLUTION NASAL at 11:06

## 2019-05-26 RX ADMIN — LEVOTHYROXINE SODIUM 125 MCG: 125 TABLET ORAL at 08:42

## 2019-05-26 RX ADMIN — IPRATROPIUM BROMIDE AND ALBUTEROL SULFATE 3 ML: 2.5; .5 SOLUTION RESPIRATORY (INHALATION) at 19:58

## 2019-05-26 RX ADMIN — CLOPIDOGREL 75 MG: 75 TABLET, FILM COATED ORAL at 21:14

## 2019-05-26 RX ADMIN — SUCRALFATE 1 G: 1 TABLET ORAL at 17:24

## 2019-05-26 RX ADMIN — IPRATROPIUM BROMIDE AND ALBUTEROL SULFATE 3 ML: 2.5; .5 SOLUTION RESPIRATORY (INHALATION) at 11:31

## 2019-05-26 RX ADMIN — SUCRALFATE 1 G: 1 TABLET ORAL at 21:14

## 2019-05-26 RX ADMIN — BUDESONIDE AND FORMOTEROL FUMARATE DIHYDRATE 2 PUFF: 160; 4.5 AEROSOL RESPIRATORY (INHALATION) at 19:58

## 2019-05-26 RX ADMIN — SUCRALFATE 1 G: 1 TABLET ORAL at 11:06

## 2019-05-26 RX ADMIN — IPRATROPIUM BROMIDE AND ALBUTEROL SULFATE 3 ML: 2.5; .5 SOLUTION RESPIRATORY (INHALATION) at 15:00

## 2019-05-26 RX ADMIN — ENOXAPARIN SODIUM 40 MG: 40 INJECTION SUBCUTANEOUS at 17:24

## 2019-05-26 RX ADMIN — LOSARTAN POTASSIUM 100 MG: 100 TABLET, FILM COATED ORAL at 08:44

## 2019-05-26 RX ADMIN — CEFTRIAXONE SODIUM 1 G: 1 INJECTION, SOLUTION INTRAVENOUS at 08:45

## 2019-05-26 RX ADMIN — ASPIRIN 81 MG: 81 TABLET, CHEWABLE ORAL at 21:14

## 2019-05-26 RX ADMIN — SUCRALFATE 1 G: 1 TABLET ORAL at 07:23

## 2019-05-26 RX ADMIN — ALPRAZOLAM 1 MG: 0.5 TABLET ORAL at 17:24

## 2019-05-26 RX ADMIN — IPRATROPIUM BROMIDE AND ALBUTEROL SULFATE 3 ML: 2.5; .5 SOLUTION RESPIRATORY (INHALATION) at 07:54

## 2019-05-26 RX ADMIN — SODIUM CHLORIDE, PRESERVATIVE FREE 3 ML: 5 INJECTION INTRAVENOUS at 08:46

## 2019-05-26 RX ADMIN — AMLODIPINE BESYLATE 10 MG: 10 TABLET ORAL at 08:42

## 2019-05-26 RX ADMIN — ISOSORBIDE MONONITRATE 30 MG: 30 TABLET ORAL at 08:42

## 2019-05-27 ENCOUNTER — APPOINTMENT (OUTPATIENT)
Dept: CARDIOLOGY | Facility: HOSPITAL | Age: 64
End: 2019-05-27

## 2019-05-27 LAB
ANION GAP SERPL CALCULATED.3IONS-SCNC: 11.8 MMOL/L
BUN BLD-MCNC: 24 MG/DL (ref 8–23)
BUN/CREAT SERPL: 19.4 (ref 7–25)
CALCIUM SPEC-SCNC: 9.1 MG/DL (ref 8.6–10.5)
CHLORIDE SERPL-SCNC: 100 MMOL/L (ref 98–107)
CO2 SERPL-SCNC: 25.2 MMOL/L (ref 22–29)
CREAT BLD-MCNC: 1.24 MG/DL (ref 0.76–1.27)
DEPRECATED RDW RBC AUTO: 50.4 FL (ref 37–54)
ERYTHROCYTE [DISTWIDTH] IN BLOOD BY AUTOMATED COUNT: 13.3 % (ref 12.3–15.4)
GFR SERPL CREATININE-BSD FRML MDRD: 59 ML/MIN/1.73
GLUCOSE BLD-MCNC: 101 MG/DL (ref 65–99)
HCT VFR BLD AUTO: 52.8 % (ref 37.5–51)
HGB BLD-MCNC: 16.3 G/DL (ref 13–17.7)
MCH RBC QN AUTO: 31.5 PG (ref 26.6–33)
MCHC RBC AUTO-ENTMCNC: 30.9 G/DL (ref 31.5–35.7)
MCV RBC AUTO: 101.9 FL (ref 79–97)
PLATELET # BLD AUTO: 175 10*3/MM3 (ref 140–450)
PMV BLD AUTO: 10.1 FL (ref 6–12)
POTASSIUM BLD-SCNC: 4 MMOL/L (ref 3.5–5.2)
RBC # BLD AUTO: 5.18 10*6/MM3 (ref 4.14–5.8)
SODIUM BLD-SCNC: 137 MMOL/L (ref 136–145)
WBC NRBC COR # BLD: 6.85 10*3/MM3 (ref 3.4–10.8)

## 2019-05-27 PROCEDURE — 94799 UNLISTED PULMONARY SVC/PX: CPT

## 2019-05-27 PROCEDURE — 25010000002 CEFTRIAXONE PER 250 MG: Performed by: SPECIALIST

## 2019-05-27 PROCEDURE — 85027 COMPLETE CBC AUTOMATED: CPT | Performed by: INTERNAL MEDICINE

## 2019-05-27 PROCEDURE — 80048 BASIC METABOLIC PNL TOTAL CA: CPT | Performed by: INTERNAL MEDICINE

## 2019-05-27 PROCEDURE — 93975 VASCULAR STUDY: CPT

## 2019-05-27 PROCEDURE — 25010000002 ENOXAPARIN PER 10 MG: Performed by: SPECIALIST

## 2019-05-27 RX ADMIN — ENOXAPARIN SODIUM 40 MG: 40 INJECTION SUBCUTANEOUS at 18:34

## 2019-05-27 RX ADMIN — SODIUM CHLORIDE, PRESERVATIVE FREE 3 ML: 5 INJECTION INTRAVENOUS at 20:20

## 2019-05-27 RX ADMIN — LEVOTHYROXINE SODIUM 125 MCG: 125 TABLET ORAL at 08:54

## 2019-05-27 RX ADMIN — IPRATROPIUM BROMIDE AND ALBUTEROL SULFATE 3 ML: 2.5; .5 SOLUTION RESPIRATORY (INHALATION) at 07:18

## 2019-05-27 RX ADMIN — ISOSORBIDE MONONITRATE 30 MG: 30 TABLET ORAL at 08:54

## 2019-05-27 RX ADMIN — PANTOPRAZOLE SODIUM 40 MG: 40 TABLET, DELAYED RELEASE ORAL at 06:39

## 2019-05-27 RX ADMIN — AMLODIPINE BESYLATE 10 MG: 10 TABLET ORAL at 08:54

## 2019-05-27 RX ADMIN — ASPIRIN 81 MG: 81 TABLET, CHEWABLE ORAL at 20:20

## 2019-05-27 RX ADMIN — BUDESONIDE AND FORMOTEROL FUMARATE DIHYDRATE 2 PUFF: 160; 4.5 AEROSOL RESPIRATORY (INHALATION) at 07:21

## 2019-05-27 RX ADMIN — IPRATROPIUM BROMIDE AND ALBUTEROL SULFATE 3 ML: 2.5; .5 SOLUTION RESPIRATORY (INHALATION) at 10:37

## 2019-05-27 RX ADMIN — SUCRALFATE 1 G: 1 TABLET ORAL at 06:39

## 2019-05-27 RX ADMIN — ALPRAZOLAM 1 MG: 0.5 TABLET ORAL at 22:11

## 2019-05-27 RX ADMIN — SUCRALFATE 1 G: 1 TABLET ORAL at 20:20

## 2019-05-27 RX ADMIN — ACETAMINOPHEN 650 MG: 325 TABLET, FILM COATED ORAL at 14:43

## 2019-05-27 RX ADMIN — ATORVASTATIN CALCIUM 10 MG: 10 TABLET, FILM COATED ORAL at 08:54

## 2019-05-27 RX ADMIN — SODIUM CHLORIDE, PRESERVATIVE FREE 3 ML: 5 INJECTION INTRAVENOUS at 08:55

## 2019-05-27 RX ADMIN — CLOPIDOGREL 75 MG: 75 TABLET, FILM COATED ORAL at 20:20

## 2019-05-27 RX ADMIN — ALPRAZOLAM 1 MG: 0.5 TABLET ORAL at 14:43

## 2019-05-27 RX ADMIN — CEFTRIAXONE SODIUM 1 G: 1 INJECTION, SOLUTION INTRAVENOUS at 08:53

## 2019-05-27 RX ADMIN — IPRATROPIUM BROMIDE AND ALBUTEROL SULFATE 3 ML: 2.5; .5 SOLUTION RESPIRATORY (INHALATION) at 19:40

## 2019-05-27 RX ADMIN — IPRATROPIUM BROMIDE AND ALBUTEROL SULFATE 3 ML: 2.5; .5 SOLUTION RESPIRATORY (INHALATION) at 15:25

## 2019-05-27 RX ADMIN — SUCRALFATE 1 G: 1 TABLET ORAL at 16:58

## 2019-05-28 VITALS
BODY MASS INDEX: 26.26 KG/M2 | OXYGEN SATURATION: 90 % | WEIGHT: 177.3 LBS | HEIGHT: 69 IN | SYSTOLIC BLOOD PRESSURE: 144 MMHG | TEMPERATURE: 99.1 F | DIASTOLIC BLOOD PRESSURE: 78 MMHG | HEART RATE: 86 BPM | RESPIRATION RATE: 18 BRPM

## 2019-05-28 LAB
ANION GAP SERPL CALCULATED.3IONS-SCNC: 13 MMOL/L
BH CV ECHO MEAS - DIST REN A EDV LEFT: 18.3 CM/SEC
BH CV ECHO MEAS - DIST REN A PSV LEFT: 75.5 CM/SEC
BH CV ECHO MEAS - DIST REN A RI LEFT: 0.76
BH CV ECHO MEAS - MID REN A EDV LEFT: 18.7 CM/SEC
BH CV ECHO MEAS - MID REN A PSV LEFT: 81.5 CM/SEC
BH CV ECHO MEAS - MID REN A RI LEFT: 0.77
BH CV ECHO MEAS - PROX REN A EDV LEFT: 18 CM/SEC
BH CV ECHO MEAS - PROX REN A PSV LEFT: 163 CM/SEC
BH CV ECHO MEAS - PROX REN A RI LEFT: 0.83
BH CV VAS BP LEFT ARM: NORMAL MMHG
BH CV VAS BP RIGHT ARM: NORMAL MMHG
BH CV VAS RENAL AORTIC MID EDV: 0 CM/S
BH CV VAS RENAL AORTIC MID PSV: 48 CM/S
BH CV VAS RENAL HILUM LEFT EDV: 7 CM/S
BH CV VAS RENAL HILUM LEFT PSV: 25 CM/S
BH CV VAS RENAL HILUM RIGHT EDV: 9 CM/S
BH CV VAS RENAL HILUM RIGHT PSV: 39 CM/S
BH CV XLRA MEAS - KID L LEFT: 10.3 CM
BH CV XLRA MEAS - RENAL A ORG RI LEFT: 0.83
BH CV XLRA MEAS DIST REN A EDV RIGHT: 22.1 CM/SEC
BH CV XLRA MEAS DIST REN A PSV RIGHT: 95.5 CM/SEC
BH CV XLRA MEAS DIST REN A RI RIGHT: 0.77
BH CV XLRA MEAS KID L RIGHT: 11.3 CM
BH CV XLRA MEAS KID W RIGHT: 5.18 CM
BH CV XLRA MEAS MID REN A EDV RIGHT: 26.1 CM/SEC
BH CV XLRA MEAS MID REN A PSV RIGHT: 106 CM/SEC
BH CV XLRA MEAS MID REN A RI RIGHT: 0.75
BH CV XLRA MEAS PROX REN A EDV RIGHT: 20.9 CM/SEC
BH CV XLRA MEAS PROX REN A PSV RIGHT: 88.3 CM/SEC
BH CV XLRA MEAS PROX REN A RI RIGHT: 0.76
BH CV XLRA MEAS RAR LEFT: 3.45
BH CV XLRA MEAS RAR RIGHT: 2.2
BH CV XLRA MEAS RENAL A ORG EDV LEFT: 28 CM/SEC
BH CV XLRA MEAS RENAL A ORG EDV RIGHT: 21.1 CM/SEC
BH CV XLRA MEAS RENAL A ORG PSV LEFT: 166 CM/SEC
BH CV XLRA MEAS RENAL A ORG PSV RIGHT: 86.2 CM/SEC
BH CV XLRA MEAS RENAL A ORG RI RIGHT: 0.76
BUN BLD-MCNC: 20 MG/DL (ref 8–23)
BUN/CREAT SERPL: 16 (ref 7–25)
CALCIUM SPEC-SCNC: 9.2 MG/DL (ref 8.6–10.5)
CHLORIDE SERPL-SCNC: 99 MMOL/L (ref 98–107)
CO2 SERPL-SCNC: 27 MMOL/L (ref 22–29)
CREAT BLD-MCNC: 1.25 MG/DL (ref 0.76–1.27)
GFR SERPL CREATININE-BSD FRML MDRD: 58 ML/MIN/1.73
GLUCOSE BLD-MCNC: 87 MG/DL (ref 65–99)
LEFT KIDNEY WIDTH: 4.18 CM
LEFT RENAL UPPER PARENCHYMA MAX: 22 CM/S
LEFT RENAL UPPER PARENCHYMA MIN: 6 CM/S
LEFT RENAL UPPER PARENCHYMA RI: 0.73
MAGNESIUM SERPL-MCNC: 2 MG/DL (ref 1.6–2.4)
POTASSIUM BLD-SCNC: 4.2 MMOL/L (ref 3.5–5.2)
RIGHT RENAL UPPER PARENCHYMA MAX: 16 CM/S
RIGHT RENAL UPPER PARENCHYMA MIN: 5 CM/S
RIGHT RENAL UPPER PARENCHYMA RI: 0.69
SODIUM BLD-SCNC: 139 MMOL/L (ref 136–145)

## 2019-05-28 PROCEDURE — 25010000002 CEFTRIAXONE PER 250 MG: Performed by: SPECIALIST

## 2019-05-28 PROCEDURE — 94799 UNLISTED PULMONARY SVC/PX: CPT

## 2019-05-28 PROCEDURE — 83735 ASSAY OF MAGNESIUM: CPT | Performed by: SPECIALIST

## 2019-05-28 PROCEDURE — 99232 SBSQ HOSP IP/OBS MODERATE 35: CPT | Performed by: NURSE PRACTITIONER

## 2019-05-28 PROCEDURE — 80048 BASIC METABOLIC PNL TOTAL CA: CPT | Performed by: SPECIALIST

## 2019-05-28 RX ORDER — AMLODIPINE BESYLATE 10 MG/1
10 TABLET ORAL
Qty: 90 TABLET | Refills: 3 | Status: SHIPPED | OUTPATIENT
Start: 2019-05-29 | End: 2020-03-03 | Stop reason: SDUPTHER

## 2019-05-28 RX ADMIN — ISOSORBIDE MONONITRATE 30 MG: 30 TABLET ORAL at 09:00

## 2019-05-28 RX ADMIN — PANTOPRAZOLE SODIUM 40 MG: 40 TABLET, DELAYED RELEASE ORAL at 06:57

## 2019-05-28 RX ADMIN — SUCRALFATE 1 G: 1 TABLET ORAL at 06:57

## 2019-05-28 RX ADMIN — IPRATROPIUM BROMIDE AND ALBUTEROL SULFATE 3 ML: 2.5; .5 SOLUTION RESPIRATORY (INHALATION) at 12:04

## 2019-05-28 RX ADMIN — AMLODIPINE BESYLATE 10 MG: 10 TABLET ORAL at 09:00

## 2019-05-28 RX ADMIN — LEVOTHYROXINE SODIUM 125 MCG: 125 TABLET ORAL at 09:00

## 2019-05-28 RX ADMIN — SODIUM CHLORIDE, PRESERVATIVE FREE 3 ML: 5 INJECTION INTRAVENOUS at 09:01

## 2019-05-28 RX ADMIN — BUDESONIDE AND FORMOTEROL FUMARATE DIHYDRATE 2 PUFF: 160; 4.5 AEROSOL RESPIRATORY (INHALATION) at 10:14

## 2019-05-28 RX ADMIN — ATORVASTATIN CALCIUM 10 MG: 10 TABLET, FILM COATED ORAL at 09:00

## 2019-05-28 RX ADMIN — CEFTRIAXONE SODIUM 1 G: 1 INJECTION, SOLUTION INTRAVENOUS at 09:00

## 2019-05-28 RX ADMIN — SUCRALFATE 1 G: 1 TABLET ORAL at 12:31

## 2019-05-28 RX ADMIN — IPRATROPIUM BROMIDE AND ALBUTEROL SULFATE 3 ML: 2.5; .5 SOLUTION RESPIRATORY (INHALATION) at 07:50

## 2019-05-29 ENCOUNTER — READMISSION MANAGEMENT (OUTPATIENT)
Dept: CALL CENTER | Facility: HOSPITAL | Age: 64
End: 2019-05-29

## 2019-05-29 NOTE — OUTREACH NOTE
Prep Survey      Responses   Facility patient discharged from?  [facility]   Is patient eligible?  Yes   Discharge diagnosis  Acute UTI, Metabolic encephalopathy, bradycardia, COPD with hypoxia, sepsis, essential HTN   Does the patient have one of the following disease processes/diagnoses(primary or secondary)?  COPD/Pneumonia [Sepsis not mentioned in D/C summary]   Does the patient have Home health ordered?  No   Is there a DME ordered?  No   Comments regarding appointments  See AVS   Prep survey completed?  Yes          Christelle Garza RN

## 2019-06-02 ENCOUNTER — READMISSION MANAGEMENT (OUTPATIENT)
Dept: CALL CENTER | Facility: HOSPITAL | Age: 64
End: 2019-06-02

## 2019-06-02 NOTE — OUTREACH NOTE
COPD/PN Week 1 Survey      Responses   Facility patient discharged from?  [facility]   Does the patient have one of the following disease processes/diagnoses(primary or secondary)?  COPD/Pneumonia   Is there a successful TCM telephone encounter documented?  No   Was the primary reason for admission:  COPD exacerbation   Week 1 attempt successful?  No   Unsuccessful attempts  Attempt 1          Sharon Gallagher RN

## 2019-06-05 ENCOUNTER — OFFICE VISIT (OUTPATIENT)
Dept: CARDIOLOGY | Facility: CLINIC | Age: 64
End: 2019-06-05

## 2019-06-05 ENCOUNTER — READMISSION MANAGEMENT (OUTPATIENT)
Dept: CALL CENTER | Facility: HOSPITAL | Age: 64
End: 2019-06-05

## 2019-06-05 VITALS
HEIGHT: 69 IN | WEIGHT: 178 LBS | BODY MASS INDEX: 26.36 KG/M2 | DIASTOLIC BLOOD PRESSURE: 90 MMHG | SYSTOLIC BLOOD PRESSURE: 140 MMHG | HEART RATE: 65 BPM

## 2019-06-05 DIAGNOSIS — I73.9 PVD (PERIPHERAL VASCULAR DISEASE) (HCC): ICD-10-CM

## 2019-06-05 DIAGNOSIS — Z09 HOSPITAL DISCHARGE FOLLOW-UP: Primary | ICD-10-CM

## 2019-06-05 DIAGNOSIS — I42.8 NONISCHEMIC CARDIOMYOPATHY (HCC): ICD-10-CM

## 2019-06-05 DIAGNOSIS — R00.1 BRADYCARDIA: ICD-10-CM

## 2019-06-05 DIAGNOSIS — I25.10 CORONARY ARTERY DISEASE INVOLVING NATIVE CORONARY ARTERY OF NATIVE HEART WITHOUT ANGINA PECTORIS: ICD-10-CM

## 2019-06-05 DIAGNOSIS — J44.9 CHRONIC OBSTRUCTIVE PULMONARY DISEASE, UNSPECIFIED COPD TYPE (HCC): ICD-10-CM

## 2019-06-05 PROCEDURE — 93000 ELECTROCARDIOGRAM COMPLETE: CPT | Performed by: NURSE PRACTITIONER

## 2019-06-05 PROCEDURE — 99214 OFFICE O/P EST MOD 30 MIN: CPT | Performed by: NURSE PRACTITIONER

## 2019-06-05 RX ORDER — TRAMADOL HYDROCHLORIDE 50 MG/1
50 TABLET ORAL 2 TIMES DAILY
COMMUNITY
End: 2020-03-03 | Stop reason: SDUPTHER

## 2019-06-05 NOTE — PROGRESS NOTES
Date of Office Visit: 2019  Encounter Provider: MANDO Sandoval  Place of Service: [facility] CARDIOLOGY  Patient Name: Sim Agustin  :1955    Chief Complaint   Patient presents with   • Coronary Artery Disease   • Cardiomyopathy   • Follow-up   :     HPI: Sim Agustin is a 63 y.o. male is a patient of Dr. Sood. I will be seeing him today for the first time and have reviewed his medical record.     His past medical history is significant of nonischemic cardiomyopathy, coronary artery disease, COPD, peripheral artery disease, left above-the-knee amputation    In  he had a long hospitalization and required intensive care. He had an aorto duodenal fistula repair ultimately needed resection of an infected aortic graft the right axial superficial artery [place]-Aneudy graft in 2016.  He required left femoral graft thrombectomy and developed compartment syndrome and fasciotomy.  He will ultimately require left above-the-knee amputation which was completed in 2016.  He also had an exploratory lap with left hemicolectomy, colostomy, G-tube and J-tube for ischemic:.  He had some acute renal failure which required hemodialysis.  He then developed left pleural effusion and required thoracentesis, chest tube and VATS procedure.  He had some anemia and had blood transfusion.  He also had an aorto duodenal graft infection and required IV biotic.  Later had his colostomy reversed.  In 2017 he had a ventral hernia repair with mesh in 2019 he had respiratory failure which required noninvasive ventilation.    He had exacerbation of COPD, and confusion and new cardiomyopathy with an ejection fraction of 29%, mild concentric hypertrophy, grade 1 diastolic dysfunction with hypokinetic apical anterior, mid inferoseptal, apex.  The apical septal segment was akinetic.  Moderate pulmonary hypertension was noted with RVSP at least 45 mmHg.  He proceeded to have left  and right heart catheterization.  Pulmonary wedge pressure was 17/14, pulmonary artery pressure 55/20/32, RV pressure 55/1, right atrial pressure 9/8.  He had 30% mid LAD and 70% distal LAD and a small vessel, left circumflex had mid 30%, right coronary artery had distal 10-20%.  It was determined that he had mild nonobstructive and severe small vessel coronary artery disease with severe pulmonary hypertension with normal left-sided filling pressures.  Medical management of cardiomyopathy was recommended.  He then was readmitted in May 2019 with a UTI and sepsis.  He was hypotensive so some his medications were held he also was noted to have some bradycardia.  His carvedilol was stopped.  he had some acute renal insufficiency so losartan was held.  His home amlodipine was resumed.  Renal ultrasounds were unremarkable    Patient presents today for hospital discharge follow-up.  He has been stable and complains only of occasional right foot and ankle edema which he has had for some time.  He denies chest pain tightness pressure, palpitation, shortness of breath, dizziness, lightheadedness, fatigue.  He does leg strengthening and walks and stays very active.  His blood pressure at home is normally 130s over 70s reportedly.  Continues to follow with pulmonary with Dr. Tate.  He also reports that for years his heart rate is been in the 50s and he has never had issues with that.      Allergies   Allergen Reactions   • Augmentin [Amoxicillin-Pot Clavulanate] Hives and Rash       Past Medical History:   Diagnosis Date   • Anemia     per [facility] database   • CAD (coronary artery disease) 4/4/2016   • Colon polyps    • Compartment syndrome (CMS/HCC)     AFTER ILIAC SURGERY. ABOVE KNEE AMPUTATION   • COPD (chronic obstructive pulmonary disease) (CMS/HCC)    • Cough     SINCE APRIL WITH PNEUMONIA.    • Disease of thyroid gland     HYPOTHYRODISM   • Diverticulosis    • Employs prosthetic leg    • History of acute renal  failure    • History of CHF (congestive heart failure)    • History of GI bleed 02/2016    AORTODUOD FISTULA   • History of sepsis 02/2016   • History of transfusion     MULTIPLE, 2016   • Hypertension    • Hypotension    • Nonischemic cardiomyopathy (CMS/HCC)    • Phantom limb pain (CMS/HCC)     SL, WITH LEFT ABOUVE KNEE   • Pneumonia     SPRING 2016  AFTER SURGERY   • PVD (peripheral vascular disease) (CMS/HCC)        Past Surgical History:   Procedure Laterality Date   • ABOVE KNEE AMPUTATION Left 3/16/2016    Procedure: LT AMPUTATION ABOVE KNEE;  Surgeon: Jose Swann MD;  Location: [facility] OR;  Service:    • ARTERIAL THROMBECTOMY  2001    throbectomy of arterial graft   • AXILLARY FEMORAL BYPASS Right 02/15/2016    Exploratory lapartomy, repair aortoduodenal fistula, resection infected aortobifemoral bypass graft, axillobi-superficial femoral artery [place]-Aneudy bypass graft from right axillary artery, Repair of duodenotomy 4th portion duodenum-Dr. Jose Swann, Dr. Genaro Perrin   • BRONCHOSCOPY Left 03/04/2016    Dr. NATALIIA Tate   • BRONCHOSCOPY RIGID / FLEXIBLE N/A 03/03/2016    Dr. NATALIIA Tate   • BRONCHOSCOPY RIGID / FLEXIBLE N/A 02/26/2016    Dr. NATALIIA Tate   • CARDIAC CATHETERIZATION N/A 3/18/2019    Procedure: Right and Left Heart Cath;  Surgeon: Nina Storey MD;  Location: [facility] CATH INVASIVE LOCATION;  Service: Cardiovascular   • CATARACT EXTRACTION WITH INTRAOCULAR LENS IMPLANT Bilateral    • COLON RESECTION WITH COLOSTOMY Left 02/28/2016    Exploratory laparotomy with open left hemicolectomy, end-colostomy, G-tube and J-tube-Dr. Endy Chaney   • COLONOSCOPY N/A 2015    Dr. Laughlin   • COLONOSCOPY N/A 10/12/2016    Procedure: COLONOSCOPY TO 20 CM AND PER STOMA TO 30CM;  Surgeon: Genaro Perrin MD;  Location: [facility] ENDOSCOPY;  Service:    • COLONOSCOPY N/A 10/21/2016    Procedure: COLONOSCOPY DONE AT BEDSIDE ONTO CECEM;  Surgeon: Genaro Perrin MD;  Location:   [facility] ENDOSCOPY;  Service:    • COLONOSCOPY N/A 02/10/2016    Diverticulosis in the entire examined colon, non-bleeding internal hemorrhoids-Dr. Taylor Pina   • COLONOSCOPY N/A 02/11/2016    Poor prep, blood in the entire examined colon, normal ileum-Dr. Taylor Pina   • COLONOSCOPY W/ POLYPECTOMY N/A 07/27/2015    Normal ileum, diverticulosis in the sigmoid colon: resected and retrieved, one 6 mm polyp in the descending colon: resected and retrieved, the distal rectum and anal verge are normal on retroflexion view-Dr. Miguel Ángel Laughlin   • COLOSTOMY CLOSURE N/A 10/13/2016    Procedure: COLOSTOMY TAKEDOWN OR CLOSURE, APPENDECTOMY;  Surgeon: Genaro Perrin MD;  Location: [facility] MAIN OR;  Service:    • DEBRIDEMENT LEG Left 03/01/2016    Excisional debridement of the skin, subcutantous tissue, tendon and muscle left calf-Dr. Jose Swann   • DEBRIDEMENT LEG Left 02/25/2016    Excisional debridement full-thcikness skin and subcutaneous tissue and tendon and muscle, left medial and lateral calf muscles-Dr. Jose Swann   • ENDOSCOPY N/A 10/21/2016    Procedure: ESOPHAGOGASTRODUODENOSCOPY DONE AT BEDSIDE;  Surgeon: Genaro Perrin MD;  Location: [facility] ENDOSCOPY;  Service:    • ENDOSCOPY AND COLONOSCOPY N/A 02/28/2016    EGD and Colonoscopy with Candy ink injection-Dr. Endy Chaney   • ENTEROSCOPY SMALL BOWEL N/A 02/11/2016    Normal esophagus, normal stomach, normal duodenum, portion of the jejunum normal-Dr. Taylor Pina   • ILIAC ARTERY - FEMORAL ARTERY BYPASS GRAFT Bilateral 2002   • ILIAC ARTERY STENT Bilateral 2001   • INSERTION AND REMOVAL PERITONEAL DIALYSIS CATHETER Right 02/29/2016    Exchange right jugular 16 cm Mahurkar dialysis catheter-Dr. Jose Swann   • INSERTION PERITONEAL DIALYSIS CATHETER Left 03/01/2016    Ultrasound-guided access of the left jugular vein, 23 cm left jugular palindrome dialysis catheter placement-Dr. Jose Swann   • INSERTION PERITONEAL DIALYSIS CATHETER  "Right 02/18/2016    Right jugular Mahrkar catherer placement-Dr. Jose Swann   • JOINT REPLACEMENT Left    • THORACOSCOPY Left 4/18/2016    Procedure: LT THORACOSCOPY VIDEO ASSISTED WITH DECORDICATION;  Surgeon: Genaro Davila III, MD;  Location: [facility];  Service:    • THROMBECTOMY Left 02/16/2016    Thombectomy of left femoral graft, left leg arteriogram, left calf forward compartment fasciotomy-Dr. Jose Swann   • TOTAL HIP ARTHROPLASTY Left    • UPPER GASTROINTESTINAL ENDOSCOPY N/A 02/09/2016    Normal UGI-Dr. Ajay Parkinson   • UPPER GASTROINTESTINAL ENDOSCOPY N/A 11/28/2015    Small hiatal hernia, chronic gastritis: biopsied, non-bleeding angioectasias in the stomach: treated with argon plasma coagulation, multiple bleeding angioectasias in the dudenum: treated with argon plasma coagulation-Dr. Mykel Jaramillo   • VASCULAR SURGERY      MULTIPLE   • VENTRAL/INCISIONAL HERNIA REPAIR N/A 10/19/2017    Procedure: VENTRAL HERNIA REPAIR WITH MESH AND BILATERAL COMPONENT SEPARATION;  Surgeon: Genaro Perrin MD;  Location: [facility];  Service:    • WISDOM TOOTH EXTRACTION           Family and social history reviewed.     Review of Systems   Cardiovascular: Positive for leg swelling.     All other systems were reviewed and are negative          Objective:     Vitals:    06/05/19 1301   BP: 140/90   BP Location: Left arm   Patient Position: Sitting   Pulse: 65   Weight: 80.7 kg (178 lb)   Height: 175.3 cm (69\")     Body mass index is 26.29 kg/m².    PHYSICAL EXAM:  Physical Exam   Constitutional: He is oriented to person, place, and time. He appears well-developed and well-nourished. No distress.   HENT:   Head: Normocephalic.   Eyes: Conjunctivae are normal.   Neck: Normal range of motion.   Cardiovascular: Normal rate, regular rhythm, normal heart sounds and intact distal pulses.   No murmur heard.  Pulses:       Carotid pulses are 2+ on the right side, and 2+ on the left side.       Radial " pulses are 2+ on the right side, and 2+ on the left side.        Posterior tibial pulses are 2+ on the right side.   Pulmonary/Chest: Effort normal. No respiratory distress. He has wheezes. He has no rhonchi. He exhibits no tenderness.   Abdominal: Soft. Bowel sounds are normal. He exhibits no distension.   Musculoskeletal: Normal range of motion. He exhibits no edema.   LLE prosthesis   Neurological: He is alert and oriented to person, place, and time.   Skin: Skin is warm, dry and intact. No rash noted. He is not diaphoretic. No cyanosis.   Psychiatric: He has a normal mood and affect. His behavior is normal. Judgment and thought content normal.         ECG 12 Lead  Date/Time: 6/5/2019 1:19 PM  Performed by: Sherlyn Ortiz APRN  Authorized by: Sherlyn Ortiz APRN   Comparison: compared with previous ECG from 5/23/2019  Similar to previous ECG  Rhythm: sinus rhythm  Rate: normal  T flattening: aVL  QRS axis: normal    Clinical impression: non-specific ECG  Comments: No significant change            Current Outpatient Medications   Medication Sig Dispense Refill   • albuterol (PROVENTIL HFA;VENTOLIN HFA) 108 (90 BASE) MCG/ACT inhaler Inhale 2 puffs Every 6 (Six) Hours As Needed for wheezing (RESCUE INHALER).     • ALPRAZolam (XANAX) 1 MG tablet Take 1 mg by mouth 3 (Three) Times a Day As Needed.     • amLODIPine (NORVASC) 10 MG tablet Take 1 tablet by mouth Daily. 90 tablet 3   • aspirin 81 MG chewable tablet Chew 81 mg Every Night.     • Cholecalciferol (VITAMIN D3) 5000 UNITS tablet Take 5,000 Units by mouth Daily.     • clopidogrel (PLAVIX) 75 MG tablet Take 75 mg by mouth Every Night.     • Coenzyme Q10 400 MG capsule Take 400 mg by mouth Every Evening.     • FERROUS SULFATE PO Take 130 mg by mouth Daily.     • Fluticasone-Umeclidin-Vilant (TRELEGY ELLIPTA) 100-62.5-25 MCG/INH aerosol powder  Inhale 1 each Daily. 28 each 2   • gabapentin (NEURONTIN) 300 MG capsule Take 300 mg by mouth 3 (Three) Times a Day.      • isosorbide mononitrate (IMDUR) 30 MG 24 hr tablet Take 30 mg by mouth Every Morning.     • levothyroxine (SYNTHROID, LEVOTHROID) 125 MCG tablet Take 125 mcg by mouth Daily.     • Multiple Vitamins-Minerals (MULTIVITAMIN ADULT PO) Take 1 tablet by mouth Daily.     • NIACIN PO Take 1 capsule by mouth Every Morning.     • omeprazole (priLOSEC) 20 MG capsule Take 20 mg by mouth Every Morning.     • simvastatin (ZOCOR) 40 MG tablet Take 40 mg by mouth Every Night.     • Thiamine HCl (VITAMIN B-1 PO) Take 1 tablet by mouth Daily.     • traMADol (ULTRAM) 50 MG tablet Take 50 mg by mouth 2 (Two) Times a Day.     • Coenzyme Q10 (CO Q10) 100 MG capsule Take 400 mg by mouth Every Night.     • sucralfate (CARAFATE) 1 g tablet Take 1 g by mouth As Needed.       No current facility-administered medications for this visit.      Assessment:       Diagnosis Plan   1. Hospital discharge follow-up  ECG 12 Lead   2. Nonischemic cardiomyopathy (CMS/HCC)  ECG 12 Lead   3. Bradycardia  ECG 12 Lead   4. Chronic obstructive pulmonary disease, unspecified COPD type (CMS/HCC)     5. PVD (peripheral vascular disease) (CMS/HCC)     6. Coronary artery disease involving native coronary artery of native heart without angina pectoris          Orders Placed This Encounter   Procedures   • ECG 12 Lead     This order was created via procedure documentation         Plan:         1.  Nonischemic cardiomyopathy EF 29% in March 2019 carvedilol has been stopped secondary to bradycardia and hypotension.  He is clinically doing well he will see us back in 3 weeks and call with any questions or concerns    2.  Mild nonobstructive and severe small vessel coronary artery disease noted on catheterization March 2019 EF on 45%-he is on aspirin and statin therapy.  His carvedilol is currently held due to hypotension bradycardia.  3.Peripheral artery disease status post left above-the-knee amputation 2016  4.  Hypertension losartan for acute kidney injury better  on amlodipine.  He is to follow his blood pressure more closely over the next 3 weeks and bring a list and to his follow-up appointment to see if carvedilol should be resumed.  5.  COPD follows with DR. Tate  6.Obstructive sleep apnea following with DR. Tran  7.Pulmonary hypertension-moderate  8.History of renal failure requiring hemodialysis in the past with near normal Cr/BUN no longer requiring hemodialysis  9. History of duodenal fistula repair ultimately needed resection of an infected aortic graft the right axial superficial artery [place]-Aneudy graft        It has been a pleasure to participate in this patient's care.      Thank you,  MANDO Sandoval      **[place]omer Disclaimer:**  Much of this encounter note is an electronic transcription/translation of spoken language to printed text. The electronic translation of spoken language may permit erroneous, or at times, nonsensical words or phrases to be inadvertently transcribed. Although I have reviewed the note for such errors, some may still exist.

## 2019-06-05 NOTE — OUTREACH NOTE
COPD/PN Week 1 Survey      Responses   Facility patient discharged from?  [place]   Does the patient have one of the following disease processes/diagnoses(primary or secondary)?  COPD/Pneumonia   Is there a successful TCM telephone encounter documented?  No   Was the primary reason for admission:  COPD exacerbation   Week 1 attempt successful?  No   Unsuccessful attempts  Attempt 2          Kenia Rowland RN

## 2019-06-06 ENCOUNTER — READMISSION MANAGEMENT (OUTPATIENT)
Dept: CALL CENTER | Facility: HOSPITAL | Age: 64
End: 2019-06-06

## 2019-06-06 NOTE — OUTREACH NOTE
COPD/PN Week 2 Survey      Responses   Facility patient discharged from?  [place]   Does the patient have one of the following disease processes/diagnoses(primary or secondary)?  COPD/Pneumonia   Was the primary reason for admission:  COPD exacerbation   Week 2 attempt successful?  No   Unsuccessful attempts  Attempt 1          Sharon Gallagher, RN

## 2019-06-08 ENCOUNTER — READMISSION MANAGEMENT (OUTPATIENT)
Dept: CALL CENTER | Facility: HOSPITAL | Age: 64
End: 2019-06-08

## 2019-06-08 NOTE — OUTREACH NOTE
COPD/PN Week 2 Survey      Responses   Facility patient discharged from?  [place]   Does the patient have one of the following disease processes/diagnoses(primary or secondary)?  COPD/Pneumonia   Was the primary reason for admission:  COPD exacerbation   Week 2 attempt successful?  Yes   Call start time  1756   Call end time  1758   Discharge diagnosis  Acute UTI, Metabolic encephalopathy, bradycardia, COPD with hypoxia, sepsis, essential HTN   Person spoke with today (if not patient) and relationship  Korin Black reviewed with patient/caregiver?  Yes   Is the patient having any side effects they believe may be caused by any medication additions or changes?  No   Does the patient have all medications ordered at discharge?  Yes   Is the patient taking all medications as directed (includes completed medication regime)?  Yes   Has the patient kept scheduled appointments due by today?  Yes   Has all DME been delivered?  Yes   DME comments  Pt uses [facility] for O2.   Psychosocial issues?  No   What is the patient's perception of their health status since discharge?  Improving   Is the patient/caregiver able to teach back the hierarchy of who to call/visit for symptoms/problems? PCP, Specialist, Home health nurse, Urgent Care, ED, 911  Yes   Is the patient able to teach back COPD zones?  Yes   Patient reports what zone on this call?  Green Zone   Green Zone  Reports doing well, Sleeping well, Appetite is good   Green Zone interventions:  Take daily medications, Use oxygen as prescribed   Week 2 call completed?  Yes          Fariha Pineda RN

## 2019-06-17 ENCOUNTER — READMISSION MANAGEMENT (OUTPATIENT)
Dept: CALL CENTER | Facility: HOSPITAL | Age: 64
End: 2019-06-17

## 2019-06-17 NOTE — OUTREACH NOTE
COPD/PN Week 3 Survey      Responses   Facility patient discharged from?  [place]   Does the patient have one of the following disease processes/diagnoses(primary or secondary)?  COPD/Pneumonia   Week 3 attempt successful?  No   Unsuccessful attempts  Attempt 1          Nilsa Molina RN

## 2019-06-18 ENCOUNTER — READMISSION MANAGEMENT (OUTPATIENT)
Dept: CALL CENTER | Facility: HOSPITAL | Age: 64
End: 2019-06-18

## 2019-06-18 NOTE — OUTREACH NOTE
COPD/PN Week 3 Survey      Responses   Facility patient discharged from?  [place]   Does the patient have one of the following disease processes/diagnoses(primary or secondary)?  COPD/Pneumonia   Was the primary reason for admission:  COPD exacerbation   Week 3 attempt successful?  No   Unsuccessful attempts  Attempt 2          Hilaria Schmidt RN

## 2019-07-01 ENCOUNTER — OFFICE VISIT (OUTPATIENT)
Dept: CARDIOLOGY | Facility: CLINIC | Age: 64
End: 2019-07-01

## 2019-07-01 VITALS
DIASTOLIC BLOOD PRESSURE: 78 MMHG | WEIGHT: 178 LBS | HEIGHT: 69 IN | BODY MASS INDEX: 26.36 KG/M2 | RESPIRATION RATE: 16 BRPM | HEART RATE: 70 BPM | SYSTOLIC BLOOD PRESSURE: 130 MMHG

## 2019-07-01 DIAGNOSIS — I42.8 NON-ISCHEMIC CARDIOMYOPATHY (HCC): ICD-10-CM

## 2019-07-01 DIAGNOSIS — I10 ESSENTIAL HYPERTENSION: ICD-10-CM

## 2019-07-01 DIAGNOSIS — I25.10 CORONARY ARTERY DISEASE INVOLVING NATIVE CORONARY ARTERY OF NATIVE HEART WITHOUT ANGINA PECTORIS: Primary | ICD-10-CM

## 2019-07-01 PROCEDURE — 93000 ELECTROCARDIOGRAM COMPLETE: CPT | Performed by: INTERNAL MEDICINE

## 2019-07-01 PROCEDURE — 99214 OFFICE O/P EST MOD 30 MIN: CPT | Performed by: INTERNAL MEDICINE

## 2019-07-01 NOTE — PROGRESS NOTES
PATIENTINFORMATION    Date of Office Visit: 2019  Encounter Provider: Mari Sood MD  Place of Service: [facility] CARDIOLOGY  Patient Name: Sim Agustin  : 1955    Subjective:     Encounter Date:2019      Patient ID: Sim Agustin is a 63 y.o. male.      History of Present Illness    This is a gentleman who I saw when he was hospitalized.  In 2019, he had an echocardiogram, which showed a cardiomyopathy with an ejection fraction of 29%.  He had a heart catheterization, which showed small vessel disease and nonobstructive large vessel disease.  He is on a statin and his LDL is controlled.  He could not tolerate a beta-blocker because of bradycardia.  He could not tolerate an angiotensin receptor blocker due to chronic kidney disease.      He comes in today for follow-up.  He has been feeling well.  He denies palpitations.  He says his breathing is better than ever since he stopped smoking.  He denies lightheadedness, chest pain, or fatigue.  He brings in a list of blood pressures, which shows his blood pressure is generally well controlled.      Review of Systems   Constitution: Negative for fever, malaise/fatigue, weight gain and weight loss.   HENT: Negative for ear pain, hearing loss, nosebleeds and sore throat.    Eyes: Negative for double vision, pain, vision loss in left eye and vision loss in right eye.   Cardiovascular:        See history of present illness.   Respiratory: Negative for cough, shortness of breath, sleep disturbances due to breathing, snoring and wheezing.    Endocrine: Negative for cold intolerance, heat intolerance and polyuria.   Skin: Negative for itching, poor wound healing and rash.   Musculoskeletal: Negative for joint pain, joint swelling and myalgias.   Gastrointestinal: Negative for abdominal pain, diarrhea, hematochezia, nausea and vomiting.   Genitourinary: Negative for hematuria and hesitancy.   Neurological: Negative for  "numbness, paresthesias and seizures.   Psychiatric/Behavioral: Negative for depression. The patient is not nervous/anxious.            ECG 12 Lead  Date/Time: 7/1/2019 11:40 AM  Performed by: Mari Sood MD  Authorized by: Mari Sood MD   Comparison: compared with previous ECG from 6/5/2019  Similar to previous ECG  Rhythm: sinus rhythm  BPM: 70  Conduction: conduction normal  ST Segments: ST segments normal  T Waves: T waves normal    Clinical impression: normal ECG               Objective:     /78 (BP Location: Left arm, Patient Position: Sitting, Cuff Size: Adult)   Pulse 70   Resp 16   Ht 175.3 cm (69\")   Wt 80.7 kg (178 lb)   BMI 26.29 kg/m²  Body mass index is 26.29 kg/m².     Physical Exam   Constitutional: He appears well-developed.   HENT:   Head: Normocephalic and atraumatic.   Eyes: Conjunctivae and lids are normal. Pupils are equal, round, and reactive to light. Lids are everted and swept, no foreign bodies found.   Neck: Normal range of motion. No JVD present. Carotid bruit is not present. No tracheal deviation present. No thyroid mass present.   Cardiovascular: Normal rate, regular rhythm and normal heart sounds.   Pulses:       Dorsalis pedis pulses are 2+ on the right side, and 2+ on the left side.   Pulmonary/Chest: Effort normal and breath sounds normal.   Abdominal: Normal appearance and bowel sounds are normal.   Musculoskeletal: Normal range of motion.   Neurological: He is alert. He has normal strength.   Skin: Skin is warm, dry and intact.   Psychiatric: He has a normal mood and affect. His behavior is normal.   Vitals reviewed.          Assessment/Plan:       1.  Heart Failure  Subjective/Objective    • Physical exam findings negative for elevated JVP.    Last ejection fraction 29% in March 2019.  Heart catheterization showed small vessel disease.  He cannot tolerate beta-blocker due to bradycardia.  He cannot tolerate an ACE inhibitor or angiotensin receptor blocker " because of renal insufficiency.    2.  Mild nonobstructive large vessel coronary artery disease and severe small vessel disease.  He is on Plavix and statin.  3.  Peripheral arterial disease.  Status post above-the-knee left leg amputation 2016.  Renal Doppler in 2019 was normal  4.  Hypertension.  Controlled on current medications.  5.  COPD.  He quit smoking and his breathing is improved.  6.  Pulmonary hypertension.  Moderate  7.  History of renal disease.  8.  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    He is can get a repeat echo at his next visit.  He will follow-up with myself or Sherlyn in 6 months.    Orders Placed This Encounter   Procedures   • ECG 12 Lead     This order was created via procedure documentation   • Adult Transthoracic Echo Complete W/ Cont if Necessary Per Protocol     Standing Status:   Future     Standing Expiration Date:   6/30/2020     Scheduling Instructions:      Echo with next appt     Order Specific Question:   Reason for exam?     Answer:   Dyspnea     Order Specific Question:   Reason for exam?     Answer:   Heart Failure, Cardiomyopathy, or Sytemic or Pulmonary Hypertension        Discharge Medications           Accurate as of 7/1/19 12:00 PM. If you have any questions, ask your nurse or doctor.               Continue These Medications      Instructions Start Date   albuterol sulfate  (90 Base) MCG/ACT inhaler  Commonly known as:  PROVENTIL HFA;VENTOLIN HFA;PROAIR HFA   2 puffs, Inhalation, Every 6 Hours PRN      ALPRAZolam 1 MG tablet  Commonly known as:  XANAX   1 mg, Oral, 3 Times Daily PRN      amLODIPine 10 MG tablet  Commonly known as:  NORVASC   10 mg, Oral, Every 24 Hours Scheduled      aspirin 81 MG chewable tablet   81 mg, Oral, Nightly      clopidogrel 75 MG tablet  Commonly known as:  PLAVIX   75 mg, Oral, Nightly      Coenzyme Q10 400 MG capsule   400 mg, Oral, Every Evening      FERROUS SULFATE PO   130 mg, Oral, Daily       Fluticasone-Umeclidin-Vilant 100-62.5-25 MCG/INH aerosol powder   Commonly known as:  TRELEGY ELLIPTA   1 each, Inhalation, Daily      gabapentin 300 MG capsule  Commonly known as:  NEURONTIN   300 mg, Oral, 3 Times Daily      isosorbide mononitrate 30 MG 24 hr tablet  Commonly known as:  IMDUR   30 mg, Oral, Every Morning      levothyroxine 125 MCG tablet  Commonly known as:  SYNTHROID, LEVOTHROID   125 mcg, Oral, Daily      MULTIVITAMIN ADULT PO   1 tablet, Oral, Daily      NIACIN PO   1 capsule, Oral, Every Morning      omeprazole 20 MG capsule  Commonly known as:  priLOSEC   20 mg, Oral, Every Morning      simvastatin 40 MG tablet  Commonly known as:  ZOCOR   40 mg, Oral, Nightly      traMADol 50 MG tablet  Commonly known as:  ULTRAM   50 mg, Oral, 2 Times Daily      VITAMIN B-1 PO   1 tablet, Oral, Daily      vitamin D3 5000 units tablet tablet   5,000 Units, Oral, Daily                    Mari Sood MD  07/01/19  12:00 PM

## 2019-10-14 ENCOUNTER — LAB (OUTPATIENT)
Dept: LAB | Facility: HOSPITAL | Age: 64
End: 2019-10-14

## 2019-10-14 ENCOUNTER — TRANSCRIBE ORDERS (OUTPATIENT)
Dept: ADMINISTRATIVE | Facility: HOSPITAL | Age: 64
End: 2019-10-14

## 2019-10-14 DIAGNOSIS — M25.50 ARTHRALGIA, UNSPECIFIED JOINT: ICD-10-CM

## 2019-10-14 DIAGNOSIS — M25.50 ARTHRALGIA, UNSPECIFIED JOINT: Primary | ICD-10-CM

## 2019-10-14 LAB
BASOPHILS # BLD AUTO: 0.04 10*3/MM3 (ref 0–0.2)
BASOPHILS NFR BLD AUTO: 0.4 % (ref 0–1.5)
CRP SERPL-MCNC: 4.12 MG/DL (ref 0–0.5)
DEPRECATED RDW RBC AUTO: 43.7 FL (ref 37–54)
EOSINOPHIL # BLD AUTO: 0.06 10*3/MM3 (ref 0–0.4)
EOSINOPHIL NFR BLD AUTO: 0.6 % (ref 0.3–6.2)
ERYTHROCYTE [DISTWIDTH] IN BLOOD BY AUTOMATED COUNT: 12.8 % (ref 12.3–15.4)
ERYTHROCYTE [SEDIMENTATION RATE] IN BLOOD: 2 MM/HR (ref 0–20)
HCT VFR BLD AUTO: 53 % (ref 37.5–51)
HGB BLD-MCNC: 18 G/DL (ref 13–17.7)
IMM GRANULOCYTES # BLD AUTO: 0.04 10*3/MM3 (ref 0–0.05)
IMM GRANULOCYTES NFR BLD AUTO: 0.4 % (ref 0–0.5)
LYMPHOCYTES # BLD AUTO: 2.08 10*3/MM3 (ref 0.7–3.1)
LYMPHOCYTES NFR BLD AUTO: 19.6 % (ref 19.6–45.3)
MCH RBC QN AUTO: 31.9 PG (ref 26.6–33)
MCHC RBC AUTO-ENTMCNC: 34 G/DL (ref 31.5–35.7)
MCV RBC AUTO: 93.8 FL (ref 79–97)
MONOCYTES # BLD AUTO: 0.99 10*3/MM3 (ref 0.1–0.9)
MONOCYTES NFR BLD AUTO: 9.3 % (ref 5–12)
NEUTROPHILS # BLD AUTO: 7.39 10*3/MM3 (ref 1.7–7)
NEUTROPHILS NFR BLD AUTO: 69.7 % (ref 42.7–76)
NRBC BLD AUTO-RTO: 0 /100 WBC (ref 0–0.2)
PLATELET # BLD AUTO: 169 10*3/MM3 (ref 140–450)
PMV BLD AUTO: 9.4 FL (ref 6–12)
RBC # BLD AUTO: 5.65 10*6/MM3 (ref 4.14–5.8)
URATE SERPL-MCNC: 7.9 MG/DL (ref 3.4–7)
WBC NRBC COR # BLD: 10.6 10*3/MM3 (ref 3.4–10.8)

## 2019-10-14 PROCEDURE — 36415 COLL VENOUS BLD VENIPUNCTURE: CPT

## 2019-10-14 PROCEDURE — 85025 COMPLETE CBC W/AUTO DIFF WBC: CPT

## 2019-10-14 PROCEDURE — 85652 RBC SED RATE AUTOMATED: CPT

## 2019-10-14 PROCEDURE — 86140 C-REACTIVE PROTEIN: CPT

## 2019-10-14 PROCEDURE — 84550 ASSAY OF BLOOD/URIC ACID: CPT

## 2019-11-14 ENCOUNTER — DOCUMENTATION (OUTPATIENT)
Dept: INTERNAL MEDICINE | Facility: HOSPITAL | Age: 64
End: 2019-11-14

## 2019-11-14 NOTE — PROGRESS NOTES
[facility]    Date of service:  November 5, 2019    Diagnoses:  1. Status post left above-knee amputation, 03/16/2016.  2. Status post resection of infected aortic graft with right axial superficial femoral artery [place]-Aneudy graft 02/15/2016.   3. History of aortobifemoral graft and bilateral iliac stents, infected aortic graft, and chronic draining sinus left iliac.   4. 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   5. History of duodenal angioectasia.   6. History of GI bleed.   7. History of left femoral graft thrombectomy and left calf compartment syndrome and fasciotomy 02/16/2016.   8. Status post exploratory laparotomy with left hemicolectomy, colostomy, G-tube, J-tube for ischemic colon on 02/28/2016.   9. History of left hip replacement.   10. History of COPD.     History of present illness: Patient seen in the PT clinic on November 5, 2019.  He sees vascular surgery for yearly follow-up in December.  He is on Plavix 75 mg and aspirin 81 mg daily.  He had right knee cellulitis that required Keflex 500 mg 4 times a day for 10-day course, about 2 or 3 weeks ago.  Prosthetically he has a new battery for the microprocessor knee.  He feels that he is not walking after a step, wanting to bend.  Feels floating.  He keeps the foot placed anteriorly to secure the knee.    Physical exam nation: Left above-knee amputation.  Good hip extension range of motion.  He has distal rubor-has that chronically.  The skin is otherwise unremarkable.  He ambulates with a right straight cane in the left above-knee amputation-transfemoral-prosthesis with a plié 3 microprocessor knee, lateral foot placement.  Somewhat of a step-to gait.    Assessment and plan: Left transfemoral amputation-need to assess the alignment of his prosthesis further through we will prosthetics.  We will look at adjustments to try to improve his gait mechanics.  Once the alignment is optimized, then may consider a course  of physical therapy to further improve his gait pattern for more efficiency.  Otherwise we will have him follow-up in this clinic yearly.      Gordy Bonilla MD    Date of service:  Nov 5, 2019

## 2020-01-16 ENCOUNTER — TRANSCRIBE ORDERS (OUTPATIENT)
Dept: ADMINISTRATIVE | Facility: HOSPITAL | Age: 65
End: 2020-01-16

## 2020-01-16 DIAGNOSIS — Z12.2 ENCOUNTER FOR SCREENING FOR LUNG CANCER: Primary | ICD-10-CM

## 2020-01-31 ENCOUNTER — OFFICE VISIT (OUTPATIENT)
Dept: CARDIOLOGY | Facility: CLINIC | Age: 65
End: 2020-01-31

## 2020-01-31 ENCOUNTER — HOSPITAL ENCOUNTER (OUTPATIENT)
Dept: CARDIOLOGY | Facility: HOSPITAL | Age: 65
Discharge: HOME | End: 2020-01-31
Admitting: INTERNAL MEDICINE

## 2020-01-31 VITALS
HEART RATE: 72 BPM | DIASTOLIC BLOOD PRESSURE: 82 MMHG | BODY MASS INDEX: 27.55 KG/M2 | HEIGHT: 69 IN | SYSTOLIC BLOOD PRESSURE: 130 MMHG | WEIGHT: 186 LBS

## 2020-01-31 VITALS
HEART RATE: 78 BPM | DIASTOLIC BLOOD PRESSURE: 82 MMHG | WEIGHT: 186.2 LBS | SYSTOLIC BLOOD PRESSURE: 130 MMHG | BODY MASS INDEX: 27.58 KG/M2 | HEIGHT: 69 IN

## 2020-01-31 DIAGNOSIS — I10 ESSENTIAL HYPERTENSION: ICD-10-CM

## 2020-01-31 DIAGNOSIS — I42.9 CARDIOMYOPATHY, UNSPECIFIED TYPE (HCC): ICD-10-CM

## 2020-01-31 DIAGNOSIS — I25.10 CORONARY ARTERY DISEASE INVOLVING NATIVE CORONARY ARTERY OF NATIVE HEART WITHOUT ANGINA PECTORIS: ICD-10-CM

## 2020-01-31 DIAGNOSIS — I42.8 NON-ISCHEMIC CARDIOMYOPATHY (HCC): ICD-10-CM

## 2020-01-31 DIAGNOSIS — F17.210 CIGARETTE NICOTINE DEPENDENCE WITHOUT COMPLICATION: ICD-10-CM

## 2020-01-31 DIAGNOSIS — I25.10 CORONARY ARTERY DISEASE INVOLVING NATIVE CORONARY ARTERY OF NATIVE HEART WITHOUT ANGINA PECTORIS: Primary | ICD-10-CM

## 2020-01-31 LAB
AORTIC ROOT ANNULUS: 2.5 CM
ASCENDING AORTA: 3.3 CM
BH CV ECHO MEAS - ACS: 1.8 CM
BH CV ECHO MEAS - AO MAX PG (FULL): 6.6 MMHG
BH CV ECHO MEAS - AO MAX PG: 8.9 MMHG
BH CV ECHO MEAS - AO MEAN PG (FULL): 4 MMHG
BH CV ECHO MEAS - AO MEAN PG: 5 MMHG
BH CV ECHO MEAS - AO ROOT AREA (BSA CORRECTED): 1.8
BH CV ECHO MEAS - AO ROOT AREA: 10.2 CM^2
BH CV ECHO MEAS - AO ROOT DIAM: 3.6 CM
BH CV ECHO MEAS - AO V2 MAX: 149 CM/SEC
BH CV ECHO MEAS - AO V2 MEAN: 98.6 CM/SEC
BH CV ECHO MEAS - AO V2 VTI: 30.5 CM
BH CV ECHO MEAS - ASC AORTA: 3.3 CM
BH CV ECHO MEAS - AVA(I,A): 2.5 CM^2
BH CV ECHO MEAS - AVA(I,D): 2.5 CM^2
BH CV ECHO MEAS - AVA(V,A): 2.3 CM^2
BH CV ECHO MEAS - AVA(V,D): 2.3 CM^2
BH CV ECHO MEAS - BSA(HAYCOCK): 2 M^2
BH CV ECHO MEAS - BSA: 2 M^2
BH CV ECHO MEAS - BZI_BMI: 27.5 KILOGRAMS/M^2
BH CV ECHO MEAS - BZI_METRIC_HEIGHT: 175.3 CM
BH CV ECHO MEAS - BZI_METRIC_WEIGHT: 84.4 KG
BH CV ECHO MEAS - EDV(MOD-SP2): 76 ML
BH CV ECHO MEAS - EDV(MOD-SP4): 77 ML
BH CV ECHO MEAS - EDV(TEICH): 129.5 ML
BH CV ECHO MEAS - EF(CUBED): 54.5 %
BH CV ECHO MEAS - EF(MOD-BP): 50 %
BH CV ECHO MEAS - EF(MOD-SP2): 50 %
BH CV ECHO MEAS - EF(MOD-SP4): 49.4 %
BH CV ECHO MEAS - EF(TEICH): 45.9 %
BH CV ECHO MEAS - ESV(MOD-SP2): 38 ML
BH CV ECHO MEAS - ESV(MOD-SP4): 39 ML
BH CV ECHO MEAS - ESV(TEICH): 70 ML
BH CV ECHO MEAS - FS: 23.1 %
BH CV ECHO MEAS - IVS/LVPW: 1
BH CV ECHO MEAS - IVSD: 1.2 CM
BH CV ECHO MEAS - LAT PEAK E' VEL: 7 CM/SEC
BH CV ECHO MEAS - LV DIASTOLIC VOL/BSA (35-75): 38.4 ML/M^2
BH CV ECHO MEAS - LV MASS(C)D: 248.8 GRAMS
BH CV ECHO MEAS - LV MASS(C)DI: 124.2 GRAMS/M^2
BH CV ECHO MEAS - LV MAX PG: 2.3 MMHG
BH CV ECHO MEAS - LV MEAN PG: 1 MMHG
BH CV ECHO MEAS - LV SYSTOLIC VOL/BSA (12-30): 19.5 ML/M^2
BH CV ECHO MEAS - LV V1 MAX: 75.1 CM/SEC
BH CV ECHO MEAS - LV V1 MEAN: 57.1 CM/SEC
BH CV ECHO MEAS - LV V1 VTI: 16.6 CM
BH CV ECHO MEAS - LVIDD: 5.2 CM
BH CV ECHO MEAS - LVIDS: 4 CM
BH CV ECHO MEAS - LVLD AP2: 9.2 CM
BH CV ECHO MEAS - LVLD AP4: 9.1 CM
BH CV ECHO MEAS - LVLS AP2: 8.3 CM
BH CV ECHO MEAS - LVLS AP4: 8.8 CM
BH CV ECHO MEAS - LVOT AREA (M): 4.5 CM^2
BH CV ECHO MEAS - LVOT AREA: 4.5 CM^2
BH CV ECHO MEAS - LVOT DIAM: 2.4 CM
BH CV ECHO MEAS - LVPWD: 1.2 CM
BH CV ECHO MEAS - MED PEAK E' VEL: 7 CM/SEC
BH CV ECHO MEAS - MV A DUR: 0.13 SEC
BH CV ECHO MEAS - MV A MAX VEL: 72 CM/SEC
BH CV ECHO MEAS - MV DEC SLOPE: 161 CM/SEC^2
BH CV ECHO MEAS - MV DEC TIME: 0.27 SEC
BH CV ECHO MEAS - MV E MAX VEL: 31.3 CM/SEC
BH CV ECHO MEAS - MV E/A: 0.43
BH CV ECHO MEAS - MV MAX PG: 4.2 MMHG
BH CV ECHO MEAS - MV MEAN PG: 1 MMHG
BH CV ECHO MEAS - MV P1/2T MAX VEL: 33.8 CM/SEC
BH CV ECHO MEAS - MV P1/2T: 61.5 MSEC
BH CV ECHO MEAS - MV V2 MAX: 102 CM/SEC
BH CV ECHO MEAS - MV V2 MEAN: 42.4 CM/SEC
BH CV ECHO MEAS - MV V2 VTI: 17.9 CM
BH CV ECHO MEAS - MVA P1/2T LCG: 6.5 CM^2
BH CV ECHO MEAS - MVA(P1/2T): 3.6 CM^2
BH CV ECHO MEAS - MVA(VTI): 4.2 CM^2
BH CV ECHO MEAS - PA MAX PG (FULL): 1 MMHG
BH CV ECHO MEAS - PA MAX PG: 2.1 MMHG
BH CV ECHO MEAS - PA V2 MAX: 71.9 CM/SEC
BH CV ECHO MEAS - PULM A REVS DUR: 0.13 SEC
BH CV ECHO MEAS - PULM A REVS VEL: 41.6 CM/SEC
BH CV ECHO MEAS - PULM DIAS VEL: 33.2 CM/SEC
BH CV ECHO MEAS - PULM S/D: 1.9
BH CV ECHO MEAS - PULM SYS VEL: 64.5 CM/SEC
BH CV ECHO MEAS - PVA(V,A): 3.2 CM^2
BH CV ECHO MEAS - PVA(V,D): 3.2 CM^2
BH CV ECHO MEAS - QP/QS: 0.63
BH CV ECHO MEAS - RAP SYSTOLE: 3 MMHG
BH CV ECHO MEAS - RV MAX PG: 1 MMHG
BH CV ECHO MEAS - RV MEAN PG: 1 MMHG
BH CV ECHO MEAS - RV V1 MAX: 50.9 CM/SEC
BH CV ECHO MEAS - RV V1 MEAN: 35.6 CM/SEC
BH CV ECHO MEAS - RV V1 VTI: 10.4 CM
BH CV ECHO MEAS - RVOT AREA: 4.5 CM^2
BH CV ECHO MEAS - RVOT DIAM: 2.4 CM
BH CV ECHO MEAS - RVSP: 22 MMHG
BH CV ECHO MEAS - SI(AO): 155 ML/M^2
BH CV ECHO MEAS - SI(CUBED): 38.2 ML/M^2
BH CV ECHO MEAS - SI(LVOT): 37.5 ML/M^2
BH CV ECHO MEAS - SI(MOD-SP2): 19 ML/M^2
BH CV ECHO MEAS - SI(MOD-SP4): 19 ML/M^2
BH CV ECHO MEAS - SI(TEICH): 29.7 ML/M^2
BH CV ECHO MEAS - SV(AO): 310.5 ML
BH CV ECHO MEAS - SV(CUBED): 76.6 ML
BH CV ECHO MEAS - SV(LVOT): 75.1 ML
BH CV ECHO MEAS - SV(MOD-SP2): 38 ML
BH CV ECHO MEAS - SV(MOD-SP4): 38 ML
BH CV ECHO MEAS - SV(RVOT): 47 ML
BH CV ECHO MEAS - SV(TEICH): 59.5 ML
BH CV ECHO MEAS - TAPSE (>1.6): 2.6 CM2
BH CV ECHO MEAS - TR MAX VEL: 215 CM/SEC
BH CV ECHO MEASUREMENTS AVERAGE E/E' RATIO: 4.47
BH CV XLRA - RV BASE: 2.8 CM
BH CV XLRA - RV LENGTH: 7.8 CM
BH CV XLRA - RV MID: 2.6 CM
BH CV XLRA - TDI S': 11 CM/SEC
LEFT ATRIUM VOLUME INDEX: 30 ML/M2
SINUS: 3.4 CM
STJ: 2.8 CM

## 2020-01-31 PROCEDURE — 93306 TTE W/DOPPLER COMPLETE: CPT

## 2020-01-31 PROCEDURE — 93000 ELECTROCARDIOGRAM COMPLETE: CPT | Performed by: NURSE PRACTITIONER

## 2020-01-31 PROCEDURE — 99213 OFFICE O/P EST LOW 20 MIN: CPT | Performed by: NURSE PRACTITIONER

## 2020-01-31 PROCEDURE — 93356 MYOCRD STRAIN IMG SPCKL TRCK: CPT

## 2020-01-31 PROCEDURE — 93356 MYOCRD STRAIN IMG SPCKL TRCK: CPT | Performed by: INTERNAL MEDICINE

## 2020-01-31 PROCEDURE — 93306 TTE W/DOPPLER COMPLETE: CPT | Performed by: INTERNAL MEDICINE

## 2020-01-31 PROCEDURE — 99406 BEHAV CHNG SMOKING 3-10 MIN: CPT | Performed by: NURSE PRACTITIONER

## 2020-01-31 RX ORDER — BUPROPION HYDROCHLORIDE 300 MG/1
1 TABLET ORAL DAILY
COMMUNITY
Start: 2020-01-20 | End: 2020-03-03

## 2020-01-31 NOTE — PROGRESS NOTES
Date of Office Visit: 2020  Encounter Provider: Jolene Sarabia, GABRIEL, APRN  Place of Service: [facility]  Patient Name: Sim Agustin  :1955        Subjective:     Chief Complaint:  Follow-up, coronary disease, cardiomyopathy.      History of Present Illness:  Sim Agustin is a 64 y.o. male patient of Dr. Sood.  I am seeing this patient in the office today and I reviewed his records.    Patient has a history of heart failure, COPD, peripheral vascular disease, coronary artery disease with distant history of stent placement, hypothyroidism, end-stage renal disease on hemodialysis, left above-knee amputation, CVA over 15 years ago.     2015 echocardiogram showed EF of 55%, mild TR, RVSP 57 mmHg.  He had a normal nuclear stress test. 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  He had complicated recovery and received multiple transfusions.  He was noted to have intermittent ST elevation felt to be related to acute plaque rupture.  He was too ill at that time to go the Cath Lab. He was admitted 2016 with dyspnea and back pain.  He was noted to have T wave inversions in V1 and V2 which were new.  Nuclear stress test showed EF 52% and infarction in the apical And anterior apex, infarction in the inferior wall, and mild ydaiel-infarct ischemia in the inferior wall.  He was not having chest pain with exertion and was treated medically. Patient was admitted 3/11/19 with progressive cough, subjective fever, and dyspnea.  He was admitted to the ICU for BiPAP use.  Echocardiogram was ordered due to increased oxygen demand.  It showed ejection fraction 29%, grade 1 diastolic dysfunction, hypokinesis of the apical anterior, mid inferior septal, and apex, akinesis of the apical septal wall, mild thickening of the aortic valve, and moderate pulmonary hypertension with RVSP of 45 mmHg.  Patient underwent  heart catheterization 3/18/2019 showing mild nonobstructive and severe small vessel coronary artery disease.  Severe pulmonary hypertension with normal left-sided filling pressures were noted.  EF appeared to be mildly decreased at 45%.  There appeared to be normal contraction of the basal and mid segments but hypokinesis of the apical walls.  Continuation of medical management for cardiomyopathy was recommended.      Patient presents to office today for follow-up appointment.  Patient reports he has been feeling well overall since last visit.  He continues to get some shortness of breath with walking long distances but reports that this is not new or worsening since last visit.  Unfortunately he is smoking 1 to 2 cigarettes a day.  He is followed by Dr. Tate for COPD.  Highly recommended smoking cessation.  He might get some occasional mild sock line dependent edema at the end of the day if he has been on his feet a lot but nothing significant or new or worsening.  He denies any chest pain, palpitations, racing heartbeat sensation, dizziness, syncope, near syncope, falls, fatigue, or abnormal bleeding.  He has a history of sleep apnea and uses BiPAP nightly.  Blood pressure and heart rate are well controlled in the office today.  He is scheduled for an echocardiogram today after his appointment.        Past Medical History:   Diagnosis Date   • Anemia     per [facility] database   • CAD (coronary artery disease) 4/4/2016   • Colon polyps    • Compartment syndrome (CMS/HCC)     AFTER ILIAC SURGERY. ABOVE KNEE AMPUTATION   • COPD (chronic obstructive pulmonary disease) (CMS/HCC)    • Cough     SINCE APRIL WITH PNEUMONIA.    • Disease of thyroid gland     HYPOTHYRODISM   • Diverticulosis    • Employs prosthetic leg    • History of acute renal failure    • History of CHF (congestive heart failure)    • History of GI bleed 02/2016    AORTODUOD FISTULA   • History of sepsis 02/2016   • History of transfusion      MULTIPLE, 2016   • Hypertension    • Hypotension    • Nonischemic cardiomyopathy (CMS/HCC)    • Phantom limb pain (CMS/HCC)     SL, WITH LEFT ABOUVE KNEE   • Pneumonia     SPRING 2016  AFTER SURGERY   • PVD (peripheral vascular disease) (CMS/HCC)      Past Surgical History:   Procedure Laterality Date   • ABOVE KNEE AMPUTATION Left 3/16/2016    Procedure: LT AMPUTATION ABOVE KNEE;  Surgeon: Jose Swann MD;  Location: [facility] MAIN OR;  Service:    • ARTERIAL THROMBECTOMY  2001    throbectomy of arterial graft   • AXILLARY FEMORAL BYPASS Right 02/15/2016    Exploratory lapartomy, repair aortoduodenal fistula, resection infected aortobifemoral bypass graft, axillobi-superficial femoral artery [place]-Aneudy bypass graft from right axillary artery, Repair of duodenotomy 4th portion duodenum-Dr. Jose Swann, Dr. Genaro Perrin   • BRONCHOSCOPY Left 03/04/2016    Dr. NATALIIA Tate   • BRONCHOSCOPY RIGID / FLEXIBLE N/A 03/03/2016    Dr. NATALIIA Tate   • BRONCHOSCOPY RIGID / FLEXIBLE N/A 02/26/2016    Dr. NATALIIA Tate   • CARDIAC CATHETERIZATION N/A 3/18/2019    Procedure: Right and Left Heart Cath;  Surgeon: Nina Storey MD;  Location: [facility] CATH INVASIVE LOCATION;  Service: Cardiovascular   • CATARACT EXTRACTION WITH INTRAOCULAR LENS IMPLANT Bilateral    • COLON RESECTION WITH COLOSTOMY Left 02/28/2016    Exploratory laparotomy with open left hemicolectomy, end-colostomy, G-tube and J-tube-Dr. Endy Chaney   • COLONOSCOPY N/A 2015    Dr. Laughlin   • COLONOSCOPY N/A 10/12/2016    Procedure: COLONOSCOPY TO 20 CM AND PER STOMA TO 30CM;  Surgeon: Genaro Perirn MD;  Location: [facility] ENDOSCOPY;  Service:    • COLONOSCOPY N/A 10/21/2016    Procedure: COLONOSCOPY DONE AT BEDSIDE ONTO CECEM;  Surgeon: Genaro Perrin MD;  Location: [facility] ENDOSCOPY;  Service:    • COLONOSCOPY N/A 02/10/2016    Diverticulosis in the entire examined colon, non-bleeding internal hemorrhoids-Dr. Taylor Pina   • COLONOSCOPY  N/A 02/11/2016    Poor prep, blood in the entire examined colon, normal ileum-Dr. Taylor Pina   • COLONOSCOPY W/ POLYPECTOMY N/A 07/27/2015    Normal ileum, diverticulosis in the sigmoid colon: resected and retrieved, one 6 mm polyp in the descending colon: resected and retrieved, the distal rectum and anal verge are normal on retroflexion view-Dr. Miguel Ángel Laughlin   • COLOSTOMY CLOSURE N/A 10/13/2016    Procedure: COLOSTOMY TAKEDOWN OR CLOSURE, APPENDECTOMY;  Surgeon: Genaro Perrin MD;  Location: [facility] MAIN OR;  Service:    • DEBRIDEMENT LEG Left 03/01/2016    Excisional debridement of the skin, subcutantous tissue, tendon and muscle left calf-Dr. Jose Swann   • DEBRIDEMENT LEG Left 02/25/2016    Excisional debridement full-thcikness skin and subcutaneous tissue and tendon and muscle, left medial and lateral calf muscles-Dr. Jose Swann   • ENDOSCOPY N/A 10/21/2016    Procedure: ESOPHAGOGASTRODUODENOSCOPY DONE AT BEDSIDE;  Surgeon: Genaro Perrin MD;  Location: [facility] ENDOSCOPY;  Service:    • ENDOSCOPY AND COLONOSCOPY N/A 02/28/2016    EGD and Colonoscopy with [place]andy ink injection-Dr. Endy Chaney   • ENTEROSCOPY SMALL BOWEL N/A 02/11/2016    Normal esophagus, normal stomach, normal duodenum, portion of the jejunum normal-Dr. Taylor Pina   • ILIAC ARTERY - FEMORAL ARTERY BYPASS GRAFT Bilateral 2002   • ILIAC ARTERY STENT Bilateral 2001   • INSERTION AND REMOVAL PERITONEAL DIALYSIS CATHETER Right 02/29/2016    Exchange right jugular 16 cm [place]kar dialysis catheter-Dr. Jose Swann   • INSERTION PERITONEAL DIALYSIS CATHETER Left 03/01/2016    Ultrasound-guided access of the left jugular vein, 23 cm left jugular palindrome dialysis catheter placement-Dr. Jose Swann   • INSERTION PERITONEAL DIALYSIS CATHETER Right 02/18/2016    Right jugular Mahrkar catherer placement-Dr. Jose Swann   • JOINT REPLACEMENT Left    • THORACOSCOPY Left 4/18/2016    Procedure: LT THORACOSCOPY  VIDEO ASSISTED WITH DECORDICATION;  Surgeon: Genaro Davila III, MD;  Location: [facility] OR;  Service:    • THROMBECTOMY Left 02/16/2016    Thombectomy of left femoral graft, left leg arteriogram, left calf forward compartment fasciotomy-Dr. Jose Swann   • TOTAL HIP ARTHROPLASTY Left    • UPPER GASTROINTESTINAL ENDOSCOPY N/A 02/09/2016    Normal UGI-Dr. Ajay Parkinson   • UPPER GASTROINTESTINAL ENDOSCOPY N/A 11/28/2015    Small hiatal hernia, chronic gastritis: biopsied, non-bleeding angioectasias in the stomach: treated with argon plasma coagulation, multiple bleeding angioectasias in the dudenum: treated with argon plasma coagulation-Dr. Mykel Jaramillo   • VASCULAR SURGERY      MULTIPLE   • VENTRAL/INCISIONAL HERNIA REPAIR N/A 10/19/2017    Procedure: VENTRAL HERNIA REPAIR WITH MESH AND BILATERAL COMPONENT SEPARATION;  Surgeon: Genaro Perrin MD;  Location: [facility] OR;  Service:    • WISDOM TOOTH EXTRACTION       Outpatient Medications Prior to Visit   Medication Sig Dispense Refill   • albuterol (PROVENTIL HFA;VENTOLIN HFA) 108 (90 BASE) MCG/ACT inhaler Inhale 2 puffs Every 6 (Six) Hours As Needed for wheezing (RESCUE INHALER).     • ALPRAZolam (XANAX) 1 MG tablet Take 1 mg by mouth 3 (Three) Times a Day As Needed.     • amLODIPine (NORVASC) 10 MG tablet Take 1 tablet by mouth Daily. 90 tablet 3   • aspirin 81 MG chewable tablet Chew 81 mg Every Night.     • buPROPion XL (WELLBUTRIN XL) 300 MG 24 hr tablet Take 1 tablet by mouth Daily.     • Cholecalciferol (VITAMIN D3) 5000 UNITS tablet Take 5,000 Units by mouth Daily.     • clopidogrel (PLAVIX) 75 MG tablet Take 75 mg by mouth Every Night.     • Coenzyme Q10 400 MG capsule Take 400 mg by mouth Every Evening.     • FERROUS SULFATE PO Take 130 mg by mouth Daily.     • Fluticasone-Umeclidin-Vilant (TRELEGY ELLIPTA) 100-62.5-25 MCG/INH aerosol powder  Inhale 1 each Daily. 28 each 2   • gabapentin (NEURONTIN) 300 MG capsule Take 300 mg by mouth 3  (Three) Times a Day.     • isosorbide mononitrate (IMDUR) 30 MG 24 hr tablet Take 30 mg by mouth Every Morning.     • levothyroxine (SYNTHROID, LEVOTHROID) 125 MCG tablet Take 125 mcg by mouth Daily.     • Multiple Vitamins-Minerals (MULTIVITAMIN ADULT PO) Take 1 tablet by mouth Daily.     • NIACIN PO Take 1 capsule by mouth Every Morning.     • omeprazole (priLOSEC) 20 MG capsule Take 20 mg by mouth Every Morning.     • simvastatin (ZOCOR) 40 MG tablet Take 40 mg by mouth Every Night.     • Thiamine HCl (VITAMIN B-1 PO) Take 1 tablet by mouth Daily.     • traMADol (ULTRAM) 50 MG tablet Take 50 mg by mouth 2 (Two) Times a Day.       No facility-administered medications prior to visit.        Allergies as of 2020 - Reviewed 2020   Allergen Reaction Noted   • Augmentin [amoxicillin-pot clavulanate] Hives and Rash 2014     Social History     Socioeconomic History   • Marital status:      Spouse name: Not on file   • Number of children: Not on file   • Years of education: Not on file   • Highest education level: Not on file   Tobacco Use   • Smoking status: Former Smoker     Packs/day: 1.00     Years: 30.00     Pack years: 30.00     Types: Cigarettes     Last attempt to quit: 2016     Years since quittin.0   • Smokeless tobacco: Never Used   • Tobacco comment: caffeine - rarely   Substance and Sexual Activity   • Alcohol use: No   • Drug use: No   • Sexual activity: Defer     Family History   Problem Relation Age of Onset   • Lung cancer Mother    • Heart disease Father    • Diabetes Father    • Hypertension Father    • Malig Hyperthermia Neg Hx        Review of Systems   Constitution: Negative for malaise/fatigue.   Cardiovascular: Positive for dyspnea on exertion. Negative for chest pain, near-syncope, palpitations and syncope.   Respiratory:        COPD.  Sleep Apnea.   Hematologic/Lymphatic: Negative for bleeding problem.   Musculoskeletal: Negative for falls.   Gastrointestinal:  "Negative for jaundice and melena.   Genitourinary: Negative for hematuria.   Neurological: Negative for dizziness and light-headedness.   Psychiatric/Behavioral: Negative for altered mental status.          Objective:     Vitals:    01/31/20 0938   BP: 130/82   BP Location: Right arm   Pulse: 78   Weight: 84.5 kg (186 lb 3.2 oz)   Height: 175.3 cm (69\")     Body mass index is 27.5 kg/m².      PHYSICAL EXAM:  Physical Exam   Constitutional: He is oriented to person, place, and time. He appears well-developed and well-nourished. No distress.   HENT:   Head: Normocephalic and atraumatic.   Eyes: Pupils are equal, round, and reactive to light.   Neck: Neck supple. No JVD present. Carotid bruit is not present.   Cardiovascular: Normal rate, regular rhythm, normal heart sounds and intact distal pulses. Exam reveals no gallop and no friction rub.   No murmur heard.  Pulses:       Radial pulses are 2+ on the right side, and 2+ on the left side.   Pulmonary/Chest: Effort normal and breath sounds normal. No respiratory distress. He has no wheezes. He has no rales.   Abdominal: Soft. Bowel sounds are normal.   Musculoskeletal: He exhibits no edema, tenderness or deformity.   Prosthetic to lower extremity.  Cane.     Neurological: He is alert and oriented to person, place, and time.   Skin: Skin is warm and dry. No rash noted. He is not diaphoretic. No erythema.   Psychiatric: He has a normal mood and affect. His behavior is normal. Judgment normal.           ECG 12 Lead  Date/Time: 1/31/2020 9:51 AM  Performed by: Jolene Sarabia DNP, APRN  Authorized by: Jolene Sarabia DNP, MANDO   Comparison: compared with previous ECG from 6/5/2019  Similar to previous ECG  Rhythm: sinus rhythm  Rate: normal  BPM: 78  Comments: No significant changes from previous ECG              Assessment:       Diagnosis Plan   1. Coronary artery disease involving native coronary artery of native heart without angina pectoris     2. Essential " hypertension     3. Cardiomyopathy, unspecified type (CMS/HCC)     4. Cigarette nicotine dependence without complication           Plan:     1. Cardiomyopathy: Appears well compensated.  Patient getting repeat echocardiogram today after appointment.  2. Coronary artery disease: Nonobstructive large vessel disease and severe small vessel disease seen on 3/2019 heart cath.  Denies anginal symptoms.  Continue medical therapy.  Remains on Imdur aspirin, Plavix  3. Pulmonary hypertension: Patient getting repeat echocardiogram today after appointment.  Also follows with pulmonology.  4. Systemic hypertension: Blood pressure well controlled in the office today.  Patient to continue to monitor.  5. Peripheral vascular disease: Status post left above-knee amputation in 2016.  Normal renal Doppler 2019.  Follows with Dr. Swann.  6. COPD: Followed by Dr. Tate.  7. 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 Tobacco abuse: Unfortunately patient has been smoking 1 to 2 cigarettes a day.  Highly recommend smoking cessation.  Handout given.  Discussed smoking cessation for at least 3 to 5 minutes.  9. History of dialysis after sepsis in 2016 with renal function later improving and no longer requiring dialysis  10. Obstructive sleep apnea: On BiPAP at night.  Followed by Dr. Tate.    Patient to follow-up with Dr. Sood in 6 months or sooner if needed for any new, recurrent, or worsening symptoms or other problems/concerns.           Your medication list           Accurate as of January 31, 2020 10:04 AM. If you have any questions, ask your nurse or doctor.               CONTINUE taking these medications      Instructions Last Dose Given Next Dose Due   albuterol sulfate  (90 Base) MCG/ACT inhaler  Commonly known as:  PROVENTIL HFA;VENTOLIN HFA;PROAIR HFA      Inhale 2 puffs Every 6 (Six) Hours As Needed for wheezing (RESCUE INHALER).        ALPRAZolam 1 MG tablet  Commonly known as:  XANAX      Take 1 mg by mouth 3 (Three) Times a Day As Needed.       amLODIPine 10 MG tablet  Commonly known as:  NORVASC      Take 1 tablet by mouth Daily.       aspirin 81 MG chewable tablet      Chew 81 mg Every Night.       buPROPion  MG 24 hr tablet  Commonly known as:  WELLBUTRIN XL      Take 1 tablet by mouth Daily.       clopidogrel 75 MG tablet  Commonly known as:  PLAVIX      Take 75 mg by mouth Every Night.       Coenzyme Q10 400 MG capsule      Take 400 mg by mouth Every Evening.       FERROUS SULFATE PO      Take 130 mg by mouth Daily.       Fluticasone-Umeclidin-Vilant 100-62.5-25 MCG/INH aerosol powder   Commonly known as:  TRELEGY ELLIPTA      Inhale 1 each Daily.       gabapentin 300 MG capsule  Commonly known as:  NEURONTIN      Take 300 mg by mouth 3 (Three) Times a Day.       isosorbide mononitrate 30 MG 24 hr tablet  Commonly known as:  IMDUR      Take 30 mg by mouth Every Morning.       levothyroxine 125 MCG tablet  Commonly known as:  SYNTHROID, LEVOTHROID      Take 125 mcg by mouth Daily.       MULTIVITAMIN ADULT PO      Take 1 tablet by mouth Daily.       NIACIN PO      Take 1 capsule by mouth Every Morning.       omeprazole 20 MG capsule  Commonly known as:  priLOSEC      Take 20 mg by mouth Every Morning.       simvastatin 40 MG tablet  Commonly known as:  ZOCOR      Take 40 mg by mouth Every Night.       traMADol 50 MG tablet  Commonly known as:  ULTRAM      Take 50 mg by mouth 2 (Two) Times a Day.       VITAMIN B-1 PO      Take 1 tablet by mouth Daily.       vitamin D3 125 MCG (5000 UT) tablet tablet      Take 5,000 Units by mouth Daily.              I did not stop or change the above medication.  Patient's medication list was updated to reflect medications they are currently taking including medication changes made by other providers.        Thanks,    Jolene Sarabia, GABRIEL, APRN  01/31/2020         Dictated utilizing Dragon  dictation

## 2020-02-12 ENCOUNTER — HOSPITAL ENCOUNTER (OUTPATIENT)
Dept: CT IMAGING | Facility: HOSPITAL | Age: 65
Discharge: HOME | End: 2020-02-12
Admitting: INTERNAL MEDICINE

## 2020-02-12 DIAGNOSIS — Z12.2 ENCOUNTER FOR SCREENING FOR LUNG CANCER: ICD-10-CM

## 2020-02-12 PROCEDURE — G0297 LDCT FOR LUNG CA SCREEN: HCPCS

## 2020-03-03 ENCOUNTER — OFFICE VISIT (OUTPATIENT)
Dept: INTERNAL MEDICINE | Facility: CLINIC | Age: 65
End: 2020-03-03

## 2020-03-03 VITALS
HEART RATE: 69 BPM | SYSTOLIC BLOOD PRESSURE: 124 MMHG | WEIGHT: 183.2 LBS | HEIGHT: 69 IN | TEMPERATURE: 97.8 F | BODY MASS INDEX: 27.13 KG/M2 | DIASTOLIC BLOOD PRESSURE: 78 MMHG | OXYGEN SATURATION: 91 %

## 2020-03-03 DIAGNOSIS — F17.200 SMOKES TOBACCO DAILY: ICD-10-CM

## 2020-03-03 DIAGNOSIS — Z79.899 LONG TERM PRESCRIPTION BENZODIAZEPINE USE: ICD-10-CM

## 2020-03-03 DIAGNOSIS — E61.1 IRON DEFICIENCY: ICD-10-CM

## 2020-03-03 DIAGNOSIS — M54.50 CHRONIC MIDLINE LOW BACK PAIN WITHOUT SCIATICA: ICD-10-CM

## 2020-03-03 DIAGNOSIS — R73.09 ELEVATED GLUCOSE: ICD-10-CM

## 2020-03-03 DIAGNOSIS — N18.9 ANEMIA IN CHRONIC KIDNEY DISEASE, UNSPECIFIED CKD STAGE: ICD-10-CM

## 2020-03-03 DIAGNOSIS — D63.1 ANEMIA IN CHRONIC KIDNEY DISEASE, UNSPECIFIED CKD STAGE: ICD-10-CM

## 2020-03-03 DIAGNOSIS — E78.9 LIPID DISORDER: ICD-10-CM

## 2020-03-03 DIAGNOSIS — E03.9 HYPOTHYROIDISM, UNSPECIFIED TYPE: ICD-10-CM

## 2020-03-03 DIAGNOSIS — F41.9 ANXIETY DISORDER, UNSPECIFIED TYPE: ICD-10-CM

## 2020-03-03 DIAGNOSIS — K21.9 GASTROESOPHAGEAL REFLUX DISEASE, ESOPHAGITIS PRESENCE NOT SPECIFIED: ICD-10-CM

## 2020-03-03 DIAGNOSIS — Z79.899 HIGH RISK MEDICATION USE: ICD-10-CM

## 2020-03-03 DIAGNOSIS — E55.9 VITAMIN D DEFICIENCY: ICD-10-CM

## 2020-03-03 DIAGNOSIS — Z89.619 STATUS POST ABOVE KNEE AMPUTATION, UNSPECIFIED LATERALITY (HCC): ICD-10-CM

## 2020-03-03 DIAGNOSIS — J44.9 CHRONIC OBSTRUCTIVE PULMONARY DISEASE, UNSPECIFIED COPD TYPE (HCC): ICD-10-CM

## 2020-03-03 DIAGNOSIS — I25.10 CORONARY ARTERY DISEASE INVOLVING NATIVE CORONARY ARTERY OF NATIVE HEART WITHOUT ANGINA PECTORIS: ICD-10-CM

## 2020-03-03 DIAGNOSIS — E53.8 VITAMIN B12 DEFICIENCY: ICD-10-CM

## 2020-03-03 DIAGNOSIS — G89.29 CHRONIC MIDLINE LOW BACK PAIN WITHOUT SCIATICA: ICD-10-CM

## 2020-03-03 DIAGNOSIS — I73.9 PVD (PERIPHERAL VASCULAR DISEASE) (HCC): Primary | ICD-10-CM

## 2020-03-03 DIAGNOSIS — I10 ESSENTIAL HYPERTENSION: ICD-10-CM

## 2020-03-03 PROBLEM — G47.33 OSA TREATED WITH BIPAP: Status: ACTIVE | Noted: 2020-03-03

## 2020-03-03 PROBLEM — J96.02 ACUTE RESPIRATORY FAILURE WITH HYPOXIA AND HYPERCARBIA: Status: RESOLVED | Noted: 2019-03-11 | Resolved: 2020-03-03

## 2020-03-03 PROBLEM — Z87.898 HISTORY OF BRADYCARDIA: Status: ACTIVE | Noted: 2020-03-03

## 2020-03-03 PROBLEM — R00.1 BRADYCARDIA: Status: RESOLVED | Noted: 2019-05-23 | Resolved: 2020-03-03

## 2020-03-03 PROBLEM — R09.02 COPD WITH HYPOXIA (HCC): Status: RESOLVED | Noted: 2019-05-23 | Resolved: 2020-03-03

## 2020-03-03 PROBLEM — J96.01 ACUTE RESPIRATORY FAILURE WITH HYPOXIA AND HYPERCARBIA (HCC): Status: RESOLVED | Noted: 2019-03-11 | Resolved: 2020-03-03

## 2020-03-03 PROCEDURE — 99215 OFFICE O/P EST HI 40 MIN: CPT | Performed by: FAMILY MEDICINE

## 2020-03-03 RX ORDER — CLOPIDOGREL BISULFATE 75 MG/1
75 TABLET ORAL NIGHTLY
Qty: 90 TABLET | Refills: 3 | Status: SHIPPED | OUTPATIENT
Start: 2020-03-03 | End: 2021-04-06

## 2020-03-03 RX ORDER — ISOSORBIDE MONONITRATE 30 MG/1
30 TABLET, EXTENDED RELEASE ORAL EVERY MORNING
Qty: 90 TABLET | Refills: 3 | Status: SHIPPED | OUTPATIENT
Start: 2020-03-03 | End: 2021-05-12

## 2020-03-03 RX ORDER — GABAPENTIN 300 MG/1
300 CAPSULE ORAL 3 TIMES DAILY
Qty: 90 CAPSULE | Refills: 2 | Status: SHIPPED | OUTPATIENT
Start: 2020-03-03 | End: 2020-06-04

## 2020-03-03 RX ORDER — OMEPRAZOLE 20 MG/1
20 CAPSULE, DELAYED RELEASE ORAL EVERY MORNING
Qty: 90 CAPSULE | Refills: 3 | Status: SHIPPED | OUTPATIENT
Start: 2020-03-03 | End: 2021-01-05 | Stop reason: SDUPTHER

## 2020-03-03 RX ORDER — BUPROPION HYDROCHLORIDE 300 MG/1
300 TABLET ORAL DAILY
Qty: 90 TABLET | Refills: 3 | Status: SHIPPED | OUTPATIENT
Start: 2020-03-03 | End: 2021-03-01

## 2020-03-03 RX ORDER — SIMVASTATIN 40 MG
40 TABLET ORAL NIGHTLY
Qty: 90 TABLET | Refills: 3 | Status: SHIPPED | OUTPATIENT
Start: 2020-03-03 | End: 2021-04-08

## 2020-03-03 RX ORDER — TRAMADOL HYDROCHLORIDE 50 MG/1
50 TABLET ORAL 2 TIMES DAILY
Qty: 60 TABLET | Refills: 1 | Status: SHIPPED | OUTPATIENT
Start: 2020-03-03 | End: 2020-04-28 | Stop reason: SDUPTHER

## 2020-03-03 RX ORDER — AMLODIPINE BESYLATE 10 MG/1
10 TABLET ORAL
Qty: 90 TABLET | Refills: 3 | Status: SHIPPED | OUTPATIENT
Start: 2020-03-03 | End: 2021-01-05 | Stop reason: SDUPTHER

## 2020-03-03 RX ORDER — ALPRAZOLAM 1 MG/1
1 TABLET ORAL 3 TIMES DAILY PRN
Qty: 90 TABLET | Refills: 2 | Status: SHIPPED | OUTPATIENT
Start: 2020-03-24 | End: 2020-06-05 | Stop reason: SDUPTHER

## 2020-03-03 NOTE — PROGRESS NOTES
Date of Encounter: 2020  Patient: Sim Agustin,  1955    Subjective   History of Presenting Illness  Chief complaint: Anxiety, chronic problems    Patient of Dr. Wilburn, who recently  in a skiing accident.  Patient needs new provider and refills for all his medications.    He has no acute concerns.  We reviewed all of his problems, updated his chart, and refilled his medications.  Please see assessment and plan for further details per problem.    .  Smokes 1 pack/day with over 30-pack-year history.  Last lung cancer screening 1 year ago.  Colonoscopy 2016.  No alcohol use.  Disabled.  Up-to-date on annual influenza vaccination, shingles vaccination, and pneumococcal vaccination.    Review of Systems:  Negative for chest pain, wheezing, abdominal pain, fever, rash, headache    The following portions of the patient's history were reviewed and updated as appropriate: allergies, current medications, past family history, past medical history, past social history, past surgical history and problem list.    Patient Active Problem List   Diagnosis   • H/O angiodysplasia of intestinal tract   • COPD (chronic obstructive pulmonary disease) (CMS/[facility])   • S/P colectomy   • Peripheral arteriosclerosis (CMS/[facility])   • S/P AKA (above knee amputation) (CMS/[facility])   • CAD (coronary artery disease)   • Anemia in chronic kidney disease   • PVD (peripheral vascular disease) (CMS/[facility])   • Essential hypertension   • Cardiomyopathy, unspecified (CMS/[facility])   • PRISCILLA treated with BiPAP   • Anxiety disorder   • Chronic midline low back pain without sciatica   • Long term prescription benzodiazepine use   • History of bradycardia   • Hypothyroidism   • Vitamin B12 deficiency   • Vitamin D deficiency   • Iron deficiency   • Smokes tobacco daily   • High risk medication use     Past Medical History:   Diagnosis Date   • Acute respiratory failure with hypoxia and hypercarbia (CMS/[facility]) 3/11/2019   • Acute upper respiratory  infection 2/6/2013   • Anemia     per [facility] database   • ARF (acute renal failure) (CMS/[facility]) 3/4/2016   • Atelectasis, left 3/4/2016   • Bradycardia 5/23/2019   • CAD (coronary artery disease) 4/4/2016   • Chronic obstructive pulmonary disease with acute exacerbation (CMS/[facility]) 2/6/2013   • Colon polyps    • Compartment syndrome (CMS/[facility])     AFTER ILIAC SURGERY. ABOVE KNEE AMPUTATION   • COPD (chronic obstructive pulmonary disease) (CMS/[facility])    • COPD with hypoxia (CMS/[facility]) 5/23/2019   • Cough     SINCE APRIL WITH PNEUMONIA.    • Disease of thyroid gland     HYPOTHYRODISM   • Diverticulosis    • Employs prosthetic leg    • ESRD (end stage renal disease) on dialysis (CMS/[facility]) 4/6/2016   • Fracture of patella 3/3/2015   • History of acute renal failure    • History of CHF (congestive heart failure)    • History of GI bleed 02/2016    AORTODUOD FISTULA   • History of sepsis 02/2016   • History of transfusion     MULTIPLE, 2016   • Hyperkalemia 4/11/2016   • Hypertension    • Hypotension    • Hypotension 3/4/2016   • Nonischemic cardiomyopathy (CMS/[facility])    • Nosocomial pneumonia 4/6/2016   • Phantom limb pain (CMS/[facility])     SL, WITH LEFT ABOUVE KNEE   • Pleural effusion on left 4/11/2016   • Pneumonia     SPRING 2016  AFTER SURGERY   • PVD (peripheral vascular disease) (CMS/[facility])    • S/P thoracentesis 4/11/2016   • Sepsis, unspecified organism (CMS/[facility]) 4/5/2016     Past Surgical History:   Procedure Laterality Date   • ABOVE KNEE AMPUTATION Left 3/16/2016    Procedure: LT AMPUTATION ABOVE KNEE;  Surgeon: Jose Swann MD;  Location: [facility];  Service:    • ARTERIAL THROMBECTOMY  2001    throbectomy of arterial graft   • AXILLARY FEMORAL BYPASS Right 02/15/2016    Exploratory lapartomy, repair aortoduodenal fistula, resection infected aortobifemoral bypass graft, axillobi-superficial femoral artery [place]-Aneudy bypass graft from right axillary artery, Repair of duodenotomy 4th portion duodenum-Dr. Garcia  [place], Dr. Genaro Perrin   • BRONCHOSCOPY Left 03/04/2016    Dr. NATALIIA Tate   • BRONCHOSCOPY RIGID / FLEXIBLE N/A 03/03/2016    Dr. NATALIIA Tate   • BRONCHOSCOPY RIGID / FLEXIBLE N/A 02/26/2016    Dr. NATALIIA Tate   • CARDIAC CATHETERIZATION N/A 3/18/2019    Procedure: Right and Left Heart Cath;  Surgeon: Nina Storey MD;  Location: [facility] CATH INVASIVE LOCATION;  Service: Cardiovascular   • CATARACT EXTRACTION WITH INTRAOCULAR LENS IMPLANT Bilateral    • COLON RESECTION WITH COLOSTOMY Left 02/28/2016    Exploratory laparotomy with open left hemicolectomy, end-colostomy, G-tube and J-tube-Dr. Endy Chaney   • COLONOSCOPY N/A 2015    Dr. Laughlin   • COLONOSCOPY N/A 10/12/2016    Procedure: COLONOSCOPY TO 20 CM AND PER STOMA TO 30CM;  Surgeon: Genaro Perrin MD;  Location: [facility] ENDOSCOPY;  Service:    • COLONOSCOPY N/A 10/21/2016    Procedure: COLONOSCOPY DONE AT BEDSIDE ONTO CECEM;  Surgeon: Genaro Perrin MD;  Location: [facility] ENDOSCOPY;  Service:    • COLONOSCOPY N/A 02/10/2016    Diverticulosis in the entire examined colon, non-bleeding internal hemorrhoids-Dr. Taylor Pina   • COLONOSCOPY N/A 02/11/2016    Poor prep, blood in the entire examined colon, normal ileum-Dr. Taylor Pina   • COLONOSCOPY W/ POLYPECTOMY N/A 07/27/2015    Normal ileum, diverticulosis in the sigmoid colon: resected and retrieved, one 6 mm polyp in the descending colon: resected and retrieved, the distal rectum and anal verge are normal on retroflexion view-Dr. Miguel Ángel Laughlin   • COLOSTOMY CLOSURE N/A 10/13/2016    Procedure: COLOSTOMY TAKEDOWN OR CLOSURE, APPENDECTOMY;  Surgeon: Genaro Perrin MD;  Location: [facility] MAIN OR;  Service:    • DEBRIDEMENT LEG Left 03/01/2016    Excisional debridement of the skin, subcutantous tissue, tendon and muscle left calf-Dr. Jose Swann   • DEBRIDEMENT LEG Left 02/25/2016    Excisional debridement full-thcikness skin and subcutaneous tissue and tendon and  muscle, left medial and lateral calf muscles-Dr. Jose Swann   • ENDOSCOPY N/A 10/21/2016    Procedure: ESOPHAGOGASTRODUODENOSCOPY DONE AT BEDSIDE;  Surgeon: Genaro Perrin MD;  Location: [facility] ENDOSCOPY;  Service:    • ENDOSCOPY AND COLONOSCOPY N/A 02/28/2016    EGD and Colonoscopy with Candy ink injection-Dr. Endy Chaney   • ENTEROSCOPY SMALL BOWEL N/A 02/11/2016    Normal esophagus, normal stomach, normal duodenum, portion of the jejunum normal-Dr. Taylor Pina   • ILIAC ARTERY - FEMORAL ARTERY BYPASS GRAFT Bilateral 2002   • ILIAC ARTERY STENT Bilateral 2001   • INSERTION AND REMOVAL PERITONEAL DIALYSIS CATHETER Right 02/29/2016    Exchange right jugular 16 cm [place] dialysis catheter-Dr. Jose Swann   • INSERTION PERITONEAL DIALYSIS CATHETER Left 03/01/2016    Ultrasound-guided access of the left jugular vein, 23 cm left jugular palindrome dialysis catheter placement-Dr. Jose Swann   • INSERTION PERITONEAL DIALYSIS CATHETER Right 02/18/2016    Right jugular Mahrkar catherer placement-Dr. Jose Swann   • JOINT REPLACEMENT Left    • THORACOSCOPY Left 4/18/2016    Procedure: LT THORACOSCOPY VIDEO ASSISTED WITH DECORDICATION;  Surgeon: Genaro Daivla III, MD;  Location: [facility] OR;  Service:    • THROMBECTOMY Left 02/16/2016    Thombectomy of left femoral graft, left leg arteriogram, left calf forward compartment fasciotomy-Dr. Jose Swann   • TOTAL HIP ARTHROPLASTY Left    • UPPER GASTROINTESTINAL ENDOSCOPY N/A 02/09/2016    Normal UGI-Dr. Ajay Parkinson   • UPPER GASTROINTESTINAL ENDOSCOPY N/A 11/28/2015    Small hiatal hernia, chronic gastritis: biopsied, non-bleeding angioectasias in the stomach: treated with argon plasma coagulation, multiple bleeding angioectasias in the dudenum: treated with argon plasma coagulation-Dr. Mykel Jaramillo   • VASCULAR SURGERY      MULTIPLE   • VENTRAL/INCISIONAL HERNIA REPAIR N/A 10/19/2017    Procedure: VENTRAL HERNIA REPAIR WITH MESH  AND BILATERAL COMPONENT SEPARATION;  Surgeon: Genaro Perrin MD;  Location: [facility] MAIN OR;  Service:    • WISDOM TOOTH EXTRACTION       Family History   Problem Relation Age of Onset   • Lung cancer Mother    • Heart disease Father    • Diabetes Father    • Hypertension Father    • Malig Hyperthermia Neg Hx        Current Outpatient Medications:   •  [START ON 3/24/2020] ALPRAZolam (XANAX) 1 MG tablet, Take 1 tablet by mouth 3 (Three) Times a Day As Needed for Anxiety., Disp: 90 tablet, Rfl: 2  •  amLODIPine (NORVASC) 10 MG tablet, Take 1 tablet by mouth Daily., Disp: 90 tablet, Rfl: 3  •  aspirin 81 MG chewable tablet, Chew 81 mg Every Night., Disp: , Rfl:   •  Cholecalciferol (VITAMIN D3) 125 MCG (5000 UT) tablet tablet, Take 1 tablet by mouth Daily., Disp: 90 tablet, Rfl: 3  •  clopidogrel (PLAVIX) 75 MG tablet, Take 1 tablet by mouth Every Night., Disp: 90 tablet, Rfl: 3  •  Coenzyme Q10 400 MG capsule, Take 400 mg by mouth Every Evening., Disp: , Rfl:   •  FERROUS SULFATE PO, Take 130 mg by mouth Daily., Disp: , Rfl:   •  Fluticasone-Umeclidin-Vilant (TRELEGY ELLIPTA) 100-62.5-25 MCG/INH aerosol powder , Inhale 1 each Daily., Disp: 28 each, Rfl: 2  •  gabapentin (NEURONTIN) 300 MG capsule, Take 1 capsule by mouth 3 (Three) Times a Day., Disp: 90 capsule, Rfl: 2  •  isosorbide mononitrate (IMDUR) 30 MG 24 hr tablet, Take 1 tablet by mouth Every Morning., Disp: 90 tablet, Rfl: 3  •  levothyroxine (SYNTHROID, LEVOTHROID) 125 MCG tablet, Take 125 mcg by mouth Daily., Disp: , Rfl:   •  Multiple Vitamins-Minerals (MULTIVITAMIN ADULT PO), Take 1 tablet by mouth Daily., Disp: , Rfl:   •  NIACIN PO, Take 1 capsule by mouth Every Morning., Disp: , Rfl:   •  omeprazole (priLOSEC) 20 MG capsule, Take 1 capsule by mouth Every Morning., Disp: 90 capsule, Rfl: 3  •  simvastatin (ZOCOR) 40 MG tablet, Take 1 tablet by mouth Every Night., Disp: 90 tablet, Rfl: 3  •  Thiamine HCl (VITAMIN B-1 PO), Take 1 tablet by mouth  "Daily., Disp: , Rfl:   •  traMADol (ULTRAM) 50 MG tablet, Take 1 tablet by mouth 2 (Two) Times a Day., Disp: 60 tablet, Rfl: 1  •  buPROPion XL (WELLBUTRIN XL) 300 MG 24 hr tablet, Take 1 tablet by mouth Daily., Disp: 90 tablet, Rfl: 3  Allergies   Allergen Reactions   • Augmentin [Amoxicillin-Pot Clavulanate] Hives and Rash     Social History     Tobacco Use   • Smoking status: Former Smoker     Packs/day: 1.00     Years: 30.00     Pack years: 30.00     Types: Cigarettes     Last attempt to quit: 2016     Years since quittin.1   • Smokeless tobacco: Never Used   • Tobacco comment: caffeine - rarely   Substance Use Topics   • Alcohol use: No   • Drug use: Never          Objective   Physical Exam  Vitals:    20 1000   BP: 124/78   Pulse: 69   Temp: 97.8 °F (36.6 °C)   SpO2: 91%   Weight: 83.1 kg (183 lb 3.2 oz)   Height: 175.3 cm (69\")     Body mass index is 27.05 kg/m².    Constitutional: NAD.  Eyes: EOMI. PERRLA. Normal conjunctiva.  Ear, nose, mouth, throat: No tonsillar exudates or erythema.   Normal nasal mucosa. Normal external ear canals and TMs bilaterally.  Cardiovascular: RRR.  Distant heart sounds but murmurs. No LE edema b/l. Radial pulses 1+ bilaterally.  Pulmonary: Bibasilar coarse breath sounds without focal findings or wheezing.  Good effort.  Integumentary: No rashes or wounds on face or upper extremities.  Lymphatic: No anterior cervical lymphadenopathy.  Endocrine: No thyromegaly or palpable thyroid nodules.  Psychiatric: Normal affect. Normal thought content.     Assessment/Plan   Assessment and Plan  Pleasant 64-year-old male smoker with PVD status post bypass, CAD, moderate to severe COPD, hypertension, hyperlipidemia, CKD, anxiety, chronic lower back pain on high risk medications, GERD, hypothyroidism, anemia, vitamin D and B12 deficiency, who presents to establish care after the recent passing of his prior physician.    Diagnoses and all orders for this visit:    1. PVD (peripheral " vascular disease) (CMS/[facility]) (Primary): Stable, follows with vascular annually  -     clopidogrel (PLAVIX) 75 MG tablet; Take 1 tablet by mouth Every Night.  Dispense: 90 tablet; Refill: 3  -     isosorbide mononitrate (IMDUR) 30 MG 24 hr tablet; Take 1 tablet by mouth Every Morning.  Dispense: 90 tablet; Refill: 3  -     simvastatin (ZOCOR) 40 MG tablet; Take 1 tablet by mouth Every Night.  Dispense: 90 tablet; Refill: 3  -     Lipid Panel  -     Comprehensive Metabolic Panel    2. Status post above knee amputation, unspecified laterality (CMS/[facility]): Uses gabapentin for phantom limb syndrome.  Pain is controlled.  -     gabapentin (NEURONTIN) 300 MG capsule; Take 1 capsule by mouth 3 (Three) Times a Day.  Dispense: 90 capsule; Refill: 2    3. Smokes tobacco daily: Pre-contemplative wants to continue bupropion which also gives anxiolytic benefit.  -     buPROPion XL (WELLBUTRIN XL) 300 MG 24 hr tablet; Take 1 tablet by mouth Daily.  Dispense: 90 tablet; Refill: 3    4. Chronic obstructive pulmonary disease, unspecified COPD type (CMS/[facility]): Controlled on current regimen, moderate to severe, time BiPAP    5. Anxiety disorder, unspecified type: Long-term use of benzodiazepine.  Adam consistent with regular use.  For now we will not change regimen significantly but may try to taper as tolerated in the future.  -     ALPRAZolam (XANAX) 1 MG tablet; Take 1 tablet by mouth 3 (Three) Times a Day As Needed for Anxiety.  Dispense: 90 tablet; Refill: 2  -     buPROPion XL (WELLBUTRIN XL) 300 MG 24 hr tablet; Take 1 tablet by mouth Daily.  Dispense: 90 tablet; Refill: 3  6. Long term prescription benzodiazepine use    7. Chronic midline low back pain without sciatica: As per #5, fairly regular use for chronic lower back pain.  No imaging immediately available but his Adam is consistent with regular prescriptions from Dr. Henry.  Drug screen today.  Continue to monitor.  -     traMADol (ULTRAM) 50 MG tablet; Take 1 tablet by  mouth 2 (Two) Times a Day.  Dispense: 60 tablet; Refill: 1  8. High risk medication use  -     ToxASSURE Select 13 (MW) - Urine, Clean Catch    9. Coronary artery disease involving native coronary artery of native heart without angina pectoris: Stable and asymptomatic, follows with cardiology annually    10. Anemia in chronic kidney disease, unspecified CKD stage: History of needing dialysis but reports normal kidney function recently    11. Essential hypertension: Stable  -     amLODIPine (NORVASC) 10 MG tablet; Take 1 tablet by mouth Daily.  Dispense: 90 tablet; Refill: 3    12. Iron deficiency: Labs per below, on iron supplementation  -     CBC & Differential  -     Iron Profile  -     Ferritin    13. Vitamin D deficiency: Stable  -     Cholecalciferol (VITAMIN D3) 125 MCG (5000 UT) tablet tablet; Take 1 tablet by mouth Daily.  Dispense: 90 tablet; Refill: 3  -     Vitamin D 25 Hydroxy    14. Vitamin B12 deficiency: Stable  -     Vitamin B12    15. Hypothyroidism, unspecified type: We will obtain TSH for titration  -     Thyroid Panel With TSH    16. Elevated glucose  -     Hemoglobin A1c    17. Lipid disorder: Tolerating statin  -     Lipid Panel    18. Gastroesophageal reflux disease, esophagitis presence not specified: Stable  -     omeprazole (priLOSEC) 20 MG capsule; Take 1 capsule by mouth Every Morning.  Dispense: 90 capsule; Refill: 3      Additionally counseled patient on COVID19 precautions.    Total visit time = 40 minutes; > 50% spent counseling/coordinating care.  We reviewed all of his problems, updated and clean his chart, and discussed the status of each problem individually.    Follow-up in 2 months for controlled substance refills and chronic problems.    Seth Hester MD  Family Medicine  O: [phone]  C: [phone]    Disclaimer: Parts of this note were dictated by speech recognition. Minor errors in transcription may be present. Please call if questions.

## 2020-03-09 LAB
25(OH)D3+25(OH)D2 SERPL-MCNC: 78.9 NG/ML (ref 30–100)
ALBUMIN SERPL-MCNC: 4.3 G/DL (ref 3.8–4.8)
ALBUMIN/GLOB SERPL: 1.8 {RATIO} (ref 1.2–2.2)
ALP SERPL-CCNC: 71 IU/L (ref 39–117)
ALT SERPL-CCNC: 11 IU/L (ref 0–44)
AST SERPL-CCNC: 12 IU/L (ref 0–40)
BASOPHILS # BLD AUTO: 0 X10E3/UL (ref 0–0.2)
BASOPHILS NFR BLD AUTO: 1 %
BILIRUB SERPL-MCNC: 0.2 MG/DL (ref 0–1.2)
BUN SERPL-MCNC: 17 MG/DL (ref 8–27)
BUN/CREAT SERPL: 14 (ref 10–24)
CALCIUM SERPL-MCNC: 10 MG/DL (ref 8.6–10.2)
CHLORIDE SERPL-SCNC: 103 MMOL/L (ref 96–106)
CHOLEST SERPL-MCNC: 180 MG/DL (ref 100–199)
CO2 SERPL-SCNC: 25 MMOL/L (ref 20–29)
CREAT SERPL-MCNC: 1.21 MG/DL (ref 0.76–1.27)
DRUGS UR: NORMAL
EOSINOPHIL # BLD AUTO: 0.2 X10E3/UL (ref 0–0.4)
EOSINOPHIL NFR BLD AUTO: 3 %
ERYTHROCYTE [DISTWIDTH] IN BLOOD BY AUTOMATED COUNT: 12.9 % (ref 11.6–15.4)
FERRITIN SERPL-MCNC: 311 NG/ML (ref 30–400)
FT4I SERPL CALC-MCNC: 2.5 (ref 1.2–4.9)
GLOBULIN SER CALC-MCNC: 2.4 G/DL (ref 1.5–4.5)
GLUCOSE SERPL-MCNC: 97 MG/DL (ref 65–99)
HBA1C MFR BLD: 5.2 % (ref 4.8–5.6)
HCT VFR BLD AUTO: 51.9 % (ref 37.5–51)
HDLC SERPL-MCNC: 55 MG/DL
HGB BLD-MCNC: 16.9 G/DL (ref 13–17.7)
IMM GRANULOCYTES # BLD AUTO: 0 X10E3/UL (ref 0–0.1)
IMM GRANULOCYTES NFR BLD AUTO: 0 %
IRON SATN MFR SERPL: 26 % (ref 15–55)
IRON SERPL-MCNC: 65 UG/DL (ref 38–169)
LDLC SERPL CALC-MCNC: 93 MG/DL (ref 0–99)
LYMPHOCYTES # BLD AUTO: 1.8 X10E3/UL (ref 0.7–3.1)
LYMPHOCYTES NFR BLD AUTO: 26 %
MCH RBC QN AUTO: 30.8 PG (ref 26.6–33)
MCHC RBC AUTO-ENTMCNC: 32.6 G/DL (ref 31.5–35.7)
MCV RBC AUTO: 95 FL (ref 79–97)
MONOCYTES # BLD AUTO: 0.5 X10E3/UL (ref 0.1–0.9)
MONOCYTES NFR BLD AUTO: 8 %
NEUTROPHILS # BLD AUTO: 4.2 X10E3/UL (ref 1.4–7)
NEUTROPHILS NFR BLD AUTO: 62 %
PLATELET # BLD AUTO: 252 X10E3/UL (ref 150–450)
POTASSIUM SERPL-SCNC: 4.8 MMOL/L (ref 3.5–5.2)
PROT SERPL-MCNC: 6.7 G/DL (ref 6–8.5)
RBC # BLD AUTO: 5.48 X10E6/UL (ref 4.14–5.8)
SODIUM SERPL-SCNC: 145 MMOL/L (ref 134–144)
T3RU NFR SERPL: 27 % (ref 24–39)
T4 SERPL-MCNC: 9.2 UG/DL (ref 4.5–12)
TIBC SERPL-MCNC: 248 UG/DL (ref 250–450)
TRIGL SERPL-MCNC: 158 MG/DL (ref 0–149)
TSH SERPL DL<=0.005 MIU/L-ACNC: 2.88 UIU/ML (ref 0.45–4.5)
UIBC SERPL-MCNC: 183 UG/DL (ref 111–343)
VIT B12 SERPL-MCNC: 884 PG/ML (ref 232–1245)
VLDLC SERPL CALC-MCNC: 32 MG/DL (ref 5–40)
WBC # BLD AUTO: 6.7 X10E3/UL (ref 3.4–10.8)

## 2020-03-09 RX ORDER — LEVOTHYROXINE SODIUM 0.12 MG/1
125 TABLET ORAL DAILY
Qty: 90 TABLET | Refills: 1 | Status: SHIPPED | OUTPATIENT
Start: 2020-03-09 | End: 2020-06-22 | Stop reason: SDUPTHER

## 2020-03-09 NOTE — PROGRESS NOTES
Discussed the results over the phone with the patient as previously agreed.    Overall unremarkable/stable results.  Continue current levothyroxine dose.

## 2020-04-28 ENCOUNTER — TELEPHONE (OUTPATIENT)
Dept: INTERNAL MEDICINE | Facility: CLINIC | Age: 65
End: 2020-04-28

## 2020-04-28 DIAGNOSIS — M54.50 CHRONIC MIDLINE LOW BACK PAIN WITHOUT SCIATICA: ICD-10-CM

## 2020-04-28 DIAGNOSIS — G89.29 CHRONIC MIDLINE LOW BACK PAIN WITHOUT SCIATICA: ICD-10-CM

## 2020-04-28 RX ORDER — TRAMADOL HYDROCHLORIDE 50 MG/1
50 TABLET ORAL 2 TIMES DAILY
Qty: 60 TABLET | Refills: 0 | Status: SHIPPED | OUTPATIENT
Start: 2020-04-28 | End: 2020-05-22 | Stop reason: SDUPTHER

## 2020-04-28 NOTE — TELEPHONE ENCOUNTER
PATIENT CALLED TO REQUEST A REFILL ON HIS TRAMADOL 50 MG MEDICATION AND HE WOULD LIKE FOR THIS TO GO TO THE [facility] THAT IS IN HIS CHART. THE PATIENT CAN BE REACHED [phone].

## 2020-05-22 DIAGNOSIS — M54.50 CHRONIC MIDLINE LOW BACK PAIN WITHOUT SCIATICA: ICD-10-CM

## 2020-05-22 DIAGNOSIS — G89.29 CHRONIC MIDLINE LOW BACK PAIN WITHOUT SCIATICA: ICD-10-CM

## 2020-05-22 RX ORDER — TRAMADOL HYDROCHLORIDE 50 MG/1
50 TABLET ORAL 2 TIMES DAILY
Qty: 60 TABLET | Refills: 0 | Status: SHIPPED | OUTPATIENT
Start: 2020-05-22 | End: 2020-06-22 | Stop reason: SDUPTHER

## 2020-05-22 NOTE — TELEPHONE ENCOUNTER
PATIENT CALLED AND REQUESTED A REFILL ON RX, traMADol (ULTRAM) 50 MG tablet    [facility] ON [address] CONFIRMED    PATIENT CALLBACK # [phone]

## 2020-05-26 ENCOUNTER — TELEPHONE (OUTPATIENT)
Dept: INTERNAL MEDICINE | Facility: CLINIC | Age: 65
End: 2020-05-26

## 2020-05-26 DIAGNOSIS — M54.50 CHRONIC MIDLINE LOW BACK PAIN WITHOUT SCIATICA: ICD-10-CM

## 2020-05-26 DIAGNOSIS — G89.29 CHRONIC MIDLINE LOW BACK PAIN WITHOUT SCIATICA: ICD-10-CM

## 2020-05-26 RX ORDER — TRAMADOL HYDROCHLORIDE 50 MG/1
50 TABLET ORAL 2 TIMES DAILY
Qty: 60 TABLET | Refills: 0 | Status: CANCELLED | OUTPATIENT
Start: 2020-05-26

## 2020-05-26 NOTE — TELEPHONE ENCOUNTER
PATIENT CALLED IN TO REQUEST A REFILL OF traMADol (ULTRAM) 50 MG tablet QUANITY OF 60 . SENT TO [facility] . PATIENT CALL BACK [phone].

## 2020-06-04 DIAGNOSIS — Z89.619 STATUS POST ABOVE KNEE AMPUTATION, UNSPECIFIED LATERALITY (HCC): ICD-10-CM

## 2020-06-04 RX ORDER — GABAPENTIN 300 MG/1
CAPSULE ORAL
Qty: 90 CAPSULE | Refills: 2 | Status: SHIPPED | OUTPATIENT
Start: 2020-06-04 | End: 2020-09-08

## 2020-06-04 NOTE — TELEPHONE ENCOUNTER
Last OV 3/3/2020  Next OV 6/5/2020  Last RF 3/3/2020 #90 +2  KSP 3/3/2020  Contract 3/3/2020  UDS 3/3/2020

## 2020-06-05 ENCOUNTER — OFFICE VISIT (OUTPATIENT)
Dept: INTERNAL MEDICINE | Facility: CLINIC | Age: 65
End: 2020-06-05

## 2020-06-05 VITALS
OXYGEN SATURATION: 95 % | SYSTOLIC BLOOD PRESSURE: 120 MMHG | HEART RATE: 88 BPM | BODY MASS INDEX: 25.99 KG/M2 | DIASTOLIC BLOOD PRESSURE: 82 MMHG | WEIGHT: 176 LBS

## 2020-06-05 DIAGNOSIS — Z87.891 HISTORY OF SMOKING 30 OR MORE PACK YEARS: ICD-10-CM

## 2020-06-05 DIAGNOSIS — I10 ESSENTIAL HYPERTENSION: ICD-10-CM

## 2020-06-05 DIAGNOSIS — J44.9 CHRONIC OBSTRUCTIVE PULMONARY DISEASE, UNSPECIFIED COPD TYPE (HCC): Primary | Chronic | ICD-10-CM

## 2020-06-05 DIAGNOSIS — I70.209 PERIPHERAL ARTERIOSCLEROSIS (HCC): ICD-10-CM

## 2020-06-05 DIAGNOSIS — I25.10 CORONARY ARTERY DISEASE INVOLVING NATIVE CORONARY ARTERY OF NATIVE HEART WITHOUT ANGINA PECTORIS: ICD-10-CM

## 2020-06-05 DIAGNOSIS — F41.9 ANXIETY DISORDER, UNSPECIFIED TYPE: ICD-10-CM

## 2020-06-05 PROCEDURE — 99213 OFFICE O/P EST LOW 20 MIN: CPT | Performed by: FAMILY MEDICINE

## 2020-06-05 RX ORDER — ALPRAZOLAM 1 MG/1
1 TABLET ORAL 3 TIMES DAILY PRN
Qty: 90 TABLET | Refills: 2 | Status: SHIPPED | OUTPATIENT
Start: 2020-06-05 | End: 2020-09-10 | Stop reason: SDUPTHER

## 2020-06-05 NOTE — PROGRESS NOTES
Date of Encounter: 2020  Patient: Sim Agustin,  1955    Subjective   History of Presenting Illness  Chief complaint: Follow-up smoking, COPD    COPD: Successfully quit smoking, has not smoked since April.  Wife still smokes.  Reports significant improvement in his breathing.  Persistent productive cough, but no fever or chills.    Anxiety has improved significantly on bupropion.    Denies chest pain.    Phantom limb pain is stable.    Follow-up appointment with pulmonology, cardiology upcoming.    Review of Systems:  Negative for fever, rash, chest pain    Positive for productive cough, chronic shortness of breath    The following portions of the patient's history were reviewed and updated as appropriate: allergies, current medications, past family history, past medical history, past social history, past surgical history and problem list.    Patient Active Problem List   Diagnosis   • H/O angiodysplasia of intestinal tract   • COPD (chronic obstructive pulmonary disease) (CMS/[facility])   • S/P colectomy   • Peripheral arteriosclerosis (CMS/[facility])   • S/P AKA (above knee amputation) (CMS/[facility])   • CAD (coronary artery disease)   • Anemia in chronic kidney disease   • PVD (peripheral vascular disease) (CMS/[facility])   • Essential hypertension   • Cardiomyopathy, unspecified (CMS/[facility])   • PRISCILLA treated with BiPAP   • Anxiety disorder   • Chronic midline low back pain without sciatica   • Long term prescription benzodiazepine use   • History of bradycardia   • Hypothyroidism   • Vitamin B12 deficiency   • Vitamin D deficiency   • Iron deficiency   • History of smoking 30 or more pack years   • High risk medication use     Past Medical History:   Diagnosis Date   • Acute respiratory failure with hypoxia and hypercarbia (CMS/[facility]) 3/11/2019   • Acute upper respiratory infection 2013   • Anemia     per [place] database   • ARF (acute renal failure) (CMS/[facility]) 3/4/2016   • Atelectasis, left 3/4/2016   • Bradycardia  5/23/2019   • CAD (coronary artery disease) 4/4/2016   • Chronic obstructive pulmonary disease with acute exacerbation (CMS/HCC) 2/6/2013   • Colon polyps    • Compartment syndrome (CMS/HCC)     AFTER ILIAC SURGERY. ABOVE KNEE AMPUTATION   • COPD (chronic obstructive pulmonary disease) (CMS/HCC)    • COPD with hypoxia (CMS/HCC) 5/23/2019   • Cough     SINCE APRIL WITH PNEUMONIA.    • Disease of thyroid gland     HYPOTHYRODISM   • Diverticulosis    • Employs prosthetic leg    • ESRD (end stage renal disease) on dialysis (CMS/[facility]) 4/6/2016   • Fracture of patella 3/3/2015   • History of acute renal failure    • History of CHF (congestive heart failure)    • History of GI bleed 02/2016    AORTODUOD FISTULA   • History of sepsis 02/2016   • History of transfusion     MULTIPLE, 2016   • Hyperkalemia 4/11/2016   • Hypertension    • Hypotension    • Hypotension 3/4/2016   • Nonischemic cardiomyopathy (CMS/[facility])    • Nosocomial pneumonia 4/6/2016   • Phantom limb pain (CMS/[facility])     SL, WITH LEFT ABOUVE KNEE   • Pleural effusion on left 4/11/2016   • Pneumonia     SPRING 2016  AFTER SURGERY   • PVD (peripheral vascular disease) (CMS/[facility])    • S/P thoracentesis 4/11/2016   • Sepsis, unspecified organism (CMS/[facility]) 4/5/2016     Past Surgical History:   Procedure Laterality Date   • ABOVE KNEE AMPUTATION Left 3/16/2016    Procedure: LT AMPUTATION ABOVE KNEE;  Surgeon: Jose Swann MD;  Location: [facility];  Service:    • ARTERIAL THROMBECTOMY  2001    throbectomy of arterial graft   • AXILLARY FEMORAL BYPASS Right 02/15/2016    Exploratory lapartomy, repair aortoduodenal fistula, resection infected aortobifemoral bypass graft, axillobi-superficial femoral artery Warner-Aneudy bypass graft from right axillary artery, Repair of duodenotomy 4th portion duodenum-Dr. Jose Swann, Dr. Genaro Perrin   • BRONCHOSCOPY Left 03/04/2016    Dr. NATALIIA Tate   • BRONCHOSCOPY RIGID / FLEXIBLE N/A 03/03/2016    Dr. NATALIIA Tate    • BRONCHOSCOPY RIGID / FLEXIBLE N/A 02/26/2016    Dr. NATALIIA Tate   • CARDIAC CATHETERIZATION N/A 3/18/2019    Procedure: Right and Left Heart Cath;  Surgeon: Nina Storey MD;  Location: [facility] CATH INVASIVE LOCATION;  Service: Cardiovascular   • CATARACT EXTRACTION WITH INTRAOCULAR LENS IMPLANT Bilateral    • COLON RESECTION WITH COLOSTOMY Left 02/28/2016    Exploratory laparotomy with open left hemicolectomy, end-colostomy, G-tube and J-tube-Dr. Endy Chaney   • COLONOSCOPY N/A 2015    Dr. Laughlin   • COLONOSCOPY N/A 10/12/2016    Procedure: COLONOSCOPY TO 20 CM AND PER STOMA TO 30CM;  Surgeon: Genaro Perrin MD;  Location: [facility] ENDOSCOPY;  Service:    • COLONOSCOPY N/A 10/21/2016    Procedure: COLONOSCOPY DONE AT BEDSIDE ONTO CECEM;  Surgeon: Genaro Perrin MD;  Location: [facility] ENDOSCOPY;  Service:    • COLONOSCOPY N/A 02/10/2016    Diverticulosis in the entire examined colon, non-bleeding internal hemorrhoids-Dr. Taylor Pina   • COLONOSCOPY N/A 02/11/2016    Poor prep, blood in the entire examined colon, normal ileum-Dr. Taylor Pina   • COLONOSCOPY W/ POLYPECTOMY N/A 07/27/2015    Normal ileum, diverticulosis in the sigmoid colon: resected and retrieved, one 6 mm polyp in the descending colon: resected and retrieved, the distal rectum and anal verge are normal on retroflexion view-Dr. Miguel Ángel Laughlin   • COLOSTOMY CLOSURE N/A 10/13/2016    Procedure: COLOSTOMY TAKEDOWN OR CLOSURE, APPENDECTOMY;  Surgeon: Genaro Perrin MD;  Location: [facility] OR;  Service:    • DEBRIDEMENT LEG Left 03/01/2016    Excisional debridement of the skin, subcutantous tissue, tendon and muscle left calf-Dr. Jose Swann   • DEBRIDEMENT LEG Left 02/25/2016    Excisional debridement full-thcikness skin and subcutaneous tissue and tendon and muscle, left medial and lateral calf muscles-Dr. Jose Swann   • ENDOSCOPY N/A 10/21/2016    Procedure: ESOPHAGOGASTRODUODENOSCOPY DONE AT BEDSIDE;  Surgeon:  Genaro Perrin MD;  Location: [facility] ENDOSCOPY;  Service:    • ENDOSCOPY AND COLONOSCOPY N/A 02/28/2016    EGD and Colonoscopy with Candy ink injection-Dr. Endy Chaney   • ENTEROSCOPY SMALL BOWEL N/A 02/11/2016    Normal esophagus, normal stomach, normal duodenum, portion of the jejunum normal-Dr. Taylor Pina   • ILIAC ARTERY - FEMORAL ARTERY BYPASS GRAFT Bilateral 2002   • ILIAC ARTERY STENT Bilateral 2001   • INSERTION AND REMOVAL PERITONEAL DIALYSIS CATHETER Right 02/29/2016    Exchange right jugular 16 cm Mahurkar dialysis catheter-Dr. Jose Swann   • INSERTION PERITONEAL DIALYSIS CATHETER Left 03/01/2016    Ultrasound-guided access of the left jugular vein, 23 cm left jugular palindrome dialysis catheter placement-Dr. Jose Swann   • INSERTION PERITONEAL DIALYSIS CATHETER Right 02/18/2016    Right jugular Mahrkar catherer placement-Dr. Jose Swann   • JOINT REPLACEMENT Left    • THORACOSCOPY Left 4/18/2016    Procedure: LT THORACOSCOPY VIDEO ASSISTED WITH DECORDICATION;  Surgeon: Genaro Davila III, MD;  Location: [facility] MAIN OR;  Service:    • THROMBECTOMY Left 02/16/2016    Thombectomy of left femoral graft, left leg arteriogram, left calf forward compartment fasciotomy-Dr. Jose Swann   • TOTAL HIP ARTHROPLASTY Left    • UPPER GASTROINTESTINAL ENDOSCOPY N/A 02/09/2016    Normal UGI-Dr. Ajay Parkinson   • UPPER GASTROINTESTINAL ENDOSCOPY N/A 11/28/2015    Small hiatal hernia, chronic gastritis: biopsied, non-bleeding angioectasias in the stomach: treated with argon plasma coagulation, multiple bleeding angioectasias in the dudenum: treated with argon plasma coagulation-Dr. Mykel Jaramillo   • VASCULAR SURGERY      MULTIPLE   • VENTRAL/INCISIONAL HERNIA REPAIR N/A 10/19/2017    Procedure: VENTRAL HERNIA REPAIR WITH MESH AND BILATERAL COMPONENT SEPARATION;  Surgeon: Genaro Perrin MD;  Location: [facility] MAIN OR;  Service:    • WISDOM TOOTH EXTRACTION       Family History    Problem Relation Age of Onset   • Lung cancer Mother    • Heart disease Father    • Diabetes Father    • Hypertension Father    • Malig Hyperthermia Neg Hx        Current Outpatient Medications:   •  ALPRAZolam (XANAX) 1 MG tablet, Take 1 tablet by mouth 3 (Three) Times a Day As Needed for Anxiety., Disp: 90 tablet, Rfl: 2  •  amLODIPine (NORVASC) 10 MG tablet, Take 1 tablet by mouth Daily., Disp: 90 tablet, Rfl: 3  •  aspirin 81 MG chewable tablet, Chew 81 mg Every Night., Disp: , Rfl:   •  buPROPion XL (WELLBUTRIN XL) 300 MG 24 hr tablet, Take 1 tablet by mouth Daily., Disp: 90 tablet, Rfl: 3  •  Cholecalciferol (VITAMIN D3) 125 MCG (5000 UT) tablet tablet, Take 1 tablet by mouth Daily., Disp: 90 tablet, Rfl: 3  •  clopidogrel (PLAVIX) 75 MG tablet, Take 1 tablet by mouth Every Night., Disp: 90 tablet, Rfl: 3  •  Coenzyme Q10 400 MG capsule, Take 400 mg by mouth Every Evening., Disp: , Rfl:   •  FERROUS SULFATE PO, Take 130 mg by mouth Daily., Disp: , Rfl:   •  Fluticasone-Umeclidin-Vilant (TRELEGY ELLIPTA) 100-62.5-25 MCG/INH aerosol powder , Inhale 1 each Daily., Disp: 28 each, Rfl: 2  •  gabapentin (NEURONTIN) 300 MG capsule, TAKE 1 CAPSULE BY MOUTH THREE TIMES DAILY, Disp: 90 capsule, Rfl: 2  •  isosorbide mononitrate (IMDUR) 30 MG 24 hr tablet, Take 1 tablet by mouth Every Morning., Disp: 90 tablet, Rfl: 3  •  levothyroxine (SYNTHROID, LEVOTHROID) 125 MCG tablet, Take 1 tablet by mouth Daily., Disp: 90 tablet, Rfl: 1  •  Multiple Vitamins-Minerals (MULTIVITAMIN ADULT PO), Take 1 tablet by mouth Daily., Disp: , Rfl:   •  NIACIN PO, Take 1 capsule by mouth Every Morning., Disp: , Rfl:   •  omeprazole (priLOSEC) 20 MG capsule, Take 1 capsule by mouth Every Morning., Disp: 90 capsule, Rfl: 3  •  simvastatin (ZOCOR) 40 MG tablet, Take 1 tablet by mouth Every Night., Disp: 90 tablet, Rfl: 3  •  Thiamine HCl (VITAMIN B-1 PO), Take 1 tablet by mouth Daily., Disp: , Rfl:   •  traMADol (ULTRAM) 50 MG tablet, Take 1  tablet by mouth 2 (Two) Times a Day., Disp: 60 tablet, Rfl: 0  Allergies   Allergen Reactions   • Augmentin [Amoxicillin-Pot Clavulanate] Hives and Rash     Social History     Tobacco Use   • Smoking status: Former Smoker     Packs/day: 1.00     Years: 30.00     Pack years: 30.00     Types: Cigarettes     Last attempt to quit: 2016     Years since quittin.4   • Smokeless tobacco: Never Used   • Tobacco comment: caffeine - rarely   Substance Use Topics   • Alcohol use: No   • Drug use: Never          Objective   Physical Exam  Vitals:    20 1023   BP: 120/82   Pulse: 88   SpO2: 95%   Weight: 79.8 kg (176 lb)     Body mass index is 25.99 kg/m².    Constitutional: NAD.  Eyes: EOMI. Normal conjunctiva.  Cardiovascular: RRR.  No LE edema b/l. Radial pulses 2+ bilaterally.  Pulmonary: Coarseness at the left lung base, cleared with cough.  No wheezing.  Integumentary: No rashes or wounds on face or upper extremities.  Lymphatic: No anterior cervical lymphadenopathy.  Endocrine: No thyromegaly or palpable thyroid nodules.  Psychiatric: Normal affect. Normal thought content.     Assessment/Plan   Assessment and Plan  Pleasant 64-year-old male smoker with PVD status post bypass, CAD, moderate to severe COPD, hypertension, hyperlipidemia, CKD, anxiety, chronic lower back pain on high risk medications, GERD, hypothyroidism, anemia, vitamin D and B12 deficiency, who presents for the following:    Diagnoses and all orders for this visit:    1. Chronic obstructive pulmonary disease, unspecified COPD type (CMS/HCC) (Primary): Congratulated him on improved smoking, encourage regular cough to clear secretions especially left lower lung base where he has had evident emphysema on prior scan    2. History of smoking 30 or more pack years    3. Anxiety disorder, unspecified type: Continue bupropion, if it continues to remain effective can discuss weaning Xanax as tolerated, for now continue, up-to-date on [facility] and contract  -      ALPRAZolam (XANAX) 1 MG tablet; Take 1 tablet by mouth 3 (Three) Times a Day As Needed for Anxiety.  Dispense: 90 tablet; Refill: 2    4. Peripheral arteriosclerosis (CMS/HCC): Stable    5. Essential hypertension: Stable    6. Coronary artery disease involving native coronary artery of native heart without angina pectoris: Stable    Return to office in 3 months for medication review    Seth Hester MD  Family Medicine  O: [phone]  C: [phone]    Disclaimer: Parts of this note were dictated by speech recognition. Minor errors in transcription may be present. Please call if questions.

## 2020-06-22 DIAGNOSIS — M54.50 CHRONIC MIDLINE LOW BACK PAIN WITHOUT SCIATICA: ICD-10-CM

## 2020-06-22 DIAGNOSIS — G89.29 CHRONIC MIDLINE LOW BACK PAIN WITHOUT SCIATICA: ICD-10-CM

## 2020-06-22 RX ORDER — LEVOTHYROXINE SODIUM 0.12 MG/1
125 TABLET ORAL DAILY
Qty: 90 TABLET | Refills: 1 | Status: SHIPPED | OUTPATIENT
Start: 2020-06-22 | End: 2020-09-08 | Stop reason: SDUPTHER

## 2020-06-22 RX ORDER — TRAMADOL HYDROCHLORIDE 50 MG/1
50 TABLET ORAL 2 TIMES DAILY
Qty: 60 TABLET | Refills: 0 | Status: SHIPPED | OUTPATIENT
Start: 2020-06-22 | End: 2020-07-24 | Stop reason: SDUPTHER

## 2020-06-22 NOTE — TELEPHONE ENCOUNTER
Last OV 6/5/2020  Next OV 9/1/2020  Last RF 5/22/2020 #60 No RF  KSP 3/3/2020  Contract 3/3/2020  [facility] 3/3/2020

## 2020-07-24 DIAGNOSIS — G89.29 CHRONIC MIDLINE LOW BACK PAIN WITHOUT SCIATICA: ICD-10-CM

## 2020-07-24 DIAGNOSIS — M54.50 CHRONIC MIDLINE LOW BACK PAIN WITHOUT SCIATICA: ICD-10-CM

## 2020-07-24 RX ORDER — TRAMADOL HYDROCHLORIDE 50 MG/1
50 TABLET ORAL 2 TIMES DAILY
Qty: 60 TABLET | Refills: 0 | Status: SHIPPED | OUTPATIENT
Start: 2020-07-24 | End: 2020-08-24 | Stop reason: SDUPTHER

## 2020-07-24 NOTE — TELEPHONE ENCOUNTER
Last OV 6/5/2020  Next OV 9/1/2020  Last RF 6/22/2020 #60 No RF  K[facility] Printed 7/24/2020  Contract On File 3/3/2020  [facility] 3/3/2020

## 2020-07-27 ENCOUNTER — OFFICE VISIT (OUTPATIENT)
Dept: CARDIOLOGY | Facility: CLINIC | Age: 65
End: 2020-07-27

## 2020-07-27 VITALS
BODY MASS INDEX: 25.92 KG/M2 | OXYGEN SATURATION: 98 % | SYSTOLIC BLOOD PRESSURE: 152 MMHG | HEART RATE: 66 BPM | DIASTOLIC BLOOD PRESSURE: 88 MMHG | WEIGHT: 175 LBS | TEMPERATURE: 98.6 F | HEIGHT: 69 IN

## 2020-07-27 DIAGNOSIS — I42.9 CARDIOMYOPATHY, UNSPECIFIED TYPE (HCC): ICD-10-CM

## 2020-07-27 DIAGNOSIS — I73.9 PVD (PERIPHERAL VASCULAR DISEASE) (HCC): ICD-10-CM

## 2020-07-27 DIAGNOSIS — I10 ESSENTIAL HYPERTENSION: ICD-10-CM

## 2020-07-27 DIAGNOSIS — I25.10 CORONARY ARTERY DISEASE INVOLVING NATIVE CORONARY ARTERY OF NATIVE HEART WITHOUT ANGINA PECTORIS: Primary | ICD-10-CM

## 2020-07-27 DIAGNOSIS — I70.209 PERIPHERAL ARTERIOSCLEROSIS (HCC): ICD-10-CM

## 2020-07-27 PROCEDURE — 93000 ELECTROCARDIOGRAM COMPLETE: CPT | Performed by: INTERNAL MEDICINE

## 2020-07-27 PROCEDURE — 99214 OFFICE O/P EST MOD 30 MIN: CPT | Performed by: INTERNAL MEDICINE

## 2020-07-27 NOTE — PROGRESS NOTES
PATIENTINFORMATION    Date of Office Visit: 20  Encounter Provider: Mari Sood MD  Place of Service: [facility] CARDIOLOGY  Patient Name: Sim Agustin  : 1955    Subjective:         Patient ID: Sim Agustin is a 64 y.o. male.      History of Present Illness    This is a gentleman who I saw when he was hospitalized.  In 2019, he had an echocardiogram, which showed a cardiomyopathy with an ejection fraction of 29%.  He had a heart catheterization, which showed small vessel disease and nonobstructive large vessel disease.  He is on a statin and his LDL is controlled.  He could not tolerate a beta-blocker because of bradycardia.  He could not tolerate an angiotensin receptor blocker due to chronic kidney disease.    Repeat echocardiogram in 2020 showed ejection fraction had improved to 50%.  Strain rate was at -16% with grade 1 diastolic dysfunction.    He says he is feeling okay.  He denies any chest pain, shortness of breath or palpitations.  He has some occasional swelling in his right leg.  His blood pressure is little bit elevated here in the office but at previous doctor's appointments it is been normal.  He has not been checking it at home.    Review of Systems   Constitution: Negative for fever, malaise/fatigue, weight gain and weight loss.   HENT: Negative for ear pain, hearing loss, nosebleeds and sore throat.    Eyes: Negative for double vision, pain, vision loss in left eye and vision loss in right eye.   Cardiovascular:        See history of present illness.   Respiratory: Negative for cough, shortness of breath, sleep disturbances due to breathing, snoring and wheezing.    Endocrine: Negative for cold intolerance, heat intolerance and polyuria.   Skin: Negative for itching, poor wound healing and rash.   Musculoskeletal: Negative for joint pain, joint swelling and myalgias.   Gastrointestinal: Negative for abdominal pain, diarrhea,  "hematochezia, nausea and vomiting.   Genitourinary: Negative for hematuria and hesitancy.   Neurological: Negative for numbness, paresthesias and seizures.   Psychiatric/Behavioral: Negative for depression. The patient is not nervous/anxious.            ECG 12 Lead  Date/Time: 7/27/2020 11:41 AM  Performed by: Mari Sood MD  Authorized by: Mari Sood MD   Comparison: compared with previous ECG from 1/31/2020  Similar to previous ECG  Rhythm: sinus rhythm  Ectopy: unifocal PVCs  BPM: 63  Conduction: conduction normal  ST Segments: ST segments normal  T Waves: T waves normal    Clinical impression: normal ECG               Objective:     /88 (BP Location: Left arm)   Pulse 66   Temp 98.6 °F (37 °C)   Ht 175.3 cm (69\")   Wt 79.4 kg (175 lb)   SpO2 98%   BMI 25.84 kg/m²  Body mass index is 25.84 kg/m².     Physical Exam   Constitutional: He appears well-developed.   HENT:   Head: Normocephalic and atraumatic.   Eyes: Pupils are equal, round, and reactive to light. Conjunctivae and lids are normal. Lids are everted and swept, no foreign bodies found.   Neck: Normal range of motion. No JVD present. Carotid bruit is not present. No tracheal deviation present. No thyroid mass present.   Cardiovascular: Normal rate, regular rhythm and normal heart sounds.   Pulses:       Dorsalis pedis pulses are 2+ on the right side, and 2+ on the left side.   Pulmonary/Chest: Effort normal and breath sounds normal.   Abdominal: Normal appearance and bowel sounds are normal.   Musculoskeletal: Normal range of motion.   Neurological: He is alert. He has normal strength.   Skin: Skin is warm, dry and intact.   Psychiatric: He has a normal mood and affect. His behavior is normal.   Vitals reviewed.      Lab Review: I reviewed labs from March 2020      Assessment/Plan:       1.  Nonischemic cardiomyopathy.  Ejection fraction originally 29% in March 2019.  Normal nonobstructive coronary arteries.  Repeat echo in January " 2020 showed his ejection fraction had improved to 50%.  No symptoms.  2.  Nonobstructive coronary artery disease.  Continue Plavix and statin  3.  Peripheral arterial disease. Status post above-the-knee left leg amputation 2016.  Renal Doppler in 2019 was normal.  He follows with Dr. Swann  4.  Hypertension.  Controlled on current medications.  5.  COPD.  He quit smoking and his breathing is improved.  6.  Pulmonary hypertension.  Moderate  7.  History of renal disease.  8.  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    He will come back in 1 year unless he is having problems sooner.  I am not changing his medications since he is asymptomatic and his LV function has improved.    Orders Placed This Encounter   Procedures   • ECG 12 Lead     This order was created via procedure documentation        Discharge Medications           Accurate as of July 27, 2020 11:42 AM. If you have any questions, ask your nurse or doctor.               Continue These Medications      Instructions Start Date   ALPRAZolam 1 MG tablet  Commonly known as:  XANAX   1 mg, Oral, 3 Times Daily PRN      amLODIPine 10 MG tablet  Commonly known as:  NORVASC   10 mg, Oral, Every 24 Hours Scheduled      aspirin 81 MG chewable tablet   81 mg, Oral, Nightly      buPROPion  MG 24 hr tablet  Commonly known as:  WELLBUTRIN XL   300 mg, Oral, Daily      clopidogrel 75 MG tablet  Commonly known as:  PLAVIX   75 mg, Oral, Nightly      Coenzyme Q10 400 MG capsule   400 mg, Oral, Every Evening      FERROUS SULFATE PO   130 mg, Oral, Daily      Fluticasone-Umeclidin-Vilant 100-62.5-25 MCG/INH aerosol powder   Commonly known as:  Trelegy Ellipta   1 each, Inhalation, Daily      gabapentin 300 MG capsule  Commonly known as:  NEURONTIN   TAKE 1 CAPSULE BY MOUTH THREE TIMES DAILY      isosorbide mononitrate 30 MG 24 hr tablet  Commonly known as:  IMDUR   30 mg, Oral, Every Morning      levothyroxine 125 MCG tablet  Commonly known  as:  SYNTHROID, LEVOTHROID   125 mcg, Oral, Daily      MULTIVITAMIN ADULT PO   1 tablet, Oral, Daily      NIACIN PO   1 capsule, Oral, Every Morning      omeprazole 20 MG capsule  Commonly known as:  priLOSEC   20 mg, Oral, Every Morning      simvastatin 40 MG tablet  Commonly known as:  ZOCOR   40 mg, Oral, Nightly      traMADol 50 MG tablet  Commonly known as:  ULTRAM   50 mg, Oral, 2 Times Daily      VITAMIN B-1 PO   1 tablet, Oral, Daily      vitamin D3 125 MCG (5000 UT) tablet tablet   5,000 Units, Oral, Daily                    Mari Sood MD  07/27/20  11:42

## 2020-08-24 DIAGNOSIS — M54.50 CHRONIC MIDLINE LOW BACK PAIN WITHOUT SCIATICA: ICD-10-CM

## 2020-08-24 DIAGNOSIS — G89.29 CHRONIC MIDLINE LOW BACK PAIN WITHOUT SCIATICA: ICD-10-CM

## 2020-08-24 RX ORDER — TRAMADOL HYDROCHLORIDE 50 MG/1
50 TABLET ORAL 2 TIMES DAILY
Qty: 60 TABLET | Refills: 0 | Status: SHIPPED | OUTPATIENT
Start: 2020-08-24 | End: 2020-09-24 | Stop reason: SDUPTHER

## 2020-08-24 NOTE — TELEPHONE ENCOUNTER
Last OV 6/5/2020  Next OV 9/1/2020  Last RF 7/24/2020 #60 no RF  KSP Printed 8/24/2020  Contract On File 3/3/2020  [facility] 3/3/2020

## 2020-09-01 ENCOUNTER — OFFICE VISIT (OUTPATIENT)
Dept: INTERNAL MEDICINE | Facility: CLINIC | Age: 65
End: 2020-09-01

## 2020-09-01 VITALS
SYSTOLIC BLOOD PRESSURE: 116 MMHG | OXYGEN SATURATION: 97 % | BODY MASS INDEX: 25.78 KG/M2 | DIASTOLIC BLOOD PRESSURE: 80 MMHG | HEART RATE: 68 BPM | WEIGHT: 174.6 LBS

## 2020-09-01 DIAGNOSIS — Z79.899 LONG TERM PRESCRIPTION BENZODIAZEPINE USE: ICD-10-CM

## 2020-09-01 DIAGNOSIS — Z00.00 MEDICARE ANNUAL WELLNESS VISIT, SUBSEQUENT: Primary | ICD-10-CM

## 2020-09-01 DIAGNOSIS — Z23 NEEDS FLU SHOT: ICD-10-CM

## 2020-09-01 DIAGNOSIS — Z66 DNR (DO NOT RESUSCITATE): ICD-10-CM

## 2020-09-01 DIAGNOSIS — J44.9 CHRONIC OBSTRUCTIVE PULMONARY DISEASE, UNSPECIFIED COPD TYPE (HCC): Chronic | ICD-10-CM

## 2020-09-01 PROCEDURE — G0439 PPPS, SUBSEQ VISIT: HCPCS | Performed by: FAMILY MEDICINE

## 2020-09-01 PROCEDURE — G0008 ADMIN INFLUENZA VIRUS VAC: HCPCS | Performed by: FAMILY MEDICINE

## 2020-09-01 PROCEDURE — 90653 IIV ADJUVANT VACCINE IM: CPT | Performed by: FAMILY MEDICINE

## 2020-09-01 RX ORDER — KETOROLAC TROMETHAMINE 5 MG/ML
SOLUTION OPHTHALMIC
COMMUNITY
Start: 2020-07-24 | End: 2021-01-05

## 2020-09-01 RX ORDER — BIMATOPROST 0.01 %
DROPS OPHTHALMIC (EYE)
Status: ON HOLD | COMMUNITY
Start: 2020-08-22 | End: 2022-04-04

## 2020-09-01 NOTE — PROGRESS NOTES
Date of Encounter: 2020  Patient: Sim Agustin,  1955    SUBSEQUENT MEDICARE WELLNESS VISIT  Subjective   History of Presenting Illness  Chief complaint: Medicare wellness    No acute concerns.    Anxiety stable currently on bupropion, alprazolam.  There is some home stress with conflict with his wife, but he feels his anxiety is doing better than it has been several years.    Goes to the amputation clinic for prosthetic adjustment early next year.    Has continued to only smoke 1 or 2 cigarettes every few days.  Feels his breathing is significantly improved.     Follows up with pulmonology, cardiology regularly.  No recent change in medications.  He is on their annual schedule.    Review of Systems:  Negative for chest pain, shortness of breath, abdominal pain, headache, depression    Health Risk Assessment:    Recent Hospitalizations:  No hospitalization(s) within the last year.    Current Medical Providers:  Patient Care Team:  Seth Hester MD as PCP - General (Family Medicine)  Mariusz Wilburn MD as PCP - Claims Attributed  Jose Swann MD as Consulting Physician (Vascular Surgery)  Krystal Ocampo MD as Consulting Physician (Cardiology)  Ishmael Tate MD as Consulting Physician (Pulmonary Disease)    Smoking Status:  Social History     Tobacco Use   Smoking Status Former Smoker   • Packs/day: 1.00   • Years: 30.00   • Pack years: 30.00   • Types: Cigarettes   • Last attempt to quit: 2016   • Years since quittin.6   Smokeless Tobacco Never Used   Tobacco Comment    caffeine - rarely       Alcohol Consumption:  Social History     Substance and Sexual Activity   Alcohol Use No       Depression Screen:   PHQ-2/PHQ-9 Depression Screening 2020   Little interest or pleasure in doing things 0   Feeling down, depressed, or hopeless 0   Total Score 0     I spent less than 16 minutes asking patient questions, counseling and documenting in the chart    Fall Risk Screen:  EVELIOADI Fall Risk  Assessment has not been completed.    Health Habits and Functional and Cognitive Screening:  Functional & Cognitive Status 9/1/2020   Do you have difficulty preparing food and eating? No   Do you have difficulty bathing yourself, getting dressed or grooming yourself? No   Do you have difficulty using the toilet? No   Do you have difficulty moving around from place to place? No   Do you have trouble with steps or getting out of a bed or a chair? No   Do you need help using the phone?  No   Are you deaf or do you have serious difficulty hearing?  No   Do you need help with transportation? No   Do you need help shopping? No   Do you need help preparing meals?  No   Do you need help with housework?  No   Do you need help with laundry? No   Do you need help taking your medications? No   Do you need help managing money? No   Do you ever drive or ride in a car without wearing a seat belt? No   Do you have difficulty concentrating, remembering or making decisions? No       Does the patient have evidence of cognitive impairment? No    Aspirin use counseling:Taking ASA appropriately as indicated    Recent Lab Results:        Age-appropriate Screening Schedule:  Refer to the list below for future screening recommendations based on patient's age, sex and/or medical conditions. Orders for these recommended tests are listed in the plan section. The patient has been provided with a written plan.    Health Maintenance   Topic Date Due   • TDAP/TD VACCINES (1 - Tdap) 12/21/1966   • INFLUENZA VACCINE  08/01/2020   • COLONOSCOPY  10/21/2026   • PNEUMOCOCCAL VACCINE (19-64 MEDIUM RISK)  Completed   • ZOSTER VACCINE  Completed       Compared to one year ago, the patient feels his physical health is better.  Compared to one year ago, the patient feels his mental health is better.    Reviewed chart for potential of high risk medication in the elderly: yes  Reviewed chart for potential of harmful drug interactions in the  elderly:yes    Advanced Care Planning:  ACP discussion was held with the patient during this visit. Patient has an advance directive in EMR which is still valid.     A face-to-face visit was completed today with patient.  Counseling explanation, and discussion of advanced directives was performed.   The last advanced care visit was performed in 2019.  In a near life ending situation, from which he is not expected to recover functionally, and he is not able to speak for him, he does not want life sustaining measures. We discussed feeding tubes, ventilators and cardiac support as well as dialysis.    I spent less than 16 minutes discussing Advanced Care Planning information and documenting in the chart.      The following portions of the patient's history were reviewed and updated as appropriate: allergies, current medications, past family history, past medical history, past social history, past surgical history and problem list.    Patient Active Problem List   Diagnosis   • H/O angiodysplasia of intestinal tract   • COPD (chronic obstructive pulmonary disease) (CMS/[facility])   • S/P colectomy   • S/P AKA (above knee amputation) (CMS/[facility])   • CAD (coronary artery disease)   • PVD (peripheral vascular disease) (CMS/[facility])   • Essential hypertension   • Cardiomyopathy, unspecified (CMS/[facility])   • PRISCILLA treated with BiPAP   • Anxiety disorder   • Chronic midline low back pain without sciatica   • Long term prescription benzodiazepine use   • History of bradycardia   • Hypothyroidism   • Vitamin B12 deficiency   • Vitamin D deficiency   • Iron deficiency   • History of smoking 30 or more pack years   • High risk medication use   • DNR (do not resuscitate)     Past Medical History:   Diagnosis Date   • Acute respiratory failure with hypoxia and hypercarbia (CMS/[facility]) 3/11/2019   • Acute upper respiratory infection 2/6/2013   • Anemia     per [place] database   • ARF (acute renal failure) (CMS/[facility]) 3/4/2016   • Atelectasis, left  3/4/2016   • Bradycardia 5/23/2019   • CAD (coronary artery disease) 4/4/2016   • Chronic obstructive pulmonary disease with acute exacerbation (CMS/HCC) 2/6/2013   • Colon polyps    • Compartment syndrome (CMS/[facility])     AFTER ILIAC SURGERY. ABOVE KNEE AMPUTATION   • COPD (chronic obstructive pulmonary disease) (CMS/HCC)    • COPD with hypoxia (CMS/[facility]) 5/23/2019   • Cough     SINCE APRIL WITH PNEUMONIA.    • Disease of thyroid gland     HYPOTHYRODISM   • Diverticulosis    • Employs prosthetic leg    • ESRD (end stage renal disease) on dialysis (CMS/[facility]) 4/6/2016   • Fracture of patella 3/3/2015   • History of acute renal failure    • History of CHF (congestive heart failure)    • History of GI bleed 02/2016    AORTODUOD FISTULA   • History of sepsis 02/2016   • History of transfusion     MULTIPLE, 2016   • Hyperkalemia 4/11/2016   • Hypertension    • Hypotension    • Hypotension 3/4/2016   • Nonischemic cardiomyopathy (CMS/[facility])    • Nosocomial pneumonia 4/6/2016   • Phantom limb pain (CMS/[facility])     SL, WITH LEFT ABOUVE KNEE   • Pleural effusion on left 4/11/2016   • Pneumonia     SPRING 2016  AFTER SURGERY   • PVD (peripheral vascular disease) (CMS/[facility])    • S/P thoracentesis 4/11/2016   • Sepsis, unspecified organism (CMS/[facility]) 4/5/2016     Past Surgical History:   Procedure Laterality Date   • ABOVE KNEE AMPUTATION Left 3/16/2016    Procedure: LT AMPUTATION ABOVE KNEE;  Surgeon: Jose Swann MD;  Location: [facility];  Service:    • ARTERIAL THROMBECTOMY  2001    throbectomy of arterial graft   • AXILLARY FEMORAL BYPASS Right 02/15/2016    Exploratory lapartomy, repair aortoduodenal fistula, resection infected aortobifemoral bypass graft, axillobi-superficial femoral artery Redby-Aneudy bypass graft from right axillary artery, Repair of duodenotomy 4th portion duodenum-Dr. Jose Swann, Dr. Genaro Perrin   • BRONCHOSCOPY Left 03/04/2016    Dr. NATALIIA Tate   • BRONCHOSCOPY RIGID / FLEXIBLE N/A  03/03/2016    Dr. NATALIIA Tate   • BRONCHOSCOPY RIGID / FLEXIBLE N/A 02/26/2016    Dr. NATALIIA Tate   • CARDIAC CATHETERIZATION N/A 3/18/2019    Procedure: Right and Left Heart Cath;  Surgeon: Nina Storey MD;  Location: [facility] CATH INVASIVE LOCATION;  Service: Cardiovascular   • CATARACT EXTRACTION WITH INTRAOCULAR LENS IMPLANT Bilateral    • COLON RESECTION WITH COLOSTOMY Left 02/28/2016    Exploratory laparotomy with open left hemicolectomy, end-colostomy, G-tube and J-tube-Dr. Endy Chaney   • COLONOSCOPY N/A 2015    Dr. Laughlin   • COLONOSCOPY N/A 10/12/2016    Procedure: COLONOSCOPY TO 20 CM AND PER STOMA TO 30CM;  Surgeon: Genaro Perrin MD;  Location: [facility] ENDOSCOPY;  Service:    • COLONOSCOPY N/A 10/21/2016    Procedure: COLONOSCOPY DONE AT BEDSIDE ONTO CECEM;  Surgeon: Genaro Perrin MD;  Location: [facility] ENDOSCOPY;  Service:    • COLONOSCOPY N/A 02/10/2016    Diverticulosis in the entire examined colon, non-bleeding internal hemorrhoids-Dr. Taylor Pina   • COLONOSCOPY N/A 02/11/2016    Poor prep, blood in the entire examined colon, normal ileum-Dr. Taylor Pina   • COLONOSCOPY W/ POLYPECTOMY N/A 07/27/2015    Normal ileum, diverticulosis in the sigmoid colon: resected and retrieved, one 6 mm polyp in the descending colon: resected and retrieved, the distal rectum and anal verge are normal on retroflexion view-Dr. Miguel Ángel Laughlin   • COLOSTOMY CLOSURE N/A 10/13/2016    Procedure: COLOSTOMY TAKEDOWN OR CLOSURE, APPENDECTOMY;  Surgeon: Genaro Perrin MD;  Location: [facility] OR;  Service:    • DEBRIDEMENT LEG Left 03/01/2016    Excisional debridement of the skin, subcutantous tissue, tendon and muscle left calf-Dr. Jose Swann   • DEBRIDEMENT LEG Left 02/25/2016    Excisional debridement full-thcikness skin and subcutaneous tissue and tendon and muscle, left medial and lateral calf muscles-Dr. Jose Swann   • ENDOSCOPY N/A 10/21/2016    Procedure:  ESOPHAGOGASTRODUODENOSCOPY DONE AT BEDSIDE;  Surgeon: Genaro Perrin MD;  Location: [facility] ENDOSCOPY;  Service:    • ENDOSCOPY AND COLONOSCOPY N/A 02/28/2016    EGD and Colonoscopy with Candy ink injection-Dr. Endy Chaney   • ENTEROSCOPY SMALL BOWEL N/A 02/11/2016    Normal esophagus, normal stomach, normal duodenum, portion of the jejunum normal-Dr. Taylor Pina   • ILIAC ARTERY - FEMORAL ARTERY BYPASS GRAFT Bilateral 2002   • ILIAC ARTERY STENT Bilateral 2001   • INSERTION AND REMOVAL PERITONEAL DIALYSIS CATHETER Right 02/29/2016    Exchange right jugular 16 cm Mahurkar dialysis catheter-Dr. Jose Swann   • INSERTION PERITONEAL DIALYSIS CATHETER Left 03/01/2016    Ultrasound-guided access of the left jugular vein, 23 cm left jugular palindrome dialysis catheter placement-Dr. Jose Swann   • INSERTION PERITONEAL DIALYSIS CATHETER Right 02/18/2016    Right jugular Mahrkar catherer placement-Dr. Jose Swann   • JOINT REPLACEMENT Left    • THORACOSCOPY Left 4/18/2016    Procedure: LT THORACOSCOPY VIDEO ASSISTED WITH DECORDICATION;  Surgeon: Genaro Davila III, MD;  Location: [facility] OR;  Service:    • THROMBECTOMY Left 02/16/2016    Thombectomy of left femoral graft, left leg arteriogram, left calf forward compartment fasciotomy-Dr. Jose Swann   • TOTAL HIP ARTHROPLASTY Left    • UPPER GASTROINTESTINAL ENDOSCOPY N/A 02/09/2016    Normal UGI-Dr. Ajay Parkinson   • UPPER GASTROINTESTINAL ENDOSCOPY N/A 11/28/2015    Small hiatal hernia, chronic gastritis: biopsied, non-bleeding angioectasias in the stomach: treated with argon plasma coagulation, multiple bleeding angioectasias in the dudenum: treated with argon plasma coagulation-Dr. Mykel Jaramillo   • VASCULAR SURGERY      MULTIPLE   • VENTRAL/INCISIONAL HERNIA REPAIR N/A 10/19/2017    Procedure: VENTRAL HERNIA REPAIR WITH MESH AND BILATERAL COMPONENT SEPARATION;  Surgeon: Genaro Perrin MD;  Location: [facility] MAIN OR;  Service:     • WISDOM TOOTH EXTRACTION       Family History   Problem Relation Age of Onset   • Lung cancer Mother    • Heart disease Father    • Diabetes Father    • Hypertension Father    • Malig Hyperthermia Neg Hx        Current Outpatient Medications:   •  ALPRAZolam (XANAX) 1 MG tablet, Take 1 tablet by mouth 3 (Three) Times a Day As Needed for Anxiety., Disp: 90 tablet, Rfl: 2  •  amLODIPine (NORVASC) 10 MG tablet, Take 1 tablet by mouth Daily., Disp: 90 tablet, Rfl: 3  •  aspirin 81 MG chewable tablet, Chew 81 mg Every Night., Disp: , Rfl:   •  buPROPion XL (WELLBUTRIN XL) 300 MG 24 hr tablet, Take 1 tablet by mouth Daily., Disp: 90 tablet, Rfl: 3  •  Cholecalciferol (VITAMIN D3) 125 MCG (5000 UT) tablet tablet, Take 1 tablet by mouth Daily., Disp: 90 tablet, Rfl: 3  •  clopidogrel (PLAVIX) 75 MG tablet, Take 1 tablet by mouth Every Night., Disp: 90 tablet, Rfl: 3  •  Coenzyme Q10 400 MG capsule, Take 400 mg by mouth Every Evening., Disp: , Rfl:   •  FERROUS SULFATE PO, Take 130 mg by mouth Daily., Disp: , Rfl:   •  Fluticasone-Umeclidin-Vilant (TRELEGY ELLIPTA) 100-62.5-25 MCG/INH aerosol powder , Inhale 1 each Daily., Disp: 28 each, Rfl: 2  •  gabapentin (NEURONTIN) 300 MG capsule, TAKE 1 CAPSULE BY MOUTH THREE TIMES DAILY, Disp: 90 capsule, Rfl: 2  •  isosorbide mononitrate (IMDUR) 30 MG 24 hr tablet, Take 1 tablet by mouth Every Morning., Disp: 90 tablet, Rfl: 3  •  levothyroxine (SYNTHROID, LEVOTHROID) 125 MCG tablet, Take 1 tablet by mouth Daily., Disp: 90 tablet, Rfl: 1  •  LUMIGAN 0.01 % ophthalmic drops, 1 DROP IN TO BOTH EYES AT BEDTIME BRAND MEDICALLY NECESSARY, Disp: , Rfl:   •  Multiple Vitamins-Minerals (MULTIVITAMIN ADULT PO), Take 1 tablet by mouth Daily., Disp: , Rfl:   •  NIACIN PO, Take 1 capsule by mouth Every Morning., Disp: , Rfl:   •  omeprazole (priLOSEC) 20 MG capsule, Take 1 capsule by mouth Every Morning., Disp: 90 capsule, Rfl: 3  •  simvastatin (ZOCOR) 40 MG tablet, Take 1 tablet by mouth  Every Night., Disp: 90 tablet, Rfl: 3  •  Thiamine HCl (VITAMIN B-1 PO), Take 1 tablet by mouth Daily., Disp: , Rfl:   •  traMADol (ULTRAM) 50 MG tablet, Take 1 tablet by mouth 2 (Two) Times a Day., Disp: 60 tablet, Rfl: 0  •  ketorolac (ACULAR) 0.5 % ophthalmic solution, 1 DROP INTO RIGHT EYE 3 TIMES A DAY FOR 3 DAYS THEN STOP, Disp: , Rfl:   Allergies   Allergen Reactions   • Augmentin [Amoxicillin-Pot Clavulanate] Hives and Rash     Social History     Tobacco Use   • Smoking status: Former Smoker     Packs/day: 1.00     Years: 30.00     Pack years: 30.00     Types: Cigarettes     Last attempt to quit: 2016     Years since quittin.6   • Smokeless tobacco: Never Used   • Tobacco comment: caffeine - rarely   Substance Use Topics   • Alcohol use: No   • Drug use: Never          Objective   Physical Exam  Vitals:    20 0958   BP: 116/80   Pulse: 68   SpO2: 97%   Weight: 79.2 kg (174 lb 9.6 oz)     Body mass index is 25.78 kg/m².  Discussed the patient's BMI with him. The BMI is in the acceptable range.    Pertinent findings:  Constitutional: NAD.  Cardiovascular: RRR, no LE edema RLL.  Respiratory: Chronic coarse breath sounds again demonstrated in the left base, unchanged. No other wheezing or focal findings. Good effort.  Psychiatric: Normal affect and thought content.     Assessment/Plan   Assessment and Plan  Pleasant 64-year-old male smoker with PVD status post bypass, CAD, moderate to severe COPD, hypertension, hyperlipidemia, CKD, anxiety, chronic lower back pain on high risk medications, GERD, hypothyroidism, anemia, vitamin D and B12 deficiency, who presents for the following:     Advance Directive Discussion: DNR  Fall Risk: Ambulates with a cane and walker at home  Inactivity/Sedentary: Encouraged regular exercise  Polypharmacy: Discussed my concerns with benzodiazepines and cognitive function  Tobacco Use/Dependance: Encouraged him to continue to avoid tobacco use    The above risks/problems have  been discussed with the patient.  Pertinent information has been shared with the patient in the After Visit Summary.  Other plans as below:    Diagnoses and all orders for this visit:    1. Medicare annual wellness visit, subsequent (Primary)    2. DNR (do not resuscitate)    3. Chronic obstructive pulmonary disease, unspecified COPD type (CMS/[facility])    4. Long term prescription benzodiazepine use    5. Needs flu shot  -     Fluad Quad 65+ yrs ([id number])      Overall Mr. Agustin's problems are stable.  Encourage continued smoking cessation.  We will continue to encourage him to taper off of benzodiazepines as able, we will continue bupropion as it is addressing his anxiety well.  He will continue to follow-up with cardiology, pulmonology, prosthetic clinic as scheduled.  Plan for labs at follow-up appointment in 3 to 4 months.    An After Visit Summary and PPPS were given to the patient.      Seth Hester MD  Family Medicine  O: [phone]  C: [phone]    Disclaimer: Parts of this note were dictated by speech recognition. Minor errors in transcription may be present. Please call if questions.

## 2020-09-08 DIAGNOSIS — Z89.619 STATUS POST ABOVE KNEE AMPUTATION, UNSPECIFIED LATERALITY (HCC): ICD-10-CM

## 2020-09-08 RX ORDER — GABAPENTIN 300 MG/1
CAPSULE ORAL
Qty: 90 CAPSULE | Refills: 1 | Status: SHIPPED | OUTPATIENT
Start: 2020-09-08 | End: 2020-11-08 | Stop reason: SDUPTHER

## 2020-09-08 RX ORDER — LEVOTHYROXINE SODIUM 0.12 MG/1
125 TABLET ORAL DAILY
Qty: 90 TABLET | Refills: 1 | Status: SHIPPED | OUTPATIENT
Start: 2020-09-08 | End: 2021-03-01

## 2020-09-08 RX ORDER — GABAPENTIN 300 MG/1
300 CAPSULE ORAL 3 TIMES DAILY
Qty: 90 CAPSULE | Refills: 2 | Status: CANCELLED | OUTPATIENT
Start: 2020-09-08

## 2020-09-08 NOTE — TELEPHONE ENCOUNTER
Last OV 9/1/2020  Next OV 1/5/2021  Last RF 6/4/2020 #90 +2  KSP On File 7/24/2020  Contract On File 3/3/2020  UDS 3/3/2020

## 2020-09-09 DIAGNOSIS — F41.9 ANXIETY DISORDER, UNSPECIFIED TYPE: ICD-10-CM

## 2020-09-09 RX ORDER — ALPRAZOLAM 1 MG/1
1 TABLET ORAL 3 TIMES DAILY PRN
Qty: 90 TABLET | Refills: 2 | OUTPATIENT
Start: 2020-09-09

## 2020-09-09 NOTE — TELEPHONE ENCOUNTER
Last OV 9/1/2020  Next OV 1/5/2021  Last RF 6/5/2020 #90 +2  KSP On File 7/24/2020  Contract On File 3/3/2020  UDS 3/3/2020

## 2020-09-09 NOTE — TELEPHONE ENCOUNTER
Please let Dr. Hester know about this controlled substance prescription refill request. Thank you.

## 2020-09-10 RX ORDER — ALPRAZOLAM 1 MG/1
1 TABLET ORAL 3 TIMES DAILY PRN
Qty: 90 TABLET | Refills: 2 | Status: SHIPPED | OUTPATIENT
Start: 2020-09-10 | End: 2020-12-04 | Stop reason: SDUPTHER

## 2020-09-24 DIAGNOSIS — G89.29 CHRONIC MIDLINE LOW BACK PAIN WITHOUT SCIATICA: ICD-10-CM

## 2020-09-24 DIAGNOSIS — M54.50 CHRONIC MIDLINE LOW BACK PAIN WITHOUT SCIATICA: ICD-10-CM

## 2020-09-25 RX ORDER — TRAMADOL HYDROCHLORIDE 50 MG/1
50 TABLET ORAL 2 TIMES DAILY
Qty: 60 TABLET | Refills: 0 | Status: SHIPPED | OUTPATIENT
Start: 2020-09-25 | End: 2020-10-25 | Stop reason: SDUPTHER

## 2020-09-25 NOTE — TELEPHONE ENCOUNTER
Last OV 9/1/2020  Next OV 1/5/2021  Last RF 8/24/2020 #60 no RF  KSP UTD 8/24/2020  Contract On File 3/3/2020  UDS 3/3/2020

## 2020-10-25 DIAGNOSIS — G89.29 CHRONIC MIDLINE LOW BACK PAIN WITHOUT SCIATICA: ICD-10-CM

## 2020-10-25 DIAGNOSIS — M54.50 CHRONIC MIDLINE LOW BACK PAIN WITHOUT SCIATICA: ICD-10-CM

## 2020-10-26 DIAGNOSIS — G89.29 CHRONIC MIDLINE LOW BACK PAIN WITHOUT SCIATICA: ICD-10-CM

## 2020-10-26 DIAGNOSIS — M54.50 CHRONIC MIDLINE LOW BACK PAIN WITHOUT SCIATICA: ICD-10-CM

## 2020-10-26 RX ORDER — TRAMADOL HYDROCHLORIDE 50 MG/1
TABLET ORAL
Qty: 60 TABLET | Refills: 0 | OUTPATIENT
Start: 2020-10-26

## 2020-10-26 RX ORDER — TRAMADOL HYDROCHLORIDE 50 MG/1
50 TABLET ORAL 2 TIMES DAILY
Qty: 60 TABLET | Refills: 0 | Status: SHIPPED | OUTPATIENT
Start: 2020-10-26 | End: 2020-11-24 | Stop reason: SDUPTHER

## 2020-11-08 DIAGNOSIS — Z89.619 STATUS POST ABOVE KNEE AMPUTATION, UNSPECIFIED LATERALITY (HCC): ICD-10-CM

## 2020-11-09 RX ORDER — GABAPENTIN 300 MG/1
300 CAPSULE ORAL 3 TIMES DAILY
Qty: 90 CAPSULE | Refills: 1 | Status: SHIPPED | OUTPATIENT
Start: 2020-11-09 | End: 2021-01-05 | Stop reason: SDUPTHER

## 2020-11-10 NOTE — TELEPHONE ENCOUNTER
reviewed. Discussed risk associated with opioid pain medication and benzo. Pt verbalized understanding. Has been on both medications for some time now. No concerns for abuse. Refill sent for xanax. Continue zoloft also.   Last OV 3/3/2020  Next OV 6/5/2020  Last RF 4/28/2020  Last KSP on file 3/3/2020  Contract on file 3/3/2020  UDS On File 3/3/2020    KD

## 2020-11-17 ENCOUNTER — DOCUMENTATION (OUTPATIENT)
Dept: INTERNAL MEDICINE | Facility: HOSPITAL | Age: 65
End: 2020-11-17

## 2020-11-18 NOTE — PROGRESS NOTES
[facility]     Date of service:   November 3, 2020     Diagnoses:  1. Status post left above-knee amputation, 03/16/2016.  2. Status post resection of infected aortic graft with right axial superficial femoral artery Pierce-Aneudy graft 02/15/2016.   3. History of aortobifemoral graft and bilateral iliac stents, infected aortic graft, and chronic draining sinus left iliac.   4. 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   5. History of duodenal angioectasia.   6. History of GI bleed.   7. History of left femoral graft thrombectomy and left calf compartment syndrome and fasciotomy 02/16/2016.   8. Status post exploratory laparotomy with left hemicolectomy, colostomy, G-tube, J-tube for ischemic colon on 02/28/2016.   9. History of left hip replacement.   10. History of COPD.      History of present illness: 64-year-old male with history of left above-knee amputation, last seen 1 year ago.  He has hydraulic fluid loss at both the knee and the ankle.  Dysfunctional knee component.  He has difficulty with the seal of the suction.  His residual limb is decreased in size.  He had a 5 ply sock.  He also has put the sock within the seal on liner to push it out to try to get better suction seal.    Physical exam nation: Left above-knee amputation.  Good hip extension range of motion.  He has distal rubor-has that chronically.  The skin is otherwise unremarkable.  He ambulates with a right straight cane in the left above-knee amputation-transfemoral-prosthesis with a plié 3 microprocessor knee.       Assessment and plan: Left transfemoral amputation-he has had a physiologic change in the size of his residual limb.  In addition the knee and ankle hydraulics are blown.  He needs fabrication of a new socket, knee component, as well as foot component.  We will also possibly look at updated physical therapy to smooth out his gait and deficiency with the new prosthesis once he receives it.  He  will give us a call if he wishes to pursue the physical therapy for gait training once he receives a new prosthesis.        Gordy Bonilla MD     Date of service:   November 3, 2020

## 2020-11-24 DIAGNOSIS — M54.50 CHRONIC MIDLINE LOW BACK PAIN WITHOUT SCIATICA: ICD-10-CM

## 2020-11-24 DIAGNOSIS — G89.29 CHRONIC MIDLINE LOW BACK PAIN WITHOUT SCIATICA: ICD-10-CM

## 2020-11-24 RX ORDER — TRAMADOL HYDROCHLORIDE 50 MG/1
50 TABLET ORAL 2 TIMES DAILY
Qty: 60 TABLET | Refills: 0 | Status: SHIPPED | OUTPATIENT
Start: 2020-11-24 | End: 2020-12-21 | Stop reason: SDUPTHER

## 2020-12-04 DIAGNOSIS — F41.9 ANXIETY DISORDER, UNSPECIFIED TYPE: ICD-10-CM

## 2020-12-04 RX ORDER — ALPRAZOLAM 1 MG/1
1 TABLET ORAL 3 TIMES DAILY PRN
Qty: 90 TABLET | Refills: 0 | Status: SHIPPED | OUTPATIENT
Start: 2020-12-04 | End: 2021-01-04 | Stop reason: SDUPTHER

## 2020-12-21 DIAGNOSIS — M54.50 CHRONIC MIDLINE LOW BACK PAIN WITHOUT SCIATICA: ICD-10-CM

## 2020-12-21 DIAGNOSIS — G89.29 CHRONIC MIDLINE LOW BACK PAIN WITHOUT SCIATICA: ICD-10-CM

## 2020-12-21 RX ORDER — TRAMADOL HYDROCHLORIDE 50 MG/1
50 TABLET ORAL 2 TIMES DAILY
Qty: 60 TABLET | Refills: 0 | Status: SHIPPED | OUTPATIENT
Start: 2021-01-05 | End: 2021-01-19 | Stop reason: SDUPTHER

## 2021-01-04 DIAGNOSIS — F41.9 ANXIETY DISORDER, UNSPECIFIED TYPE: ICD-10-CM

## 2021-01-04 RX ORDER — ALPRAZOLAM 1 MG/1
1 TABLET ORAL 3 TIMES DAILY PRN
Qty: 90 TABLET | Refills: 0 | Status: SHIPPED | OUTPATIENT
Start: 2021-01-04 | End: 2021-02-02 | Stop reason: SDUPTHER

## 2021-01-05 ENCOUNTER — OFFICE VISIT (OUTPATIENT)
Dept: INTERNAL MEDICINE | Facility: CLINIC | Age: 66
End: 2021-01-05

## 2021-01-05 VITALS
TEMPERATURE: 97.1 F | DIASTOLIC BLOOD PRESSURE: 80 MMHG | HEIGHT: 69 IN | SYSTOLIC BLOOD PRESSURE: 128 MMHG | HEART RATE: 74 BPM | OXYGEN SATURATION: 98 % | WEIGHT: 196.6 LBS | BODY MASS INDEX: 29.12 KG/M2

## 2021-01-05 DIAGNOSIS — E78.5 HYPERLIPIDEMIA, UNSPECIFIED HYPERLIPIDEMIA TYPE: ICD-10-CM

## 2021-01-05 DIAGNOSIS — K21.9 GASTROESOPHAGEAL REFLUX DISEASE: ICD-10-CM

## 2021-01-05 DIAGNOSIS — E55.9 VITAMIN D DEFICIENCY: ICD-10-CM

## 2021-01-05 DIAGNOSIS — E03.9 HYPOTHYROIDISM, UNSPECIFIED TYPE: ICD-10-CM

## 2021-01-05 DIAGNOSIS — I10 ESSENTIAL HYPERTENSION: ICD-10-CM

## 2021-01-05 DIAGNOSIS — F41.9 ANXIETY DISORDER, UNSPECIFIED TYPE: ICD-10-CM

## 2021-01-05 DIAGNOSIS — Z89.619 STATUS POST ABOVE KNEE AMPUTATION, UNSPECIFIED LATERALITY (HCC): Primary | ICD-10-CM

## 2021-01-05 DIAGNOSIS — E53.8 VITAMIN B12 DEFICIENCY: ICD-10-CM

## 2021-01-05 DIAGNOSIS — Z79.899 LONG TERM PRESCRIPTION BENZODIAZEPINE USE: ICD-10-CM

## 2021-01-05 DIAGNOSIS — Z12.5 SCREENING FOR MALIGNANT NEOPLASM OF PROSTATE: ICD-10-CM

## 2021-01-05 PROCEDURE — 99214 OFFICE O/P EST MOD 30 MIN: CPT | Performed by: FAMILY MEDICINE

## 2021-01-05 RX ORDER — AMLODIPINE BESYLATE 10 MG/1
10 TABLET ORAL
Qty: 90 TABLET | Refills: 3 | Status: SHIPPED | OUTPATIENT
Start: 2021-01-05 | End: 2021-04-15 | Stop reason: SDUPTHER

## 2021-01-05 RX ORDER — OMEPRAZOLE 20 MG/1
20 CAPSULE, DELAYED RELEASE ORAL EVERY MORNING
Qty: 90 CAPSULE | Refills: 3 | Status: SHIPPED | OUTPATIENT
Start: 2021-01-05 | End: 2021-12-14

## 2021-01-05 RX ORDER — GABAPENTIN 300 MG/1
300 CAPSULE ORAL 3 TIMES DAILY
Qty: 90 CAPSULE | Refills: 2 | Status: SHIPPED | OUTPATIENT
Start: 2021-01-05 | End: 2021-04-02 | Stop reason: SDUPTHER

## 2021-01-06 ENCOUNTER — TELEPHONE (OUTPATIENT)
Dept: INTERNAL MEDICINE | Facility: CLINIC | Age: 66
End: 2021-01-06

## 2021-01-06 PROBLEM — N18.9 CKD (CHRONIC KIDNEY DISEASE): Status: ACTIVE | Noted: 2021-01-06

## 2021-01-06 PROBLEM — R71.8 ELEVATED HEMATOCRIT: Status: ACTIVE | Noted: 2021-01-06

## 2021-01-06 LAB
25(OH)D3+25(OH)D2 SERPL-MCNC: 81.3 NG/ML (ref 30–100)
ALBUMIN SERPL-MCNC: 4.9 G/DL (ref 3.8–4.8)
ALBUMIN/GLOB SERPL: 1.8 {RATIO} (ref 1.2–2.2)
ALP SERPL-CCNC: 65 IU/L (ref 39–117)
ALT SERPL-CCNC: 9 IU/L (ref 0–44)
AST SERPL-CCNC: 18 IU/L (ref 0–40)
BASOPHILS # BLD AUTO: 0 X10E3/UL (ref 0–0.2)
BASOPHILS NFR BLD AUTO: 0 %
BILIRUB SERPL-MCNC: 0.6 MG/DL (ref 0–1.2)
BUN SERPL-MCNC: 29 MG/DL (ref 8–27)
BUN/CREAT SERPL: 18 (ref 10–24)
CALCIUM SERPL-MCNC: 10 MG/DL (ref 8.6–10.2)
CHLORIDE SERPL-SCNC: 104 MMOL/L (ref 96–106)
CHOLEST SERPL-MCNC: 222 MG/DL (ref 100–199)
CO2 SERPL-SCNC: 26 MMOL/L (ref 20–29)
CREAT SERPL-MCNC: 1.59 MG/DL (ref 0.76–1.27)
EOSINOPHIL # BLD AUTO: 0.1 X10E3/UL (ref 0–0.4)
EOSINOPHIL NFR BLD AUTO: 1 %
ERYTHROCYTE [DISTWIDTH] IN BLOOD BY AUTOMATED COUNT: 13.3 % (ref 11.6–15.4)
FT4I SERPL CALC-MCNC: 3.2 (ref 1.2–4.9)
GLOBULIN SER CALC-MCNC: 2.8 G/DL (ref 1.5–4.5)
GLUCOSE SERPL-MCNC: 98 MG/DL (ref 65–99)
HCT VFR BLD AUTO: 54.1 % (ref 37.5–51)
HDLC SERPL-MCNC: 62 MG/DL
HGB BLD-MCNC: 17.6 G/DL (ref 13–17.7)
IMM GRANULOCYTES # BLD AUTO: 0 X10E3/UL (ref 0–0.1)
IMM GRANULOCYTES NFR BLD AUTO: 0 %
LDLC SERPL CALC-MCNC: 119 MG/DL (ref 0–99)
LYMPHOCYTES # BLD AUTO: 2.1 X10E3/UL (ref 0.7–3.1)
LYMPHOCYTES NFR BLD AUTO: 25 %
MCH RBC QN AUTO: 30 PG (ref 26.6–33)
MCHC RBC AUTO-ENTMCNC: 32.5 G/DL (ref 31.5–35.7)
MCV RBC AUTO: 92 FL (ref 79–97)
MONOCYTES # BLD AUTO: 0.6 X10E3/UL (ref 0.1–0.9)
MONOCYTES NFR BLD AUTO: 8 %
NEUTROPHILS # BLD AUTO: 5.6 X10E3/UL (ref 1.4–7)
NEUTROPHILS NFR BLD AUTO: 66 %
PLATELET # BLD AUTO: 259 X10E3/UL (ref 150–450)
POTASSIUM SERPL-SCNC: 5.3 MMOL/L (ref 3.5–5.2)
PROT SERPL-MCNC: 7.7 G/DL (ref 6–8.5)
PSA SERPL-MCNC: 1.2 NG/ML (ref 0–4)
RBC # BLD AUTO: 5.87 X10E6/UL (ref 4.14–5.8)
SODIUM SERPL-SCNC: 144 MMOL/L (ref 134–144)
T3RU NFR SERPL: 29 % (ref 24–39)
T4 SERPL-MCNC: 10.9 UG/DL (ref 4.5–12)
TRIGL SERPL-MCNC: 234 MG/DL (ref 0–149)
TSH SERPL DL<=0.005 MIU/L-ACNC: 3.91 UIU/ML (ref 0.45–4.5)
VIT B12 SERPL-MCNC: 936 PG/ML (ref 232–1245)
VLDLC SERPL CALC-MCNC: 41 MG/DL (ref 5–40)
WBC # BLD AUTO: 8.4 X10E3/UL (ref 3.4–10.8)

## 2021-01-06 NOTE — TELEPHONE ENCOUNTER
S/w pt re: lab results. Pt was advised of all lab results, and advised that we will monitor at next appt. Pt demonstrated understanding.

## 2021-01-06 NOTE — TELEPHONE ENCOUNTER
----- Message from Seth Hester MD sent at 1/6/2021  2:01 PM EST -----  Please call the patient about their results with the following discussion points:    Most of his blood work looks great, however his kidney function declined somewhat since our last testing.  His cholesterol is also higher than 10 months ago.  His blood count is still somewhat elevated likely due to to his smoking.    No changes right now, but I want to recheck these things at our next appointment

## 2021-01-06 NOTE — PROGRESS NOTES
Date of Encounter: 2021  Patient: Sim Agustin,  1955    Subjective   History of Presenting Illness  Chief complaint: Follow-up multiple chronic problems    Patient essentially has no complaints.  He did have recent left lower extremity prosthetic replacement and is very happy with it.  Has improved mobility although he still has still need to use a cane.  Physical therapy is likely pending.    He reports his mood is doing very well on current regimen of bupropion, alprazolam.  Likely augmented by his tramadol and gabapentin as well.  Urine drug screen collected today.    GERD remains well controlled.    Needs refill on amlodipine, blood pressure well controlled.    Continues to smoke only a few cigarettes per day.  Continues to do well on Trelegy inhaler.    Review of Systems:  Negative for fever, chest pain, dizziness, headache, falls    The following portions of the patient's history were reviewed and updated as appropriate: allergies, current medications, past family history, past medical history, past social history, past surgical history and problem list.    Patient Active Problem List   Diagnosis   • H/O angiodysplasia of intestinal tract   • COPD (chronic obstructive pulmonary disease) (CMS/[facility])   • S/P colectomy   • S/P AKA (above knee amputation) (CMS/[facility])   • CAD (coronary artery disease)   • PVD (peripheral vascular disease) (CMS/[facility])   • Essential hypertension   • Cardiomyopathy, unspecified (CMS/[facility])   • PRISCILLA treated with BiPAP   • Anxiety disorder   • Chronic midline low back pain without sciatica   • Long term prescription benzodiazepine use   • History of bradycardia   • Hypothyroidism   • Vitamin B12 deficiency   • Vitamin D deficiency   • Iron deficiency   • History of smoking 30 or more pack years   • High risk medication use   • DNR (do not resuscitate)     Past Medical History:   Diagnosis Date   • Acute respiratory failure with hypoxia and hypercarbia (CMS/[facility]) 3/11/2019   •  Acute upper respiratory infection 2/6/2013   • Anemia     per [facility] database   • ARF (acute renal failure) (CMS/[facility]) 3/4/2016   • Atelectasis, left 3/4/2016   • Bradycardia 5/23/2019   • CAD (coronary artery disease) 4/4/2016   • Chronic obstructive pulmonary disease with acute exacerbation (CMS/[facility]) 2/6/2013   • Colon polyps    • Compartment syndrome (CMS/[facility])     AFTER ILIAC SURGERY. ABOVE KNEE AMPUTATION   • COPD (chronic obstructive pulmonary disease) (CMS/[facility])    • COPD with hypoxia (CMS/[facility]) 5/23/2019   • Cough     SINCE APRIL WITH PNEUMONIA.    • Disease of thyroid gland     HYPOTHYRODISM   • Diverticulosis    • Employs prosthetic leg    • ESRD (end stage renal disease) on dialysis (CMS/[facility]) 4/6/2016   • Fracture of patella 3/3/2015   • History of acute renal failure    • History of CHF (congestive heart failure)    • History of GI bleed 02/2016    AORTODUOD FISTULA   • History of sepsis 02/2016   • History of transfusion     MULTIPLE, 2016   • Hyperkalemia 4/11/2016   • Hypertension    • Hypotension    • Hypotension 3/4/2016   • Nonischemic cardiomyopathy (CMS/[facility])    • Nosocomial pneumonia 4/6/2016   • Phantom limb pain (CMS/[facility])     SL, WITH LEFT ABOUVE KNEE   • Pleural effusion on left 4/11/2016   • Pneumonia     SPRING 2016  AFTER SURGERY   • PVD (peripheral vascular disease) (CMS/[facility])    • S/P thoracentesis 4/11/2016   • Sepsis, unspecified organism (CMS/[facility]) 4/5/2016     Past Surgical History:   Procedure Laterality Date   • ABOVE KNEE AMPUTATION Left 3/16/2016    Procedure: LT AMPUTATION ABOVE KNEE;  Surgeon: Jose Swann MD;  Location: [facility];  Service:    • ARTERIAL THROMBECTOMY  2001    throbectomy of arterial graft   • AXILLARY FEMORAL BYPASS Right 02/15/2016    Exploratory lapartomy, repair aortoduodenal fistula, resection infected aortobifemoral bypass graft, axillobi-superficial femoral artery Opheim-Aneudy bypass graft from right axillary artery, Repair of duodenotomy 4th portion  duodenum-Dr. Jose Swann, Dr. Genaro Perrin   • BRONCHOSCOPY Left 03/04/2016    Dr. NATALIIA Tate   • BRONCHOSCOPY RIGID / FLEXIBLE N/A 03/03/2016    Dr. NATALIIA Tate   • BRONCHOSCOPY RIGID / FLEXIBLE N/A 02/26/2016    Dr. NATALIIA Tate   • CARDIAC CATHETERIZATION N/A 3/18/2019    Procedure: Right and Left Heart Cath;  Surgeon: Nina Storey MD;  Location: [facility] CATH INVASIVE LOCATION;  Service: Cardiovascular   • CATARACT EXTRACTION WITH INTRAOCULAR LENS IMPLANT Bilateral    • COLON RESECTION WITH COLOSTOMY Left 02/28/2016    Exploratory laparotomy with open left hemicolectomy, end-colostomy, G-tube and J-tube-Dr. Endy Chaney   • COLONOSCOPY N/A 2015    Dr. Laughlin   • COLONOSCOPY N/A 10/12/2016    Procedure: COLONOSCOPY TO 20 CM AND PER STOMA TO 30CM;  Surgeon: Genaro Perrin MD;  Location: [facility] ENDOSCOPY;  Service:    • COLONOSCOPY N/A 10/21/2016    Procedure: COLONOSCOPY DONE AT BEDSIDE ONTO CECEM;  Surgeon: Genaro Perrin MD;  Location: [facility] ENDOSCOPY;  Service:    • COLONOSCOPY N/A 02/10/2016    Diverticulosis in the entire examined colon, non-bleeding internal hemorrhoids-Dr. Taylor Pina   • COLONOSCOPY N/A 02/11/2016    Poor prep, blood in the entire examined colon, normal ileum-Dr. Taylor Pina   • COLONOSCOPY W/ POLYPECTOMY N/A 07/27/2015    Normal ileum, diverticulosis in the sigmoid colon: resected and retrieved, one 6 mm polyp in the descending colon: resected and retrieved, the distal rectum and anal verge are normal on retroflexion view-Dr. Miguel Ángel Laughlin   • COLOSTOMY CLOSURE N/A 10/13/2016    Procedure: COLOSTOMY TAKEDOWN OR CLOSURE, APPENDECTOMY;  Surgeon: Genaro Perrin MD;  Location: [facility] OR;  Service:    • DEBRIDEMENT LEG Left 03/01/2016    Excisional debridement of the skin, subcutantous tissue, tendon and muscle left calf-Dr. Jose Swann   • DEBRIDEMENT LEG Left 02/25/2016    Excisional debridement full-thcikness skin and subcutaneous tissue  and tendon and muscle, left medial and lateral calf muscles-Dr. Jose Swann   • ENDOSCOPY N/A 10/21/2016    Procedure: ESOPHAGOGASTRODUODENOSCOPY DONE AT BEDSIDE;  Surgeon: Genaro Perrin MD;  Location: [facility] ENDOSCOPY;  Service:    • ENDOSCOPY AND COLONOSCOPY N/A 02/28/2016    EGD and Colonoscopy with Candy ink injection-Dr. Endy Chaney   • ENTEROSCOPY SMALL BOWEL N/A 02/11/2016    Normal esophagus, normal stomach, normal duodenum, portion of the jejunum normal-Dr. Taylor Pina   • ILIAC ARTERY - FEMORAL ARTERY BYPASS GRAFT Bilateral 2002   • ILIAC ARTERY STENT Bilateral 2001   • INSERTION AND REMOVAL PERITONEAL DIALYSIS CATHETER Right 02/29/2016    Exchange right jugular 16 cm Mahurkar dialysis catheter-Dr. Jose Swann   • INSERTION PERITONEAL DIALYSIS CATHETER Left 03/01/2016    Ultrasound-guided access of the left jugular vein, 23 cm left jugular palindrome dialysis catheter placement-Dr. Jose Swann   • INSERTION PERITONEAL DIALYSIS CATHETER Right 02/18/2016    Right jugular Mahrkar catherer placement-Dr. Jose Swann   • JOINT REPLACEMENT Left    • THORACOSCOPY Left 4/18/2016    Procedure: LT THORACOSCOPY VIDEO ASSISTED WITH DECORDICATION;  Surgeon: Genaro Davila III, MD;  Location: [facility] OR;  Service:    • THROMBECTOMY Left 02/16/2016    Thombectomy of left femoral graft, left leg arteriogram, left calf forward compartment fasciotomy-Dr. Jose Swann   • TOTAL HIP ARTHROPLASTY Left    • UPPER GASTROINTESTINAL ENDOSCOPY N/A 02/09/2016    Normal UGI-Dr. Ajay Parkinson   • UPPER GASTROINTESTINAL ENDOSCOPY N/A 11/28/2015    Small hiatal hernia, chronic gastritis: biopsied, non-bleeding angioectasias in the stomach: treated with argon plasma coagulation, multiple bleeding angioectasias in the dudenum: treated with argon plasma coagulation-Dr. Mykel Jaramillo   • VASCULAR SURGERY      MULTIPLE   • VENTRAL/INCISIONAL HERNIA REPAIR N/A 10/19/2017    Procedure: VENTRAL HERNIA  REPAIR WITH MESH AND BILATERAL COMPONENT SEPARATION;  Surgeon: Genaro Perrin MD;  Location: [facility] OR;  Service:    • WISDOM TOOTH EXTRACTION       Family History   Problem Relation Age of Onset   • Lung cancer Mother    • Heart disease Father    • Diabetes Father    • Hypertension Father    • Malig Hyperthermia Neg Hx        Current Outpatient Medications:   •  ALPRAZolam (XANAX) 1 MG tablet, Take 1 tablet by mouth 3 (Three) Times a Day As Needed for Anxiety., Disp: 90 tablet, Rfl: 0  •  amLODIPine (NORVASC) 10 MG tablet, Take 1 tablet by mouth Daily., Disp: 90 tablet, Rfl: 3  •  aspirin 81 MG chewable tablet, Chew 81 mg Every Night., Disp: , Rfl:   •  buPROPion XL (WELLBUTRIN XL) 300 MG 24 hr tablet, Take 1 tablet by mouth Daily., Disp: 90 tablet, Rfl: 3  •  Cholecalciferol (VITAMIN D3) 125 MCG (5000 UT) tablet tablet, Take 1 tablet by mouth Daily., Disp: 90 tablet, Rfl: 3  •  clopidogrel (PLAVIX) 75 MG tablet, Take 1 tablet by mouth Every Night., Disp: 90 tablet, Rfl: 3  •  Coenzyme Q10 400 MG capsule, Take 400 mg by mouth Every Evening., Disp: , Rfl:   •  FERROUS SULFATE PO, Take 130 mg by mouth Daily., Disp: , Rfl:   •  Fluticasone-Umeclidin-Vilant (TRELEGY ELLIPTA) 100-62.5-25 MCG/INH aerosol powder , Inhale 1 each Daily., Disp: 28 each, Rfl: 2  •  gabapentin (NEURONTIN) 300 MG capsule, Take 1 capsule by mouth 3 (Three) Times a Day., Disp: 90 capsule, Rfl: 2  •  isosorbide mononitrate (IMDUR) 30 MG 24 hr tablet, Take 1 tablet by mouth Every Morning., Disp: 90 tablet, Rfl: 3  •  levothyroxine (SYNTHROID, LEVOTHROID) 125 MCG tablet, Take 1 tablet by mouth Daily., Disp: 90 tablet, Rfl: 1  •  LUMIGAN 0.01 % ophthalmic drops, 1 DROP IN TO BOTH EYES AT BEDTIME BRAND MEDICALLY NECESSARY, Disp: , Rfl:   •  Multiple Vitamins-Minerals (MULTIVITAMIN ADULT PO), Take 1 tablet by mouth Daily., Disp: , Rfl:   •  NIACIN PO, Take 1 capsule by mouth Every Morning., Disp: , Rfl:   •  omeprazole (priLOSEC) 20 MG  "capsule, Take 1 capsule by mouth Every Morning., Disp: 90 capsule, Rfl: 3  •  simvastatin (ZOCOR) 40 MG tablet, Take 1 tablet by mouth Every Night., Disp: 90 tablet, Rfl: 3  •  Thiamine HCl (VITAMIN B-1 PO), Take 1 tablet by mouth Daily., Disp: , Rfl:   •  traMADol (ULTRAM) 50 MG tablet, Take 1 tablet by mouth 2 (Two) Times a Day., Disp: 60 tablet, Rfl: 0  Allergies   Allergen Reactions   • Augmentin [Amoxicillin-Pot Clavulanate] Hives and Rash     Social History     Tobacco Use   • Smoking status: Former Smoker     Packs/day: 1.00     Years: 30.00     Pack years: 30.00     Types: Cigarettes     Quit date:      Years since quittin.0   • Smokeless tobacco: Never Used   • Tobacco comment: caffeine - rarely   Substance Use Topics   • Alcohol use: No   • Drug use: Never          Objective   Physical Exam  Vitals:    21 0935   BP: 128/80   Pulse: 74   Temp: 97.1 °F (36.2 °C)   TempSrc: Temporal   SpO2: 98%   Weight: 89.2 kg (196 lb 9.6 oz)   Height: 175.3 cm (69\")     Body mass index is 29.03 kg/m².    Constitutional: NAD.  Eyes: EOMI. PERRLA. Normal conjunctiva.  Ear, nose, mouth, throat: Normal external ear canals and TMs bilaterally.  Cardiovascular: RRR. No murmurs. No right lower extremity edema b/l. Radial pulses 2+ bilaterally.  Pulmonary: CTA b/l. Good effort.  No expiratory wheezing.  Integumentary: No rashes or wounds on face or upper extremities.  Lymphatic: No anterior cervical lymphadenopathy.  Endocrine: No thyromegaly or palpable thyroid nodules.  Psychiatric: Normal affect. Normal thought content.  Gastrointestinal: Prominent bulging in the left lower abdominal wall especially with Valsalva, unable to appreciate definite defect, not reducible.     Assessment/Plan   Assessment and Plan  Pleasant 65-year-old male smoker with PVD status post bypass, CAD, moderate to severe COPD, hypertension, hyperlipidemia, CKD, anxiety, chronic lower back pain on high risk medications, GERD, hypothyroidism, " anemia, vitamin D and B12 deficiency, who presents for the following:    Diagnoses and all orders for this visit:    1. Status post above knee amputation, unspecified laterality (CMS/HCC) (Primary)  -     gabapentin (NEURONTIN) 300 MG capsule; Take 1 capsule by mouth 3 (Three) Times a Day.  Dispense: 90 capsule; Refill: 2    2. Gastroesophageal reflux disease  -     omeprazole (priLOSEC) 20 MG capsule; Take 1 capsule by mouth Every Morning.  Dispense: 90 capsule; Refill: 3  -     CBC & Differential    3. Essential hypertension  -     amLODIPine (NORVASC) 10 MG tablet; Take 1 tablet by mouth Daily.  Dispense: 90 tablet; Refill: 3    4. Vitamin D deficiency  -     Vitamin D 25 Hydroxy    5. Hypothyroidism, unspecified type    6. Hyperlipidemia, unspecified hyperlipidemia type  -     Comprehensive Metabolic Panel  -     Thyroid Panel With TSH  -     Lipid Panel    7. Vitamin B12 deficiency  -     Vitamin B12    8. Anxiety disorder, unspecified type    9. Long term prescription benzodiazepine use  -     ToxASSURE Select 13 (MW) - Urine, Clean Catch    10. Screening for malignant neoplasm of prostate  -     PSA Screen    Overall his problems are stable and he is on the appropriate therapy for CAD and COPD.  He is up-to-date on his preventative interventions.  He is on a high risk regimen of tramadol, alprazolam, gabapentin, which we are monitoring closely and his Adam, UDS, and contract are up-to-date.  He does appear to functionally benefit from this regimen even though it is not optimal.  I continue to breach the subject of reducing alprazolam dose as tolerated.    Blood work as above for routine monitoring.  Otherwise no major medication changes at this time.    Seth Hester MD  Family Medicine  O: [phone]  C: [phone]    Disclaimer: Parts of this note were dictated by speech recognition. Minor errors in transcription may be present. Please call if questions.

## 2021-01-10 LAB — DRUGS UR: NORMAL

## 2021-01-19 DIAGNOSIS — M54.50 CHRONIC MIDLINE LOW BACK PAIN WITHOUT SCIATICA: ICD-10-CM

## 2021-01-19 DIAGNOSIS — G89.29 CHRONIC MIDLINE LOW BACK PAIN WITHOUT SCIATICA: ICD-10-CM

## 2021-01-19 RX ORDER — TRAMADOL HYDROCHLORIDE 50 MG/1
50 TABLET ORAL 2 TIMES DAILY
Qty: 60 TABLET | Refills: 0 | Status: SHIPPED | OUTPATIENT
Start: 2021-01-19 | End: 2021-02-17 | Stop reason: SDUPTHER

## 2021-02-02 DIAGNOSIS — F41.9 ANXIETY DISORDER, UNSPECIFIED TYPE: ICD-10-CM

## 2021-02-02 RX ORDER — ALPRAZOLAM 1 MG/1
1 TABLET ORAL 3 TIMES DAILY PRN
Qty: 90 TABLET | Refills: 2 | Status: SHIPPED | OUTPATIENT
Start: 2021-02-02 | End: 2021-04-28 | Stop reason: SDUPTHER

## 2021-02-17 DIAGNOSIS — G89.29 CHRONIC MIDLINE LOW BACK PAIN WITHOUT SCIATICA: ICD-10-CM

## 2021-02-17 DIAGNOSIS — M54.50 CHRONIC MIDLINE LOW BACK PAIN WITHOUT SCIATICA: ICD-10-CM

## 2021-02-17 RX ORDER — TRAMADOL HYDROCHLORIDE 50 MG/1
50 TABLET ORAL 2 TIMES DAILY
Qty: 60 TABLET | Refills: 0 | Status: SHIPPED | OUTPATIENT
Start: 2021-02-17 | End: 2021-03-18 | Stop reason: SDUPTHER

## 2021-02-28 DIAGNOSIS — F17.200 SMOKES TOBACCO DAILY: ICD-10-CM

## 2021-02-28 DIAGNOSIS — F41.9 ANXIETY DISORDER, UNSPECIFIED TYPE: ICD-10-CM

## 2021-03-01 RX ORDER — BUPROPION HYDROCHLORIDE 300 MG/1
300 TABLET ORAL DAILY
Qty: 90 TABLET | Refills: 3 | Status: SHIPPED | OUTPATIENT
Start: 2021-03-01 | End: 2022-01-21

## 2021-03-01 RX ORDER — LEVOTHYROXINE SODIUM 0.12 MG/1
TABLET ORAL
Qty: 90 TABLET | Refills: 1 | Status: SHIPPED | OUTPATIENT
Start: 2021-03-01 | End: 2021-08-05

## 2021-03-18 DIAGNOSIS — M54.50 CHRONIC MIDLINE LOW BACK PAIN WITHOUT SCIATICA: ICD-10-CM

## 2021-03-18 DIAGNOSIS — G89.29 CHRONIC MIDLINE LOW BACK PAIN WITHOUT SCIATICA: ICD-10-CM

## 2021-03-18 RX ORDER — TRAMADOL HYDROCHLORIDE 50 MG/1
50 TABLET ORAL 2 TIMES DAILY
Qty: 60 TABLET | Refills: 0 | Status: SHIPPED | OUTPATIENT
Start: 2021-03-18 | End: 2021-04-15 | Stop reason: SDUPTHER

## 2021-03-19 RX ORDER — TRAMADOL HYDROCHLORIDE 50 MG/1
50 TABLET ORAL 2 TIMES DAILY
Qty: 60 TABLET | Refills: 0 | OUTPATIENT
Start: 2021-03-19

## 2021-04-02 DIAGNOSIS — Z89.619 STATUS POST ABOVE KNEE AMPUTATION, UNSPECIFIED LATERALITY (HCC): ICD-10-CM

## 2021-04-02 RX ORDER — GABAPENTIN 300 MG/1
300 CAPSULE ORAL 3 TIMES DAILY
Qty: 90 CAPSULE | Refills: 2 | Status: SHIPPED | OUTPATIENT
Start: 2021-04-02 | End: 2021-07-04 | Stop reason: SDUPTHER

## 2021-04-06 DIAGNOSIS — I73.9 PVD (PERIPHERAL VASCULAR DISEASE) (HCC): ICD-10-CM

## 2021-04-06 RX ORDER — CLOPIDOGREL BISULFATE 75 MG/1
TABLET ORAL
Qty: 90 TABLET | Refills: 3 | Status: SHIPPED | OUTPATIENT
Start: 2021-04-06 | End: 2022-03-14

## 2021-04-08 DIAGNOSIS — I73.9 PVD (PERIPHERAL VASCULAR DISEASE) (HCC): ICD-10-CM

## 2021-04-08 RX ORDER — SIMVASTATIN 40 MG
TABLET ORAL
Qty: 90 TABLET | Refills: 3 | Status: SHIPPED | OUTPATIENT
Start: 2021-04-08 | End: 2022-03-15

## 2021-04-15 DIAGNOSIS — I10 ESSENTIAL HYPERTENSION: ICD-10-CM

## 2021-04-15 DIAGNOSIS — G89.29 CHRONIC MIDLINE LOW BACK PAIN WITHOUT SCIATICA: ICD-10-CM

## 2021-04-15 DIAGNOSIS — M54.50 CHRONIC MIDLINE LOW BACK PAIN WITHOUT SCIATICA: ICD-10-CM

## 2021-04-15 RX ORDER — TRAMADOL HYDROCHLORIDE 50 MG/1
50 TABLET ORAL 2 TIMES DAILY
Qty: 60 TABLET | Refills: 0 | Status: SHIPPED | OUTPATIENT
Start: 2021-04-15 | End: 2021-05-13 | Stop reason: SDUPTHER

## 2021-04-15 RX ORDER — AMLODIPINE BESYLATE 10 MG/1
10 TABLET ORAL
Qty: 90 TABLET | Refills: 3 | Status: SHIPPED | OUTPATIENT
Start: 2021-04-15 | End: 2022-03-14

## 2021-04-28 DIAGNOSIS — F41.9 ANXIETY DISORDER, UNSPECIFIED TYPE: ICD-10-CM

## 2021-04-29 RX ORDER — ALPRAZOLAM 1 MG/1
1 TABLET ORAL 3 TIMES DAILY PRN
Qty: 90 TABLET | Refills: 2 | Status: SHIPPED | OUTPATIENT
Start: 2021-04-29 | End: 2021-07-23 | Stop reason: SDUPTHER

## 2021-05-04 ENCOUNTER — OFFICE VISIT (OUTPATIENT)
Dept: INTERNAL MEDICINE | Facility: CLINIC | Age: 66
End: 2021-05-04

## 2021-05-04 VITALS
TEMPERATURE: 99.1 F | OXYGEN SATURATION: 94 % | HEART RATE: 67 BPM | WEIGHT: 196.6 LBS | DIASTOLIC BLOOD PRESSURE: 74 MMHG | BODY MASS INDEX: 29.12 KG/M2 | HEIGHT: 69 IN | SYSTOLIC BLOOD PRESSURE: 158 MMHG

## 2021-05-04 DIAGNOSIS — F41.9 ANXIETY DISORDER, UNSPECIFIED TYPE: ICD-10-CM

## 2021-05-04 DIAGNOSIS — Z89.612 STATUS POST ABOVE-KNEE AMPUTATION OF LEFT LOWER EXTREMITY (HCC): ICD-10-CM

## 2021-05-04 DIAGNOSIS — M54.50 CHRONIC MIDLINE LOW BACK PAIN WITHOUT SCIATICA: ICD-10-CM

## 2021-05-04 DIAGNOSIS — N28.9 ABNORMAL KIDNEY FUNCTION: Primary | ICD-10-CM

## 2021-05-04 DIAGNOSIS — J44.9 CHRONIC OBSTRUCTIVE PULMONARY DISEASE, UNSPECIFIED COPD TYPE (HCC): Chronic | ICD-10-CM

## 2021-05-04 DIAGNOSIS — G89.29 CHRONIC MIDLINE LOW BACK PAIN WITHOUT SCIATICA: ICD-10-CM

## 2021-05-04 DIAGNOSIS — Z79.899 LONG TERM PRESCRIPTION BENZODIAZEPINE USE: ICD-10-CM

## 2021-05-04 DIAGNOSIS — Z12.2 ENCOUNTER FOR SCREENING FOR MALIGNANT NEOPLASM OF LUNG IN FORMER SMOKER WHO QUIT IN PAST 15 YEARS WITH 30 PACK YEAR HISTORY OR GREATER: ICD-10-CM

## 2021-05-04 DIAGNOSIS — G47.33 OSA TREATED WITH BIPAP: ICD-10-CM

## 2021-05-04 DIAGNOSIS — Z87.891 ENCOUNTER FOR SCREENING FOR MALIGNANT NEOPLASM OF LUNG IN FORMER SMOKER WHO QUIT IN PAST 15 YEARS WITH 30 PACK YEAR HISTORY OR GREATER: ICD-10-CM

## 2021-05-04 PROCEDURE — 99214 OFFICE O/P EST MOD 30 MIN: CPT | Performed by: FAMILY MEDICINE

## 2021-05-05 DIAGNOSIS — N18.32 STAGE 3B CHRONIC KIDNEY DISEASE (HCC): Primary | ICD-10-CM

## 2021-05-05 LAB
ALBUMIN SERPL-MCNC: 4.6 G/DL (ref 3.8–4.8)
ALBUMIN/GLOB SERPL: 1.6 {RATIO} (ref 1.2–2.2)
ALP SERPL-CCNC: 67 IU/L (ref 39–117)
ALT SERPL-CCNC: 11 IU/L (ref 0–44)
AST SERPL-CCNC: 13 IU/L (ref 0–40)
BILIRUB SERPL-MCNC: 0.3 MG/DL (ref 0–1.2)
BUN SERPL-MCNC: 29 MG/DL (ref 8–27)
BUN/CREAT SERPL: 16 (ref 10–24)
CALCIUM SERPL-MCNC: 10.1 MG/DL (ref 8.6–10.2)
CHLORIDE SERPL-SCNC: 101 MMOL/L (ref 96–106)
CO2 SERPL-SCNC: 26 MMOL/L (ref 20–29)
CREAT SERPL-MCNC: 1.84 MG/DL (ref 0.76–1.27)
GLOBULIN SER CALC-MCNC: 2.8 G/DL (ref 1.5–4.5)
GLUCOSE SERPL-MCNC: 92 MG/DL (ref 65–99)
POTASSIUM SERPL-SCNC: 5.3 MMOL/L (ref 3.5–5.2)
PROT SERPL-MCNC: 7.4 G/DL (ref 6–8.5)
SODIUM SERPL-SCNC: 143 MMOL/L (ref 134–144)

## 2021-05-05 NOTE — PROGRESS NOTES
Date of Encounter: 2021  Patient: Sim Agustin,  1955    Subjective   History of Presenting Illness  Chief complaint: General follow-up    No acute concerns.    Recent abnormal kidney function on last lab work 2021.  Recheck today.  Patient has not been using NSAIDs.  Continues to use tramadol, gabapentin for chronic back pain and phantom limb syndrome.    Recent new prosthetic, with much better fit.  From [employer].    PRISCILLA adherent to BiPAP.    COPD stable, partner continues to smoke and not interested in cessation.    Anxiety stable on long-term alprazolam with close monitoring.    Due for lung cancer screening    The following portions of the patient's history were reviewed and updated as appropriate: allergies, current medications, past family history, past medical history, past social history, past surgical history and problem list.    Patient Active Problem List   Diagnosis   • H/O angiodysplasia of intestinal tract   • COPD (chronic obstructive pulmonary disease) (CMS/[facility])   • S/P colectomy   • Status post above-knee amputation of left lower extremity (CMS/[facility])   • CAD (coronary artery disease)   • PVD (peripheral vascular disease) (CMS/[facility])   • Essential hypertension   • Cardiomyopathy, unspecified (CMS/[facility])   • PRISCILLA treated with BiPAP   • Anxiety disorder   • Chronic midline low back pain without sciatica   • Long term prescription benzodiazepine use   • History of bradycardia   • Hypothyroidism   • Vitamin B12 deficiency   • Vitamin D deficiency   • Iron deficiency   • History of smoking 30 or more pack years   • High risk medication use   • DNR (do not resuscitate)   • CKD (chronic kidney disease)   • Elevated hematocrit     Past Medical History:   Diagnosis Date   • Acute respiratory failure with hypoxia and hypercarbia (CMS/[facility]) 3/11/2019   • Acute upper respiratory infection 2013   • Anemia     per [place] database   • ARF (acute renal failure) (CMS/[facility])  3/4/2016   • Atelectasis, left 3/4/2016   • Bradycardia 5/23/2019   • CAD (coronary artery disease) 4/4/2016   • Chronic obstructive pulmonary disease with acute exacerbation (CMS/[facility]) 2/6/2013   • Colon polyps    • Compartment syndrome (CMS/[facility])     AFTER ILIAC SURGERY. ABOVE KNEE AMPUTATION   • COPD (chronic obstructive pulmonary disease) (CMS/[facility])    • COPD with hypoxia (CMS/[facility]) 5/23/2019   • Cough     SINCE APRIL WITH PNEUMONIA.    • Disease of thyroid gland     HYPOTHYRODISM   • Diverticulosis    • Employs prosthetic leg    • ESRD (end stage renal disease) on dialysis (CMS/[facility]) 4/6/2016   • Fracture of patella 3/3/2015   • History of acute renal failure    • History of CHF (congestive heart failure)    • History of GI bleed 02/2016    AORTODUOD FISTULA   • History of sepsis 02/2016   • History of transfusion     MULTIPLE, 2016   • Hyperkalemia 4/11/2016   • Hypertension    • Hypotension    • Hypotension 3/4/2016   • Nonischemic cardiomyopathy (CMS/[facility])    • Nosocomial pneumonia 4/6/2016   • Phantom limb pain (CMS/[facility])     SL, WITH LEFT ABOUVE KNEE   • Pleural effusion on left 4/11/2016   • Pneumonia     SPRING 2016  AFTER SURGERY   • PVD (peripheral vascular disease) (CMS/[facility])    • S/P thoracentesis 4/11/2016   • Sepsis, unspecified organism (CMS/[facility]) 4/5/2016     Past Surgical History:   Procedure Laterality Date   • ABOVE KNEE AMPUTATION Left 3/16/2016    Procedure: LT AMPUTATION ABOVE KNEE;  Surgeon: Jose Swann MD;  Location: [facility];  Service:    • ARTERIAL THROMBECTOMY  2001    throbectomy of arterial graft   • AXILLARY FEMORAL BYPASS Right 02/15/2016    Exploratory lapartomy, repair aortoduodenal fistula, resection infected aortobifemoral bypass graft, axillobi-superficial femoral artery Wiley-Aneudy bypass graft from right axillary artery, Repair of duodenotomy 4th portion duodenum-Dr. Jose Swann, Dr. Genaro Perrin   • BRONCHOSCOPY Left 03/04/2016    Dr. NATALIIA Tate   •  BRONCHOSCOPY RIGID / FLEXIBLE N/A 03/03/2016    Dr. NATALIIA Tate   • BRONCHOSCOPY RIGID / FLEXIBLE N/A 02/26/2016    Dr. NATALIIA Tate   • CARDIAC CATHETERIZATION N/A 3/18/2019    Procedure: Right and Left Heart Cath;  Surgeon: Nina Storey MD;  Location: [facility] CATH INVASIVE LOCATION;  Service: Cardiovascular   • CATARACT EXTRACTION WITH INTRAOCULAR LENS IMPLANT Bilateral    • COLON RESECTION WITH COLOSTOMY Left 02/28/2016    Exploratory laparotomy with open left hemicolectomy, end-colostomy, G-tube and J-tube-Dr. Endy Chaney   • COLONOSCOPY N/A 2015    Dr. Laughlin   • COLONOSCOPY N/A 10/12/2016    Procedure: COLONOSCOPY TO 20 CM AND PER STOMA TO 30CM;  Surgeon: Genaro Perrin MD;  Location: [facility] ENDOSCOPY;  Service:    • COLONOSCOPY N/A 10/21/2016    Procedure: COLONOSCOPY DONE AT BEDSIDE ONTO CECEM;  Surgeon: Genaro Perrin MD;  Location: [facility] ENDOSCOPY;  Service:    • COLONOSCOPY N/A 02/10/2016    Diverticulosis in the entire examined colon, non-bleeding internal hemorrhoids-Dr. Taylor Pina   • COLONOSCOPY N/A 02/11/2016    Poor prep, blood in the entire examined colon, normal ileum-Dr. Taylor Pina   • COLONOSCOPY W/ POLYPECTOMY N/A 07/27/2015    Normal ileum, diverticulosis in the sigmoid colon: resected and retrieved, one 6 mm polyp in the descending colon: resected and retrieved, the distal rectum and anal verge are normal on retroflexion view-Dr. Miguel Ángel Laughlin   • COLOSTOMY CLOSURE N/A 10/13/2016    Procedure: COLOSTOMY TAKEDOWN OR CLOSURE, APPENDECTOMY;  Surgeon: Genaro Perrin MD;  Location: [facility] OR;  Service:    • DEBRIDEMENT LEG Left 03/01/2016    Excisional debridement of the skin, subcutantous tissue, tendon and muscle left calf-Dr. Jose Swann   • DEBRIDEMENT LEG Left 02/25/2016    Excisional debridement full-thcikness skin and subcutaneous tissue and tendon and muscle, left medial and lateral calf muscles-Dr. Jose Swann   • ENDOSCOPY N/A 10/21/2016     Procedure: ESOPHAGOGASTRODUODENOSCOPY DONE AT BEDSIDE;  Surgeon: Genaro Perrin MD;  Location: [facility] ENDOSCOPY;  Service:    • ENDOSCOPY AND COLONOSCOPY N/A 02/28/2016    EGD and Colonoscopy with Candy ink injection-Dr. Endy Chaney   • ENTEROSCOPY SMALL BOWEL N/A 02/11/2016    Normal esophagus, normal stomach, normal duodenum, portion of the jejunum normal-Dr. Taylor Pina   • ILIAC ARTERY - FEMORAL ARTERY BYPASS GRAFT Bilateral 2002   • ILIAC ARTERY STENT Bilateral 2001   • INSERTION AND REMOVAL PERITONEAL DIALYSIS CATHETER Right 02/29/2016    Exchange right jugular 16 cm Mahurkar dialysis catheter-Dr. Jose Swann   • INSERTION PERITONEAL DIALYSIS CATHETER Left 03/01/2016    Ultrasound-guided access of the left jugular vein, 23 cm left jugular palindrome dialysis catheter placement-Dr. Jose Swann   • INSERTION PERITONEAL DIALYSIS CATHETER Right 02/18/2016    Right jugular Mahrkar catherer placement-Dr. Jose Swann   • JOINT REPLACEMENT Left    • THORACOSCOPY Left 4/18/2016    Procedure: LT THORACOSCOPY VIDEO ASSISTED WITH DECORDICATION;  Surgeon: Genaro Davila III, MD;  Location: [facility] OR;  Service:    • THROMBECTOMY Left 02/16/2016    Thombectomy of left femoral graft, left leg arteriogram, left calf forward compartment fasciotomy-Dr. Jose Swann   • TOTAL HIP ARTHROPLASTY Left    • UPPER GASTROINTESTINAL ENDOSCOPY N/A 02/09/2016    Normal UGI-Dr. Ajay Parkinson   • UPPER GASTROINTESTINAL ENDOSCOPY N/A 11/28/2015    Small hiatal hernia, chronic gastritis: biopsied, non-bleeding angioectasias in the stomach: treated with argon plasma coagulation, multiple bleeding angioectasias in the dudenum: treated with argon plasma coagulation-Dr. Mykel Jaramillo   • VASCULAR SURGERY      MULTIPLE   • VENTRAL/INCISIONAL HERNIA REPAIR N/A 10/19/2017    Procedure: VENTRAL HERNIA REPAIR WITH MESH AND BILATERAL COMPONENT SEPARATION;  Surgeon: Genaro Perrin MD;  Location: [facility] MAIN OR;   Service:    • WISDOM TOOTH EXTRACTION       Family History   Problem Relation Age of Onset   • Lung cancer Mother    • Heart disease Father    • Diabetes Father    • Hypertension Father    • Malig Hyperthermia Neg Hx        Current Outpatient Medications:   •  ALPRAZolam (XANAX) 1 MG tablet, Take 1 tablet by mouth 3 (Three) Times a Day As Needed for Anxiety., Disp: 90 tablet, Rfl: 2  •  amLODIPine (NORVASC) 10 MG tablet, Take 1 tablet by mouth Daily., Disp: 90 tablet, Rfl: 3  •  aspirin 81 MG chewable tablet, Chew 81 mg Every Night., Disp: , Rfl:   •  buPROPion XL (WELLBUTRIN XL) 300 MG 24 hr tablet, TAKE 1 TABLET BY MOUTH DAILY, Disp: 90 tablet, Rfl: 3  •  Cholecalciferol (VITAMIN D3) 125 MCG (5000 UT) tablet tablet, Take 1 tablet by mouth Daily., Disp: 90 tablet, Rfl: 3  •  clopidogrel (PLAVIX) 75 MG tablet, TAKE 1 TABLET BY MOUTH DAILY AT NIGHT, Disp: 90 tablet, Rfl: 3  •  Coenzyme Q10 400 MG capsule, Take 400 mg by mouth Every Evening., Disp: , Rfl:   •  FERROUS SULFATE PO, Take 130 mg by mouth Daily., Disp: , Rfl:   •  Fluticasone-Umeclidin-Vilant (TRELEGY ELLIPTA) 100-62.5-25 MCG/INH aerosol powder , Inhale 1 each Daily., Disp: 28 each, Rfl: 2  •  gabapentin (NEURONTIN) 300 MG capsule, Take 1 capsule by mouth 3 (Three) Times a Day., Disp: 90 capsule, Rfl: 2  •  isosorbide mononitrate (IMDUR) 30 MG 24 hr tablet, Take 1 tablet by mouth Every Morning., Disp: 90 tablet, Rfl: 3  •  levothyroxine (SYNTHROID, LEVOTHROID) 125 MCG tablet, TAKE 1 TABLET BY MOUTH EVERY DAY, Disp: 90 tablet, Rfl: 1  •  LUMIGAN 0.01 % ophthalmic drops, 1 DROP IN TO BOTH EYES AT BEDTIME BRAND MEDICALLY NECESSARY, Disp: , Rfl:   •  Multiple Vitamins-Minerals (MULTIVITAMIN ADULT PO), Take 1 tablet by mouth Daily., Disp: , Rfl:   •  NIACIN PO, Take 1 capsule by mouth Every Morning., Disp: , Rfl:   •  omeprazole (priLOSEC) 20 MG capsule, Take 1 capsule by mouth Every Morning., Disp: 90 capsule, Rfl: 3  •  simvastatin (ZOCOR) 40 MG tablet, TAKE  "1 TABLET BY MOUTH DAILY AT NIGHT, Disp: 90 tablet, Rfl: 3  •  Thiamine HCl (VITAMIN B-1 PO), Take 1 tablet by mouth Daily., Disp: , Rfl:   •  traMADol (ULTRAM) 50 MG tablet, Take 1 tablet by mouth 2 (Two) Times a Day., Disp: 60 tablet, Rfl: 0  Allergies   Allergen Reactions   • Augmentin [Amoxicillin-Pot Clavulanate] Hives and Rash     Social History     Tobacco Use   • Smoking status: Former Smoker     Packs/day: 1.00     Years: 30.00     Pack years: 30.00     Types: Cigarettes     Quit date:      Years since quittin.3   • Smokeless tobacco: Never Used   • Tobacco comment: caffeine - rarely   Substance Use Topics   • Alcohol use: No   • Drug use: Never          Objective   Physical Exam  Vitals:    21 0935   BP: 158/74   Pulse: 67   Temp: 99.1 °F (37.3 °C)   TempSrc: Temporal   SpO2: 94%   Weight: 89.2 kg (196 lb 9.6 oz)   Height: 175.3 cm (69\")     Body mass index is 29.03 kg/m².    Constitutional: NAD.  Eyes: EOMI. PERRLA. Normal conjunctiva.  Ear, nose, mouth, throat: Normal external ear canals and TMs bilaterally.  Cardiovascular: RRR. No murmurs.  No edema in the right lower extremity. Radial pulses 2+ bilaterally.  Pulmonary: CTA b/l. Good effort.  No wheezing.  Integumentary: No rashes or wounds on face or upper extremities.  Lymphatic: No anterior cervical lymphadenopathy.  Endocrine: No thyromegaly or palpable thyroid nodules.  Psychiatric: Normal affect. Normal thought content.     Assessment/Plan   Assessment and Plan  Pleasant 65-year-old male smoker with PVD status post bypass, CAD, moderate to severe COPD, status post left AKA, hypertension, hyperlipidemia, CKD, anxiety, chronic lower back pain on high risk medications, GERD, hypothyroidism, anemia, vitamin D and B12 deficiency, who presents for the following:    Diagnoses and all orders for this visit:    1. Abnormal kidney function (Primary): Recheck today  -     Comprehensive Metabolic Panel    2. PRISCILLA treated with BiPAP: " Adherent    3. Chronic obstructive pulmonary disease, unspecified COPD type (CMS/HCC): Stable examination, continue to follow with pulmonology    4. Status post above-knee amputation of left lower extremity (CMS/HCC): New prosthesis with better fit    5. Anxiety disorder, unspecified type  6. Long term prescription benzodiazepine use: Adam AGUILERA reviewed.  We are monitoring this carefully.    7. Chronic midline low back pain without sciatica: Stable on tramadol. Per #6    8. Encounter for screening for malignant neoplasm of lung in former smoker who quit in past 15 years with 30 pack year history or greater  -      CT Chest Low Dose Cancer Screening WO    Follow-up for annual physical in 4 months    Seth Hester MD  Family Medicine  O: [phone]  C: [phone]    Disclaimer: Parts of this note were dictated by speech recognition. Minor errors in transcription may be present. Please call if questions.

## 2021-05-06 ENCOUNTER — TELEPHONE (OUTPATIENT)
Dept: INTERNAL MEDICINE | Facility: CLINIC | Age: 66
End: 2021-05-06

## 2021-05-06 NOTE — TELEPHONE ENCOUNTER
Caller: Sim Agustin    Relationship: Self    Best call back number:     What is the best time to reach you: ANYTIME    Who are you requesting to speak with (clinical staff, provider,  specific staff member):     Do you know the name of the person who called:     What was the call regarding: DR MOHAMUD CAGLE BEING REFERRED TO HIM BY DR EAST.     Do you require a callback: YES

## 2021-05-12 DIAGNOSIS — I73.9 PVD (PERIPHERAL VASCULAR DISEASE) (HCC): ICD-10-CM

## 2021-05-12 RX ORDER — ISOSORBIDE MONONITRATE 30 MG/1
TABLET, EXTENDED RELEASE ORAL
Qty: 90 TABLET | Refills: 3 | Status: SHIPPED | OUTPATIENT
Start: 2021-05-12 | End: 2022-04-11

## 2021-05-13 DIAGNOSIS — G89.29 CHRONIC MIDLINE LOW BACK PAIN WITHOUT SCIATICA: ICD-10-CM

## 2021-05-13 DIAGNOSIS — M54.50 CHRONIC MIDLINE LOW BACK PAIN WITHOUT SCIATICA: ICD-10-CM

## 2021-05-13 RX ORDER — TRAMADOL HYDROCHLORIDE 50 MG/1
50 TABLET ORAL 2 TIMES DAILY
Qty: 60 TABLET | Refills: 0 | Status: SHIPPED | OUTPATIENT
Start: 2021-05-13 | End: 2021-06-09 | Stop reason: SDUPTHER

## 2021-05-19 ENCOUNTER — HOSPITAL ENCOUNTER (OUTPATIENT)
Dept: CT IMAGING | Facility: HOSPITAL | Age: 66
Discharge: HOME | End: 2021-05-19

## 2021-05-19 ENCOUNTER — HOSPITAL ENCOUNTER (OUTPATIENT)
Dept: ULTRASOUND IMAGING | Facility: HOSPITAL | Age: 66
Discharge: HOME | End: 2021-05-19

## 2021-05-19 DIAGNOSIS — N18.32 STAGE 3B CHRONIC KIDNEY DISEASE (HCC): ICD-10-CM

## 2021-05-19 PROCEDURE — 76775 US EXAM ABDO BACK WALL LIM: CPT

## 2021-05-19 PROCEDURE — 71271 CT THORAX LUNG CANCER SCR C-: CPT

## 2021-05-21 ENCOUNTER — TELEPHONE (OUTPATIENT)
Dept: INTERNAL MEDICINE | Facility: CLINIC | Age: 66
End: 2021-05-21

## 2021-05-21 NOTE — TELEPHONE ENCOUNTER
----- Message from Christelle Gao MD sent at 5/21/2021  7:54 AM EDT -----  Please let him know that CT of the chest shows no change from the one done in 2020.  Radiologist recommends repeat screening in one year.

## 2021-06-09 DIAGNOSIS — M54.50 CHRONIC MIDLINE LOW BACK PAIN WITHOUT SCIATICA: ICD-10-CM

## 2021-06-09 DIAGNOSIS — G89.29 CHRONIC MIDLINE LOW BACK PAIN WITHOUT SCIATICA: ICD-10-CM

## 2021-06-09 RX ORDER — TRAMADOL HYDROCHLORIDE 50 MG/1
50 TABLET ORAL 2 TIMES DAILY
Qty: 60 TABLET | Refills: 0 | Status: SHIPPED | OUTPATIENT
Start: 2021-06-09 | End: 2021-07-06 | Stop reason: SDUPTHER

## 2021-07-04 DIAGNOSIS — Z89.619 STATUS POST ABOVE KNEE AMPUTATION, UNSPECIFIED LATERALITY (HCC): ICD-10-CM

## 2021-07-06 DIAGNOSIS — M54.50 CHRONIC MIDLINE LOW BACK PAIN WITHOUT SCIATICA: ICD-10-CM

## 2021-07-06 DIAGNOSIS — G89.29 CHRONIC MIDLINE LOW BACK PAIN WITHOUT SCIATICA: ICD-10-CM

## 2021-07-06 RX ORDER — GABAPENTIN 300 MG/1
300 CAPSULE ORAL 3 TIMES DAILY
Qty: 90 CAPSULE | Refills: 2 | Status: SHIPPED | OUTPATIENT
Start: 2021-07-06 | End: 2021-09-29 | Stop reason: SDUPTHER

## 2021-07-07 RX ORDER — TRAMADOL HYDROCHLORIDE 50 MG/1
50 TABLET ORAL 2 TIMES DAILY
Qty: 60 TABLET | Refills: 0 | Status: SHIPPED | OUTPATIENT
Start: 2021-07-07 | End: 2021-08-05 | Stop reason: SDUPTHER

## 2021-07-23 DIAGNOSIS — F41.9 ANXIETY DISORDER, UNSPECIFIED TYPE: ICD-10-CM

## 2021-07-23 RX ORDER — ALPRAZOLAM 1 MG/1
1 TABLET ORAL 3 TIMES DAILY PRN
Qty: 90 TABLET | Refills: 2 | Status: SHIPPED | OUTPATIENT
Start: 2021-07-23 | End: 2021-10-15 | Stop reason: SDUPTHER

## 2021-08-05 DIAGNOSIS — G89.29 CHRONIC MIDLINE LOW BACK PAIN WITHOUT SCIATICA: ICD-10-CM

## 2021-08-05 DIAGNOSIS — M54.50 CHRONIC MIDLINE LOW BACK PAIN WITHOUT SCIATICA: ICD-10-CM

## 2021-08-05 RX ORDER — LEVOTHYROXINE SODIUM 0.12 MG/1
TABLET ORAL
Qty: 90 TABLET | Refills: 1 | Status: SHIPPED | OUTPATIENT
Start: 2021-08-05 | End: 2022-01-19

## 2021-08-06 RX ORDER — TRAMADOL HYDROCHLORIDE 50 MG/1
50 TABLET ORAL 2 TIMES DAILY
Qty: 60 TABLET | Refills: 0 | Status: SHIPPED | OUTPATIENT
Start: 2021-08-06 | End: 2021-09-01 | Stop reason: SDUPTHER

## 2021-08-23 ENCOUNTER — DOCUMENTATION (OUTPATIENT)
Dept: INTERNAL MEDICINE | Facility: HOSPITAL | Age: 66
End: 2021-08-23

## 2021-09-01 DIAGNOSIS — M54.50 CHRONIC MIDLINE LOW BACK PAIN WITHOUT SCIATICA: ICD-10-CM

## 2021-09-01 DIAGNOSIS — G89.29 CHRONIC MIDLINE LOW BACK PAIN WITHOUT SCIATICA: ICD-10-CM

## 2021-09-01 RX ORDER — TRAMADOL HYDROCHLORIDE 50 MG/1
50 TABLET ORAL 2 TIMES DAILY
Qty: 60 TABLET | Refills: 0 | Status: SHIPPED | OUTPATIENT
Start: 2021-09-05 | End: 2021-09-29 | Stop reason: SDUPTHER

## 2021-09-09 ENCOUNTER — OFFICE VISIT (OUTPATIENT)
Dept: INTERNAL MEDICINE | Facility: CLINIC | Age: 66
End: 2021-09-09

## 2021-09-09 VITALS
SYSTOLIC BLOOD PRESSURE: 122 MMHG | DIASTOLIC BLOOD PRESSURE: 74 MMHG | OXYGEN SATURATION: 96 % | HEIGHT: 69 IN | HEART RATE: 88 BPM | TEMPERATURE: 97.7 F | BODY MASS INDEX: 28.76 KG/M2 | WEIGHT: 194.2 LBS

## 2021-09-09 DIAGNOSIS — G47.33 OSA TREATED WITH BIPAP: ICD-10-CM

## 2021-09-09 DIAGNOSIS — Z66 DNR (DO NOT RESUSCITATE): ICD-10-CM

## 2021-09-09 DIAGNOSIS — Z87.891 HISTORY OF SMOKING 30 OR MORE PACK YEARS: ICD-10-CM

## 2021-09-09 DIAGNOSIS — E78.5 HYPERLIPIDEMIA, UNSPECIFIED HYPERLIPIDEMIA TYPE: ICD-10-CM

## 2021-09-09 DIAGNOSIS — M54.50 CHRONIC MIDLINE LOW BACK PAIN WITHOUT SCIATICA: ICD-10-CM

## 2021-09-09 DIAGNOSIS — I42.9 CARDIOMYOPATHY, UNSPECIFIED TYPE (HCC): ICD-10-CM

## 2021-09-09 DIAGNOSIS — E61.1 IRON DEFICIENCY: ICD-10-CM

## 2021-09-09 DIAGNOSIS — K21.9 GASTROESOPHAGEAL REFLUX DISEASE, UNSPECIFIED WHETHER ESOPHAGITIS PRESENT: ICD-10-CM

## 2021-09-09 DIAGNOSIS — N18.31 STAGE 3A CHRONIC KIDNEY DISEASE (HCC): ICD-10-CM

## 2021-09-09 DIAGNOSIS — E53.8 VITAMIN B12 DEFICIENCY: ICD-10-CM

## 2021-09-09 DIAGNOSIS — Z00.00 ENCOUNTER FOR SUBSEQUENT ANNUAL WELLNESS VISIT (AWV) IN MEDICARE PATIENT: Primary | ICD-10-CM

## 2021-09-09 DIAGNOSIS — F41.9 ANXIETY DISORDER, UNSPECIFIED TYPE: ICD-10-CM

## 2021-09-09 DIAGNOSIS — Z90.49 S/P COLECTOMY: ICD-10-CM

## 2021-09-09 DIAGNOSIS — E55.9 VITAMIN D DEFICIENCY: ICD-10-CM

## 2021-09-09 DIAGNOSIS — G89.29 CHRONIC MIDLINE LOW BACK PAIN WITHOUT SCIATICA: ICD-10-CM

## 2021-09-09 DIAGNOSIS — Z89.612 STATUS POST ABOVE-KNEE AMPUTATION OF LEFT LOWER EXTREMITY (HCC): ICD-10-CM

## 2021-09-09 DIAGNOSIS — I73.9 PVD (PERIPHERAL VASCULAR DISEASE) (HCC): ICD-10-CM

## 2021-09-09 DIAGNOSIS — Z79.899 LONG TERM PRESCRIPTION BENZODIAZEPINE USE: ICD-10-CM

## 2021-09-09 DIAGNOSIS — I10 ESSENTIAL HYPERTENSION: ICD-10-CM

## 2021-09-09 DIAGNOSIS — I25.10 CORONARY ARTERY DISEASE INVOLVING NATIVE CORONARY ARTERY OF NATIVE HEART WITHOUT ANGINA PECTORIS: ICD-10-CM

## 2021-09-09 DIAGNOSIS — E03.9 HYPOTHYROIDISM, UNSPECIFIED TYPE: ICD-10-CM

## 2021-09-09 DIAGNOSIS — J44.9 CHRONIC OBSTRUCTIVE PULMONARY DISEASE, UNSPECIFIED COPD TYPE (HCC): Chronic | ICD-10-CM

## 2021-09-09 PROBLEM — R71.8 ELEVATED HEMATOCRIT: Status: RESOLVED | Noted: 2021-01-06 | Resolved: 2021-09-09

## 2021-09-09 PROCEDURE — G0439 PPPS, SUBSEQ VISIT: HCPCS | Performed by: FAMILY MEDICINE

## 2021-09-09 RX ORDER — LATANOPROST 50 UG/ML
1 SOLUTION/ DROPS OPHTHALMIC
COMMUNITY
Start: 2021-07-14

## 2021-09-09 NOTE — PROGRESS NOTES
Date of Encounter: 2021  Patient: Sim Agustin,  1955    SUBSEQUENT MEDICARE WELLNESS VISIT  Subjective   History of Presenting Illness  Chief complaint: Medicare wellness    Patient has an area on his stump that he is concerned for being a pressure ulcer.  It is uncomfortable when he pushes on it but otherwise does not cause him problems day-to-day.  Recently got his prosthesis refit a few months ago so unsure why he would be having this problem.    Currently feels his regimen for chronic pain is stable and does not want to make any changes.    Generalized anxiety disorder with significant home stress, in particular marital problems and potential pending separation, wants to hold benzodiazepine prescription at current dose if possible.    .    Smokes 1 to 2 cigarettes/day, over 30-pack-year history.  Does not drink alcohol.  Does not exercise.  Diet described as average. Last lung cancer screening 1 year ago.  Colonoscopy 2016 with 10-year interval.  Disabled.    Up-to-date on vaccinations with the exception of Td.  Does not have a living will, we have discussed this in the past and provided paperwork, would like to be DNR.    Pain  In the past 7 days, how much pain have you felt? -5/10    Review of Systems:  Negative for fever, congestion, chest pain upon exertion, shortness of breath, vision changes, vomiting, dysuria, lymphadenopathy, muscle weakness, numbness, mood changes, rashes.    Health Risk Assessment:    Recent Hospitalizations:  No hospitalization(s) within the last year.    Current Medical Providers:  Patient Care Team:  Seth Hester MD as PCP - General (Family Medicine)  Jose Swann MD as Consulting Physician (Vascular Surgery)  Krystal Ocampo MD as Consulting Physician (Cardiology)  Ishmael Tate MD as Consulting Physician (Pulmonary Disease)    Smoking Status:  Social History     Tobacco Use   Smoking Status Current Some Day Smoker   • Packs/day: 0.25   •  Years: 30.00   • Pack years: 7.50   • Types: Cigarettes   • Last attempt to quit: 2016   • Years since quittin.6   Smokeless Tobacco Never Used   Tobacco Comment    caffeine - rarely       Alcohol Consumption:  Social History     Substance and Sexual Activity   Alcohol Use No       Depression Screen:   PHQ-2/PHQ-9 Depression Screening 2021   Little interest or pleasure in doing things 0   Feeling down, depressed, or hopeless 0   Total Score 0     I spent more than 16 minutes asking patient questions, counseling and documenting in the chart    Fall Risk Screen:  LEDA Fall Risk Assessment was completed, and patient is at MODERATE risk for falls. Assessment completed on:2021    Health Habits and Functional and Cognitive Screening:  Functional & Cognitive Status 2021   Do you have difficulty preparing food and eating? No   Do you have difficulty bathing yourself, getting dressed or grooming yourself? No   Do you have difficulty using the toilet? No   Do you have difficulty moving around from place to place? No   Do you have trouble with steps or getting out of a bed or a chair? No   Do you need help using the phone?  No   Are you deaf or do you have serious difficulty hearing?  No   Do you need help with transportation? No   Do you need help shopping? No   Do you need help preparing meals?  No   Do you need help with housework?  No   Do you need help with laundry? No   Do you need help taking your medications? No   Do you need help managing money? No   Do you ever drive or ride in a car without wearing a seat belt? No   Do you have difficulty concentrating, remembering or making decisions? No       Does the patient have evidence of cognitive impairment? No    Aspirin use counseling:Taking ASA appropriately as indicated    Recent Lab Results:        Age-appropriate Screening Schedule:  Refer to the list below for future screening recommendations based on patient's age, sex and/or medical conditions.  Orders for these recommended tests are listed in the plan section. The patient has been provided with a written plan.    Health Maintenance   Topic Date Due   • TDAP/TD VACCINES (1 - Tdap) Never done   • INFLUENZA VACCINE  10/01/2021   • LIPID PANEL  01/05/2022   • ZOSTER VACCINE  Completed       Compared to one year ago, the patient feels his physical health is the same.  Compared to one year ago, the patient feels his mental health is the same.    Reviewed chart for potential of high risk medication in the elderly: yes  Reviewed chart for potential of harmful drug interactions in the elderly:yes    Advanced Care Planning:  Advance Care Planning   ACP discussion was held with the patient during this visit. Patient does not have an advance directive, information provided.    The following portions of the patient's history were reviewed and updated as appropriate: allergies, current medications, past family history, past medical history, past social history, past surgical history and problem list.    Patient Active Problem List   Diagnosis   • H/O angiodysplasia of intestinal tract   • COPD (chronic obstructive pulmonary disease) (CMS/[facility])   • S/P colectomy   • Status post above-knee amputation of left lower extremity (CMS/[facility])   • CAD (coronary artery disease)   • PVD (peripheral vascular disease) (CMS/[facility])   • Essential hypertension   • Cardiomyopathy, unspecified (CMS/[facility])   • PRISCILLA treated with BiPAP   • Anxiety disorder   • Chronic midline low back pain without sciatica   • Long term prescription benzodiazepine use   • History of bradycardia   • Hypothyroidism   • Vitamin B12 deficiency   • Vitamin D deficiency   • Iron deficiency   • History of smoking 30 or more pack years   • High risk medication use   • DNR (do not resuscitate)   • CKD (chronic kidney disease)   • Hyperlipidemia     Past Medical History:   Diagnosis Date   • Acute respiratory failure with hypoxia and hypercarbia (CMS/[facility]) 3/11/2019   • Acute  upper respiratory infection 2/6/2013   • Anemia     per [facility] database   • ARF (acute renal failure) (CMS/[facility]) 3/4/2016   • Atelectasis, left 3/4/2016   • Bradycardia 5/23/2019   • CAD (coronary artery disease) 4/4/2016   • Chronic obstructive pulmonary disease with acute exacerbation (CMS/[facility]) 2/6/2013   • Colon polyps    • Compartment syndrome (CMS/[facility])     AFTER ILIAC SURGERY. ABOVE KNEE AMPUTATION   • COPD (chronic obstructive pulmonary disease) (CMS/[facility])    • COPD with hypoxia (CMS/[facility]) 5/23/2019   • Cough     SINCE APRIL WITH PNEUMONIA.    • Disease of thyroid gland     HYPOTHYRODISM   • Diverticulosis    • Elevated hematocrit 1/6/2021   • Employs prosthetic leg    • ESRD (end stage renal disease) on dialysis (CMS/[facility]) 4/6/2016   • Fracture of patella 3/3/2015   • History of acute renal failure    • History of CHF (congestive heart failure)    • History of GI bleed 02/2016    AORTODUOD FISTULA   • History of sepsis 02/2016   • History of transfusion     MULTIPLE, 2016   • Hyperkalemia 4/11/2016   • Hypertension    • Hypotension    • Hypotension 3/4/2016   • Nonischemic cardiomyopathy (CMS/[facility])    • Nosocomial pneumonia 4/6/2016   • Phantom limb pain (CMS/[facility])     SL, WITH LEFT ABOUVE KNEE   • Pleural effusion on left 4/11/2016   • Pneumonia     SPRING 2016  AFTER SURGERY   • PVD (peripheral vascular disease) (CMS/[facility])    • S/P thoracentesis 4/11/2016   • Sepsis, unspecified organism (CMS/[facility]) 4/5/2016     Past Surgical History:   Procedure Laterality Date   • ABOVE KNEE AMPUTATION Left 3/16/2016    Procedure: LT AMPUTATION ABOVE KNEE;  Surgeon: Jose Swann MD;  Location: [facility];  Service:    • ARTERIAL THROMBECTOMY  2001    throbectomy of arterial graft   • AXILLARY FEMORAL BYPASS Right 02/15/2016    Exploratory lapartomy, repair aortoduodenal fistula, resection infected aortobifemoral bypass graft, axillobi-superficial femoral artery [place]-Aneudy bypass graft from right axillary artery, Repair  of duodenotomy 4th portion duodenum-Dr. Jose Swann, Dr. Genaro Perrin   • BRONCHOSCOPY Left 03/04/2016    Dr. NATALIIA Tate   • BRONCHOSCOPY RIGID / FLEXIBLE N/A 03/03/2016    Dr. NATALIIA Tate   • BRONCHOSCOPY RIGID / FLEXIBLE N/A 02/26/2016    Dr. NATALIIA Tate   • CARDIAC CATHETERIZATION N/A 3/18/2019    Procedure: Right and Left Heart Cath;  Surgeon: Nina Storey MD;  Location: [facility] CATH INVASIVE LOCATION;  Service: Cardiovascular   • CATARACT EXTRACTION WITH INTRAOCULAR LENS IMPLANT Bilateral    • COLON RESECTION WITH COLOSTOMY Left 02/28/2016    Exploratory laparotomy with open left hemicolectomy, end-colostomy, G-tube and J-tube-Dr. Endy Chaney   • COLONOSCOPY N/A 2015    Dr. Laughlin   • COLONOSCOPY N/A 10/12/2016    Procedure: COLONOSCOPY TO 20 CM AND PER STOMA TO 30CM;  Surgeon: Genaro Perrin MD;  Location: [facility] ENDOSCOPY;  Service:    • COLONOSCOPY N/A 10/21/2016    Procedure: COLONOSCOPY DONE AT BEDSIDE ONTO CECEM;  Surgeon: Genaro Perrin MD;  Location: [facility] ENDOSCOPY;  Service:    • COLONOSCOPY N/A 02/10/2016    Diverticulosis in the entire examined colon, non-bleeding internal hemorrhoids-Dr. Taylor Pina   • COLONOSCOPY N/A 02/11/2016    Poor prep, blood in the entire examined colon, normal ileum-Dr. Taylor Pina   • COLONOSCOPY W/ POLYPECTOMY N/A 07/27/2015    Normal ileum, diverticulosis in the sigmoid colon: resected and retrieved, one 6 mm polyp in the descending colon: resected and retrieved, the distal rectum and anal verge are normal on retroflexion view-Dr. Miguel Ángel Laughlin   • COLOSTOMY CLOSURE N/A 10/13/2016    Procedure: COLOSTOMY TAKEDOWN OR CLOSURE, APPENDECTOMY;  Surgeon: Genaro Perrin MD;  Location: [facility] OR;  Service:    • DEBRIDEMENT LEG Left 03/01/2016    Excisional debridement of the skin, subcutantous tissue, tendon and muscle left calf-Dr. Jose Swann   • DEBRIDEMENT LEG Left 02/25/2016    Excisional debridement full-thcikness  skin and subcutaneous tissue and tendon and muscle, left medial and lateral calf muscles-Dr. Jose Swann   • ENDOSCOPY N/A 10/21/2016    Procedure: ESOPHAGOGASTRODUODENOSCOPY DONE AT BEDSIDE;  Surgeon: Genaro Perrin MD;  Location: [facility] ENDOSCOPY;  Service:    • ENDOSCOPY AND COLONOSCOPY N/A 02/28/2016    EGD and Colonoscopy with Candy ink injection-Dr. Endy Chaney   • ENTEROSCOPY SMALL BOWEL N/A 02/11/2016    Normal esophagus, normal stomach, normal duodenum, portion of the jejunum normal-Dr. Taylor Pina   • ILIAC ARTERY - FEMORAL ARTERY BYPASS GRAFT Bilateral 2002   • ILIAC ARTERY STENT Bilateral 2001   • INSERTION AND REMOVAL PERITONEAL DIALYSIS CATHETER Right 02/29/2016    Exchange right jugular 16 cm Mahurkar dialysis catheter-Dr. Jose Swann   • INSERTION PERITONEAL DIALYSIS CATHETER Left 03/01/2016    Ultrasound-guided access of the left jugular vein, 23 cm left jugular palindrome dialysis catheter placement-Dr. Jose Swann   • INSERTION PERITONEAL DIALYSIS CATHETER Right 02/18/2016    Right jugular Mahrkar catherer placement-Dr. Jose Swann   • JOINT REPLACEMENT Left    • THORACOSCOPY Left 4/18/2016    Procedure: LT THORACOSCOPY VIDEO ASSISTED WITH DECORDICATION;  Surgeon: Genaro Davila III, MD;  Location: [facility] OR;  Service:    • THROMBECTOMY Left 02/16/2016    Thombectomy of left femoral graft, left leg arteriogram, left calf forward compartment fasciotomy-Dr. Jose Swann   • TOTAL HIP ARTHROPLASTY Left    • UPPER GASTROINTESTINAL ENDOSCOPY N/A 02/09/2016    Normal UGI-Dr. Ajay Parkinson   • UPPER GASTROINTESTINAL ENDOSCOPY N/A 11/28/2015    Small hiatal hernia, chronic gastritis: biopsied, non-bleeding angioectasias in the stomach: treated with argon plasma coagulation, multiple bleeding angioectasias in the dudenum: treated with argon plasma coagulation-Dr. Mykel Jaramillo   • VASCULAR SURGERY      MULTIPLE   • VENTRAL/INCISIONAL HERNIA REPAIR N/A 10/19/2017     Procedure: VENTRAL HERNIA REPAIR WITH MESH AND BILATERAL COMPONENT SEPARATION;  Surgeon: Genaro Perrin MD;  Location: [facility] MAIN OR;  Service:    • WISDOM TOOTH EXTRACTION       Family History   Problem Relation Age of Onset   • Lung cancer Mother    • Cancer Mother    • Heart disease Father    • Diabetes Father    • Hypertension Father    • Malig Hyperthermia Neg Hx        Current Outpatient Medications:   •  ALPRAZolam (XANAX) 1 MG tablet, Take 1 tablet by mouth 3 (Three) Times a Day As Needed for Anxiety., Disp: 90 tablet, Rfl: 2  •  amLODIPine (NORVASC) 10 MG tablet, Take 1 tablet by mouth Daily., Disp: 90 tablet, Rfl: 3  •  aspirin 81 MG chewable tablet, Chew 81 mg Every Night., Disp: , Rfl:   •  buPROPion XL (WELLBUTRIN XL) 300 MG 24 hr tablet, TAKE 1 TABLET BY MOUTH DAILY, Disp: 90 tablet, Rfl: 3  •  Cholecalciferol (VITAMIN D3) 125 MCG (5000 UT) tablet tablet, Take 1 tablet by mouth Daily., Disp: 90 tablet, Rfl: 3  •  clopidogrel (PLAVIX) 75 MG tablet, TAKE 1 TABLET BY MOUTH DAILY AT NIGHT, Disp: 90 tablet, Rfl: 3  •  Coenzyme Q10 400 MG capsule, Take 400 mg by mouth Every Evening., Disp: , Rfl:   •  FERROUS SULFATE PO, Take 130 mg by mouth Daily., Disp: , Rfl:   •  Fluticasone-Umeclidin-Vilant (TRELEGY ELLIPTA) 100-62.5-25 MCG/INH aerosol powder , Inhale 1 each Daily., Disp: 28 each, Rfl: 2  •  gabapentin (NEURONTIN) 300 MG capsule, Take 1 capsule by mouth 3 (Three) Times a Day., Disp: 90 capsule, Rfl: 2  •  isosorbide mononitrate (IMDUR) 30 MG 24 hr tablet, TAKE 1 TABLET BY MOUTH DAILY EVERY MORNING, Disp: 90 tablet, Rfl: 3  •  latanoprost (XALATAN) 0.005 % ophthalmic solution, Administer 1 drop to both eyes every night at bedtime., Disp: , Rfl:   •  levothyroxine (SYNTHROID, LEVOTHROID) 125 MCG tablet, TAKE 1 TABLET BY MOUTH EVERY DAY, Disp: 90 tablet, Rfl: 1  •  Multiple Vitamins-Minerals (MULTIVITAMIN ADULT PO), Take 1 tablet by mouth Daily., Disp: , Rfl:   •  NIACIN PO, Take 1 capsule by mouth  "Every Morning., Disp: , Rfl:   •  omeprazole (priLOSEC) 20 MG capsule, Take 1 capsule by mouth Every Morning., Disp: 90 capsule, Rfl: 3  •  simvastatin (ZOCOR) 40 MG tablet, TAKE 1 TABLET BY MOUTH DAILY AT NIGHT, Disp: 90 tablet, Rfl: 3  •  Thiamine HCl (VITAMIN B-1 PO), Take 1 tablet by mouth Daily., Disp: , Rfl:   •  traMADol (ULTRAM) 50 MG tablet, Take 1 tablet by mouth 2 (Two) Times a Day. (Patient taking differently: Take 50 mg by mouth 2 (Two) Times a Day As Needed for Moderate Pain .), Disp: 60 tablet, Rfl: 0  •  LUMIGAN 0.01 % ophthalmic drops, 1 DROP IN TO BOTH EYES AT BEDTIME BRAND MEDICALLY NECESSARY, Disp: , Rfl:   Allergies   Allergen Reactions   • Augmentin [Amoxicillin-Pot Clavulanate] Hives and Rash     Social History     Tobacco Use   • Smoking status: Current Some Day Smoker     Packs/day: 0.25     Years: 30.00     Pack years: 7.50     Types: Cigarettes     Last attempt to quit: 2016     Years since quittin.6   • Smokeless tobacco: Never Used   • Tobacco comment: caffeine - rarely   Substance Use Topics   • Alcohol use: No   • Drug use: Never          Objective   Physical Exam  Vitals:    21 0951   BP: 122/74   Pulse: 88   Temp: 97.7 °F (36.5 °C)   TempSrc: Temporal   SpO2: 96%   Weight: 88.1 kg (194 lb 3.2 oz)   Height: 175.3 cm (69\")     Body mass index is 28.68 kg/m².  Discussed the patient's BMI with him. The BMI is in the acceptable range.    Constitutional: NAD.  Eyes: EOMI. PERRLA. Normal conjunctiva.  Ear, nose, mouth, throat: No tonsillar exudates or erythema.   Normal nasal mucosa. Normal external ear canals and TMs bilaterally.  Cardiovascular: RRR. No murmurs. No LE edema b/l. Radial pulses 2+ bilaterally.  Pulmonary: CTA b/l. Good effort.  Integumentary: Puckering of the inferior posterior aspect of his amputation stump with some bleeding but no palpable unstageable ulcer.  Skin is largely intact with no bruising.  No obvious defect or compression spot noted on prosthesis " socket or glove.  Lymphatic: No anterior cervical lymphadenopathy.  Endocrine: No thyromegaly or palpable thyroid nodules.  Psychiatric: Normal affect. Normal thought content.       Assessment/Plan   Assessment and Plan  Pleasant 65-year-old male smoker with PVD status post bypass, CAD, moderate to severe COPD, hypertension, hyperlipidemia, CKD, anxiety, chronic lower back pain on high risk medications, GERD, hypothyroidism, anemia, vitamin D and B12 deficiency, who presents for the following:    Advance Directive Discussion  Cardiovascular risk  Immunizations Discussed/Encouraged (specific immunizations; Td )  Inactivity/Sedentary  Lung Cancer Risk    The above risks/problems have been discussed with the patient.  Pertinent information has been shared with the patient in the After Visit Summary.  Other plans as below:    Diagnoses and all orders for this visit:    1. Encounter for subsequent annual wellness visit (AWV) in Medicare patient (Primary)    2. Chronic obstructive pulmonary disease, unspecified COPD type (CMS/HCC)  3. PRISCILLA treated with BiPAP  4. History of smoking 30 or more pack years    Stable on current regimen with no recent exacerbations, has reduced his smoking frequency.  Up-to-date on lung cancer screening.    5. Stage 3a chronic kidney disease (CMS/HCC)  -     Vitamin D 25 Hydroxy  -     Comprehensive Metabolic Panel  -     CBC & Differential  -     Protein / Creatinine Ratio, Urine - Urine, Clean Catch  -     Urinalysis With Microscopic - Urine, Clean Catch    Following with nephrology, renal ultrasound overall unremarkable, they request labs as above.    6. S/P colectomy  7. Vitamin B12 deficiency  -     Vitamin B12  8. Vitamin D deficiency  9. Iron deficiency    Continue supplementation    10. Hypothyroidism, unspecified type: Controlled  -     Thyroid Panel With TSH    11. Hyperlipidemia, unspecified hyperlipidemia type: Controlled  -     Lipid Panel    12. Chronic midline low back pain  without sciatica: Stable on current regimen.    13. Coronary artery disease involving native coronary artery of native heart without angina pectoris  14. Cardiomyopathy, unspecified type (CMS/[facility]): On aspirin, clopidogrel, statin, continue to follow with cardiology    15. PVD (peripheral vascular disease) (CMS/[facility]): Status post aortic bypass, continue to follow with vascular surgery, high risk    16. Essential hypertension: Controlled    17. Gastroesophageal reflux disease, unspecified whether esophagitis present: Controlled    18. Status post above-knee amputation of left lower extremity (CMS/[facility]): Recommend patient see prosthetics specialist to see if there are any fitting issues as this is a relatively new problem since recent prosthesis changes.  Recommend Vaseline for occlusion and protection.    19. Anxiety disorder, unspecified type: Stable, discussed benzodiazepine wean but he would like to hold course due to recent marital problems.  He understands the risks of these medications, UDS and Adam have been reviewed.  20. Long term prescription benzodiazepine use    21. DNR (do not resuscitate)    Follow-up in 6 months for chronic medical problems    An After Visit Summary and PPPS were given to the patient.      Seth Hester MD  Family Medicine  O: [phone]  C: [phone]    Disclaimer: Parts of this note were dictated by speech recognition. Minor errors in transcription may be present. Please call if questions.

## 2021-09-09 NOTE — PROGRESS NOTES
Venipuncture performed in LAC, RAC, and R hand by KD without appropriate return. Pt was advised to return to office when [facility] phlebotomist is in office. Pt verbalized agreement. 9/9/2021 Savana HENDERSON LPN.

## 2021-09-10 LAB
APPEARANCE UR: CLEAR
BACTERIA #/AREA URNS HPF: NORMAL /[HPF]
BILIRUB UR QL STRIP: NEGATIVE
CASTS URNS QL MICRO: NORMAL /LPF
COLOR UR: YELLOW
CREAT UR-MCNC: 46 MG/DL
EPI CELLS #/AREA URNS HPF: NORMAL /HPF (ref 0–10)
GLUCOSE UR QL: NEGATIVE
HGB UR QL STRIP: NEGATIVE
KETONES UR QL STRIP: NEGATIVE
LEUKOCYTE ESTERASE UR QL STRIP: NEGATIVE
MICRO URNS: ABNORMAL
NITRITE UR QL STRIP: NEGATIVE
PH UR STRIP: 6 [PH] (ref 5–7.5)
PROT UR QL STRIP: ABNORMAL
PROT UR-MCNC: 20.8 MG/DL
PROT/CREAT UR: 452 MG/G CREAT (ref 0–200)
RBC #/AREA URNS HPF: NORMAL /HPF (ref 0–2)
SP GR UR: 1.01 (ref 1–1.03)
UROBILINOGEN UR STRIP-MCNC: 0.2 MG/DL (ref 0.2–1)
WBC #/AREA URNS HPF: NORMAL /HPF (ref 0–5)

## 2021-09-11 LAB
25(OH)D3+25(OH)D2 SERPL-MCNC: 80.7 NG/ML (ref 30–100)
ALBUMIN SERPL-MCNC: 4.4 G/DL (ref 3.8–4.8)
ALBUMIN/GLOB SERPL: 1.5 {RATIO} (ref 1.2–2.2)
ALP SERPL-CCNC: 73 IU/L (ref 48–121)
ALT SERPL-CCNC: 12 IU/L (ref 0–44)
AST SERPL-CCNC: 19 IU/L (ref 0–40)
BASOPHILS # BLD AUTO: 0 X10E3/UL (ref 0–0.2)
BASOPHILS NFR BLD AUTO: 0 %
BILIRUB SERPL-MCNC: 0.8 MG/DL (ref 0–1.2)
BUN SERPL-MCNC: 21 MG/DL (ref 8–27)
BUN/CREAT SERPL: 13 (ref 10–24)
CALCIUM SERPL-MCNC: 9.9 MG/DL (ref 8.6–10.2)
CHLORIDE SERPL-SCNC: 97 MMOL/L (ref 96–106)
CHOLEST SERPL-MCNC: 234 MG/DL (ref 100–199)
CO2 SERPL-SCNC: 20 MMOL/L (ref 20–29)
CREAT SERPL-MCNC: 1.61 MG/DL (ref 0.76–1.27)
EOSINOPHIL # BLD AUTO: 0.1 X10E3/UL (ref 0–0.4)
EOSINOPHIL NFR BLD AUTO: 1 %
ERYTHROCYTE [DISTWIDTH] IN BLOOD BY AUTOMATED COUNT: 13.2 % (ref 11.6–15.4)
FT4I SERPL CALC-MCNC: 2.7 (ref 1.2–4.9)
GLOBULIN SER CALC-MCNC: 2.9 G/DL (ref 1.5–4.5)
GLUCOSE SERPL-MCNC: 125 MG/DL (ref 65–99)
HCT VFR BLD AUTO: 51.1 % (ref 37.5–51)
HDLC SERPL-MCNC: 56 MG/DL
HGB BLD-MCNC: 17.4 G/DL (ref 13–17.7)
IMM GRANULOCYTES # BLD AUTO: 0 X10E3/UL (ref 0–0.1)
IMM GRANULOCYTES NFR BLD AUTO: 0 %
LDLC SERPL CALC-MCNC: 128 MG/DL (ref 0–99)
LYMPHOCYTES # BLD AUTO: 1.6 X10E3/UL (ref 0.7–3.1)
LYMPHOCYTES NFR BLD AUTO: 17 %
MCH RBC QN AUTO: 30.6 PG (ref 26.6–33)
MCHC RBC AUTO-ENTMCNC: 34.1 G/DL (ref 31.5–35.7)
MCV RBC AUTO: 90 FL (ref 79–97)
MONOCYTES # BLD AUTO: 0.5 X10E3/UL (ref 0.1–0.9)
MONOCYTES NFR BLD AUTO: 5 %
NEUTROPHILS # BLD AUTO: 7 X10E3/UL (ref 1.4–7)
NEUTROPHILS NFR BLD AUTO: 77 %
PLATELET # BLD AUTO: 252 X10E3/UL (ref 150–450)
POTASSIUM SERPL-SCNC: 4.5 MMOL/L (ref 3.5–5.2)
PROT SERPL-MCNC: 7.3 G/DL (ref 6–8.5)
RBC # BLD AUTO: 5.68 X10E6/UL (ref 4.14–5.8)
SODIUM SERPL-SCNC: 135 MMOL/L (ref 134–144)
T3RU NFR SERPL: 28 % (ref 24–39)
T4 SERPL-MCNC: 9.5 UG/DL (ref 4.5–12)
TRIGL SERPL-MCNC: 286 MG/DL (ref 0–149)
TSH SERPL DL<=0.005 MIU/L-ACNC: 5.13 UIU/ML (ref 0.45–4.5)
VIT B12 SERPL-MCNC: 866 PG/ML (ref 232–1245)
VLDLC SERPL CALC-MCNC: 50 MG/DL (ref 5–40)
WBC # BLD AUTO: 9.1 X10E3/UL (ref 3.4–10.8)

## 2021-09-12 PROBLEM — R80.9 PROTEINURIA: Status: ACTIVE | Noted: 2021-09-12

## 2021-09-13 ENCOUNTER — TELEPHONE (OUTPATIENT)
Dept: INTERNAL MEDICINE | Facility: CLINIC | Age: 66
End: 2021-09-13

## 2021-09-29 DIAGNOSIS — M54.50 CHRONIC MIDLINE LOW BACK PAIN WITHOUT SCIATICA: ICD-10-CM

## 2021-09-29 DIAGNOSIS — G89.29 CHRONIC MIDLINE LOW BACK PAIN WITHOUT SCIATICA: ICD-10-CM

## 2021-09-29 DIAGNOSIS — Z89.619 STATUS POST ABOVE KNEE AMPUTATION, UNSPECIFIED LATERALITY (HCC): ICD-10-CM

## 2021-09-29 RX ORDER — GABAPENTIN 300 MG/1
300 CAPSULE ORAL 3 TIMES DAILY
Qty: 270 CAPSULE | Refills: 1 | Status: SHIPPED | OUTPATIENT
Start: 2021-09-29 | End: 2022-03-15 | Stop reason: SDUPTHER

## 2021-09-29 RX ORDER — TRAMADOL HYDROCHLORIDE 50 MG/1
50 TABLET ORAL 2 TIMES DAILY
Qty: 180 TABLET | Refills: 0 | Status: SHIPPED | OUTPATIENT
Start: 2021-09-29 | End: 2021-12-22 | Stop reason: SDUPTHER

## 2021-10-15 DIAGNOSIS — F41.9 ANXIETY DISORDER, UNSPECIFIED TYPE: ICD-10-CM

## 2021-10-15 NOTE — TELEPHONE ENCOUNTER
Rx Refill Note  Requested Prescriptions     Pending Prescriptions Disp Refills   • ALPRAZolam (XANAX) 1 MG tablet 90 tablet 2     Sig: Take 1 tablet by mouth 3 (Three) Times a Day As Needed for Anxiety.      Last office visit with prescribing clinician: 9/9/2021      Next office visit with prescribing clinician: 3/15/2022        ToxASSURE Select 13 (MW) - Urine, Clean Catch (01/05/2021 11:29)      Ester Shah LPN  10/15/21, 13:35 EDT

## 2021-10-16 DIAGNOSIS — F41.9 ANXIETY DISORDER, UNSPECIFIED TYPE: ICD-10-CM

## 2021-10-16 RX ORDER — ALPRAZOLAM 1 MG/1
1 TABLET ORAL 3 TIMES DAILY PRN
Qty: 90 TABLET | Refills: 2 | Status: SHIPPED | OUTPATIENT
Start: 2021-10-16 | End: 2022-02-07 | Stop reason: SDUPTHER

## 2021-10-18 RX ORDER — ALPRAZOLAM 1 MG/1
1 TABLET ORAL 3 TIMES DAILY PRN
Qty: 90 TABLET | Refills: 2 | Status: SHIPPED | OUTPATIENT
Start: 2021-10-18 | End: 2022-01-06 | Stop reason: SDUPTHER

## 2021-10-28 ENCOUNTER — TELEPHONE (OUTPATIENT)
Dept: INTERNAL MEDICINE | Facility: CLINIC | Age: 66
End: 2021-10-28

## 2021-10-28 NOTE — TELEPHONE ENCOUNTER
Patient called to schedule a lab appt for outside orders from Dr. Rajesh Sarabia with [facility]. If he brings lab order can we do those?     Please advise, thanks!

## 2021-12-14 DIAGNOSIS — K21.9 GASTROESOPHAGEAL REFLUX DISEASE: ICD-10-CM

## 2021-12-14 RX ORDER — OMEPRAZOLE 20 MG/1
CAPSULE, DELAYED RELEASE ORAL
Qty: 90 CAPSULE | Refills: 3 | Status: SHIPPED | OUTPATIENT
Start: 2021-12-14 | End: 2022-09-15

## 2021-12-22 DIAGNOSIS — G89.29 CHRONIC MIDLINE LOW BACK PAIN WITHOUT SCIATICA: ICD-10-CM

## 2021-12-22 DIAGNOSIS — M54.50 CHRONIC MIDLINE LOW BACK PAIN WITHOUT SCIATICA: ICD-10-CM

## 2021-12-22 RX ORDER — TRAMADOL HYDROCHLORIDE 50 MG/1
50 TABLET ORAL 2 TIMES DAILY
Qty: 180 TABLET | Refills: 0 | Status: SHIPPED | OUTPATIENT
Start: 2021-12-22 | End: 2022-03-15 | Stop reason: SDUPTHER

## 2021-12-22 NOTE — TELEPHONE ENCOUNTER
Rx Refill Note  Requested Prescriptions     Pending Prescriptions Disp Refills   • traMADol (ULTRAM) 50 MG tablet 180 tablet 0     Sig: Take 1 tablet by mouth 2 (Two) Times a Day.      Last office visit with prescribing clinician: 9/9/2021      Next office visit with prescribing clinician: 3/15/2022        ToxASSURE Select 13 (PETR) - Urine, Clean Catch (01/05/2021 11:29)      Ester Shah LPN  12/22/21, 08:07 EST

## 2022-01-06 DIAGNOSIS — F41.9 ANXIETY DISORDER, UNSPECIFIED TYPE: ICD-10-CM

## 2022-01-07 DIAGNOSIS — F41.9 ANXIETY DISORDER, UNSPECIFIED TYPE: ICD-10-CM

## 2022-01-09 RX ORDER — ALPRAZOLAM 1 MG/1
1 TABLET ORAL 3 TIMES DAILY PRN
Qty: 90 TABLET | Refills: 0 | Status: SHIPPED | OUTPATIENT
Start: 2022-01-09 | End: 2022-03-15 | Stop reason: SDUPTHER

## 2022-01-10 RX ORDER — ALPRAZOLAM 1 MG/1
1 TABLET ORAL 3 TIMES DAILY PRN
Qty: 90 TABLET | Refills: 2 | OUTPATIENT
Start: 2022-01-10

## 2022-01-19 DIAGNOSIS — F17.200 SMOKES TOBACCO DAILY: ICD-10-CM

## 2022-01-19 DIAGNOSIS — F41.9 ANXIETY DISORDER, UNSPECIFIED TYPE: ICD-10-CM

## 2022-01-19 RX ORDER — LEVOTHYROXINE SODIUM 0.12 MG/1
TABLET ORAL
Qty: 90 TABLET | Refills: 1 | Status: SHIPPED | OUTPATIENT
Start: 2022-01-19 | End: 2022-05-04

## 2022-01-19 NOTE — TELEPHONE ENCOUNTER
Rx Refill Note  Requested Prescriptions     Pending Prescriptions Disp Refills   • buPROPion XL (WELLBUTRIN XL) 300 MG 24 hr tablet [Pharmacy Med Name: BUPROPION HCL  MG TABLET] 90 tablet 3     Sig: TAKE 1 TABLET BY MOUTH DAILY   • levothyroxine (SYNTHROID, LEVOTHROID) 125 MCG tablet [Pharmacy Med Name: LEVOTHYROXINE 125 MCG TABLET] 90 tablet 1     Sig: TAKE 1 TABLET BY MOUTH EVERY DAY      Last office visit with prescribing clinician: 9/9/2021      Next office visit with prescribing clinician: 3/15/2022        Thyroid Panel With TSH (09/10/2021 10:04)      Ester Shah LPN  01/19/22, 15:47 EST

## 2022-01-21 RX ORDER — BUPROPION HYDROCHLORIDE 300 MG/1
300 TABLET ORAL DAILY
Qty: 90 TABLET | Refills: 3 | Status: SHIPPED | OUTPATIENT
Start: 2022-01-21 | End: 2023-01-04

## 2022-02-07 DIAGNOSIS — F41.9 ANXIETY DISORDER, UNSPECIFIED TYPE: ICD-10-CM

## 2022-02-07 RX ORDER — ALPRAZOLAM 1 MG/1
1 TABLET ORAL 3 TIMES DAILY PRN
Qty: 90 TABLET | Refills: 2 | Status: SHIPPED | OUTPATIENT
Start: 2022-02-07 | End: 2022-03-08 | Stop reason: SDUPTHER

## 2022-02-07 NOTE — TELEPHONE ENCOUNTER
Rx Refill Note  Requested Prescriptions     Pending Prescriptions Disp Refills   • ALPRAZolam (XANAX) 1 MG tablet 90 tablet 2     Sig: Take 1 tablet by mouth 3 (Three) Times a Day As Needed for Anxiety.      Last office visit with prescribing clinician: 9/9/2021      Next office visit with prescribing clinician: 3/15/2022        ToxASSURE Select 13 (MW) - Urine, Clean Catch (01/05/2021 11:29)      Ester Shah LPN  02/07/22, 10:07 EST

## 2022-02-25 NOTE — TELEPHONE ENCOUNTER
Rx Refill Note  Requested Prescriptions     Pending Prescriptions Disp Refills   • traMADol (ULTRAM) 50 MG tablet 60 tablet 0     Sig: Take 1 tablet by mouth 2 (Two) Times a Day.      Last office visit with prescribing clinician: 5/4/2021      Next office visit with prescribing clinician: 9/9/2021            Ester Shah LPN  08/06/21, 08:20 EDT    
No

## 2022-03-08 DIAGNOSIS — F41.9 ANXIETY DISORDER, UNSPECIFIED TYPE: ICD-10-CM

## 2022-03-08 RX ORDER — ALPRAZOLAM 1 MG/1
1 TABLET ORAL 3 TIMES DAILY PRN
Qty: 90 TABLET | Refills: 2 | Status: SHIPPED | OUTPATIENT
Start: 2022-03-08 | End: 2022-06-01 | Stop reason: SDUPTHER

## 2022-03-08 NOTE — TELEPHONE ENCOUNTER
Last OV 9/9/21    Upcoming OV 3/15/22    UDS on file-   ToxASSURE Select 13 (MW) - Urine, Clean Catch (01/05/2021 11:29)

## 2022-03-12 DIAGNOSIS — I73.9 PVD (PERIPHERAL VASCULAR DISEASE): ICD-10-CM

## 2022-03-12 DIAGNOSIS — I10 ESSENTIAL HYPERTENSION: ICD-10-CM

## 2022-03-14 RX ORDER — AMLODIPINE BESYLATE 10 MG/1
TABLET ORAL
Qty: 90 TABLET | Refills: 3 | Status: SHIPPED | OUTPATIENT
Start: 2022-03-14 | End: 2023-02-17 | Stop reason: SDUPTHER

## 2022-03-14 RX ORDER — CLOPIDOGREL BISULFATE 75 MG/1
TABLET ORAL
Qty: 90 TABLET | Refills: 3 | Status: SHIPPED | OUTPATIENT
Start: 2022-03-14 | End: 2023-02-17 | Stop reason: SDUPTHER

## 2022-03-15 ENCOUNTER — OFFICE VISIT (OUTPATIENT)
Dept: INTERNAL MEDICINE | Facility: CLINIC | Age: 67
End: 2022-03-15

## 2022-03-15 VITALS
BODY MASS INDEX: 28.73 KG/M2 | HEART RATE: 67 BPM | DIASTOLIC BLOOD PRESSURE: 68 MMHG | TEMPERATURE: 97.2 F | SYSTOLIC BLOOD PRESSURE: 116 MMHG | HEIGHT: 69 IN | WEIGHT: 194 LBS | OXYGEN SATURATION: 95 %

## 2022-03-15 DIAGNOSIS — F41.9 ANXIETY DISORDER, UNSPECIFIED TYPE: ICD-10-CM

## 2022-03-15 DIAGNOSIS — E78.5 HYPERLIPIDEMIA, UNSPECIFIED HYPERLIPIDEMIA TYPE: ICD-10-CM

## 2022-03-15 DIAGNOSIS — Z89.619 STATUS POST ABOVE KNEE AMPUTATION, UNSPECIFIED LATERALITY: ICD-10-CM

## 2022-03-15 DIAGNOSIS — J44.9 CHRONIC OBSTRUCTIVE PULMONARY DISEASE, UNSPECIFIED COPD TYPE: Chronic | ICD-10-CM

## 2022-03-15 DIAGNOSIS — I73.9 PVD (PERIPHERAL VASCULAR DISEASE): ICD-10-CM

## 2022-03-15 DIAGNOSIS — Z87.891 HISTORY OF SMOKING 30 OR MORE PACK YEARS: ICD-10-CM

## 2022-03-15 DIAGNOSIS — N18.32 STAGE 3B CHRONIC KIDNEY DISEASE: ICD-10-CM

## 2022-03-15 DIAGNOSIS — G89.29 CHRONIC MIDLINE LOW BACK PAIN WITHOUT SCIATICA: ICD-10-CM

## 2022-03-15 DIAGNOSIS — I10 ESSENTIAL HYPERTENSION: Primary | ICD-10-CM

## 2022-03-15 DIAGNOSIS — Z23 NEED FOR TDAP VACCINATION: ICD-10-CM

## 2022-03-15 DIAGNOSIS — M54.50 CHRONIC MIDLINE LOW BACK PAIN WITHOUT SCIATICA: ICD-10-CM

## 2022-03-15 DIAGNOSIS — Z79.899 LONG TERM PRESCRIPTION BENZODIAZEPINE USE: ICD-10-CM

## 2022-03-15 PROCEDURE — 99214 OFFICE O/P EST MOD 30 MIN: CPT | Performed by: FAMILY MEDICINE

## 2022-03-15 PROCEDURE — 90471 IMMUNIZATION ADMIN: CPT | Performed by: FAMILY MEDICINE

## 2022-03-15 PROCEDURE — 90715 TDAP VACCINE 7 YRS/> IM: CPT | Performed by: FAMILY MEDICINE

## 2022-03-15 RX ORDER — TRAMADOL HYDROCHLORIDE 50 MG/1
50 TABLET ORAL 2 TIMES DAILY
Qty: 180 TABLET | Refills: 0 | Status: SHIPPED | OUTPATIENT
Start: 2022-03-24 | End: 2022-06-17 | Stop reason: SDUPTHER

## 2022-03-15 RX ORDER — GABAPENTIN 300 MG/1
300 CAPSULE ORAL 3 TIMES DAILY
Qty: 270 CAPSULE | Refills: 1 | Status: SHIPPED | OUTPATIENT
Start: 2022-03-24 | End: 2022-09-22 | Stop reason: SDUPTHER

## 2022-03-15 RX ORDER — SIMVASTATIN 40 MG
20 TABLET ORAL NIGHTLY
Qty: 90 TABLET | Refills: 3
Start: 2022-03-15 | End: 2022-04-11

## 2022-03-15 NOTE — PROGRESS NOTES
Date of Encounter: 03/15/2022  Patient: Sim Agustin,  1955    Subjective   History of Presenting Illness  Chief complaint: Follow-up    No significant interval history.  No acute concerns.    Recently was told by his pharmacist to have the dose of his simvastatin due to his amlodipine.  He has not had any side effects on amlodipine.  He did this about 1 month ago.    COPD followed by pulmonology on Trelegy inhaler.  He quit smoking tobacco at the end of  and now just smokes cannabis a few times per week.  No recent episodes of bronchitis.    CKD 3B followed by nephrology, he does have some mild proteinuria.  He is being followed by them in about 6 months.  He avoids NSAIDs.    Chronic lower back pain uses tramadol 2-3 times per day, based on his most recent fill he is using it about twice daily.  He does feel this controls his back pain well without side effects.    Peripheral vascular disease followed by [facility].    Status post knee amputation with good recent fit of prosthetic with no issues.    Anxiety on long-term benzodiazepines, this has been stable, he is also on bupropion which he finds helpful.    Review of Systems:  Negative for fever, chest pain, abdominal pain    The following portions of the patient's history were reviewed and updated as appropriate: allergies, current medications, past family history, past medical history, past social history, past surgical history and problem list.    Patient Active Problem List   Diagnosis   • H/O angiodysplasia of intestinal tract   • COPD (chronic obstructive pulmonary disease) ([facility])   • S/P colectomy   • Status post above-knee amputation of left lower extremity ([facility])   • CAD (coronary artery disease)   • PVD (peripheral vascular disease) ([facility])   • Essential hypertension   • Cardiomyopathy, unspecified ([facility])   • PRISCILLA treated with BiPAP   • Anxiety disorder   • Chronic midline low back pain without sciatica   • Long term prescription benzodiazepine use   •  History of bradycardia   • Hypothyroidism   • Vitamin B12 deficiency   • Vitamin D deficiency   • Iron deficiency   • History of smoking 30 or more pack years   • High risk medication use   • DNR (do not resuscitate)   • CKD (chronic kidney disease)   • Hyperlipidemia   • Proteinuria     Past Medical History:   Diagnosis Date   • Acute respiratory failure with hypoxia and hypercarbia ([facility]) 3/11/2019   • Acute upper respiratory infection 2/6/2013   • Anemia     per [facility] database   • ARF (acute renal failure) ([facility]) 3/4/2016   • Atelectasis, left 3/4/2016   • Bradycardia 5/23/2019   • CAD (coronary artery disease) 4/4/2016   • Chronic obstructive pulmonary disease with acute exacerbation ([facility]) 2/6/2013   • Colon polyps    • Compartment syndrome ([facility])     AFTER ILIAC SURGERY. ABOVE KNEE AMPUTATION   • COPD (chronic obstructive pulmonary disease) ([facility])    • COPD with hypoxia ([facility]) 5/23/2019   • Cough     SINCE APRIL WITH PNEUMONIA.    • Disease of thyroid gland     HYPOTHYRODISM   • Diverticulosis    • Elevated hematocrit 1/6/2021   • Employs prosthetic leg    • ESRD (end stage renal disease) on dialysis ([facility]) 4/6/2016   • Fracture of patella 3/3/2015   • History of acute renal failure    • History of CHF (congestive heart failure)    • History of GI bleed 02/2016    AORTODUOD FISTULA   • History of sepsis 02/2016   • History of transfusion     MULTIPLE, 2016   • Hyperkalemia 4/11/2016   • Hypertension    • Hypotension    • Hypotension 3/4/2016   • Nonischemic cardiomyopathy ([facility])    • Nosocomial pneumonia 4/6/2016   • Phantom limb pain ([facility])     SL, WITH LEFT ABOUVE KNEE   • Pleural effusion on left 4/11/2016   • Pneumonia     SPRING 2016  AFTER SURGERY   • PVD (peripheral vascular disease) ([facility])    • S/P thoracentesis 4/11/2016   • Sepsis, unspecified organism ([facility]) 4/5/2016     Past Surgical History:   Procedure Laterality Date   • ABOVE KNEE AMPUTATION Left 3/16/2016    Procedure: LT AMPUTATION ABOVE KNEE;   Surgeon: Jose Swann MD;  Location: [facility] OR;  Service:    • ARTERIAL THROMBECTOMY  2001    throbectomy of arterial graft   • AXILLARY FEMORAL BYPASS Right 02/15/2016    Exploratory lapartomy, repair aortoduodenal fistula, resection infected aortobifemoral bypass graft, axillobi-superficial femoral artery [place]Aneudy bypass graft from right axillary artery, Repair of duodenotomy 4th portion duodenum-Dr. Jose Swann, Dr. Genaro Perrin   • BRONCHOSCOPY Left 03/04/2016    Dr. NATALIIA Tate   • BRONCHOSCOPY RIGID / FLEXIBLE N/A 03/03/2016    Dr. NATALIIA Tate   • BRONCHOSCOPY RIGID / FLEXIBLE N/A 02/26/2016    Dr. NATALIIA Tate   • CARDIAC CATHETERIZATION N/A 3/18/2019    Procedure: Right and Left Heart Cath;  Surgeon: Nina Storey MD;  Location: [facility] INVASIVE LOCATION;  Service: Cardiovascular   • CATARACT EXTRACTION WITH INTRAOCULAR LENS IMPLANT Bilateral    • COLON RESECTION WITH COLOSTOMY Left 02/28/2016    Exploratory laparotomy with open left hemicolectomy, end-colostomy, G-tube and J-tube-Dr. Endy Chaney   • COLONOSCOPY N/A 2015    Dr. Laughlin   • COLONOSCOPY N/A 10/12/2016    Procedure: COLONOSCOPY TO 20 CM AND PER STOMA TO 30CM;  Surgeon: Genaro Perrin MD;  Location: [facility] ENDOSCOPY;  Service:    • COLONOSCOPY N/A 10/21/2016    Procedure: COLONOSCOPY DONE AT BEDSIDE ONTO CECEM;  Surgeon: Genaro Perrin MD;  Location: [facility] ENDOSCOPY;  Service:    • COLONOSCOPY N/A 02/10/2016    Diverticulosis in the entire examined colon, non-bleeding internal hemorrhoids-Dr. Taylor Pina   • COLONOSCOPY N/A 02/11/2016    Poor prep, blood in the entire examined colon, normal ileum-Dr. Taylor Pina   • COLONOSCOPY W/ POLYPECTOMY N/A 07/27/2015    Normal ileum, diverticulosis in the sigmoid colon: resected and retrieved, one 6 mm polyp in the descending colon: resected and retrieved, the distal rectum and anal verge are normal on retroflexion view-Dr. Miguel Ángel Laughlin   • COLOSTOMY  CLOSURE N/A 10/13/2016    Procedure: COLOSTOMY TAKEDOWN OR CLOSURE, APPENDECTOMY;  Surgeon: Genaro Perrin MD;  Location: [facility] OR;  Service:    • DEBRIDEMENT LEG Left 03/01/2016    Excisional debridement of the skin, subcutantous tissue, tendon and muscle left calf-Dr. Jose Swann   • DEBRIDEMENT LEG Left 02/25/2016    Excisional debridement full-thcikness skin and subcutaneous tissue and tendon and muscle, left medial and lateral calf muscles-Dr. Jose Swann   • ENDOSCOPY N/A 10/21/2016    Procedure: ESOPHAGOGASTRODUODENOSCOPY DONE AT BEDSIDE;  Surgeon: Genaro Perrin MD;  Location: [facility] ENDOSCOPY;  Service:    • ENDOSCOPY AND COLONOSCOPY N/A 02/28/2016    EGD and Colonoscopy with Candy ink injection-Dr. Endy Chaney   • ENTEROSCOPY SMALL BOWEL N/A 02/11/2016    Normal esophagus, normal stomach, normal duodenum, portion of the jejunum normal-Dr. Taylor Pina   • ILIAC ARTERY - FEMORAL ARTERY BYPASS GRAFT Bilateral 2002   • ILIAC ARTERY STENT Bilateral 2001   • INSERTION AND REMOVAL PERITONEAL DIALYSIS CATHETER Right 02/29/2016    Exchange right jugular 16 cm Mahurkar dialysis catheter-Dr. Jose Swann   • INSERTION PERITONEAL DIALYSIS CATHETER Left 03/01/2016    Ultrasound-guided access of the left jugular vein, 23 cm left jugular palindrome dialysis catheter placement-Dr. Jose Swann   • INSERTION PERITONEAL DIALYSIS CATHETER Right 02/18/2016    Right jugular Mahrkar catherer placement-Dr. Jose Swann   • JOINT REPLACEMENT Left    • THORACOSCOPY Left 4/18/2016    Procedure: LT THORACOSCOPY VIDEO ASSISTED WITH DECORDICATION;  Surgeon: Genaro Davila III, MD;  Location: [facility] OR;  Service:    • THROMBECTOMY Left 02/16/2016    Thombectomy of left femoral graft, left leg arteriogram, left calf forward compartment fasciotomy-Dr. Jose Swann   • TOTAL HIP ARTHROPLASTY Left    • UPPER GASTROINTESTINAL ENDOSCOPY N/A 02/09/2016    Normal UGI-Dr. Ajay Parkinson   •  UPPER GASTROINTESTINAL ENDOSCOPY N/A 11/28/2015    Small hiatal hernia, chronic gastritis: biopsied, non-bleeding angioectasias in the stomach: treated with argon plasma coagulation, multiple bleeding angioectasias in the dudenum: treated with argon plasma coagulation-Dr. Mykel Jaramillo   • VASCULAR SURGERY      MULTIPLE   • VENTRAL/INCISIONAL HERNIA REPAIR N/A 10/19/2017    Procedure: VENTRAL HERNIA REPAIR WITH MESH AND BILATERAL COMPONENT SEPARATION;  Surgeon: Genaro Perrin MD;  Location: [facility];  Service:    • WISDOM TOOTH EXTRACTION       Family History   Problem Relation Age of Onset   • Lung cancer Mother    • Cancer Mother    • Heart disease Father    • Diabetes Father    • Hypertension Father    • Malig Hyperthermia Neg Hx        Current Outpatient Medications:   •  ALPRAZolam (XANAX) 1 MG tablet, Take 1 tablet by mouth 3 (Three) Times a Day As Needed for Anxiety., Disp: 90 tablet, Rfl: 2  •  amLODIPine (NORVASC) 10 MG tablet, TAKE 1 TABLET BY MOUTH EVERY DAY, Disp: 90 tablet, Rfl: 3  •  aspirin 81 MG chewable tablet, Chew 81 mg Every Night., Disp: , Rfl:   •  buPROPion XL (WELLBUTRIN XL) 300 MG 24 hr tablet, TAKE 1 TABLET BY MOUTH DAILY, Disp: 90 tablet, Rfl: 3  •  Cholecalciferol (VITAMIN D3) 125 MCG (5000 UT) tablet tablet, Take 1 tablet by mouth Daily., Disp: 90 tablet, Rfl: 3  •  clopidogrel (PLAVIX) 75 MG tablet, TAKE 1 TABLET BY MOUTH DAILY AT NIGHT, Disp: 90 tablet, Rfl: 3  •  Coenzyme Q10 400 MG capsule, Take 400 mg by mouth Every Evening., Disp: , Rfl:   •  FERROUS SULFATE PO, Take 130 mg by mouth Daily., Disp: , Rfl:   •  Fluticasone-Umeclidin-Vilant (TRELEGY ELLIPTA) 100-62.5-25 MCG/INH aerosol powder , Inhale 1 each Daily., Disp: 28 each, Rfl: 2  •  [START ON 3/24/2022] gabapentin (NEURONTIN) 300 MG capsule, Take 1 capsule by mouth 3 (Three) Times a Day., Disp: 270 capsule, Rfl: 1  •  isosorbide mononitrate (IMDUR) 30 MG 24 hr tablet, TAKE 1 TABLET BY MOUTH DAILY EVERY MORNING, Disp:  "90 tablet, Rfl: 3  •  latanoprost (XALATAN) 0.005 % ophthalmic solution, Administer 1 drop to both eyes every night at bedtime., Disp: , Rfl:   •  levothyroxine (SYNTHROID, LEVOTHROID) 125 MCG tablet, TAKE 1 TABLET BY MOUTH EVERY DAY, Disp: 90 tablet, Rfl: 1  •  LUMIGAN 0.01 % ophthalmic drops, 1 DROP IN TO BOTH EYES AT BEDTIME BRAND MEDICALLY NECESSARY, Disp: , Rfl:   •  Multiple Vitamins-Minerals (MULTIVITAMIN ADULT PO), Take 1 tablet by mouth Daily., Disp: , Rfl:   •  NIACIN PO, Take 1 capsule by mouth Every Morning., Disp: , Rfl:   •  omeprazole (priLOSEC) 20 MG capsule, TAKE 1 CAPSULE BY MOUTH EVERY DAY IN THE MORNING, Disp: 90 capsule, Rfl: 3  •  simvastatin (ZOCOR) 40 MG tablet, Take 0.5 tablets by mouth Every Night., Disp: 90 tablet, Rfl: 3  •  Thiamine HCl (VITAMIN B-1 PO), Take 1 tablet by mouth Daily., Disp: , Rfl:   •  [START ON 3/24/2022] traMADol (ULTRAM) 50 MG tablet, Take 1 tablet by mouth 2 (Two) Times a Day., Disp: 180 tablet, Rfl: 0  Allergies   Allergen Reactions   • Augmentin [Amoxicillin-Pot Clavulanate] Hives and Rash     Social History     Tobacco Use   • Smoking status: Former Smoker     Packs/day: 0.00     Years: 30.00     Pack years: 0.00     Types: Cigarettes     Quit date: 2021     Years since quittin.7   • Smokeless tobacco: Never Used   • Tobacco comment: caffeine - rarely   Substance Use Topics   • Alcohol use: No   • Drug use: Never          Objective   Physical Exam  Vitals:    03/15/22 1351   BP: 116/68   BP Location: Right arm   Patient Position: Sitting   Cuff Size: Large Adult   Pulse: 67   Temp: 97.2 °F (36.2 °C)   TempSrc: Temporal   SpO2: 95%   Weight: 88 kg (194 lb)   Height: 175.3 cm (69\")     Body mass index is 28.65 kg/m².    Constitutional: NAD.  Cardiovascular: RRR. No murmurs. No LE edema b/l. Radial pulses 2+ bilaterally.  Pulmonary: Mild end expiratory wheezing.  No focal findings.  Integumentary: No rashes or wounds on face or upper extremities.  Lymphatic: " No anterior cervical lymphadenopathy.  Endocrine: No thyromegaly or palpable thyroid nodules.  Psychiatric: Normal affect. Normal thought content.     Assessment/Plan   Assessment and Plan  Pleasant 66-year-old male smoker with PVD status post bypass, CAD, moderate to severe COPD, hypertension, hyperlipidemia, CKD, anxiety, chronic lower back pain on high risk medications, GERD, hypothyroidism, anemia, vitamin D and B12 deficiency, who presents for the following:    Diagnoses and all orders for this visit:    1. Essential hypertension (Primary): Controlled    2. Hyperlipidemia, unspecified hyperlipidemia type: We may need to rotate him to atorvastatin due to peripheral vascular disease and coronary artery disease but we will see what his lipids do at his follow-up later this year    3. Chronic obstructive pulmonary disease, unspecified COPD type (HCC): Follows with pulmonology, stable    4. Stage 3b chronic kidney disease (HCC): Follows with nephrology, stable, avoid NSAIDs    5. Chronic midline low back pain without sciatica: Controlled and stable, uses about twice daily, Adam is appropriate and he does have a recent urine drug screen.  We will repeat urine drug screen at follow-up appointment later this year.  -     traMADol (ULTRAM) 50 MG tablet; Take 1 tablet by mouth 2 (Two) Times a Day.  Dispense: 180 tablet; Refill: 0    6. PVD (peripheral vascular disease) (HCC): Per #2, he does follow with [facility]  -     simvastatin (ZOCOR) 40 MG tablet; Take 0.5 tablets by mouth Every Night.  Dispense: 90 tablet; Refill: 3    7. Status post above knee amputation, unspecified laterality ([facility]): Phantom limb pain controlled on gabapentin  -     gabapentin (NEURONTIN) 300 MG capsule; Take 1 capsule by mouth 3 (Three) Times a Day.  Dispense: 270 capsule; Refill: 1    8. Anxiety disorder, unspecified type: On bupropion and alprazolam long-term.  This problem is stable and he is aware of the risks and benefits of long-term  benzodiazepines especially in context of his other medications.  His mood has been doing so well for so long that I do not feel strongly about tapering this but we do readdress this periodically  9. Long term prescription benzodiazepine use    10. History of smoking 30 or more pack years: Encouraged continued smoking cessation    Seth Hester MD  Family Medicine  O: [phone]  C: [phone]    Disclaimer: Parts of this note were dictated by speech recognition. Minor errors in transcription may be present. Please call if questions.

## 2022-04-04 ENCOUNTER — TELEPHONE (OUTPATIENT)
Dept: INTERNAL MEDICINE | Facility: CLINIC | Age: 67
End: 2022-04-04

## 2022-04-04 ENCOUNTER — HOSPITAL ENCOUNTER (INPATIENT)
Facility: HOSPITAL | Age: 67
LOS: 6 days | Discharge: HOME | End: 2022-04-10
Attending: EMERGENCY MEDICINE | Admitting: HOSPITALIST

## 2022-04-04 ENCOUNTER — APPOINTMENT (OUTPATIENT)
Dept: GENERAL RADIOLOGY | Facility: HOSPITAL | Age: 67
End: 2022-04-04

## 2022-04-04 DIAGNOSIS — R09.02 HYPOXIA: ICD-10-CM

## 2022-04-04 DIAGNOSIS — N18.9 ACUTE RENAL FAILURE SUPERIMPOSED ON CHRONIC KIDNEY DISEASE, UNSPECIFIED CKD STAGE, UNSPECIFIED ACUTE RENAL FAILURE TYPE: ICD-10-CM

## 2022-04-04 DIAGNOSIS — N17.9 ACUTE RENAL FAILURE SUPERIMPOSED ON CHRONIC KIDNEY DISEASE, UNSPECIFIED CKD STAGE, UNSPECIFIED ACUTE RENAL FAILURE TYPE: ICD-10-CM

## 2022-04-04 DIAGNOSIS — J44.1 ACUTE EXACERBATION OF CHRONIC OBSTRUCTIVE PULMONARY DISEASE (COPD): Primary | ICD-10-CM

## 2022-04-04 LAB
ALBUMIN SERPL-MCNC: 4.1 G/DL (ref 3.5–5.2)
ALBUMIN/GLOB SERPL: 1.2 G/DL
ALP SERPL-CCNC: 72 U/L (ref 39–117)
ALT SERPL W P-5'-P-CCNC: 11 U/L (ref 1–41)
ANION GAP SERPL CALCULATED.3IONS-SCNC: 11.1 MMOL/L (ref 5–15)
AST SERPL-CCNC: 15 U/L (ref 1–40)
B PARAPERT DNA SPEC QL NAA+PROBE: NOT DETECTED
B PERT DNA SPEC QL NAA+PROBE: NOT DETECTED
BASOPHILS # BLD AUTO: 0.03 10*3/MM3 (ref 0–0.2)
BASOPHILS NFR BLD AUTO: 0.3 % (ref 0–1.5)
BILIRUB SERPL-MCNC: 0.3 MG/DL (ref 0–1.2)
BUN SERPL-MCNC: 30 MG/DL (ref 8–23)
BUN/CREAT SERPL: 15.4 (ref 7–25)
C PNEUM DNA NPH QL NAA+NON-PROBE: NOT DETECTED
CALCIUM SPEC-SCNC: 9.4 MG/DL (ref 8.6–10.5)
CHLORIDE SERPL-SCNC: 101 MMOL/L (ref 98–107)
CO2 SERPL-SCNC: 25.9 MMOL/L (ref 22–29)
CREAT SERPL-MCNC: 1.95 MG/DL (ref 0.76–1.27)
DEPRECATED RDW RBC AUTO: 40.8 FL (ref 37–54)
EGFRCR SERPLBLD CKD-EPI 2021: 37.2 ML/MIN/1.73
EOSINOPHIL # BLD AUTO: 0.05 10*3/MM3 (ref 0–0.4)
EOSINOPHIL NFR BLD AUTO: 0.6 % (ref 0.3–6.2)
ERYTHROCYTE [DISTWIDTH] IN BLOOD BY AUTOMATED COUNT: 12.9 % (ref 12.3–15.4)
FLUAV H3 RNA NPH QL NAA+PROBE: DETECTED
FLUBV RNA ISLT QL NAA+PROBE: NOT DETECTED
GLOBULIN UR ELPH-MCNC: 3.4 GM/DL
GLUCOSE SERPL-MCNC: 102 MG/DL (ref 65–99)
HADV DNA SPEC NAA+PROBE: NOT DETECTED
HCOV 229E RNA SPEC QL NAA+PROBE: NOT DETECTED
HCOV HKU1 RNA SPEC QL NAA+PROBE: NOT DETECTED
HCOV NL63 RNA SPEC QL NAA+PROBE: NOT DETECTED
HCOV OC43 RNA SPEC QL NAA+PROBE: NOT DETECTED
HCT VFR BLD AUTO: 47.2 % (ref 37.5–51)
HGB BLD-MCNC: 16.4 G/DL (ref 13–17.7)
HMPV RNA NPH QL NAA+NON-PROBE: NOT DETECTED
HPIV1 RNA ISLT QL NAA+PROBE: NOT DETECTED
HPIV2 RNA SPEC QL NAA+PROBE: NOT DETECTED
HPIV3 RNA NPH QL NAA+PROBE: NOT DETECTED
HPIV4 P GENE NPH QL NAA+PROBE: NOT DETECTED
IMM GRANULOCYTES # BLD AUTO: 0.03 10*3/MM3 (ref 0–0.05)
IMM GRANULOCYTES NFR BLD AUTO: 0.3 % (ref 0–0.5)
LYMPHOCYTES # BLD AUTO: 0.79 10*3/MM3 (ref 0.7–3.1)
LYMPHOCYTES NFR BLD AUTO: 9.2 % (ref 19.6–45.3)
M PNEUMO IGG SER IA-ACNC: NOT DETECTED
MAGNESIUM SERPL-MCNC: 2.1 MG/DL (ref 1.6–2.4)
MCH RBC QN AUTO: 30.7 PG (ref 26.6–33)
MCHC RBC AUTO-ENTMCNC: 34.7 G/DL (ref 31.5–35.7)
MCV RBC AUTO: 88.4 FL (ref 79–97)
MONOCYTES # BLD AUTO: 0.55 10*3/MM3 (ref 0.1–0.9)
MONOCYTES NFR BLD AUTO: 6.4 % (ref 5–12)
NEUTROPHILS NFR BLD AUTO: 7.18 10*3/MM3 (ref 1.7–7)
NEUTROPHILS NFR BLD AUTO: 83.2 % (ref 42.7–76)
NRBC BLD AUTO-RTO: 0 /100 WBC (ref 0–0.2)
NT-PROBNP SERPL-MCNC: 726 PG/ML (ref 0–900)
PLATELET # BLD AUTO: 221 10*3/MM3 (ref 140–450)
PMV BLD AUTO: 9.3 FL (ref 6–12)
POTASSIUM SERPL-SCNC: 5 MMOL/L (ref 3.5–5.2)
PROCALCITONIN SERPL-MCNC: 0.32 NG/ML (ref 0–0.25)
PROT SERPL-MCNC: 7.5 G/DL (ref 6–8.5)
RBC # BLD AUTO: 5.34 10*6/MM3 (ref 4.14–5.8)
RHINOVIRUS RNA SPEC NAA+PROBE: NOT DETECTED
RSV RNA NPH QL NAA+NON-PROBE: NOT DETECTED
SARS-COV-2 RNA NPH QL NAA+NON-PROBE: NOT DETECTED
SODIUM SERPL-SCNC: 138 MMOL/L (ref 136–145)
TROPONIN T SERPL-MCNC: 0.02 NG/ML (ref 0–0.03)
WBC NRBC COR # BLD: 8.63 10*3/MM3 (ref 3.4–10.8)

## 2022-04-04 PROCEDURE — 25010000002 METHYLPREDNISOLONE PER 40 MG: Performed by: INTERNAL MEDICINE

## 2022-04-04 PROCEDURE — 85025 COMPLETE CBC W/AUTO DIFF WBC: CPT | Performed by: EMERGENCY MEDICINE

## 2022-04-04 PROCEDURE — 93010 ELECTROCARDIOGRAM REPORT: CPT | Performed by: INTERNAL MEDICINE

## 2022-04-04 PROCEDURE — 83735 ASSAY OF MAGNESIUM: CPT | Performed by: EMERGENCY MEDICINE

## 2022-04-04 PROCEDURE — 83880 ASSAY OF NATRIURETIC PEPTIDE: CPT | Performed by: EMERGENCY MEDICINE

## 2022-04-04 PROCEDURE — 0202U NFCT DS 22 TRGT SARS-COV-2: CPT | Performed by: EMERGENCY MEDICINE

## 2022-04-04 PROCEDURE — 94640 AIRWAY INHALATION TREATMENT: CPT

## 2022-04-04 PROCEDURE — 25010000002 METHYLPREDNISOLONE PER 125 MG: Performed by: EMERGENCY MEDICINE

## 2022-04-04 PROCEDURE — 84145 PROCALCITONIN (PCT): CPT | Performed by: EMERGENCY MEDICINE

## 2022-04-04 PROCEDURE — 80053 COMPREHEN METABOLIC PANEL: CPT | Performed by: EMERGENCY MEDICINE

## 2022-04-04 PROCEDURE — 71045 X-RAY EXAM CHEST 1 VIEW: CPT

## 2022-04-04 PROCEDURE — 84484 ASSAY OF TROPONIN QUANT: CPT | Performed by: EMERGENCY MEDICINE

## 2022-04-04 PROCEDURE — 99285 EMERGENCY DEPT VISIT HI MDM: CPT

## 2022-04-04 PROCEDURE — 94799 UNLISTED PULMONARY SVC/PX: CPT

## 2022-04-04 PROCEDURE — 94761 N-INVAS EAR/PLS OXIMETRY MLT: CPT

## 2022-04-04 PROCEDURE — 93005 ELECTROCARDIOGRAM TRACING: CPT | Performed by: EMERGENCY MEDICINE

## 2022-04-04 PROCEDURE — 25010000002 MAGNESIUM SULFATE 2 GM/50ML SOLUTION: Performed by: EMERGENCY MEDICINE

## 2022-04-04 RX ORDER — METHYLPREDNISOLONE SODIUM SUCCINATE 125 MG/2ML
125 INJECTION, POWDER, LYOPHILIZED, FOR SOLUTION INTRAMUSCULAR; INTRAVENOUS ONCE
Status: COMPLETED | OUTPATIENT
Start: 2022-04-04 | End: 2022-04-04

## 2022-04-04 RX ORDER — MAGNESIUM SULFATE HEPTAHYDRATE 40 MG/ML
2 INJECTION, SOLUTION INTRAVENOUS ONCE
Status: COMPLETED | OUTPATIENT
Start: 2022-04-04 | End: 2022-04-04

## 2022-04-04 RX ORDER — METHYLPREDNISOLONE SODIUM SUCCINATE 40 MG/ML
40 INJECTION, POWDER, LYOPHILIZED, FOR SOLUTION INTRAMUSCULAR; INTRAVENOUS EVERY 8 HOURS
Status: DISCONTINUED | OUTPATIENT
Start: 2022-04-04 | End: 2022-04-05

## 2022-04-04 RX ORDER — ALBUTEROL SULFATE 2.5 MG/3ML
2.5 SOLUTION RESPIRATORY (INHALATION)
Status: COMPLETED | OUTPATIENT
Start: 2022-04-04 | End: 2022-04-04

## 2022-04-04 RX ORDER — ISOSORBIDE MONONITRATE 30 MG/1
30 TABLET, EXTENDED RELEASE ORAL
Status: DISCONTINUED | OUTPATIENT
Start: 2022-04-05 | End: 2022-04-10 | Stop reason: HOSPADM

## 2022-04-04 RX ORDER — LEVOTHYROXINE SODIUM 0.12 MG/1
125 TABLET ORAL
Status: DISCONTINUED | OUTPATIENT
Start: 2022-04-05 | End: 2022-04-10 | Stop reason: HOSPADM

## 2022-04-04 RX ORDER — PANTOPRAZOLE SODIUM 40 MG/1
40 TABLET, DELAYED RELEASE ORAL EVERY MORNING
Status: DISCONTINUED | OUTPATIENT
Start: 2022-04-05 | End: 2022-04-10 | Stop reason: HOSPADM

## 2022-04-04 RX ORDER — IPRATROPIUM BROMIDE AND ALBUTEROL SULFATE 2.5; .5 MG/3ML; MG/3ML
3 SOLUTION RESPIRATORY (INHALATION) ONCE
Status: COMPLETED | OUTPATIENT
Start: 2022-04-04 | End: 2022-04-04

## 2022-04-04 RX ORDER — BUDESONIDE AND FORMOTEROL FUMARATE DIHYDRATE 160; 4.5 UG/1; UG/1
2 AEROSOL RESPIRATORY (INHALATION)
Status: DISCONTINUED | OUTPATIENT
Start: 2022-04-04 | End: 2022-04-07

## 2022-04-04 RX ORDER — ATORVASTATIN CALCIUM 20 MG/1
20 TABLET, FILM COATED ORAL DAILY
Status: DISCONTINUED | OUTPATIENT
Start: 2022-04-05 | End: 2022-04-10 | Stop reason: HOSPADM

## 2022-04-04 RX ORDER — TRAMADOL HYDROCHLORIDE 50 MG/1
50 TABLET ORAL 2 TIMES DAILY
Status: DISCONTINUED | OUTPATIENT
Start: 2022-04-04 | End: 2022-04-04

## 2022-04-04 RX ORDER — OSELTAMIVIR PHOSPHATE 30 MG/1
30 CAPSULE ORAL EVERY 12 HOURS SCHEDULED
Status: DISCONTINUED | OUTPATIENT
Start: 2022-04-04 | End: 2022-04-06

## 2022-04-04 RX ORDER — ONDANSETRON 4 MG/1
4 TABLET, FILM COATED ORAL EVERY 6 HOURS PRN
Status: DISCONTINUED | OUTPATIENT
Start: 2022-04-04 | End: 2022-04-10 | Stop reason: HOSPADM

## 2022-04-04 RX ORDER — MULTIPLE VITAMINS W/ MINERALS TAB 9MG-400MCG
1 TAB ORAL DAILY
Status: DISCONTINUED | OUTPATIENT
Start: 2022-04-05 | End: 2022-04-10 | Stop reason: HOSPADM

## 2022-04-04 RX ORDER — LATANOPROST 50 UG/ML
1 SOLUTION/ DROPS OPHTHALMIC NIGHTLY
Status: DISCONTINUED | OUTPATIENT
Start: 2022-04-04 | End: 2022-04-10 | Stop reason: HOSPADM

## 2022-04-04 RX ORDER — MELATONIN
5000 DAILY
Status: DISCONTINUED | OUTPATIENT
Start: 2022-04-05 | End: 2022-04-10 | Stop reason: HOSPADM

## 2022-04-04 RX ORDER — NITROGLYCERIN 0.4 MG/1
0.4 TABLET SUBLINGUAL
Status: DISCONTINUED | OUTPATIENT
Start: 2022-04-04 | End: 2022-04-10 | Stop reason: HOSPADM

## 2022-04-04 RX ORDER — FERROUS SULFATE 325(65) MG
325 TABLET ORAL
Status: DISCONTINUED | OUTPATIENT
Start: 2022-04-05 | End: 2022-04-10 | Stop reason: HOSPADM

## 2022-04-04 RX ORDER — ASPIRIN 81 MG/1
81 TABLET, CHEWABLE ORAL NIGHTLY
Status: DISCONTINUED | OUTPATIENT
Start: 2022-04-04 | End: 2022-04-10 | Stop reason: HOSPADM

## 2022-04-04 RX ORDER — AMLODIPINE BESYLATE 10 MG/1
10 TABLET ORAL DAILY
Status: DISCONTINUED | OUTPATIENT
Start: 2022-04-05 | End: 2022-04-10 | Stop reason: HOSPADM

## 2022-04-04 RX ORDER — IPRATROPIUM BROMIDE AND ALBUTEROL SULFATE 2.5; .5 MG/3ML; MG/3ML
3 SOLUTION RESPIRATORY (INHALATION) EVERY 4 HOURS PRN
Status: DISCONTINUED | OUTPATIENT
Start: 2022-04-04 | End: 2022-04-10 | Stop reason: HOSPADM

## 2022-04-04 RX ORDER — UREA 10 %
3 LOTION (ML) TOPICAL NIGHTLY PRN
Status: DISCONTINUED | OUTPATIENT
Start: 2022-04-04 | End: 2022-04-10 | Stop reason: HOSPADM

## 2022-04-04 RX ORDER — GABAPENTIN 300 MG/1
300 CAPSULE ORAL 3 TIMES DAILY
Status: DISCONTINUED | OUTPATIENT
Start: 2022-04-04 | End: 2022-04-10 | Stop reason: HOSPADM

## 2022-04-04 RX ORDER — ALPRAZOLAM 0.5 MG/1
1 TABLET ORAL 3 TIMES DAILY PRN
Status: DISCONTINUED | OUTPATIENT
Start: 2022-04-04 | End: 2022-04-10 | Stop reason: HOSPADM

## 2022-04-04 RX ORDER — BUPROPION HYDROCHLORIDE 300 MG/1
300 TABLET ORAL DAILY
Status: DISCONTINUED | OUTPATIENT
Start: 2022-04-05 | End: 2022-04-10 | Stop reason: HOSPADM

## 2022-04-04 RX ORDER — OSELTAMIVIR PHOSPHATE 75 MG/1
75 CAPSULE ORAL ONCE
Status: COMPLETED | OUTPATIENT
Start: 2022-04-04 | End: 2022-04-04

## 2022-04-04 RX ORDER — TRAMADOL HYDROCHLORIDE 50 MG/1
50 TABLET ORAL EVERY 12 HOURS PRN
Status: DISCONTINUED | OUTPATIENT
Start: 2022-04-04 | End: 2022-04-10 | Stop reason: HOSPADM

## 2022-04-04 RX ORDER — IPRATROPIUM BROMIDE AND ALBUTEROL SULFATE 2.5; .5 MG/3ML; MG/3ML
3 SOLUTION RESPIRATORY (INHALATION)
Status: DISCONTINUED | OUTPATIENT
Start: 2022-04-04 | End: 2022-04-05

## 2022-04-04 RX ORDER — ONDANSETRON 2 MG/ML
4 INJECTION INTRAMUSCULAR; INTRAVENOUS EVERY 6 HOURS PRN
Status: DISCONTINUED | OUTPATIENT
Start: 2022-04-04 | End: 2022-04-10 | Stop reason: HOSPADM

## 2022-04-04 RX ORDER — ACETAMINOPHEN 325 MG/1
650 TABLET ORAL EVERY 4 HOURS PRN
Status: DISCONTINUED | OUTPATIENT
Start: 2022-04-04 | End: 2022-04-10 | Stop reason: HOSPADM

## 2022-04-04 RX ORDER — CLOPIDOGREL BISULFATE 75 MG/1
75 TABLET ORAL NIGHTLY
Status: DISCONTINUED | OUTPATIENT
Start: 2022-04-04 | End: 2022-04-10 | Stop reason: HOSPADM

## 2022-04-04 RX ADMIN — IPRATROPIUM BROMIDE AND ALBUTEROL SULFATE 3 ML: .5; 3 SOLUTION RESPIRATORY (INHALATION) at 20:46

## 2022-04-04 RX ADMIN — ALBUTEROL SULFATE 2.5 MG: 2.5 SOLUTION RESPIRATORY (INHALATION) at 14:50

## 2022-04-04 RX ADMIN — CLOPIDOGREL 75 MG: 75 TABLET, FILM COATED ORAL at 22:07

## 2022-04-04 RX ADMIN — ALBUTEROL SULFATE 2.5 MG: 2.5 SOLUTION RESPIRATORY (INHALATION) at 14:51

## 2022-04-04 RX ADMIN — IPRATROPIUM BROMIDE AND ALBUTEROL SULFATE 3 ML: 2.5; .5 SOLUTION RESPIRATORY (INHALATION) at 14:43

## 2022-04-04 RX ADMIN — METHYLPREDNISOLONE SODIUM SUCCINATE 40 MG: 40 INJECTION, POWDER, FOR SOLUTION INTRAMUSCULAR; INTRAVENOUS at 22:07

## 2022-04-04 RX ADMIN — ALBUTEROL SULFATE 2.5 MG: 2.5 SOLUTION RESPIRATORY (INHALATION) at 15:42

## 2022-04-04 RX ADMIN — OSELTAMIVIR PHOSPHATE 75 MG: 75 CAPSULE ORAL at 16:09

## 2022-04-04 RX ADMIN — IPRATROPIUM BROMIDE AND ALBUTEROL SULFATE 3 ML: 2.5; .5 SOLUTION RESPIRATORY (INHALATION) at 15:25

## 2022-04-04 RX ADMIN — SODIUM CHLORIDE 500 ML: 9 INJECTION, SOLUTION INTRAVENOUS at 15:31

## 2022-04-04 RX ADMIN — LATANOPROST 1 DROP: 50 SOLUTION OPHTHALMIC at 23:18

## 2022-04-04 RX ADMIN — ALPRAZOLAM 1 MG: 0.5 TABLET ORAL at 22:07

## 2022-04-04 RX ADMIN — MAGNESIUM SULFATE HEPTAHYDRATE 2 G: 2 INJECTION, SOLUTION INTRAVENOUS at 14:52

## 2022-04-04 RX ADMIN — ASPIRIN 81 MG: 81 TABLET, CHEWABLE ORAL at 23:18

## 2022-04-04 RX ADMIN — METHYLPREDNISOLONE SODIUM SUCCINATE 125 MG: 125 INJECTION, POWDER, FOR SOLUTION INTRAMUSCULAR; INTRAVENOUS at 14:53

## 2022-04-04 RX ADMIN — ALBUTEROL SULFATE 2.5 MG: 2.5 SOLUTION RESPIRATORY (INHALATION) at 15:35

## 2022-04-04 RX ADMIN — GABAPENTIN 300 MG: 300 CAPSULE ORAL at 22:07

## 2022-04-04 NOTE — ED PROVIDER NOTES
EMERGENCY DEPARTMENT ENCOUNTER  I wore full protective equipment throughout this patient encounter including a N95 mask, eye shield, gown and gloves. Hand hygiene was performed before donning protective equipment and after removal when leaving the room.    Room Number:  27/27  Date of encounter:  4/4/2022  PCP: Seth Hester MD    HPI:  Context: Sim Agustin is a 66 y.o. male who presents to the ED c/o chief complaint of shortness of breath.  Patient reports history of COPD, not on oxygen during daytime, is on oxygen 3 L at nighttime with CPAP, followed by pulmonology Dr. Tate.  Patient reports that Thursday began having runny nose and a cough.  Patient reports Friday he began feeling short of breath.  Patient reports he has been wheezing, short of breath at rest, worse with exertion.  Patient reports he has been using his home oxygen for last several days.  Patient denies any orthopnea or paroxysmal nocturnal dyspnea, patient has a previous amputation of his left leg but denies any swelling in his right leg, no chest pain, no history of heart failure.  Patient does report that the cough is productive in nature, was having runny nose and congestion but that has since resolved.  Patient denies any loss of sense of smell or taste, no vomiting or diarrhea.  Patient does report that at one point he had subjective fevers but that has since resolved, denies any fevers, no shakes chills or night sweats.  Patient denies any recent sick contacts, has been vaccinated against COVID-19.    MEDICAL HISTORY REVIEW  Reviewed in EPIC    PAST MEDICAL HISTORY  Active Ambulatory Problems     Diagnosis Date Noted   • H/O angiodysplasia of intestinal tract 03/04/2016   • COPD (chronic obstructive pulmonary disease) ([facility]) 03/04/2016   • S/P colectomy 03/04/2016   • Status post above-knee amputation of left lower extremity ([facility]) 03/28/2016   • CAD (coronary artery disease) 04/04/2016   • PVD (peripheral vascular disease) ([facility])  04/06/2016   • Essential hypertension 01/31/2014   • Cardiomyopathy, unspecified ([facility]) 03/28/2019   • PRISCILLA treated with BiPAP 03/03/2020   • Anxiety disorder 03/03/2020   • Chronic midline low back pain without sciatica 03/03/2020   • Long term prescription benzodiazepine use 03/03/2020   • History of bradycardia 03/03/2020   • Hypothyroidism 03/03/2020   • Vitamin B12 deficiency 03/03/2020   • Vitamin D deficiency 03/03/2020   • Iron deficiency 03/03/2020   • History of smoking 30 or more pack years 03/03/2020   • High risk medication use 03/03/2020   • DNR (do not resuscitate) 09/01/2020   • CKD (chronic kidney disease) 01/06/2021   • Hyperlipidemia 09/09/2021   • Proteinuria 09/12/2021     Resolved Ambulatory Problems     Diagnosis Date Noted   • Acute upper GI bleed 03/04/2016   • Hypotension 03/04/2016   • ARF (acute renal failure) ([facility]) 03/04/2016   • Atelectasis, left 03/04/2016   • 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[facility]) 03/05/2016   • Aortoenteric fistula ([facility]) 03/05/2016   • Compartment syndrome of left lower extremity ([facility]) 03/05/2016   • Peripheral arteriosclerosis ([facility]) 03/17/2016   • Sepsis, unspecified organism ([facility]) 04/05/2016   • Nosocomial pneumonia 04/06/2016   • ESRD (end stage renal disease) on dialysis ([facility]) 04/06/2016   • Anemia in chronic kidney disease 04/06/2016   • Hyperkalemia 04/11/2016   • Pleural effusion on left 04/11/2016   • S/P thoracentesis 04/11/2016   • Ischemic colitis ([facility]) 05/24/2016   • Colostomy prolapse ([facility]) 05/24/2016   • Acute upper respiratory infection 02/06/2013   • Chronic obstructive pulmonary disease with acute exacerbation ([facility]) 02/06/2013   • Fracture of patella 03/03/2015   • Ischemia of large intestine ([facility]) 10/13/2016   • Incisional hernia, without obstruction or gangrene 07/13/2017   • Acute respiratory failure with hypoxia and hypercarbia ([facility]) 03/11/2019   • Bradycardia 05/23/2019   • COPD with hypoxia ([facility]) 05/23/2019   • Elevated hematocrit 01/06/2021     Past  Medical History:   Diagnosis Date   • Anemia    • Colon polyps    • Compartment syndrome (HCC)    • Cough    • Disease of thyroid gland    • Diverticulosis    • Employs prosthetic leg    • History of acute renal failure    • History of CHF (congestive heart failure)    • History of GI bleed 02/2016   • History of sepsis 02/2016   • History of transfusion    • Hypertension    • Nonischemic cardiomyopathy (HCC)    • Phantom limb pain (HCC)    • Pneumonia        PAST SURGICAL HISTORY  Past Surgical History:   Procedure Laterality Date   • ABOVE KNEE AMPUTATION Left 3/16/2016    Procedure: LT AMPUTATION ABOVE KNEE;  Surgeon: Jose Swann MD;  Location: [facility] OR;  Service:    • ARTERIAL THROMBECTOMY  2001    throbectomy of arterial graft   • AXILLARY FEMORAL BYPASS Right 02/15/2016    Exploratory lapartomy, repair aortoduodenal fistula, resection infected aortobifemoral bypass graft, axillobi-superficial femoral artery [place]-Aneudy bypass graft from right axillary artery, Repair of duodenotomy 4th portion duodenum-Dr. Jose Swann, Dr. Genaro Perrin   • BRONCHOSCOPY Left 03/04/2016    Dr. NATALIIA Tate   • BRONCHOSCOPY RIGID / FLEXIBLE N/A 03/03/2016    Dr. NATALIIA Tate   • BRONCHOSCOPY RIGID / FLEXIBLE N/A 02/26/2016    Dr. NATALIIA Tate   • CARDIAC CATHETERIZATION N/A 3/18/2019    Procedure: Right and Left Heart Cath;  Surgeon: Nina Storey MD;  Location: [facility] CATH INVASIVE LOCATION;  Service: Cardiovascular   • CATARACT EXTRACTION WITH INTRAOCULAR LENS IMPLANT Bilateral    • COLON RESECTION WITH COLOSTOMY Left 02/28/2016    Exploratory laparotomy with open left hemicolectomy, end-colostomy, G-tube and J-tube-Dr. Endy Chaney   • COLONOSCOPY N/A 2015    Dr. Laughlin   • COLONOSCOPY N/A 10/12/2016    Procedure: COLONOSCOPY TO 20 CM AND PER STOMA TO 30CM;  Surgeon: Genaro Perrin MD;  Location: [facility] ENDOSCOPY;  Service:    • COLONOSCOPY N/A 10/21/2016    Procedure: COLONOSCOPY DONE AT  BEDSIDE ONTO CECEM;  Surgeon: Genaro Perrin MD;  Location: [facility] ENDOSCOPY;  Service:    • COLONOSCOPY N/A 02/10/2016    Diverticulosis in the entire examined colon, non-bleeding internal hemorrhoids-Dr. Taylor Pina   • COLONOSCOPY N/A 02/11/2016    Poor prep, blood in the entire examined colon, normal ileum-Dr. Taylor Pina   • COLONOSCOPY W/ POLYPECTOMY N/A 07/27/2015    Normal ileum, diverticulosis in the sigmoid colon: resected and retrieved, one 6 mm polyp in the descending colon: resected and retrieved, the distal rectum and anal verge are normal on retroflexion view-Dr. Miguel Ángel Laughlin   • COLOSTOMY CLOSURE N/A 10/13/2016    Procedure: COLOSTOMY TAKEDOWN OR CLOSURE, APPENDECTOMY;  Surgeon: Genaro Perrin MD;  Location: [facility] OR;  Service:    • DEBRIDEMENT LEG Left 03/01/2016    Excisional debridement of the skin, subcutantous tissue, tendon and muscle left calf-Dr. Jose Swann   • DEBRIDEMENT LEG Left 02/25/2016    Excisional debridement full-thcikness skin and subcutaneous tissue and tendon and muscle, left medial and lateral calf muscles-Dr. Jose Swann   • ENDOSCOPY N/A 10/21/2016    Procedure: ESOPHAGOGASTRODUODENOSCOPY DONE AT BEDSIDE;  Surgeon: Genaro Perrin MD;  Location: [facility] ENDOSCOPY;  Service:    • ENDOSCOPY AND COLONOSCOPY N/A 02/28/2016    EGD and Colonoscopy with Candy ink injection-Dr. Endy Chaney   • ENTEROSCOPY SMALL BOWEL N/A 02/11/2016    Normal esophagus, normal stomach, normal duodenum, portion of the jejunum normal-Dr. Taylor Pina   • ILIAC ARTERY - FEMORAL ARTERY BYPASS GRAFT Bilateral 2002   • ILIAC ARTERY STENT Bilateral 2001   • INSERTION AND REMOVAL PERITONEAL DIALYSIS CATHETER Right 02/29/2016    Exchange right jugular 16 cm [place]kar dialysis catheter-Dr. Jose Swann   • INSERTION PERITONEAL DIALYSIS CATHETER Left 03/01/2016    Ultrasound-guided access of the left jugular vein, 23 cm left jugular palindrome dialysis catheter  placement-Dr. Jose Swann   • INSERTION PERITONEAL DIALYSIS CATHETER Right 2016    Right jugular Mahrkar catherer placement-Dr. Jose Swann   • JOINT REPLACEMENT Left    • THORACOSCOPY Left 2016    Procedure: LT THORACOSCOPY VIDEO ASSISTED WITH DECORDICATION;  Surgeon: Genaro Davila III, MD;  Location: [facility];  Service:    • THROMBECTOMY Left 2016    Thombectomy of left femoral graft, left leg arteriogram, left calf forward compartment fasciotomy-Dr. Jose Swann   • TOTAL HIP ARTHROPLASTY Left    • UPPER GASTROINTESTINAL ENDOSCOPY N/A 2016    Normal UGI-Dr. Ajay Parkinson   • UPPER GASTROINTESTINAL ENDOSCOPY N/A 2015    Small hiatal hernia, chronic gastritis: biopsied, non-bleeding angioectasias in the stomach: treated with argon plasma coagulation, multiple bleeding angioectasias in the dudenum: treated with argon plasma coagulation-Dr. Mykel Jaramillo   • VASCULAR SURGERY      MULTIPLE   • VENTRAL/INCISIONAL HERNIA REPAIR N/A 10/19/2017    Procedure: VENTRAL HERNIA REPAIR WITH MESH AND BILATERAL COMPONENT SEPARATION;  Surgeon: Genaro Perrin MD;  Location: [facility];  Service:    • WISDOM TOOTH EXTRACTION         FAMILY HISTORY  Family History   Problem Relation Age of Onset   • Lung cancer Mother    • Cancer Mother    • Heart disease Father    • Diabetes Father    • Hypertension Father    • Malig Hyperthermia Neg Hx        SOCIAL HISTORY  Social History     Socioeconomic History   • Marital status:    Tobacco Use   • Smoking status: Former Smoker     Packs/day: 0.00     Years: 30.00     Pack years: 0.00     Types: Cigarettes     Quit date: 2021     Years since quittin.7   • Smokeless tobacco: Never Used   • Tobacco comment: caffeine - rarely   Substance and Sexual Activity   • Alcohol use: No   • Drug use: Never   • Sexual activity: Yes     Partners: Female     Birth control/protection: Post-menopausal       ALLERGIES  Augmentin  [amoxicillin-pot clavulanate]    The patient's allergies have been reviewed    REVIEW OF SYSTEMS  All systems reviewed and negative except for those discussed in HPI.     PHYSICAL EXAM  I have reviewed the triage vital signs and nursing notes.  ED Triage Vitals [04/04/22 1342]   Temp Heart Rate Resp BP SpO2   97.6 °F (36.4 °C) 79 18 128/79 98 %      Temp src Heart Rate Source Patient Position BP Location FiO2 (%)   -- -- -- -- --       General: No acute distress.  HENT: NCAT, PERRL, Nares patent.  Eyes: no scleral icterus.  Neck: trachea midline, no ROM limitations.  CV: regular rhythm, regular rate.  Respiratory: normal effort, no accessory muscle use, currently requiring 3 L to maintain oxygen saturation of 96%, extremely diminished with rhonchi  Abdomen: soft, nondistended, NTTP, no rebound tenderness, no guarding or rigidity.  Musculoskeletal: no deformity.  Neuro: alert, moves all extremities, follows commands.  Skin: warm, dry.    LAB RESULTS  Recent Results (from the past 24 hour(s))   Comprehensive Metabolic Panel    Collection Time: 04/04/22  2:21 PM    Specimen: Blood   Result Value Ref Range    Glucose 102 (H) 65 - 99 mg/dL    BUN 30 (H) 8 - 23 mg/dL    Creatinine 1.95 (H) 0.76 - 1.27 mg/dL    Sodium 138 136 - 145 mmol/L    Potassium 5.0 3.5 - 5.2 mmol/L    Chloride 101 98 - 107 mmol/L    CO2 25.9 22.0 - 29.0 mmol/L    Calcium 9.4 8.6 - 10.5 mg/dL    Total Protein 7.5 6.0 - 8.5 g/dL    Albumin 4.10 3.50 - 5.20 g/dL    ALT (SGPT) 11 1 - 41 U/L    AST (SGOT) 15 1 - 40 U/L    Alkaline Phosphatase 72 39 - 117 U/L    Total Bilirubin 0.3 0.0 - 1.2 mg/dL    Globulin 3.4 gm/dL    A/G Ratio 1.2 g/dL    BUN/Creatinine Ratio 15.4 7.0 - 25.0    Anion Gap 11.1 5.0 - 15.0 mmol/L    eGFR 37.2 (L) >60.0 mL/min/1.73   Troponin    Collection Time: 04/04/22  2:21 PM    Specimen: Blood   Result Value Ref Range    Troponin T 0.018 0.000 - 0.030 ng/mL   BNP    Collection Time: 04/04/22  2:21 PM    Specimen: Blood   Result  Value Ref Range    proBNP 726.0 0.0 - 900.0 pg/mL   Magnesium    Collection Time: 04/04/22  2:21 PM    Specimen: Blood   Result Value Ref Range    Magnesium 2.1 1.6 - 2.4 mg/dL   Procalcitonin    Collection Time: 04/04/22  2:21 PM    Specimen: Blood   Result Value Ref Range    Procalcitonin 0.32 (H) 0.00 - 0.25 ng/mL   CBC Auto Differential    Collection Time: 04/04/22  2:21 PM    Specimen: Blood   Result Value Ref Range    WBC 8.63 3.40 - 10.80 10*3/mm3    RBC 5.34 4.14 - 5.80 10*6/mm3    Hemoglobin 16.4 13.0 - 17.7 g/dL    Hematocrit 47.2 37.5 - 51.0 %    MCV 88.4 79.0 - 97.0 fL    MCH 30.7 26.6 - 33.0 pg    MCHC 34.7 31.5 - 35.7 g/dL    RDW 12.9 12.3 - 15.4 %    RDW-SD 40.8 37.0 - 54.0 fl    MPV 9.3 6.0 - 12.0 fL    Platelets 221 140 - 450 10*3/mm3    Neutrophil % 83.2 (H) 42.7 - 76.0 %    Lymphocyte % 9.2 (L) 19.6 - 45.3 %    Monocyte % 6.4 5.0 - 12.0 %    Eosinophil % 0.6 0.3 - 6.2 %    Basophil % 0.3 0.0 - 1.5 %    Immature Grans % 0.3 0.0 - 0.5 %    Neutrophils, Absolute 7.18 (H) 1.70 - 7.00 10*3/mm3    Lymphocytes, Absolute 0.79 0.70 - 3.10 10*3/mm3    Monocytes, Absolute 0.55 0.10 - 0.90 10*3/mm3    Eosinophils, Absolute 0.05 0.00 - 0.40 10*3/mm3    Basophils, Absolute 0.03 0.00 - 0.20 10*3/mm3    Immature Grans, Absolute 0.03 0.00 - 0.05 10*3/mm3    nRBC 0.0 0.0 - 0.2 /100 WBC   Respiratory Panel PCR w/COVID-19(SARS-CoV-2) PORFIRIO/MARKUS/MARISOL/PAD/COR/MAD/MADDY In-House, NP Swab in UTM/VTM, 3-4 HR TAT - Swab, Nasopharynx    Collection Time: 04/04/22  2:23 PM    Specimen: Nasopharynx; Swab   Result Value Ref Range    ADENOVIRUS, PCR Not Detected Not Detected    Coronavirus 229E Not Detected Not Detected    Coronavirus HKU1 Not Detected Not Detected    Coronavirus NL63 Not Detected Not Detected    Coronavirus OC43 Not Detected Not Detected    COVID19 Not Detected Not Detected - Ref. Range    Human Metapneumovirus Not Detected Not Detected    Human Rhinovirus/Enterovirus Not Detected Not Detected    Influenza A H3  Detected (A) Not Detected    Influenza B PCR Not Detected Not Detected    Parainfluenza Virus 1 Not Detected Not Detected    Parainfluenza Virus 2 Not Detected Not Detected    Parainfluenza Virus 3 Not Detected Not Detected    Parainfluenza Virus 4 Not Detected Not Detected    RSV, PCR Not Detected Not Detected    Bordetella pertussis pcr Not Detected Not Detected    Bordetella parapertussis PCR Not Detected Not Detected    Chlamydophila pneumoniae PCR Not Detected Not Detected    Mycoplasma pneumo by PCR Not Detected Not Detected   ECG 12 Lead    Collection Time: 04/04/22  2:42 PM   Result Value Ref Range    QT Interval 442 ms       I ordered the above labs and reviewed the results.    RADIOLOGY  XR Chest 1 View    Result Date: 4/4/2022  XR CHEST 1 VW-  HISTORY: Male who is 66 years-old,  short of breath  TECHNIQUE: Frontal views of the chest  COMPARISON: 05/19/2021  FINDINGS: The heart size is normal. Aorta is calcified. Pulmonary vasculature is unremarkable. No focal pulmonary consolidation, pleural effusion, or pneumothorax. No acute osseous process.      No focal pulmonary consolidation. Follow-up as clinical indications persist.  This report was finalized on 4/4/2022 2:46 PM by Dr. Diogo Suarez M.D.        I ordered the above noted radiological studies. I reviewed the images and results. I agree with the radiologist interpretation.    PROCEDURES  Procedures    MEDICATIONS GIVEN IN ER  Medications   sodium chloride 0.9 % bolus 500 mL (500 mL Intravenous New Bag 4/4/22 1531)   oseltamivir (TAMIFLU) capsule 75 mg (has no administration in time range)   albuterol (PROVENTIL) nebulizer solution 0.083% 2.5 mg/3mL (2.5 mg Nebulization Given 4/4/22 1451)   ipratropium-albuterol (DUO-NEB) nebulizer solution 3 mL (3 mL Nebulization Given 4/4/22 1443)   magnesium sulfate 2g/50 mL (PREMIX) infusion (0 g Intravenous Stopped 4/4/22 1512)   methylPREDNISolone sodium succinate (SOLU-Medrol) injection 125 mg (125 mg  Intravenous Given 4/4/22 1453)   ipratropium-albuterol (DUO-NEB) nebulizer solution 3 mL (3 mL Nebulization Given 4/4/22 1525)   albuterol (PROVENTIL) nebulizer solution 0.083% 2.5 mg/3mL (2.5 mg Nebulization Given 4/4/22 1542)       PROGRESS, DATA ANALYSIS, CONSULTS, AND MEDICAL DECISION MAKING  A complete history and physical exam have been performed.  All available laboratory and imaging results have been reviewed by myself prior to disposition.    MDM  After the initial H&P, I discussed pertinent information from history and physical exam with patient/family.  Discussed differential diagnosis.  Discussed plan for ED evaluation/workup/treatment.  All questions answered.  Patient/family is agreeable with plan.  ED Course as of 04/04/22 1556   Mon Apr 04, 2022   1432 My differential diagnosis for dyspnea includes but is not limited to:  Asthma, COPD, pneumonia, pulmonary embolus, acute respiratory distress syndrome, pneumothorax, pleural effusion, pulmonary fibrosis, congestive heart failure, myocardial infarction, DKA, uremia, acidosis, sepsis, anemia, drug related, hyperventilation, CNS disease     [JG]   1457 EKG independently viewed and contemporaneously interpreted by ED physician. Time: 1442.  Rate 69.  Interpretation: Normal sinus rhythm, normal axis, normal QRS, no acute ST changes. [JG]   1518 Patient has mild acute on chronic kidney injury, giving IV fluids. [JG]   1518 Patient reassessed.  Patient reports moderate improvement with breathing treatment.  Repeat lung exam performed: Patient continues to be diminished, now has expiratory wheezing with prolonged expiration.  Discussed ED work-up and results to present, discussed plan for additional breathing treatment, discussed plan for admission.  Patient agreeable with plan, no questions or concerns. [JG]   1529 Oxygen weaned down to 2 L nasal cannula, maintain oxygen saturation of 96% without difficulty. [JG]   1536 Patient is Covid negative, flu  positive.  Patient is outside the normal treatment window for flu but given his age and comorbidities and the fact that he is requiring hospitalization, will treat with Tamiflu. [JG]   1540 Discontinued enhanced droplet isolation and placed in droplet isolation. [JG]   1555 Phone call with Dr. Rosas [facility].  Discussed the patient, relevant history, exam, diagnostics, ED findings/progress, and concerns. They agree to admit the patient to telemetry. Care assumed by the admitting physician at this time.     [JG]      ED Course User Index  [JG] Brendan Boggs MD       AS OF 15:56 EDT VITALS:    BP - 128/79  HR - 62  TEMP - 97.6 °F (36.4 °C)  O2 SATS - 98%    DIAGNOSIS  Final diagnoses:   Acute exacerbation of chronic obstructive pulmonary disease (COPD) (HCC)   Hypoxia   Acute renal failure superimposed on chronic kidney disease, unspecified CKD stage, unspecified acute renal failure type (HCC)         DISPOSITION  ADMISSION    Discussed treatment plan and reason for admission with pt/family and admitting physician.  Pt/family voiced understanding of the plan for admission for further testing/treatment as needed.          Brendan Boggs MD  04/04/22 4768

## 2022-04-04 NOTE — ED NOTES
Nursing report ED to floor  Sim Agustin  66 y.o.  male    HPI (triage note):   Chief Complaint   Patient presents with   • Shortness of Breath       Admitting doctor:   Ruth Rosas MD    Admitting diagnosis:   The primary encounter diagnosis was Acute exacerbation of chronic obstructive pulmonary disease (COPD) (HCC). Diagnoses of Hypoxia and Acute renal failure superimposed on chronic kidney disease, unspecified CKD stage, unspecified acute renal failure type (HCC) were also pertinent to this visit.    Code status:   Current Code Status     Date Active Code Status Order ID Comments User Context       Prior    Advance Care Planning Activity          Allergies:   Augmentin [amoxicillin-pot clavulanate]    Weight:   There were no vitals filed for this visit.    Most recent vitals:   Vitals:    04/04/22 1601 04/04/22 1631 04/04/22 1731 04/04/22 1801   BP: 139/77 144/75 132/75 139/79   Pulse: 62 60 58    Resp:   15    Temp:       SpO2: 99% 99% 99%        Active LDAs/IV Access:   Lines, Drains & Airways     Active LDAs     Name Placement date Placement time Site Days    Peripheral IV 04/04/22 1422 Right Antecubital 04/04/22  1422  Antecubital  less than 1                Labs (abnormal labs have a star):   Labs Reviewed   RESPIRATORY PANEL PCR W/ COVID-19 (SARS-COV-2) PORFIRIO/MARKUS/MARISOL/PAD/COR/MAD/MADDY IN-HOUSE, NP SWAB IN UTM/VTP, 3-4 HR TAT - Abnormal; Notable for the following components:       Result Value    Influenza A H3 Detected (*)     All other components within normal limits    Narrative:     In the setting of a positive respiratory panel with a viral infection PLUS a negative procalcitonin without other underlying concern for bacterial infection, consider observing off antibiotics or discontinuation of antibiotics and continue supportive care. If the respiratory panel is positive for atypical bacterial infection (Bordetella pertussis, Chlamydophila pneumoniae, or Mycoplasma pneumoniae), consider  "antibiotic de-escalation to target atypical bacterial infection.   COMPREHENSIVE METABOLIC PANEL - Abnormal; Notable for the following components:    Glucose 102 (*)     BUN 30 (*)     Creatinine 1.95 (*)     eGFR 37.2 (*)     All other components within normal limits    Narrative:     GFR Normal >60  Chronic Kidney Disease <60  Kidney Failure <15     PROCALCITONIN - Abnormal; Notable for the following components:    Procalcitonin 0.32 (*)     All other components within normal limits    Narrative:     As a Marker for Sepsis (Non-Neonates):    1. <0.5 ng/mL represents a low risk of severe sepsis and/or septic shock.  2. >2 ng/mL represents a high risk of severe sepsis and/or septic shock.    As a Marker for Lower Respiratory Tract Infections that require antibiotic therapy:    PCT on Admission    Antibiotic Therapy       6-12 Hrs later    >0.5                Strongly Recommended  >0.25 - <0.5        Recommended   0.1 - 0.25          Discouraged              Remeasure/reassess PCT  <0.1                Strongly Discouraged     Remeasure/reassess PCT    As 28 day mortality risk marker: \"Change in Procalcitonin Result\" (>80% or <=80%) if Day 0 (or Day 1) and Day 4 values are available. Refer to [email or web address][email or web address]-[email or web address]    Change in PCT <=80%  A decrease of PCT levels below or equal to 80% defines a positive change in PCT test result representing a higher risk for 28-day all-cause mortality of patients diagnosed with severe sepsis for septic shock.    Change in PCT >80%  A decrease of PCT levels of more than 80% defines a negative change in PCT result representing a lower risk for 28-day all-cause mortality of patients diagnosed with severe sepsis or septic shock.      CBC WITH AUTO DIFFERENTIAL - Abnormal; Notable for the following components:    Neutrophil % 83.2 (*)     Lymphocyte % 9.2 (*)     Neutrophils, Absolute 7.18 (*)     All other components within normal limits   TROPONIN (IN-HOUSE) - Normal    " Narrative:     Troponin T Reference Range:  <= 0.03 ng/mL-   Negative for AMI  >0.03 ng/mL-     Abnormal for myocardial necrosis.  Clinicians would have to utilize clinical acumen, EKG, Troponin and serial changes to determine if it is an Acute Myocardial Infarction or myocardial injury due to an underlying chronic condition.       Results may be falsely decreased if patient taking Biotin.     BNP (IN-HOUSE) - Normal    Narrative:     Among patients with dyspnea, NT-proBNP is highly sensitive for the detection of acute congestive heart failure. In addition NT-proBNP of <300 pg/ml effectively rules out acute congestive heart failure with 99% negative predictive value.    Results may be falsely decreased if patient taking Biotin.     MAGNESIUM - Normal   CBC AND DIFFERENTIAL    Narrative:     The following orders were created for panel order CBC & Differential.  Procedure                               Abnormality         Status                     ---------                               -----------         ------                     CBC Auto Differential[[id number]]        Abnormal            Final result                 Please view results for these tests on the individual orders.       EKG:   ECG 12 Lead   Preliminary Result   HEART RATE= 69  bpm   RR Interval= 872  ms   WA Interval=   ms   P Horizontal Axis=   deg   P Front Axis=   deg   QRSD Interval= 106  ms   QT Interval= 442  ms   QRS Axis= 56  deg   T Wave Axis= 88  deg   - ABNORMAL ECG -   Junctional rhythm   Electronically Signed By:    Date and Time of Study: 2022-04-04 14:42:55          Meds given in ED:   Medications   albuterol (PROVENTIL) nebulizer solution 0.083% 2.5 mg/3mL (2.5 mg Nebulization Given 4/4/22 1451)   ipratropium-albuterol (DUO-NEB) nebulizer solution 3 mL (3 mL Nebulization Given 4/4/22 1443)   magnesium sulfate 2g/50 mL (PREMIX) infusion (0 g Intravenous Stopped 4/4/22 1512)   methylPREDNISolone sodium succinate (SOLU-Medrol) injection  125 mg (125 mg Intravenous Given 22 1453)   sodium chloride 0.9 % bolus 500 mL (0 mL Intravenous Stopped 22 1738)   ipratropium-albuterol (DUO-NEB) nebulizer solution 3 mL (3 mL Nebulization Given 22 1525)   albuterol (PROVENTIL) nebulizer solution 0.083% 2.5 mg/3mL (2.5 mg Nebulization Given 22 1542)   oseltamivir (TAMIFLU) capsule 75 mg (75 mg Oral Given 22 1609)       Imaging results:  XR Chest 1 View    Result Date: 2022  No focal pulmonary consolidation. Follow-up as clinical indications persist.  This report was finalized on 2022 2:46 PM by Dr. Diogo Suarez M.D.        Ambulatory status:   - ad tamiko    Social issues:   Social History     Socioeconomic History   • Marital status:    Tobacco Use   • Smoking status: Former Smoker     Packs/day: 0.00     Years: 30.00     Pack years: 0.00     Types: Cigarettes     Quit date: 2021     Years since quittin.7   • Smokeless tobacco: Never Used   • Tobacco comment: caffeine - rarely   Substance and Sexual Activity   • Alcohol use: No   • Drug use: Never   • Sexual activity: Yes     Partners: Female     Birth control/protection: Post-menopausal

## 2022-04-04 NOTE — TELEPHONE ENCOUNTER
Pt, wife calling to let office know that pt is in hospital for breathing issues.    Will keep office up to date.

## 2022-04-04 NOTE — ED NOTES
Nursing report ED to floor  Sim Agustin  66 y.o.  male    HPI (triage note):   Chief Complaint   Patient presents with   • Shortness of Breath       Admitting doctor:   No admitting provider for patient encounter.    Admitting diagnosis:   The primary encounter diagnosis was Acute exacerbation of chronic obstructive pulmonary disease (COPD) (HCC). Diagnoses of Hypoxia and Acute renal failure superimposed on chronic kidney disease, unspecified CKD stage, unspecified acute renal failure type (HCC) were also pertinent to this visit.    Code status:   Current Code Status     Date Active Code Status Order ID Comments User Context       Prior    Advance Care Planning Activity          Allergies:   Augmentin [amoxicillin-pot clavulanate]    Weight:   There were no vitals filed for this visit.    Most recent vitals:   Vitals:    04/04/22 1342 04/04/22 1443 04/04/22 1450   BP: 128/79     Pulse: 79 73 64   Resp: 18 20    Temp: 97.6 °F (36.4 °C)     SpO2: 98% 98% 99%       Active LDAs/IV Access:   Lines, Drains & Airways     Active LDAs     Name Placement date Placement time Site Days    Peripheral IV 04/04/22 1422 Right Antecubital 04/04/22  1422  Antecubital  less than 1                Labs (abnormal labs have a star):   Labs Reviewed   COMPREHENSIVE METABOLIC PANEL - Abnormal; Notable for the following components:       Result Value    Glucose 102 (*)     BUN 30 (*)     Creatinine 1.95 (*)     eGFR 37.2 (*)     All other components within normal limits    Narrative:     GFR Normal >60  Chronic Kidney Disease <60  Kidney Failure <15     PROCALCITONIN - Abnormal; Notable for the following components:    Procalcitonin 0.32 (*)     All other components within normal limits    Narrative:     As a Marker for Sepsis (Non-Neonates):    1. <0.5 ng/mL represents a low risk of severe sepsis and/or septic shock.  2. >2 ng/mL represents a high risk of severe sepsis and/or septic shock.    As a Marker for Lower Respiratory Tract  "Infections that require antibiotic therapy:    PCT on Admission    Antibiotic Therapy       6-12 Hrs later    >0.5                Strongly Recommended  >0.25 - <0.5        Recommended   0.1 - 0.25          Discouraged              Remeasure/reassess PCT  <0.1                Strongly Discouraged     Remeasure/reassess PCT    As 28 day mortality risk marker: \"Change in Procalcitonin Result\" (>80% or <=80%) if Day 0 (or Day 1) and Day 4 values are available. Refer to [email or web address]    Change in PCT <=80%  A decrease of PCT levels below or equal to 80% defines a positive change in PCT test result representing a higher risk for 28-day all-cause mortality of patients diagnosed with severe sepsis for septic shock.    Change in PCT >80%  A decrease of PCT levels of more than 80% defines a negative change in PCT result representing a lower risk for 28-day all-cause mortality of patients diagnosed with severe sepsis or septic shock.      CBC WITH AUTO DIFFERENTIAL - Abnormal; Notable for the following components:    Neutrophil % 83.2 (*)     Lymphocyte % 9.2 (*)     Neutrophils, Absolute 7.18 (*)     All other components within normal limits   TROPONIN (IN-HOUSE) - Normal    Narrative:     Troponin T Reference Range:  <= 0.03 ng/mL-   Negative for AMI  >0.03 ng/mL-     Abnormal for myocardial necrosis.  Clinicians would have to utilize clinical acumen, EKG, Troponin and serial changes to determine if it is an Acute Myocardial Infarction or myocardial injury due to an underlying chronic condition.       Results may be falsely decreased if patient taking Biotin.     BNP (IN-HOUSE) - Normal    Narrative:     Among patients with dyspnea, NT-proBNP is highly sensitive for the detection of acute congestive heart failure. In addition NT-proBNP of <300 pg/ml effectively rules out acute congestive heart failure with 99% negative predictive value.    Results may be falsely decreased if patient taking Biotin.   "   MAGNESIUM - Normal   RESPIRATORY PANEL PCR W/ COVID-19 (SARS-COV-2) PORFIRIO/MARKUS/MARISOL/PAD/COR/MAD/MADDY IN-HOUSE, NP SWAB IN UTM/VTP, 3-4 HR TAT   CBC AND DIFFERENTIAL    Narrative:     The following orders were created for panel order CBC & Differential.  Procedure                               Abnormality         Status                     ---------                               -----------         ------                     CBC Auto Differential[[id number]]        Abnormal            Final result                 Please view results for these tests on the individual orders.       EKG:   ECG 12 Lead   Preliminary Result   HEART RATE= 69  bpm   RR Interval= 872  ms   MD Interval=   ms   P Horizontal Axis=   deg   P Front Axis=   deg   QRSD Interval= 106  ms   QT Interval= 442  ms   QRS Axis= 56  deg   T Wave Axis= 88  deg   - ABNORMAL ECG -   Junctional rhythm   Electronically Signed By:    Date and Time of Study: 2022-04-04 14:42:55          Meds given in ED:   Medications   sodium chloride 0.9 % bolus 500 mL (has no administration in time range)   ipratropium-albuterol (DUO-NEB) nebulizer solution 3 mL (has no administration in time range)   albuterol (PROVENTIL) nebulizer solution 0.083% 2.5 mg/3mL (has no administration in time range)   albuterol (PROVENTIL) nebulizer solution 0.083% 2.5 mg/3mL (2.5 mg Nebulization Given 4/4/22 1451)   ipratropium-albuterol (DUO-NEB) nebulizer solution 3 mL (3 mL Nebulization Given 4/4/22 1443)   magnesium sulfate 2g/50 mL (PREMIX) infusion (2 g Intravenous New Bag 4/4/22 1452)   methylPREDNISolone sodium succinate (SOLU-Medrol) injection 125 mg (125 mg Intravenous Given 4/4/22 1453)       Imaging results:  XR Chest 1 View    Result Date: 4/4/2022  No focal pulmonary consolidation. Follow-up as clinical indications persist.  This report was finalized on 4/4/2022 2:46 PM by Dr. Diogo Suarez M.D.        Ambulatory status:   - ad tamiko    Social issues:   Social History      Socioeconomic History   • Marital status:    Tobacco Use   • Smoking status: Former Smoker     Packs/day: 0.00     Years: 30.00     Pack years: 0.00     Types: Cigarettes     Quit date: 2021     Years since quittin.7   • Smokeless tobacco: Never Used   • Tobacco comment: caffeine - rarely   Substance and Sexual Activity   • Alcohol use: No   • Drug use: Never   • Sexual activity: Yes     Partners: Female     Birth control/protection: Post-menopausal

## 2022-04-04 NOTE — ED NOTES
Pt to ER via EMS from home with c/o increased SOB and cough since Thursday. Pt normally only wears 2L NC at night but needing to wear 5L at all times. EMS reports o2 83 RA. Pt received a duo neb en route and now satting 3L. Pt has hx of COPD. Pt A&Ox4.    Pt wearing mask. RN wearing mask, face shield and gown.

## 2022-04-05 PROBLEM — E87.5 HYPERKALEMIA: Status: ACTIVE | Noted: 2022-04-05

## 2022-04-05 PROBLEM — J10.1 INFLUENZA A: Status: ACTIVE | Noted: 2022-04-05

## 2022-04-05 PROBLEM — J96.01 ACUTE RESPIRATORY FAILURE WITH HYPOXIA: Status: ACTIVE | Noted: 2022-04-05

## 2022-04-05 LAB
ANION GAP SERPL CALCULATED.3IONS-SCNC: 10.6 MMOL/L (ref 5–15)
BILIRUB UR QL STRIP: NEGATIVE
BUN SERPL-MCNC: 41 MG/DL (ref 8–23)
BUN/CREAT SERPL: 18 (ref 7–25)
CALCIUM SPEC-SCNC: 9 MG/DL (ref 8.6–10.5)
CHLORIDE SERPL-SCNC: 100 MMOL/L (ref 98–107)
CLARITY UR: CLEAR
CO2 SERPL-SCNC: 24.4 MMOL/L (ref 22–29)
COLOR UR: YELLOW
CREAT SERPL-MCNC: 2.28 MG/DL (ref 0.76–1.27)
CREAT UR-MCNC: 46.5 MG/DL
DEPRECATED RDW RBC AUTO: 39.9 FL (ref 37–54)
EGFRCR SERPLBLD CKD-EPI 2021: 30.9 ML/MIN/1.73
ERYTHROCYTE [DISTWIDTH] IN BLOOD BY AUTOMATED COUNT: 12.7 % (ref 12.3–15.4)
GLUCOSE SERPL-MCNC: 182 MG/DL (ref 65–99)
GLUCOSE UR STRIP-MCNC: NEGATIVE MG/DL
HCT VFR BLD AUTO: 42.9 % (ref 37.5–51)
HGB BLD-MCNC: 14.8 G/DL (ref 13–17.7)
HGB UR QL STRIP.AUTO: NEGATIVE
KETONES UR QL STRIP: NEGATIVE
LEUKOCYTE ESTERASE UR QL STRIP.AUTO: NEGATIVE
MCH RBC QN AUTO: 30.3 PG (ref 26.6–33)
MCHC RBC AUTO-ENTMCNC: 34.5 G/DL (ref 31.5–35.7)
MCV RBC AUTO: 87.9 FL (ref 79–97)
NITRITE UR QL STRIP: NEGATIVE
PH UR STRIP.AUTO: <=5 [PH] (ref 5–8)
PLATELET # BLD AUTO: 218 10*3/MM3 (ref 140–450)
PMV BLD AUTO: 9.3 FL (ref 6–12)
POTASSIUM SERPL-SCNC: 4.6 MMOL/L (ref 3.5–5.2)
POTASSIUM SERPL-SCNC: 5.8 MMOL/L (ref 3.5–5.2)
PROT ?TM UR-MCNC: 23.1 MG/DL
PROT UR QL STRIP: ABNORMAL
PROT/CREAT UR: 496.8 MG/G CREA (ref 0–200)
QT INTERVAL: 442 MS
RBC # BLD AUTO: 4.88 10*6/MM3 (ref 4.14–5.8)
SODIUM SERPL-SCNC: 135 MMOL/L (ref 136–145)
SP GR UR STRIP: 1.01 (ref 1–1.03)
UROBILINOGEN UR QL STRIP: ABNORMAL
WBC NRBC COR # BLD: 4.77 10*3/MM3 (ref 3.4–10.8)

## 2022-04-05 PROCEDURE — 97162 PT EVAL MOD COMPLEX 30 MIN: CPT

## 2022-04-05 PROCEDURE — 94799 UNLISTED PULMONARY SVC/PX: CPT

## 2022-04-05 PROCEDURE — 84132 ASSAY OF SERUM POTASSIUM: CPT | Performed by: INTERNAL MEDICINE

## 2022-04-05 PROCEDURE — 94640 AIRWAY INHALATION TREATMENT: CPT

## 2022-04-05 PROCEDURE — 94664 DEMO&/EVAL PT USE INHALER: CPT

## 2022-04-05 PROCEDURE — 25010000002 METHYLPREDNISOLONE PER 40 MG: Performed by: INTERNAL MEDICINE

## 2022-04-05 PROCEDURE — 94760 N-INVAS EAR/PLS OXIMETRY 1: CPT

## 2022-04-05 PROCEDURE — 81003 URINALYSIS AUTO W/O SCOPE: CPT | Performed by: INTERNAL MEDICINE

## 2022-04-05 PROCEDURE — 85027 COMPLETE CBC AUTOMATED: CPT | Performed by: INTERNAL MEDICINE

## 2022-04-05 PROCEDURE — 82570 ASSAY OF URINE CREATININE: CPT | Performed by: INTERNAL MEDICINE

## 2022-04-05 PROCEDURE — 84156 ASSAY OF PROTEIN URINE: CPT | Performed by: INTERNAL MEDICINE

## 2022-04-05 PROCEDURE — 94761 N-INVAS EAR/PLS OXIMETRY MLT: CPT

## 2022-04-05 PROCEDURE — 36415 COLL VENOUS BLD VENIPUNCTURE: CPT | Performed by: INTERNAL MEDICINE

## 2022-04-05 PROCEDURE — 80048 BASIC METABOLIC PNL TOTAL CA: CPT | Performed by: INTERNAL MEDICINE

## 2022-04-05 PROCEDURE — 97530 THERAPEUTIC ACTIVITIES: CPT

## 2022-04-05 RX ORDER — METHYLPREDNISOLONE SODIUM SUCCINATE 40 MG/ML
40 INJECTION, POWDER, LYOPHILIZED, FOR SOLUTION INTRAMUSCULAR; INTRAVENOUS EVERY 12 HOURS
Status: DISCONTINUED | OUTPATIENT
Start: 2022-04-05 | End: 2022-04-07

## 2022-04-05 RX ORDER — SODIUM CHLORIDE 9 MG/ML
75 INJECTION, SOLUTION INTRAVENOUS CONTINUOUS
Status: DISCONTINUED | OUTPATIENT
Start: 2022-04-05 | End: 2022-04-06

## 2022-04-05 RX ORDER — ALBUTEROL SULFATE 2.5 MG/3ML
2.5 SOLUTION RESPIRATORY (INHALATION)
Status: DISCONTINUED | OUTPATIENT
Start: 2022-04-05 | End: 2022-04-07

## 2022-04-05 RX ADMIN — BUDESONIDE AND FORMOTEROL FUMARATE DIHYDRATE 2 PUFF: 160; 4.5 AEROSOL RESPIRATORY (INHALATION) at 07:54

## 2022-04-05 RX ADMIN — BUDESONIDE AND FORMOTEROL FUMARATE DIHYDRATE 2 PUFF: 160; 4.5 AEROSOL RESPIRATORY (INHALATION) at 19:28

## 2022-04-05 RX ADMIN — Medication 5000 UNITS: at 08:03

## 2022-04-05 RX ADMIN — GABAPENTIN 300 MG: 300 CAPSULE ORAL at 17:42

## 2022-04-05 RX ADMIN — LEVOTHYROXINE SODIUM 125 MCG: 0.12 TABLET ORAL at 05:00

## 2022-04-05 RX ADMIN — SODIUM CHLORIDE 500 ML: 9 INJECTION, SOLUTION INTRAVENOUS at 09:41

## 2022-04-05 RX ADMIN — ATORVASTATIN CALCIUM 20 MG: 20 TABLET, FILM COATED ORAL at 08:03

## 2022-04-05 RX ADMIN — SODIUM ZIRCONIUM CYCLOSILICATE 10 G: 10 POWDER, FOR SUSPENSION ORAL at 12:35

## 2022-04-05 RX ADMIN — AMLODIPINE BESYLATE 10 MG: 10 TABLET ORAL at 08:02

## 2022-04-05 RX ADMIN — SODIUM ZIRCONIUM CYCLOSILICATE 10 G: 10 POWDER, FOR SUSPENSION ORAL at 21:06

## 2022-04-05 RX ADMIN — ALPRAZOLAM 1 MG: 0.5 TABLET ORAL at 08:03

## 2022-04-05 RX ADMIN — ALBUTEROL SULFATE 2.5 MG: 2.5 SOLUTION RESPIRATORY (INHALATION) at 17:26

## 2022-04-05 RX ADMIN — LATANOPROST 1 DROP: 50 SOLUTION OPHTHALMIC at 21:14

## 2022-04-05 RX ADMIN — OSELTAMIVIR PHOSPHATE 30 MG: 30 CAPSULE ORAL at 21:06

## 2022-04-05 RX ADMIN — SODIUM CHLORIDE 75 ML/HR: 9 INJECTION, SOLUTION INTRAVENOUS at 13:36

## 2022-04-05 RX ADMIN — BUPROPION HYDROCHLORIDE 300 MG: 300 TABLET, EXTENDED RELEASE ORAL at 08:03

## 2022-04-05 RX ADMIN — FERROUS SULFATE TAB 325 MG (65 MG ELEMENTAL FE) 325 MG: 325 (65 FE) TAB at 08:03

## 2022-04-05 RX ADMIN — METHYLPREDNISOLONE SODIUM SUCCINATE 40 MG: 40 INJECTION, POWDER, FOR SOLUTION INTRAMUSCULAR; INTRAVENOUS at 04:57

## 2022-04-05 RX ADMIN — METHYLPREDNISOLONE SODIUM SUCCINATE 40 MG: 40 INJECTION, POWDER, FOR SOLUTION INTRAMUSCULAR; INTRAVENOUS at 17:42

## 2022-04-05 RX ADMIN — MULTIPLE VITAMINS W/ MINERALS TAB 1 TABLET: TAB at 08:03

## 2022-04-05 RX ADMIN — SODIUM ZIRCONIUM CYCLOSILICATE 10 G: 10 POWDER, FOR SUSPENSION ORAL at 17:42

## 2022-04-05 RX ADMIN — CLOPIDOGREL 75 MG: 75 TABLET, FILM COATED ORAL at 21:06

## 2022-04-05 RX ADMIN — GABAPENTIN 300 MG: 300 CAPSULE ORAL at 21:06

## 2022-04-05 RX ADMIN — OSELTAMIVIR PHOSPHATE 30 MG: 30 CAPSULE ORAL at 08:03

## 2022-04-05 RX ADMIN — ALPRAZOLAM 1 MG: 0.5 TABLET ORAL at 21:06

## 2022-04-05 RX ADMIN — ALBUTEROL SULFATE 2.5 MG: 2.5 SOLUTION RESPIRATORY (INHALATION) at 11:47

## 2022-04-05 RX ADMIN — ISOSORBIDE MONONITRATE 30 MG: 30 TABLET ORAL at 08:03

## 2022-04-05 RX ADMIN — GABAPENTIN 300 MG: 300 CAPSULE ORAL at 08:07

## 2022-04-05 RX ADMIN — TIOTROPIUM BROMIDE INHALATION SPRAY 2 PUFF: 3.12 SPRAY, METERED RESPIRATORY (INHALATION) at 07:54

## 2022-04-05 RX ADMIN — ALBUTEROL SULFATE 2.5 MG: 2.5 SOLUTION RESPIRATORY (INHALATION) at 23:28

## 2022-04-05 RX ADMIN — PANTOPRAZOLE SODIUM 40 MG: 40 TABLET, DELAYED RELEASE ORAL at 05:00

## 2022-04-05 RX ADMIN — TRAMADOL HYDROCHLORIDE 50 MG: 50 TABLET, COATED ORAL at 21:06

## 2022-04-05 NOTE — PLAN OF CARE
Goal Outcome Evaluation:  Plan of Care Reviewed With: patient        Progress: no change  Outcome Evaluation: Patient is a 67 yo male who presented with SOA and cough. PMHx inlcudes L AKA with prosthesis. He lives with spouse and reports he uses rwx without his prosthesis in home and prosthesis with SPC in community. He presents with SOA with mobility and decreased activity tolerance. He completed bed mobility with Ari and STS to rwx requiring SBA/CGA. He ambulated 10ft using rwx requiring SBA/CGA and no LOB or unsteadiness noted. Pt will continue to benefit from skilled PT to address functional deficits and increase level of independence. PT rec home with assist at discharge.

## 2022-04-05 NOTE — PROGRESS NOTES
DAILY PROGRESS NOTE  [facility]    Patient Identification:  Name: Sim Agustin  Age: 66 y.o.  Sex: male  :  1955  MRN: [id number]         Primary Care Physician: Seth Hester MD    Subjective:  Interval History: Feeling a little better today.  He admits to not having normal p.o. intake over the last couple days.  He states he follows with Dr. Sarabia outside but he is unable to recall previous creatinine values.  Denies any confusion chest pain or altered mentation.  No issues with nausea or vomiting.    Objective: Sitting up conversational and very pleasant.  No family at bedside.  Nontoxic and seems to be clinically improving overall    Scheduled Meds:albuterol, 2.5 mg, Nebulization, Q4H - RT  amLODIPine, 10 mg, Oral, Daily  aspirin, 81 mg, Oral, Nightly  atorvastatin, 20 mg, Oral, Daily  budesonide-formoterol, 2 puff, Inhalation, BID - RT   And  tiotropium bromide monohydrate, 2 puff, Inhalation, Daily - RT  buPROPion XL, 300 mg, Oral, Daily  vitamin D3, 5,000 Units, Oral, Daily  clopidogrel, 75 mg, Oral, Nightly  ferrous sulfate, 325 mg, Oral, Daily With Breakfast  gabapentin, 300 mg, Oral, TID  isosorbide mononitrate, 30 mg, Oral, Q24H  latanoprost, 1 drop, Both Eyes, Nightly  levothyroxine, 125 mcg, Oral, Q AM  methylPREDNISolone sodium succinate, 40 mg, Intravenous, Q12H  multivitamin with minerals, 1 tablet, Oral, Daily  oseltamivir, 30 mg, Oral, Q12H  pantoprazole, 40 mg, Oral, QAM  sodium chloride, 500 mL, Intravenous, Once      Continuous Infusions:     Vital signs in last 24 hours:  Temp:  [97.4 °F (36.3 °C)-97.8 °F (36.6 °C)] 97.8 °F (36.6 °C)  Heart Rate:  [55-79] 62  Resp:  [15-22] 18  BP: (118-154)/(73-87) 154/87    Intake/Output:    Intake/Output Summary (Last 24 hours) at 2022 0942  Last data filed at 2022 2200  Gross per 24 hour   Intake 620 ml   Output --   Net 620 ml       Exam:  /87 (BP Location: Left arm, Patient Position: Lying)   Pulse 62   Temp  "97.8 °F (36.6 °C) (Oral)   Resp 18   Ht 172.7 cm (68\")   Wt 79.2 kg (174 lb 9.7 oz)   SpO2 93%   BMI 26.55 kg/m²     General Appearance:    Alert, cooperative, AAOx3                          Head:    Normocephalic, without obvious abnormality, atraumatic                           Eyes:    Conjunctivae/corneas clear, EOM's intact, both eyes                         Throat:   Oral mucosa pink and moist                           Neck:   No JVD                         Lungs:    Decreased with Rales to auscultation bilaterally, respirations unlabored                 Chest Wall:    No tenderness or deformity                          Heart:    Regular rate and rhythm, S1 and S2 normal                  Abdomen:     Soft, nontender, bowel sounds activey                  Extremities: Old healed BKA, no volume overload otherwise                  Neurologic:   CNII-XII intact, no focal deficits noted     Data Review:  Labs in chart were reviewed.    Assessment:  Active Hospital Problems    Diagnosis  POA   • **Influenza A [J10.1]  Unknown   • Acute respiratory failure with hypoxia ([facility]) [J96.01]  Unknown   • Hyperkalemia [E87.5]  Unknown   • Acute exacerbation of chronic obstructive pulmonary disease (COPD) ([facility]) [J44.1]  Yes   • Hyperlipidemia [E78.5]  Yes   • Hypothyroidism [E03.9]  Yes   • PRISCILLA treated with BiPAP [G47.33]  Yes   • PVD (peripheral vascular disease) ([facility]) [I73.9]  Yes   • Essential hypertension [I10]  Yes      Resolved Hospital Problems   No resolved problems to display.       Plan:    Influenza A -continue Tamiflu/supportive care    Acute exacerbation COPD -med changes noted per pulmonary    Wean O2 to room air -normally utilizes CPAP at night -final O2 recommendations pending pulmonary    Hyperkalemia with reported CKD but I am not sure of baseline   -I think patient is clinically a bit dry but given past history of reported chronic systolic CHF, I will bolus 500 cc and hold any Kayexalate, and I have " placed to consult to his normal nephrologist Dr. Sarabia for further recommendations/maintenance fluids.  Appetite is good as evident by nearly empty breakfast tray.  Recent decreased appetite and p.o. intake noted due to illness.   -Seems clinically dry.  No signs of volume overload    Hypothyroidism on levothyroxine    PAD/CAD on Plavix/ASA -SCDs for prophylaxis    Mati Rubio MD  4/5/2022  09:42 EDT

## 2022-04-05 NOTE — PLAN OF CARE
Goal Outcome Evaluation:  Plan of Care Reviewed With: patient        Progress: no change  Outcome Evaluation: Pt admitted this shift. VSS, 5L O2. No c/o pain. A&Ox4. IV steroids. Urinal at bedside. Pt is a L AKA, prosthetic at bedside. Mariah taylor, will ct

## 2022-04-05 NOTE — PLAN OF CARE
Goal Outcome Evaluation:           Progress: improving  A-Ox4.  Pt voiding well.  Post void residual was 99ml.  Pt getting up well with walker.  On 1 liter O2.  O2 saturations dropping with activity,  Recovering quickly.  Lokelma given for elevated potassium.  Awaiting recheck.  Will CTM.

## 2022-04-05 NOTE — H&P
HISTORY AND PHYSICAL   [facility]        Date of Admission: 2022  Patient Identification:  Name: Sim Agustin  Age: 66 y.o.  Sex: male  :  1955  MRN: [id number]                     Primary Care Physician: Seth Hester MD    Chief Complaint:  66 year old gentleman who presented to the emergency room with shortnes of breath which started 2-3 days ago; he has had a cough and nasal drainage; he has been wheezing as well; he uses oxygen at night; he is followed by dr mcintosh; no fever or chills; he denies sick contacts    History of Present Illness:   As above    Past Medical History:  Past Medical History:   Diagnosis Date   • Acute respiratory failure with hypoxia and hypercarbia ([facility]) 3/11/2019   • Acute upper respiratory infection 2013   • Anemia     per [facility] database   • ARF (acute renal failure) ([facility]) 3/4/2016   • Atelectasis, left 3/4/2016   • Bradycardia 2019   • CAD (coronary artery disease) 2016   • Chronic obstructive pulmonary disease with acute exacerbation ([facility]) 2013   • Colon polyps    • Compartment syndrome ([facility])     AFTER ILIAC SURGERY. ABOVE KNEE AMPUTATION   • COPD (chronic obstructive pulmonary disease) ([facility])    • COPD with hypoxia ([facility]) 2019   • Cough     SINCE APRIL WITH PNEUMONIA.    • Disease of thyroid gland     HYPOTHYRODISM   • Diverticulosis    • Elevated hematocrit 2021   • Employs prosthetic leg    • ESRD (end stage renal disease) on dialysis ([facility]) 2016   • Fracture of patella 3/3/2015   • History of acute renal failure    • History of CHF (congestive heart failure)    • History of GI bleed 2016    AORTODUOD FISTULA   • History of sepsis 2016   • History of transfusion     MULTIPLE,    • Hyperkalemia 2016   • Hypertension    • Hypotension    • Hypotension 3/4/2016   • Nonischemic cardiomyopathy ([facility])    • Nosocomial pneumonia 2016   • Phantom limb pain ([facility])     SL, WITH LEFT ABOUVE KNEE   • Pleural effusion on  left 4/11/2016   • Pneumonia     SPRING 2016  AFTER SURGERY   • PVD (peripheral vascular disease) ([facility])    • S/P thoracentesis 4/11/2016   • Sepsis, unspecified organism ([facility]) 4/5/2016     Past Surgical History:  Past Surgical History:   Procedure Laterality Date   • ABOVE KNEE AMPUTATION Left 3/16/2016    Procedure: LT AMPUTATION ABOVE KNEE;  Surgeon: Jose Swann MD;  Location: [facility] MAIN OR;  Service:    • ARTERIAL THROMBECTOMY  2001    throbectomy of arterial graft   • AXILLARY FEMORAL BYPASS Right 02/15/2016    Exploratory lapartomy, repair aortoduodenal fistula, resection infected aortobifemoral bypass graft, axillobi-superficial femoral artery Baker-Aneudy bypass graft from right axillary artery, Repair of duodenotomy 4th portion duodenum-Dr. Jose Swann, Dr. Genaro Perrin   • BRONCHOSCOPY Left 03/04/2016    Dr. NATALIIA Tate   • BRONCHOSCOPY RIGID / FLEXIBLE N/A 03/03/2016    Dr. NATALIIA Tate   • BRONCHOSCOPY RIGID / FLEXIBLE N/A 02/26/2016    Dr. NATALIIA Tate   • CARDIAC CATHETERIZATION N/A 3/18/2019    Procedure: Right and Left Heart Cath;  Surgeon: Nina Storey MD;  Location: [facility] CATH INVASIVE LOCATION;  Service: Cardiovascular   • CATARACT EXTRACTION WITH INTRAOCULAR LENS IMPLANT Bilateral    • COLON RESECTION WITH COLOSTOMY Left 02/28/2016    Exploratory laparotomy with open left hemicolectomy, end-colostomy, G-tube and J-tube-Dr. Endy Chaney   • COLONOSCOPY N/A 2015    Dr. Laughlin   • COLONOSCOPY N/A 10/12/2016    Procedure: COLONOSCOPY TO 20 CM AND PER STOMA TO 30CM;  Surgeon: Genaro Perrin MD;  Location: [facility] ENDOSCOPY;  Service:    • COLONOSCOPY N/A 10/21/2016    Procedure: COLONOSCOPY DONE AT BEDSIDE ONTO CECEM;  Surgeon: Genaro Perrin MD;  Location: [facility] ENDOSCOPY;  Service:    • COLONOSCOPY N/A 02/10/2016    Diverticulosis in the entire examined colon, non-bleeding internal hemorrhoids-Dr. Taylor Pina   • COLONOSCOPY N/A 02/11/2016    Poor prep, blood in  the entire examined colon, normal ileum-Dr. Taylor Pina   • COLONOSCOPY W/ POLYPECTOMY N/A 07/27/2015    Normal ileum, diverticulosis in the sigmoid colon: resected and retrieved, one 6 mm polyp in the descending colon: resected and retrieved, the distal rectum and anal verge are normal on retroflexion view-Dr. Miguel Ángel Laughlin   • COLOSTOMY CLOSURE N/A 10/13/2016    Procedure: COLOSTOMY TAKEDOWN OR CLOSURE, APPENDECTOMY;  Surgeon: Genaro Perrin MD;  Location: [facility] MAIN OR;  Service:    • DEBRIDEMENT LEG Left 03/01/2016    Excisional debridement of the skin, subcutantous tissue, tendon and muscle left calf-Dr. Jose Swann   • DEBRIDEMENT LEG Left 02/25/2016    Excisional debridement full-thcikness skin and subcutaneous tissue and tendon and muscle, left medial and lateral calf muscles-Dr. Jose Swann   • ENDOSCOPY N/A 10/21/2016    Procedure: ESOPHAGOGASTRODUODENOSCOPY DONE AT BEDSIDE;  Surgeon: Genaro Perrin MD;  Location: [facility] ENDOSCOPY;  Service:    • ENDOSCOPY AND COLONOSCOPY N/A 02/28/2016    EGD and Colonoscopy with Candy ink injection-Dr. Endy Chaney   • ENTEROSCOPY SMALL BOWEL N/A 02/11/2016    Normal esophagus, normal stomach, normal duodenum, portion of the jejunum normal-Dr. Taylor Pina   • ILIAC ARTERY - FEMORAL ARTERY BYPASS GRAFT Bilateral 2002   • ILIAC ARTERY STENT Bilateral 2001   • INSERTION AND REMOVAL PERITONEAL DIALYSIS CATHETER Right 02/29/2016    Exchange right jugular 16 cm Mahurkar dialysis catheter-Dr. Jose Swann   • INSERTION PERITONEAL DIALYSIS CATHETER Left 03/01/2016    Ultrasound-guided access of the left jugular vein, 23 cm left jugular palindrome dialysis catheter placement-Dr. Jsoe Swann   • INSERTION PERITONEAL DIALYSIS CATHETER Right 02/18/2016    Right jugular Mahrkar catherer placement-Dr. Jose Swann   • JOINT REPLACEMENT Left    • THORACOSCOPY Left 4/18/2016    Procedure: LT THORACOSCOPY VIDEO ASSISTED WITH DECORDICATION;   Surgeon: Genaro Davila III, MD;  Location: [facility] OR;  Service:    • THROMBECTOMY Left 2016    Thombectomy of left femoral graft, left leg arteriogram, left calf forward compartment fasciotomy-Dr. Jose Swann   • TOTAL HIP ARTHROPLASTY Left    • UPPER GASTROINTESTINAL ENDOSCOPY N/A 2016    Normal UGI-Dr. Ajay Parkinson   • UPPER GASTROINTESTINAL ENDOSCOPY N/A 2015    Small hiatal hernia, chronic gastritis: biopsied, non-bleeding angioectasias in the stomach: treated with argon plasma coagulation, multiple bleeding angioectasias in the dudenum: treated with argon plasma coagulation-Dr. Mykel Jaramillo   • VASCULAR SURGERY      MULTIPLE   • VENTRAL/INCISIONAL HERNIA REPAIR N/A 10/19/2017    Procedure: VENTRAL HERNIA REPAIR WITH MESH AND BILATERAL COMPONENT SEPARATION;  Surgeon: Genaro Perrin MD;  Location: [facility];  Service:    • WISDOM TOOTH EXTRACTION        Home Meds:  (Not in a hospital admission)      Allergies:  Allergies   Allergen Reactions   • Augmentin [Amoxicillin-Pot Clavulanate] Hives and Rash     Immunizations:  Immunization History   Administered Date(s) Administered   • COVID-19 (PFIZER) PURPLE CAP 2021, 2021, 10/18/2021   • Flu Vaccine Quad PF >36MO 10/26/2016, 10/10/2017   • Fluad Quad 65+ 2020   • Fluzone High Dose =>65 Years ([employer] ONLY) 10/08/2018, 2019   • Fluzone High-Dose 65+yrs 2021   • Pneumococcal Polysaccharide (PPSV23) 2017   • Shingrix 2019, 2019   • Tdap 03/15/2022     Social History:   Social History     Social History Narrative   • Not on file     Social History     Socioeconomic History   • Marital status:    Tobacco Use   • Smoking status: Former Smoker     Packs/day: 0.00     Years: 30.00     Pack years: 0.00     Types: Cigarettes     Quit date: 2021     Years since quittin.7   • Smokeless tobacco: Never Used   • Tobacco comment: caffeine - rarely   Substance and Sexual Activity    • Alcohol use: No   • Drug use: Never   • Sexual activity: Yes     Partners: Female     Birth control/protection: Post-menopausal       Family History:  Family History   Problem Relation Age of Onset   • Lung cancer Mother    • Cancer Mother    • Heart disease Father    • Diabetes Father    • Hypertension Father    • Malig Hyperthermia Neg Hx         Review of Systems  See history of present illness and past medical history.  Patient denies headache, dizziness, syncope, falls, trauma, change in vision, change in hearing, change in taste, changes in weight, changes in appetite, focal weakness, numbness, or paresthesia.  Patient denies chest pain, palpitations, sinus pressure, rhinorrhea, epistaxis, hemoptysis, nausea, vomiting,hematemesis, diarrhea, constipation or hematchezia.  Denies cold or heat intolerance, polydipsia, polyuria, polyphagia. Denies hematuria, pyuria, dysuria, hesitancy, frequency or urgency. Denies consumption of raw and under cooked meats foods or change in water source.  Denies fever, chills, sweats, night sweats.  Denies missing any routine medications. Remainder of ROS is negative.    Objective:  T Max 24 hrs: Temp (24hrs), Av.6 °F (36.4 °C), Min:97.6 °F (36.4 °C), Max:97.6 °F (36.4 °C)    Vitals Ranges:   Temp:  [97.6 °F (36.4 °C)] 97.6 °F (36.4 °C)  Heart Rate:  [55-79] 55  Resp:  [15-20] 15  BP: (128-144)/(73-79) 134/73      Exam:  /73   Pulse 55   Temp 97.6 °F (36.4 °C)   Resp 15   SpO2 100%     General Appearance:    Alert, cooperative, no distress, appears stated age   Head:    Normocephalic, without obvious abnormality, atraumatic   Eyes:    PERRL, conjunctivae/corneas clear, EOM's intact, both eyes   Ears:    Normal external ear canals, both ears   Nose:   Nares normal, septum midline, mucosa normal, no drainage    or sinus tenderness   Throat:   Lips, mucosa, and tongue normal   Neck:   Supple, symmetrical, trachea midline, no adenopathy;     thyroid:  no  enlargement/tenderness/nodules; no carotid    bruit or JVD   Back:     Symmetric, no curvature, ROM normal, no CVA tenderness   Lungs:     Decreased breath sounds bilaterally, respirations unlabored   Chest Wall:    No tenderness or deformity    Heart:    Regular rate and rhythm, S1 and S2 normal, no murmur, rub   or gallop   Abdomen:     Soft, nontender, bowel sounds active all four quadrants,     no masses, no hepatomegaly, no splenomegaly   Extremities:   Extremities normal, atraumatic, s/p right aka   Pulses:   2+ and symmetric all extremities   Skin:   Skin color, texture, turgor normal, no rashes or lesions   Lymph nodes:   Cervical, supraclavicular, and axillary nodes normal   Neurologic:   CNII-XII intact, normal strength, sensation intact throughout      .    Data Review:  Labs in chart were reviewed.  WBC   Date Value Ref Range Status   04/04/2022 8.63 3.40 - 10.80 10*3/mm3 Final     Hemoglobin   Date Value Ref Range Status   04/04/2022 16.4 13.0 - 17.7 g/dL Final     Hematocrit   Date Value Ref Range Status   04/04/2022 47.2 37.5 - 51.0 % Final     Platelets   Date Value Ref Range Status   04/04/2022 221 140 - 450 10*3/mm3 Final     Sodium   Date Value Ref Range Status   04/04/2022 138 136 - 145 mmol/L Final     Potassium   Date Value Ref Range Status   04/04/2022 5.0 3.5 - 5.2 mmol/L Final     Chloride   Date Value Ref Range Status   04/04/2022 101 98 - 107 mmol/L Final     CO2   Date Value Ref Range Status   04/04/2022 25.9 22.0 - 29.0 mmol/L Final     BUN   Date Value Ref Range Status   04/04/2022 30 (H) 8 - 23 mg/dL Final     Creatinine   Date Value Ref Range Status   04/04/2022 1.95 (H) 0.76 - 1.27 mg/dL Final     Glucose   Date Value Ref Range Status   04/04/2022 102 (H) 65 - 99 mg/dL Final     Calcium   Date Value Ref Range Status   04/04/2022 9.4 8.6 - 10.5 mg/dL Final     Magnesium   Date Value Ref Range Status   04/04/2022 2.1 1.6 - 2.4 mg/dL Final     AST (SGOT)   Date Value Ref Range Status    04/04/2022 15 1 - 40 U/L Final     ALT (SGPT)   Date Value Ref Range Status   04/04/2022 11 1 - 41 U/L Final     Alkaline Phosphatase   Date Value Ref Range Status   04/04/2022 72 39 - 117 U/L Final                Imaging Results (All)     Procedure Component Value Units Date/Time    XR Chest 1 View [[id number]] Collected: 04/04/22 1441     Updated: 04/04/22 1450    Narrative:      XR CHEST 1 VW-     HISTORY: Male who is 66 years-old,  short of breath     TECHNIQUE: Frontal views of the chest     COMPARISON: 05/19/2021     FINDINGS: The heart size is normal. Aorta is calcified. Pulmonary  vasculature is unremarkable. No focal pulmonary consolidation, pleural  effusion, or pneumothorax. No acute osseous process.       Impression:      No focal pulmonary consolidation. Follow-up as clinical  indications persist.     This report was finalized on 4/4/2022 2:46 PM by Dr. Diogo Suarez M.D.               Assessment:  Active Hospital Problems    Diagnosis  POA   • Acute exacerbation of chronic obstructive pulmonary disease (COPD) ([facility]) [J44.1]  Yes      Resolved Hospital Problems   No resolved problems to display.   acute hypoxic respiratory failure  Influenza a  Hypothyroidism  Pad  Cad      Plan:  Will continue tamiflu  Ask pulmonary to see him  Wean oxygen as tolerated  Steroids, mininebs  Monitor on telemetry  Matt patient and ED provider    Ruth Rosas MD  4/4/2022  20:11 EDT

## 2022-04-05 NOTE — PROGRESS NOTES
[facility] Consult Note      Patient Name: Sim Agustin  : 1955  MRN: [id number]  Primary Care Physician:  Seth Hester MD  Referring Physician: Ruth Rosas MD  Date of admission: 2022    Subjective     Reason for Consult:  CKD3, hyperkalemia     HPI:   Sim Agustin is a 66 y.o. male with h/o CKD stage 3 followed by Dr Sarabia, attributed to renovascular dz and residual component from AIKI/ATN in early  (required temporary HD).  Hx PVD s/p aortobifemoral grafts, LLE AKA, colostomy following aortoduodenal fistula repair which was later reversed.  Also has HTN but not diabetic.  Admitted yesterday with worsening dyspnea over few days, along with cough and nasal congestion.  Found to have Influenza A and started on tamiflu, along with COPD exacerbation for which PULM is following, on IV steroids.  Denies n/v or diarrhea.  Occ urinary hesitancy.  Saw Dr Sarabia 22 in office.  Baseline creatinine approx 1.6 mg/dL, was 1.9 yesterday and up to 2.3 today.  K has climbed 5.0 to 5.8 with modestly elevated  and normal serum HCo3.  No hypotension or contrast exposure.  CXR showed no central congestion.  Not taking any K-sparing meds; K generous at times in office ie as high as 5.3 in past 12 mos.  Echo in 2020 suggested diastolic dysfunction with EF 50%.    Review of Systems:   14 point review of systems is otherwise negative except for mentioned above on HPI    Personal History     Past Medical History:   Diagnosis Date   • Acute respiratory failure with hypoxia and hypercarbia (HCC) 3/11/2019   • Acute upper respiratory infection 2013   • Anemia     per [place] database   • ARF (acute renal failure) (HCC) 3/4/2016   • Atelectasis, left 3/4/2016   • Bradycardia 2019   • CAD (coronary artery disease) 2016   • Chronic obstructive pulmonary disease with acute exacerbation (HCC) 2013   • Colon polyps    • Compartment syndrome (HCC)     AFTER  ILIAC SURGERY. ABOVE KNEE AMPUTATION   • COPD (chronic obstructive pulmonary disease) ([facility])    • COPD with hypoxia ([facility]) 5/23/2019   • Cough     SINCE APRIL WITH PNEUMONIA.    • Disease of thyroid gland     HYPOTHYRODISM   • Diverticulosis    • Elevated hematocrit 1/6/2021   • Employs prosthetic leg    • ESRD (end stage renal disease) on dialysis ([facility]) 4/6/2016   • Fracture of patella 3/3/2015   • History of acute renal failure    • History of CHF (congestive heart failure)    • History of GI bleed 02/2016    AORTODUOD FISTULA   • History of sepsis 02/2016   • History of transfusion     MULTIPLE, 2016   • Hyperkalemia 4/11/2016   • Hypertension    • Hypotension    • Hypotension 3/4/2016   • Nonischemic cardiomyopathy ([facility])    • Nosocomial pneumonia 4/6/2016   • Phantom limb pain ([facility])     SL, WITH LEFT ABOUVE KNEE   • Pleural effusion on left 4/11/2016   • Pneumonia     SPRING 2016  AFTER SURGERY   • PVD (peripheral vascular disease) ([facility])    • S/P thoracentesis 4/11/2016   • Sepsis, unspecified organism ([facility]) 4/5/2016       Past Surgical History:   Procedure Laterality Date   • ABOVE KNEE AMPUTATION Left 3/16/2016    Procedure: LT AMPUTATION ABOVE KNEE;  Surgeon: Jose Swann MD;  Location: [facility];  Service:    • ARTERIAL THROMBECTOMY  2001    throbectomy of arterial graft   • AXILLARY FEMORAL BYPASS Right 02/15/2016    Exploratory lapartomy, repair aortoduodenal fistula, resection infected aortobifemoral bypass graft, axillobi-superficial femoral artery Delancey-Aneudy bypass graft from right axillary artery, Repair of duodenotomy 4th portion duodenum-Dr. Jose Swann, Dr. Genaro Perrin   • BRONCHOSCOPY Left 03/04/2016    Dr. NATALIIA Tate   • BRONCHOSCOPY RIGID / FLEXIBLE N/A 03/03/2016    Dr. NATALIIA Tate   • BRONCHOSCOPY RIGID / FLEXIBLE N/A 02/26/2016    Dr. NATALIIA Tate   • CARDIAC CATHETERIZATION N/A 3/18/2019    Procedure: Right and Left Heart Cath;  Surgeon: Nina Storey MD;  Location:  [facility] CATH INVASIVE LOCATION;  Service: Cardiovascular   • CATARACT EXTRACTION WITH INTRAOCULAR LENS IMPLANT Bilateral    • COLON RESECTION WITH COLOSTOMY Left 02/28/2016    Exploratory laparotomy with open left hemicolectomy, end-colostomy, G-tube and J-tube-Dr. Endy Chaney   • COLONOSCOPY N/A 2015    Dr. Laughlin   • COLONOSCOPY N/A 10/12/2016    Procedure: COLONOSCOPY TO 20 CM AND PER STOMA TO 30CM;  Surgeon: Genaro Perrin MD;  Location: [facility] ENDOSCOPY;  Service:    • COLONOSCOPY N/A 10/21/2016    Procedure: COLONOSCOPY DONE AT BEDSIDE ONTO CECEM;  Surgeon: Genaro Perrin MD;  Location: [facility] ENDOSCOPY;  Service:    • COLONOSCOPY N/A 02/10/2016    Diverticulosis in the entire examined colon, non-bleeding internal hemorrhoids-Dr. Taylor Pina   • COLONOSCOPY N/A 02/11/2016    Poor prep, blood in the entire examined colon, normal ileum-Dr. Taylor Pina   • COLONOSCOPY W/ POLYPECTOMY N/A 07/27/2015    Normal ileum, diverticulosis in the sigmoid colon: resected and retrieved, one 6 mm polyp in the descending colon: resected and retrieved, the distal rectum and anal verge are normal on retroflexion view-Dr. Miguel Ángel Laughlin   • COLOSTOMY CLOSURE N/A 10/13/2016    Procedure: COLOSTOMY TAKEDOWN OR CLOSURE, APPENDECTOMY;  Surgeon: Genaro Perrin MD;  Location: [facility] MAIN OR;  Service:    • DEBRIDEMENT LEG Left 03/01/2016    Excisional debridement of the skin, subcutantous tissue, tendon and muscle left calf-Dr. Jose Swann   • DEBRIDEMENT LEG Left 02/25/2016    Excisional debridement full-thcikness skin and subcutaneous tissue and tendon and muscle, left medial and lateral calf muscles-Dr. Jose Swann   • ENDOSCOPY N/A 10/21/2016    Procedure: ESOPHAGOGASTRODUODENOSCOPY DONE AT BEDSIDE;  Surgeon: Genaro Perrin MD;  Location: [facility] ENDOSCOPY;  Service:    • ENDOSCOPY AND COLONOSCOPY N/A 02/28/2016    EGD and Colonoscopy with Candy ink injection-Dr. Endy Chaney   • ENTEROSCOPY  SMALL BOWEL N/A 02/11/2016    Normal esophagus, normal stomach, normal duodenum, portion of the jejunum normal-Dr. Taylor Pina   • ILIAC ARTERY - FEMORAL ARTERY BYPASS GRAFT Bilateral 2002   • ILIAC ARTERY STENT Bilateral 2001   • INSERTION AND REMOVAL PERITONEAL DIALYSIS CATHETER Right 02/29/2016    Exchange right jugular 16 cm Mahurkar dialysis catheter-Dr. Jose Swann   • INSERTION PERITONEAL DIALYSIS CATHETER Left 03/01/2016    Ultrasound-guided access of the left jugular vein, 23 cm left jugular palindrome dialysis catheter placement-Dr. Jose Swann   • INSERTION PERITONEAL DIALYSIS CATHETER Right 02/18/2016    Right jugular Mahrkar catherer placement-Dr. Jose Swann   • JOINT REPLACEMENT Left    • THORACOSCOPY Left 4/18/2016    Procedure: LT THORACOSCOPY VIDEO ASSISTED WITH DECORDICATION;  Surgeon: Genaro Davila III, MD;  Location: [facility];  Service:    • THROMBECTOMY Left 02/16/2016    Thombectomy of left femoral graft, left leg arteriogram, left calf forward compartment fasciotomy-Dr. Jose Swann   • TOTAL HIP ARTHROPLASTY Left    • UPPER GASTROINTESTINAL ENDOSCOPY N/A 02/09/2016    Normal UGI-Dr. Ajay Parkinson   • UPPER GASTROINTESTINAL ENDOSCOPY N/A 11/28/2015    Small hiatal hernia, chronic gastritis: biopsied, non-bleeding angioectasias in the stomach: treated with argon plasma coagulation, multiple bleeding angioectasias in the dudenum: treated with argon plasma coagulation-Dr. Mykel Jaramillo   • VASCULAR SURGERY      MULTIPLE   • VENTRAL/INCISIONAL HERNIA REPAIR N/A 10/19/2017    Procedure: VENTRAL HERNIA REPAIR WITH MESH AND BILATERAL COMPONENT SEPARATION;  Surgeon: Genaro Perrin MD;  Location: [facility];  Service:    • WISDOM TOOTH EXTRACTION         Family History: family history includes Cancer in his mother; Diabetes in his father; Heart disease in his father; Hypertension in his father; Lung cancer in his mother.    Social History:  reports that he quit  smoking about 9 months ago. His smoking use included cigarettes. He smoked 0.00 packs per day for 30.00 years. He has never used smokeless tobacco. He reports that he does not drink alcohol and does not use drugs.    Home Medications:  Prior to Admission medications    Medication Sig Start Date End Date Taking? Authorizing Provider   ALPRAZolam (XANAX) 1 MG tablet Take 1 tablet by mouth 3 (Three) Times a Day As Needed for Anxiety. 3/8/22   Seth Hester MD   amLODIPine (NORVASC) 10 MG tablet TAKE 1 TABLET BY MOUTH EVERY DAY 3/14/22   Seth Hester MD   aspirin 81 MG chewable tablet Chew 81 mg Every Night.    ProviderPrimitivo MD   buPROPion XL (WELLBUTRIN XL) 300 MG 24 hr tablet TAKE 1 TABLET BY MOUTH DAILY 1/21/22   Seth Hester MD   Cholecalciferol (VITAMIN D3) 125 MCG (5000 UT) tablet tablet Take 1 tablet by mouth Daily.  Patient taking differently: Take 5,000 Units by mouth 1 (One) Time Per Week. 3/3/20   Seth Hester MD   clopidogrel (PLAVIX) 75 MG tablet TAKE 1 TABLET BY MOUTH DAILY AT NIGHT 3/14/22   Seth Hester MD   Coenzyme Q10 400 MG capsule Take 400 mg by mouth Every Evening.    ProviderPrimitivo MD   FERROUS SULFATE PO Take 65 mg by mouth Every Night.    ProviderPrimitivo MD   Fluticasone-Umeclidin-Vilant (TRELEGY ELLIPTA) 100-62.5-25 MCG/INH aerosol powder  Inhale 1 each Daily. 3/19/19   Jemima Tran MD   gabapentin (NEURONTIN) 300 MG capsule Take 1 capsule by mouth 3 (Three) Times a Day.  Patient taking differently: Take 900 mg by mouth Every Night. 3/24/22   Seth Hester MD   isosorbide mononitrate (IMDUR) 30 MG 24 hr tablet TAKE 1 TABLET BY MOUTH DAILY EVERY MORNING 5/12/21   Seth Hester MD   latanoprost (XALATAN) 0.005 % ophthalmic solution Administer 1 drop to both eyes every night at bedtime. 7/14/21   Primitivo Lagunas MD   levothyroxine (SYNTHROID, LEVOTHROID) 125 MCG tablet TAKE 1 TABLET BY MOUTH EVERY DAY 1/19/22   Seth Hester MD   Multiple Vitamins-Minerals  (MULTIVITAMIN ADULT PO) Take 1 tablet by mouth Daily.    ProviderPrimitivo MD   NIACIN PO Take 1 capsule by mouth Every Morning.    Primitivo Lagunas MD   omeprazole (priLOSEC) 20 MG capsule TAKE 1 CAPSULE BY MOUTH EVERY DAY IN THE MORNING 12/14/21   Seth Hester MD   simvastatin (ZOCOR) 40 MG tablet Take 0.5 tablets by mouth Every Night. 3/15/22   Seth Hester MD   Thiamine HCl (VITAMIN B-1 PO) Take 1 tablet by mouth Daily.    ProviderPrimitivo MD   traMADol (ULTRAM) 50 MG tablet Take 1 tablet by mouth 2 (Two) Times a Day. 3/24/22   Seth Hester MD       Allergies:  Allergies   Allergen Reactions   • Augmentin [Amoxicillin-Pot Clavulanate] Hives and Rash       Objective     Vitals:   Temp:  [97.4 °F (36.3 °C)-97.8 °F (36.6 °C)] 97.8 °F (36.6 °C)  Heart Rate:  [55-79] 62  Resp:  [15-22] 18  BP: (118-154)/(73-87) 154/87  Flow (L/min):  [2-5] 2    Intake/Output Summary (Last 24 hours) at 4/5/2022 1006  Last data filed at 4/4/2022 2200  Gross per 24 hour   Intake 620 ml   Output --   Net 620 ml       Physical Exam:    General Appearance: pleasant WM in chair eating lunch, no distress, nasal cannula O2  Skin: warm and dry  HEENT: oral mucosa normal, nonicteric sclera  Neck: supple, no JVD  Lungs: Coarse BS bilat with exp wheezes   Heart: RRR, normal S1 and S2  Abdomen: soft, nontender, nondistended  : no palpable bladder  Extremities: trace RLE edema, LLE AKA  Neuro: normal speech and mental status     Scheduled Meds:     albuterol, 2.5 mg, Nebulization, Q4H - RT  amLODIPine, 10 mg, Oral, Daily  aspirin, 81 mg, Oral, Nightly  atorvastatin, 20 mg, Oral, Daily  budesonide-formoterol, 2 puff, Inhalation, BID - RT   And  tiotropium bromide monohydrate, 2 puff, Inhalation, Daily - RT  buPROPion XL, 300 mg, Oral, Daily  vitamin D3, 5,000 Units, Oral, Daily  clopidogrel, 75 mg, Oral, Nightly  ferrous sulfate, 325 mg, Oral, Daily With Breakfast  gabapentin, 300 mg, Oral, TID  isosorbide mononitrate, 30 mg,  Oral, Q24H  latanoprost, 1 drop, Both Eyes, Nightly  levothyroxine, 125 mcg, Oral, Q AM  methylPREDNISolone sodium succinate, 40 mg, Intravenous, Q12H  multivitamin with minerals, 1 tablet, Oral, Daily  oseltamivir, 30 mg, Oral, Q12H  pantoprazole, 40 mg, Oral, QAM  sodium chloride, 500 mL, Intravenous, Once  sodium zirconium cyclosilicate, 10 g, Oral, TID      IV Meds:        Results Reviewed:   I have personally reviewed the results from the time of this admission to 4/5/2022 10:06 EDT     Lab Results   Component Value Date    GLUCOSE 182 (H) 04/05/2022    CALCIUM 9.0 04/05/2022     (L) 04/05/2022    K 5.8 (H) 04/05/2022    CO2 24.4 04/05/2022     04/05/2022    BUN 41 (H) 04/05/2022    CREATININE 2.28 (H) 04/05/2022    EGFRIFAFRI 51 (L) 09/10/2021    EGFRIFNONA 44 (L) 09/10/2021    BCR 18.0 04/05/2022    ANIONGAP 10.6 04/05/2022      Lab Results   Component Value Date    MG 2.1 04/04/2022    PHOS 3.0 10/25/2017    ALBUMIN 4.10 04/04/2022           Assessment / Plan     ASSESSMENT:  1. Non olig ARLYN - suspect prerenal azotemia from vol depletion in setting on influenza and poor oral intake, though Cr up 2.3 (despite IVF?  None hanging currently).  Rule out retentoin  2. 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 Hyperkalemia - intermittent issue even when renal fcn at BL; up to 5.8 in setting of ARLYN  4. Influenza A & COPD exac - on tamiflu, steroids; PULM following  5. Hx PVD s/p LLE AKa  6. HTN - BP decent, on norvasc    PLAN:  Resume gentle IVF   Give lokelma series and restrict K in diet  Recheck K this afternoon  Check UA & PVR     Thank you Dr Rubio for involving us in the care of Sim Agustin.  Please feel free to call with any questions.    Geoffrey Haddad MD  04/05/22  10:06 EDT    [facility]  [phone]

## 2022-04-05 NOTE — CONSULTS
Pulmonary Consultation     Patient Name: Sim Agustin  Age/Sex: 66 y.o. male  : 1955  MRN: [id number]    Date of Admission: 2022  Date of Encounter Visit: 22  Encounter Provider: Henry Lux MD  Referring Provider: Ruth Rosas MD  Place of Service: [facility]  Patient Care Team:  Seth Hester MD as PCP - General (Family Medicine)  Jose Swann MD as Consulting Physician (Vascular Surgery)  Krystal Ocampo MD as Consulting Physician (Cardiology)  Ishmael Tate Jr., MD as Consulting Physician (Pulmonary Disease)      Subjective:     Consulted for: COPD exacerbation    Chief Complaint: Worsening shortness of breath    History of Present Illness:  Sim Agustin is a 66 y.o. male with history of COPD, with possible infection for Haemophilus influenza on this admission who presented with worsening shortness of breath, cough and wheezing.  Patient has chronic hypoxemic respiratory failure and obstructive sleep apnea on oxygen at 3 L/min bled into the CPAP and follows with Dr. Tate in our office.  Patient was last seen by Dr. Tate on 2022,  His office regimen includes outpatient treatment with Trelegy and as needed albuterol.  He is a former smoker  His last CPAP download compliance with showing 100% adherence with good control of the sleep apnea and hypoxemia  His sleep study was done on 4/15/2019 and showed evidence of moderate obstructive sleep apnea with overall AHI of 18.1 and RDI of 24.5 with 99% of total sleep time spent below 89%  Started to have runny nose and congestion on Thursday that progressed into worsening cough  That was followed by wheezing with worsening shortness of breath initially with exertion and later on even at rest.  Patient tried to use her home oxygen that helped initially however the symptoms kept on progressing, there is no hemoptysis.  No loss of sense of smell or taste, no GI or  problem with that.  Positive subjective fever and  chills but no night sweats.  No recent sick contact or confirmed COVID-19 cases  Patient is already vaccinated and up-to-date on his COVID-19 vaccines.  Symptoms have progressed most over the last 2 to 3 days  Patient was admitted under the care of the medical service, was started on Symbicort and Spiriva and as needed DuoNeb  Patient was also started on Solu-Medrol currently on 40 mg IV every 8 hours.  Tamiflu was started because of the positive influenza test  Procalcitonin was borderline at 0.32 with no leukocytosis, chemistry showed creatinine of 1.95, this is close to the baseline at 1.6-1.9  Patient is feeling already better    Pulmonary Functions Testing Results:    No results found for: FEV1, FVC, OMV4HQM, TLC, DLCO    Review of Systems:   Review of Systems   Constitutional: Positive for chills. Negative for appetite change, diaphoresis, fatigue and fever.   HENT: Positive for congestion, postnasal drip and rhinorrhea.    Eyes: Negative.    Respiratory: Positive for cough and shortness of breath. Negative for apnea, wheezing and stridor.    Cardiovascular: Negative for chest pain, palpitations and leg swelling.   Gastrointestinal: Negative.    Endocrine: Negative.    Genitourinary: Negative.    Musculoskeletal: Negative.    Skin: Negative.    Allergic/Immunologic: Negative.    Neurological: Negative.    Hematological: Negative.    Psychiatric/Behavioral: Negative.      Past Medical History:  Past Medical History:   Diagnosis Date   • Acute respiratory failure with hypoxia and hypercarbia ([facility]) 3/11/2019   • Acute upper respiratory infection 2/6/2013   • Anemia     per [place] database   • ARF (acute renal failure) ([facility]) 3/4/2016   • Atelectasis, left 3/4/2016   • Bradycardia 5/23/2019   • CAD (coronary artery disease) 4/4/2016   • Chronic obstructive pulmonary disease with acute exacerbation ([facility]) 2/6/2013   • Colon polyps    • Compartment syndrome (HCC)     AFTER ILIAC SURGERY. ABOVE KNEE AMPUTATION   •  COPD (chronic obstructive pulmonary disease) ([facility])    • COPD with hypoxia ([facility]) 5/23/2019   • Cough     SINCE APRIL WITH PNEUMONIA.    • Disease of thyroid gland     HYPOTHYRODISM   • Diverticulosis    • Elevated hematocrit 1/6/2021   • Employs prosthetic leg    • ESRD (end stage renal disease) on dialysis ([facility]) 4/6/2016   • Fracture of patella 3/3/2015   • History of acute renal failure    • History of CHF (congestive heart failure)    • History of GI bleed 02/2016    AORTODUOD FISTULA   • History of sepsis 02/2016   • History of transfusion     MULTIPLE, 2016   • Hyperkalemia 4/11/2016   • Hypertension    • Hypotension    • Hypotension 3/4/2016   • Nonischemic cardiomyopathy ([facility])    • Nosocomial pneumonia 4/6/2016   • Phantom limb pain ([facility])     SL, WITH LEFT ABOUVE KNEE   • Pleural effusion on left 4/11/2016   • Pneumonia     SPRING 2016  AFTER SURGERY   • PVD (peripheral vascular disease) ([facility])    • S/P thoracentesis 4/11/2016   • Sepsis, unspecified organism ([facility]) 4/5/2016       Past Surgical History:   Procedure Laterality Date   • ABOVE KNEE AMPUTATION Left 3/16/2016    Procedure: LT AMPUTATION ABOVE KNEE;  Surgeon: Jose Swann MD;  Location: [facility] OR;  Service:    • ARTERIAL THROMBECTOMY  2001    throbectomy of arterial graft   • AXILLARY FEMORAL BYPASS Right 02/15/2016    Exploratory lapartomy, repair aortoduodenal fistula, resection infected aortobifemoral bypass graft, axillobi-superficial femoral artery [place]-Aneudy bypass graft from right axillary artery, Repair of duodenotomy 4th portion duodenum-Dr. Jose Swann, Dr. Genaro Perrin   • BRONCHOSCOPY Left 03/04/2016    Dr. NATALIIA Tate   • BRONCHOSCOPY RIGID / FLEXIBLE N/A 03/03/2016    Dr. NATALIIA Tate   • BRONCHOSCOPY RIGID / FLEXIBLE N/A 02/26/2016    Dr. NATALIIA Tate   • CARDIAC CATHETERIZATION N/A 3/18/2019    Procedure: Right and Left Heart Cath;  Surgeon: Nina Storey MD;  Location: [facility] INVASIVE LOCATION;  Service:  Cardiovascular   • CATARACT EXTRACTION WITH INTRAOCULAR LENS IMPLANT Bilateral    • COLON RESECTION WITH COLOSTOMY Left 02/28/2016    Exploratory laparotomy with open left hemicolectomy, end-colostomy, G-tube and J-tube-Dr. Endy Chaney   • COLONOSCOPY N/A 2015    Dr. Laughlin   • COLONOSCOPY N/A 10/12/2016    Procedure: COLONOSCOPY TO 20 CM AND PER STOMA TO 30CM;  Surgeon: Genaro Perrin MD;  Location: [facility] ENDOSCOPY;  Service:    • COLONOSCOPY N/A 10/21/2016    Procedure: COLONOSCOPY DONE AT BEDSIDE ONTO CECEM;  Surgeon: Genaro Perrin MD;  Location: [facility] ENDOSCOPY;  Service:    • COLONOSCOPY N/A 02/10/2016    Diverticulosis in the entire examined colon, non-bleeding internal hemorrhoids-Dr. Taylor Pina   • COLONOSCOPY N/A 02/11/2016    Poor prep, blood in the entire examined colon, normal ileum-Dr. Taylor Pina   • COLONOSCOPY W/ POLYPECTOMY N/A 07/27/2015    Normal ileum, diverticulosis in the sigmoid colon: resected and retrieved, one 6 mm polyp in the descending colon: resected and retrieved, the distal rectum and anal verge are normal on retroflexion view-Dr. Miguel Ángel Laughlin   • COLOSTOMY CLOSURE N/A 10/13/2016    Procedure: COLOSTOMY TAKEDOWN OR CLOSURE, APPENDECTOMY;  Surgeon: Genaro Perrin MD;  Location: [facility] MAIN OR;  Service:    • DEBRIDEMENT LEG Left 03/01/2016    Excisional debridement of the skin, subcutantous tissue, tendon and muscle left calf-Dr. Jose Swann   • DEBRIDEMENT LEG Left 02/25/2016    Excisional debridement full-thcikness skin and subcutaneous tissue and tendon and muscle, left medial and lateral calf muscles-Dr. Jose Swann   • ENDOSCOPY N/A 10/21/2016    Procedure: ESOPHAGOGASTRODUODENOSCOPY DONE AT BEDSIDE;  Surgeon: Genaro Perrin MD;  Location: [facility] ENDOSCOPY;  Service:    • ENDOSCOPY AND COLONOSCOPY N/A 02/28/2016    EGD and Colonoscopy with Candy ink injection-Dr. Endy Chaney   • ENTEROSCOPY SMALL BOWEL N/A 02/11/2016    Normal  esophagus, normal stomach, normal duodenum, portion of the jejunum normal-Dr. Taylor Pina   • ILIAC ARTERY - FEMORAL ARTERY BYPASS GRAFT Bilateral 2002   • ILIAC ARTERY STENT Bilateral 2001   • INSERTION AND REMOVAL PERITONEAL DIALYSIS CATHETER Right 02/29/2016    Exchange right jugular 16 cm Mahurkar dialysis catheter-Dr. Jose Swann   • INSERTION PERITONEAL DIALYSIS CATHETER Left 03/01/2016    Ultrasound-guided access of the left jugular vein, 23 cm left jugular palindrome dialysis catheter placement-Dr. Jose Swann   • INSERTION PERITONEAL DIALYSIS CATHETER Right 02/18/2016    Right jugular Mahrkar catherer placement-Dr. Jose Swann   • JOINT REPLACEMENT Left    • THORACOSCOPY Left 4/18/2016    Procedure: LT THORACOSCOPY VIDEO ASSISTED WITH DECORDICATION;  Surgeon: Genaro Davial III, MD;  Location: [facility];  Service:    • THROMBECTOMY Left 02/16/2016    Thombectomy of left femoral graft, left leg arteriogram, left calf forward compartment fasciotomy-Dr. Jose Swann   • TOTAL HIP ARTHROPLASTY Left    • UPPER GASTROINTESTINAL ENDOSCOPY N/A 02/09/2016    Normal UGI-Dr. Ajay Parkinson   • UPPER GASTROINTESTINAL ENDOSCOPY N/A 11/28/2015    Small hiatal hernia, chronic gastritis: biopsied, non-bleeding angioectasias in the stomach: treated with argon plasma coagulation, multiple bleeding angioectasias in the dudenum: treated with argon plasma coagulation-Dr. Mykel Jaramillo   • VASCULAR SURGERY      MULTIPLE   • VENTRAL/INCISIONAL HERNIA REPAIR N/A 10/19/2017    Procedure: VENTRAL HERNIA REPAIR WITH MESH AND BILATERAL COMPONENT SEPARATION;  Surgeon: Genaro Perrin MD;  Location: [facility];  Service:    • WISDOM TOOTH EXTRACTION         Home Medications:   Medications Prior to Admission   Medication Sig Dispense Refill Last Dose   • ALPRAZolam (XANAX) 1 MG tablet Take 1 tablet by mouth 3 (Three) Times a Day As Needed for Anxiety. 90 tablet 2    • amLODIPine (NORVASC) 10 MG tablet  TAKE 1 TABLET BY MOUTH EVERY DAY 90 tablet 3    • aspirin 81 MG chewable tablet Chew 81 mg Every Night.      • buPROPion XL (WELLBUTRIN XL) 300 MG 24 hr tablet TAKE 1 TABLET BY MOUTH DAILY 90 tablet 3    • Cholecalciferol (VITAMIN D3) 125 MCG (5000 UT) tablet tablet Take 1 tablet by mouth Daily. (Patient taking differently: Take 5,000 Units by mouth 1 (One) Time Per Week.) 90 tablet 3    • clopidogrel (PLAVIX) 75 MG tablet TAKE 1 TABLET BY MOUTH DAILY AT NIGHT 90 tablet 3    • Coenzyme Q10 400 MG capsule Take 400 mg by mouth Every Evening.      • FERROUS SULFATE PO Take 65 mg by mouth Every Night.      • Fluticasone-Umeclidin-Vilant (TRELEGY ELLIPTA) 100-62.5-25 MCG/INH aerosol powder  Inhale 1 each Daily. 28 each 2    • gabapentin (NEURONTIN) 300 MG capsule Take 1 capsule by mouth 3 (Three) Times a Day. (Patient taking differently: Take 900 mg by mouth Every Night.) 270 capsule 1    • isosorbide mononitrate (IMDUR) 30 MG 24 hr tablet TAKE 1 TABLET BY MOUTH DAILY EVERY MORNING 90 tablet 3    • latanoprost (XALATAN) 0.005 % ophthalmic solution Administer 1 drop to both eyes every night at bedtime.      • levothyroxine (SYNTHROID, LEVOTHROID) 125 MCG tablet TAKE 1 TABLET BY MOUTH EVERY DAY 90 tablet 1    • Multiple Vitamins-Minerals (MULTIVITAMIN ADULT PO) Take 1 tablet by mouth Daily.      • NIACIN PO Take 1 capsule by mouth Every Morning.      • omeprazole (priLOSEC) 20 MG capsule TAKE 1 CAPSULE BY MOUTH EVERY DAY IN THE MORNING 90 capsule 3    • simvastatin (ZOCOR) 40 MG tablet Take 0.5 tablets by mouth Every Night. 90 tablet 3    • Thiamine HCl (VITAMIN B-1 PO) Take 1 tablet by mouth Daily.      • traMADol (ULTRAM) 50 MG tablet Take 1 tablet by mouth 2 (Two) Times a Day. 180 tablet 0        Inpatient Medications:  Scheduled Meds:amLODIPine, 10 mg, Oral, Daily  aspirin, 81 mg, Oral, Nightly  atorvastatin, 20 mg, Oral, Daily  budesonide-formoterol, 2 puff, Inhalation, BID - RT   And  tiotropium bromide monohydrate,  2 puff, Inhalation, Daily - RT  buPROPion XL, 300 mg, Oral, Daily  vitamin D3, 5,000 Units, Oral, Daily  clopidogrel, 75 mg, Oral, Nightly  ferrous sulfate, 325 mg, Oral, Daily With Breakfast  gabapentin, 300 mg, Oral, TID  ipratropium-albuterol, 3 mL, Nebulization, 4x Daily - RT  isosorbide mononitrate, 30 mg, Oral, Q24H  latanoprost, 1 drop, Both Eyes, Nightly  levothyroxine, 125 mcg, Oral, Q AM  methylPREDNISolone sodium succinate, 40 mg, Intravenous, Q8H  multivitamin with minerals, 1 tablet, Oral, Daily  oseltamivir, 30 mg, Oral, Q12H  pantoprazole, 40 mg, Oral, QAM      Continuous Infusions:   PRN Meds:.•  acetaminophen  •  ALPRAZolam  •  ipratropium-albuterol  •  melatonin  •  nitroglycerin  •  ondansetron **OR** ondansetron  •  traMADol    Allergies:  Allergies   Allergen Reactions   • Augmentin [Amoxicillin-Pot Clavulanate] Hives and Rash       Past Social History:  Social History     Socioeconomic History   • Marital status:    Tobacco Use   • Smoking status: Former Smoker     Packs/day: 0.00     Years: 30.00     Pack years: 0.00     Types: Cigarettes     Quit date: 2021     Years since quittin.7   • Smokeless tobacco: Never Used   • Tobacco comment: caffeine - rarely   Substance and Sexual Activity   • Alcohol use: No   • Drug use: Never   • Sexual activity: Yes     Partners: Female     Birth control/protection: Post-menopausal       Past Family History:  Family History   Problem Relation Age of Onset   • Lung cancer Mother    • Cancer Mother    • Heart disease Father    • Diabetes Father    • Hypertension Father    • Malig Hyperthermia Neg Hx            Objective:   Temp:  [97.4 °F (36.3 °C)-97.6 °F (36.4 °C)] 97.4 °F (36.3 °C)  Heart Rate:  [55-79] 60  Resp:  [15-22] 20  BP: (118-154)/(73-79) 118/74  SpO2:  [95 %-100 %] 97 %  on  Flow (L/min):  [3-5] 5 Device (Oxygen Therapy): nasal cannula     Intake/Output Summary (Last 24 hours) at 2022 0433  Last data filed at 2022 2206  Gross  per 24 hour   Intake 620 ml   Output --   Net 620 ml     Body mass index is 26.45 kg/m².      04/04/22 2138   Weight: 78.9 kg (173 lb 15.1 oz)     Weight change:     Physical Exam:   Physical Exam   General:    No acute distress, alert and oriented x4, pleasant                   Head:    Normocephalic, atraumatic. External ears and nose are normal   Eyes:          Conjunctivae and sclerae normal, no icterus, PERRLA, no  discharge   Throat:   No oral lesions, no thrush, oral mucosa moist.    Neck:   Supple, trachea midline. No JVD, no cervical or supraclavicular lymphadenopathy    Lungs:     Normal chest on inspection,  decreased breath sounds specially expiratory sounds with minor prolongation of the expiratory phase, no obvious crackles.  No wheezing on quiet breathing    Heart:    Regular rhythm and normal rate.  No murmurs, gallops, or rubs noted.   Abdomen:     Soft, nontender, nondistended, positive bowel sounds. No hepatospleenomegaly    Extremities:   No clubbing, cyanosis, or edema.  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    Skin:   No bleeding or rash. No bumps, good turgor pressure    Neuro:   Nonfocal.  Moves all extremities well. Strength 5/5 and symmetrical, no sensory deficit    Psychiatric:   Normal mood and affect.     Lab Review:   Results from last 7 days   Lab Units 04/04/22  1421   SODIUM mmol/L 138   POTASSIUM mmol/L 5.0   CHLORIDE mmol/L 101   CO2 mmol/L 25.9   BUN mg/dL 30*   CREATININE mg/dL 1.95*   GLUCOSE mg/dL 102*   CALCIUM mg/dL 9.4   AST (SGOT) U/L 15   ALT (SGPT) U/L 11   ALBUMIN g/dL 4.10     Results from last 7 days   Lab Units 04/04/22  1421   TROPONIN T ng/mL 0.018     Results from last 7 days   Lab Units 04/04/22  1421   WBC 10*3/mm3 8.63   HEMOGLOBIN g/dL 16.4   HEMATOCRIT % 47.2   PLATELETS 10*3/mm3 221   MCV fL 88.4   MCH pg 30.7   MCHC g/dL 34.7   RDW % 12.9   RDW-SD fl 40.8    MPV fL 9.3   NEUTROPHIL % % 83.2*   LYMPHOCYTE % % 9.2*   MONOCYTES % % 6.4   EOSINOPHIL % % 0.6   BASOPHIL % % 0.3   IMM GRAN % % 0.3   NEUTROS ABS 10*3/mm3 7.18*   LYMPHS ABS 10*3/mm3 0.79   MONOS ABS 10*3/mm3 0.55   EOS ABS 10*3/mm3 0.05   BASOS ABS 10*3/mm3 0.03   IMMATURE GRANS (ABS) 10*3/mm3 0.03   NRBC /100 WBC 0.0         Results from last 7 days   Lab Units 04/04/22  1421   MAGNESIUM mg/dL 2.1           Invalid input(s): LDLCALC  Results from last 7 days   Lab Units 04/04/22  1421   PROBNP pg/mL 726.0             Results from last 7 days   Lab Units 04/04/22  1421   PROCALCITONIN ng/mL 0.32*                     Results from last 7 days   Lab Units 04/04/22  1423   COVID19  Not Detected   ADENOVIRUS DETECTION BY PCR  Not Detected   CORONAVIRUS 229E  Not Detected   CORONAVIRUS HKU1  Not Detected   CORONAVIRUS NL63  Not Detected   CORONAVIRUS OC43  Not Detected   HUMAN METAPNEUMOVIRUS  Not Detected   HUMAN RHINOVIRUS/ENTEROVIRUS  Not Detected   INFLUENZA B PCR  Not Detected   PARAINFLUENZA 1  Not Detected   PARAINFLUENZA VIRUS 2  Not Detected   PARAINFLUENZA VIRUS 3  Not Detected   PARAINFLUENZA VIRUS 4  Not Detected   BORDETELLA PERTUSSIS PCR  Not Detected   BORDETELLA PARAPERTUSSIS PCR  Not Detected   CHLAMYDOPHILA PNEUMONIAE PCR  Not Detected   MYCOPLAMA PNEUMO PCR  Not Detected   INFLUENZA A H3  Detected*   RSV, PCR  Not Detected             Imaging:  Imaging Results (Most Recent)     Procedure Component Value Units Date/Time    XR Chest 1 View [[id number]] Collected: 04/04/22 1441     Updated: 04/04/22 1450    Narrative:      XR CHEST 1 VW-     HISTORY: Male who is 66 years-old,  short of breath     TECHNIQUE: Frontal views of the chest     COMPARISON: 05/19/2021     FINDINGS: The heart size is normal. Aorta is calcified. Pulmonary  vasculature is unremarkable. No focal pulmonary consolidation, pleural  effusion, or pneumothorax. No acute osseous process.       Impression:      No focal pulmonary  consolidation. Follow-up as clinical  indications persist.     This report was finalized on 4/4/2022 2:46 PM by Dr. Diogo Suarez M.D.             I personally viewed and interpreted the patient's imaging studies.    Assessment:     1. Acute exacerbation of COPD  2. Acute on chronic respiratory failure, patient is on oxygen mainly at night  3. Influenza A infection  4. Obstructive sleep apnea with sleep related hypoxemia  5. Pulmonary hypertension  6. History of systolic congestive heart failure    Plan:     Patient is doing better, continue the Tamiflu for the influenza A  Continue with the steroid, will transition to the every 12 hour dosing on the Solu-Medrol and consider switching to p.o. prednisone and continue taper tomorrow if he continues to improve  I do not see an obvious pneumonia on the x-ray patient has some chronic changes  No antibiotic indicated at this point  Patient does not have his home CPAP, he is on the nasal cannula oxygen for now  Patient has oxygen at home but does not have ambulatory oxygen, will assess for his needs at the time of discharge    Discussed with the patient        Thank you for allowing me to participate in the care of Sim Agustin. Feel free to contact me directly with any further questions or concerns.    Henry Lux MD  [facility]   04/05/22  04:33 EDT    Dictated utilizing Dragon dictation

## 2022-04-05 NOTE — THERAPY EVALUATION
Patient Name: Sim Agustin  : 1955    MRN: [id number]                              Today's Date: 2022       Admit Date: 2022    Visit Dx:     ICD-10-CM ICD-9-CM   1. Acute exacerbation of chronic obstructive pulmonary disease (COPD) ([facility])  J44.1 491.21   2. Hypoxia  R09.02 799.02   3. Acute renal failure superimposed on chronic kidney disease, unspecified CKD stage, unspecified acute renal failure type ([facility])  N17.9 584.9    N18.9 585.9     Patient Active Problem List   Diagnosis   • H/O angiodysplasia of intestinal tract   • COPD (chronic obstructive pulmonary disease) ([facility])   • S/P colectomy   • Status post above-knee amputation of left lower extremity ([facility])   • CAD (coronary artery disease)   • PVD (peripheral vascular disease) ([facility])   • Essential hypertension   • Cardiomyopathy, unspecified ([facility])   • PRISCILLA treated with BiPAP   • Anxiety disorder   • Chronic midline low back pain without sciatica   • Long term prescription benzodiazepine use   • History of bradycardia   • Hypothyroidism   • Vitamin B12 deficiency   • Vitamin D deficiency   • Iron deficiency   • History of smoking 30 or more pack years   • High risk medication use   • DNR (do not resuscitate)   • CKD (chronic kidney disease)   • Hyperlipidemia   • Proteinuria   • Acute exacerbation of chronic obstructive pulmonary disease (COPD) ([facility])   • Influenza A   • Acute respiratory failure with hypoxia ([facility])   • Hyperkalemia     Past Medical History:   Diagnosis Date   • Acute respiratory failure with hypoxia and hypercarbia ([facility]) 3/11/2019   • Acute upper respiratory infection 2013   • Anemia     per [place]   • ARF (acute renal failure) ([facility]) 3/4/2016   • Atelectasis, left 3/4/2016   • Bradycardia 2019   • CAD (coronary artery disease) 2016   • Chronic obstructive pulmonary disease with acute exacerbation ([facility]) 2013   • Colon polyps    • Compartment syndrome ([facility])     AFTER ILIAC SURGERY. ABOVE KNEE AMPUTATION   •  COPD (chronic obstructive pulmonary disease) ([facility])    • COPD with hypoxia ([facility]) 5/23/2019   • Cough     SINCE APRIL WITH PNEUMONIA.    • Disease of thyroid gland     HYPOTHYRODISM   • Diverticulosis    • Elevated hematocrit 1/6/2021   • Employs prosthetic leg    • ESRD (end stage renal disease) on dialysis ([facility]) 4/6/2016   • Fracture of patella 3/3/2015   • History of acute renal failure    • History of CHF (congestive heart failure)    • History of GI bleed 02/2016    AORTODUOD FISTULA   • History of sepsis 02/2016   • History of transfusion     MULTIPLE, 2016   • Hyperkalemia 4/11/2016   • Hypertension    • Hypotension    • Hypotension 3/4/2016   • Nonischemic cardiomyopathy ([facility])    • Nosocomial pneumonia 4/6/2016   • Phantom limb pain ([facility])     SL, WITH LEFT ABOUVE KNEE   • Pleural effusion on left 4/11/2016   • Pneumonia     SPRING 2016  AFTER SURGERY   • PVD (peripheral vascular disease) ([facility])    • S/P thoracentesis 4/11/2016   • Sepsis, unspecified organism ([facility]) 4/5/2016     Past Surgical History:   Procedure Laterality Date   • ABOVE KNEE AMPUTATION Left 3/16/2016    Procedure: LT AMPUTATION ABOVE KNEE;  Surgeon: Jose Swann MD;  [facility]: [facility] OR;  Service:    • ARTERIAL THROMBECTOMY  2001    throbectomy of arterial graft   • AXILLARY FEMORAL BYPASS Right 02/15/2016    Exploratory lapartomy, repair aortoduodenal fistula, resection infected aortobifemoral bypass graft, axillobi-superficial femoral artery Guild-Aneudy bypass graft from right axillary artery, Repair of duodenotomy 4th portion duodenum-Dr. Jose Swann, Dr. Genaro Perrin   • BRONCHOSCOPY Left 03/04/2016    Dr. NATALIIA Tate   • BRONCHOSCOPY RIGID / FLEXIBLE N/A 03/03/2016    Dr. NATALIIA Tate   • BRONCHOSCOPY RIGID / FLEXIBLE N/A 02/26/2016    Dr. NATALIIA Tate   • CARDIAC CATHETERIZATION N/A 3/18/2019    Procedure: Right and Left Heart Cath;  Surgeon: Nina Storey MD;  Location: [facility] INVASIVE LOCATION;  Service:  Cardiovascular   • CATARACT EXTRACTION WITH INTRAOCULAR LENS IMPLANT Bilateral    • COLON RESECTION WITH COLOSTOMY Left 02/28/2016    Exploratory laparotomy with open left hemicolectomy, end-colostomy, G-tube and J-tube-Dr. Endy Chaney   • COLONOSCOPY N/A 2015    Dr. Laughlin   • COLONOSCOPY N/A 10/12/2016    Procedure: COLONOSCOPY TO 20 CM AND PER STOMA TO 30CM;  Surgeon: Genaro Perrin MD;  Location: [facility] ENDOSCOPY;  Service:    • COLONOSCOPY N/A 10/21/2016    Procedure: COLONOSCOPY DONE AT BEDSIDE ONTO CECEM;  Surgeon: Genaro Perrin MD;  Location: [facility] ENDOSCOPY;  Service:    • COLONOSCOPY N/A 02/10/2016    Diverticulosis in the entire examined colon, non-bleeding internal hemorrhoids-Dr. Taylor Pina   • COLONOSCOPY N/A 02/11/2016    Poor prep, blood in the entire examined colon, normal ileum-Dr. Taylor Pina   • COLONOSCOPY W/ POLYPECTOMY N/A 07/27/2015    Normal ileum, diverticulosis in the sigmoid colon: resected and retrieved, one 6 mm polyp in the descending colon: resected and retrieved, the distal rectum and anal verge are normal on retroflexion view-Dr. Miguel Ángel Laughlin   • COLOSTOMY CLOSURE N/A 10/13/2016    Procedure: COLOSTOMY TAKEDOWN OR CLOSURE, APPENDECTOMY;  Surgeon: Genaro Perrin MD;  Location: [facility] MAIN OR;  Service:    • DEBRIDEMENT LEG Left 03/01/2016    Excisional debridement of the skin, subcutantous tissue, tendon and muscle left calf-Dr. Jose Swann   • DEBRIDEMENT LEG Left 02/25/2016    Excisional debridement full-thcikness skin and subcutaneous tissue and tendon and muscle, left medial and lateral calf muscles-Dr. Jose Swann   • ENDOSCOPY N/A 10/21/2016    Procedure: ESOPHAGOGASTRODUODENOSCOPY DONE AT BEDSIDE;  Surgeon: Genaro Perrin MD;  Location: [facility] ENDOSCOPY;  Service:    • ENDOSCOPY AND COLONOSCOPY N/A 02/28/2016    EGD and Colonoscopy with Candy ink injection-Dr. Endy Chaney   • ENTEROSCOPY SMALL BOWEL N/A 02/11/2016    Normal  esophagus, normal stomach, normal duodenum, portion of the jejunum normal-Dr. Taylor Pina   • ILIAC ARTERY - FEMORAL ARTERY BYPASS GRAFT Bilateral 2002   • ILIAC ARTERY STENT Bilateral 2001   • INSERTION AND REMOVAL PERITONEAL DIALYSIS CATHETER Right 02/29/2016    Exchange right jugular 16 cm Mahurkar dialysis catheter-Dr. oJse Swann   • INSERTION PERITONEAL DIALYSIS CATHETER Left 03/01/2016    Ultrasound-guided access of the left jugular vein, 23 cm left jugular palindrome dialysis catheter placement-Dr. Jose Swann   • INSERTION PERITONEAL DIALYSIS CATHETER Right 02/18/2016    Right jugular Mahrkar catherer placement-Dr. Jose Swann   • JOINT REPLACEMENT Left    • THORACOSCOPY Left 4/18/2016    Procedure: LT THORACOSCOPY VIDEO ASSISTED WITH DECORDICATION;  Surgeon: Genaro Davila III, MD;  [facility];  Service:    • THROMBECTOMY Left 02/16/2016    Thombectomy of left femoral graft, left leg arteriogram, left calf forward compartment fasciotomy-Dr. Jose Swann   • TOTAL HIP ARTHROPLASTY Left    • UPPER GASTROINTESTINAL ENDOSCOPY N/A 02/09/2016    Normal UGI-Dr. Ajay Parkinson   • UPPER GASTROINTESTINAL ENDOSCOPY N/A 11/28/2015    Small hiatal hernia, chronic gastritis: biopsied, non-bleeding angioectasias in the stomach: treated with argon plasma coagulation, multiple bleeding angioectasias in the dudenum: treated with argon plasma coagulation-Dr. Mykel Jaramillo   • VASCULAR SURGERY      MULTIPLE   • VENTRAL/INCISIONAL HERNIA REPAIR N/A 10/19/2017    Procedure: VENTRAL HERNIA REPAIR WITH MESH AND BILATERAL COMPONENT SEPARATION;  Surgeon: Genaro Perrin MD;  Location[facility];  Service:    • WISDOM TOOTH EXTRACTION        General Information     Row Name 04/05/22 1609          Physical Therapy Time and Intention    Document Type evaluation  -CB     Mode of Treatment individual therapy;physical therapy  -CB     Row Name 04/05/22 9272          General Information     Patient Profile Reviewed yes  -CB     Prior Level of Function independent:;gait;transfer;bed mobility  L AKA with prothesis; reports he wears prosthesis in community using SPC and no prosthesis in home with rwx  -CB     Existing Precautions/Restrictions fall;oxygen therapy device and L/min  -CB     Barriers to Rehab none identified  -CB     Row Name 04/05/22 1605          Living Environment    People in Home spouse  -CB     Row Name 04/05/22 1605          Cognition    Orientation Status (Cognition) oriented x 4  -CB     Row Name 04/05/22 1605          Safety Issues, Functional Mobility    Impairments Affecting Function (Mobility) shortness of breath;endurance/activity tolerance  -CB           User Key  (r) = Recorded By, (t) = Taken By, (c) = Cosigned By    Initials Name Provider Type    CB Patti Campbell PT Physical Therapist               Mobility     Row Name 04/05/22 1606          Bed Mobility    Bed Mobility supine-sit  -CB     Supine-Sit [place] (Bed Mobility) modified independence  -CB     Row Name 04/05/22 1606          Sit-Stand Transfer    Sit-Stand [place] (Transfers) contact guard;verbal cues;nonverbal cues (demo/gesture);standby assist  -CB     Assistive Device (Sit-Stand Transfers) walker, front-wheeled  -CB     Row Name 04/05/22 1606          Gait/Stairs (Locomotion)    [place] Level (Gait) standby assist;contact guard;verbal cues;nonverbal cues (demo/gesture)  -CB     Assistive Device (Gait) walker, front-wheeled  -CB     Distance in Feet (Gait) 10ft  -CB     Deviations/Abnormal Patterns (Gait) gait speed decreased  -CB     Comment, (Gait/Stairs) no LOB or unsteadiness. PT to manage lines for mobility  -CB           User Key  (r) = Recorded By, (t) = Taken By, (c) = Cosigned By    Initials Name Provider Type    Patti Maurice PT Physical Therapist               Obj/Interventions     Row Name 04/05/22 1607          Range of Motion Comprehensive    Comment, General Range of Motion L  AKA; ROM WFL  -CB     Row Name 04/05/22 1607          Strength Comprehensive (MMT)    General Manual Muscle Testing (MMT) Assessment no strength deficits identified  -CB     Row Name 04/05/22 1607          Balance    Balance Assessment sitting static balance;sitting dynamic balance;standing static balance;standing dynamic balance  -CB     Static Sitting Balance modified independence  -CB     Dynamic Sitting Balance modified independence  -CB     Position, Sitting Balance sitting edge of bed  -CB     Static Standing Balance standby assist;contact guard  -CB     Dynamic Standing Balance standby assist;contact guard  -CB     Position/Device Used, Standing Balance supported;walker, rolling  -CB     Row Name 04/05/22 1607          Sensory Assessment (Somatosensory)    Sensory Assessment (Somatosensory) LE sensation intact  -CB           User Key  (r) = Recorded By, (t) = Taken By, (c) = Cosigned By    Initials Name Provider Type    Patti Maurice, PT Physical Therapist               Goals/Plan    No documentation.                Clinical Impression     Row Name 04/05/22 1608          Pain    Pretreatment Pain Rating 0/10 - no pain  -CB     Posttreatment Pain Rating 0/10 - no pain  -CB     Row Name 04/05/22 1608          Plan of Care Review    Plan of Care Reviewed With patient  -CB     Progress no change  -CB     Outcome Evaluation Patient is a 67 yo male who presented with SOA and cough. PMHx inlcudes L AKA with prosthesis. He lives with spouse and reports he uses rwx without his prosthesis in home and prosthesis with SPC in community. He presents with SOA with mobility and decreased activity tolerance. He completed bed mobility with Ari and STS to rwx requiring SBA/CGA. He ambulated 10ft using rwx requiring SBA/CGA and no LOB or unsteadiness noted. Pt will continue to benefit from skilled PT to address functional deficits and increase level of independence. PT rec home with assist at discharge.  -CB     Row Name  04/05/22 1608          Therapy Assessment/Plan (PT)    Rehab Potential (PT) good, to achieve stated therapy goals  -CB     Criteria for Skilled Interventions Met (PT) yes  -CB     Row Name 04/05/22 1608          Vital Signs    Pre SpO2 (%) 95  -CB     O2 Delivery Pre Treatment supplemental O2  -CB     Intra SpO2 (%) 92  sitting EOB  -CB     O2 Delivery Intra Treatment room air  -CB     Post SpO2 (%) --  sitting in bathroom with sensor not reaching into restroom  -CB     O2 Delivery Post Treatment supplemental O2  -CB     Row Name 04/05/22 1608          Positioning and Restraints    Pre-Treatment Position in bed  -CB     Post Treatment Position bathroom  -CB     Bathroom notified nsg;sitting;call light within reach;encouraged to call for assist  -CB           User Key  (r) = Recorded By, (t) = Taken By, (c) = Cosigned By    Initials Name Provider Type    Patti Maurice PT Physical Therapist               Outcome Measures     Row Name 04/05/22 1619          How much help from another person do you currently need...    Turning from your back to your side while in flat bed without using bedrails? 4  -CB     Moving from lying on back to sitting on the side of a flat bed without bedrails? 3  -CB     Moving to and from a bed to a chair (including a wheelchair)? 3  -CB     Standing up from a chair using your arms (e.g., wheelchair, bedside chair)? 3  -CB     Climbing 3-5 steps with a railing? 3  -CB     To walk in hospital room? 3  -CB     AM-PAC 6 Clicks Score (PT) 19  -CB     Row Name 04/05/22 1619          Functional Assessment    Outcome Measure Options AM-PAC 6 Clicks Basic Mobility (PT)  -CB           User Key  (r) = Recorded By, (t) = Taken By, (c) = Cosigned By    Initials Name Provider Type    Patti Maurice PT Physical Therapist                             Physical Therapy Education                 Title: PT OT SLP Therapies (In Progress)     Topic: Physical Therapy (In Progress)     Point: Mobility  training (Done)     Learning Progress Summary           Patient Acceptance, E,TB, VU,NR by CB at 4/5/2022 1619                   Point: Home exercise program (Not Started)     Learner Progress:  Not documented in this visit.          Point: Body mechanics (Not Started)     Learner Progress:  Not documented in this visit.          Point: Precautions (Not Started)     Learner Progress:  Not documented in this visit.                      User Key     Initials Effective Dates Name Provider Type Discipline    CB 10/22/21 -  Patti Campbell, PT Physical Therapist PT              PT Recommendation and Plan     Plan of Care Reviewed With: patient  Progress: no change  Outcome Evaluation: Patient is a 67 yo male who presented with SOA and cough. PMHx inlcudes L AKA with prosthesis. He lives with spouse and reports he uses rwx without his prosthesis in home and prosthesis with SPC in community. He presents with SOA with mobility and decreased activity tolerance. He completed bed mobility with Ari and STS to rwx requiring SBA/CGA. He ambulated 10ft using rwx requiring SBA/CGA and no LOB or unsteadiness noted. Pt will continue to benefit from skilled PT to address functional deficits and increase level of independence. PT rec home with assist at discharge.     Time Calculation:    PT Charges     Row Name 04/05/22 1621             Time Calculation    Start Time 1523  -CB      Stop Time 1540  -CB      Time Calculation (min) 17 min  -CB      PT Received On 04/05/22  -CB      PT - Next Appointment 04/06/22  -CB      PT Goal Re-Cert Due Date 04/12/22  -CB              Time Calculation- PT    Total Timed Code Minutes- PT 10 minute(s)  -CB              Timed Charges    75564 - PT Therapeutic Activity Minutes 10  -CB              Total Minutes    Timed Charges Total Minutes 10  -CB       Total Minutes 10  -CB            User Key  (r) = Recorded By, (t) = Taken By, (c) = Cosigned By    Initials Name Provider Type    CB Patti Campbell,  PT Physical Therapist              Therapy Charges for Today     Code Description Service Date Service Provider Modifiers Qty    41835325852  PT THERAPEUTIC ACT EA 15 MIN 4/5/2022 Patti Campbell, PT GP 1    [id number] HC PT EVAL MOD COMPLEXITY 2 4/5/2022 Patti Campbell, PT GP 1          PT G-Codes  Outcome Measure Options: AM-PAC 6 Clicks Basic Mobility (PT)  AM-PAC 6 Clicks Score (PT): 19    Patti Campbell, PT  4/5/2022

## 2022-04-06 ENCOUNTER — APPOINTMENT (OUTPATIENT)
Dept: GENERAL RADIOLOGY | Facility: HOSPITAL | Age: 67
End: 2022-04-06

## 2022-04-06 LAB
ALBUMIN SERPL-MCNC: 3.8 G/DL (ref 3.5–5.2)
ANION GAP SERPL CALCULATED.3IONS-SCNC: 11.5 MMOL/L (ref 5–15)
BUN SERPL-MCNC: 30 MG/DL (ref 8–23)
BUN/CREAT SERPL: 21.9 (ref 7–25)
CALCIUM SPEC-SCNC: 8.7 MG/DL (ref 8.6–10.5)
CHLORIDE SERPL-SCNC: 103 MMOL/L (ref 98–107)
CO2 SERPL-SCNC: 24.5 MMOL/L (ref 22–29)
CREAT SERPL-MCNC: 1.37 MG/DL (ref 0.76–1.27)
EGFRCR SERPLBLD CKD-EPI 2021: 56.9 ML/MIN/1.73
GLUCOSE SERPL-MCNC: 128 MG/DL (ref 65–99)
PHOSPHATE SERPL-MCNC: 2.9 MG/DL (ref 2.5–4.5)
POTASSIUM SERPL-SCNC: 4.1 MMOL/L (ref 3.5–5.2)
SODIUM SERPL-SCNC: 139 MMOL/L (ref 136–145)

## 2022-04-06 PROCEDURE — 94664 DEMO&/EVAL PT USE INHALER: CPT

## 2022-04-06 PROCEDURE — 94799 UNLISTED PULMONARY SVC/PX: CPT

## 2022-04-06 PROCEDURE — 94760 N-INVAS EAR/PLS OXIMETRY 1: CPT

## 2022-04-06 PROCEDURE — 25010000002 METHYLPREDNISOLONE PER 40 MG: Performed by: INTERNAL MEDICINE

## 2022-04-06 PROCEDURE — 71045 X-RAY EXAM CHEST 1 VIEW: CPT

## 2022-04-06 PROCEDURE — 25010000002 FUROSEMIDE PER 20 MG: Performed by: INTERNAL MEDICINE

## 2022-04-06 PROCEDURE — 80069 RENAL FUNCTION PANEL: CPT | Performed by: HOSPITALIST

## 2022-04-06 PROCEDURE — 94761 N-INVAS EAR/PLS OXIMETRY MLT: CPT

## 2022-04-06 RX ORDER — FUROSEMIDE 10 MG/ML
40 INJECTION INTRAMUSCULAR; INTRAVENOUS ONCE
Status: COMPLETED | OUTPATIENT
Start: 2022-04-06 | End: 2022-04-06

## 2022-04-06 RX ORDER — OSELTAMIVIR PHOSPHATE 75 MG/1
75 CAPSULE ORAL EVERY 12 HOURS SCHEDULED
Status: COMPLETED | OUTPATIENT
Start: 2022-04-06 | End: 2022-04-09

## 2022-04-06 RX ADMIN — METHYLPREDNISOLONE SODIUM SUCCINATE 40 MG: 40 INJECTION, POWDER, FOR SOLUTION INTRAMUSCULAR; INTRAVENOUS at 04:04

## 2022-04-06 RX ADMIN — Medication 5000 UNITS: at 08:01

## 2022-04-06 RX ADMIN — GABAPENTIN 300 MG: 300 CAPSULE ORAL at 08:01

## 2022-04-06 RX ADMIN — ATORVASTATIN CALCIUM 20 MG: 20 TABLET, FILM COATED ORAL at 08:00

## 2022-04-06 RX ADMIN — ALBUTEROL SULFATE 2.5 MG: 2.5 SOLUTION RESPIRATORY (INHALATION) at 23:32

## 2022-04-06 RX ADMIN — OSELTAMIVIR PHOSPHATE 75 MG: 75 CAPSULE ORAL at 20:49

## 2022-04-06 RX ADMIN — ASPIRIN 81 MG: 81 TABLET, CHEWABLE ORAL at 20:47

## 2022-04-06 RX ADMIN — TIOTROPIUM BROMIDE INHALATION SPRAY 2 PUFF: 3.12 SPRAY, METERED RESPIRATORY (INHALATION) at 08:40

## 2022-04-06 RX ADMIN — ALPRAZOLAM 1 MG: 0.5 TABLET ORAL at 17:15

## 2022-04-06 RX ADMIN — MULTIPLE VITAMINS W/ MINERALS TAB 1 TABLET: TAB at 10:50

## 2022-04-06 RX ADMIN — TRAMADOL HYDROCHLORIDE 50 MG: 50 TABLET, COATED ORAL at 20:48

## 2022-04-06 RX ADMIN — SODIUM CHLORIDE 75 ML/HR: 9 INJECTION, SOLUTION INTRAVENOUS at 04:05

## 2022-04-06 RX ADMIN — BUPROPION HYDROCHLORIDE 300 MG: 300 TABLET, EXTENDED RELEASE ORAL at 08:00

## 2022-04-06 RX ADMIN — OSELTAMIVIR PHOSPHATE 30 MG: 30 CAPSULE ORAL at 08:01

## 2022-04-06 RX ADMIN — PANTOPRAZOLE SODIUM 40 MG: 40 TABLET, DELAYED RELEASE ORAL at 07:05

## 2022-04-06 RX ADMIN — BUDESONIDE AND FORMOTEROL FUMARATE DIHYDRATE 2 PUFF: 160; 4.5 AEROSOL RESPIRATORY (INHALATION) at 08:35

## 2022-04-06 RX ADMIN — GABAPENTIN 300 MG: 300 CAPSULE ORAL at 20:47

## 2022-04-06 RX ADMIN — ALBUTEROL SULFATE 2.5 MG: 2.5 SOLUTION RESPIRATORY (INHALATION) at 15:49

## 2022-04-06 RX ADMIN — ISOSORBIDE MONONITRATE 30 MG: 30 TABLET ORAL at 08:00

## 2022-04-06 RX ADMIN — BUDESONIDE AND FORMOTEROL FUMARATE DIHYDRATE 2 PUFF: 160; 4.5 AEROSOL RESPIRATORY (INHALATION) at 19:39

## 2022-04-06 RX ADMIN — METHYLPREDNISOLONE SODIUM SUCCINATE 40 MG: 40 INJECTION, POWDER, FOR SOLUTION INTRAMUSCULAR; INTRAVENOUS at 16:53

## 2022-04-06 RX ADMIN — GABAPENTIN 300 MG: 300 CAPSULE ORAL at 16:53

## 2022-04-06 RX ADMIN — ALPRAZOLAM 1 MG: 0.5 TABLET ORAL at 07:04

## 2022-04-06 RX ADMIN — LATANOPROST 1 DROP: 50 SOLUTION OPHTHALMIC at 20:53

## 2022-04-06 RX ADMIN — LEVOTHYROXINE SODIUM 125 MCG: 0.12 TABLET ORAL at 07:04

## 2022-04-06 RX ADMIN — ALBUTEROL SULFATE 2.5 MG: 2.5 SOLUTION RESPIRATORY (INHALATION) at 11:31

## 2022-04-06 RX ADMIN — FERROUS SULFATE TAB 325 MG (65 MG ELEMENTAL FE) 325 MG: 325 (65 FE) TAB at 07:58

## 2022-04-06 RX ADMIN — CLOPIDOGREL 75 MG: 75 TABLET, FILM COATED ORAL at 20:47

## 2022-04-06 RX ADMIN — FUROSEMIDE 40 MG: 10 INJECTION, SOLUTION INTRAMUSCULAR; INTRAVENOUS at 10:14

## 2022-04-06 RX ADMIN — AMLODIPINE BESYLATE 10 MG: 10 TABLET ORAL at 08:00

## 2022-04-06 RX ADMIN — ALBUTEROL SULFATE 2.5 MG: 2.5 SOLUTION RESPIRATORY (INHALATION) at 03:27

## 2022-04-06 NOTE — CASE MANAGEMENT/SOCIAL WORK
Discharge Planning Assessment  [facility]     Patient Name: Sim Agustin  MRN: [id number]  Today's Date: 4/6/2022    Admit Date: 4/4/2022     Discharge Needs Assessment     Row Name 04/06/22 0913       Living Environment    People in Home spouse    Name(s) of People in Home Cassie Anthony/Wife - [phone]    Current Living Arrangements home    Primary Care Provided by self    Provides Primary Care For no one    Family Caregiver if Needed spouse    Family Caregiver Names Cassie Anthony/Wife - [phone]    Quality of Family Relationships helpful;involved;supportive    Able to Return to Prior Arrangements yes       Resource/Environmental Concerns    Resource/Environmental Concerns none       Transition Planning    Patient/Family Anticipates Transition to home with family    Patient/Family Anticipated Services at Transition none    Transportation Anticipated family or friend will provide       Discharge Needs Assessment    Readmission Within the Last 30 Days no previous admission in last 30 days    Equipment Currently Used at Home walker, rolling;oxygen;cpap    Concerns to be Addressed no discharge needs identified;denies needs/concerns at this time    Equipment Needed After Discharge none               Discharge Plan     Row Name 04/06/22 0915       Plan    Plan Home with spouse    Patient/Family in Agreement with Plan yes    Plan Comments CCP met with pt. at bedside discussed role. Pt. confirmed address on face sheet is accurate and he lives at home with his spouse, Cassie Anthony, [phone]. Pt. confirmed his PCP is Dr. Seth Hester. Pt. states he is retired and completely independent at home. Pt. states he has O2, CPAP, and a walker at home. Pt. states he uses the walker daily to help him balance when ambulating with his prosthetic leg. Pt. states he has had [facility] in the past when he had his leg amputated. Pt. denies ever going to skilled nursing facility for rehab after a hospitalization. Pt.  states he has 2 stairs to enter the home, no stairs within the home to maneuver. Pt. uses [facility] pharmacy, [phone] for his medicine. Pt. declines to participate in the [facility] Beds program at this time. Pt. states his wife Cassie can transport him home at discharge. No needs identified at this time, pt. denies any needs. CCP will monitor for any discharge needs that may arise.              Continued Care and Services - Admitted Since 4/4/2022    Coordination has not been started for this encounter.          Demographic Summary     Row Name 04/06/22 0913       General Information    Admission Type inpatient    Arrived From home    Reason for Consult discharge planning    Preferred Language English               Functional Status     Row Name 04/06/22 0913       Functional Status    Usual Activity Tolerance good    Current Activity Tolerance good       Functional Status, IADL    Medications independent    Meal Preparation assistive equipment    Housekeeping assistive equipment    Laundry assistive equipment    Shopping assistive equipment       Mental Status    General Appearance WDL WDL       Mental Status Summary    Recent Changes in Mental Status/Cognitive Functioning no changes       Employment/    Employment Status retired               Psychosocial    No documentation.                Abuse/Neglect    No documentation.                Legal    No documentation.                Substance Abuse    No documentation.                Patient Forms    No documentation.

## 2022-04-06 NOTE — PLAN OF CARE
Goal Outcome Evaluation:  Plan of Care Reviewed With: patient        Progress: improving  Outcome Evaluation: No change throughout the night. Will CTM

## 2022-04-06 NOTE — PLAN OF CARE
Goal Outcome Evaluation:  Plan of Care Reviewed With: patient        Progress: improving   Patietnt iv flids stoped and iv lasix given to decrease edema and to help with  fluid on lung Coarse sounds in bl lungs persist.   Problem: COPD (Chronic Obstructive Pulmonary Disease) Comorbidity  Goal: Maintenance of COPD Symptom Control  Outcome: Ongoing, Not Progressing  Intervention: Maintain COPD-Symptom Control  Recent Flowsheet Documentation  Taken 4/6/2022 1417 by Hortensia Jones RN  Medication Review/Management: medications reviewed  Taken 4/6/2022 1216 by Hortensia Jones RN  Medication Review/Management: medications reviewed  Taken 4/6/2022 0800 by Hortensia Jones RN  Medication Review/Management: medications reviewed     Problem: COPD (Chronic Obstructive Pulmonary Disease) Comorbidity  Goal: Maintenance of COPD Symptom Control  Outcome: Ongoing, Not Progressing  Intervention: Maintain COPD-Symptom Control  Recent Flowsheet Documentation  Taken 4/6/2022 1417 by Hortensia Jones RN  Medication Review/Management: medications reviewed  Taken 4/6/2022 1216 by Hortensia Jones RN  Medication Review/Management: medications reviewed  Taken 4/6/2022 0800 by Hortensia Jones RN  Medication Review/Management: medications reviewed

## 2022-04-06 NOTE — PROGRESS NOTES
[facility] Progress Note      Patient Name: Sim Agustin  : 1955  MRN: [id number]  Primary Care Physician:  Seth Hester MD  Date of admission: 2022    Subjective     Interval History:   F/u ARLYN CKD3    Review of Systems:   Inc dyspnea overnight and O2 inc'd 3L  No nausea     Objective     Vitals:   Temp:  [97.4 °F (36.3 °C)-98.2 °F (36.8 °C)] 98.2 °F (36.8 °C)  Heart Rate:  [61-71] 67  Resp:  [18] 18  BP: (131-169)/(75-94) 169/94  Flow (L/min):  [1-2] 2    Intake/Output Summary (Last 24 hours) at 2022 0949  Last data filed at 2022 0800  Gross per 24 hour   Intake --   Output 3499 ml   Net -3499 ml       Physical Exam:    General Appearance: no acute distress but slightly tachypnic  Neck: supple, no JVD  Lungs: Dec BS bibasilar   Heart: RRR, normal S1 and S2  Abdomen: soft, nontender, nondistended  Extremities: LLE high AKA, trace RLE edema    Scheduled Meds:     albuterol, 2.5 mg, Nebulization, Q4H - RT  amLODIPine, 10 mg, Oral, Daily  aspirin, 81 mg, Oral, Nightly  atorvastatin, 20 mg, Oral, Daily  budesonide-formoterol, 2 puff, Inhalation, BID - RT   And  tiotropium bromide monohydrate, 2 puff, Inhalation, Daily - RT  buPROPion XL, 300 mg, Oral, Daily  vitamin D3, 5,000 Units, Oral, Daily  clopidogrel, 75 mg, Oral, Nightly  ferrous sulfate, 325 mg, Oral, Daily With Breakfast  gabapentin, 300 mg, Oral, TID  isosorbide mononitrate, 30 mg, Oral, Q24H  latanoprost, 1 drop, Both Eyes, Nightly  levothyroxine, 125 mcg, Oral, Q AM  methylPREDNISolone sodium succinate, 40 mg, Intravenous, Q12H  multivitamin with minerals, 1 tablet, Oral, Daily  oseltamivir, 30 mg, Oral, Q12H  pantoprazole, 40 mg, Oral, QAM      IV Meds:   sodium chloride, 75 mL/hr, Last Rate: 75 mL/hr (22 0405)        Results Reviewed:   I have personally reviewed the results from the time of this admission to 2022 09:49 EDT     Results from last 7 days   Lab Units 22  6150  04/05/22  1655 04/05/22  0605 04/04/22  1421   SODIUM mmol/L 139  --  135* 138   POTASSIUM mmol/L 4.1 4.6 5.8* 5.0   CHLORIDE mmol/L 103  --  100 101   CO2 mmol/L 24.5  --  24.4 25.9   BUN mg/dL 30*  --  41* 30*   CREATININE mg/dL 1.37*  --  2.28* 1.95*   CALCIUM mg/dL 8.7  --  9.0 9.4   BILIRUBIN mg/dL  --   --   --  0.3   ALK PHOS U/L  --   --   --  72   ALT (SGPT) U/L  --   --   --  11   AST (SGOT) U/L  --   --   --  15   GLUCOSE mg/dL 128*  --  182* 102*     Estimated Creatinine Clearance: 60.1 mL/min (A) (by C-G formula based on SCr of 1.37 mg/dL (H)).  Results from last 7 days   Lab Units 04/06/22  0409 04/04/22  1421   MAGNESIUM mg/dL  --  2.1   PHOSPHORUS mg/dL 2.9  --          Results from last 7 days   Lab Units 04/05/22  0605 04/04/22  1421   WBC 10*3/mm3 4.77 8.63   HEMOGLOBIN g/dL 14.8 16.4   PLATELETS 10*3/mm3 218 221           Assessment / Plan     ASSESSMENT:  1. Non olig ARLYN - suspect prerenal azotemia from vol depletion in setting on influenza and poor oral intake, much better today with IVF, Cr down 2.3 to 1.3.  UA NEG.  Now has some e/o mild vol excess with dyspnea, inc O2 requirement, some edema   2. 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 Hyperkalemia - intermittent issue even when renal fcn at BL; up to 5.8 in setting of ARLYN  -- K down to 4.6 yesterday PM and 4.1 today with lokelma x 2 doses   4. Influenza A & COPD exac - on tamiflu, steroids; PULM following  5. Hx PVD s/p LLE AKA  6. HTN - BP higher than usual this AM, on max norvasc     PLAN:  DC IVF and give lasix 40mg IV x1; d/w RN  Repeat CXR      Geoffrey Haddad MD  04/06/22  09:49 EDT    [facility]  [phone]

## 2022-04-06 NOTE — PAYOR COMM NOTE
"Sim Alonso (66 y.o. Male)     Please see attached for inpt auth.  REF#[id number]    PLEASE CALL  OR  [phone]    THANK YOU    JENN ELLISON LPN CCP            Date of Birth   1955    Social Security Number       Address   [address] [place], [place] [address]    Home Phone   [phone]    MRN   [id number]       Scientology   Judaism    Marital Status                               Admission Date   4/4/22    Admission Type   Emergency    Admitting Provider   Ruth Rosas MD    Attending Provider   Mati Rubio MD    Department, Room/Bed   [address], [address]/1       Discharge Date       Discharge Disposition       Discharge Destination                               Attending Provider: Mati Rubio MD    Allergies: Augmentin [Amoxicillin-pot Clavulanate]    Isolation: Droplet   Infection: Influenza (04/04/22)   Code Status: CPR   Advance Care Planning Activity    Ht: 172.7 cm (68\")   Wt: 80.1 kg (176 lb 9.4 oz)    Admission Cmt: None   Principal Problem: Influenza A [J10.1]                 Active Insurance as of 4/4/2022     Primary Coverage     Payor Plan Insurance Group Employer/Plan Group    AETNA MEDICARE REPLACEMENT AETNA MEDICARE REPLACEMENT [id number]     Payor Plan Address Payor Plan Phone Number Payor Plan Fax Number Effective Dates    [address] [phone]  7/1/2021 - None Entered    [place] [address]       Subscriber Name Subscriber Birth Date Member ID       SIM ALONSO 1955 [id number]           Secondary Coverage     Payor Plan Insurance Group Employer/Plan Group    [place]      Payor Plan Address Payor Plan Phone Number Payor Plan Fax Number Effective Dates    [address] [phone]  3/3/2020 - None Entered    [place] [address]       Subscriber Name Subscriber Birth Date Member ID       SIM ALONSO 1955 [id number]                 Emergency Contacts     Contact " Person (Rel.) Home Phone Work Phone Mobile Phone    Cassie Anthony (Spouse) [phone] -- --    John Agustin (Son) -- -- [phone]               History & Physical      StinglRuth MD at 22          HISTORY AND PHYSICAL   [facility]        Date of Admission: 2022  Patient Identification:  Name: Sim Agustin  Age: 66 y.o.  Sex: male  :  1955  MRN: [id number]                     Primary Care Physician: Seth Hester MD    Chief Complaint:  66 year old gentleman who presented to the emergency room with shortnes of breath which started 2-3 days ago; he has had a cough and nasal drainage; he has been wheezing as well; he uses oxygen at night; he is followed by dr mcintosh; no fever or chills; he denies sick contacts    History of Present Illness:   As above    Past Medical History:  Past Medical History:   Diagnosis Date   • Acute respiratory failure with hypoxia and hypercarbia ([facility]) 3/11/2019   • Acute upper respiratory infection 2013   • Anemia     per [place] database   • ARF (acute renal failure) ([facility]) 3/4/2016   • Atelectasis, left 3/4/2016   • Bradycardia 2019   • CAD (coronary artery disease) 2016   • Chronic obstructive pulmonary disease with acute exacerbation ([facility]) 2013   • Colon polyps    • Compartment syndrome ([facility])     AFTER ILIAC SURGERY. ABOVE KNEE AMPUTATION   • COPD (chronic obstructive pulmonary disease) ([facility])    • COPD with hypoxia ([facility]) 2019   • Cough     SINCE APRIL WITH PNEUMONIA.    • Disease of thyroid gland     HYPOTHYRODISM   • Diverticulosis    • Elevated hematocrit 2021   • Employs prosthetic leg    • ESRD (end stage renal disease) on dialysis ([facility]) 2016   • Fracture of patella 3/3/2015   • History of acute renal failure    • History of CHF (congestive heart failure)    • History of GI bleed 2016    AORTODUOD FISTULA   • History of sepsis 2016   • History of transfusion     MULTIPLE, 2016   •  Hyperkalemia 4/11/2016   • Hypertension    • Hypotension    • Hypotension 3/4/2016   • Nonischemic cardiomyopathy (HCC)    • Nosocomial pneumonia 4/6/2016   • Phantom limb pain (HCC)     SL, WITH LEFT ABOUVE KNEE   • Pleural effusion on left 4/11/2016   • Pneumonia     SPRING 2016  AFTER SURGERY   • PVD (peripheral vascular disease) (HCC)    • S/P thoracentesis 4/11/2016   • Sepsis, unspecified organism (HCC) 4/5/2016     Past Surgical History:  Past Surgical History:   Procedure Laterality Date   • ABOVE KNEE AMPUTATION Left 3/16/2016    Procedure: LT AMPUTATION ABOVE KNEE;  Surgeon: Jose Swann MD;  Location: [facility] OR;  Service:    • ARTERIAL THROMBECTOMY  2001    throbectomy of arterial graft   • AXILLARY FEMORAL BYPASS Right 02/15/2016    Exploratory lapartomy, repair aortoduodenal fistula, resection infected aortobifemoral bypass graft, axillobi-superficial femoral artery Meservey-Aneudy bypass graft from right axillary artery, Repair of duodenotomy 4th portion duodenum-Dr. Jose Swann, Dr. Genaro Perrin   • BRONCHOSCOPY Left 03/04/2016    Dr. NATALIIA Tate   • BRONCHOSCOPY RIGID / FLEXIBLE N/A 03/03/2016    Dr. NATALIIA Tate   • BRONCHOSCOPY RIGID / FLEXIBLE N/A 02/26/2016    Dr. NATALIIA Tate   • CARDIAC CATHETERIZATION N/A 3/18/2019    Procedure: Right and Left Heart Cath;  Surgeon: Nina Storey MD;  Location: [facility] INVASIVE LOCATION;  Service: Cardiovascular   • CATARACT EXTRACTION WITH INTRAOCULAR LENS IMPLANT Bilateral    • COLON RESECTION WITH COLOSTOMY Left 02/28/2016    Exploratory laparotomy with open left hemicolectomy, end-colostomy, G-tube and J-tube-Dr. Endy Chaney   • COLONOSCOPY N/A 2015    Dr. Laughlin   • COLONOSCOPY N/A 10/12/2016    Procedure: COLONOSCOPY TO 20 CM AND PER STOMA TO 30CM;  Surgeon: Genaro Perrin MD;  Location: [facility] ENDOSCOPY;  Service:    • COLONOSCOPY N/A 10/21/2016    Procedure: COLONOSCOPY DONE AT BEDSIDE ONTO CECEM;  Surgeon: Genaro SARAVIA  MD Marychuy;  Location: [facility] ENDOSCOPY;  Service:    • COLONOSCOPY N/A 02/10/2016    Diverticulosis in the entire examined colon, non-bleeding internal hemorrhoids-Dr. Tayolr Pina   • COLONOSCOPY N/A 02/11/2016    Poor prep, blood in the entire examined colon, normal ileum-Dr. Taylor Pina   • COLONOSCOPY W/ POLYPECTOMY N/A 07/27/2015    Normal ileum, diverticulosis in the sigmoid colon: resected and retrieved, one 6 mm polyp in the descending colon: resected and retrieved, the distal rectum and anal verge are normal on retroflexion view-Dr. Miguel Ángel Laughlin   • COLOSTOMY CLOSURE N/A 10/13/2016    Procedure: COLOSTOMY TAKEDOWN OR CLOSURE, APPENDECTOMY;  Surgeon: Genaro Perrin MD;  Location: [facility] OR;  Service:    • DEBRIDEMENT LEG Left 03/01/2016    Excisional debridement of the skin, subcutantous tissue, tendon and muscle left calf-Dr. Jose Swann   • DEBRIDEMENT LEG Left 02/25/2016    Excisional debridement full-thcikness skin and subcutaneous tissue and tendon and muscle, left medial and lateral calf muscles-Dr. Jose Swann   • ENDOSCOPY N/A 10/21/2016    Procedure: ESOPHAGOGASTRODUODENOSCOPY DONE AT BEDSIDE;  Surgeon: Genaro Perrin MD;  Location: [facility] ENDOSCOPY;  Service:    • ENDOSCOPY AND COLONOSCOPY N/A 02/28/2016    EGD and Colonoscopy with Candy ink injection-Dr. Endy Chaney   • ENTEROSCOPY SMALL BOWEL N/A 02/11/2016    Normal esophagus, normal stomach, normal duodenum, portion of the jejunum normal-Dr. Taylor Pina   • ILIAC ARTERY - FEMORAL ARTERY BYPASS GRAFT Bilateral 2002   • ILIAC ARTERY STENT Bilateral 2001   • INSERTION AND REMOVAL PERITONEAL DIALYSIS CATHETER Right 02/29/2016    Exchange right jugular 16 cm Mahurkar dialysis catheter-Dr. Jose Swann   • INSERTION PERITONEAL DIALYSIS CATHETER Left 03/01/2016    Ultrasound-guided access of the left jugular vein, 23 cm left jugular palindrome dialysis catheter placement-Dr. Jose Swann   • INSERTION  PERITONEAL DIALYSIS CATHETER Right 02/18/2016    Right jugular Mahrkar catherer placement-Dr. Jose Swann   • JOINT REPLACEMENT Left    • THORACOSCOPY Left 4/18/2016    Procedure: LT THORACOSCOPY VIDEO ASSISTED WITH DECORDICATION;  Surgeon: Genaro Davila III, MD;  Location: [facility];  Service:    • THROMBECTOMY Left 02/16/2016    Thombectomy of left femoral graft, left leg arteriogram, left calf forward compartment fasciotomy-Dr. Jose Swann   • TOTAL HIP ARTHROPLASTY Left    • UPPER GASTROINTESTINAL ENDOSCOPY N/A 02/09/2016    Normal UGI-Dr. Ajay Parkinson   • UPPER GASTROINTESTINAL ENDOSCOPY N/A 11/28/2015    Small hiatal hernia, chronic gastritis: biopsied, non-bleeding angioectasias in the stomach: treated with argon plasma coagulation, multiple bleeding angioectasias in the dudenum: treated with argon plasma coagulation-Dr. Mykel Jaramillo   • VASCULAR SURGERY      MULTIPLE   • VENTRAL/INCISIONAL HERNIA REPAIR N/A 10/19/2017    Procedure: VENTRAL HERNIA REPAIR WITH MESH AND BILATERAL COMPONENT SEPARATION;  Surgeon: Genaro Perrin MD;  Location: [facility];  Service:    • WISDOM TOOTH EXTRACTION        Home Meds:  (Not in a hospital admission)      Allergies:  Allergies   Allergen Reactions   • Augmentin [Amoxicillin-Pot Clavulanate] Hives and Rash     Immunizations:  Immunization History   Administered Date(s) Administered   • COVID-19 (PFIZER) PURPLE CAP 03/17/2021, 04/14/2021, 10/18/2021   • Flu Vaccine Quad PF >36MO 10/26/2016, 10/10/2017   • Fluad Quad 65+ 09/01/2020   • Fluzone High Dose =>65 Years ([employer] ONLY) 10/08/2018, 09/16/2019   • Fluzone High-Dose 65+yrs 09/21/2021   • Pneumococcal Polysaccharide (PPSV23) 08/01/2017   • Shingrix 09/16/2019, 11/23/2019   • Tdap 03/15/2022     Social History:   Social History     Social History Narrative   • Not on file     Social History     Socioeconomic History   • Marital status:    Tobacco Use   • Smoking status: Former Smoker      Packs/day: 0.00     Years: 30.00     Pack years: 0.00     Types: Cigarettes     Quit date: 2021     Years since quittin.7   • Smokeless tobacco: Never Used   • Tobacco comment: caffeine - rarely   Substance and Sexual Activity   • Alcohol use: No   • Drug use: Never   • Sexual activity: Yes     Partners: Female     Birth control/protection: Post-menopausal       Family History:  Family History   Problem Relation Age of Onset   • Lung cancer Mother    • Cancer Mother    • Heart disease Father    • Diabetes Father    • Hypertension Father    • Malig Hyperthermia Neg Hx         Review of Systems  See history of present illness and past medical history.  Patient denies headache, dizziness, syncope, falls, trauma, change in vision, change in hearing, change in taste, changes in weight, changes in appetite, focal weakness, numbness, or paresthesia.  Patient denies chest pain, palpitations, sinus pressure, rhinorrhea, epistaxis, hemoptysis, nausea, vomiting,hematemesis, diarrhea, constipation or hematchezia.  Denies cold or heat intolerance, polydipsia, polyuria, polyphagia. Denies hematuria, pyuria, dysuria, hesitancy, frequency or urgency. Denies consumption of raw and under cooked meats foods or change in water source.  Denies fever, chills, sweats, night sweats.  Denies missing any routine medications. Remainder of ROS is negative.    Objective:  T Max 24 hrs: Temp (24hrs), Av.6 °F (36.4 °C), Min:97.6 °F (36.4 °C), Max:97.6 °F (36.4 °C)    Vitals Ranges:   Temp:  [97.6 °F (36.4 °C)] 97.6 °F (36.4 °C)  Heart Rate:  [55-79] 55  Resp:  [15-20] 15  BP: (128-144)/(73-79) 134/73      Exam:  /73   Pulse 55   Temp 97.6 °F (36.4 °C)   Resp 15   SpO2 100%     General Appearance:    Alert, cooperative, no distress, appears stated age   Head:    Normocephalic, without obvious abnormality, atraumatic   Eyes:    PERRL, conjunctivae/corneas clear, EOM's intact, both eyes   Ears:    Normal external ear canals,  both ears   Nose:   Nares normal, septum midline, mucosa normal, no drainage    or sinus tenderness   Throat:   Lips, mucosa, and tongue normal   Neck:   Supple, symmetrical, trachea midline, no adenopathy;     thyroid:  no enlargement/tenderness/nodules; no carotid    bruit or JVD   Back:     Symmetric, no curvature, ROM normal, no CVA tenderness   Lungs:     Decreased breath sounds bilaterally, respirations unlabored   Chest Wall:    No tenderness or deformity    Heart:    Regular rate and rhythm, S1 and S2 normal, no murmur, rub   or gallop   Abdomen:     Soft, nontender, bowel sounds active all four quadrants,     no masses, no hepatomegaly, no splenomegaly   Extremities:   Extremities normal, atraumatic, s/p right aka   Pulses:   2+ and symmetric all extremities   Skin:   Skin color, texture, turgor normal, no rashes or lesions   Lymph nodes:   Cervical, supraclavicular, and axillary nodes normal   Neurologic:   CNII-XII intact, normal strength, sensation intact throughout      .    Data Review:  Labs in chart were reviewed.  WBC   Date Value Ref Range Status   04/04/2022 8.63 3.40 - 10.80 10*3/mm3 Final     Hemoglobin   Date Value Ref Range Status   04/04/2022 16.4 13.0 - 17.7 g/dL Final     Hematocrit   Date Value Ref Range Status   04/04/2022 47.2 37.5 - 51.0 % Final     Platelets   Date Value Ref Range Status   04/04/2022 221 140 - 450 10*3/mm3 Final     Sodium   Date Value Ref Range Status   04/04/2022 138 136 - 145 mmol/L Final     Potassium   Date Value Ref Range Status   04/04/2022 5.0 3.5 - 5.2 mmol/L Final     Chloride   Date Value Ref Range Status   04/04/2022 101 98 - 107 mmol/L Final     CO2   Date Value Ref Range Status   04/04/2022 25.9 22.0 - 29.0 mmol/L Final     BUN   Date Value Ref Range Status   04/04/2022 30 (H) 8 - 23 mg/dL Final     Creatinine   Date Value Ref Range Status   04/04/2022 1.95 (H) 0.76 - 1.27 mg/dL Final     Glucose   Date Value Ref Range Status   04/04/2022 102 (H) 65 - 99  mg/dL Final     Calcium   Date Value Ref Range Status   04/04/2022 9.4 8.6 - 10.5 mg/dL Final     Magnesium   Date Value Ref Range Status   04/04/2022 2.1 1.6 - 2.4 mg/dL Final     AST (SGOT)   Date Value Ref Range Status   04/04/2022 15 1 - 40 U/L Final     ALT (SGPT)   Date Value Ref Range Status   04/04/2022 11 1 - 41 U/L Final     Alkaline Phosphatase   Date Value Ref Range Status   04/04/2022 72 39 - 117 U/L Final                Imaging Results (All)     Procedure Component Value Units Date/Time    XR Chest 1 View [[id number]] Collected: 04/04/22 1441     Updated: 04/04/22 1450    Narrative:      XR CHEST 1 VW-     HISTORY: Male who is 66 years-old,  short of breath     TECHNIQUE: Frontal views of the chest     COMPARISON: 05/19/2021     FINDINGS: The heart size is normal. Aorta is calcified. Pulmonary  vasculature is unremarkable. No focal pulmonary consolidation, pleural  effusion, or pneumothorax. No acute osseous process.       Impression:      No focal pulmonary consolidation. Follow-up as clinical  indications persist.     This report was finalized on 4/4/2022 2:46 PM by Dr. Diogo Suarez M.D.               Assessment:  Active Hospital Problems    Diagnosis  POA   • Acute exacerbation of chronic obstructive pulmonary disease (COPD) (HCC) [J44.1]  Yes      Resolved Hospital Problems   No resolved problems to display.   acute hypoxic respiratory failure  Influenza a  Hypothyroidism  Pad  Cad      Plan:  Will continue tamiflu  Ask pulmonary to see him  Wean oxygen as tolerated  Steroids, mininebs  Monitor on telemetry  D.w patient and ED provider    Ruth Rosas MD  4/4/2022  20:11 EDT      Electronically signed by Ruth Rosas MD at 04/04/22 2015          Emergency Department Notes      Liliana Bronson, RN at 04/04/22 1339        Pt to ER via EMS from home with c/o increased SOB and cough since Thursday. Pt normally only wears 2L NC at night but needing to wear 5L at all times. EMS  reports o2 83 RA. Pt received a duo neb en route and now satting 3L. Pt has hx of COPD. Pt A&Ox4.    Pt wearing mask. RN wearing mask, face shield and gown.     Electronically signed by Liliana Bronson RN at 04/04/22 1342     Brendan Boggs MD at 04/04/22 1352           EMERGENCY DEPARTMENT ENCOUNTER  I wore full protective equipment throughout this patient encounter including a N95 mask, eye shield, gown and gloves. Hand hygiene was performed before donning protective equipment and after removal when leaving the room.    Room Number:  27/27  Date of encounter:  4/4/2022  PCP: Seth Hester MD    HPI:  Context: Sim Agustin is a 66 y.o. male who presents to the ED c/o chief complaint of shortness of breath.  Patient reports history of COPD, not on oxygen during daytime, is on oxygen 3 L at nighttime with CPAP, followed by pulmonology Dr. Tate.  Patient reports that Thursday began having runny nose and a cough.  Patient reports Friday he began feeling short of breath.  Patient reports he has been wheezing, short of breath at rest, worse with exertion.  Patient reports he has been using his home oxygen for last several days.  Patient denies any orthopnea or paroxysmal nocturnal dyspnea, patient has a previous amputation of his left leg but denies any swelling in his right leg, no chest pain, no history of heart failure.  Patient does report that the cough is productive in nature, was having runny nose and congestion but that has since resolved.  Patient denies any loss of sense of smell or taste, no vomiting or diarrhea.  Patient does report that at one point he had subjective fevers but that has since resolved, denies any fevers, no shakes chills or night sweats.  Patient denies any recent sick contacts, has been vaccinated against COVID-19.  SOCIAL HISTORY  Social History     Socioeconomic History   • Marital status:    Tobacco Use   • Smoking status: Former Smoker     Packs/day: 0.00     Years:  30.00     Pack years: 0.00     Types: Cigarettes     Quit date: 2021     Years since quittin.7   • Smokeless tobacco: Never Used   • Tobacco comment: caffeine - rarely   Substance and Sexual Activity   • Alcohol use: No   • Drug use: Never   • Sexual activity: Yes     Partners: Female     Birth control/protection: Post-menopausal       ALLERGIES  Augmentin [amoxicillin-pot clavulanate]    The patient's allergies have been reviewed    REVIEW OF SYSTEMS  All systems reviewed and negative except for those discussed in HPI.     PHYSICAL EXAM  I have reviewed the triage vital signs and nursing notes.  ED Triage Vitals [22 1342]   Temp Heart Rate Resp BP SpO2   97.6 °F (36.4 °C) 79 18 128/79 98 %      Temp src Heart Rate Source Patient Position BP Location FiO2 (%)   -- -- -- -- --       General: No acute distress.  HENT: NCAT, PERRL, Nares patent.  Eyes: no scleral icterus.  Neck: trachea midline, no ROM limitations.  CV: regular rhythm, regular rate.  Respiratory: normal effort, no accessory muscle use, currently requiring 3 L to maintain oxygen saturation of 96%, extremely diminished with rhonchi  Abdomen: soft, nondistended, NTTP, no rebound tenderness, no guarding or rigidity.  Musculoskeletal: no deformity.  Neuro: alert, moves all extremities, follows commands.  Skin: warm, dry.  PROCEDURES  Procedures    MEDICATIONS GIVEN IN ER  Medications   sodium chloride 0.9 % bolus 500 mL (500 mL Intravenous New Bag 22 1531)   oseltamivir (TAMIFLU) capsule 75 mg (has no administration in time range)   albuterol (PROVENTIL) nebulizer solution 0.083% 2.5 mg/3mL (2.5 mg Nebulization Given 22 1451)   ipratropium-albuterol (DUO-NEB) nebulizer solution 3 mL (3 mL Nebulization Given 22 1443)   magnesium sulfate 2g/50 mL (PREMIX) infusion (0 g Intravenous Stopped 22 1512)   methylPREDNISolone sodium succinate (SOLU-Medrol) injection 125 mg (125 mg Intravenous Given 22 1453)   ipratropium-albuterol  (DUO-NEB) nebulizer solution 3 mL (3 mL Nebulization Given 4/4/22 1525)   albuterol (PROVENTIL) nebulizer solution 0.083% 2.5 mg/3mL (2.5 mg Nebulization Given 4/4/22 1542)       PROGRESS, DATA ANALYSIS, CONSULTS, AND MEDICAL DECISION MAKING  A complete history and physical exam have been performed.  All available laboratory and imaging results have been reviewed by myself prior to disposition.    MDM  After the initial H&P, I discussed pertinent information from history and physical exam with patient/family.  Discussed differential diagnosis.  Discussed plan for ED evaluation/workup/treatment.  All questions answered.  Patient/family is agreeable with plan.  ED Course as of 04/04/22 1556   Mon Apr 04, 2022   1432 My differential diagnosis for dyspnea includes but is not limited to:  Asthma, COPD, pneumonia, pulmonary embolus, acute respiratory distress syndrome, pneumothorax, pleural effusion, pulmonary fibrosis, congestive heart failure, myocardial infarction, DKA, uremia, acidosis, sepsis, anemia, drug related, hyperventilation, CNS disease     [JG]   1457 EKG independently viewed and contemporaneously interpreted by ED physician. Time: 1442.  Rate 69.  Interpretation: Normal sinus rhythm, normal axis, normal QRS, no acute ST changes. [JG]   1518 Patient has mild acute on chronic kidney injury, giving IV fluids. [JG]   1518 Patient reassessed.  Patient reports moderate improvement with breathing treatment.  Repeat lung exam performed: Patient continues to be diminished, now has expiratory wheezing with prolonged expiration.  Discussed ED work-up and results to present, discussed plan for additional breathing treatment, discussed plan for admission.  Patient agreeable with plan, no questions or concerns. [JG]   1529 Oxygen weaned down to 2 L nasal cannula, maintain oxygen saturation of 96% without difficulty. [JG]   1536 Patient is Covid negative, flu positive.  Patient is outside the normal treatment window for  flu but given his age and comorbidities and the fact that he is requiring hospitalization, will treat with Tamiflu. [JG]   1540 Discontinued enhanced droplet isolation and placed in droplet isolation. [JG]   1555 Phone call with Dr. Rosas [facility].  Discussed the patient, relevant history, exam, diagnostics, ED findings/progress, and concerns. They agree to admit the patient to telemetry. Care assumed by the admitting physician at this time.     [JG]      ED Course User Index  [JG] Brendan Boggs MD       AS OF 15:56 EDT VITALS:    BP - 128/79  HR - 62  TEMP - 97.6 °F (36.4 °C)  O2 SATS - 98%    DIAGNOSIS  Final diagnoses:   Acute exacerbation of chronic obstructive pulmonary disease (COPD) (HCC)   Hypoxia   Acute renal failure superimposed on chronic kidney disease, unspecified CKD stage, unspecified acute renal failure type (HCC)         DISPOSITION  ADMISSION    Discussed treatment plan and reason for admission with pt/family and admitting physician.  Pt/family voiced understanding of the plan for admission for further testing/treatment as needed.          Brendan Boggs MD  04/04/22 1557      Electronically signed by Brendan Boggs MD at 04/04/22 1557     Levon Rojas, RN at 04/04/22 1528          Nursing report ED to floor  [facility]  66 y.o.  male    HPI (triage note):   Chief Complaint   Patient presents with   • Shortness of Breath       Admitting doctor:   No admitting provider for patient encounter.    Admitting diagnosis:   The primary encounter diagnosis was Acute exacerbation of chronic obstructive pulmonary disease (COPD) (HCC). Diagnoses of Hypoxia and Acute renal failure superimposed on chronic kidney disease, unspecified CKD stage, unspecified acute renal failure type (HCC) were also pertinent to this visit.    Code status:   Current Code Status     Date Active Code Status Order ID Comments User Context       Prior    Advance Care Planning Activity          Allergies:   Augmentin  [amoxicillin-pot clavulanate]    Weight:   There were no vitals filed for this visit.    Most recent vitals:   Vitals:    22 1342 22 1443 22 1450   BP: 128/79     Pulse: 79 73 64   Resp: 18 20    Temp: 97.6 °F (36.4 °C)     SpO2: 98% 98% 99%       EKG:   ECG 12 Lead   Preliminary Result   HEART RATE= 69  bpm   RR Interval= 872  ms   PA Interval=   ms   P Horizontal Axis=   deg   P Front Axis=   deg   QRSD Interval= 106  ms   QT Interval= 442  ms   QRS Axis= 56  deg   T Wave Axis= 88  deg   - ABNORMAL ECG -   Junctional rhythm   Electronically Signed By:    Date and Time of Study: 2022 14:42:55          Meds given in ED:   Medications   sodium chloride 0.9 % bolus 500 mL (has no administration in time range)   ipratropium-albuterol (DUO-NEB) nebulizer solution 3 mL (has no administration in time range)   albuterol (PROVENTIL) nebulizer solution 0.083% 2.5 mg/3mL (has no administration in time range)   albuterol (PROVENTIL) nebulizer solution 0.083% 2.5 mg/3mL (2.5 mg Nebulization Given 22 1451)   ipratropium-albuterol (DUO-NEB) nebulizer solution 3 mL (3 mL Nebulization Given 22 1443)   magnesium sulfate 2g/50 mL (PREMIX) infusion (2 g Intravenous New Bag 22 1452)   methylPREDNISolone sodium succinate (SOLU-Medrol) injection 125 mg (125 mg Intravenous Given 22 1453)       Imaging results:  XR Chest 1 View    Result Date: 2022  No focal pulmonary consolidation. Follow-up as clinical indications persist.  This report was finalized on 2022 2:46 PM by Dr. Diogo Suarez M.D.        Ambulatory status:   - ad tamiko    Social issues:   Social History     Socioeconomic History   • Marital status:    Tobacco Use   • Smoking status: Former Smoker     Packs/day: 0.00     Years: 30.00     Pack years: 0.00     Types: Cigarettes     Quit date: 2021     Years since quittin.7   • Smokeless tobacco: Never Used   • Tobacco comment: caffeine - rarely   Substance and  Sexual Activity   • Alcohol use: No   • Drug use: Never   • Sexual activity: Yes     Partners: Female     Birth control/protection: Post-menopausal          Electronically signed by Levon Rojas, RN at 04/04/22 8398     Levon Rojas, RN at 04/04/22 1152          Nursing report ED to floor  Sim Agustin  66 y.o.  male    HPI (triage note):   Chief Complaint   Patient presents with   • Shortness of Breath       Admitting doctor:   Ruth Rosas MD    Admitting diagnosis:   The primary encounter diagnosis was Acute exacerbation of chronic obstructive pulmonary disease (COPD) (HCC). Diagnoses of Hypoxia and Acute renal failure superimposed on chronic kidney disease, unspecified CKD stage, unspecified acute renal failure type (HCC) were also pertinent to this visit.    Code status:   Current Code Status     Date Active Code Status Order ID Comments User Context       Prior    Advance Care Planning Activity          Allergies:   Augmentin [amoxicillin-pot clavulanate]    Weight:   There were no vitals filed for this visit.    Most recent vitals:   Vitals:    04/04/22 1601 04/04/22 1631 04/04/22 1731 04/04/22 1801   BP: 139/77 144/75 132/75 139/79   Pulse: 62 60 58    Resp:   15    Temp:       SpO2: 99% 99% 99%        Active LDAs/IV Access:   Lines, Drains & Airways     Active LDAs     Name Placement date Placement time Site Days    Peripheral IV 04/04/22 1422 Right Antecubital 04/04/22  1422  Antecubital  less than 1                Labs (abnormal labs have a star):   Labs Reviewed   RESPIRATORY PANEL PCR W/ COVID-19 (SARS-COV-2) PORFIRIO/MARKUS/MARISOL/PAD/COR/MAD/MADDY IN-HOUSE, NP SWAB IN UTM/VTP, 3-4 HR TAT - Abnormal; Notable for the following components:       Result Value    Influenza A H3 Detected (*)     All other components within normal limits    Narrative:     In the setting of a positive respiratory panel with a viral infection PLUS a negative procalcitonin without other underlying concern for bacterial  "infection, consider observing off antibiotics or discontinuation of antibiotics and continue supportive care. If the respiratory panel is positive for atypical bacterial infection (Bordetella pertussis, Chlamydophila pneumoniae, or Mycoplasma pneumoniae), consider antibiotic de-escalation to target atypical bacterial infection.   COMPREHENSIVE METABOLIC PANEL - Abnormal; Notable for the following components:    Glucose 102 (*)     BUN 30 (*)     Creatinine 1.95 (*)     eGFR 37.2 (*)     All other components within normal limits    Narrative:     GFR Normal >60  Chronic Kidney Disease <60  Kidney Failure <15     PROCALCITONIN - Abnormal; Notable for the following components:    Procalcitonin 0.32 (*)     All other components within normal limits    Narrative:     As a Marker for Sepsis (Non-Neonates):    1. <0.5 ng/mL represents a low risk of severe sepsis and/or septic shock.  2. >2 ng/mL represents a high risk of severe sepsis and/or septic shock.    As a Marker for Lower Respiratory Tract Infections that require antibiotic therapy:    PCT on Admission    Antibiotic Therapy       6-12 Hrs later    >0.5                Strongly Recommended  >0.25 - <0.5        Recommended   0.1 - 0.25          Discouraged              Remeasure/reassess PCT  <0.1                Strongly Discouraged     Remeasure/reassess PCT    As 28 day mortality risk marker: \"Change in Procalcitonin Result\" (>80% or <=80%) if Day 0 (or Day 1) and Day 4 values are available. Refer to http://www.[facility]-pct-calculator.com    Change in PCT <=80%  A decrease of PCT levels below or equal to 80% defines a positive change in PCT test result representing a higher risk for 28-day all-cause mortality of patients diagnosed with severe sepsis for septic shock.    Change in PCT >80%  A decrease of PCT levels of more than 80% defines a negative change in PCT result representing a lower risk for 28-day all-cause mortality of patients diagnosed with severe sepsis " or septic shock.      CBC WITH AUTO DIFFERENTIAL - Abnormal; Notable for the following components:    Neutrophil % 83.2 (*)     Lymphocyte % 9.2 (*)     Neutrophils, Absolute 7.18 (*)     All other components within normal limits   TROPONIN (IN-HOUSE) - Normal    Narrative:     Troponin T Reference Range:  <= 0.03 ng/mL-   Negative for AMI  >0.03 ng/mL-     Abnormal for myocardial necrosis.  Clinicians would have to utilize clinical acumen, EKG, Troponin and serial changes to determine if it is an Acute Myocardial Infarction or myocardial injury due to an underlying chronic condition.       Results may be falsely decreased if patient taking Biotin.     BNP (IN-HOUSE) - Normal    Narrative:     Among patients with dyspnea, NT-proBNP is highly sensitive for the detection of acute congestive heart failure. In addition NT-proBNP of <300 pg/ml effectively rules out acute congestive heart failure with 99% negative predictive value.    Results may be falsely decreased if patient taking Biotin.     MAGNESIUM - Normal   CBC AND DIFFERENTIAL    Narrative:     The following orders were created for panel order CBC & Differential.  Procedure                               Abnormality         Status                     ---------                               -----------         ------                     CBC Auto Differential[[id number]]        Abnormal            Final result                 Please view results for these tests on the individual orders.       EKG:   ECG 12 Lead   Preliminary Result   HEART RATE= 69  bpm   RR Interval= 872  ms   IN Interval=   ms   P Horizontal Axis=   deg   P Front Axis=   deg   QRSD Interval= 106  ms   QT Interval= 442  ms   QRS Axis= 56  deg   T Wave Axis= 88  deg   - ABNORMAL ECG -   Junctional rhythm   Electronically Signed By:    Date and Time of Study: 2022-04-04 14:42:55          Meds given in ED:   Medications   albuterol (PROVENTIL) nebulizer solution 0.083% 2.5 mg/3mL (2.5 mg Nebulization  Given 22 1451)   ipratropium-albuterol (DUO-NEB) nebulizer solution 3 mL (3 mL Nebulization Given 22 1443)   magnesium sulfate 2g/50 mL (PREMIX) infusion (0 g Intravenous Stopped 22 1512)   methylPREDNISolone sodium succinate (SOLU-Medrol) injection 125 mg (125 mg Intravenous Given 22 1453)   sodium chloride 0.9 % bolus 500 mL (0 mL Intravenous Stopped 22 1738)   ipratropium-albuterol (DUO-NEB) nebulizer solution 3 mL (3 mL Nebulization Given 22 1525)   albuterol (PROVENTIL) nebulizer solution 0.083% 2.5 mg/3mL (2.5 mg Nebulization Given 22 1542)   oseltamivir (TAMIFLU) capsule 75 mg (75 mg Oral Given 22 1609)       Imaging results:  XR Chest 1 View    Result Date: 2022  No focal pulmonary consolidation. Follow-up as clinical indications persist.  This report was finalized on 2022 2:46 PM by Dr. Diogo Suarez M.D.        Ambulatory status:   - ad tamiko    Social issues:   Social History     Socioeconomic History   • Marital status:    Tobacco Use   • Smoking status: Former Smoker     Packs/day: 0.00     Years: 30.00     Pack years: 0.00     Types: Cigarettes     Quit date: 2021     Years since quittin.7   • Smokeless tobacco: Never Used   • Tobacco comment: caffeine - rarely   Substance and Sexual Activity   • Alcohol use: No   • Drug use: Never   • Sexual activity: Yes     Partners: Female     Birth control/protection: Post-menopausal          Electronically signed by Levon Rojas RN at 22     Levon Rojas RN at 22        Attempted to call report. Admitting nurse not available at this time.       Electronically signed by Levon Rojas RN at 22       Oxygen Therapy (since admission)     Date/Time SpO2 Device (Oxygen Therapy) Flow (L/min) Oxygen Concentration (%) ETCO2 (mmHg)    22 1212 94 -- -- -- --    22 1138 97 -- -- -- --    22 1018 92 -- -- -- --    22 0835 92 nasal cannula 2 -- --     04/06/22 0800 -- nasal cannula 2 -- --    04/06/22 0717 -- nasal cannula 1 -- --    04/06/22 0327 94 nasal cannula 1 -- --    04/06/22 0200 -- nasal cannula 1 -- --    04/06/22 0012 92 nasal cannula 1 -- --    04/05/22 2328 94 nasal cannula 1 -- --    04/05/22 2000 -- nasal cannula 1 -- --    04/05/22 1928 93 nasal cannula 1 -- --    04/05/22 1731 99 -- -- -- --    04/05/22 1727 91 nasal cannula 1 -- --    04/05/22 1414 -- nasal cannula 1 -- --    04/05/22 1230 93 nasal cannula 1 -- --    04/05/22 1154 99 nasal cannula 1 -- --    04/05/22 1147 92 nasal cannula 2 -- --    04/05/22 0803 -- nasal cannula 1 -- --    04/05/22 0754 93 nasal cannula 2 -- --    04/05/22 0715 92 nasal cannula 5 -- --    04/05/22 0054 -- nasal cannula 5 -- --    04/05/22 0025 97 -- -- -- --    04/04/22 2321 98 nasal cannula 5 -- --    04/04/22 2200 -- nasal cannula 5 -- --    04/04/22 2138 98 nasal cannula 5 -- --    04/04/22 2051 -- nasal cannula 3 -- --    04/04/22 2046 -- nasal cannula 3 -- --    04/04/22 1931 100 -- -- -- --    04/04/22 1731 99 -- -- -- --    04/04/22 1631 99 -- -- -- --    04/04/22 1601 99 -- -- -- --    04/04/22 1542 98 -- -- -- --    04/04/22 1535 97 -- -- -- --    04/04/22 1531 95 -- -- -- --    04/04/22 1525 96 nasal cannula 3 -- --    04/04/22 1450 99 -- -- -- --    04/04/22 1443 98 nasal cannula 3 -- --    04/04/22 1342 98 nasal cannula 3 -- --        Intake & Output (last 3 days)       04/03 0701 04/04 0700 04/04 0701 04/05 0700 04/05 0701 04/06 0700 04/06 0701 04/07 0700    P.O.  120      IV Piggyback  500      Total Intake(mL/kg)  620 (7.8)      Urine (mL/kg/hr)   2499 (1.3) 1480 (3.4)    Total Output   2499 1480    Net  +620 -2499 -1480                 Lines, Drains & Airways     Active LDAs     Name Placement date Placement time Site Days    Peripheral IV 04/06/22 0359 Anterior;Distal;Left Forearm 04/06/22  0359  Forearm  less than 1          Inactive LDAs     Name Placement date Placement time Removal  date Removal time Site Days    [REMOVED] Peripheral IV 04/04/22 1422 Right Antecubital 04/04/22  1422  04/06/22  0359  Antecubital  1                  Medication Administration Report for Sim Agustin as of 04/06/22 1226   Legend:    Given Hold Not Given Due Canceled Entry Other Actions    Time Time (Time) Time  Time-Action       Discontinued     Completed     Future     MAR Hold     Linked           Medications 04/04/22 04/05/22 04/06/22    acetaminophen (TYLENOL) tablet 650 mg  Dose: 650 mg  Freq: Every 4 Hours PRN Route: PO  PRN Reason: Mild Pain   Start: 04/04/22 1903   Admin Instructions:   Do not exceed 4 grams of acetaminophen in a 24 hr period.    If given for pain, use the following pain scale:   Mild Pain = Pain Score of 1-3, CPOT 1-2  Moderate Pain = Pain Score of 4-6, CPOT 3-4  Severe Pain = Pain Score of 7-10, CPOT 5-8  Do not exceed 4 grams of acetaminophen in a 24 hr period. Max dose of 2gm for AST/ALT greater than 120 units/L    If given for fever, use fever parameter: fever greater than 100.4 °F.    If given for pain, use the following pain scale:   Mild Pain = Pain Score of 1-3, CPOT 1-2  Moderate Pain = Pain Score of 4-6, CPOT 3-4  Severe Pain = Pain Score of 7-10, CPOT 5-8          albuterol (PROVENTIL) nebulizer solution 0.083% 2.5 mg/3mL  Dose: 2.5 mg  Freq: Every 4 Hours - RT Route: NEBULIZATION  Start: 04/05/22 1130   Admin Instructions:   Include Respiratory Treatment Education     1147-Given     1726-Given     (1927)-Not Given [C]       2328-Given          0327-Given     (0841)-Not Given     1131-Given       1530     1930     2330           ALPRAZolam (XANAX) tablet 1 mg  Dose: 1 mg  Freq: 3 Times Daily PRN Route: PO  PRN Reason: Anxiety  Start: 04/04/22 2127   Admin Instructions:    Caution: Look alike/sound alike drug alert.   Avoid grapefruit juice    2207-Given          0803-Given     2106-Given         0704-Given             amLODIPine (NORVASC) tablet 10 mg  Dose: 10 mg  Freq: Daily  Route: PO  Start: 04/05/22 0900   Admin Instructions:   Caution: Look alike/sound alike drug alert. Avoid grapefruit juice.     0802-Given          0800-Given             aspirin chewable tablet 81 mg  Dose: 81 mg  Freq: Nightly Route: PO  Start: 04/04/22 2300   Admin Instructions:   Herbal/drug interaction: Avoid use with ginkgo biloba.  Do not exceed 4 grams of aspirin in a 24 hr period.    If given for pain, use the following pain scale:   Mild Pain = Pain Score of 1-3, CPOT 1-2  Moderate Pain = Pain Score of 4-6, CPOT 3-4  Severe Pain = Pain Score of 7-10, CPOT 5-8    2318-Given          (2106)-Not Given          2100             atorvastatin (LIPITOR) tablet 20 mg  Dose: 20 mg  Freq: Daily Route: PO  Start: 04/05/22 0900   Admin Instructions:   Avoid grapefruit juice.     0803-Given          0800-Given             budesonide-formoterol (SYMBICORT) 160-4.5 MCG/ACT inhaler 2 puff  Dose: 2 puff  Freq: 2 Times Daily - RT Route: IN  Start: 04/04/22 2300   Admin Instructions:   Therapeutic substitution for Trelegy   Shake well.  Rinse mouth after use, do not swallow water.  Send aerosols to pharmacy in ziplock bag for proper disposal.     (0024)-Not Given     0754-Given     1928-Given        0835-Given     2130           And  tiotropium (SPIRIVA RESPIMAT) 2.5 mcg/act aerosol solution inhaler  Dose: 2 puff  Freq: Daily - RT Route: IN  Start: 04/05/22 0930   Admin Instructions:   Therapeutic substitution for Trelegy     0754-Given          0840-Given             buPROPion XL (WELLBUTRIN XL) 24 hr tablet 300 mg  Dose: 300 mg  Freq: Daily Route: PO  Start: 04/05/22 0900   Admin Instructions:   Caution: Look alike/sound alike drug alert. Swallow whole.  Do not crush, chew, or split tablet.     0803-Given          0800-Given             cholecalciferol (VITAMIN D3) tablet 5,000 Units  Dose: 5,000 Units  Freq: Daily Route: PO  Start: 04/05/22 0900     0803-Given          0801-Given             clopidogrel (PLAVIX) tablet 75  mg  Dose: 75 mg  Freq: Nightly Route: PO  Start: 04/04/22 2215 2207-Given          2106-Given          2100             ferrous sulfate tablet 325 mg  Dose: 325 mg  Freq: Daily With Breakfast Route: PO  Start: 04/05/22 0800   Admin Instructions:   Swallow whole. Do not crush, split, or chew. Take with food if GI upset occurs.     0803-Given          0758-Given             gabapentin (NEURONTIN) capsule 300 mg  Dose: 300 mg  Freq: 3 Times Daily Route: PO  Start: 04/04/22 2215   Admin Instructions:       2207-Given          0807-Given     1742-Given     2106-Given        0801-Given     1600     2100           ipratropium-albuterol (DUO-NEB) nebulizer solution 3 mL  Dose: 3 mL  Freq: Every 4 Hours PRN Route: NEBULIZATION  PRN Reason: Shortness of Air  Start: 04/04/22 2016          isosorbide mononitrate (IMDUR) 24 hr tablet 30 mg  Dose: 30 mg  Freq: Every 24 Hours Scheduled Route: PO  Start: 04/05/22 0900   Admin Instructions:   Do not crush, or chew.     0803-Given          0800-Given             latanoprost (XALATAN) 0.005 % ophthalmic solution 1 drop  Dose: 1 drop  Freq: Nightly Route: Both Eyes  Start: 04/04/22 2300    2318-Given          2114-Given          2100             levothyroxine (SYNTHROID, LEVOTHROID) tablet 125 mcg  Dose: 125 mcg  Freq: Every Early Morning Route: PO  Start: 04/05/22 0600   Admin Instructions:   Take on empty stomach.     0500-Given          0704-Given             melatonin tablet 3 mg  Dose: 3 mg  Freq: Nightly PRN Route: PO  PRN Reason: Sleep  Start: 04/04/22 1903          methylPREDNISolone sodium succinate (SOLU-Medrol) injection 40 mg  Dose: 40 mg  Freq: Every 12 Hours Route: IV  Start: 04/05/22 1657   Admin Instructions:   Caution: Look alike/sound alike drug alert     1742-Given          0404-Given     1700            multivitamin with minerals 1 tablet  Dose: 1 tablet  Freq: Daily Route: PO  Start: 04/05/22 0900   Admin Instructions:        0803-Given          1050-Given              nitroglycerin (NITROSTAT) SL tablet 0.4 mg  Dose: 0.4 mg  Freq: Every 5 Minutes PRN Route: SL  PRN Reason: Chest Pain  PRN Comment: Only if SBP Greater Than 100  Start: 04/04/22 1903   Admin Instructions:   If Pain Unrelieved After 3 Doses Notify MD          ondansetron (ZOFRAN) tablet 4 mg  Dose: 4 mg  Freq: Every 6 Hours PRN Route: PO  PRN Reasons: Nausea,Vomiting  Start: 04/04/22 1903         Or  ondansetron (ZOFRAN) injection 4 mg  Dose: 4 mg  Freq: Every 6 Hours PRN Route: IV  PRN Reasons: Nausea,Vomiting  Start: 04/04/22 1903          oseltamivir (TAMIFLU) capsule 30 mg  Dose: 30 mg  Freq: Every 12 Hours Scheduled Route: PO  Indications of Use: INFLUENZA A  Start: 04/04/22 2100   End: 04/09/22 2059    (2356)-Not Given          0803-Given     2106-Given         0801-Given     2100            pantoprazole (PROTONIX) EC tablet 40 mg  Dose: 40 mg  Freq: Every Morning Route: PO  Start: 04/05/22 0700   Admin Instructions:   Swallow whole; do not crush, split, or chew.     0500-Given     0700-Canceled Entry         0705-Given             traMADol (ULTRAM) tablet 50 mg  Dose: 50 mg  Freq: Every 12 Hours PRN Route: PO  PRN Reason: Moderate Pain   Start: 04/04/22 2245   Admin Instructions:       Caution: Look alike/sound alike drug alert    If given for pain, use the following pain scale:  Mild Pain = Pain Score of 1-3, CPOT 1-2  Moderate Pain = Pain Score of 4-6, CPOT 3-4  Severe Pain = Pain Score of 7-10, CPOT 5-8     2106-Given             Completed Medications  Medications 04/04/22 04/05/22 04/06/22       albuterol (PROVENTIL) nebulizer solution 0.083% 2.5 mg/3mL  Dose: 2.5 mg  Freq: Every 15 Minutes Route: NEBULIZATION  Start: 04/04/22 1523   End: 04/04/22 1542   Admin Instructions:   Include Respiratory Treatment Education    1535-Given     1542-Given              albuterol (PROVENTIL) nebulizer solution 0.083% 2.5 mg/3mL  Dose: 2.5 mg  Freq: Every 15 Minutes Route: NEBULIZATION  Start: 04/04/22 1431   End:  04/04/22 1451   Admin Instructions:   Include Respiratory Treatment Education    1450-Given     1451-Given              furosemide (LASIX) injection 40 mg  Dose: 40 mg  Freq: Once Route: IV  Start: 04/06/22 1045   End: 04/06/22 1014      1014-Given             ipratropium-albuterol (DUO-NEB) nebulizer solution 3 mL  Dose: 3 mL  Freq: Once Route: NEBULIZATION  Start: 04/04/22 1523   End: 04/04/22 1525   Admin Instructions:   Include Respiratory Treatment Education    1525-Given               ipratropium-albuterol (DUO-NEB) nebulizer solution 3 mL  Dose: 3 mL  Freq: Once Route: NEBULIZATION  Start: 04/04/22 1431   End: 04/04/22 1443   Admin Instructions:   Include Respiratory Treatment Education    1443-Given               magnesium sulfate 2g/50 mL (PREMIX) infusion  Dose: 2 g  Freq: Once Route: IV  Start: 04/04/22 1431   End: 04/04/22 1512    1452-New Bag     1512-Stopped              methylPREDNISolone sodium succinate (SOLU-Medrol) injection 125 mg  Dose: 125 mg  Freq: Once Route: IV  Start: 04/04/22 1431   End: 04/04/22 1453   Admin Instructions:   Caution: Look alike/sound alike drug alert    1453-Given               oseltamivir (TAMIFLU) capsule 75 mg  Dose: 75 mg  Freq: Once Route: PO  Indications Comment: Flu  Start: 04/04/22 1543   End: 04/04/22 1609    1609-Given               sodium chloride 0.9 % bolus 500 mL  Dose: 500 mL  Freq: Once Route: IV  Start: 04/05/22 0900   End: 04/05/22 1011     0941-New Bag              sodium chloride 0.9 % bolus 500 mL  Dose: 500 mL  Freq: Once Route: IV  Start: 04/04/22 1520   End: 04/04/22 1738    1531-New Bag     1738-Stopped              sodium zirconium cyclosilicate (LOKELMA) pack 10 g  Dose: 10 g  Freq: 3 Times Daily Route: PO  Start: 04/05/22 1100   End: 04/05/22 2106   Admin Instructions:   Empty contents of packet into drinking glass and mix with 3 or more tablespoons of water. Stir well and give to patient to drink immediately. If powder remains add additional  water and stir.     1235-Given     2-Given     -Given           Discontinued Medications  Medications 22       ipratropium-albuterol (DUO-NEB) nebulizer solution 3 mL  Dose: 3 mL  Freq: 4 Times Daily - RT Route: NEBULIZATION  Start: 22   End: 2239   Admin Instructions:   Include Respiratory Treatment Education    -Given          (755)-Not Given [C]              methylPREDNISolone sodium succinate (SOLU-Medrol) injection 40 mg  Dose: 40 mg  Freq: Every 8 Hours Route: IV  Start: 22   End: 22   Admin Instructions:   Caution: Look alike/sound alike drug alert    7-Given          457-Given              Pharmacy to dose Trelegy  Freq: Continuous PRN Route: XX  PRN Reason: Consult  Indications of Use: COPD WITH BRONCHOSPASMS PROPHYLAXIS  Start: 22   End: 22          sodium chloride 0.9 % infusion  Rate: 75 mL/hr Dose: 75 mL/hr  Freq: Continuous Route: IV  Start: 22 1345   End: 22 0949     1336-New Bag          0405-New Bag             traMADol (ULTRAM) tablet 50 mg  Dose: 50 mg  Freq: 2 Times Daily Route: PO  Start: 22   End: 22   Admin Instructions:   Caution: Look alike/sound alike drug alert    If given for pain, use the following pain scale:  Mild Pain = Pain Score of 1-3, CPOT 1-2  Moderate Pain = Pain Score of 4-6, CPOT 3-4  Severe Pain = Pain Score of 7-10, CPOT 5-8    (1223)-Not Given                           Physician Progress Notes (all)      Mati Rubio MD at 22 1140              DAILY PROGRESS NOTE  [facility]    Patient Identification:  Name: Sim Agustin  Age: 66 y.o.  Sex: male  :  1955  MRN: [id number]         Primary Care Physician: Seth Hester MD    Subjective:  Interval History: Feeling much better.  Still having some troubles breathing he noted throughout the night.  He denies any worsening of his shortness of breath.  Denies  "any chest pain.  Denies any nausea or vomiting.  Denies any confusion.  Also no issues with fever or chills    Objective: Sitting up on bedside.  Clinically looks much improved.  Conversational with no aspects of dyspnea and pleasant.  No family at bedside.  Case discussed in multidisciplinary rounds and patient doing all transfers independently    Scheduled Meds:albuterol, 2.5 mg, Nebulization, Q4H - RT  amLODIPine, 10 mg, Oral, Daily  aspirin, 81 mg, Oral, Nightly  atorvastatin, 20 mg, Oral, Daily  budesonide-formoterol, 2 puff, Inhalation, BID - RT   And  tiotropium bromide monohydrate, 2 puff, Inhalation, Daily - RT  buPROPion XL, 300 mg, Oral, Daily  vitamin D3, 5,000 Units, Oral, Daily  clopidogrel, 75 mg, Oral, Nightly  ferrous sulfate, 325 mg, Oral, Daily With Breakfast  gabapentin, 300 mg, Oral, TID  isosorbide mononitrate, 30 mg, Oral, Q24H  latanoprost, 1 drop, Both Eyes, Nightly  levothyroxine, 125 mcg, Oral, Q AM  methylPREDNISolone sodium succinate, 40 mg, Intravenous, Q12H  multivitamin with minerals, 1 tablet, Oral, Daily  oseltamivir, 30 mg, Oral, Q12H  pantoprazole, 40 mg, Oral, QAM      Continuous Infusions:     Vital signs in last 24 hours:  Temp:  [97.4 °F (36.3 °C)-98.2 °F (36.8 °C)] 98.2 °F (36.8 °C)  Heart Rate:  [61-86] 76  Resp:  [18] 18  BP: (131-169)/(75-94) 157/90    Intake/Output:    Intake/Output Summary (Last 24 hours) at 4/6/2022 1140  Last data filed at 4/6/2022 1000  Gross per 24 hour   Intake --   Output 3979 ml   Net -3979 ml       Exam:  /90 (BP Location: Right arm, Patient Position: Lying)   Pulse 76   Temp 98.2 °F (36.8 °C) (Oral)   Resp 18   Ht 172.7 cm (68\")   Wt 80.1 kg (176 lb 9.4 oz)   SpO2 97%   BMI 26.85 kg/m²     General Appearance:    Alert, cooperative, AAOx3                          Head:    Normocephalic, without obvious abnormality, atraumatic                           Eyes:    Conjunctivae/corneas clear, EOM's intact, both eyes                        "  Throat:   Oral mucosa pink and moist                           Neck:   Supple, symmetrical, trachea midline, no JVD                         Lungs:    Such poor air movement throughout both lungs that breath sounds are difficult to hear as he is quite tight still to auscultation bilaterally, respirations unlabored and no conversational dyspnea noted.  Patient was sitting up on side of bed with no signs of distress                 Chest Wall:    No tenderness or deformity                          Heart:    Regular rate and rhythm, S1 and S2 normal                 Abdomen:     Soft, nontender, bowel sounds active                 Extremities: Left old amputation, no cyanosis with trace in right lower extremity edema                  Neurologic:   CNII-XII intact, moving all    Data Review:  Labs in chart were reviewed.    Assessment:  Active Hospital Problems    Diagnosis  POA   • **Influenza A [J10.1]  Unknown   • Acute respiratory failure with hypoxia ([facility]) [J96.01]  Unknown   • Hyperkalemia [E87.5]  Unknown   • Acute exacerbation of chronic obstructive pulmonary disease (COPD) ([facility]) [J44.1]  Yes   • Hyperlipidemia [E78.5]  Yes   • Hypothyroidism [E03.9]  Yes   • PRISCILLA treated with BiPAP [G47.33]  Yes   • PVD (peripheral vascular disease) ([facility]) [I73.9]  Yes   • Essential hypertension [I10]  Yes      Resolved Hospital Problems   No resolved problems to display.       Plan:    Influenza A -continue Tamiflu/supportive care     Acute exacerbation COPD -med changes noted per pulmonary -patient is still quite tight and minimal air movement noted and I think he would benefit from additional day in the hospital     Wean O2 to room air -normally utilizes CPAP at night -final O2 recommendations pending pulmonary.  Only on 2 L at this time     Hyperkalemia with reported CKD but I am not sure of baseline              -Appreciate renal assistance.  K therapeutic.  They are giving Lasix and stopping fluids and checking a chest x-ray  at this juncture    Hypothyroidism on levothyroxine     PAD/CAD on Plavix/ASA -SCDs for prophylaxis        Disposition -hopeful for tomorrow.  Pulmonology please address further O2 needs or orders post discharge    Mati Rubio MD  2022  11:40 EDT      Electronically signed by Mati Rubio MD at 22 1144     Geoffrey Haddad MD at 22 0907              [facility] Progress Note      Patient Name: Sim Agustin  : 1955  MRN: [id number]  Primary Care Physician:  Seth Hester MD  Date of admission: 2022    Subjective     Interval History:   F/u ARLYN CKD3    Review of Systems:   Inc dyspnea overnight and O2 inc'd 3L  No nausea     Objective     Vitals:   Temp:  [97.4 °F (36.3 °C)-98.2 °F (36.8 °C)] 98.2 °F (36.8 °C)  Heart Rate:  [61-71] 67  Resp:  [18] 18  BP: (131-169)/(75-94) 169/94  Flow (L/min):  [1-2] 2    Intake/Output Summary (Last 24 hours) at 2022 0949  Last data filed at 2022 0800  Gross per 24 hour   Intake --   Output 3499 ml   Net -3499 ml       Physical Exam:    General Appearance: no acute distress but slightly tachypnic  Neck: supple, no JVD  Lungs: Dec BS bibasilar   Heart: RRR, normal S1 and S2  Abdomen: soft, nontender, nondistended  Extremities: LLE high AKA, trace RLE edema    Scheduled Meds:     albuterol, 2.5 mg, Nebulization, Q4H - RT  amLODIPine, 10 mg, Oral, Daily  aspirin, 81 mg, Oral, Nightly  atorvastatin, 20 mg, Oral, Daily  budesonide-formoterol, 2 puff, Inhalation, BID - RT   And  tiotropium bromide monohydrate, 2 puff, Inhalation, Daily - RT  buPROPion XL, 300 mg, Oral, Daily  vitamin D3, 5,000 Units, Oral, Daily  clopidogrel, 75 mg, Oral, Nightly  ferrous sulfate, 325 mg, Oral, Daily With Breakfast  gabapentin, 300 mg, Oral, TID  isosorbide mononitrate, 30 mg, Oral, Q24H  latanoprost, 1 drop, Both Eyes, Nightly  levothyroxine, 125 mcg, Oral, Q AM  methylPREDNISolone sodium succinate, 40 mg, Intravenous,  Q12H  multivitamin with minerals, 1 tablet, Oral, Daily  oseltamivir, 30 mg, Oral, Q12H  pantoprazole, 40 mg, Oral, QAM      IV Meds:   sodium chloride, 75 mL/hr, Last Rate: 75 mL/hr (04/06/22 0405)        Results Reviewed:   I have personally reviewed the results from the time of this admission to 4/6/2022 09:49 EDT     Results from last 7 days   Lab Units 04/06/22  0409 04/05/22  1655 04/05/22  0605 04/04/22  1421   SODIUM mmol/L 139  --  135* 138   POTASSIUM mmol/L 4.1 4.6 5.8* 5.0   CHLORIDE mmol/L 103  --  100 101   CO2 mmol/L 24.5  --  24.4 25.9   BUN mg/dL 30*  --  41* 30*   CREATININE mg/dL 1.37*  --  2.28* 1.95*   CALCIUM mg/dL 8.7  --  9.0 9.4   BILIRUBIN mg/dL  --   --   --  0.3   ALK PHOS U/L  --   --   --  72   ALT (SGPT) U/L  --   --   --  11   AST (SGOT) U/L  --   --   --  15   GLUCOSE mg/dL 128*  --  182* 102*     Estimated Creatinine Clearance: 60.1 mL/min (A) (by C-G formula based on SCr of 1.37 mg/dL (H)).  Results from last 7 days   Lab Units 04/06/22  0409 04/04/22  1421   MAGNESIUM mg/dL  --  2.1   PHOSPHORUS mg/dL 2.9  --          Results from last 7 days   Lab Units 04/05/22  0605 04/04/22  1421   WBC 10*3/mm3 4.77 8.63   HEMOGLOBIN g/dL 14.8 16.4   PLATELETS 10*3/mm3 218 221           Assessment / Plan     ASSESSMENT:  1. Non olig ARLYN - suspect prerenal azotemia from vol depletion in setting on influenza and poor oral intake, much better today with IVF, Cr down 2.3 to 1.3.  UA NEG.  Now has some e/o mild vol excess with dyspnea, inc O2 requirement, some edema   2. 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 Hyperkalemia - intermittent issue even when renal fcn at BL; up to 5.8 in setting of ARLYN  -- K down to 4.6 yesterday PM and 4.1 today with lokelma x 2 doses   4. Influenza A & COPD exac - on tamiflu, steroids; PULM following  5. Hx PVD s/p LLE AKA  6. HTN - BP higher than usual this AM, on max norvasc     PLAN:  DC  IVF and give lasix 40mg IV x1; d/w RN  Repeat CXR      Geoffrey Haddad MD  22  09:49 EDT    [facility]  [phone]    Electronically signed by Geoffrey Haddad MD at 22 1003     Geoffrey Haddad MD at 22 1006            [facility] Consult Note      Patient Name: Sim Agustin  : 1955  MRN: [id number]  Primary Care Physician:  Seth Hester MD  Referring Physician: Ruth Rosas MD  Date of admission: 2022    Subjective     Reason for Consult:  CKD3, hyperkalemia     HPI:   Sim Agustin is a 66 y.o. male with h/o CKD stage 3 followed by Dr Sarabia, attributed to renovascular dz and residual component from AIKI/ATN in early  (required temporary HD).  Hx PVD s/p aortobifemoral grafts, LLE AKA, colostomy following aortoduodenal fistula repair which was later reversed.  Also has HTN but not diabetic.  Admitted yesterday with worsening dyspnea over few days, along with cough and nasal congestion.  Found to have Influenza A and started on tamiflu, along with COPD exacerbation for which PULM is following, on IV steroids.  Denies n/v or diarrhea.  Occ urinary hesitancy.  Saw Dr Sarabia 22 in office.  Baseline creatinine approx 1.6 mg/dL, was 1.9 yesterday and up to 2.3 today.  K has climbed 5.0 to 5.8 with modestly elevated  and normal serum HCo3.  No hypotension or contrast exposure.  CXR showed no central congestion.  Not taking any K-sparing meds; K generous at times in office ie as high as 5.3 in past 12 mos.  Echo in 2020 suggested diastolic dysfunction with EF 50%.    Review of Systems:   14 point review of systems is otherwise negative except for mentioned above on HPI    Personal History     Past Medical History:   Diagnosis Date   • Acute respiratory failure with hypoxia and hypercarbia (HCC) 3/11/2019   • Acute upper respiratory infection 2013   • Anemia     per [place]  database   • ARF (acute renal failure) ([facility]) 3/4/2016   • Atelectasis, left 3/4/2016   • Bradycardia 5/23/2019   • CAD (coronary artery disease) 4/4/2016   • Chronic obstructive pulmonary disease with acute exacerbation ([facility]) 2/6/2013   • Colon polyps    • Compartment syndrome ([facility])     AFTER ILIAC SURGERY. ABOVE KNEE AMPUTATION   • COPD (chronic obstructive pulmonary disease) ([facility])    • COPD with hypoxia ([facility]) 5/23/2019   • Cough     SINCE APRIL WITH PNEUMONIA.    • Disease of thyroid gland     HYPOTHYRODISM   • Diverticulosis    • Elevated hematocrit 1/6/2021   • Employs prosthetic leg    • ESRD (end stage renal disease) on dialysis ([facility]) 4/6/2016   • Fracture of patella 3/3/2015   • History of acute renal failure    • History of CHF (congestive heart failure)    • History of GI bleed 02/2016    AORTODUOD FISTULA   • History of sepsis 02/2016   • History of transfusion     MULTIPLE, 2016   • Hyperkalemia 4/11/2016   • Hypertension    • Hypotension    • Hypotension 3/4/2016   • Nonischemic cardiomyopathy ([facility])    • Nosocomial pneumonia 4/6/2016   • Phantom limb pain ([facility])     SL, WITH LEFT ABOUVE KNEE   • Pleural effusion on left 4/11/2016   • Pneumonia     SPRING 2016  AFTER SURGERY   • PVD (peripheral vascular disease) ([facility])    • S/P thoracentesis 4/11/2016   • Sepsis, unspecified organism ([facility]) 4/5/2016       Past Surgical History:   Procedure Laterality Date   • ABOVE KNEE AMPUTATION Left 3/16/2016    Procedure: LT AMPUTATION ABOVE KNEE;  Surgeon: Jose Swann MD;  Location: [facility];  Service:    • ARTERIAL THROMBECTOMY  2001    throbectomy of arterial graft   • AXILLARY FEMORAL BYPASS Right 02/15/2016    Exploratory lapartomy, repair aortoduodenal fistula, resection infected aortobifemoral bypass graft, axillobi-superficial femoral artery [place]-Aneudy bypass graft from right axillary artery, Repair of duodenotomy 4th portion duodenum-Dr. Jose Swann, Dr. Genaro Perrin   • BRONCHOSCOPY Left  03/04/2016    Dr. NATALIIA Tate   • BRONCHOSCOPY RIGID / FLEXIBLE N/A 03/03/2016    Dr. NATALIIA Tate   • BRONCHOSCOPY RIGID / FLEXIBLE N/A 02/26/2016    Dr. NATALIIA Tate   • CARDIAC CATHETERIZATION N/A 3/18/2019    Procedure: Right and Left Heart Cath;  Surgeon: Nina Storey MD;  Location: [facility] CATH INVASIVE LOCATION;  Service: Cardiovascular   • CATARACT EXTRACTION WITH INTRAOCULAR LENS IMPLANT Bilateral    • COLON RESECTION WITH COLOSTOMY Left 02/28/2016    Exploratory laparotomy with open left hemicolectomy, end-colostomy, G-tube and J-tube-Dr. Endy Chaney   • COLONOSCOPY N/A 2015    Dr. Laughlin   • COLONOSCOPY N/A 10/12/2016    Procedure: COLONOSCOPY TO 20 CM AND PER STOMA TO 30CM;  Surgeon: Genaro Perrin MD;  Location: [facility] ENDOSCOPY;  Service:    • COLONOSCOPY N/A 10/21/2016    Procedure: COLONOSCOPY DONE AT BEDSIDE ONTO CECEM;  Surgeon: Genaro Perrin MD;  Location: [facility] ENDOSCOPY;  Service:    • COLONOSCOPY N/A 02/10/2016    Diverticulosis in the entire examined colon, non-bleeding internal hemorrhoids-Dr. Taylor Pina   • COLONOSCOPY N/A 02/11/2016    Poor prep, blood in the entire examined colon, normal ileum-Dr. Taylor Pina   • COLONOSCOPY W/ POLYPECTOMY N/A 07/27/2015    Normal ileum, diverticulosis in the sigmoid colon: resected and retrieved, one 6 mm polyp in the descending colon: resected and retrieved, the distal rectum and anal verge are normal on retroflexion view-Dr. Miguel Ángel Laughlin   • COLOSTOMY CLOSURE N/A 10/13/2016    Procedure: COLOSTOMY TAKEDOWN OR CLOSURE, APPENDECTOMY;  Surgeon: Genaro Perrin MD;  Location: [facility] OR;  Service:    • DEBRIDEMENT LEG Left 03/01/2016    Excisional debridement of the skin, subcutantous tissue, tendon and muscle left calf-Dr. Jose Swann   • DEBRIDEMENT LEG Left 02/25/2016    Excisional debridement full-thcikness skin and subcutaneous tissue and tendon and muscle, left medial and lateral calf muscles-Dr. Garcia  Hue   • ENDOSCOPY N/A 10/21/2016    Procedure: ESOPHAGOGASTRODUODENOSCOPY DONE AT BEDSIDE;  Surgeon: Genaro Perrin MD;  Location: [facility] ENDOSCOPY;  Service:    • ENDOSCOPY AND COLONOSCOPY N/A 02/28/2016    EGD and Colonoscopy with Candy ink injection-Dr. Endy Chaney   • ENTEROSCOPY SMALL BOWEL N/A 02/11/2016    Normal esophagus, normal stomach, normal duodenum, portion of the jejunum normal-Dr. Taylor Pina   • ILIAC ARTERY - FEMORAL ARTERY BYPASS GRAFT Bilateral 2002   • ILIAC ARTERY STENT Bilateral 2001   • INSERTION AND REMOVAL PERITONEAL DIALYSIS CATHETER Right 02/29/2016    Exchange right jugular 16 cm Mahurkar dialysis catheter-Dr. Jose Swann   • INSERTION PERITONEAL DIALYSIS CATHETER Left 03/01/2016    Ultrasound-guided access of the left jugular vein, 23 cm left jugular palindrome dialysis catheter placement-Dr. Jose Swann   • INSERTION PERITONEAL DIALYSIS CATHETER Right 02/18/2016    Right jugular Mahrkar catherer placement-Dr. Jose Swann   • JOINT REPLACEMENT Left    • THORACOSCOPY Left 4/18/2016    Procedure: LT THORACOSCOPY VIDEO ASSISTED WITH DECORDICATION;  Surgeon: Genaro Davila III, MD;  Location: [facility] MAIN OR;  Service:    • THROMBECTOMY Left 02/16/2016    Thombectomy of left femoral graft, left leg arteriogram, left calf forward compartment fasciotomy-Dr. Jose Swann   • TOTAL HIP ARTHROPLASTY Left    • UPPER GASTROINTESTINAL ENDOSCOPY N/A 02/09/2016    Normal UGI-Dr. Ajay Parkinson   • UPPER GASTROINTESTINAL ENDOSCOPY N/A 11/28/2015    Small hiatal hernia, chronic gastritis: biopsied, non-bleeding angioectasias in the stomach: treated with argon plasma coagulation, multiple bleeding angioectasias in the dudenum: treated with argon plasma coagulation-Dr. Mykel Jaramillo   • VASCULAR SURGERY      MULTIPLE   • VENTRAL/INCISIONAL HERNIA REPAIR N/A 10/19/2017    Procedure: VENTRAL HERNIA REPAIR WITH MESH AND BILATERAL COMPONENT SEPARATION;  Surgeon: Genaro SARAVIA  MD Marychuy;  Location: [facility] OR;  Service:    • WISDOM TOOTH EXTRACTION         Family History: family history includes Cancer in his mother; Diabetes in his father; Heart disease in his father; Hypertension in his father; Lung cancer in his mother.    Social History:  reports that he quit smoking about 9 months ago. His smoking use included cigarettes. He smoked 0.00 packs per day for 30.00 years. He has never used smokeless tobacco. He reports that he does not drink alcohol and does not use drugs.    Home Medications:  Prior to Admission medications    Medication Sig Start Date End Date Taking? Authorizing Provider   ALPRAZolam (XANAX) 1 MG tablet Take 1 tablet by mouth 3 (Three) Times a Day As Needed for Anxiety. 3/8/22   Seth Hester MD   amLODIPine (NORVASC) 10 MG tablet TAKE 1 TABLET BY MOUTH EVERY DAY 3/14/22   Seth Hester MD   aspirin 81 MG chewable tablet Chew 81 mg Every Night.    ProviderPrimitivo MD   buPROPion XL (WELLBUTRIN XL) 300 MG 24 hr tablet TAKE 1 TABLET BY MOUTH DAILY 1/21/22   Seth Hester MD   Cholecalciferol (VITAMIN D3) 125 MCG (5000 UT) tablet tablet Take 1 tablet by mouth Daily.  Patient taking differently: Take 5,000 Units by mouth 1 (One) Time Per Week. 3/3/20   Seth Hester MD   clopidogrel (PLAVIX) 75 MG tablet TAKE 1 TABLET BY MOUTH DAILY AT NIGHT 3/14/22   Seth Hester MD   Coenzyme Q10 400 MG capsule Take 400 mg by mouth Every Evening.    ProviderPrimitivo MD   FERROUS SULFATE PO Take 65 mg by mouth Every Night.    Primitivo Lagunas MD   Fluticasone-Umeclidin-Vilant (TRELEGY ELLIPTA) 100-62.5-25 MCG/INH aerosol powder  Inhale 1 each Daily. 3/19/19   Jemima rTan MD   gabapentin (NEURONTIN) 300 MG capsule Take 1 capsule by mouth 3 (Three) Times a Day.  Patient taking differently: Take 900 mg by mouth Every Night. 3/24/22   Seth Hester MD   isosorbide mononitrate (IMDUR) 30 MG 24 hr tablet TAKE 1 TABLET BY MOUTH DAILY EVERY MORNING 5/12/21   Mir  MD Seth   latanoprost (XALATAN) 0.005 % ophthalmic solution Administer 1 drop to both eyes every night at bedtime. 7/14/21   Primitivo Lagunas MD   levothyroxine (SYNTHROID, LEVOTHROID) 125 MCG tablet TAKE 1 TABLET BY MOUTH EVERY DAY 1/19/22   Seth Hester MD   Multiple Vitamins-Minerals (MULTIVITAMIN ADULT PO) Take 1 tablet by mouth Daily.    Primitivo Lagunas MD   NIACIN PO Take 1 capsule by mouth Every Morning.    Primitivo Lagunas MD   omeprazole (priLOSEC) 20 MG capsule TAKE 1 CAPSULE BY MOUTH EVERY DAY IN THE MORNING 12/14/21   Seth Hester MD   simvastatin (ZOCOR) 40 MG tablet Take 0.5 tablets by mouth Every Night. 3/15/22   Seth Hester MD   Thiamine HCl (VITAMIN B-1 PO) Take 1 tablet by mouth Daily.    Primitivo Lagunas MD   traMADol (ULTRAM) 50 MG tablet Take 1 tablet by mouth 2 (Two) Times a Day. 3/24/22   Seth Hester MD       Allergies:  Allergies   Allergen Reactions   • Augmentin [Amoxicillin-Pot Clavulanate] Hives and Rash       Objective     Vitals:   Temp:  [97.4 °F (36.3 °C)-97.8 °F (36.6 °C)] 97.8 °F (36.6 °C)  Heart Rate:  [55-79] 62  Resp:  [15-22] 18  BP: (118-154)/(73-87) 154/87  Flow (L/min):  [2-5] 2    Intake/Output Summary (Last 24 hours) at 4/5/2022 1006  Last data filed at 4/4/2022 2200  Gross per 24 hour   Intake 620 ml   Output --   Net 620 ml       Physical Exam:    General Appearance: pleasant WM in chair eating lunch, no distress, nasal cannula O2  Skin: warm and dry  HEENT: oral mucosa normal, nonicteric sclera  Neck: supple, no JVD  Lungs: Coarse BS bilat with exp wheezes   Heart: RRR, normal S1 and S2  Abdomen: soft, nontender, nondistended  : no palpable bladder  Extremities: trace RLE edema, LLE AKA  Neuro: normal speech and mental status     Scheduled Meds:     albuterol, 2.5 mg, Nebulization, Q4H - RT  amLODIPine, 10 mg, Oral, Daily  aspirin, 81 mg, Oral, Nightly  atorvastatin, 20 mg, Oral, Daily  budesonide-formoterol, 2 puff, Inhalation, BID - RT    And  tiotropium bromide monohydrate, 2 puff, Inhalation, Daily - RT  buPROPion XL, 300 mg, Oral, Daily  vitamin D3, 5,000 Units, Oral, Daily  clopidogrel, 75 mg, Oral, Nightly  ferrous sulfate, 325 mg, Oral, Daily With Breakfast  gabapentin, 300 mg, Oral, TID  isosorbide mononitrate, 30 mg, Oral, Q24H  latanoprost, 1 drop, Both Eyes, Nightly  levothyroxine, 125 mcg, Oral, Q AM  methylPREDNISolone sodium succinate, 40 mg, Intravenous, Q12H  multivitamin with minerals, 1 tablet, Oral, Daily  oseltamivir, 30 mg, Oral, Q12H  pantoprazole, 40 mg, Oral, QAM  sodium chloride, 500 mL, Intravenous, Once  sodium zirconium cyclosilicate, 10 g, Oral, TID      IV Meds:        Results Reviewed:   I have personally reviewed the results from the time of this admission to 4/5/2022 10:06 EDT     Lab Results   Component Value Date    GLUCOSE 182 (H) 04/05/2022    CALCIUM 9.0 04/05/2022     (L) 04/05/2022    K 5.8 (H) 04/05/2022    CO2 24.4 04/05/2022     04/05/2022    BUN 41 (H) 04/05/2022    CREATININE 2.28 (H) 04/05/2022    EGFRIFAFRI 51 (L) 09/10/2021    EGFRIFNONA 44 (L) 09/10/2021    BCR 18.0 04/05/2022    ANIONGAP 10.6 04/05/2022      Lab Results   Component Value Date    MG 2.1 04/04/2022    PHOS 3.0 10/25/2017    ALBUMIN 4.10 04/04/2022           Assessment / Plan     ASSESSMENT:  1. Non olig ARLYN - suspect prerenal azotemia from vol depletion in setting on influenza and poor oral intake, though Cr up 2.3 (despite IVF?  None hanging currently).  Rule out retentoin  2. 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 Hyperkalemia - intermittent issue even when renal fcn at BL; up to 5.8 in setting of ARLYN  4. Influenza A & COPD exac - on tamiflu, steroids; PULM following  5. Hx PVD s/p LLE AKa  6. HTN - BP decent, on norvasc    PLAN:  Resume gentle IVF   Give lokelma series and restrict K in diet  Recheck K this afternoon  Check UA & PVR     Thank you  Dr Rubio for involving us in the care of Sim Agustin.  Please feel free to call with any questions.    Geoffrey Haddad MD  22  10:06 T    [facility]  [phone]    Electronically signed by Geoffrey Haddad MD at 22 2558     Mati Rubio MD at 22 [phone]              DAILY PROGRESS NOTE  [facility]    Patient Identification:  Name: Sim Agustin  Age: 66 y.o.  Sex: male  :  1955  MRN: [id number]         Primary Care Physician: Seth Hester MD    Subjective:  Interval History: Feeling a little better today.  He admits to not having normal p.o. intake over the last couple days.  He states he follows with Dr. Sarabia outside but he is unable to recall previous creatinine values.  Denies any confusion chest pain or altered mentation.  No issues with nausea or vomiting.    Objective: Sitting up conversational and very pleasant.  No family at bedside.  Nontoxic and seems to be clinically improving overall    Scheduled Meds:albuterol, 2.5 mg, Nebulization, Q4H - RT  amLODIPine, 10 mg, Oral, Daily  aspirin, 81 mg, Oral, Nightly  atorvastatin, 20 mg, Oral, Daily  budesonide-formoterol, 2 puff, Inhalation, BID - RT   And  tiotropium bromide monohydrate, 2 puff, Inhalation, Daily - RT  buPROPion XL, 300 mg, Oral, Daily  vitamin D3, 5,000 Units, Oral, Daily  clopidogrel, 75 mg, Oral, Nightly  ferrous sulfate, 325 mg, Oral, Daily With Breakfast  gabapentin, 300 mg, Oral, TID  isosorbide mononitrate, 30 mg, Oral, Q24H  latanoprost, 1 drop, Both Eyes, Nightly  levothyroxine, 125 mcg, Oral, Q AM  methylPREDNISolone sodium succinate, 40 mg, Intravenous, Q12H  multivitamin with minerals, 1 tablet, Oral, Daily  oseltamivir, 30 mg, Oral, Q12H  pantoprazole, 40 mg, Oral, QAM  sodium chloride, 500 mL, Intravenous, Once      Continuous Infusions:     Vital signs in last 24 hours:  Temp:  [97.4 °F (36.3 °C)-97.8 °F (36.6 °C)] 97.8 °F (36.6  "°C)  Heart Rate:  [55-79] 62  Resp:  [15-22] 18  BP: (118-154)/(73-87) 154/87    Intake/Output:    Intake/Output Summary (Last 24 hours) at 4/5/2022 0942  Last data filed at 4/4/2022 2200  Gross per 24 hour   Intake 620 ml   Output --   Net 620 ml       Exam:  /87 (BP Location: Left arm, Patient Position: Lying)   Pulse 62   Temp 97.8 °F (36.6 °C) (Oral)   Resp 18   Ht 172.7 cm (68\")   Wt 79.2 kg (174 lb 9.7 oz)   SpO2 93%   BMI 26.55 kg/m²     General Appearance:    Alert, cooperative, AAOx3                          Head:    Normocephalic, without obvious abnormality, atraumatic                           Eyes:    Conjunctivae/corneas clear, EOM's intact, both eyes                         Throat:   Oral mucosa pink and moist                           Neck:   No JVD                         Lungs:    Decreased with Rales to auscultation bilaterally, respirations unlabored                 Chest Wall:    No tenderness or deformity                          Heart:    Regular rate and rhythm, S1 and S2 normal                  Abdomen:     Soft, nontender, bowel sounds activey                  Extremities: Old healed BKA, no volume overload otherwise                  Neurologic:   CNII-XII intact, no focal deficits noted     Data Review:  Labs in chart were reviewed.    Assessment:  Active Hospital Problems    Diagnosis  POA   • **Influenza A [J10.1]  Unknown   • Acute respiratory failure with hypoxia ([facility]) [J96.01]  Unknown   • Hyperkalemia [E87.5]  Unknown   • Acute exacerbation of chronic obstructive pulmonary disease (COPD) ([facility]) [J44.1]  Yes   • Hyperlipidemia [E78.5]  Yes   • Hypothyroidism [E03.9]  Yes   • PRISCILLA treated with BiPAP [G47.33]  Yes   • PVD (peripheral vascular disease) ([facility]) [I73.9]  Yes   • Essential hypertension [I10]  Yes      Resolved Hospital Problems   No resolved problems to display.       Plan:    Influenza A -continue Tamiflu/supportive care    Acute exacerbation COPD -med changes " noted per pulmonary    Wean O2 to room air -normally utilizes CPAP at night -final O2 recommendations pending pulmonary    Hyperkalemia with reported CKD but I am not sure of baseline   -I think patient is clinically a bit dry but given past history of reported chronic systolic CHF, I will bolus 500 cc and hold any Kayexalate, and I have placed to consult to his normal nephrologist Dr. Sarabia for further recommendations/maintenance fluids.  Appetite is good as evident by nearly empty breakfast tray.  Recent decreased appetite and p.o. intake noted due to illness.   -Seems clinically dry.  No signs of volume overload    Hypothyroidism on levothyroxine    PAD/CAD on Plavix/ASA -SCDs for prophylaxis    Mati Rubio MD  2022  09:42 EDT      Electronically signed by Mati Rubio MD at 22 0945          Consult Notes (all)      Henry Lux MD at 22 0433      Consult Orders    1. Inpatient Pulmonology Consult [[id number]] ordered by Ruth Rosas MD at 22                 Pulmonary Consultation     Patient Name: Sim Agustin  Age/Sex: 66 y.o. male  : 1955  MRN: [id number]    Date of Admission: 2022  Date of Encounter Visit: 22  Encounter Provider: Henry Lux MD  Referring Provider: Ruth Rosas MD  Place of Service: [facility]  Patient Care Team:  Seth Hester MD as PCP - General (Family Medicine)  Jose Swann MD as Consulting Physician (Vascular Surgery)  Krystal Ocampo MD as Consulting Physician (Cardiology)  Ishmael Tate Jr., MD as Consulting Physician (Pulmonary Disease)      Subjective:     Consulted for: COPD exacerbation    Chief Complaint: Worsening shortness of breath    History of Present Illness:  Sim Agustin is a 66 y.o. male with history of COPD, with possible infection for Haemophilus influenza on this admission who presented with worsening shortness of breath, cough and wheezing.  Patient has chronic  hypoxemic respiratory failure and obstructive sleep apnea on oxygen at 3 L/min bled into the CPAP and follows with Dr. Tate in our office.  Patient was last seen by Dr. Tate on 1/4/2022,  His office regimen includes outpatient treatment with Trelegy and as needed albuterol.  He is a former smoker  His last CPAP download compliance with showing 100% adherence with good control of the sleep apnea and hypoxemia  His sleep study was done on 4/15/2019 and showed evidence of moderate obstructive sleep apnea with overall AHI of 18.1 and RDI of 24.5 with 99% of total sleep time spent below 89%  Started to have runny nose and congestion on Thursday that progressed into worsening cough  That was followed by wheezing with worsening shortness of breath initially with exertion and later on even at rest.  Patient tried to use her home oxygen that helped initially however the symptoms kept on progressing, there is no hemoptysis.  No loss of sense of smell or taste, no GI or  problem with that.  Positive subjective fever and chills but no night sweats.  No recent sick contact or confirmed COVID-19 cases  Patient is already vaccinated and up-to-date on his COVID-19 vaccines.  Symptoms have progressed most over the last 2 to 3 days  Patient was admitted under the care of the medical service, was started on Symbicort and Spiriva and as needed DuoNeb  Patient was also started on Solu-Medrol currently on 40 mg IV every 8 hours.  Tamiflu was started because of the positive influenza test  Procalcitonin was borderline at 0.32 with no leukocytosis, chemistry showed creatinine of 1.95, this is close to the baseline at 1.6-1.9  Patient is feeling already better    Pulmonary Functions Testing Results:    No results found for: FEV1, FVC, BDG4DAK, TLC, DLCO    Review of Systems:   Review of Systems   Constitutional: Positive for chills. Negative for appetite change, diaphoresis, fatigue and fever.   HENT: Positive for congestion,  postnasal drip and rhinorrhea.    Eyes: Negative.    Respiratory: Positive for cough and shortness of breath. Negative for apnea, wheezing and stridor.    Cardiovascular: Negative for chest pain, palpitations and leg swelling.   Gastrointestinal: Negative.    Endocrine: Negative.    Genitourinary: Negative.    Musculoskeletal: Negative.    Skin: Negative.    Allergic/Immunologic: Negative.    Neurological: Negative.    Hematological: Negative.    Psychiatric/Behavioral: Negative.      Past Medical History:  Past Medical History:   Diagnosis Date   • Acute respiratory failure with hypoxia and hypercarbia ([facility]) 3/11/2019   • Acute upper respiratory infection 2/6/2013   • Anemia     per [facility] database   • ARF (acute renal failure) ([facility]) 3/4/2016   • Atelectasis, left 3/4/2016   • Bradycardia 5/23/2019   • CAD (coronary artery disease) 4/4/2016   • Chronic obstructive pulmonary disease with acute exacerbation ([facility]) 2/6/2013   • Colon polyps    • Compartment syndrome ([facility])     AFTER ILIAC SURGERY. ABOVE KNEE AMPUTATION   • COPD (chronic obstructive pulmonary disease) ([facility])    • COPD with hypoxia ([facility]) 5/23/2019   • Cough     SINCE APRIL WITH PNEUMONIA.    • Disease of thyroid gland     HYPOTHYRODISM   • Diverticulosis    • Elevated hematocrit 1/6/2021   • Employs prosthetic leg    • ESRD (end stage renal disease) on dialysis ([facility]) 4/6/2016   • Fracture of patella 3/3/2015   • History of acute renal failure    • History of CHF (congestive heart failure)    • History of GI bleed 02/2016    AORTODUOD FISTULA   • History of sepsis 02/2016   • History of transfusion     MULTIPLE, 2016   • Hyperkalemia 4/11/2016   • Hypertension    • Hypotension    • Hypotension 3/4/2016   • Nonischemic cardiomyopathy ([facility])    • Nosocomial pneumonia 4/6/2016   • Phantom limb pain ([facility])     SL, WITH LEFT ABOUVE KNEE   • Pleural effusion on left 4/11/2016   • Pneumonia     SPRING 2016  AFTER SURGERY   • PVD (peripheral vascular disease) ([facility])     • S/P thoracentesis 4/11/2016   • Sepsis, unspecified organism ([facility]) 4/5/2016       Past Surgical History:   Procedure Laterality Date   • ABOVE KNEE AMPUTATION Left 3/16/2016    Procedure: LT AMPUTATION ABOVE KNEE;  Surgeon: Jose Swann MD;  Location: [facility] OR;  Service:    • ARTERIAL THROMBECTOMY  2001    throbectomy of arterial graft   • AXILLARY FEMORAL BYPASS Right 02/15/2016    Exploratory lapartomy, repair aortoduodenal fistula, resection infected aortobifemoral bypass graft, axillobi-superficial femoral artery [place]-Aneudy bypass graft from right axillary artery, Repair of duodenotomy 4th portion duodenum-Dr. Jose Swann, Dr. Genaro Perrin   • BRONCHOSCOPY Left 03/04/2016    Dr. NATALIIA Tate   • BRONCHOSCOPY RIGID / FLEXIBLE N/A 03/03/2016    Dr. NATALIIA Tate   • BRONCHOSCOPY RIGID / FLEXIBLE N/A 02/26/2016    Dr. NATALIIA Tate   • CARDIAC CATHETERIZATION N/A 3/18/2019    Procedure: Right and Left Heart Cath;  Surgeon: Nina Storey MD;  Location: [facility] INVASIVE LOCATION;  Service: Cardiovascular   • CATARACT EXTRACTION WITH INTRAOCULAR LENS IMPLANT Bilateral    • COLON RESECTION WITH COLOSTOMY Left 02/28/2016    Exploratory laparotomy with open left hemicolectomy, end-colostomy, G-tube and J-tube-Dr. Endy Chaney   • COLONOSCOPY N/A 2015    Dr. Laughlin   • COLONOSCOPY N/A 10/12/2016    Procedure: COLONOSCOPY TO 20 CM AND PER STOMA TO 30CM;  Surgeon: Genaro Perrin MD;  Location: [facility] ENDOSCOPY;  Service:    • COLONOSCOPY N/A 10/21/2016    Procedure: COLONOSCOPY DONE AT BEDSIDE ONTO CECEM;  Surgeon: Genaro Perrin MD;  Location: [facility] ENDOSCOPY;  Service:    • COLONOSCOPY N/A 02/10/2016    Diverticulosis in the entire examined colon, non-bleeding internal hemorrhoids-Dr. Taylor Pina   • COLONOSCOPY N/A 02/11/2016    Poor prep, blood in the entire examined colon, normal ileum-Dr. Taylor Pina   • COLONOSCOPY W/ POLYPECTOMY N/A 07/27/2015    Normal ileum,  diverticulosis in the sigmoid colon: resected and retrieved, one 6 mm polyp in the descending colon: resected and retrieved, the distal rectum and anal verge are normal on retroflexion view-Dr. Miguel Ángel Laughlin   • COLOSTOMY CLOSURE N/A 10/13/2016    Procedure: COLOSTOMY TAKEDOWN OR CLOSURE, APPENDECTOMY;  Surgeon: Genaro Perrin MD;  Location: [facility] OR;  Service:    • DEBRIDEMENT LEG Left 03/01/2016    Excisional debridement of the skin, subcutantous tissue, tendon and muscle left calf-Dr. Jose Swann   • DEBRIDEMENT LEG Left 02/25/2016    Excisional debridement full-thcikness skin and subcutaneous tissue and tendon and muscle, left medial and lateral calf muscles-Dr. Jose Swann   • ENDOSCOPY N/A 10/21/2016    Procedure: ESOPHAGOGASTRODUODENOSCOPY DONE AT BEDSIDE;  Surgeon: Genaro Perrin MD;  Location: [facility] ENDOSCOPY;  Service:    • ENDOSCOPY AND COLONOSCOPY N/A 02/28/2016    EGD and Colonoscopy with Candy ink injection-Dr. Endy Chaney   • ENTEROSCOPY SMALL BOWEL N/A 02/11/2016    Normal esophagus, normal stomach, normal duodenum, portion of the jejunum normal-Dr. Taylor Pina   • ILIAC ARTERY - FEMORAL ARTERY BYPASS GRAFT Bilateral 2002   • ILIAC ARTERY STENT Bilateral 2001   • INSERTION AND REMOVAL PERITONEAL DIALYSIS CATHETER Right 02/29/2016    Exchange right jugular 16 cm [place] dialysis catheter-Dr. Jose Swann   • INSERTION PERITONEAL DIALYSIS CATHETER Left 03/01/2016    Ultrasound-guided access of the left jugular vein, 23 cm left jugular palindrome dialysis catheter placement-Dr. Jose Swann   • INSERTION PERITONEAL DIALYSIS CATHETER Right 02/18/2016    Right jugular Mahrkar catherer placement-Dr. Jose Swann   • JOINT REPLACEMENT Left    • THORACOSCOPY Left 4/18/2016    Procedure: LT THORACOSCOPY VIDEO ASSISTED WITH DECORDICATION;  Surgeon: Genaro Davila III, MD;  Location: [facility] OR;  Service:    • THROMBECTOMY Left 02/16/2016    Thombectomy of  left femoral graft, left leg arteriogram, left calf forward compartment fasciotomy-Dr. Jose Swann   • TOTAL HIP ARTHROPLASTY Left    • UPPER GASTROINTESTINAL ENDOSCOPY N/A 02/09/2016    Normal UGI-Dr. Ajay Parkinson   • UPPER GASTROINTESTINAL ENDOSCOPY N/A 11/28/2015    Small hiatal hernia, chronic gastritis: biopsied, non-bleeding angioectasias in the stomach: treated with argon plasma coagulation, multiple bleeding angioectasias in the dudenum: treated with argon plasma coagulation-Dr. Mykel Jaramillo   • VASCULAR SURGERY      MULTIPLE   • VENTRAL/INCISIONAL HERNIA REPAIR N/A 10/19/2017    Procedure: VENTRAL HERNIA REPAIR WITH MESH AND BILATERAL COMPONENT SEPARATION;  Surgeon: Genaro Perrin MD;  Location: [facility] OR;  Service:    • WISDOM TOOTH EXTRACTION         Home Medications:   Medications Prior to Admission   Medication Sig Dispense Refill Last Dose   • ALPRAZolam (XANAX) 1 MG tablet Take 1 tablet by mouth 3 (Three) Times a Day As Needed for Anxiety. 90 tablet 2    • amLODIPine (NORVASC) 10 MG tablet TAKE 1 TABLET BY MOUTH EVERY DAY 90 tablet 3    • aspirin 81 MG chewable tablet Chew 81 mg Every Night.      • buPROPion XL (WELLBUTRIN XL) 300 MG 24 hr tablet TAKE 1 TABLET BY MOUTH DAILY 90 tablet 3    • Cholecalciferol (VITAMIN D3) 125 MCG (5000 UT) tablet tablet Take 1 tablet by mouth Daily. (Patient taking differently: Take 5,000 Units by mouth 1 (One) Time Per Week.) 90 tablet 3    • clopidogrel (PLAVIX) 75 MG tablet TAKE 1 TABLET BY MOUTH DAILY AT NIGHT 90 tablet 3    • Coenzyme Q10 400 MG capsule Take 400 mg by mouth Every Evening.      • FERROUS SULFATE PO Take 65 mg by mouth Every Night.      • Fluticasone-Umeclidin-Vilant (TRELEGY ELLIPTA) 100-62.5-25 MCG/INH aerosol powder  Inhale 1 each Daily. 28 each 2    • gabapentin (NEURONTIN) 300 MG capsule Take 1 capsule by mouth 3 (Three) Times a Day. (Patient taking differently: Take 900 mg by mouth Every Night.) 270 capsule 1    • isosorbide  mononitrate (IMDUR) 30 MG 24 hr tablet TAKE 1 TABLET BY MOUTH DAILY EVERY MORNING 90 tablet 3    • latanoprost (XALATAN) 0.005 % ophthalmic solution Administer 1 drop to both eyes every night at bedtime.      • levothyroxine (SYNTHROID, LEVOTHROID) 125 MCG tablet TAKE 1 TABLET BY MOUTH EVERY DAY 90 tablet 1    • Multiple Vitamins-Minerals (MULTIVITAMIN ADULT PO) Take 1 tablet by mouth Daily.      • NIACIN PO Take 1 capsule by mouth Every Morning.      • omeprazole (priLOSEC) 20 MG capsule TAKE 1 CAPSULE BY MOUTH EVERY DAY IN THE MORNING 90 capsule 3    • simvastatin (ZOCOR) 40 MG tablet Take 0.5 tablets by mouth Every Night. 90 tablet 3    • Thiamine HCl (VITAMIN B-1 PO) Take 1 tablet by mouth Daily.      • traMADol (ULTRAM) 50 MG tablet Take 1 tablet by mouth 2 (Two) Times a Day. 180 tablet 0        Inpatient Medications:  Scheduled Meds:amLODIPine, 10 mg, Oral, Daily  aspirin, 81 mg, Oral, Nightly  atorvastatin, 20 mg, Oral, Daily  budesonide-formoterol, 2 puff, Inhalation, BID - RT   And  tiotropium bromide monohydrate, 2 puff, Inhalation, Daily - RT  buPROPion XL, 300 mg, Oral, Daily  vitamin D3, 5,000 Units, Oral, Daily  clopidogrel, 75 mg, Oral, Nightly  ferrous sulfate, 325 mg, Oral, Daily With Breakfast  gabapentin, 300 mg, Oral, TID  ipratropium-albuterol, 3 mL, Nebulization, 4x Daily - RT  isosorbide mononitrate, 30 mg, Oral, Q24H  latanoprost, 1 drop, Both Eyes, Nightly  levothyroxine, 125 mcg, Oral, Q AM  methylPREDNISolone sodium succinate, 40 mg, Intravenous, Q8H  multivitamin with minerals, 1 tablet, Oral, Daily  oseltamivir, 30 mg, Oral, Q12H  pantoprazole, 40 mg, Oral, QAM      Continuous Infusions:   PRN Meds:.•  acetaminophen  •  ALPRAZolam  •  ipratropium-albuterol  •  melatonin  •  nitroglycerin  •  ondansetron **OR** ondansetron  •  traMADol    Allergies:  Allergies   Allergen Reactions   • Augmentin [Amoxicillin-Pot Clavulanate] Hives and Rash       Past Social History:  Social History      Socioeconomic History   • Marital status:    Tobacco Use   • Smoking status: Former Smoker     Packs/day: 0.00     Years: 30.00     Pack years: 0.00     Types: Cigarettes     Quit date: 2021     Years since quittin.7   • Smokeless tobacco: Never Used   • Tobacco comment: caffeine - rarely   Substance and Sexual Activity   • Alcohol use: No   • Drug use: Never   • Sexual activity: Yes     Partners: Female     Birth control/protection: Post-menopausal       Past Family History:  Family History   Problem Relation Age of Onset   • Lung cancer Mother    • Cancer Mother    • Heart disease Father    • Diabetes Father    • Hypertension Father    • Malig Hyperthermia Neg Hx            Objective:   Temp:  [97.4 °F (36.3 °C)-97.6 °F (36.4 °C)] 97.4 °F (36.3 °C)  Heart Rate:  [55-79] 60  Resp:  [15-22] 20  BP: (118-154)/(73-79) 118/74  SpO2:  [95 %-100 %] 97 %  on  Flow (L/min):  [3-5] 5 Device (Oxygen Therapy): nasal cannula     Intake/Output Summary (Last 24 hours) at 2022 0433  Last data filed at 2022 2200  Gross per 24 hour   Intake 620 ml   Output --   Net 620 ml     Body mass index is 26.45 kg/m².      22   Weight: 78.9 kg (173 lb 15.1 oz)     Weight change:     Physical Exam:   Physical Exam   General:    No acute distress, alert and oriented x4, pleasant                   Head:    Normocephalic, atraumatic. External ears and nose are normal   Eyes:          Conjunctivae and sclerae normal, no icterus, PERRLA, no  discharge   Throat:   No oral lesions, no thrush, oral mucosa moist.    Neck:   Supple, trachea midline. No JVD, no cervical or supraclavicular lymphadenopathy    Lungs:     Normal chest on inspection,  decreased breath sounds specially expiratory sounds with minor prolongation of the expiratory phase, no obvious crackles.  No wheezing on quiet breathing    Heart:    Regular rhythm and normal rate.  No murmurs, gallops, or rubs noted.   Abdomen:     Soft, nontender,  nondistended, positive bowel sounds. No hepatospleenomegaly    Extremities:   No clubbing, cyanosis, or edema.  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    Skin:   No bleeding or rash. No bumps, good turgor pressure    Neuro:   Nonfocal.  Moves all extremities well. Strength 5/5 and symmetrical, no sensory deficit    Psychiatric:   Normal mood and affect.     Lab Review:   Results from last 7 days   Lab Units 04/04/22  1421   SODIUM mmol/L 138   POTASSIUM mmol/L 5.0   CHLORIDE mmol/L 101   CO2 mmol/L 25.9   BUN mg/dL 30*   CREATININE mg/dL 1.95*   GLUCOSE mg/dL 102*   CALCIUM mg/dL 9.4   AST (SGOT) U/L 15   ALT (SGPT) U/L 11   ALBUMIN g/dL 4.10     Results from last 7 days   Lab Units 04/04/22  1421   TROPONIN T ng/mL 0.018     Results from last 7 days   Lab Units 04/04/22  1421   WBC 10*3/mm3 8.63   HEMOGLOBIN g/dL 16.4   HEMATOCRIT % 47.2   PLATELETS 10*3/mm3 221   MCV fL 88.4   MCH pg 30.7   MCHC g/dL 34.7   RDW % 12.9   RDW-SD fl 40.8   MPV fL 9.3   NEUTROPHIL % % 83.2*   LYMPHOCYTE % % 9.2*   MONOCYTES % % 6.4   EOSINOPHIL % % 0.6   BASOPHIL % % 0.3   IMM GRAN % % 0.3   NEUTROS ABS 10*3/mm3 7.18*   LYMPHS ABS 10*3/mm3 0.79   MONOS ABS 10*3/mm3 0.55   EOS ABS 10*3/mm3 0.05   BASOS ABS 10*3/mm3 0.03   IMMATURE GRANS (ABS) 10*3/mm3 0.03   NRBC /100 WBC 0.0         Results from last 7 days   Lab Units 04/04/22  1421   MAGNESIUM mg/dL 2.1           Invalid input(s): LDLCALC  Results from last 7 days   Lab Units 04/04/22  1421   PROBNP pg/mL 726.0             Results from last 7 days   Lab Units 04/04/22  1421   PROCALCITONIN ng/mL 0.32*                     Results from last 7 days   Lab Units 04/04/22  1423   COVID19  Not Detected   ADENOVIRUS DETECTION BY PCR  Not Detected   CORONAVIRUS 229E  Not Detected   CORONAVIRUS HKU1  Not Detected   CORONAVIRUS NL63  Not Detected   CORONAVIRUS OC43  Not Detected   HUMAN  METAPNEUMOVIRUS  Not Detected   HUMAN RHINOVIRUS/ENTEROVIRUS  Not Detected   INFLUENZA B PCR  Not Detected   PARAINFLUENZA 1  Not Detected   PARAINFLUENZA VIRUS 2  Not Detected   PARAINFLUENZA VIRUS 3  Not Detected   PARAINFLUENZA VIRUS 4  Not Detected   BORDETELLA PERTUSSIS PCR  Not Detected   BORDETELLA PARAPERTUSSIS PCR  Not Detected   CHLAMYDOPHILA PNEUMONIAE PCR  Not Detected   MYCOPLAMA PNEUMO PCR  Not Detected   INFLUENZA A H3  Detected*   RSV, PCR  Not Detected             Imaging:  Imaging Results (Most Recent)     Procedure Component Value Units Date/Time    XR Chest 1 View [[id number]] Collected: 04/04/22 1441     Updated: 04/04/22 1450    Narrative:      XR CHEST 1 VW-     HISTORY: Male who is 66 years-old,  short of breath     TECHNIQUE: Frontal views of the chest     COMPARISON: 05/19/2021     FINDINGS: The heart size is normal. Aorta is calcified. Pulmonary  vasculature is unremarkable. No focal pulmonary consolidation, pleural  effusion, or pneumothorax. No acute osseous process.       Impression:      No focal pulmonary consolidation. Follow-up as clinical  indications persist.     This report was finalized on 4/4/2022 2:46 PM by Dr. Diogo Suarez M.D.             I personally viewed and interpreted the patient's imaging studies.    Assessment:     1. Acute exacerbation of COPD  2. Acute on chronic respiratory failure, patient is on oxygen mainly at night  3. Influenza A infection  4. Obstructive sleep apnea with sleep related hypoxemia  5. Pulmonary hypertension  6. History of systolic congestive heart failure    Plan:     Patient is doing better, continue the Tamiflu for the influenza A  Continue with the steroid, will transition to the every 12 hour dosing on the Solu-Medrol and consider switching to p.o. prednisone and continue taper tomorrow if he continues to improve  I do not see an obvious pneumonia on the x-ray patient has some chronic changes  No antibiotic indicated at this  point  Patient does not have his home CPAP, he is on the nasal cannula oxygen for now  Patient has oxygen at home but does not have ambulatory oxygen, will assess for his needs at the time of discharge    Discussed with the patient        Thank you for allowing me to participate in the care of Sim Agustin. Feel free to contact me directly with any further questions or concerns.    Henry Lux MD  [facility]   04/05/22  04:33 EDT    Dictated utilizing Dragon dictation    Electronically signed by Henry Lux MD at 04/05/22 0696

## 2022-04-06 NOTE — PROGRESS NOTES
DAILY PROGRESS NOTE  [facility]    Patient Identification:  Name: Sim Agustin  Age: 66 y.o.  Sex: male  :  1955  MRN: [id number]         Primary Care Physician: Seth Hester MD    Subjective:  Interval History: Feeling much better.  Still having some troubles breathing he noted throughout the night.  He denies any worsening of his shortness of breath.  Denies any chest pain.  Denies any nausea or vomiting.  Denies any confusion.  Also no issues with fever or chills    Objective: Sitting up on bedside.  Clinically looks much improved.  Conversational with no aspects of dyspnea and pleasant.  No family at bedside.  Case discussed in multidisciplinary rounds and patient doing all transfers independently    Scheduled Meds:albuterol, 2.5 mg, Nebulization, Q4H - RT  amLODIPine, 10 mg, Oral, Daily  aspirin, 81 mg, Oral, Nightly  atorvastatin, 20 mg, Oral, Daily  budesonide-formoterol, 2 puff, Inhalation, BID - RT   And  tiotropium bromide monohydrate, 2 puff, Inhalation, Daily - RT  buPROPion XL, 300 mg, Oral, Daily  vitamin D3, 5,000 Units, Oral, Daily  clopidogrel, 75 mg, Oral, Nightly  ferrous sulfate, 325 mg, Oral, Daily With Breakfast  gabapentin, 300 mg, Oral, TID  isosorbide mononitrate, 30 mg, Oral, Q24H  latanoprost, 1 drop, Both Eyes, Nightly  levothyroxine, 125 mcg, Oral, Q AM  methylPREDNISolone sodium succinate, 40 mg, Intravenous, Q12H  multivitamin with minerals, 1 tablet, Oral, Daily  oseltamivir, 30 mg, Oral, Q12H  pantoprazole, 40 mg, Oral, QAM      Continuous Infusions:     Vital signs in last 24 hours:  Temp:  [97.4 °F (36.3 °C)-98.2 °F (36.8 °C)] 98.2 °F (36.8 °C)  Heart Rate:  [61-86] 76  Resp:  [18] 18  BP: (131-169)/(75-94) 157/90    Intake/Output:    Intake/Output Summary (Last 24 hours) at 2022 1140  Last data filed at 2022 1000  Gross per 24 hour   Intake --   Output 3979 ml   Net -3979 ml       Exam:  /90 (BP Location: Right arm, Patient Position:  "Lying)   Pulse 76   Temp 98.2 °F (36.8 °C) (Oral)   Resp 18   Ht 172.7 cm (68\")   Wt 80.1 kg (176 lb 9.4 oz)   SpO2 97%   BMI 26.85 kg/m²     General Appearance:    Alert, cooperative, AAOx3                          Head:    Normocephalic, without obvious abnormality, atraumatic                           Eyes:    Conjunctivae/corneas clear, EOM's intact, both eyes                         Throat:   Oral mucosa pink and moist                           Neck:   Supple, symmetrical, trachea midline, no JVD                         Lungs:    Such poor air movement throughout both lungs that breath sounds are difficult to hear as he is quite tight still to auscultation bilaterally, respirations unlabored and no conversational dyspnea noted.  Patient was sitting up on side of bed with no signs of distress                 Chest Wall:    No tenderness or deformity                          Heart:    Regular rate and rhythm, S1 and S2 normal                 Abdomen:     Soft, nontender, bowel sounds active                 Extremities: Left old amputation, no cyanosis with trace in right lower extremity edema                  Neurologic:   CNII-XII intact, moving all    Data Review:  Labs in chart were reviewed.    Assessment:  Active Hospital Problems    Diagnosis  POA   • **Influenza A [J10.1]  Unknown   • Acute respiratory failure with hypoxia ([facility]) [J96.01]  Unknown   • Hyperkalemia [E87.5]  Unknown   • Acute exacerbation of chronic obstructive pulmonary disease (COPD) ([facility]) [J44.1]  Yes   • Hyperlipidemia [E78.5]  Yes   • Hypothyroidism [E03.9]  Yes   • PRISCILLA treated with BiPAP [G47.33]  Yes   • PVD (peripheral vascular disease) ([facility]) [I73.9]  Yes   • Essential hypertension [I10]  Yes      Resolved Hospital Problems   No resolved problems to display.       Plan:    Influenza A -continue Tamiflu/supportive care     Acute exacerbation COPD -med changes noted per pulmonary -patient is still quite tight and minimal air " movement noted and I think he would benefit from additional day in the hospital     Wean O2 to room air -normally utilizes CPAP at night -final O2 recommendations pending pulmonary.  Only on 2 L at this time     Hyperkalemia with reported CKD but I am not sure of baseline              -Appreciate renal assistance.  K therapeutic.  They are giving Lasix and stopping fluids and checking a chest x-ray at this juncture    Hypothyroidism on levothyroxine     PAD/CAD on Plavix/ASA -SCDs for prophylaxis        Disposition -hopeful for tomorrow.  Pulmonology please address further O2 needs or orders post discharge    Mati Rubio MD  4/6/2022  11:40 EDT

## 2022-04-07 LAB
ANION GAP SERPL CALCULATED.3IONS-SCNC: 11.3 MMOL/L (ref 5–15)
BASOPHILS # BLD AUTO: 0.01 10*3/MM3 (ref 0–0.2)
BASOPHILS NFR BLD AUTO: 0.1 % (ref 0–1.5)
BUN SERPL-MCNC: 32 MG/DL (ref 8–23)
BUN/CREAT SERPL: 22.4 (ref 7–25)
CALCIUM SPEC-SCNC: 9.5 MG/DL (ref 8.6–10.5)
CHLORIDE SERPL-SCNC: 98 MMOL/L (ref 98–107)
CO2 SERPL-SCNC: 30.7 MMOL/L (ref 22–29)
CREAT SERPL-MCNC: 1.43 MG/DL (ref 0.76–1.27)
DEPRECATED RDW RBC AUTO: 41.7 FL (ref 37–54)
EGFRCR SERPLBLD CKD-EPI 2021: 54 ML/MIN/1.73
EOSINOPHIL # BLD AUTO: 0 10*3/MM3 (ref 0–0.4)
EOSINOPHIL NFR BLD AUTO: 0 % (ref 0.3–6.2)
ERYTHROCYTE [DISTWIDTH] IN BLOOD BY AUTOMATED COUNT: 12.6 % (ref 12.3–15.4)
GLUCOSE SERPL-MCNC: 120 MG/DL (ref 65–99)
HCT VFR BLD AUTO: 45.8 % (ref 37.5–51)
HGB BLD-MCNC: 15.4 G/DL (ref 13–17.7)
IMM GRANULOCYTES # BLD AUTO: 0.04 10*3/MM3 (ref 0–0.05)
IMM GRANULOCYTES NFR BLD AUTO: 0.4 % (ref 0–0.5)
LYMPHOCYTES # BLD AUTO: 1.05 10*3/MM3 (ref 0.7–3.1)
LYMPHOCYTES NFR BLD AUTO: 11.1 % (ref 19.6–45.3)
MCH RBC QN AUTO: 30.1 PG (ref 26.6–33)
MCHC RBC AUTO-ENTMCNC: 33.6 G/DL (ref 31.5–35.7)
MCV RBC AUTO: 89.6 FL (ref 79–97)
MONOCYTES # BLD AUTO: 0.4 10*3/MM3 (ref 0.1–0.9)
MONOCYTES NFR BLD AUTO: 4.2 % (ref 5–12)
NEUTROPHILS NFR BLD AUTO: 7.98 10*3/MM3 (ref 1.7–7)
NEUTROPHILS NFR BLD AUTO: 84.2 % (ref 42.7–76)
NRBC BLD AUTO-RTO: 0 /100 WBC (ref 0–0.2)
PLATELET # BLD AUTO: 251 10*3/MM3 (ref 140–450)
PMV BLD AUTO: 9.2 FL (ref 6–12)
POTASSIUM SERPL-SCNC: 4.4 MMOL/L (ref 3.5–5.2)
RBC # BLD AUTO: 5.11 10*6/MM3 (ref 4.14–5.8)
SODIUM SERPL-SCNC: 140 MMOL/L (ref 136–145)
WBC NRBC COR # BLD: 9.48 10*3/MM3 (ref 3.4–10.8)

## 2022-04-07 PROCEDURE — 94799 UNLISTED PULMONARY SVC/PX: CPT

## 2022-04-07 PROCEDURE — 85025 COMPLETE CBC W/AUTO DIFF WBC: CPT | Performed by: INTERNAL MEDICINE

## 2022-04-07 PROCEDURE — 25010000002 FUROSEMIDE PER 20 MG: Performed by: INTERNAL MEDICINE

## 2022-04-07 PROCEDURE — 94664 DEMO&/EVAL PT USE INHALER: CPT

## 2022-04-07 PROCEDURE — 25010000002 METHYLPREDNISOLONE PER 40 MG: Performed by: INTERNAL MEDICINE

## 2022-04-07 PROCEDURE — 94761 N-INVAS EAR/PLS OXIMETRY MLT: CPT

## 2022-04-07 PROCEDURE — 25010000002 METHYLPREDNISOLONE PER 125 MG: Performed by: HOSPITALIST

## 2022-04-07 PROCEDURE — 80048 BASIC METABOLIC PNL TOTAL CA: CPT | Performed by: INTERNAL MEDICINE

## 2022-04-07 RX ORDER — ALBUTEROL SULFATE 2.5 MG/3ML
2.5 SOLUTION RESPIRATORY (INHALATION)
Status: DISCONTINUED | OUTPATIENT
Start: 2022-04-07 | End: 2022-04-10 | Stop reason: HOSPADM

## 2022-04-07 RX ORDER — FUROSEMIDE 10 MG/ML
40 INJECTION INTRAMUSCULAR; INTRAVENOUS ONCE
Status: COMPLETED | OUTPATIENT
Start: 2022-04-07 | End: 2022-04-07

## 2022-04-07 RX ORDER — ARFORMOTEROL TARTRATE 15 UG/2ML
15 SOLUTION RESPIRATORY (INHALATION)
Status: DISCONTINUED | OUTPATIENT
Start: 2022-04-07 | End: 2022-04-10 | Stop reason: HOSPADM

## 2022-04-07 RX ORDER — BUDESONIDE 1 MG/2ML
1 INHALANT ORAL
Status: DISCONTINUED | OUTPATIENT
Start: 2022-04-07 | End: 2022-04-10 | Stop reason: HOSPADM

## 2022-04-07 RX ORDER — GUAIFENESIN 600 MG/1
600 TABLET, EXTENDED RELEASE ORAL EVERY 12 HOURS SCHEDULED
Status: DISCONTINUED | OUTPATIENT
Start: 2022-04-07 | End: 2022-04-10 | Stop reason: HOSPADM

## 2022-04-07 RX ORDER — METHYLPREDNISOLONE SODIUM SUCCINATE 125 MG/2ML
80 INJECTION, POWDER, LYOPHILIZED, FOR SOLUTION INTRAMUSCULAR; INTRAVENOUS EVERY 8 HOURS
Status: DISCONTINUED | OUTPATIENT
Start: 2022-04-07 | End: 2022-04-08

## 2022-04-07 RX ADMIN — ASPIRIN 81 MG: 81 TABLET, CHEWABLE ORAL at 21:49

## 2022-04-07 RX ADMIN — BUPROPION HYDROCHLORIDE 300 MG: 300 TABLET, EXTENDED RELEASE ORAL at 08:55

## 2022-04-07 RX ADMIN — BUDESONIDE AND FORMOTEROL FUMARATE DIHYDRATE 2 PUFF: 160; 4.5 AEROSOL RESPIRATORY (INHALATION) at 06:44

## 2022-04-07 RX ADMIN — ATORVASTATIN CALCIUM 20 MG: 20 TABLET, FILM COATED ORAL at 08:53

## 2022-04-07 RX ADMIN — ALBUTEROL SULFATE 2.5 MG: 2.5 SOLUTION RESPIRATORY (INHALATION) at 10:42

## 2022-04-07 RX ADMIN — METHYLPREDNISOLONE SODIUM SUCCINATE 80 MG: 125 INJECTION, POWDER, FOR SOLUTION INTRAMUSCULAR; INTRAVENOUS at 13:03

## 2022-04-07 RX ADMIN — Medication 3 MG: at 22:01

## 2022-04-07 RX ADMIN — MULTIPLE VITAMINS W/ MINERALS TAB 1 TABLET: TAB at 08:55

## 2022-04-07 RX ADMIN — GUAIFENESIN 600 MG: 600 TABLET, EXTENDED RELEASE ORAL at 21:58

## 2022-04-07 RX ADMIN — OSELTAMIVIR PHOSPHATE 75 MG: 75 CAPSULE ORAL at 08:54

## 2022-04-07 RX ADMIN — ALPRAZOLAM 1 MG: 0.5 TABLET ORAL at 05:41

## 2022-04-07 RX ADMIN — FUROSEMIDE 40 MG: 10 INJECTION, SOLUTION INTRAMUSCULAR; INTRAVENOUS at 17:45

## 2022-04-07 RX ADMIN — GABAPENTIN 300 MG: 300 CAPSULE ORAL at 16:11

## 2022-04-07 RX ADMIN — GABAPENTIN 300 MG: 300 CAPSULE ORAL at 08:53

## 2022-04-07 RX ADMIN — ISOSORBIDE MONONITRATE 30 MG: 30 TABLET ORAL at 08:55

## 2022-04-07 RX ADMIN — ARFORMOTEROL TARTRATE 15 MCG: 15 SOLUTION RESPIRATORY (INHALATION) at 19:20

## 2022-04-07 RX ADMIN — LATANOPROST 1 DROP: 50 SOLUTION OPHTHALMIC at 22:09

## 2022-04-07 RX ADMIN — TIOTROPIUM BROMIDE INHALATION SPRAY 2 PUFF: 3.12 SPRAY, METERED RESPIRATORY (INHALATION) at 06:45

## 2022-04-07 RX ADMIN — ALBUTEROL SULFATE 2.5 MG: 2.5 SOLUTION RESPIRATORY (INHALATION) at 23:02

## 2022-04-07 RX ADMIN — METHYLPREDNISOLONE SODIUM SUCCINATE 40 MG: 40 INJECTION, POWDER, FOR SOLUTION INTRAMUSCULAR; INTRAVENOUS at 05:34

## 2022-04-07 RX ADMIN — GABAPENTIN 300 MG: 300 CAPSULE ORAL at 21:57

## 2022-04-07 RX ADMIN — AMLODIPINE BESYLATE 10 MG: 10 TABLET ORAL at 08:53

## 2022-04-07 RX ADMIN — ALPRAZOLAM 1 MG: 0.5 TABLET ORAL at 14:38

## 2022-04-07 RX ADMIN — CLOPIDOGREL 75 MG: 75 TABLET, FILM COATED ORAL at 21:58

## 2022-04-07 RX ADMIN — GUAIFENESIN 600 MG: 600 TABLET, EXTENDED RELEASE ORAL at 11:27

## 2022-04-07 RX ADMIN — LEVOTHYROXINE SODIUM 125 MCG: 0.12 TABLET ORAL at 05:34

## 2022-04-07 RX ADMIN — FERROUS SULFATE TAB 325 MG (65 MG ELEMENTAL FE) 325 MG: 325 (65 FE) TAB at 08:53

## 2022-04-07 RX ADMIN — BUDESONIDE 1 MG: 1 INHALANT ORAL at 19:20

## 2022-04-07 RX ADMIN — METHYLPREDNISOLONE SODIUM SUCCINATE 80 MG: 125 INJECTION, POWDER, FOR SOLUTION INTRAMUSCULAR; INTRAVENOUS at 22:04

## 2022-04-07 RX ADMIN — PANTOPRAZOLE SODIUM 40 MG: 40 TABLET, DELAYED RELEASE ORAL at 08:55

## 2022-04-07 RX ADMIN — OSELTAMIVIR PHOSPHATE 75 MG: 75 CAPSULE ORAL at 21:58

## 2022-04-07 RX ADMIN — ALBUTEROL SULFATE 2.5 MG: 2.5 SOLUTION RESPIRATORY (INHALATION) at 14:33

## 2022-04-07 RX ADMIN — ALPRAZOLAM 1 MG: 0.5 TABLET ORAL at 21:58

## 2022-04-07 RX ADMIN — Medication 5000 UNITS: at 08:54

## 2022-04-07 NOTE — PROGRESS NOTES
DAILY PROGRESS NOTE  [facility]    Patient Identification:  Name: Sim Agustin  Age: 66 y.o.  Sex: male  :  1955  MRN: [id number]         Primary Care Physician: Seth Hester MD    Subjective:  Interval History: Just not breathing any better.  He states he has not felt that he is showing improvement at this point.  He is still getting quite winded with going to the bathroom.  He has significant pursed lip breathing and he says that is not his normal baseline.  Otherwise no issues with chest pain.  He is urinating without issue denies any confusion nausea or vomiting    Objective: Clinical plateau.  Very pleasant very conversational though he does exhibit conversational dyspnea.  No family at bedside    Scheduled Meds:albuterol, 2.5 mg, Nebulization, 4x Daily - RT  amLODIPine, 10 mg, Oral, Daily  arformoterol, 15 mcg, Nebulization, BID - RT  aspirin, 81 mg, Oral, Nightly  atorvastatin, 20 mg, Oral, Daily  budesonide, 1 mg, Nebulization, BID - RT  buPROPion XL, 300 mg, Oral, Daily  vitamin D3, 5,000 Units, Oral, Daily  clopidogrel, 75 mg, Oral, Nightly  ferrous sulfate, 325 mg, Oral, Daily With Breakfast  gabapentin, 300 mg, Oral, TID  isosorbide mononitrate, 30 mg, Oral, Q24H  latanoprost, 1 drop, Both Eyes, Nightly  levothyroxine, 125 mcg, Oral, Q AM  methylPREDNISolone sodium succinate, 80 mg, Intravenous, Q8H  multivitamin with minerals, 1 tablet, Oral, Daily  oseltamivir, 75 mg, Oral, Q12H  pantoprazole, 40 mg, Oral, QAM  tiotropium bromide monohydrate, 2 puff, Inhalation, Daily - RT      Continuous Infusions:     Vital signs in last 24 hours:  Temp:  [97.8 °F (36.6 °C)-98.5 °F (36.9 °C)] 97.8 °F (36.6 °C)  Heart Rate:  [60-86] 71  Resp:  [16-18] 18  BP: (144-175)/() 175/104    Intake/Output:    Intake/Output Summary (Last 24 hours) at 2022 1026  Last data filed at 2022 0859  Gross per 24 hour   Intake 740 ml   Output 1100 ml   Net -360 ml       Exam:  BP (!) 175/104  "(BP Location: Right arm, Patient Position: Lying)   Pulse 71   Temp 97.8 °F (36.6 °C) (Oral)   Resp 18   Ht 172.7 cm (68\")   Wt 82.6 kg (182 lb 1.6 oz)   SpO2 91%   BMI 27.69 kg/m²     General Appearance:    Alert, cooperative, AAOx3                           Eyes:    Conjunctivae/corneas clear, EOM's intact, both eyes                         Throat:   Oral mucosa pink and moist                           Neck:   No JVD                         Lungs:    Still just not moving hardly any air from mid lung down to auscultation bilaterally, respirations mildly labored with pulse lip breathing with minimal exertion of walking from bathroom to bed                 Chest Wall:    No tenderness or deformity                          Heart:    Regular rate and rhythm, S1 and S2 normal                  Abdomen:     Soft, nontender, bowel sounds active                  Extremities: 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    Assessment:  Active Hospital Problems    Diagnosis  POA   • **Influenza A [J10.1]  Unknown   • Acute respiratory failure with hypoxia ([facility]) [J96.01]  Unknown   • Hyperkalemia [E87.5]  Unknown   • Acute exacerbation of chronic obstructive pulmonary disease (COPD) ([facility]) [J44.1]  Yes   • Hyperlipidemia [E78.5]  Yes   • Hypothyroidism [E03.9]  Yes   • PRISCILLA treated with BiPAP [G47.33]  Yes   • PVD (peripheral vascular disease) ([facility]) [I73.9]  Yes   • Essential hypertension [I10]  Yes      Resolved Hospital Problems   No resolved problems to display.       Plan:    Patient hitting a clinical plateau and just not improving   -Will maximize pulmonary function as he is very tight on exam with no air movement from mid lung down   -Increase Solu-Medrol to 80 mg and increase frequency to 3 times daily for the next 24 hours   -Expand nebulizers and DC Symbicort   -Wean O2 accordingly   -Chest x-ray without focal " pneumonia      CKD 3 a stable -further fluid balance per renal    HTN labile -multiple numbers showing elevated systolic as well as diastolics greater than 100.  I question the validity of these blood pressure readings.  I checked one personally at bedside and got 140s over 90s.  Cuff size could probably be enlarged.  No further changes to medications.  Probable element of influenced by steroids as well    Hypothyroidism on levothyroxine     PAD/CAD on Plavix/ASA -SCDs for prophylaxis         Disposition -was hopeful for today but patient needs more clinical improvement before he will be deemed safe for discharge    Mati Rubio MD  4/7/2022  10:26 EDT

## 2022-04-07 NOTE — PROGRESS NOTES
[facility] Progress Note      Patient Name: Sim Agustin  : 1955  MRN: [id number]  Primary Care Physician:  Seth Hester MD  Date of admission: 2022    Subjective     Interval History:   Follow up  ARLYN on CKD3.  On 3 L nasal 02.  uop excellent. Feeling much better than this am. On mininebs and steroids.  Moving air better.  Eating, but wants lighter options.  Bowels moving.     Review of Systems:    Breathing better. Eating fair. A little edema RLE    Objective     Vitals:   Temp:  [97.8 °F (36.6 °C)-98.3 °F (36.8 °C)] 97.8 °F (36.6 °C)  Heart Rate:  [60-75] 66  Resp:  [16-18] 18  BP: (139-175)/() 139/80  Flow (L/min):  [2-3] 3    Intake/Output Summary (Last 24 hours) at 2022 1603  Last data filed at 2022 1330  Gross per 24 hour   Intake 1200 ml   Output 2775 ml   Net -1575 ml       Physical Exam:    General Appearance: no acute distress. Nasal 0-2. Sitting up in chair.   Neck: supple, no JVD  Heart: RRR, no s3 or rub  Lungs coarse bs., decreased bs bases.  Fair air movement.   Not wheezing.   Abdomen: soft, nontender, nondistended. + bs  Extremities: LLE high AKA, trace RLE edema.  Skin no rash.     Scheduled Meds:     albuterol, 2.5 mg, Nebulization, 4x Daily - RT  amLODIPine, 10 mg, Oral, Daily  arformoterol, 15 mcg, Nebulization, BID - RT  aspirin, 81 mg, Oral, Nightly  atorvastatin, 20 mg, Oral, Daily  budesonide, 1 mg, Nebulization, BID - RT  buPROPion XL, 300 mg, Oral, Daily  vitamin D3, 5,000 Units, Oral, Daily  clopidogrel, 75 mg, Oral, Nightly  ferrous sulfate, 325 mg, Oral, Daily With Breakfast  gabapentin, 300 mg, Oral, TID  guaiFENesin, 600 mg, Oral, Q12H  isosorbide mononitrate, 30 mg, Oral, Q24H  latanoprost, 1 drop, Both Eyes, Nightly  levothyroxine, 125 mcg, Oral, Q AM  methylPREDNISolone sodium succinate, 80 mg, Intravenous, Q8H  multivitamin with minerals, 1 tablet, Oral, Daily  oseltamivir, 75 mg, Oral, Q12H  pantoprazole, 40 mg, Oral,  QAM  tiotropium bromide monohydrate, 2 puff, Inhalation, Daily - RT      IV Meds:        Results Reviewed:   I have personally reviewed the results from the time of this admission to 4/7/2022 16:03 EDT     Results from last 7 days   Lab Units 04/07/22  0505 04/06/22  0409 04/05/22  1655 04/05/22  0605 04/04/22  1421   SODIUM mmol/L 140 139  --  135* 138   POTASSIUM mmol/L 4.4 4.1 4.6 5.8* 5.0   CHLORIDE mmol/L 98 103  --  100 101   CO2 mmol/L 30.7* 24.5  --  24.4 25.9   BUN mg/dL 32* 30*  --  41* 30*   CREATININE mg/dL 1.43* 1.37*  --  2.28* 1.95*   CALCIUM mg/dL 9.5 8.7  --  9.0 9.4   BILIRUBIN mg/dL  --   --   --   --  0.3   ALK PHOS U/L  --   --   --   --  72   ALT (SGPT) U/L  --   --   --   --  11   AST (SGOT) U/L  --   --   --   --  15   GLUCOSE mg/dL 120* 128*  --  182* 102*     Estimated Creatinine Clearance: 53.3 mL/min (A) (by C-G formula based on SCr of 1.43 mg/dL (H)).  Results from last 7 days   Lab Units 04/06/22  0409 04/04/22  1421   MAGNESIUM mg/dL  --  2.1   PHOSPHORUS mg/dL 2.9  --          Results from last 7 days   Lab Units 04/07/22  0505 04/05/22  0605 04/04/22  1421   WBC 10*3/mm3 9.48 4.77 8.63   HEMOGLOBIN g/dL 15.4 14.8 16.4   PLATELETS 10*3/mm3 251 218 221           Assessment / Plan     ASSESSMENT:  1. Non olig ARLYN on CKD 3 ( renovascular disease)  with baseline creatinine 1.3. Initial pre-renal azotemia.  Improved with IVF,  Now looks wet.   Continue IV diuretic today. Potassium controlled.   2.  Influenza A & COPD exac - on tamiflu, steroids.  3. HTN - not controlled.  Diurese today.  Likely element of steroid effect, as well.   4. PAD s/p LLE AKA    PLAN:  1. IV Diuretic again today.   2 . Monitor chemistries.       Colleen Tesfaye MD  04/07/22  16:03 EDT    [facility]  [phone]

## 2022-04-07 NOTE — PLAN OF CARE
Problem: Adult Inpatient Plan of Care  Goal: Plan of Care Review  Outcome: Ongoing, Progressing  Flowsheets (Taken 4/7/2022 1553)  Plan of Care Reviewed With: patient  Outcome Evaluation: Patient's blood pressure decreases each morning post medication administration of blood pressure meds. Additionally, he is on 3 liters of oxygen with a productive cough that is tan mucoid in nature and he is able to increase oxygen saturation with deep breathing and sitting oob to chair. Lung sounds at bases are coarse.  Goal: Patient-Specific Goal (Individualized)  Outcome: Ongoing, Progressing  Goal: Optimal Comfort and Wellbeing  Outcome: Ongoing, Progressing  Intervention: Provide Person-Centered Care  Recent Flowsheet Documentation  Taken 4/7/2022 1309 by Hortensia Jones RN  Trust Relationship/Rapport:   care explained   choices provided   emotional support provided   empathic listening provided   reassurance provided   questions encouraged   questions answered   thoughts/feelings acknowledged  Taken 4/7/2022 0859 by Hortensia Jones RN  Trust Relationship/Rapport:   care explained   choices provided   emotional support provided   questions answered   empathic listening provided   questions encouraged   reassurance provided   thoughts/feelings acknowledged  Goal: Readiness for Transition of Care  Outcome: Ongoing, Progressing   Goal Outcome Evaluation:  Plan of Care Reviewed With: patient        Progress: improving  Outcome Evaluation: Patient's blood pressure decreases each morning post medication administration of blood pressure meds. Additionally, he is on 3 liters of oxygen with a productive cough that is tan mucoid in nature and he is able to increase oxygen saturation with deep breathing and sitting oob to chair. Lung sounds at bases are coarse.

## 2022-04-08 LAB
ALBUMIN SERPL-MCNC: 4.1 G/DL (ref 3.5–5.2)
ANION GAP SERPL CALCULATED.3IONS-SCNC: 15.6 MMOL/L (ref 5–15)
BUN SERPL-MCNC: 38 MG/DL (ref 8–23)
BUN/CREAT SERPL: 25.3 (ref 7–25)
CALCIUM SPEC-SCNC: 9.9 MG/DL (ref 8.6–10.5)
CHLORIDE SERPL-SCNC: 94 MMOL/L (ref 98–107)
CO2 SERPL-SCNC: 27.4 MMOL/L (ref 22–29)
CREAT SERPL-MCNC: 1.5 MG/DL (ref 0.76–1.27)
EGFRCR SERPLBLD CKD-EPI 2021: 51 ML/MIN/1.73
GLUCOSE SERPL-MCNC: 126 MG/DL (ref 65–99)
PHOSPHATE SERPL-MCNC: 4.5 MG/DL (ref 2.5–4.5)
POTASSIUM SERPL-SCNC: 4.9 MMOL/L (ref 3.5–5.2)
SODIUM SERPL-SCNC: 137 MMOL/L (ref 136–145)

## 2022-04-08 PROCEDURE — 25010000002 METHYLPREDNISOLONE PER 125 MG: Performed by: HOSPITALIST

## 2022-04-08 PROCEDURE — 94761 N-INVAS EAR/PLS OXIMETRY MLT: CPT

## 2022-04-08 PROCEDURE — 80069 RENAL FUNCTION PANEL: CPT | Performed by: INTERNAL MEDICINE

## 2022-04-08 PROCEDURE — 94799 UNLISTED PULMONARY SVC/PX: CPT

## 2022-04-08 PROCEDURE — 94664 DEMO&/EVAL PT USE INHALER: CPT

## 2022-04-08 RX ORDER — METHYLPREDNISOLONE SODIUM SUCCINATE 125 MG/2ML
80 INJECTION, POWDER, LYOPHILIZED, FOR SOLUTION INTRAMUSCULAR; INTRAVENOUS EVERY 12 HOURS
Status: DISCONTINUED | OUTPATIENT
Start: 2022-04-08 | End: 2022-04-09

## 2022-04-08 RX ORDER — TORSEMIDE 20 MG/1
20 TABLET ORAL DAILY
Status: DISCONTINUED | OUTPATIENT
Start: 2022-04-08 | End: 2022-04-10 | Stop reason: HOSPADM

## 2022-04-08 RX ADMIN — OSELTAMIVIR PHOSPHATE 75 MG: 75 CAPSULE ORAL at 20:38

## 2022-04-08 RX ADMIN — ALBUTEROL SULFATE 2.5 MG: 2.5 SOLUTION RESPIRATORY (INHALATION) at 14:45

## 2022-04-08 RX ADMIN — GUAIFENESIN 600 MG: 600 TABLET, EXTENDED RELEASE ORAL at 20:38

## 2022-04-08 RX ADMIN — LEVOTHYROXINE SODIUM 125 MCG: 0.12 TABLET ORAL at 06:20

## 2022-04-08 RX ADMIN — ALPRAZOLAM 1 MG: 0.5 TABLET ORAL at 20:37

## 2022-04-08 RX ADMIN — AMLODIPINE BESYLATE 10 MG: 10 TABLET ORAL at 09:08

## 2022-04-08 RX ADMIN — ISOSORBIDE MONONITRATE 30 MG: 30 TABLET ORAL at 09:08

## 2022-04-08 RX ADMIN — TIOTROPIUM BROMIDE INHALATION SPRAY 2 PUFF: 3.12 SPRAY, METERED RESPIRATORY (INHALATION) at 06:38

## 2022-04-08 RX ADMIN — Medication 3 MG: at 20:38

## 2022-04-08 RX ADMIN — FERROUS SULFATE TAB 325 MG (65 MG ELEMENTAL FE) 325 MG: 325 (65 FE) TAB at 09:07

## 2022-04-08 RX ADMIN — ATORVASTATIN CALCIUM 20 MG: 20 TABLET, FILM COATED ORAL at 09:08

## 2022-04-08 RX ADMIN — GABAPENTIN 300 MG: 300 CAPSULE ORAL at 16:48

## 2022-04-08 RX ADMIN — MULTIPLE VITAMINS W/ MINERALS TAB 1 TABLET: TAB at 09:07

## 2022-04-08 RX ADMIN — GABAPENTIN 300 MG: 300 CAPSULE ORAL at 09:08

## 2022-04-08 RX ADMIN — ARFORMOTEROL TARTRATE 15 MCG: 15 SOLUTION RESPIRATORY (INHALATION) at 06:38

## 2022-04-08 RX ADMIN — CLOPIDOGREL 75 MG: 75 TABLET, FILM COATED ORAL at 20:38

## 2022-04-08 RX ADMIN — ALBUTEROL SULFATE 2.5 MG: 2.5 SOLUTION RESPIRATORY (INHALATION) at 10:29

## 2022-04-08 RX ADMIN — TORSEMIDE 20 MG: 20 TABLET ORAL at 16:48

## 2022-04-08 RX ADMIN — PANTOPRAZOLE SODIUM 40 MG: 40 TABLET, DELAYED RELEASE ORAL at 06:20

## 2022-04-08 RX ADMIN — ALPRAZOLAM 1 MG: 0.5 TABLET ORAL at 12:22

## 2022-04-08 RX ADMIN — GABAPENTIN 300 MG: 300 CAPSULE ORAL at 20:38

## 2022-04-08 RX ADMIN — LATANOPROST 1 DROP: 50 SOLUTION OPHTHALMIC at 20:42

## 2022-04-08 RX ADMIN — OSELTAMIVIR PHOSPHATE 75 MG: 75 CAPSULE ORAL at 09:08

## 2022-04-08 RX ADMIN — TRAMADOL HYDROCHLORIDE 50 MG: 50 TABLET, COATED ORAL at 16:48

## 2022-04-08 RX ADMIN — ARFORMOTEROL TARTRATE 15 MCG: 15 SOLUTION RESPIRATORY (INHALATION) at 19:23

## 2022-04-08 RX ADMIN — Medication 5000 UNITS: at 09:07

## 2022-04-08 RX ADMIN — ALBUTEROL SULFATE 2.5 MG: 2.5 SOLUTION RESPIRATORY (INHALATION) at 23:26

## 2022-04-08 RX ADMIN — GUAIFENESIN 600 MG: 600 TABLET, EXTENDED RELEASE ORAL at 09:08

## 2022-04-08 RX ADMIN — METHYLPREDNISOLONE SODIUM SUCCINATE 80 MG: 125 INJECTION, POWDER, FOR SOLUTION INTRAMUSCULAR; INTRAVENOUS at 20:12

## 2022-04-08 RX ADMIN — ASPIRIN 81 MG: 81 TABLET, CHEWABLE ORAL at 20:38

## 2022-04-08 RX ADMIN — BUDESONIDE 1 MG: 1 INHALANT ORAL at 19:23

## 2022-04-08 RX ADMIN — BUPROPION HYDROCHLORIDE 300 MG: 300 TABLET, EXTENDED RELEASE ORAL at 09:08

## 2022-04-08 RX ADMIN — BUDESONIDE 1 MG: 1 INHALANT ORAL at 06:38

## 2022-04-08 RX ADMIN — METHYLPREDNISOLONE SODIUM SUCCINATE 80 MG: 125 INJECTION, POWDER, FOR SOLUTION INTRAMUSCULAR; INTRAVENOUS at 06:27

## 2022-04-08 NOTE — PLAN OF CARE
Goal Outcome Evaluation:  Plan of Care Reviewed With: patient        Progress: improving  Outcome Evaluation: VSS, up ad tamiko, in droplet for flu a, on 3L O2, SOA with excertion, no c/o of pain or nausea, will CTM

## 2022-04-08 NOTE — SIGNIFICANT NOTE
04/08/22 0913   OTHER   Discipline physical therapist   Rehab Time/Intention   Session Not Performed other (see comments)  (Pt reports he is getting up ind and denies further PT needs. Also chart indicates up ad tamiko. PT to s/o.)

## 2022-04-08 NOTE — PROGRESS NOTES
DAILY PROGRESS NOTE  [facility]    Patient Identification:  Name: Sim Agustin  Age: 66 y.o.  Sex: male  :  1955  MRN: [id number]         Primary Care Physician: Seth Hester MD    Subjective:  Interval History: Feeling much better today with the above med changes and increase in steroids.  He states he is breathing easier both at rest as well as getting up and walking to the bathroom much easier.  He does not feel that he is huffing and puffing nearly as badly.  He denies any chest pain confusion.  He is utilizing Xanax as needed but does not feel all that jittery from the steroids    Objective: Clinically much better.  Much less pursed lip breathing and conversational dyspnea.  RT at bedside    Scheduled Meds:albuterol, 2.5 mg, Nebulization, 4x Daily - RT  amLODIPine, 10 mg, Oral, Daily  arformoterol, 15 mcg, Nebulization, BID - RT  aspirin, 81 mg, Oral, Nightly  atorvastatin, 20 mg, Oral, Daily  budesonide, 1 mg, Nebulization, BID - RT  buPROPion XL, 300 mg, Oral, Daily  vitamin D3, 5,000 Units, Oral, Daily  clopidogrel, 75 mg, Oral, Nightly  ferrous sulfate, 325 mg, Oral, Daily With Breakfast  gabapentin, 300 mg, Oral, TID  guaiFENesin, 600 mg, Oral, Q12H  isosorbide mononitrate, 30 mg, Oral, Q24H  latanoprost, 1 drop, Both Eyes, Nightly  levothyroxine, 125 mcg, Oral, Q AM  methylPREDNISolone sodium succinate, 80 mg, Intravenous, Q12H  multivitamin with minerals, 1 tablet, Oral, Daily  oseltamivir, 75 mg, Oral, Q12H  pantoprazole, 40 mg, Oral, QAM  tiotropium bromide monohydrate, 2 puff, Inhalation, Daily - RT      Continuous Infusions:     Vital signs in last 24 hours:  Temp:  [97.6 °F (36.4 °C)-97.8 °F (36.6 °C)] 97.8 °F (36.6 °C)  Heart Rate:  [57-75] 74  Resp:  [18] 18  BP: (134-149)/(78-91) 149/78    Intake/Output:    Intake/Output Summary (Last 24 hours) at 2022 1058  Last data filed at 2022 1730  Gross per 24 hour   Intake 680 ml   Output 400 ml   Net 280 ml  "      Exam:  /78 (BP Location: Right arm, Patient Position: Sitting)   Pulse 74   Temp 97.8 °F (36.6 °C) (Oral)   Resp 18   Ht 172.7 cm (68\")   Wt 78.9 kg (173 lb 15.1 oz)   SpO2 92%   BMI 26.45 kg/m²     General Appearance:    Alert, cooperative, AAOx3                         Throat:   Oral mucosa pink and moist                           Neck:   No JVD                         Lungs:    Finally moving air from mid lung down with audible wheeze to auscultation bilaterally, respirations much less labored                 Chest Wall:    No tenderness or deformity                          Heart:    Regular rate and rhythm, S1 and S2 normal                  Abdomen:     Soft, nontender, bowel sounds active                 Extremities:   Lower extremity amputation with no signs of fluid retention in remaining extremity                    Data Review:  Labs in chart were reviewed.    Assessment:  Active Hospital Problems    Diagnosis  POA   • **Influenza A [J10.1]  Unknown   • Acute respiratory failure with hypoxia ([facility]) [J96.01]  Unknown   • Hyperkalemia [E87.5]  Unknown   • Acute exacerbation of chronic obstructive pulmonary disease (COPD) ([facility]) [J44.1]  Yes   • Hyperlipidemia [E78.5]  Yes   • Hypothyroidism [E03.9]  Yes   • PRISCILLA treated with BiPAP [G47.33]  Yes   • PVD (peripheral vascular disease) ([facility]) [I73.9]  Yes   • Essential hypertension [I10]  Yes      Resolved Hospital Problems   No resolved problems to display.       Plan:    Patient clinically demonstrating some improvement as he is at least now moving air from mid lung down with audible wheeze   -Continue pulmonary nebulized meds as ordered   -Good response with increase in steroids yesterday and will decrease to twice daily dosing from 3 times daily dosing but keep the dosage the same   -O2 at 2 L and we can wean accordingly   -Influenza A but no focal pneumonia on imaging      Appreciate nephrology assistance for CKD 3a    HTN stable overall with " lability at times likely influence from hospitalization as well as steroids but I am not making any further medication changes today    Hypothyroidism on levothyroxine     PAD/CAD on Plavix/ASA -SCDs for prophylaxis        Disposition -I do believe this patient will be much more medically ready for discharge by tomorrow      Mati Rubio MD  4/8/2022  10:58 EDT

## 2022-04-08 NOTE — PROGRESS NOTES
[facility] Progress Note      Patient Name: Sim Agustin  : 1955  MRN: [id number]  Primary Care Physician:  Seth Hester MD  Date of admission: 2022    Subjective     Interval History:   F/u ARLYN CKD3    Review of Systems:   UOP 1800 +  Less dyspnea  No nausea/  S/p IV lasix x1 again yest    Objective     Vitals:   Temp:  [97.6 °F (36.4 °C)-97.8 °F (36.6 °C)] 97.8 °F (36.6 °C)  Heart Rate:  [57-75] 74  Resp:  [18] 18  BP: (134-149)/(78-91) 149/78  Flow (L/min):  [2-3] 2    Intake/Output Summary (Last 24 hours) at 2022 1215  Last data filed at 2022 1730  Gross per 24 hour   Intake 680 ml   Output 400 ml   Net 280 ml       Physical Exam:    General Appearance: no acute distress but slightly tachypnic  Neck: supple, no JVD  Lungs: Dec BS bibasilar   Heart: RRR, normal S1 and S2  Abdomen: soft, nontender, nondistended  Extremities: LLE high AKA, trace RLE edema    Scheduled Meds:     albuterol, 2.5 mg, Nebulization, 4x Daily - RT  amLODIPine, 10 mg, Oral, Daily  arformoterol, 15 mcg, Nebulization, BID - RT  aspirin, 81 mg, Oral, Nightly  atorvastatin, 20 mg, Oral, Daily  budesonide, 1 mg, Nebulization, BID - RT  buPROPion XL, 300 mg, Oral, Daily  vitamin D3, 5,000 Units, Oral, Daily  clopidogrel, 75 mg, Oral, Nightly  ferrous sulfate, 325 mg, Oral, Daily With Breakfast  gabapentin, 300 mg, Oral, TID  guaiFENesin, 600 mg, Oral, Q12H  isosorbide mononitrate, 30 mg, Oral, Q24H  latanoprost, 1 drop, Both Eyes, Nightly  levothyroxine, 125 mcg, Oral, Q AM  methylPREDNISolone sodium succinate, 80 mg, Intravenous, Q12H  multivitamin with minerals, 1 tablet, Oral, Daily  oseltamivir, 75 mg, Oral, Q12H  pantoprazole, 40 mg, Oral, QAM  tiotropium bromide monohydrate, 2 puff, Inhalation, Daily - RT      IV Meds:        Results Reviewed:   I have personally reviewed the results from the time of this admission to 2022 12:15 EDT     Results from last 7 days   Lab Units  04/08/22  0624 04/07/22  0505 04/06/22  0409 04/05/22  0605 04/04/22  1421   SODIUM mmol/L 137 140 139   < > 138   POTASSIUM mmol/L 4.9 4.4 4.1   < > 5.0   CHLORIDE mmol/L 94* 98 103   < > 101   CO2 mmol/L 27.4 30.7* 24.5   < > 25.9   BUN mg/dL 38* 32* 30*   < > 30*   CREATININE mg/dL 1.50* 1.43* 1.37*   < > 1.95*   CALCIUM mg/dL 9.9 9.5 8.7   < > 9.4   BILIRUBIN mg/dL  --   --   --   --  0.3   ALK PHOS U/L  --   --   --   --  72   ALT (SGPT) U/L  --   --   --   --  11   AST (SGOT) U/L  --   --   --   --  15   GLUCOSE mg/dL 126* 120* 128*   < > 102*    < > = values in this interval not displayed.     Estimated Creatinine Clearance: 54.1 mL/min (A) (by C-G formula based on SCr of 1.5 mg/dL (H)).  Results from last 7 days   Lab Units 04/08/22  0624 04/06/22  0409 04/04/22  1421   MAGNESIUM mg/dL  --   --  2.1   PHOSPHORUS mg/dL 4.5 2.9  --          Results from last 7 days   Lab Units 04/07/22  0505 04/05/22  0605 04/04/22  1421   WBC 10*3/mm3 9.48 4.77 8.63   HEMOGLOBIN g/dL 15.4 14.8 16.4   PLATELETS 10*3/mm3 251 218 221           Assessment / Plan     ASSESSMENT:  1. Non olig ARLYN - suspect prerenal azotemia from vol depletion in setting on influenza and poor oral intake, Cr improved 2.3 to 1.3 with IVF but became vol overloaded and gave IV lasix x1 past couple days.  Improved, still with mild periph edema, would benefit from low dose loop diuretic (may also help re: generous K levels historically).  UA NEG.   2. 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 Hyperkalemia - intermittent issue even when renal fcn at BL; Initial K up to 5.8 in setting of ARLYN; creeping back up to 4.9 over 48h  4. Influenza A & COPD exac - on tamiflu, steroids; PULM following  5. Hx PVD s/p LLE AKA  6. HTN - BP labile but adequate on max norvasc (factor re: edema)    PLAN:  Start torsemide 20mg daily   Ok with discharge tomorrow and will arrange follow up 2 weeks with  Dr Cornell Haddad MD  04/08/22  12:15 EDT    [facility]  [phone]

## 2022-04-09 LAB
ANION GAP SERPL CALCULATED.3IONS-SCNC: 9 MMOL/L (ref 5–15)
BUN SERPL-MCNC: 47 MG/DL (ref 8–23)
BUN/CREAT SERPL: 28.5 (ref 7–25)
CALCIUM SPEC-SCNC: 9.4 MG/DL (ref 8.6–10.5)
CHLORIDE SERPL-SCNC: 94 MMOL/L (ref 98–107)
CO2 SERPL-SCNC: 33 MMOL/L (ref 22–29)
CREAT SERPL-MCNC: 1.65 MG/DL (ref 0.76–1.27)
EGFRCR SERPLBLD CKD-EPI 2021: 45.5 ML/MIN/1.73
GLUCOSE SERPL-MCNC: 125 MG/DL (ref 65–99)
POTASSIUM SERPL-SCNC: 4.3 MMOL/L (ref 3.5–5.2)
SODIUM SERPL-SCNC: 136 MMOL/L (ref 136–145)
URATE SERPL-MCNC: 8.9 MG/DL (ref 3.4–7)

## 2022-04-09 PROCEDURE — 25010000002 METHYLPREDNISOLONE PER 125 MG: Performed by: HOSPITALIST

## 2022-04-09 PROCEDURE — 84550 ASSAY OF BLOOD/URIC ACID: CPT | Performed by: HOSPITALIST

## 2022-04-09 PROCEDURE — 94664 DEMO&/EVAL PT USE INHALER: CPT

## 2022-04-09 PROCEDURE — 94799 UNLISTED PULMONARY SVC/PX: CPT

## 2022-04-09 PROCEDURE — 63710000001 PREDNISONE PER 5 MG: Performed by: HOSPITALIST

## 2022-04-09 PROCEDURE — 80048 BASIC METABOLIC PNL TOTAL CA: CPT | Performed by: HOSPITALIST

## 2022-04-09 PROCEDURE — 94761 N-INVAS EAR/PLS OXIMETRY MLT: CPT

## 2022-04-09 RX ADMIN — Medication 3 MG: at 21:16

## 2022-04-09 RX ADMIN — ALPRAZOLAM 1 MG: 0.5 TABLET ORAL at 21:16

## 2022-04-09 RX ADMIN — BUDESONIDE 1 MG: 1 INHALANT ORAL at 19:41

## 2022-04-09 RX ADMIN — MULTIPLE VITAMINS W/ MINERALS TAB 1 TABLET: TAB at 08:11

## 2022-04-09 RX ADMIN — PREDNISONE 60 MG: 50 TABLET ORAL at 11:32

## 2022-04-09 RX ADMIN — GABAPENTIN 300 MG: 300 CAPSULE ORAL at 21:16

## 2022-04-09 RX ADMIN — TORSEMIDE 20 MG: 20 TABLET ORAL at 08:10

## 2022-04-09 RX ADMIN — PANTOPRAZOLE SODIUM 40 MG: 40 TABLET, DELAYED RELEASE ORAL at 06:09

## 2022-04-09 RX ADMIN — LATANOPROST 1 DROP: 50 SOLUTION OPHTHALMIC at 21:16

## 2022-04-09 RX ADMIN — ALBUTEROL SULFATE 2.5 MG: 2.5 SOLUTION RESPIRATORY (INHALATION) at 11:19

## 2022-04-09 RX ADMIN — ALPRAZOLAM 1 MG: 0.5 TABLET ORAL at 11:32

## 2022-04-09 RX ADMIN — BUPROPION HYDROCHLORIDE 300 MG: 300 TABLET, EXTENDED RELEASE ORAL at 08:11

## 2022-04-09 RX ADMIN — ALPRAZOLAM 1 MG: 0.5 TABLET ORAL at 03:52

## 2022-04-09 RX ADMIN — ALBUTEROL SULFATE 2.5 MG: 2.5 SOLUTION RESPIRATORY (INHALATION) at 15:46

## 2022-04-09 RX ADMIN — METHYLPREDNISOLONE SODIUM SUCCINATE 80 MG: 125 INJECTION, POWDER, FOR SOLUTION INTRAMUSCULAR; INTRAVENOUS at 06:09

## 2022-04-09 RX ADMIN — OSELTAMIVIR PHOSPHATE 75 MG: 75 CAPSULE ORAL at 08:11

## 2022-04-09 RX ADMIN — GABAPENTIN 300 MG: 300 CAPSULE ORAL at 08:11

## 2022-04-09 RX ADMIN — CLOPIDOGREL 75 MG: 75 TABLET, FILM COATED ORAL at 21:16

## 2022-04-09 RX ADMIN — GABAPENTIN 300 MG: 300 CAPSULE ORAL at 16:53

## 2022-04-09 RX ADMIN — GUAIFENESIN 600 MG: 600 TABLET, EXTENDED RELEASE ORAL at 21:16

## 2022-04-09 RX ADMIN — FERROUS SULFATE TAB 325 MG (65 MG ELEMENTAL FE) 325 MG: 325 (65 FE) TAB at 08:11

## 2022-04-09 RX ADMIN — ARFORMOTEROL TARTRATE 15 MCG: 15 SOLUTION RESPIRATORY (INHALATION) at 19:40

## 2022-04-09 RX ADMIN — GUAIFENESIN 600 MG: 600 TABLET, EXTENDED RELEASE ORAL at 08:11

## 2022-04-09 RX ADMIN — LEVOTHYROXINE SODIUM 125 MCG: 0.12 TABLET ORAL at 06:10

## 2022-04-09 RX ADMIN — ASPIRIN 81 MG: 81 TABLET, CHEWABLE ORAL at 21:16

## 2022-04-09 RX ADMIN — Medication 5000 UNITS: at 08:11

## 2022-04-09 RX ADMIN — AMLODIPINE BESYLATE 10 MG: 10 TABLET ORAL at 08:10

## 2022-04-09 RX ADMIN — ATORVASTATIN CALCIUM 20 MG: 20 TABLET, FILM COATED ORAL at 08:11

## 2022-04-09 RX ADMIN — BUDESONIDE 1 MG: 1 INHALANT ORAL at 08:17

## 2022-04-09 RX ADMIN — ISOSORBIDE MONONITRATE 30 MG: 30 TABLET ORAL at 08:11

## 2022-04-09 RX ADMIN — TRAMADOL HYDROCHLORIDE 50 MG: 50 TABLET, COATED ORAL at 14:56

## 2022-04-09 RX ADMIN — ARFORMOTEROL TARTRATE 15 MCG: 15 SOLUTION RESPIRATORY (INHALATION) at 08:17

## 2022-04-09 NOTE — PROGRESS NOTES
[facility] Progress Note      Patient Name: Sim Agustin  : 1955  MRN: [id number]  Primary Care Physician:  Seth Hester MD  Date of admission: 2022    Subjective     Interval History:   F/u ARLYN CKD3    Review of Systems:   Dyspnea improving; no nausea; mild swelling    Objective     Vitals:   Temp:  [97.8 °F (36.6 °C)-98.3 °F (36.8 °C)] 97.8 °F (36.6 °C)  Heart Rate:  [56-84] 84  Resp:  [18] 18  BP: (123-168)/(76-97) 132/80  Flow (L/min):  [1-2] 1    Intake/Output Summary (Last 24 hours) at 2022 1147  Last data filed at 2022 0601  Gross per 24 hour   Intake --   Output 1800 ml   Net -1800 ml       Physical Exam:    General Appearance: no acute distress but slightly tachypnic  Neck: supple, no JVD  Lungs: Dec BS bibasilar   Heart: RRR, normal S1 and S2  Abdomen: soft, nontender, nondistended  Extremities: LLE high AKA, trace RLE edema    Scheduled Meds:     albuterol, 2.5 mg, Nebulization, 4x Daily - RT  amLODIPine, 10 mg, Oral, Daily  arformoterol, 15 mcg, Nebulization, BID - RT  aspirin, 81 mg, Oral, Nightly  atorvastatin, 20 mg, Oral, Daily  budesonide, 1 mg, Nebulization, BID - RT  buPROPion XL, 300 mg, Oral, Daily  vitamin D3, 5,000 Units, Oral, Daily  clopidogrel, 75 mg, Oral, Nightly  ferrous sulfate, 325 mg, Oral, Daily With Breakfast  gabapentin, 300 mg, Oral, TID  guaiFENesin, 600 mg, Oral, Q12H  isosorbide mononitrate, 30 mg, Oral, Q24H  latanoprost, 1 drop, Both Eyes, Nightly  levothyroxine, 125 mcg, Oral, Q AM  multivitamin with minerals, 1 tablet, Oral, Daily  pantoprazole, 40 mg, Oral, QAM  predniSONE, 60 mg, Oral, Daily With Lunch  tiotropium bromide monohydrate, 2 puff, Inhalation, Daily - RT  torsemide, 20 mg, Oral, Daily      IV Meds:        Results Reviewed:   I have personally reviewed the results from the time of this admission to 2022 11:47 EDT     Results from last 7 days   Lab Units 22  0617 22  0624 22  0505  04/05/22  0605 04/04/22  1421   SODIUM mmol/L 136 137 140   < > 138   POTASSIUM mmol/L 4.3 4.9 4.4   < > 5.0   CHLORIDE mmol/L 94* 94* 98   < > 101   CO2 mmol/L 33.0* 27.4 30.7*   < > 25.9   BUN mg/dL 47* 38* 32*   < > 30*   CREATININE mg/dL 1.65* 1.50* 1.43*   < > 1.95*   CALCIUM mg/dL 9.4 9.9 9.5   < > 9.4   BILIRUBIN mg/dL  --   --   --   --  0.3   ALK PHOS U/L  --   --   --   --  72   ALT (SGPT) U/L  --   --   --   --  11   AST (SGOT) U/L  --   --   --   --  15   GLUCOSE mg/dL 125* 126* 120*   < > 102*    < > = values in this interval not displayed.     Estimated Creatinine Clearance: 48.2 mL/min (A) (by C-G formula based on SCr of 1.65 mg/dL (H)).  Results from last 7 days   Lab Units 04/08/22  0624 04/06/22  0409 04/04/22  1421   MAGNESIUM mg/dL  --   --  2.1   PHOSPHORUS mg/dL 4.5 2.9  --      Results from last 7 days   Lab Units 04/09/22  0617   URIC ACID mg/dL 8.9*     Results from last 7 days   Lab Units 04/07/22  0505 04/05/22  0605 04/04/22  1421   WBC 10*3/mm3 9.48 4.77 8.63   HEMOGLOBIN g/dL 15.4 14.8 16.4   PLATELETS 10*3/mm3 251 218 221           Assessment / Plan     ASSESSMENT:  1. Non olig ARLYN - suspect prerenal azotemia from vol depletion in setting on influenza and poor oral intake, Cr improved 2.3 to 1.3 with IVF but became vol overloaded and required couple doses of IV lasix, transitioned to Po torsemide yesterday so understandably Cr drifted back up some to 1.5 to 1.6 today, still within BL range, and BUN higher in large part from steroids.  UA NEG.   2. 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 Hyperkalemia - intermittent issue even when renal fcn at BL; Initial K up to 5.8 in setting of ARLYN; crept back up to 4.9 yesterday, 4.3 today with diuretic  4. Influenza A & COPD exac - on tamiflu, steroids; PULM following  5. Hx PVD s/p LLE AKA  6. HTN - BP labile but adequate on max norvasc (factor re:  edema)    PLAN:  Continue torsemide 20mg daily   Note plan to discharge tomorrow, ok from nephrology standpoint  Will arrange follow up Dr Sarabia in 2-3 weeks with labs prior      Geoffrey Haddad MD  04/09/22  11:47 EDT    [facility]  [phone]

## 2022-04-09 NOTE — PLAN OF CARE
Goal Outcome Evaluation:  Plan of Care Reviewed With: patient        Progress: improving  Outcome Evaluation: VSS, NO C/O of pain or nausea, PRN anxiety meds given, on 1L O2, still gets SOA with excertion, patient has oxygen at home for CPAP at , possible D/C in AM, will CTM

## 2022-04-09 NOTE — PROGRESS NOTES
"    DAILY PROGRESS NOTE  [facility]    Patient Identification:  Name: Sim Agustin  Age: 66 y.o.  Sex: male  :  1955  MRN: [id number]         Primary Care Physician: Seth Hester MD    Subjective:  Interval History: Continues to feel and breathe better.  His O2 has been weaned down to 1 L.  He is able to get up and go to the bathroom with less struggling and pursed lip breathing.  Denies any new chest pain confusion nausea or vomiting.  He is tolerating steroids without significant aversion.    Objective: Chronically ill but very pleasant and conversational.  Nontoxic    Scheduled Meds:albuterol, 2.5 mg, Nebulization, 4x Daily - RT  amLODIPine, 10 mg, Oral, Daily  arformoterol, 15 mcg, Nebulization, BID - RT  aspirin, 81 mg, Oral, Nightly  atorvastatin, 20 mg, Oral, Daily  budesonide, 1 mg, Nebulization, BID - RT  buPROPion XL, 300 mg, Oral, Daily  vitamin D3, 5,000 Units, Oral, Daily  clopidogrel, 75 mg, Oral, Nightly  ferrous sulfate, 325 mg, Oral, Daily With Breakfast  gabapentin, 300 mg, Oral, TID  guaiFENesin, 600 mg, Oral, Q12H  isosorbide mononitrate, 30 mg, Oral, Q24H  latanoprost, 1 drop, Both Eyes, Nightly  levothyroxine, 125 mcg, Oral, Q AM  multivitamin with minerals, 1 tablet, Oral, Daily  pantoprazole, 40 mg, Oral, QAM  predniSONE, 60 mg, Oral, Daily With Lunch  tiotropium bromide monohydrate, 2 puff, Inhalation, Daily - RT  torsemide, 20 mg, Oral, Daily      Continuous Infusions:     Vital signs in last 24 hours:  Temp:  [97.8 °F (36.6 °C)-98.3 °F (36.8 °C)] 97.8 °F (36.6 °C)  Heart Rate:  [56-77] 68  Resp:  [18] 18  BP: (123-168)/(76-97) 132/80    Intake/Output:    Intake/Output Summary (Last 24 hours) at 2022 0911  Last data filed at 2022 0601  Gross per 24 hour   Intake --   Output 1800 ml   Net -1800 ml       Exam:  /80 (BP Location: Left arm, Patient Position: Lying)   Pulse 68   Temp 97.8 °F (36.6 °C) (Oral)   Resp 18   Ht 172.7 cm (68\")   Wt 77.4 kg " (170 lb 10.2 oz)   SpO2 95%   BMI 25.95 kg/m²     General Appearance:    Alert, cooperative, AAOx3                        Throat:   Oral mucosa pink and moist                           Neck:   No JVD                         Lungs:    Feel quite tight but right side is moving good air to bases and left sides a little more decreased today to auscultation bilaterally, respirations unlabored and no struggling at baseline or pursed lip breathing and are noted                         Heart:    Regular rate and rhythm, S1 and S2 normal                  Abdomen:     Soft, nontender, bowel sounds active                  Extremities: Old amputee, no volume overload                                          Data Review:  Labs in chart were reviewed.    Assessment:  Active Hospital Problems    Diagnosis  POA   • **Influenza A [J10.1]  Unknown   • Acute respiratory failure with hypoxia ([facility]) [J96.01]  Unknown   • Hyperkalemia [E87.5]  Unknown   • Acute exacerbation of chronic obstructive pulmonary disease (COPD) ([facility]) [J44.1]  Yes   • Hyperlipidemia [E78.5]  Yes   • Hypothyroidism [E03.9]  Yes   • PRISCILLA treated with BiPAP [G47.33]  Yes   • PVD (peripheral vascular disease) ([facility]) [I73.9]  Yes   • Essential hypertension [I10]  Yes      Resolved Hospital Problems   No resolved problems to display.       Plan:    Influenza with subsequent acute exacerbation of COPD   -Patient definitely digressed during this hospitalization until we expanded nebulized meds and increase Solu-Medrol to 80 mg 3 times daily.  Over the last couple days he has had improved air entry to his lungs as he was not moving any air from mid lung down.  I had reduced steroids yesterday from 3 times daily to twice daily and we still note continued improvement.   -I will switch patient to p.o. steroids today and gave him the option of going home versus remaining in house as I do have concern for him possibly taking a step back clinically.  He very clearly would feel  more comfortable with holding the idea of discharge until tomorrow to ensure that does not happen.  I will switch him over to 60 mg p.o. daily of prednisone today and continue with aggressive nebulized meds   -O2 requirements have been weaning accordingly and is now down to 1-2 L   -No pneumonia on x-ray imaging      CKD 3 a at baseline.  Appreciate nephrology input and assistance.  Stable from their perspective and okay for discharge from their perspective with resumption of torsemide and he remains overall euvolemic    HTN stable with some lability due to hospitalization and steroids    Hypothyroidism on levothyroxine     PAD/CAD on Plavix/ASA -SCDs for prophylaxis        Disposition -idea of discharge today held and we will be hopeful for tomorrow    Mati Rubio MD  4/9/2022  09:59 EDT

## 2022-04-10 ENCOUNTER — READMISSION MANAGEMENT (OUTPATIENT)
Dept: CALL CENTER | Facility: HOSPITAL | Age: 67
End: 2022-04-10

## 2022-04-10 VITALS
HEIGHT: 68 IN | RESPIRATION RATE: 18 BRPM | DIASTOLIC BLOOD PRESSURE: 95 MMHG | TEMPERATURE: 97.8 F | BODY MASS INDEX: 25.89 KG/M2 | OXYGEN SATURATION: 91 % | HEART RATE: 88 BPM | WEIGHT: 170.86 LBS | SYSTOLIC BLOOD PRESSURE: 145 MMHG

## 2022-04-10 DIAGNOSIS — I73.9 PVD (PERIPHERAL VASCULAR DISEASE): ICD-10-CM

## 2022-04-10 PROCEDURE — 94799 UNLISTED PULMONARY SVC/PX: CPT

## 2022-04-10 PROCEDURE — 94761 N-INVAS EAR/PLS OXIMETRY MLT: CPT

## 2022-04-10 PROCEDURE — 63710000001 PREDNISONE PER 5 MG: Performed by: HOSPITALIST

## 2022-04-10 PROCEDURE — 94664 DEMO&/EVAL PT USE INHALER: CPT

## 2022-04-10 RX ORDER — TORSEMIDE 20 MG/1
20 TABLET ORAL DAILY
Qty: 30 TABLET | Refills: 0 | Status: SHIPPED | OUTPATIENT
Start: 2022-04-11

## 2022-04-10 RX ORDER — PREDNISONE 10 MG/1
TABLET ORAL
Qty: 25 TABLET | Refills: 0 | Status: SHIPPED | OUTPATIENT
Start: 2022-04-10 | End: 2022-04-21

## 2022-04-10 RX ORDER — GUAIFENESIN 600 MG/1
600 TABLET, EXTENDED RELEASE ORAL EVERY 12 HOURS SCHEDULED
Qty: 30 TABLET | Refills: 0 | Status: ON HOLD | OUTPATIENT
Start: 2022-04-10 | End: 2022-05-18

## 2022-04-10 RX ORDER — ALBUTEROL SULFATE 2.5 MG/3ML
2.5 SOLUTION RESPIRATORY (INHALATION) EVERY 4 HOURS PRN
Qty: 360 ML | Refills: 0 | Status: SHIPPED | OUTPATIENT
Start: 2022-04-10 | End: 2022-05-10

## 2022-04-10 RX ADMIN — ATORVASTATIN CALCIUM 20 MG: 20 TABLET, FILM COATED ORAL at 08:48

## 2022-04-10 RX ADMIN — PANTOPRAZOLE SODIUM 40 MG: 40 TABLET, DELAYED RELEASE ORAL at 06:52

## 2022-04-10 RX ADMIN — AMLODIPINE BESYLATE 10 MG: 10 TABLET ORAL at 08:48

## 2022-04-10 RX ADMIN — TIOTROPIUM BROMIDE INHALATION SPRAY 2 PUFF: 3.12 SPRAY, METERED RESPIRATORY (INHALATION) at 07:24

## 2022-04-10 RX ADMIN — GABAPENTIN 300 MG: 300 CAPSULE ORAL at 08:48

## 2022-04-10 RX ADMIN — FERROUS SULFATE TAB 325 MG (65 MG ELEMENTAL FE) 325 MG: 325 (65 FE) TAB at 08:48

## 2022-04-10 RX ADMIN — GUAIFENESIN 600 MG: 600 TABLET, EXTENDED RELEASE ORAL at 08:48

## 2022-04-10 RX ADMIN — ISOSORBIDE MONONITRATE 30 MG: 30 TABLET ORAL at 08:48

## 2022-04-10 RX ADMIN — Medication 5000 UNITS: at 08:47

## 2022-04-10 RX ADMIN — ARFORMOTEROL TARTRATE 15 MCG: 15 SOLUTION RESPIRATORY (INHALATION) at 07:16

## 2022-04-10 RX ADMIN — BUDESONIDE 1 MG: 1 INHALANT ORAL at 07:18

## 2022-04-10 RX ADMIN — TORSEMIDE 20 MG: 20 TABLET ORAL at 08:47

## 2022-04-10 RX ADMIN — ALPRAZOLAM 1 MG: 0.5 TABLET ORAL at 06:52

## 2022-04-10 RX ADMIN — BUPROPION HYDROCHLORIDE 300 MG: 300 TABLET, EXTENDED RELEASE ORAL at 08:48

## 2022-04-10 RX ADMIN — PREDNISONE 60 MG: 50 TABLET ORAL at 11:45

## 2022-04-10 RX ADMIN — LEVOTHYROXINE SODIUM 125 MCG: 0.12 TABLET ORAL at 06:52

## 2022-04-10 RX ADMIN — MULTIPLE VITAMINS W/ MINERALS TAB 1 TABLET: TAB at 08:48

## 2022-04-10 NOTE — DISCHARGE SUMMARY
Patient Name: Sim Agustin  : 1955  MRN: [id number]    Date of Admission: 2022  Date of Discharge:  4/10/2022  Primary Care Physician: Seth Hester MD      Chief Complaint:   Shortness of Breath      Discharge Diagnoses     Active Hospital Problems    Diagnosis  POA   • **Influenza A [J10.1]  Unknown   • Acute respiratory failure with hypoxia ([facility]) [J96.01]  Unknown   • Hyperkalemia [E87.5]  Unknown   • Acute exacerbation of chronic obstructive pulmonary disease (COPD) ([facility]) [J44.1]  Yes   • Hyperlipidemia [E78.5]  Yes   • Hypothyroidism [E03.9]  Yes   • PRISCILLA treated with BiPAP [G47.33]  Yes   • PVD (peripheral vascular disease) ([facility]) [I73.9]  Yes   • Essential hypertension [I10]  Yes      Resolved Hospital Problems   No resolved problems to display.        Hospital Course     Mr. Agustin is a 66 y.o. male with a history of COPD with chronic respiratory failure who presented to [facility] initially complaining of shortness of breath.  Please see the admitting history and physical for further details.  He was found to have influenza A and acute exacerbation of COPD and was admitted to the hospital for further evaluation and treatment.  There is no evidence of lower respiratory infection.  He was treated aggressively for his COPD exacerbation and provided supportive care for his underlying viral infection.  He was started on IV steroids and transition to oral steroids yesterday.  He continues to have improvement and at this point he feels he is back to his baseline.  I have ordered a nebulizer and breathing treatments for the patient at discharge.  I recommended that he follow-up with his pulmonologist Dr. Tate within the next 2 weeks.  Is also was noted that the patient had acute on chronic renal failure present on admission.  He responded well to treatment and plan is to discharge on torsemide 20 mg daily.  Plan is to taper steroids over the next 7 to 10 days and otherwise at this  time remained stable for discharge home today.  He has supplemental oxygen available at home will continue to use this as needed.      Day of Discharge     Subjective:  Overall patient is feeling better today and wants to go home    Physical Exam:  Temp:  [97.3 °F (36.3 °C)-98.2 °F (36.8 °C)] 97.8 °F (36.6 °C)  Heart Rate:  [57-88] 88  Resp:  [16-20] 18  BP: (126-149)/(77-97) 145/95  Body mass index is 25.98 kg/m².  Physical Exam  Vitals and nursing note reviewed.   Constitutional:       General: He is not in acute distress.     Comments: Chronic ill-appearing   Cardiovascular:      Rate and Rhythm: Normal rate and regular rhythm.   Pulmonary:      Comments: Decreased air movement bilaterally fine expiratory wheezes noted  Skin:     General: Skin is warm and dry.   Neurological:      Mental Status: He is alert.         Consultants     Consult Orders (all) (From admission, onward)     Start     Ordered    04/05/22 0837  Inpatient Nephrology Consult  Once        Specialty:  Nephrology  Provider:  Rajesh Sarabia MD    04/05/22 0837    04/04/22 2012  Inpatient Pulmonology Consult  Once        Specialty:  Pulmonary Disease  Provider:  Ishmael Tate Jr., MD    04/04/22 2011 04/04/22 1523  [facility] (on-call MD unless specified) Details  Once        Specialty:  Hospitalist  Provider:  (Not yet assigned)    04/04/22 1522              Procedures     * Surgery not found *      Imaging Results (All)     Procedure Component Value Units Date/Time    XR Chest 1 View [[id number]] Collected: 04/06/22 1204     Updated: 04/06/22 1218    Narrative:      PORTABLE VIEW OF THE CHEST 04/06/2022     CLINICAL HISTORY: Dyspnea, increasing O2 requirements and history of  COPD.     This is correlated to prior portable chest x-ray 2 days ago on  04/04/2022.     FINDINGS: The cardiomediastinal silhouette is upper limits normal to  mildly enlarged. Pulmonary vasculature is within normal limits. There is  chronic linear stranding in the  lateral aspect of the left midlung zone  and peripheral aspect of the lung bases similar to prior chest x-rays  likely linear areas of scarring. No focal dense airspace consolidation  seen costophrenic angles are sharp.       Impression:      1. No definite active disease is seen in the chest with no significant  change when compared to prior chest x-rays 04/04/2022 and 05/23/2019.  There is chronic linear scarring at lateral aspect left mid and lower  lung zone and lateral right lung base. There is evidence of pulmonary  emphysematous changes in the upper lung zones most pronounced in the  right upper lung zone.     This report was finalized on 4/6/2022 12:15 PM by Dr. Genaro Cordoba M.D.       XR Chest 1 View [[id number]] Collected: 04/04/22 1441     Updated: 04/04/22 1450    Narrative:      XR CHEST 1 VW-     HISTORY: Male who is 66 years-old,  short of breath     TECHNIQUE: Frontal views of the chest     COMPARISON: 05/19/2021     FINDINGS: The heart size is normal. Aorta is calcified. Pulmonary  vasculature is unremarkable. No focal pulmonary consolidation, pleural  effusion, or pneumothorax. No acute osseous process.       Impression:      No focal pulmonary consolidation. Follow-up as clinical  indications persist.     This report was finalized on 4/4/2022 2:46 PM by Dr. Diogo Suarez M.D.           Results for orders placed during the hospital encounter of 05/23/19    Duplex Renal Artery - Bilateral Complete CAR    Interpretation Summary  · Normal right renal artery.  · Normal left renal artery.    Results for orders placed during the hospital encounter of 01/31/20    Adult Transthoracic Echo Complete W/ Cont if Necessary Per Protocol    Interpretation Summary  · Left ventricular systolic function is low normal. Calculated EF = 50%. Estimated EF was in agreement with the calculated EF. Global Longitudinal LV strain = -16.4%. Strain data was reviewed and speckle tracking was considered accurate. Normal  left ventricular cavity size noted. Left ventricular wall thickness is consistent with mild concentric hypertrophy. Left ventricular diastolic dysfunction is noted (grade I) consistent with impaired relaxation.  · See wall scoring diagram.    Pertinent Labs     Results from last 7 days   Lab Units 04/07/22 0505 04/05/22  0605 04/04/22  1421   WBC 10*3/mm3 9.48 4.77 8.63   HEMOGLOBIN g/dL 15.4 14.8 16.4   PLATELETS 10*3/mm3 251 218 221     Results from last 7 days   Lab Units 04/09/22  0617 04/08/22  0624 04/07/22  0505 04/06/22  0409   SODIUM mmol/L 136 137 140 139   POTASSIUM mmol/L 4.3 4.9 4.4 4.1   CHLORIDE mmol/L 94* 94* 98 103   CO2 mmol/L 33.0* 27.4 30.7* 24.5   BUN mg/dL 47* 38* 32* 30*   CREATININE mg/dL 1.65* 1.50* 1.43* 1.37*   GLUCOSE mg/dL 125* 126* 120* 128*   EGFR mL/min/1.73 45.5* 51.0* 54.0* 56.9*     Results from last 7 days   Lab Units 04/08/22  0624 04/06/22  0409 04/04/22  1421   ALBUMIN g/dL 4.10 3.80 4.10   BILIRUBIN mg/dL  --   --  0.3   ALK PHOS U/L  --   --  72   AST (SGOT) U/L  --   --  15   ALT (SGPT) U/L  --   --  11     Results from last 7 days   Lab Units 04/09/22  0617 04/08/22  0624 04/07/22  0505 04/06/22  0409 04/05/22  0605 04/04/22  1421   CALCIUM mg/dL 9.4 9.9 9.5 8.7   < > 9.4   ALBUMIN g/dL  --  4.10  --  3.80  --  4.10   MAGNESIUM mg/dL  --   --   --   --   --  2.1   PHOSPHORUS mg/dL  --  4.5  --  2.9  --   --     < > = values in this interval not displayed.       Results from last 7 days   Lab Units 04/04/22  1421   TROPONIN T ng/mL 0.018   PROBNP pg/mL 726.0     Results from last 7 days   Lab Units 04/09/22  0617 04/05/22  1506   CREATININE UR mg/dL  --  46.5   PROTEIN TOTAL URINE mg/dL  --  23.1   URIC ACID mg/dL 8.9*  --    PROT/CREAT RATIO UR mg/G Crea  --  496.8*         Invalid input(s): LDLCALC      Results from last 7 days   Lab Units 04/04/22  1423   COVID19  Not Detected       Test Results Pending at Discharge       Discharge Details        Discharge Medications       New Medications      Instructions Start Date   albuterol (2.5 MG/3ML) 0.083% nebulizer solution  Commonly known as: PROVENTIL   2.5 mg, Nebulization, Every 4 Hours PRN, J44.1      guaiFENesin 600 MG 12 hr tablet  Commonly known as: MUCINEX   600 mg, Oral, Every 12 Hours Scheduled      predniSONE 10 MG tablet  Commonly known as: DELTASONE   Take 4 tablets by mouth Daily for 3 days, THEN 3 tablets Daily for 2 days, THEN 2 tablets Daily for 2 days, THEN 1 tablet Daily for 2 days, THEN 0.5 tablets Daily for 2 days.   Start Date: April 10, 2022     torsemide 20 MG tablet  Commonly known as: DEMADEX   20 mg, Oral, Daily   Start Date: April 11, 2022        Changes to Medications      Instructions Start Date   gabapentin 300 MG capsule  Commonly known as: NEURONTIN  What changed:   how much to take  when to take this   300 mg, Oral, 3 Times Daily      vitamin D3 125 MCG (5000 UT) tablet tablet  What changed: when to take this   5,000 Units, Oral, Daily         Continue These Medications      Instructions Start Date   ALPRAZolam 1 MG tablet  Commonly known as: XANAX   1 mg, Oral, 3 Times Daily PRN      amLODIPine 10 MG tablet  Commonly known as: NORVASC   TAKE 1 TABLET BY MOUTH EVERY DAY      aspirin 81 MG chewable tablet   81 mg, Oral, Nightly      buPROPion  MG 24 hr tablet  Commonly known as: WELLBUTRIN XL   300 mg, Oral, Daily      clopidogrel 75 MG tablet  Commonly known as: PLAVIX   TAKE 1 TABLET BY MOUTH DAILY AT NIGHT      Coenzyme Q10 400 MG capsule   400 mg, Oral, Every Evening      FERROUS SULFATE PO   65 mg, Oral, Nightly      Fluticasone-Umeclidin-Vilant 100-62.5-25 MCG/INH inhaler  Commonly known as: Trelegy Ellipta   1 each, Inhalation, Daily      isosorbide mononitrate 30 MG 24 hr tablet  Commonly known as: IMDUR   TAKE 1 TABLET BY MOUTH DAILY EVERY MORNING      latanoprost 0.005 % ophthalmic solution  Commonly known as: XALATAN   1 drop, Both Eyes, Every Night at Bedtime      levothyroxine 125 MCG  tablet  Commonly known as: SYNTHROID, LEVOTHROID   TAKE 1 TABLET BY MOUTH EVERY DAY      multivitamin with minerals tablet tablet   1 tablet, Oral, Daily      NIACIN PO   1 capsule, Oral, Every Morning      omeprazole 20 MG capsule  Commonly known as: priLOSEC   TAKE 1 CAPSULE BY MOUTH EVERY DAY IN THE MORNING      simvastatin 40 MG tablet  Commonly known as: ZOCOR   20 mg, Oral, Nightly      traMADol 50 MG tablet  Commonly known as: ULTRAM   50 mg, Oral, 2 Times Daily      VITAMIN B-1 PO   1 tablet, Oral, Daily             Allergies   Allergen Reactions   • Augmentin [Amoxicillin-Pot Clavulanate] Hives and Rash       Discharge Disposition:  Home or Self Care      Discharge Diet:  Diet Order   Procedures   • Diet Regular; Cardiac       Discharge Activity:   Activity Instructions     Activity as Tolerated            CODE STATUS:    Code Status and Medical Interventions:   Ordered at: 04/04/22 1904     Code Status (Patient has no pulse and is not breathing):    CPR (Attempt to Resuscitate)     Medical Interventions (Patient has pulse or is breathing):    Full       Future Appointments   Date Time Provider Department Center   9/15/2022 10:00 AM Seth Hester MD [facility]      Follow-up Information     Seth Hester MD .    Specialties: Family Medicine, Emergency Medicine, Urgent Care  Contact information:  [address] [place] [id number] [phone]             Ishmael Tate Jr., MD Follow up in 2 week(s).    Specialty: Pulmonary Disease  Contact information:  [address]  [facility] [address]  [facility] [id number] [phone]             Rajesh Sarabia MD Follow up in 3 week(s).    Specialty: Nephrology  Contact information:  [address]  [facility] [address]  [facility] [id number] [phone]                         Time Spent on Discharge:  Greater than 30 minutes      Modesto Hu MD  [facility]  04/10/22  12:30 EDT

## 2022-04-10 NOTE — PROGRESS NOTES
[facility] Progress Note      Patient Name: Sim Agustin  : 1955  MRN: [id number]  Primary Care Physician:  Seth Hester MD  Date of admission: 2022    Subjective     Interval History:   F/u ARLYN CKD3    Review of Systems:   Feels well, less dyspnea  To discharge today    Objective     Vitals:   Temp:  [97.3 °F (36.3 °C)-98.2 °F (36.8 °C)] 97.8 °F (36.6 °C)  Heart Rate:  [57-88] 88  Resp:  [16-20] 18  BP: (126-149)/(77-97) 145/95  Flow (L/min):  [1] 1    Intake/Output Summary (Last 24 hours) at 4/10/2022 1211  Last data filed at 4/10/2022 0653  Gross per 24 hour   Intake --   Output 1950 ml   Net -1950 ml       Physical Exam:    General Appearance: no acute distress but slightly tachypnic  Neck: supple, no JVD  Lungs: Dec BS bibasilar   Heart: RRR, normal S1 and S2  Abdomen: soft, nontender, nondistended  Extremities: LLE high AKA, trace RLE edema    Scheduled Meds:     albuterol, 2.5 mg, Nebulization, 4x Daily - RT  amLODIPine, 10 mg, Oral, Daily  arformoterol, 15 mcg, Nebulization, BID - RT  aspirin, 81 mg, Oral, Nightly  atorvastatin, 20 mg, Oral, Daily  budesonide, 1 mg, Nebulization, BID - RT  buPROPion XL, 300 mg, Oral, Daily  vitamin D3, 5,000 Units, Oral, Daily  clopidogrel, 75 mg, Oral, Nightly  ferrous sulfate, 325 mg, Oral, Daily With Breakfast  gabapentin, 300 mg, Oral, TID  guaiFENesin, 600 mg, Oral, Q12H  isosorbide mononitrate, 30 mg, Oral, Q24H  latanoprost, 1 drop, Both Eyes, Nightly  levothyroxine, 125 mcg, Oral, Q AM  multivitamin with minerals, 1 tablet, Oral, Daily  pantoprazole, 40 mg, Oral, QAM  predniSONE, 60 mg, Oral, Daily With Lunch  tiotropium bromide monohydrate, 2 puff, Inhalation, Daily - RT  torsemide, 20 mg, Oral, Daily      IV Meds:        Results Reviewed:   I have personally reviewed the results from the time of this admission to 4/10/2022 12:11 EDT     Results from last 7 days   Lab Units 22  0617 22  0624 22  0505  04/05/22  0605 04/04/22  1421   SODIUM mmol/L 136 137 140   < > 138   POTASSIUM mmol/L 4.3 4.9 4.4   < > 5.0   CHLORIDE mmol/L 94* 94* 98   < > 101   CO2 mmol/L 33.0* 27.4 30.7*   < > 25.9   BUN mg/dL 47* 38* 32*   < > 30*   CREATININE mg/dL 1.65* 1.50* 1.43*   < > 1.95*   CALCIUM mg/dL 9.4 9.9 9.5   < > 9.4   BILIRUBIN mg/dL  --   --   --   --  0.3   ALK PHOS U/L  --   --   --   --  72   ALT (SGPT) U/L  --   --   --   --  11   AST (SGOT) U/L  --   --   --   --  15   GLUCOSE mg/dL 125* 126* 120*   < > 102*    < > = values in this interval not displayed.     Estimated Creatinine Clearance: 48.3 mL/min (A) (by C-G formula based on SCr of 1.65 mg/dL (H)).  Results from last 7 days   Lab Units 04/08/22  0624 04/06/22  0409 04/04/22  1421   MAGNESIUM mg/dL  --   --  2.1   PHOSPHORUS mg/dL 4.5 2.9  --      Results from last 7 days   Lab Units 04/09/22  0617   URIC ACID mg/dL 8.9*     Results from last 7 days   Lab Units 04/07/22  0505 04/05/22  0605 04/04/22  1421   WBC 10*3/mm3 9.48 4.77 8.63   HEMOGLOBIN g/dL 15.4 14.8 16.4   PLATELETS 10*3/mm3 251 218 221           Assessment / Plan     ASSESSMENT:  1. Non olig ARLYN - suspect prerenal azotemia from vol depletion in setting on influenza and poor oral intake, Cr improved 2.3 to 1.3 with IVF but became vol overloaded and required couple doses of IV lasix, transitioned to Po torsemide yesterday so understandably Cr drifted back up some to 1.5 to 1.6 yesterday, still within BL range, and BUN higher in large part from steroids.  UA NEG.   2. 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 Hyperkalemia - intermittent issue even when renal fcn at BL; Initial K up to 5.8 in setting of ARLYN; stable 4.3 yesterday with diureticaddition  4. Influenza A & COPD exac - on tamiflu, steroids; PULM following  5. Hx PVD s/p LLE AKA  6. HTN - BP labile but adequate on max norvasc (factor re: edema)     PLAN:  Labs today not  needed -- ok with discharge  Continue torsemide 20mg daily   Will arrange follow up Dr Sarabia in 2-3 weeks with labs prior           Geoffrey Haddad MD  04/10/22  12:11 EDT    [facility]  [phone]

## 2022-04-10 NOTE — OUTREACH NOTE
Prep Survey    Flowsheet Row Responses   [facility] patient discharged from? [place]   Is LACE score < 7 ? No   Emergency Room discharge w/ pulse ox? No   Eligibility [facility]   Date of Admission 04/04/22   Date of Discharge 04/10/22   Discharge Disposition Home or Self Care   Discharge diagnosis Influenza A  AE of COPD   Does the patient have one of the following disease processes/diagnoses(primary or secondary)? COPD/Pneumonia   Does the patient have Home health ordered? No   Is there a DME ordered? No   Prep survey completed? Yes          MILY Antony Registered Nurse

## 2022-04-10 NOTE — PLAN OF CARE
Goal Outcome Evaluation:  Plan of Care Reviewed With: patient        Progress: improving  Outcome Evaluation: VSS, IL O2, PRN pain and anxiety meds plan to D/C in AM will CTM

## 2022-04-11 ENCOUNTER — TRANSITIONAL CARE MANAGEMENT TELEPHONE ENCOUNTER (OUTPATIENT)
Dept: CALL CENTER | Facility: HOSPITAL | Age: 67
End: 2022-04-11

## 2022-04-11 RX ORDER — ISOSORBIDE MONONITRATE 30 MG/1
TABLET, EXTENDED RELEASE ORAL
Qty: 90 TABLET | Refills: 3 | Status: SHIPPED | OUTPATIENT
Start: 2022-04-11 | End: 2023-03-21

## 2022-04-11 RX ORDER — SIMVASTATIN 40 MG
TABLET ORAL
Qty: 90 TABLET | Refills: 3 | Status: SHIPPED | OUTPATIENT
Start: 2022-04-11 | End: 2022-06-01

## 2022-04-11 NOTE — OUTREACH NOTE
Call Center TCM Note    Flowsheet Row Responses   [facility] patient discharged from? [place]   Does the patient have one of the following disease processes/diagnoses(primary or secondary)? COPD/Pneumonia   Was the primary reason for admission: COPD exacerbation   TCM attempt successful? No   Unsuccessful attempts Attempt 1          Christelle King MA    4/11/2022, 15:18 EDT

## 2022-04-11 NOTE — PAYOR COMM NOTE
"Gemini Agustin (66 y.o. Male)     PLEASE SEE ATTACHED.   STILL NEEDING ONE MORE ADDT DAY.  4/9/22    AUTH#[id number]    THANK YOU      JENN ELLISON LPN CCP            Date of Birth   1955    Social Security Number       Address   [address] [place], [place] [address]    Home Phone   [phone]    MRN   [id number]       Confucianism   Religious    Marital Status                               Admission Date   4/4/22    Admission Type   Emergency    Admitting Provider   Ruth Rosas MD    Attending Provider       Department, Room/Bed   [address], [address]       Discharge Date   4/10/2022    Discharge Disposition   Home or Self Care    Discharge Destination                               Attending Provider: (none)   Allergies: Augmentin [Amoxicillin-pot Clavulanate]    Isolation: None   Infection: Influenza (04/04/22)   Code Status: Prior   Advance Care Planning Activity    Ht: 172.7 cm (68\")   Wt: 77.5 kg (170 lb 13.7 oz)    Admission Cmt: None   Principal Problem: Influenza A [J10.1]                 Active Insurance as of 4/4/2022     Primary Coverage     Payor Plan Insurance Group Employer/Plan Group    AETNA MEDICARE REPLACEMENT AETNA MEDICARE REPLACEMENT [id number]     Payor Plan Address Payor Plan Phone Number Payor Plan Fax Number Effective Dates    [address] [phone]  7/1/2021 - None Entered    [facility] [id number]       Subscriber Name Subscriber Birth Date Member ID       GEMINI AGUSTIN 1955 [id number]           Secondary Coverage     Payor Plan Insurance Group Employer/Plan Group    [place]      Payor Plan Address Payor Plan Phone Number Payor Plan Fax Number Effective Dates    [address] [phone]  3/3/2020 - None Entered    [place] [address]       Subscriber Name Subscriber Birth Date Member ID       GEMINI AGUSTIN 1955 [id number]                 Emergency Contacts      (Rel.) Home Phone Work " Phone Mobile Phone    Cassie Anthony (Spouse) [phone] -- --    John Agustin (Son) -- -- [phone]               Discharge Summary      Modesto Hu MD at 04/10/22 1229              Patient Name: Sim Agustin  : 1955  MRN: [id number]    Date of Admission: 2022  Date of Discharge:  4/10/2022  Primary Care Physician: Seth Hester MD      Chief Complaint:   Shortness of Breath      Discharge Diagnoses     Active Hospital Problems    Diagnosis  POA   • **Influenza A [J10.1]  Unknown   • Acute respiratory failure with hypoxia ([facility]) [J96.01]  Unknown   • Hyperkalemia [E87.5]  Unknown   • Acute exacerbation of chronic obstructive pulmonary disease (COPD) ([facility]) [J44.1]  Yes   • Hyperlipidemia [E78.5]  Yes   • Hypothyroidism [E03.9]  Yes   • PRISCILLA treated with BiPAP [G47.33]  Yes   • PVD (peripheral vascular disease) ([facility]) [I73.9]  Yes   • Essential hypertension [I10]  Yes      Resolved Hospital Problems   No resolved problems to display.        Hospital Course     Mr. Agustin is a 66 y.o. male with a history of COPD with chronic respiratory failure who presented to [facility] initially complaining of shortness of breath.  Please see the admitting history and physical for further details.  He was found to have influenza A and acute exacerbation of COPD and was admitted to the hospital for further evaluation and treatment.  There is no evidence of lower respiratory infection.  He was treated aggressively for his COPD exacerbation and provided supportive care for his underlying viral infection.  He was started on IV steroids and transition to oral steroids yesterday.  He continues to have improvement and at this point he feels he is back to his baseline.  I have ordered a nebulizer and breathing treatments for the patient at discharge.  I recommended that he follow-up with his pulmonologist Dr. Tate within the next 2 weeks.  Is also was noted that the patient had acute on chronic  renal failure present on admission.  He responded well to treatment and plan is to discharge on torsemide 20 mg daily.  Plan is to taper steroids over the next 7 to 10 days and otherwise at this time remained stable for discharge home today.  He has supplemental oxygen available at home will continue to use this as needed.      Day of Discharge     Subjective:  Overall patient is feeling better today and wants to go home    Physical Exam:  Temp:  [97.3 °F (36.3 °C)-98.2 °F (36.8 °C)] 97.8 °F (36.6 °C)  Heart Rate:  [57-88] 88  Resp:  [16-20] 18  BP: (126-149)/(77-97) 145/95  Body mass index is 25.98 kg/m².  Physical Exam  Vitals and nursing note reviewed.   Constitutional:       General: He is not in acute distress.     Comments: Chronic ill-appearing   Cardiovascular:      Rate and Rhythm: Normal rate and regular rhythm.   Pulmonary:      Comments: Decreased air movement bilaterally fine expiratory wheezes noted  Skin:     General: Skin is warm and dry.   Neurological:      Mental Status: He is alert.         Consultants     Consult Orders (all) (From admission, onward)     Start     Ordered    04/05/22 0837  Inpatient Nephrology Consult  Once        Specialty:  Nephrology  Provider:  Rajesh Sarabia MD    04/05/22 0837    04/04/22 2012  Inpatient Pulmonology Consult  Once        Specialty:  Pulmonary Disease  Provider:  Ishmael Tate Jr., MD    04/04/22 2011 04/04/22 1523  [facility] (on-call MD unless specified) Details  Once        Specialty:  Hospitalist  Provider:  (Not yet assigned)    04/04/22 1522              Procedures     * Surgery not found *      Imaging Results (All)     Procedure Component Value Units Date/Time    XR Chest 1 View [[id number]] Collected: 04/06/22 1204     Updated: 04/06/22 1218    Narrative:      PORTABLE VIEW OF THE CHEST 04/06/2022     CLINICAL HISTORY: Dyspnea, increasing O2 requirements and history of  COPD.     This is correlated to prior portable chest x-ray 2 days ago  on  04/04/2022.     FINDINGS: The cardiomediastinal silhouette is upper limits normal to  mildly enlarged. Pulmonary vasculature is within normal limits. There is  chronic linear stranding in the lateral aspect of the left midlung zone  and peripheral aspect of the lung bases similar to prior chest x-rays  likely linear areas of scarring. No focal dense airspace consolidation  seen costophrenic angles are sharp.       Impression:      1. No definite active disease is seen in the chest with no significant  change when compared to prior chest x-rays 04/04/2022 and 05/23/2019.  There is chronic linear scarring at lateral aspect left mid and lower  lung zone and lateral right lung base. There is evidence of pulmonary  emphysematous changes in the upper lung zones most pronounced in the  right upper lung zone.     This report was finalized on 4/6/2022 12:15 PM by Dr. Genaro Cordoba M.D.       XR Chest 1 View [[id number]] Collected: 04/04/22 1441     Updated: 04/04/22 1450    Narrative:      XR CHEST 1 VW-     HISTORY: Male who is 66 years-old,  short of breath     TECHNIQUE: Frontal views of the chest     COMPARISON: 05/19/2021     FINDINGS: The heart size is normal. Aorta is calcified. Pulmonary  vasculature is unremarkable. No focal pulmonary consolidation, pleural  effusion, or pneumothorax. No acute osseous process.       Impression:      No focal pulmonary consolidation. Follow-up as clinical  indications persist.     This report was finalized on 4/4/2022 2:46 PM by Dr. Diogo Suarez M.D.           Results for orders placed during the hospital encounter of 05/23/19    Duplex Renal Artery - Bilateral Complete CAR    Interpretation Summary  · Normal right renal artery.  · Normal left renal artery.    Results for orders placed during the hospital encounter of 01/31/20    Adult Transthoracic Echo Complete W/ Cont if Necessary Per Protocol    Interpretation Summary  · Left ventricular systolic function is low normal.  Calculated EF = 50%. Estimated EF was in agreement with the calculated EF. Global Longitudinal LV strain = -16.4%. Strain data was reviewed and speckle tracking was considered accurate. Normal left ventricular cavity size noted. Left ventricular wall thickness is consistent with mild concentric hypertrophy. Left ventricular diastolic dysfunction is noted (grade I) consistent with impaired relaxation.  · See wall scoring diagram.    Pertinent Labs     Results from last 7 days   Lab Units 04/07/22  0505 04/05/22  0605 04/04/22  1421   WBC 10*3/mm3 9.48 4.77 8.63   HEMOGLOBIN g/dL 15.4 14.8 16.4   PLATELETS 10*3/mm3 251 218 221     Results from last 7 days   Lab Units 04/09/22  0617 04/08/22  0624 04/07/22 0505 04/06/22  0409   SODIUM mmol/L 136 137 140 139   POTASSIUM mmol/L 4.3 4.9 4.4 4.1   CHLORIDE mmol/L 94* 94* 98 103   CO2 mmol/L 33.0* 27.4 30.7* 24.5   BUN mg/dL 47* 38* 32* 30*   CREATININE mg/dL 1.65* 1.50* 1.43* 1.37*   GLUCOSE mg/dL 125* 126* 120* 128*   EGFR mL/min/1.73 45.5* 51.0* 54.0* 56.9*     Results from last 7 days   Lab Units 04/08/22  0624 04/06/22  0409 04/04/22  1421   ALBUMIN g/dL 4.10 3.80 4.10   BILIRUBIN mg/dL  --   --  0.3   ALK PHOS U/L  --   --  72   AST (SGOT) U/L  --   --  15   ALT (SGPT) U/L  --   --  11     Results from last 7 days   Lab Units 04/09/22  0617 04/08/22  0624 04/07/22  0505 04/06/22  0409 04/05/22  0605 04/04/22  1421   CALCIUM mg/dL 9.4 9.9 9.5 8.7   < > 9.4   ALBUMIN g/dL  --  4.10  --  3.80  --  4.10   MAGNESIUM mg/dL  --   --   --   --   --  2.1   PHOSPHORUS mg/dL  --  4.5  --  2.9  --   --     < > = values in this interval not displayed.       Results from last 7 days   Lab Units 04/04/22  1421   TROPONIN T ng/mL 0.018   PROBNP pg/mL 726.0     Results from last 7 days   Lab Units 04/09/22  0617 04/05/22  1506   CREATININE UR mg/dL  --  46.5   PROTEIN TOTAL URINE mg/dL  --  23.1   URIC ACID mg/dL 8.9*  --    PROT/CREAT RATIO UR mg/G Crea  --  496.8*         Invalid  input(s): LDLCALC      Results from last 7 days   Lab Units 04/04/22  1423   COVID19  Not Detected       Test Results Pending at Discharge       Discharge Details        Discharge Medications      New Medications      Instructions Start Date   albuterol (2.5 MG/3ML) 0.083% nebulizer solution  Commonly known as: PROVENTIL   2.5 mg, Nebulization, Every 4 Hours PRN, J44.1      guaiFENesin 600 MG 12 hr tablet  Commonly known as: MUCINEX   600 mg, Oral, Every 12 Hours Scheduled      predniSONE 10 MG tablet  Commonly known as: DELTASONE   Take 4 tablets by mouth Daily for 3 days, THEN 3 tablets Daily for 2 days, THEN 2 tablets Daily for 2 days, THEN 1 tablet Daily for 2 days, THEN 0.5 tablets Daily for 2 days.   Start Date: April 10, 2022     torsemide 20 MG tablet  Commonly known as: DEMADEX   20 mg, Oral, Daily   Start Date: April 11, 2022        Changes to Medications      Instructions Start Date   gabapentin 300 MG capsule  Commonly known as: NEURONTIN  What changed:   · how much to take  · when to take this   300 mg, Oral, 3 Times Daily      vitamin D3 125 MCG (5000 UT) tablet tablet  What changed: when to take this   5,000 Units, Oral, Daily         Continue These Medications      Instructions Start Date   ALPRAZolam 1 MG tablet  Commonly known as: XANAX   1 mg, Oral, 3 Times Daily PRN      amLODIPine 10 MG tablet  Commonly known as: NORVASC   TAKE 1 TABLET BY MOUTH EVERY DAY      aspirin 81 MG chewable tablet   81 mg, Oral, Nightly      buPROPion  MG 24 hr tablet  Commonly known as: WELLBUTRIN XL   300 mg, Oral, Daily      clopidogrel 75 MG tablet  Commonly known as: PLAVIX   TAKE 1 TABLET BY MOUTH DAILY AT NIGHT      Coenzyme Q10 400 MG capsule   400 mg, Oral, Every Evening      FERROUS SULFATE PO   65 mg, Oral, Nightly      Fluticasone-Umeclidin-Vilant 100-62.5-25 MCG/INH inhaler  Commonly known as: Trelegy Ellipta   1 each, Inhalation, Daily      isosorbide mononitrate 30 MG 24 hr tablet  Commonly known  as: IMDUR   TAKE 1 TABLET BY MOUTH DAILY EVERY MORNING      latanoprost 0.005 % ophthalmic solution  Commonly known as: XALATAN   1 drop, Both Eyes, Every Night at Bedtime      levothyroxine 125 MCG tablet  Commonly known as: SYNTHROID, LEVOTHROID   TAKE 1 TABLET BY MOUTH EVERY DAY      multivitamin with minerals tablet tablet   1 tablet, Oral, Daily      NIACIN PO   1 capsule, Oral, Every Morning      omeprazole 20 MG capsule  Commonly known as: priLOSEC   TAKE 1 CAPSULE BY MOUTH EVERY DAY IN THE MORNING      simvastatin 40 MG tablet  Commonly known as: ZOCOR   20 mg, Oral, Nightly      traMADol 50 MG tablet  Commonly known as: ULTRAM   50 mg, Oral, 2 Times Daily      VITAMIN B-1 PO   1 tablet, Oral, Daily             Allergies   Allergen Reactions   • Augmentin [Amoxicillin-Pot Clavulanate] Hives and Rash       Discharge Disposition:  Home or Self Care      Discharge Diet:  Diet Order   Procedures   • Diet Regular; Cardiac       Discharge Activity:   Activity Instructions     Activity as Tolerated            CODE STATUS:    Code Status and Medical Interventions:   Ordered at: 04/04/22 8790     Code Status (Patient has no pulse and is not breathing):    CPR (Attempt to Resuscitate)     Medical Interventions (Patient has pulse or is breathing):    Full       Future Appointments   Date Time Provider Department Center   9/15/2022 10:00 AM Seth Hester MD [facility]      Follow-up Information     Seth Hester MD .    Specialties: Family Medicine, Emergency Medicine, Urgent Care  Contact information:  [address] [place] [address] [phone]             Ishmael Tate Jr., MD Follow up in 2 week(s).    Specialty: Pulmonary Disease  Contact information:  [address]  [facility] [address]  [facility] [id number] [phone]             Rajesh Sarabia MD Follow up in 3 week(s).    Specialty: Nephrology  Contact information:  [address]  [facility] [address]  [facility] [id number] [phone]                          Time Spent on Discharge:  Greater than 30 minutes      Modesto Hu MD  [facility]  04/10/22  12:30 EDT                Electronically signed by Modesto Hu MD at 04/10/22 2696

## 2022-04-11 NOTE — CASE MANAGEMENT/SOCIAL WORK
Case Management Discharge Note      Final Note: Home with wife. [facility]         Selected Continued Care - Discharged on 4/10/2022 Admission date: 4/4/2022 - Discharge disposition: Home or Self Care    Destination    No services have been selected for the patient.              Durable Medical Equipment    No services have been selected for the patient.              Dialysis/Infusion    No services have been selected for the patient.              Home Medical Care    No services have been selected for the patient.              Therapy    No services have been selected for the patient.              Community Resources    No services have been selected for the patient.              Community & DME    No services have been selected for the patient.                       Final Discharge Disposition Code: 01 - home or self-care

## 2022-04-11 NOTE — OUTREACH NOTE
Call Center TCM Note    Flowsheet Row Responses   [facility] patient discharged from? [place]   Does the patient have one of the following disease processes/diagnoses(primary or secondary)? COPD/Pneumonia   Was the primary reason for admission: COPD exacerbation   TCM attempt successful? No   Unsuccessful attempts Attempt 2          Christelle King MA    4/11/2022, 16:57 EDT

## 2022-04-12 ENCOUNTER — TRANSITIONAL CARE MANAGEMENT TELEPHONE ENCOUNTER (OUTPATIENT)
Dept: CALL CENTER | Facility: HOSPITAL | Age: 67
End: 2022-04-12

## 2022-04-12 NOTE — OUTREACH NOTE
Call Center TCM Note    Flowsheet Row Responses   [facility] patient discharged from? [place]   Does the patient have one of the following disease processes/diagnoses(primary or secondary)? COPD/Pneumonia   Was the primary reason for admission: COPD exacerbation   TCM attempt successful? No   Unsuccessful attempts Attempt 3          Christelle Rutherford RN    4/12/2022, 11:41 EDT

## 2022-04-20 ENCOUNTER — READMISSION MANAGEMENT (OUTPATIENT)
Dept: CALL CENTER | Facility: HOSPITAL | Age: 67
End: 2022-04-20

## 2022-04-20 NOTE — OUTREACH NOTE
COPD/PN Week 1 Survey    Flowsheet Row Responses   [facility] patient discharged from? [place]   Does the patient have one of the following disease processes/diagnoses(primary or secondary)? COPD/Pneumonia   Was the primary reason for admission: COPD exacerbation          MARILU MONK - Registered Nurse

## 2022-05-04 RX ORDER — LEVOTHYROXINE SODIUM 0.12 MG/1
TABLET ORAL
Qty: 90 TABLET | Refills: 3 | Status: SHIPPED | OUTPATIENT
Start: 2022-05-04 | End: 2022-09-19 | Stop reason: SDUPTHER

## 2022-05-18 ENCOUNTER — APPOINTMENT (OUTPATIENT)
Dept: GENERAL RADIOLOGY | Facility: HOSPITAL | Age: 67
End: 2022-05-18

## 2022-05-18 ENCOUNTER — HOSPITAL ENCOUNTER (INPATIENT)
Facility: HOSPITAL | Age: 67
LOS: 6 days | Discharge: HOME | End: 2022-05-24
Attending: EMERGENCY MEDICINE | Admitting: INTERNAL MEDICINE

## 2022-05-18 ENCOUNTER — APPOINTMENT (OUTPATIENT)
Dept: CT IMAGING | Facility: HOSPITAL | Age: 67
End: 2022-05-18

## 2022-05-18 DIAGNOSIS — N17.9 ACUTE KIDNEY INJURY: ICD-10-CM

## 2022-05-18 DIAGNOSIS — R09.02 HYPOXIA: ICD-10-CM

## 2022-05-18 DIAGNOSIS — I21.A1 MYOCARDIAL INFARCTION DUE TO DEMAND ISCHEMIA: ICD-10-CM

## 2022-05-18 DIAGNOSIS — J18.9 PNEUMONIA OF LEFT LOWER LOBE DUE TO INFECTIOUS ORGANISM: ICD-10-CM

## 2022-05-18 DIAGNOSIS — J18.9 HOSPITAL-ACQUIRED PNEUMONIA: Primary | ICD-10-CM
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
## 2022-05-18 PROBLEM — N18.31 STAGE 3A CHRONIC KIDNEY DISEASE: Status: ACTIVE | Noted: 2021-01-06

## 2022-05-18 LAB
ALBUMIN SERPL-MCNC: 3.3 G/DL (ref 3.5–5.2)
ALBUMIN/GLOB SERPL: 0.8 G/DL
ALP SERPL-CCNC: 84 U/L (ref 39–117)
ALT SERPL W P-5'-P-CCNC: 9 U/L (ref 1–41)
ANION GAP SERPL CALCULATED.3IONS-SCNC: 13.4 MMOL/L (ref 5–15)
ARTERIAL PATENCY WRIST A: POSITIVE
AST SERPL-CCNC: <5 U/L (ref 1–40)
ATMOSPHERIC PRESS: 745.7 MMHG
B PARAPERT DNA SPEC QL NAA+PROBE: NOT DETECTED
B PERT DNA SPEC QL NAA+PROBE: NOT DETECTED
BASE EXCESS BLDA CALC-SCNC: 2.2 MMOL/L (ref 0–2)
BASOPHILS # BLD AUTO: 0.1 10*3/MM3 (ref 0–0.2)
BASOPHILS NFR BLD AUTO: 1 % (ref 0–1.5)
BDY SITE: ABNORMAL
BILIRUB SERPL-MCNC: 0.2 MG/DL (ref 0–1.2)
BUN SERPL-MCNC: 32 MG/DL (ref 8–23)
BUN/CREAT SERPL: 15.8 (ref 7–25)
C PNEUM DNA NPH QL NAA+NON-PROBE: NOT DETECTED
CALCIUM SPEC-SCNC: 10.4 MG/DL (ref 8.6–10.5)
CHLORIDE SERPL-SCNC: 103 MMOL/L (ref 98–107)
CO2 SERPL-SCNC: 24.6 MMOL/L (ref 22–29)
CREAT SERPL-MCNC: 2.03 MG/DL (ref 0.76–1.27)
D-LACTATE SERPL-SCNC: 2 MMOL/L (ref 0.5–2)
DEPRECATED RDW RBC AUTO: 42.6 FL (ref 37–54)
EGFRCR SERPLBLD CKD-EPI 2021: 35.5 ML/MIN/1.73
EOSINOPHIL # BLD AUTO: 0.41 10*3/MM3 (ref 0–0.4)
EOSINOPHIL NFR BLD AUTO: 4.1 % (ref 0.3–6.2)
ERYTHROCYTE [DISTWIDTH] IN BLOOD BY AUTOMATED COUNT: 13.3 % (ref 12.3–15.4)
FLUAV SUBTYP SPEC NAA+PROBE: NOT DETECTED
FLUBV RNA ISLT QL NAA+PROBE: NOT DETECTED
GAS FLOW AIRWAY: 4 LPM
GLOBULIN UR ELPH-MCNC: 3.9 GM/DL
GLUCOSE SERPL-MCNC: 92 MG/DL (ref 65–99)
HADV DNA SPEC NAA+PROBE: NOT DETECTED
HCO3 BLDA-SCNC: 27.1 MMOL/L (ref 22–28)
HCOV 229E RNA SPEC QL NAA+PROBE: NOT DETECTED
HCOV HKU1 RNA SPEC QL NAA+PROBE: NOT DETECTED
HCOV NL63 RNA SPEC QL NAA+PROBE: NOT DETECTED
HCOV OC43 RNA SPEC QL NAA+PROBE: NOT DETECTED
HCT VFR BLD AUTO: 42.3 % (ref 37.5–51)
HGB BLD-MCNC: 14.2 G/DL (ref 13–17.7)
HMPV RNA NPH QL NAA+NON-PROBE: NOT DETECTED
HOLD SPECIMEN: NORMAL
HPIV1 RNA ISLT QL NAA+PROBE: NOT DETECTED
HPIV2 RNA SPEC QL NAA+PROBE: NOT DETECTED
HPIV3 RNA NPH QL NAA+PROBE: NOT DETECTED
HPIV4 P GENE NPH QL NAA+PROBE: NOT DETECTED
IMM GRANULOCYTES # BLD AUTO: 0.27 10*3/MM3 (ref 0–0.05)
IMM GRANULOCYTES NFR BLD AUTO: 2.7 % (ref 0–0.5)
LYMPHOCYTES # BLD AUTO: 1.57 10*3/MM3 (ref 0.7–3.1)
LYMPHOCYTES NFR BLD AUTO: 15.9 % (ref 19.6–45.3)
M PNEUMO IGG SER IA-ACNC: NOT DETECTED
MCH RBC QN AUTO: 30.1 PG (ref 26.6–33)
MCHC RBC AUTO-ENTMCNC: 33.6 G/DL (ref 31.5–35.7)
MCV RBC AUTO: 89.6 FL (ref 79–97)
MODALITY: ABNORMAL
MONOCYTES # BLD AUTO: 0.77 10*3/MM3 (ref 0.1–0.9)
MONOCYTES NFR BLD AUTO: 7.8 % (ref 5–12)
NEUTROPHILS NFR BLD AUTO: 6.76 10*3/MM3 (ref 1.7–7)
NEUTROPHILS NFR BLD AUTO: 68.5 % (ref 42.7–76)
NRBC BLD AUTO-RTO: 0 /100 WBC (ref 0–0.2)
NT-PROBNP SERPL-MCNC: 484 PG/ML (ref 0–900)
PCO2 BLDA: 42.1 MM HG (ref 35–45)
PH BLDA: 7.42 PH UNITS (ref 7.35–7.45)
PLATELET # BLD AUTO: 517 10*3/MM3 (ref 140–450)
PMV BLD AUTO: 8.9 FL (ref 6–12)
PO2 BLDA: 111.8 MM HG (ref 80–100)
POTASSIUM SERPL-SCNC: 4.2 MMOL/L (ref 3.5–5.2)
PROCALCITONIN SERPL-MCNC: 0.19 NG/ML (ref 0–0.25)
PROT SERPL-MCNC: 7.2 G/DL (ref 6–8.5)
RBC # BLD AUTO: 4.72 10*6/MM3 (ref 4.14–5.8)
RHINOVIRUS RNA SPEC NAA+PROBE: NOT DETECTED
RSV RNA NPH QL NAA+NON-PROBE: NOT DETECTED
SAO2 % BLDCOA: 98.4 % (ref 92–99)
SARS-COV-2 RNA NPH QL NAA+NON-PROBE: NOT DETECTED
SODIUM SERPL-SCNC: 141 MMOL/L (ref 136–145)
TOTAL RATE: 16 BREATHS/MINUTE
TROPONIN T SERPL-MCNC: 0.03 NG/ML (ref 0–0.03)
TROPONIN T SERPL-MCNC: 0.04 NG/ML (ref 0–0.03)
WBC NRBC COR # BLD: 9.88 10*3/MM3 (ref 3.4–10.8)
WHOLE BLOOD HOLD COAG: NORMAL
WHOLE BLOOD HOLD SPECIMEN: NORMAL

## 2022-05-18 PROCEDURE — 93010 ELECTROCARDIOGRAM REPORT: CPT | Performed by: INTERNAL MEDICINE

## 2022-05-18 PROCEDURE — 0202U NFCT DS 22 TRGT SARS-COV-2: CPT | Performed by: PHYSICIAN ASSISTANT

## 2022-05-18 PROCEDURE — 71046 X-RAY EXAM CHEST 2 VIEWS: CPT

## 2022-05-18 PROCEDURE — 93005 ELECTROCARDIOGRAM TRACING: CPT | Performed by: PHYSICIAN ASSISTANT

## 2022-05-18 PROCEDURE — 99285 EMERGENCY DEPT VISIT HI MDM: CPT

## 2022-05-18 PROCEDURE — 84484 ASSAY OF TROPONIN QUANT: CPT | Performed by: PHYSICIAN ASSISTANT

## 2022-05-18 PROCEDURE — 36600 WITHDRAWAL OF ARTERIAL BLOOD: CPT

## 2022-05-18 PROCEDURE — 82803 BLOOD GASES ANY COMBINATION: CPT

## 2022-05-18 PROCEDURE — 84484 ASSAY OF TROPONIN QUANT: CPT | Performed by: NURSE PRACTITIONER

## 2022-05-18 PROCEDURE — 84145 PROCALCITONIN (PCT): CPT | Performed by: PHYSICIAN ASSISTANT

## 2022-05-18 PROCEDURE — 87040 BLOOD CULTURE FOR BACTERIA: CPT | Performed by: PHYSICIAN ASSISTANT

## 2022-05-18 PROCEDURE — 83605 ASSAY OF LACTIC ACID: CPT | Performed by: PHYSICIAN ASSISTANT

## 2022-05-18 PROCEDURE — 25010000002 CEFEPIME PER 500 MG: Performed by: PHYSICIAN ASSISTANT

## 2022-05-18 PROCEDURE — 71250 CT THORAX DX C-: CPT

## 2022-05-18 PROCEDURE — 80053 COMPREHEN METABOLIC PANEL: CPT | Performed by: PHYSICIAN ASSISTANT

## 2022-05-18 PROCEDURE — 85025 COMPLETE CBC W/AUTO DIFF WBC: CPT | Performed by: PHYSICIAN ASSISTANT

## 2022-05-18 PROCEDURE — 83880 ASSAY OF NATRIURETIC PEPTIDE: CPT | Performed by: PHYSICIAN ASSISTANT

## 2022-05-18 RX ORDER — ASPIRIN 81 MG/1
81 TABLET, CHEWABLE ORAL NIGHTLY
Status: DISCONTINUED | OUTPATIENT
Start: 2022-05-18 | End: 2022-05-24 | Stop reason: HOSPADM

## 2022-05-18 RX ORDER — ISOSORBIDE MONONITRATE 30 MG/1
30 TABLET, EXTENDED RELEASE ORAL DAILY
Status: DISCONTINUED | OUTPATIENT
Start: 2022-05-19 | End: 2022-05-24 | Stop reason: HOSPADM

## 2022-05-18 RX ORDER — ACETAMINOPHEN 325 MG/1
650 TABLET ORAL EVERY 4 HOURS PRN
Status: DISCONTINUED | OUTPATIENT
Start: 2022-05-18 | End: 2022-05-24 | Stop reason: HOSPADM

## 2022-05-18 RX ORDER — LEVOFLOXACIN 5 MG/ML
750 INJECTION, SOLUTION INTRAVENOUS ONCE
Status: DISCONTINUED | OUTPATIENT
Start: 2022-05-18 | End: 2022-05-18

## 2022-05-18 RX ORDER — IPRATROPIUM BROMIDE AND ALBUTEROL SULFATE 2.5; .5 MG/3ML; MG/3ML
3 SOLUTION RESPIRATORY (INHALATION)
Status: DISCONTINUED | OUTPATIENT
Start: 2022-05-18 | End: 2022-05-24 | Stop reason: HOSPADM

## 2022-05-18 RX ORDER — NITROGLYCERIN 0.4 MG/1
0.4 TABLET SUBLINGUAL
Status: DISCONTINUED | OUTPATIENT
Start: 2022-05-18 | End: 2022-05-24 | Stop reason: HOSPADM

## 2022-05-18 RX ORDER — ALPRAZOLAM 0.5 MG/1
1 TABLET ORAL 3 TIMES DAILY PRN
Status: DISCONTINUED | OUTPATIENT
Start: 2022-05-18 | End: 2022-05-24 | Stop reason: HOSPADM

## 2022-05-18 RX ORDER — ALBUTEROL SULFATE 0.63 MG/3ML
0.63 SOLUTION RESPIRATORY (INHALATION) EVERY 6 HOURS PRN
Status: DISCONTINUED | OUTPATIENT
Start: 2022-05-18 | End: 2022-05-24 | Stop reason: HOSPADM

## 2022-05-18 RX ORDER — BUPROPION HYDROCHLORIDE 150 MG/1
150 TABLET ORAL DAILY
Status: DISCONTINUED | OUTPATIENT
Start: 2022-05-19 | End: 2022-05-24 | Stop reason: HOSPADM

## 2022-05-18 RX ORDER — PANTOPRAZOLE SODIUM 40 MG/1
40 TABLET, DELAYED RELEASE ORAL EVERY MORNING
Refills: 3 | Status: DISCONTINUED | OUTPATIENT
Start: 2022-05-19 | End: 2022-05-24 | Stop reason: HOSPADM

## 2022-05-18 RX ORDER — LATANOPROST 50 UG/ML
1 SOLUTION/ DROPS OPHTHALMIC NIGHTLY
Status: DISCONTINUED | OUTPATIENT
Start: 2022-05-18 | End: 2022-05-24 | Stop reason: HOSPADM

## 2022-05-18 RX ORDER — TRAMADOL HYDROCHLORIDE 50 MG/1
50 TABLET ORAL 2 TIMES DAILY PRN
Status: DISCONTINUED | OUTPATIENT
Start: 2022-05-18 | End: 2022-05-24 | Stop reason: HOSPADM

## 2022-05-18 RX ORDER — SODIUM CHLORIDE 0.9 % (FLUSH) 0.9 %
10 SYRINGE (ML) INJECTION AS NEEDED
Status: DISCONTINUED | OUTPATIENT
Start: 2022-05-18 | End: 2022-05-19

## 2022-05-18 RX ORDER — ONDANSETRON 2 MG/ML
4 INJECTION INTRAMUSCULAR; INTRAVENOUS EVERY 6 HOURS PRN
Status: DISCONTINUED | OUTPATIENT
Start: 2022-05-18 | End: 2022-05-24 | Stop reason: HOSPADM

## 2022-05-18 RX ORDER — ATORVASTATIN CALCIUM 20 MG/1
20 TABLET, FILM COATED ORAL DAILY
Refills: 3 | Status: DISCONTINUED | OUTPATIENT
Start: 2022-05-19 | End: 2022-05-20

## 2022-05-18 RX ORDER — CLOPIDOGREL BISULFATE 75 MG/1
75 TABLET ORAL NIGHTLY
Status: DISCONTINUED | OUTPATIENT
Start: 2022-05-18 | End: 2022-05-24 | Stop reason: HOSPADM

## 2022-05-18 RX ORDER — AMLODIPINE BESYLATE 10 MG/1
10 TABLET ORAL DAILY
Status: DISCONTINUED | OUTPATIENT
Start: 2022-05-19 | End: 2022-05-24 | Stop reason: HOSPADM

## 2022-05-18 RX ORDER — ALUMINA, MAGNESIA, AND SIMETHICONE 2400; 2400; 240 MG/30ML; MG/30ML; MG/30ML
15 SUSPENSION ORAL EVERY 6 HOURS PRN
Status: DISCONTINUED | OUTPATIENT
Start: 2022-05-18 | End: 2022-05-24 | Stop reason: HOSPADM

## 2022-05-18 RX ORDER — GABAPENTIN 300 MG/1
300 CAPSULE ORAL 3 TIMES DAILY
Status: DISCONTINUED | OUTPATIENT
Start: 2022-05-18 | End: 2022-05-24 | Stop reason: HOSPADM

## 2022-05-18 RX ORDER — ONDANSETRON 4 MG/1
4 TABLET, FILM COATED ORAL EVERY 6 HOURS PRN
Status: DISCONTINUED | OUTPATIENT
Start: 2022-05-18 | End: 2022-05-24 | Stop reason: HOSPADM

## 2022-05-18 RX ORDER — LEVOTHYROXINE SODIUM 0.12 MG/1
125 TABLET ORAL
Status: DISCONTINUED | OUTPATIENT
Start: 2022-05-19 | End: 2022-05-24 | Stop reason: HOSPADM

## 2022-05-18 RX ORDER — SODIUM CHLORIDE, SODIUM LACTATE, POTASSIUM CHLORIDE, CALCIUM CHLORIDE 600; 310; 30; 20 MG/100ML; MG/100ML; MG/100ML; MG/100ML
75 INJECTION, SOLUTION INTRAVENOUS CONTINUOUS
Status: ACTIVE | OUTPATIENT
Start: 2022-05-18 | End: 2022-05-19

## 2022-05-18 RX ORDER — SODIUM CHLORIDE 0.9 % (FLUSH) 0.9 %
10 SYRINGE (ML) INJECTION AS NEEDED
Status: DISCONTINUED | OUTPATIENT
Start: 2022-05-18 | End: 2022-05-24 | Stop reason: HOSPADM

## 2022-05-18 RX ADMIN — SODIUM CHLORIDE, POTASSIUM CHLORIDE, SODIUM LACTATE AND CALCIUM CHLORIDE 75 ML/HR: 600; 310; 30; 20 INJECTION, SOLUTION INTRAVENOUS at 22:11

## 2022-05-18 RX ADMIN — SODIUM CHLORIDE 500 ML: 9 INJECTION, SOLUTION INTRAVENOUS at 19:14

## 2022-05-18 RX ADMIN — CEFEPIME HYDROCHLORIDE 2 G: 2 INJECTION, POWDER, FOR SOLUTION INTRAVENOUS at 18:05

## 2022-05-19 PROBLEM — I21.9 DEMAND MYOCARDIAL INFARCTION: Status: ACTIVE | Noted: 2022-05-19

## 2022-05-19 LAB
ANION GAP SERPL CALCULATED.3IONS-SCNC: 11.1 MMOL/L (ref 5–15)
BACTERIA SPEC RESP CULT: NORMAL
BUN SERPL-MCNC: 28 MG/DL (ref 8–23)
BUN/CREAT SERPL: 15.8 (ref 7–25)
CALCIUM SPEC-SCNC: 9.2 MG/DL (ref 8.6–10.5)
CHLORIDE SERPL-SCNC: 104 MMOL/L (ref 98–107)
CO2 SERPL-SCNC: 26.9 MMOL/L (ref 22–29)
CREAT SERPL-MCNC: 1.77 MG/DL (ref 0.76–1.27)
DEPRECATED RDW RBC AUTO: 44.6 FL (ref 37–54)
EGFRCR SERPLBLD CKD-EPI 2021: 41.8 ML/MIN/1.73
ERYTHROCYTE [DISTWIDTH] IN BLOOD BY AUTOMATED COUNT: 13.4 % (ref 12.3–15.4)
GLUCOSE SERPL-MCNC: 87 MG/DL (ref 65–99)
GRAM STN SPEC: NORMAL
HCT VFR BLD AUTO: 40.2 % (ref 37.5–51)
HGB BLD-MCNC: 12.8 G/DL (ref 13–17.7)
L PNEUMO1 AG UR QL IA: NEGATIVE
MCH RBC QN AUTO: 29.2 PG (ref 26.6–33)
MCHC RBC AUTO-ENTMCNC: 31.8 G/DL (ref 31.5–35.7)
MCV RBC AUTO: 91.6 FL (ref 79–97)
PLATELET # BLD AUTO: 446 10*3/MM3 (ref 140–450)
PMV BLD AUTO: 8.8 FL (ref 6–12)
POTASSIUM SERPL-SCNC: 4.1 MMOL/L (ref 3.5–5.2)
QT INTERVAL: 451 MS
RBC # BLD AUTO: 4.39 10*6/MM3 (ref 4.14–5.8)
S PNEUM AG SPEC QL LA: NEGATIVE
SODIUM SERPL-SCNC: 142 MMOL/L (ref 136–145)
TROPONIN T SERPL-MCNC: 0.04 NG/ML (ref 0–0.03)
WBC NRBC COR # BLD: 9.73 10*3/MM3 (ref 3.4–10.8)

## 2022-05-19 PROCEDURE — 94799 UNLISTED PULMONARY SVC/PX: CPT

## 2022-05-19 PROCEDURE — 94640 AIRWAY INHALATION TREATMENT: CPT

## 2022-05-19 PROCEDURE — 94761 N-INVAS EAR/PLS OXIMETRY MLT: CPT

## 2022-05-19 PROCEDURE — 36415 COLL VENOUS BLD VENIPUNCTURE: CPT | Performed by: NURSE PRACTITIONER

## 2022-05-19 PROCEDURE — 94664 DEMO&/EVAL PT USE INHALER: CPT

## 2022-05-19 PROCEDURE — 84484 ASSAY OF TROPONIN QUANT: CPT | Performed by: NURSE PRACTITIONER

## 2022-05-19 PROCEDURE — 87899 AGENT NOS ASSAY W/OPTIC: CPT | Performed by: INTERNAL MEDICINE

## 2022-05-19 PROCEDURE — 80048 BASIC METABOLIC PNL TOTAL CA: CPT | Performed by: NURSE PRACTITIONER

## 2022-05-19 PROCEDURE — 93005 ELECTROCARDIOGRAM TRACING: CPT | Performed by: INTERNAL MEDICINE

## 2022-05-19 PROCEDURE — 93010 ELECTROCARDIOGRAM REPORT: CPT | Performed by: INTERNAL MEDICINE

## 2022-05-19 PROCEDURE — 87205 SMEAR GRAM STAIN: CPT | Performed by: NURSE PRACTITIONER

## 2022-05-19 PROCEDURE — 99222 1ST HOSP IP/OBS MODERATE 55: CPT | Performed by: INTERNAL MEDICINE

## 2022-05-19 PROCEDURE — 25010000002 CEFTRIAXONE PER 250 MG: Performed by: INTERNAL MEDICINE

## 2022-05-19 PROCEDURE — 85027 COMPLETE CBC AUTOMATED: CPT | Performed by: NURSE PRACTITIONER

## 2022-05-19 RX ADMIN — CLOPIDOGREL 75 MG: 75 TABLET, FILM COATED ORAL at 00:46

## 2022-05-19 RX ADMIN — ISOSORBIDE MONONITRATE 30 MG: 30 TABLET ORAL at 08:54

## 2022-05-19 RX ADMIN — BUPROPION HYDROCHLORIDE 150 MG: 150 TABLET, EXTENDED RELEASE ORAL at 08:54

## 2022-05-19 RX ADMIN — GABAPENTIN 300 MG: 300 CAPSULE ORAL at 08:54

## 2022-05-19 RX ADMIN — LATANOPROST 1 DROP: 50 SOLUTION OPHTHALMIC at 00:46

## 2022-05-19 RX ADMIN — ASPIRIN 81 MG: 81 TABLET, CHEWABLE ORAL at 00:46

## 2022-05-19 RX ADMIN — ATORVASTATIN CALCIUM 20 MG: 20 TABLET, FILM COATED ORAL at 08:54

## 2022-05-19 RX ADMIN — ALPRAZOLAM 1 MG: 0.5 TABLET ORAL at 08:56

## 2022-05-19 RX ADMIN — AMLODIPINE BESYLATE 10 MG: 10 TABLET ORAL at 08:54

## 2022-05-19 RX ADMIN — IPRATROPIUM BROMIDE AND ALBUTEROL SULFATE 3 ML: 2.5; .5 SOLUTION RESPIRATORY (INHALATION) at 15:01

## 2022-05-19 RX ADMIN — LEVOTHYROXINE SODIUM 125 MCG: 0.12 TABLET ORAL at 06:28

## 2022-05-19 RX ADMIN — CEFTRIAXONE 2 G: 2 INJECTION, POWDER, FOR SOLUTION INTRAMUSCULAR; INTRAVENOUS at 02:26

## 2022-05-19 RX ADMIN — GABAPENTIN 300 MG: 300 CAPSULE ORAL at 16:22

## 2022-05-19 RX ADMIN — IPRATROPIUM BROMIDE AND ALBUTEROL SULFATE 3 ML: 2.5; .5 SOLUTION RESPIRATORY (INHALATION) at 19:21

## 2022-05-19 RX ADMIN — Medication 100 MG: at 08:54

## 2022-05-19 RX ADMIN — CLOPIDOGREL 75 MG: 75 TABLET, FILM COATED ORAL at 20:29

## 2022-05-19 RX ADMIN — PANTOPRAZOLE SODIUM 40 MG: 40 TABLET, DELAYED RELEASE ORAL at 06:28

## 2022-05-19 RX ADMIN — LATANOPROST 1 DROP: 50 SOLUTION OPHTHALMIC at 20:29

## 2022-05-19 RX ADMIN — ALPRAZOLAM 1 MG: 0.5 TABLET ORAL at 00:46

## 2022-05-19 RX ADMIN — IPRATROPIUM BROMIDE AND ALBUTEROL SULFATE 3 ML: 2.5; .5 SOLUTION RESPIRATORY (INHALATION) at 11:01

## 2022-05-19 RX ADMIN — ALPRAZOLAM 1 MG: 0.5 TABLET ORAL at 16:22

## 2022-05-19 RX ADMIN — GABAPENTIN 300 MG: 300 CAPSULE ORAL at 20:29

## 2022-05-19 RX ADMIN — ASPIRIN 81 MG: 81 TABLET, CHEWABLE ORAL at 20:29

## 2022-05-19 RX ADMIN — IPRATROPIUM BROMIDE AND ALBUTEROL SULFATE 3 ML: 2.5; .5 SOLUTION RESPIRATORY (INHALATION) at 07:20

## 2022-05-19 RX ADMIN — GABAPENTIN 300 MG: 300 CAPSULE ORAL at 00:46

## 2022-05-20 ENCOUNTER — ANESTHESIA (OUTPATIENT)
Dept: GASTROENTEROLOGY | Facility: HOSPITAL | Age: 67
End: 2022-05-20

## 2022-05-20 ENCOUNTER — APPOINTMENT (OUTPATIENT)
Dept: GENERAL RADIOLOGY | Facility: HOSPITAL | Age: 67
End: 2022-05-20

## 2022-05-20 ENCOUNTER — ANESTHESIA EVENT (OUTPATIENT)
Dept: GASTROENTEROLOGY | Facility: HOSPITAL | Age: 67
End: 2022-05-20

## 2022-05-20 LAB
ANION GAP SERPL CALCULATED.3IONS-SCNC: 11 MMOL/L (ref 5–15)
BUN SERPL-MCNC: 23 MG/DL (ref 8–23)
BUN/CREAT SERPL: 17.7 (ref 7–25)
CALCIUM SPEC-SCNC: 9.3 MG/DL (ref 8.6–10.5)
CHLORIDE SERPL-SCNC: 100 MMOL/L (ref 98–107)
CO2 SERPL-SCNC: 25 MMOL/L (ref 22–29)
CREAT SERPL-MCNC: 1.3 MG/DL (ref 0.76–1.27)
EGFRCR SERPLBLD CKD-EPI 2021: 60.6 ML/MIN/1.73
GIE STN SPEC: NORMAL
GLUCOSE SERPL-MCNC: 111 MG/DL (ref 65–99)
POTASSIUM SERPL-SCNC: 4.4 MMOL/L (ref 3.5–5.2)
QT INTERVAL: 481 MS
SODIUM SERPL-SCNC: 136 MMOL/L (ref 136–145)

## 2022-05-20 PROCEDURE — 0B9J8ZX DRAINAGE OF LEFT LOWER LUNG LOBE, VIA NATURAL OR ARTIFICIAL OPENING ENDOSCOPIC, DIAGNOSTIC: ICD-10-PCS | Performed by: INTERNAL MEDICINE

## 2022-05-20 PROCEDURE — 88112 CYTOPATH CELL ENHANCE TECH: CPT | Performed by: INTERNAL MEDICINE

## 2022-05-20 PROCEDURE — 0BDJ8ZX EXTRACTION OF LEFT LOWER LUNG LOBE, VIA NATURAL OR ARTIFICIAL OPENING ENDOSCOPIC, DIAGNOSTIC: ICD-10-PCS | Performed by: INTERNAL MEDICINE

## 2022-05-20 PROCEDURE — 25010000002 PROPOFOL 10 MG/ML EMULSION: Performed by: NURSE ANESTHETIST, CERTIFIED REGISTERED

## 2022-05-20 PROCEDURE — 87102 FUNGUS ISOLATION CULTURE: CPT | Performed by: INTERNAL MEDICINE

## 2022-05-20 PROCEDURE — 88312 SPECIAL STAINS GROUP 1: CPT | Performed by: INTERNAL MEDICINE

## 2022-05-20 PROCEDURE — 80048 BASIC METABOLIC PNL TOTAL CA: CPT | Performed by: HOSPITALIST

## 2022-05-20 PROCEDURE — 76000 FLUOROSCOPY <1 HR PHYS/QHP: CPT

## 2022-05-20 PROCEDURE — 94799 UNLISTED PULMONARY SVC/PX: CPT

## 2022-05-20 PROCEDURE — 87070 CULTURE OTHR SPECIMN AEROBIC: CPT | Performed by: INTERNAL MEDICINE

## 2022-05-20 PROCEDURE — 0BDG8ZX EXTRACTION OF LEFT UPPER LUNG LOBE, VIA NATURAL OR ARTIFICIAL OPENING ENDOSCOPIC, DIAGNOSTIC: ICD-10-PCS | Performed by: INTERNAL MEDICINE

## 2022-05-20 PROCEDURE — 87206 SMEAR FLUORESCENT/ACID STAI: CPT | Performed by: INTERNAL MEDICINE

## 2022-05-20 PROCEDURE — 71045 X-RAY EXAM CHEST 1 VIEW: CPT

## 2022-05-20 PROCEDURE — 87116 MYCOBACTERIA CULTURE: CPT | Performed by: INTERNAL MEDICINE

## 2022-05-20 PROCEDURE — 88305 TISSUE EXAM BY PATHOLOGIST: CPT | Performed by: INTERNAL MEDICINE

## 2022-05-20 PROCEDURE — 25010000002 PHENYLEPHRINE 10 MG/ML SOLUTION: Performed by: NURSE ANESTHETIST, CERTIFIED REGISTERED

## 2022-05-20 PROCEDURE — 87071 CULTURE AEROBIC QUANT OTHER: CPT | Performed by: INTERNAL MEDICINE

## 2022-05-20 PROCEDURE — 94664 DEMO&/EVAL PT USE INHALER: CPT

## 2022-05-20 PROCEDURE — 25010000002 CEFTRIAXONE PER 250 MG: Performed by: HOSPITALIST

## 2022-05-20 PROCEDURE — 94761 N-INVAS EAR/PLS OXIMETRY MLT: CPT

## 2022-05-20 PROCEDURE — 88104 CYTOPATH FL NONGYN SMEARS: CPT | Performed by: INTERNAL MEDICINE

## 2022-05-20 PROCEDURE — 87205 SMEAR GRAM STAIN: CPT | Performed by: INTERNAL MEDICINE

## 2022-05-20 RX ORDER — PROPOFOL 10 MG/ML
VIAL (ML) INTRAVENOUS AS NEEDED
Status: DISCONTINUED | OUTPATIENT
Start: 2022-05-20 | End: 2022-05-20 | Stop reason: SURG

## 2022-05-20 RX ORDER — EPHEDRINE SULFATE 50 MG/ML
INJECTION, SOLUTION INTRAVENOUS AS NEEDED
Status: DISCONTINUED | OUTPATIENT
Start: 2022-05-20 | End: 2022-05-20 | Stop reason: SURG

## 2022-05-20 RX ORDER — LIDOCAINE HYDROCHLORIDE 10 MG/ML
INJECTION, SOLUTION EPIDURAL; INFILTRATION; INTRACAUDAL; PERINEURAL AS NEEDED
Status: DISCONTINUED | OUTPATIENT
Start: 2022-05-20 | End: 2022-05-20 | Stop reason: HOSPADM

## 2022-05-20 RX ORDER — LIDOCAINE HYDROCHLORIDE 20 MG/ML
INJECTION, SOLUTION INFILTRATION; PERINEURAL AS NEEDED
Status: DISCONTINUED | OUTPATIENT
Start: 2022-05-20 | End: 2022-05-20 | Stop reason: SURG

## 2022-05-20 RX ORDER — ATORVASTATIN CALCIUM 20 MG/1
40 TABLET, FILM COATED ORAL DAILY
Status: DISCONTINUED | OUTPATIENT
Start: 2022-05-21 | End: 2022-05-24 | Stop reason: HOSPADM

## 2022-05-20 RX ORDER — PHENYLEPHRINE HYDROCHLORIDE 10 MG/ML
INJECTION INTRAVENOUS AS NEEDED
Status: DISCONTINUED | OUTPATIENT
Start: 2022-05-20 | End: 2022-05-20 | Stop reason: SURG

## 2022-05-20 RX ORDER — SODIUM CHLORIDE 9 MG/ML
30 INJECTION, SOLUTION INTRAVENOUS CONTINUOUS PRN
Status: DISCONTINUED | OUTPATIENT
Start: 2022-05-20 | End: 2022-05-24 | Stop reason: HOSPADM

## 2022-05-20 RX ADMIN — CLOPIDOGREL 75 MG: 75 TABLET, FILM COATED ORAL at 20:19

## 2022-05-20 RX ADMIN — IPRATROPIUM BROMIDE AND ALBUTEROL SULFATE 3 ML: 2.5; .5 SOLUTION RESPIRATORY (INHALATION) at 19:40

## 2022-05-20 RX ADMIN — SODIUM CHLORIDE 30 ML/HR: 9 INJECTION, SOLUTION INTRAVENOUS at 12:53

## 2022-05-20 RX ADMIN — CEFTRIAXONE 1 G: 1 INJECTION, POWDER, FOR SOLUTION INTRAMUSCULAR; INTRAVENOUS at 01:00

## 2022-05-20 RX ADMIN — IPRATROPIUM BROMIDE AND ALBUTEROL SULFATE 3 ML: 2.5; .5 SOLUTION RESPIRATORY (INHALATION) at 10:54

## 2022-05-20 RX ADMIN — ASPIRIN 81 MG: 81 TABLET, CHEWABLE ORAL at 20:19

## 2022-05-20 RX ADMIN — ATORVASTATIN CALCIUM 20 MG: 20 TABLET, FILM COATED ORAL at 08:12

## 2022-05-20 RX ADMIN — PHENYLEPHRINE HYDROCHLORIDE 100 MCG: 10 INJECTION, SOLUTION INTRAVENOUS at 13:49

## 2022-05-20 RX ADMIN — GABAPENTIN 300 MG: 300 CAPSULE ORAL at 16:12

## 2022-05-20 RX ADMIN — GABAPENTIN 300 MG: 300 CAPSULE ORAL at 20:19

## 2022-05-20 RX ADMIN — PROPOFOL 150 MG: 10 INJECTION, EMULSION INTRAVENOUS at 13:40

## 2022-05-20 RX ADMIN — IPRATROPIUM BROMIDE AND ALBUTEROL SULFATE 3 ML: 2.5; .5 SOLUTION RESPIRATORY (INHALATION) at 06:36

## 2022-05-20 RX ADMIN — ISOSORBIDE MONONITRATE 30 MG: 30 TABLET ORAL at 08:12

## 2022-05-20 RX ADMIN — IPRATROPIUM BROMIDE AND ALBUTEROL SULFATE 3 ML: 2.5; .5 SOLUTION RESPIRATORY (INHALATION) at 15:11

## 2022-05-20 RX ADMIN — PHENYLEPHRINE HYDROCHLORIDE 100 MCG: 10 INJECTION, SOLUTION INTRAVENOUS at 13:46

## 2022-05-20 RX ADMIN — EPHEDRINE SULFATE 10 MG: 50 INJECTION INTRAVENOUS at 13:55

## 2022-05-20 RX ADMIN — ALPRAZOLAM 1 MG: 0.5 TABLET ORAL at 20:21

## 2022-05-20 RX ADMIN — BUPROPION HYDROCHLORIDE 150 MG: 150 TABLET, EXTENDED RELEASE ORAL at 08:12

## 2022-05-20 RX ADMIN — EPHEDRINE SULFATE 20 MG: 50 INJECTION INTRAVENOUS at 13:56

## 2022-05-20 RX ADMIN — AMLODIPINE BESYLATE 10 MG: 10 TABLET ORAL at 08:12

## 2022-05-20 RX ADMIN — EPHEDRINE SULFATE 10 MG: 50 INJECTION INTRAVENOUS at 13:54

## 2022-05-20 RX ADMIN — LATANOPROST 1 DROP: 50 SOLUTION OPHTHALMIC at 20:19

## 2022-05-20 RX ADMIN — LIDOCAINE HYDROCHLORIDE 100 MG: 20 INJECTION, SOLUTION INFILTRATION; PERINEURAL at 13:40

## 2022-05-20 RX ADMIN — PHENYLEPHRINE HYDROCHLORIDE 100 MCG: 10 INJECTION, SOLUTION INTRAVENOUS at 13:47

## 2022-05-20 RX ADMIN — Medication 100 MG: at 08:12

## 2022-05-20 RX ADMIN — PANTOPRAZOLE SODIUM 40 MG: 40 TABLET, DELAYED RELEASE ORAL at 08:12

## 2022-05-20 RX ADMIN — PROPOFOL 200 MCG/KG/MIN: 10 INJECTION, EMULSION INTRAVENOUS at 13:40

## 2022-05-20 RX ADMIN — PHENYLEPHRINE HYDROCHLORIDE 200 MCG: 10 INJECTION, SOLUTION INTRAVENOUS at 13:57

## 2022-05-20 RX ADMIN — GABAPENTIN 300 MG: 300 CAPSULE ORAL at 08:12

## 2022-05-20 NOTE — ANESTHESIA PROCEDURE NOTES
Airway  Urgency: elective    Date/Time: 5/20/2022 1:43 PM  Airway not difficult    General Information and Staff    Patient location during procedure: OR  CRNA/CAA: Bina Escamilla CRNA    Indications and Patient Condition  Indications for airway management: airway protection    Preoxygenated: yes  Mask difficulty assessment: 1 - vent by mask    Final Airway Details  Final airway type: supraglottic airway      Successful airway: classic and bronch  Size 4    Number of attempts at approach: 1  Assessment: lips, teeth, and gum same as pre-op    Additional Comments  Unable to pass #5 LMA.  #4 LMA placed easily.  Cuff MOP.

## 2022-05-20 NOTE — ANESTHESIA POSTPROCEDURE EVALUATION
Patient: Sim Agustin    Procedure Summary     Date: 05/20/22 Room / Location:  [facility] /  [facility]    Anesthesia Start: 1336 Anesthesia Stop: 1425    Procedure: BRONCHOSCOPY WITH BIOPSIES, BRUSHINGS, AND BAL UNDER FLUORO (Left Bronchus) Diagnosis:       Pneumonia of left lower lobe due to infectious organism      (Pneumonia of left lower lobe due to infectious organism [J18.9])    Surgeons: Ishmael Tate Jr., MD Provider: Amanda Krueger MD    Anesthesia Type: general ASA Status: 4          Anesthesia Type: general    Vitals  No vitals data found for the desired time range.          Post Anesthesia Care and Evaluation    Patient location during evaluation: bedside  Patient participation: complete - patient participated  Level of consciousness: awake  Pain management: adequate  Airway patency: patent  Anesthetic complications: No anesthetic complications    Cardiovascular status: acceptable  Respiratory status: acceptable  Hydration status: acceptable

## 2022-05-20 NOTE — ANESTHESIA PREPROCEDURE EVALUATION
Anesthesia Evaluation                  Airway   Mallampati: IV  TM distance: >3 FB  Neck ROM: full  No difficulty expected and Small opening  Dental - normal exam     Pulmonary - normal exam   (+) pneumonia , pleural effusion, COPD, recent URI, sleep apnea,   Cardiovascular - normal exam    (+) hypertension 2 medications or greater, past MI , CAD, PVD, hyperlipidemia,       Neuro/Psych  (+) numbness, psychiatric history Anxiety,    GI/Hepatic/Renal/Endo    (+)   renal disease CRI and ARF, thyroid problem     Musculoskeletal     Abdominal    Substance History      OB/GYN          Other                        Anesthesia Plan    ASA 4     general     intravenous induction     Anesthetic plan, all risks, benefits, and alternatives have been provided, discussed and informed consent has been obtained with: patient.        CODE STATUS:    Code Status (Patient has no pulse and is not breathing): CPR (Attempt to Resuscitate)  Medical Interventions (Patient has pulse or is breathing): Full Support       negative - no fever

## 2022-05-21 LAB
ANION GAP SERPL CALCULATED.3IONS-SCNC: 7 MMOL/L (ref 5–15)
BUN SERPL-MCNC: 19 MG/DL (ref 8–23)
BUN/CREAT SERPL: 14.7 (ref 7–25)
CALCIUM SPEC-SCNC: 9.3 MG/DL (ref 8.6–10.5)
CHLORIDE SERPL-SCNC: 102 MMOL/L (ref 98–107)
CO2 SERPL-SCNC: 28 MMOL/L (ref 22–29)
CREAT SERPL-MCNC: 1.29 MG/DL (ref 0.76–1.27)
DEPRECATED RDW RBC AUTO: 43.5 FL (ref 37–54)
EGFRCR SERPLBLD CKD-EPI 2021: 61.2 ML/MIN/1.73
ERYTHROCYTE [DISTWIDTH] IN BLOOD BY AUTOMATED COUNT: 13.4 % (ref 12.3–15.4)
GLUCOSE SERPL-MCNC: 96 MG/DL (ref 65–99)
HCT VFR BLD AUTO: 40.6 % (ref 37.5–51)
HGB BLD-MCNC: 13.6 G/DL (ref 13–17.7)
MCH RBC QN AUTO: 30 PG (ref 26.6–33)
MCHC RBC AUTO-ENTMCNC: 33.5 G/DL (ref 31.5–35.7)
MCV RBC AUTO: 89.4 FL (ref 79–97)
PLATELET # BLD AUTO: 458 10*3/MM3 (ref 140–450)
PMV BLD AUTO: 8.7 FL (ref 6–12)
POTASSIUM SERPL-SCNC: 4.5 MMOL/L (ref 3.5–5.2)
RBC # BLD AUTO: 4.54 10*6/MM3 (ref 4.14–5.8)
SODIUM SERPL-SCNC: 137 MMOL/L (ref 136–145)
WBC NRBC COR # BLD: 9.71 10*3/MM3 (ref 3.4–10.8)

## 2022-05-21 PROCEDURE — 94664 DEMO&/EVAL PT USE INHALER: CPT

## 2022-05-21 PROCEDURE — 85027 COMPLETE CBC AUTOMATED: CPT | Performed by: INTERNAL MEDICINE

## 2022-05-21 PROCEDURE — 94799 UNLISTED PULMONARY SVC/PX: CPT

## 2022-05-21 PROCEDURE — 80048 BASIC METABOLIC PNL TOTAL CA: CPT | Performed by: INTERNAL MEDICINE

## 2022-05-21 PROCEDURE — 25010000002 CEFTRIAXONE PER 250 MG: Performed by: INTERNAL MEDICINE

## 2022-05-21 PROCEDURE — 93010 ELECTROCARDIOGRAM REPORT: CPT | Performed by: INTERNAL MEDICINE

## 2022-05-21 PROCEDURE — 93005 ELECTROCARDIOGRAM TRACING: CPT | Performed by: HOSPITALIST

## 2022-05-21 RX ORDER — TORSEMIDE 20 MG/1
20 TABLET ORAL DAILY
Status: DISCONTINUED | OUTPATIENT
Start: 2022-05-21 | End: 2022-05-24 | Stop reason: HOSPADM

## 2022-05-21 RX ADMIN — ISOSORBIDE MONONITRATE 30 MG: 30 TABLET ORAL at 08:14

## 2022-05-21 RX ADMIN — AMLODIPINE BESYLATE 10 MG: 10 TABLET ORAL at 08:15

## 2022-05-21 RX ADMIN — IPRATROPIUM BROMIDE AND ALBUTEROL SULFATE 3 ML: 2.5; .5 SOLUTION RESPIRATORY (INHALATION) at 10:03

## 2022-05-21 RX ADMIN — GABAPENTIN 300 MG: 300 CAPSULE ORAL at 16:13

## 2022-05-21 RX ADMIN — ATORVASTATIN CALCIUM 40 MG: 20 TABLET, FILM COATED ORAL at 08:14

## 2022-05-21 RX ADMIN — ALPRAZOLAM 1 MG: 0.5 TABLET ORAL at 14:13

## 2022-05-21 RX ADMIN — BUPROPION HYDROCHLORIDE 150 MG: 150 TABLET, EXTENDED RELEASE ORAL at 08:14

## 2022-05-21 RX ADMIN — CEFTRIAXONE 1 G: 1 INJECTION, POWDER, FOR SOLUTION INTRAMUSCULAR; INTRAVENOUS at 02:08

## 2022-05-21 RX ADMIN — IPRATROPIUM BROMIDE AND ALBUTEROL SULFATE 3 ML: 2.5; .5 SOLUTION RESPIRATORY (INHALATION) at 16:48

## 2022-05-21 RX ADMIN — IPRATROPIUM BROMIDE AND ALBUTEROL SULFATE 3 ML: 2.5; .5 SOLUTION RESPIRATORY (INHALATION) at 13:50

## 2022-05-21 RX ADMIN — ASPIRIN 81 MG: 81 TABLET, CHEWABLE ORAL at 20:21

## 2022-05-21 RX ADMIN — Medication 100 MG: at 08:15

## 2022-05-21 RX ADMIN — GABAPENTIN 300 MG: 300 CAPSULE ORAL at 08:14

## 2022-05-21 RX ADMIN — GABAPENTIN 300 MG: 300 CAPSULE ORAL at 20:21

## 2022-05-21 RX ADMIN — CLOPIDOGREL 75 MG: 75 TABLET, FILM COATED ORAL at 20:21

## 2022-05-21 RX ADMIN — PANTOPRAZOLE SODIUM 40 MG: 40 TABLET, DELAYED RELEASE ORAL at 06:42

## 2022-05-21 RX ADMIN — IPRATROPIUM BROMIDE AND ALBUTEROL SULFATE 3 ML: 2.5; .5 SOLUTION RESPIRATORY (INHALATION) at 19:41

## 2022-05-21 RX ADMIN — LEVOTHYROXINE SODIUM 125 MCG: 0.12 TABLET ORAL at 06:42

## 2022-05-21 RX ADMIN — TORSEMIDE 20 MG: 20 TABLET ORAL at 11:18

## 2022-05-21 RX ADMIN — LATANOPROST 1 DROP: 50 SOLUTION OPHTHALMIC at 20:21

## 2022-05-22 PROBLEM — I49.8 JUNCTIONAL CARDIAC ARRHYTHMIA: Status: ACTIVE | Noted: 2022-05-22

## 2022-05-22 LAB
BACTERIA SPEC AEROBE CULT: NORMAL
BACTERIA SPEC RESP CULT: NORMAL
GRAM STN SPEC: NORMAL
QT INTERVAL: 412 MS

## 2022-05-22 PROCEDURE — 94799 UNLISTED PULMONARY SVC/PX: CPT

## 2022-05-22 PROCEDURE — 25010000002 CEFTRIAXONE PER 250 MG: Performed by: INTERNAL MEDICINE

## 2022-05-22 PROCEDURE — 94664 DEMO&/EVAL PT USE INHALER: CPT

## 2022-05-22 RX ORDER — GUAIFENESIN 600 MG/1
1200 TABLET, EXTENDED RELEASE ORAL EVERY 12 HOURS SCHEDULED
Status: DISCONTINUED | OUTPATIENT
Start: 2022-05-22 | End: 2022-05-24 | Stop reason: HOSPADM

## 2022-05-22 RX ADMIN — IPRATROPIUM BROMIDE AND ALBUTEROL SULFATE 3 ML: 2.5; .5 SOLUTION RESPIRATORY (INHALATION) at 12:50

## 2022-05-22 RX ADMIN — CEFTRIAXONE 1 G: 1 INJECTION, POWDER, FOR SOLUTION INTRAMUSCULAR; INTRAVENOUS at 01:37

## 2022-05-22 RX ADMIN — LEVOTHYROXINE SODIUM 125 MCG: 0.12 TABLET ORAL at 06:36

## 2022-05-22 RX ADMIN — IPRATROPIUM BROMIDE AND ALBUTEROL SULFATE 3 ML: 2.5; .5 SOLUTION RESPIRATORY (INHALATION) at 08:15

## 2022-05-22 RX ADMIN — GUAIFENESIN 1200 MG: 600 TABLET, EXTENDED RELEASE ORAL at 10:01

## 2022-05-22 RX ADMIN — ALPRAZOLAM 1 MG: 0.5 TABLET ORAL at 18:26

## 2022-05-22 RX ADMIN — GABAPENTIN 300 MG: 300 CAPSULE ORAL at 07:48

## 2022-05-22 RX ADMIN — AMLODIPINE BESYLATE 10 MG: 10 TABLET ORAL at 07:50

## 2022-05-22 RX ADMIN — GUAIFENESIN 1200 MG: 600 TABLET, EXTENDED RELEASE ORAL at 20:19

## 2022-05-22 RX ADMIN — ATORVASTATIN CALCIUM 40 MG: 20 TABLET, FILM COATED ORAL at 07:48

## 2022-05-22 RX ADMIN — GABAPENTIN 300 MG: 300 CAPSULE ORAL at 20:19

## 2022-05-22 RX ADMIN — GABAPENTIN 300 MG: 300 CAPSULE ORAL at 15:58

## 2022-05-22 RX ADMIN — IPRATROPIUM BROMIDE AND ALBUTEROL SULFATE 3 ML: 2.5; .5 SOLUTION RESPIRATORY (INHALATION) at 16:02

## 2022-05-22 RX ADMIN — LATANOPROST 1 DROP: 50 SOLUTION OPHTHALMIC at 20:19

## 2022-05-22 RX ADMIN — ASPIRIN 81 MG: 81 TABLET, CHEWABLE ORAL at 20:19

## 2022-05-22 RX ADMIN — IPRATROPIUM BROMIDE AND ALBUTEROL SULFATE 3 ML: 2.5; .5 SOLUTION RESPIRATORY (INHALATION) at 20:26

## 2022-05-22 RX ADMIN — BUPROPION HYDROCHLORIDE 150 MG: 150 TABLET, EXTENDED RELEASE ORAL at 07:49

## 2022-05-22 RX ADMIN — CLOPIDOGREL 75 MG: 75 TABLET, FILM COATED ORAL at 20:19

## 2022-05-22 RX ADMIN — ISOSORBIDE MONONITRATE 30 MG: 30 TABLET ORAL at 07:49

## 2022-05-22 RX ADMIN — TORSEMIDE 20 MG: 20 TABLET ORAL at 07:49

## 2022-05-22 RX ADMIN — ALPRAZOLAM 1 MG: 0.5 TABLET ORAL at 01:37

## 2022-05-22 RX ADMIN — Medication 100 MG: at 07:49

## 2022-05-22 RX ADMIN — PANTOPRAZOLE SODIUM 40 MG: 40 TABLET, DELAYED RELEASE ORAL at 06:36

## 2022-05-23 ENCOUNTER — APPOINTMENT (OUTPATIENT)
Dept: GENERAL RADIOLOGY | Facility: HOSPITAL | Age: 67
End: 2022-05-23

## 2022-05-23 LAB
BACTERIA SPEC AEROBE CULT: NORMAL
BACTERIA SPEC AEROBE CULT: NORMAL
LAB AP CASE REPORT: NORMAL
PATH REPORT.FINAL DX SPEC: NORMAL
PATH REPORT.GROSS SPEC: NORMAL

## 2022-05-23 PROCEDURE — 94799 UNLISTED PULMONARY SVC/PX: CPT

## 2022-05-23 PROCEDURE — 71046 X-RAY EXAM CHEST 2 VIEWS: CPT

## 2022-05-23 PROCEDURE — 25010000002 CEFTRIAXONE PER 250 MG: Performed by: INTERNAL MEDICINE

## 2022-05-23 PROCEDURE — 94664 DEMO&/EVAL PT USE INHALER: CPT

## 2022-05-23 PROCEDURE — 94761 N-INVAS EAR/PLS OXIMETRY MLT: CPT

## 2022-05-23 RX ADMIN — LATANOPROST 1 DROP: 50 SOLUTION OPHTHALMIC at 20:29

## 2022-05-23 RX ADMIN — BUPROPION HYDROCHLORIDE 150 MG: 150 TABLET, EXTENDED RELEASE ORAL at 08:31

## 2022-05-23 RX ADMIN — ISOSORBIDE MONONITRATE 30 MG: 30 TABLET ORAL at 08:31

## 2022-05-23 RX ADMIN — TRAMADOL HYDROCHLORIDE 50 MG: 50 TABLET, COATED ORAL at 20:31

## 2022-05-23 RX ADMIN — AMLODIPINE BESYLATE 10 MG: 10 TABLET ORAL at 08:31

## 2022-05-23 RX ADMIN — GABAPENTIN 300 MG: 300 CAPSULE ORAL at 20:29

## 2022-05-23 RX ADMIN — ALPRAZOLAM 1 MG: 0.5 TABLET ORAL at 02:19

## 2022-05-23 RX ADMIN — ATORVASTATIN CALCIUM 40 MG: 20 TABLET, FILM COATED ORAL at 08:31

## 2022-05-23 RX ADMIN — CLOPIDOGREL 75 MG: 75 TABLET, FILM COATED ORAL at 20:29

## 2022-05-23 RX ADMIN — ALPRAZOLAM 1 MG: 0.5 TABLET ORAL at 18:29

## 2022-05-23 RX ADMIN — LEVOTHYROXINE SODIUM 125 MCG: 0.12 TABLET ORAL at 07:03

## 2022-05-23 RX ADMIN — IPRATROPIUM BROMIDE AND ALBUTEROL SULFATE 3 ML: 2.5; .5 SOLUTION RESPIRATORY (INHALATION) at 15:24

## 2022-05-23 RX ADMIN — CEFTRIAXONE 1 G: 1 INJECTION, POWDER, FOR SOLUTION INTRAMUSCULAR; INTRAVENOUS at 02:19

## 2022-05-23 RX ADMIN — IPRATROPIUM BROMIDE AND ALBUTEROL SULFATE 3 ML: 2.5; .5 SOLUTION RESPIRATORY (INHALATION) at 07:10

## 2022-05-23 RX ADMIN — GABAPENTIN 300 MG: 300 CAPSULE ORAL at 16:05

## 2022-05-23 RX ADMIN — PANTOPRAZOLE SODIUM 40 MG: 40 TABLET, DELAYED RELEASE ORAL at 07:05

## 2022-05-23 RX ADMIN — TORSEMIDE 20 MG: 20 TABLET ORAL at 08:31

## 2022-05-23 RX ADMIN — IPRATROPIUM BROMIDE AND ALBUTEROL SULFATE 3 ML: 2.5; .5 SOLUTION RESPIRATORY (INHALATION) at 20:02

## 2022-05-23 RX ADMIN — GUAIFENESIN 1200 MG: 600 TABLET, EXTENDED RELEASE ORAL at 20:29

## 2022-05-23 RX ADMIN — GUAIFENESIN 1200 MG: 600 TABLET, EXTENDED RELEASE ORAL at 08:30

## 2022-05-23 RX ADMIN — GABAPENTIN 300 MG: 300 CAPSULE ORAL at 08:30

## 2022-05-23 RX ADMIN — ASPIRIN 81 MG: 81 TABLET, CHEWABLE ORAL at 20:29

## 2022-05-23 RX ADMIN — Medication 100 MG: at 08:30

## 2022-05-24 ENCOUNTER — READMISSION MANAGEMENT (OUTPATIENT)
Dept: CALL CENTER | Facility: HOSPITAL | Age: 67
End: 2022-05-24

## 2022-05-24 VITALS
RESPIRATION RATE: 18 BRPM | HEART RATE: 78 BPM | WEIGHT: 165.9 LBS | OXYGEN SATURATION: 91 % | SYSTOLIC BLOOD PRESSURE: 109 MMHG | HEIGHT: 68 IN | BODY MASS INDEX: 25.14 KG/M2 | TEMPERATURE: 98.2 F | DIASTOLIC BLOOD PRESSURE: 76 MMHG

## 2022-05-24 LAB
CYTO UR: NORMAL
LAB AP CASE REPORT: NORMAL
LAB AP CLINICAL INFORMATION: NORMAL
LAB AP DIAGNOSIS COMMENT: NORMAL
PATH REPORT.FINAL DX SPEC: NORMAL
PATH REPORT.GROSS SPEC: NORMAL

## 2022-05-24 PROCEDURE — 94664 DEMO&/EVAL PT USE INHALER: CPT

## 2022-05-24 PROCEDURE — 94799 UNLISTED PULMONARY SVC/PX: CPT

## 2022-05-24 RX ADMIN — GUAIFENESIN 1200 MG: 600 TABLET, EXTENDED RELEASE ORAL at 08:16

## 2022-05-24 RX ADMIN — Medication 100 MG: at 08:16

## 2022-05-24 RX ADMIN — TRAMADOL HYDROCHLORIDE 50 MG: 50 TABLET, COATED ORAL at 08:20

## 2022-05-24 RX ADMIN — ISOSORBIDE MONONITRATE 30 MG: 30 TABLET ORAL at 08:16

## 2022-05-24 RX ADMIN — PANTOPRAZOLE SODIUM 40 MG: 40 TABLET, DELAYED RELEASE ORAL at 05:57

## 2022-05-24 RX ADMIN — ATORVASTATIN CALCIUM 40 MG: 20 TABLET, FILM COATED ORAL at 08:16

## 2022-05-24 RX ADMIN — IPRATROPIUM BROMIDE AND ALBUTEROL SULFATE 3 ML: 2.5; .5 SOLUTION RESPIRATORY (INHALATION) at 07:19

## 2022-05-24 RX ADMIN — BUPROPION HYDROCHLORIDE 150 MG: 150 TABLET, EXTENDED RELEASE ORAL at 08:16

## 2022-05-24 RX ADMIN — IPRATROPIUM BROMIDE AND ALBUTEROL SULFATE 3 ML: 2.5; .5 SOLUTION RESPIRATORY (INHALATION) at 10:54

## 2022-05-24 RX ADMIN — AMLODIPINE BESYLATE 10 MG: 10 TABLET ORAL at 08:16

## 2022-05-24 RX ADMIN — GABAPENTIN 300 MG: 300 CAPSULE ORAL at 08:16

## 2022-05-24 RX ADMIN — TORSEMIDE 20 MG: 20 TABLET ORAL at 08:16

## 2022-05-24 RX ADMIN — LEVOTHYROXINE SODIUM 125 MCG: 0.12 TABLET ORAL at 05:57

## 2022-05-24 NOTE — OUTREACH NOTE
Prep Survey    Flowsheet Row Responses   [facility] patient discharged from? [place]   Is LACE score < 7 ? No   Emergency Room discharge w/ pulse ox? No   Eligibility [facility]   Date of Admission 05/18/22   Date of Discharge 05/24/22   Discharge Disposition Home or Self Care   Discharge diagnosis Left lower lobe pneumonia    Does the patient have one of the following disease processes/diagnoses(primary or secondary)? COPD/Pneumonia   Does the patient have Home health ordered? No   Is there a DME ordered? No   Prep survey completed? Yes          ADA SINHA - Registered Nurse

## 2022-05-25 ENCOUNTER — TRANSITIONAL CARE MANAGEMENT TELEPHONE ENCOUNTER (OUTPATIENT)
Dept: CALL CENTER | Facility: HOSPITAL | Age: 67
End: 2022-05-25

## 2022-05-25 NOTE — OUTREACH NOTE
Call Center TCM Note    Flowsheet Row Responses   [facility] patient discharged from? [place]   Does the patient have one of the following disease processes/diagnoses(primary or secondary)? COPD/Pneumonia   Was the primary reason for admission: COPD exacerbation   TCM attempt successful? No   Unsuccessful attempts Attempt 1          Stephanie Cullen LPN    5/25/2022, 12:01 EDT

## 2022-05-25 NOTE — OUTREACH NOTE
Call Center TCM Note    Flowsheet Row Responses   [facility] patient discharged from? [place]   Does the patient have one of the following disease processes/diagnoses(primary or secondary)? COPD/Pneumonia   Was the primary reason for admission: COPD exacerbation   TCM attempt successful? No   Unsuccessful attempts Attempt 2          Stephanie Cullen LPN    5/25/2022, 16:11 EDT

## 2022-05-26 ENCOUNTER — TRANSITIONAL CARE MANAGEMENT TELEPHONE ENCOUNTER (OUTPATIENT)
Dept: CALL CENTER | Facility: HOSPITAL | Age: 67
End: 2022-05-26

## 2022-05-26 NOTE — OUTREACH NOTE
Call Center TCM Note    Flowsheet Row Responses   [facility] patient discharged from? [place]   Does the patient have one of the following disease processes/diagnoses(primary or secondary)? COPD/Pneumonia   Was the primary reason for admission: COPD exacerbation   TCM attempt successful? No   Unsuccessful attempts Attempt 3  [No person listed on PCP verbal release]          Stephanie Field RN    5/26/2022, 08:38 EDT

## 2022-06-01 ENCOUNTER — PATIENT MESSAGE (OUTPATIENT)
Dept: INTERNAL MEDICINE | Facility: CLINIC | Age: 67
End: 2022-06-01

## 2022-06-01 DIAGNOSIS — E78.5 HYPERLIPIDEMIA, UNSPECIFIED HYPERLIPIDEMIA TYPE: Primary | ICD-10-CM

## 2022-06-01 DIAGNOSIS — F41.9 ANXIETY DISORDER, UNSPECIFIED TYPE: ICD-10-CM

## 2022-06-01 RX ORDER — ALPRAZOLAM 1 MG/1
1 TABLET ORAL 3 TIMES DAILY PRN
Qty: 90 TABLET | Refills: 2 | Status: SHIPPED | OUTPATIENT
Start: 2022-06-01 | End: 2022-08-26 | Stop reason: SDUPTHER

## 2022-06-01 RX ORDER — ATORVASTATIN CALCIUM 40 MG/1
TABLET, FILM COATED ORAL
Qty: 90 TABLET | Refills: 3 | Status: SHIPPED | OUTPATIENT
Start: 2022-06-01 | End: 2022-09-19

## 2022-06-01 NOTE — TELEPHONE ENCOUNTER
Last OV 3/15/22    Upcoming OV 9/15/22    UDS on file-   ToxASSURE Select 13 (MW) - Urine, Clean Catch (01/05/2021 11:29)

## 2022-06-03 ENCOUNTER — READMISSION MANAGEMENT (OUTPATIENT)
Dept: CALL CENTER | Facility: HOSPITAL | Age: 67
End: 2022-06-03

## 2022-06-03 NOTE — OUTREACH NOTE
COPD/PN Week 2 Survey    Flowsheet Row Responses   [facility] facility patient discharged from? [place]   Does the patient have one of the following disease processes/diagnoses(primary or secondary)? COPD/Pneumonia   Was the primary reason for admission: COPD exacerbation   Week 2 attempt successful? No   Rescheduled Revoked  [4th call no answer]          HALEY OH - Registered Nurse

## 2022-06-17 DIAGNOSIS — G89.29 CHRONIC MIDLINE LOW BACK PAIN WITHOUT SCIATICA: ICD-10-CM

## 2022-06-17 DIAGNOSIS — M54.50 CHRONIC MIDLINE LOW BACK PAIN WITHOUT SCIATICA: ICD-10-CM

## 2022-06-17 LAB
FUNGUS WND CULT: NORMAL
FUNGUS WND CULT: NORMAL

## 2022-06-17 RX ORDER — TRAMADOL HYDROCHLORIDE 50 MG/1
50 TABLET ORAL 2 TIMES DAILY
Qty: 180 TABLET | Refills: 0 | Status: SHIPPED | OUTPATIENT
Start: 2022-06-17 | End: 2022-09-13 | Stop reason: SDUPTHER

## 2022-07-01 LAB
MYCOBACTERIUM SPEC CULT: NORMAL
MYCOBACTERIUM SPEC CULT: NORMAL
NIGHT BLUE STAIN TISS: NORMAL
NIGHT BLUE STAIN TISS: NORMAL

## 2022-07-19 ENCOUNTER — TELEPHONE (OUTPATIENT)
Dept: URGENT CARE | Facility: CLINIC | Age: 67
End: 2022-07-19

## 2022-07-19 NOTE — TELEPHONE ENCOUNTER
Patient return a call to the office, I informed him the his chest xray was positive for pneumonia, as Michelle, had told him and he is to continue all the antibiotic that he was prescribe.  Patient stated he understood

## 2022-08-26 DIAGNOSIS — F41.9 ANXIETY DISORDER, UNSPECIFIED TYPE: ICD-10-CM

## 2022-08-26 RX ORDER — ALPRAZOLAM 1 MG/1
1 TABLET ORAL 3 TIMES DAILY PRN
Qty: 90 TABLET | Refills: 2 | Status: SHIPPED | OUTPATIENT
Start: 2022-08-26 | End: 2022-09-15 | Stop reason: SDUPTHER

## 2022-08-26 NOTE — TELEPHONE ENCOUNTER
Rx Refill Note  Requested Prescriptions     Pending Prescriptions Disp Refills   • ALPRAZolam (XANAX) 1 MG tablet 90 tablet 2     Sig: Take 1 tablet by mouth 3 (Three) Times a Day As Needed for Anxiety.      Last office visit with prescribing clinician: 3/15/2022      Next office visit with prescribing clinician: 9/15/2022          ToxASSURE Select 13 (MW) - Urine, Clean Catch (01/05/2021 11:29)      DINA YI MA  08/26/22, 08:05 EDT

## 2022-09-13 DIAGNOSIS — G89.29 CHRONIC MIDLINE LOW BACK PAIN WITHOUT SCIATICA: ICD-10-CM

## 2022-09-13 DIAGNOSIS — M54.50 CHRONIC MIDLINE LOW BACK PAIN WITHOUT SCIATICA: ICD-10-CM

## 2022-09-13 NOTE — TELEPHONE ENCOUNTER
Rx Refill Note  Requested Prescriptions     Pending Prescriptions Disp Refills   • traMADol (ULTRAM) 50 MG tablet 180 tablet 0     Sig: Take 1 tablet by mouth 2 (Two) Times a Day.      Last office visit with prescribing clinician: 3/15/2022      Next office visit with prescribing clinician: 9/15/2022          ToxASSURE Select 13 (PETR) - Urine, Clean Catch (01/05/2021 11:29)      DINA YI MA  09/13/22, 08:21 EDT

## 2022-09-14 RX ORDER — TRAMADOL HYDROCHLORIDE 50 MG/1
50 TABLET ORAL 2 TIMES DAILY
Qty: 180 TABLET | Refills: 0 | Status: SHIPPED | OUTPATIENT
Start: 2022-09-14 | End: 2022-12-12 | Stop reason: SDUPTHER

## 2022-09-15 ENCOUNTER — OFFICE VISIT (OUTPATIENT)
Dept: INTERNAL MEDICINE | Facility: CLINIC | Age: 67
End: 2022-09-15

## 2022-09-15 VITALS
HEART RATE: 71 BPM | WEIGHT: 161 LBS | OXYGEN SATURATION: 95 % | SYSTOLIC BLOOD PRESSURE: 136 MMHG | DIASTOLIC BLOOD PRESSURE: 68 MMHG | BODY MASS INDEX: 24.48 KG/M2 | TEMPERATURE: 97.1 F

## 2022-09-15 DIAGNOSIS — I25.10 CORONARY ARTERY DISEASE INVOLVING NATIVE CORONARY ARTERY OF NATIVE HEART WITHOUT ANGINA PECTORIS: ICD-10-CM

## 2022-09-15 DIAGNOSIS — Z87.19 H/O ANGIODYSPLASIA OF INTESTINAL TRACT: Chronic | ICD-10-CM

## 2022-09-15 DIAGNOSIS — R73.09 ELEVATED GLUCOSE: ICD-10-CM

## 2022-09-15 DIAGNOSIS — I10 ESSENTIAL HYPERTENSION: ICD-10-CM

## 2022-09-15 DIAGNOSIS — G47.33 OSA TREATED WITH BIPAP: ICD-10-CM

## 2022-09-15 DIAGNOSIS — N18.31 STAGE 3A CHRONIC KIDNEY DISEASE: ICD-10-CM

## 2022-09-15 DIAGNOSIS — Z00.00 ENCOUNTER FOR SUBSEQUENT ANNUAL WELLNESS VISIT (AWV) IN MEDICARE PATIENT: Primary | ICD-10-CM

## 2022-09-15 DIAGNOSIS — Z79.899 LONG TERM PRESCRIPTION BENZODIAZEPINE USE: ICD-10-CM

## 2022-09-15 DIAGNOSIS — I73.9 PVD (PERIPHERAL VASCULAR DISEASE): ICD-10-CM

## 2022-09-15 DIAGNOSIS — F41.9 ANXIETY DISORDER, UNSPECIFIED TYPE: ICD-10-CM

## 2022-09-15 DIAGNOSIS — Z90.49 S/P COLECTOMY: ICD-10-CM

## 2022-09-15 DIAGNOSIS — Z89.612 STATUS POST ABOVE-KNEE AMPUTATION OF LEFT LOWER EXTREMITY: ICD-10-CM

## 2022-09-15 DIAGNOSIS — E03.9 HYPOTHYROIDISM, UNSPECIFIED TYPE: ICD-10-CM

## 2022-09-15 DIAGNOSIS — E53.8 VITAMIN B12 DEFICIENCY: ICD-10-CM

## 2022-09-15 DIAGNOSIS — Z66 DNR (DO NOT RESUSCITATE): ICD-10-CM

## 2022-09-15 DIAGNOSIS — Z87.891 HISTORY OF SMOKING 30 OR MORE PACK YEARS: ICD-10-CM

## 2022-09-15 DIAGNOSIS — M54.50 CHRONIC MIDLINE LOW BACK PAIN WITHOUT SCIATICA: ICD-10-CM

## 2022-09-15 DIAGNOSIS — J41.0 SIMPLE CHRONIC BRONCHITIS: Chronic | ICD-10-CM

## 2022-09-15 DIAGNOSIS — Z79.899 HIGH RISK MEDICATION USE: ICD-10-CM

## 2022-09-15 DIAGNOSIS — R80.9 PROTEINURIA, UNSPECIFIED TYPE: ICD-10-CM

## 2022-09-15 DIAGNOSIS — Z23 NEED FOR VACCINATION FOR PNEUMOCOCCUS: ICD-10-CM

## 2022-09-15 DIAGNOSIS — E78.5 HYPERLIPIDEMIA, UNSPECIFIED HYPERLIPIDEMIA TYPE: ICD-10-CM

## 2022-09-15 DIAGNOSIS — G89.29 CHRONIC MIDLINE LOW BACK PAIN WITHOUT SCIATICA: ICD-10-CM

## 2022-09-15 PROBLEM — I21.9 DEMAND MYOCARDIAL INFARCTION: Status: RESOLVED | Noted: 2022-05-19 | Resolved: 2022-09-15

## 2022-09-15 PROBLEM — J18.9 LEFT LOWER LOBE PNEUMONIA: Status: RESOLVED | Noted: 2022-05-18 | Resolved: 2022-09-15

## 2022-09-15 PROBLEM — J96.01 ACUTE RESPIRATORY FAILURE WITH HYPOXIA: Status: RESOLVED | Noted: 2022-04-05 | Resolved: 2022-09-15

## 2022-09-15 PROBLEM — E55.9 VITAMIN D DEFICIENCY: Status: RESOLVED | Noted: 2020-03-03 | Resolved: 2022-09-15

## 2022-09-15 PROBLEM — J44.1 ACUTE EXACERBATION OF CHRONIC OBSTRUCTIVE PULMONARY DISEASE (COPD): Status: RESOLVED | Noted: 2022-04-04 | Resolved: 2022-09-15

## 2022-09-15 PROBLEM — J10.1 INFLUENZA A: Status: RESOLVED | Noted: 2022-04-05 | Resolved: 2022-09-15

## 2022-09-15 PROCEDURE — 1159F MED LIST DOCD IN RCRD: CPT | Performed by: FAMILY MEDICINE

## 2022-09-15 PROCEDURE — G0009 ADMIN PNEUMOCOCCAL VACCINE: HCPCS | Performed by: FAMILY MEDICINE

## 2022-09-15 PROCEDURE — 1170F FXNL STATUS ASSESSED: CPT | Performed by: FAMILY MEDICINE

## 2022-09-15 PROCEDURE — 1125F AMNT PAIN NOTED PAIN PRSNT: CPT | Performed by: FAMILY MEDICINE

## 2022-09-15 PROCEDURE — G0439 PPPS, SUBSEQ VISIT: HCPCS | Performed by: FAMILY MEDICINE

## 2022-09-15 PROCEDURE — 90677 PCV20 VACCINE IM: CPT | Performed by: FAMILY MEDICINE

## 2022-09-15 RX ORDER — ALPRAZOLAM 1 MG/1
TABLET ORAL
Qty: 75 TABLET | Refills: 2 | Status: SHIPPED | OUTPATIENT
Start: 2022-09-15 | End: 2023-01-27 | Stop reason: SDUPTHER

## 2022-09-15 NOTE — PROGRESS NOTES
Date of Encounter: 09/15/2022  Patient: Sim Agustin,  1955    SUBSEQUENT MEDICARE WELLNESS VISIT  Subjective   History of Presenting Illness  Chief complaint: Medicare wellness exam    No acute concerns.    Hypertension well controlled.    Peripheral vascular disease with complicated history including graft infection with revision.  He follows with vascular surgery regularly.  Flow has remained good and he does not have any symptoms of claudication.  He remains on aspirin and Plavix.    Coronary artery disease, no recent chest discomfort, recent adjustment in his statin due to amlodipine, we will recheck lipids today.    COPD with hospitalization due to influenza and bacterial pneumonia earlier this year.  He is followed by pulmonology.  He has not been smoking since  and now uses cannabis edibles instead of smoking.  He has not had any regular use of his albuterol inhaler and has not had any exacerbations except for his hospitalizations earlier this year.  He had a CT earlier this year in context of his pneumonia with recommendation for follow-up which pulmonology is following.    PRISCILLA adherent to BiPAP.    CKD 3 followed by nephrology with proteinuria.  They did have him stop vitamin D due to mildly elevated calcium and strong therapeutic vitamin D levels. He has good water intake    Hypothyroidism with no current symptoms.    Chronic lower back pain, status post left TKA, managed by tramadol.  He reports his pain is stable.  Able to tolerate regular exercise by walking daily without too many limitations.    Anxiety disorder on bupropion, alprazolam.  Feels that is doing well.  He has been on benzodiazepines long-term.  He is willing to try dose reduction today.    History of angiodysplasia of the colon status post colectomy with no recent symptoms.  Bowel habits are mostly regular.    . 1 child. Over 30-pack-year smoking history quit in .  Does not drink alcohol.  Exercises by walking  daily.   Recent improvement in his diet with reduced sugar and fat consumption.  Lung cancer screening  but he did have a CT of the chest earlier this year in context of pneumonia and continues to follow with pulmonology. Colonoscopy 2016 with 10-year interval.  PSA .  Disabled. Does not have a living will, we have discussed this in the past and provided paperwork, would like to be DNR.  Recommend PCV20 today.  He will get the high-dose flu at a pharmacy.    Pain  In the past 7 days, how much pain have you felt? 3/10    Review of Systems:  Negative for fever, congestion, chest pain upon exertion, shortness of breath, vision changes, vomiting, dysuria, lymphadenopathy, muscle weakness, numbness, mood changes, rashes.    Health Risk Assessment:    Recent Hospitalizations:  Recently treated at the following:  [facility]    Current Medical Providers:  Patient Care Team:  Seth Hester MD as PCP - General (Family Medicine)  Jose Swann MD as Consulting Physician (Vascular Surgery)  Krystal Ocampo MD as Consulting Physician (Cardiology)  Ishmael Tate Jr., MD as Consulting Physician (Pulmonary Disease)    Smoking Status:  Social History     Tobacco Use   Smoking Status Former Smoker   • Packs/day: 0.00   • Years: 30.00   • Pack years: 0.00   • Types: Cigarettes   • Quit date: 2021   • Years since quittin.3   Smokeless Tobacco Never Used   Tobacco Comment    caffeine - rarely       Alcohol Consumption:  Social History     Substance and Sexual Activity   Alcohol Use No       Depression Screen:   PHQ-2/PHQ-9 Depression Screening 9/15/2022   Retired PHQ-9 Total Score -   Retired Total Score -   Little Interest or Pleasure in Doing Things 0-->not at all   Feeling Down, Depressed or Hopeless 0-->not at all   PHQ-9: Brief Depression Severity Measure Score 0     I spent more than 16 minutes asking patient questions, counseling and documenting in the chart    Fall Risk Screen:  LEDA  Fall Risk Assessment was completed, and patient is at MODERATE risk for falls. Assessment completed on:9/15/2022    Health Habits and Functional and Cognitive Screening:  Functional & Cognitive Status 9/15/2022   Do you have difficulty preparing food and eating? No   Do you have difficulty bathing yourself, getting dressed or grooming yourself? No   Do you have difficulty using the toilet? No   Do you have difficulty moving around from place to place? No   Do you have trouble with steps or getting out of a bed or a chair? Yes   Do you need help using the phone?  No   Are you deaf or do you have serious difficulty hearing?  No   Do you need help with transportation? Yes   Do you need help shopping? No   Do you need help preparing meals?  No   Do you need help with housework?  No   Do you need help with laundry? No   Do you need help taking your medications? No   Do you need help managing money? No   Do you ever drive or ride in a car without wearing a seat belt? No   Do you have difficulty concentrating, remembering or making decisions? No       Does the patient have evidence of cognitive impairment? No    Aspirin use counseling:Taking ASA appropriately as indicated    Recent Lab Results:        Age-appropriate Screening Schedule:  Refer to the list below for future screening recommendations based on patient's age, sex and/or medical conditions. Orders for these recommended tests are listed in the plan section. The patient has been provided with a written plan.    Health Maintenance   Topic Date Due   • LIPID PANEL  09/10/2022   • INFLUENZA VACCINE  10/01/2022   • TDAP/TD VACCINES (2 - Td or Tdap) 03/15/2032   • ZOSTER VACCINE  Completed       Compared to one year ago, the patient feels his physical health is the same.  Compared to one year ago, the patient feels his mental health is the same.    Reviewed chart for potential of high risk medication in the elderly: yes  Reviewed chart for potential of harmful drug  interactions in the elderly:yes    Advanced Care Planning:  Advance Care Planning   ACP discussion was held with the patient during this visit. Patient does not have an advance directive, information provided.    The following portions of the patient's history were reviewed and updated as appropriate: allergies, current medications, past family history, past medical history, past social history, past surgical history and problem list.    Patient Active Problem List   Diagnosis   • H/O angiodysplasia of intestinal tract   • COPD (chronic obstructive pulmonary disease) ([facility])   • S/P colectomy   • Status post above-knee amputation of left lower extremity ([facility])   • CAD (coronary artery disease)   • PVD (peripheral vascular disease) ([facility])   • Essential hypertension   • Cardiomyopathy, unspecified ([facility])   • PRISCILLA treated with BiPAP   • Anxiety disorder   • Chronic midline low back pain without sciatica   • Long term prescription benzodiazepine use   • History of bradycardia   • Hypothyroidism   • Vitamin B12 deficiency   • Iron deficiency   • History of smoking 30 or more pack years   • High risk medication use   • DNR (do not resuscitate)   • Stage 3a chronic kidney disease ([facility])   • Hyperlipidemia   • Proteinuria   • Hyperkalemia   • Junctional cardiac arrhythmia     Past Medical History:   Diagnosis Date   • Acute respiratory failure with hypoxia and hypercarbia ([facility]) 03/11/2019   • Acute upper respiratory infection 02/06/2013   • Anemia     per [place] database   • ARF (acute renal failure) ([facility]) 03/04/2016   • Atelectasis, left 03/04/2016   • Bradycardia 05/23/2019   • CAD (coronary artery disease) 04/04/2016   • Chronic obstructive pulmonary disease with acute exacerbation ([facility]) 02/06/2013   • Colon polyps    • Compartment syndrome ([facility])     AFTER ILIAC SURGERY. ABOVE KNEE AMPUTATION   • COPD (chronic obstructive pulmonary disease) ([facility])    • COPD with hypoxia ([facility]) 05/23/2019   • Cough     SINCE APRIL WITH  PNEUMONIA.    • Demand myocardial infarction ([facility]) 5/19/2022   • Disease of thyroid gland     HYPOTHYRODISM   • Diverticulosis    • Elevated hematocrit 01/06/2021   • Employs prosthetic leg    • ESRD (end stage renal disease) on dialysis ([facility]) 04/06/2016   • Fracture of patella 03/03/2015   • History of acute renal failure    • History of CHF (congestive heart failure)    • History of GI bleed 02/2016    AORTODUOD FISTULA   • History of sepsis 02/2016   • History of transfusion     MULTIPLE, 2016   • Hyperkalemia 04/11/2016   • Hypertension    • Hypotension    • Hypotension 03/04/2016   • Left lower lobe pneumonia 5/18/2022   • Nonischemic cardiomyopathy ([facility])    • Nosocomial pneumonia 04/06/2016   • Phantom limb pain ([facility])     SL, WITH LEFT ABOUVE KNEE   • Pleural effusion on left 04/11/2016   • Pneumonia     SPRING 2016  AFTER SURGERY   • PVD (peripheral vascular disease) ([facility])    • S/P thoracentesis 04/11/2016   • Sepsis, unspecified organism ([facility]) 04/05/2016     Past Surgical History:   Procedure Laterality Date   • ABOVE KNEE AMPUTATION Left 3/16/2016    Procedure: LT AMPUTATION ABOVE KNEE;  Surgeon: Jose Swann MD;  Location: [facility] OR;  Service:    • ARTERIAL THROMBECTOMY  2001    throbectomy of arterial graft   • AXILLARY FEMORAL BYPASS Right 02/15/2016    Exploratory lapartomy, repair aortoduodenal fistula, resection infected aortobifemoral bypass graft, axillobi-superficial femoral artery [place]-Aneudy bypass graft from right axillary artery, Repair of duodenotomy 4th portion duodenum-Dr. Jose Swann, Dr. Genaro Perrin   • BRONCHOSCOPY Left 03/04/2016    Dr. NATALIIA Tate   • BRONCHOSCOPY Left 5/20/2022    Procedure: BRONCHOSCOPY WITH BIOPSIES, BRUSHINGS, AND BAL UNDER FLUORO;  Surgeon: Ishmael Tate Jr., MD;  Location: [facility] ENDOSCOPY;  Service: Pulmonary;  Laterality: Left;  PRE OP - LLL CONSOLIDATION  POST OP - SAME   • BRONCHOSCOPY RIGID / FLEXIBLE N/A 03/03/2016    Dr. NATALIIA Morel  [place]   • BRONCHOSCOPY RIGID / FLEXIBLE N/A 02/26/2016    Dr. NATALIIA Tate   • CARDIAC CATHETERIZATION N/A 3/18/2019    Procedure: Right and Left Heart Cath;  Surgeon: Nina Storey MD;  Location: [facility] CATH INVASIVE LOCATION;  Service: Cardiovascular   • CATARACT EXTRACTION WITH INTRAOCULAR LENS IMPLANT Bilateral    • COLON RESECTION WITH COLOSTOMY Left 02/28/2016    Exploratory laparotomy with open left hemicolectomy, end-colostomy, G-tube and J-tube-Dr. Endy Chaney   • COLONOSCOPY N/A 2015    Dr. Laughlin   • COLONOSCOPY N/A 10/12/2016    Procedure: COLONOSCOPY TO 20 CM AND PER STOMA TO 30CM;  Surgeon: Genaro Perrin MD;  Location: [facility] ENDOSCOPY;  Service:    • COLONOSCOPY N/A 10/21/2016    Procedure: COLONOSCOPY DONE AT BEDSIDE ONTO CECEM;  Surgeon: Genaro Perrin MD;  Location: [facility] ENDOSCOPY;  Service:    • COLONOSCOPY N/A 02/10/2016    Diverticulosis in the entire examined colon, non-bleeding internal hemorrhoids-Dr. Taylor Pina   • COLONOSCOPY N/A 02/11/2016    Poor prep, blood in the entire examined colon, normal ileum-Dr. Taylor Pina   • COLONOSCOPY W/ POLYPECTOMY N/A 07/27/2015    Normal ileum, diverticulosis in the sigmoid colon: resected and retrieved, one 6 mm polyp in the descending colon: resected and retrieved, the distal rectum and anal verge are normal on retroflexion view-Dr. Miguel Ángel Laughlin   • COLOSTOMY CLOSURE N/A 10/13/2016    Procedure: COLOSTOMY TAKEDOWN OR CLOSURE, APPENDECTOMY;  Surgeon: Genaro Perrin MD;  Location: [facility] OR;  Service:    • DEBRIDEMENT LEG Left 03/01/2016    Excisional debridement of the skin, subcutantous tissue, tendon and muscle left calf-Dr. Jose Swann   • DEBRIDEMENT LEG Left 02/25/2016    Excisional debridement full-thcikness skin and subcutaneous tissue and tendon and muscle, left medial and lateral calf muscles-Dr. Jose Swann   • ENDOSCOPY N/A 10/21/2016    Procedure: ESOPHAGOGASTRODUODENOSCOPY DONE AT BEDSIDE;   Surgeon: Genaro Perrin MD;  Location: [facility] ENDOSCOPY;  Service:    • ENDOSCOPY AND COLONOSCOPY N/A 02/28/2016    EGD and Colonoscopy with Candy ink injection-Dr. Endy Chaney   • ENTEROSCOPY SMALL BOWEL N/A 02/11/2016    Normal esophagus, normal stomach, normal duodenum, portion of the jejunum normal-Dr. Taylor Pina   • ILIAC ARTERY - FEMORAL ARTERY BYPASS GRAFT Bilateral 2002   • ILIAC ARTERY STENT Bilateral 2001   • INSERTION AND REMOVAL PERITONEAL DIALYSIS CATHETER Right 02/29/2016    Exchange right jugular 16 cm Mahurkar dialysis catheter-Dr. Jose Swann   • INSERTION PERITONEAL DIALYSIS CATHETER Left 03/01/2016    Ultrasound-guided access of the left jugular vein, 23 cm left jugular palindrome dialysis catheter placement-Dr. Jose Swann   • INSERTION PERITONEAL DIALYSIS CATHETER Right 02/18/2016    Right jugular Mahrkar catherer placement-Dr. Jose Swann   • JOINT REPLACEMENT Left    • THORACOSCOPY Left 4/18/2016    Procedure: LT THORACOSCOPY VIDEO ASSISTED WITH DECORDICATION;  Surgeon: Genaro Davila III, MD;  Location: [facility] MAIN OR;  Service:    • THROMBECTOMY Left 02/16/2016    Thombectomy of left femoral graft, left leg arteriogram, left calf forward compartment fasciotomy-Dr. Jose Swann   • TOTAL HIP ARTHROPLASTY Left    • UPPER GASTROINTESTINAL ENDOSCOPY N/A 02/09/2016    Normal UGI-Dr. Ajay Parkinson   • UPPER GASTROINTESTINAL ENDOSCOPY N/A 11/28/2015    Small hiatal hernia, chronic gastritis: biopsied, non-bleeding angioectasias in the stomach: treated with argon plasma coagulation, multiple bleeding angioectasias in the dudenum: treated with argon plasma coagulation-Dr. Mykel Jaramillo   • VASCULAR SURGERY      MULTIPLE   • VENTRAL/INCISIONAL HERNIA REPAIR N/A 10/19/2017    Procedure: VENTRAL HERNIA REPAIR WITH MESH AND BILATERAL COMPONENT SEPARATION;  Surgeon: Genaro Perrin MD;  Location: [facility] MAIN OR;  Service:    • WISDOM TOOTH EXTRACTION       Family  History   Problem Relation Age of Onset   • Lung cancer Mother    • Cancer Mother    • Heart disease Father    • Diabetes Father    • Hypertension Father    • Malig Hyperthermia Neg Hx        Current Outpatient Medications:   •  albuterol (ACCUNEB) 0.63 MG/3ML nebulizer solution, Inhale 0.63 mg Every 6 (Six) Hours As Needed., Disp: , Rfl:   •  ALPRAZolam (XANAX) 1 MG tablet, Take 1/2 tab in the morning, 1 tab at noon, 1 tab in the evening, Disp: 75 tablet, Rfl: 2  •  amLODIPine (NORVASC) 10 MG tablet, TAKE 1 TABLET BY MOUTH EVERY DAY, Disp: 90 tablet, Rfl: 3  •  aspirin 81 MG chewable tablet, Chew 81 mg Every Night., Disp: , Rfl:   •  atorvastatin (LIPITOR) 40 MG tablet, Take 1 tab PO QD for cholesterol and heart health, Disp: 90 tablet, Rfl: 3  •  buPROPion XL (WELLBUTRIN XL) 300 MG 24 hr tablet, TAKE 1 TABLET BY MOUTH DAILY, Disp: 90 tablet, Rfl: 3  •  clopidogrel (PLAVIX) 75 MG tablet, TAKE 1 TABLET BY MOUTH DAILY AT NIGHT, Disp: 90 tablet, Rfl: 3  •  Coenzyme Q10 400 MG capsule, Take 400 mg by mouth Every Evening., Disp: , Rfl:   •  FERROUS SULFATE PO, Take 65 mg by mouth Every Night., Disp: , Rfl:   •  Fluticasone-Umeclidin-Vilant (TRELEGY ELLIPTA) 100-62.5-25 MCG/INH aerosol powder , Inhale 1 each Daily., Disp: 28 each, Rfl: 2  •  gabapentin (NEURONTIN) 300 MG capsule, Take 1 capsule by mouth 3 (Three) Times a Day. (Patient taking differently: Take 900 mg by mouth Every Night.), Disp: 270 capsule, Rfl: 1  •  isosorbide mononitrate (IMDUR) 30 MG 24 hr tablet, TAKE 1 TABLET BY MOUTH EVERY DAY IN THE MORNING, Disp: 90 tablet, Rfl: 3  •  latanoprost (XALATAN) 0.005 % ophthalmic solution, Administer 1 drop to both eyes every night at bedtime., Disp: , Rfl:   •  levothyroxine (SYNTHROID, LEVOTHROID) 125 MCG tablet, TAKE 1 TABLET BY MOUTH EVERY DAY, Disp: 90 tablet, Rfl: 3  •  Multiple Vitamins-Minerals (MULTIVITAMIN ADULT PO), Take 1 tablet by mouth Daily., Disp: , Rfl:   •  NIACIN PO, Take 1 capsule by mouth Every  Morning., Disp: , Rfl:   •  Thiamine HCl (VITAMIN B-1 PO), Take 1 tablet by mouth Daily., Disp: , Rfl:   •  torsemide (DEMADEX) 20 MG tablet, Take 1 tablet by mouth Daily., Disp: 30 tablet, Rfl: 0  •  traMADol (ULTRAM) 50 MG tablet, Take 1 tablet by mouth 2 (Two) Times a Day., Disp: 180 tablet, Rfl: 0  Allergies   Allergen Reactions   • Augmentin [Amoxicillin-Pot Clavulanate] Hives and Rash     Tolerate cephalosporins     Social History     Tobacco Use   • Smoking status: Former Smoker     Packs/day: 0.00     Years: 30.00     Pack years: 0.00     Types: Cigarettes     Quit date: 2021     Years since quittin.3   • Smokeless tobacco: Never Used   • Tobacco comment: caffeine - rarely   Vaping Use   • Vaping Use: Never used   Substance Use Topics   • Alcohol use: No   • Drug use: Never          Objective   Physical Exam  Vitals:    09/15/22 0946   BP: 136/68   Pulse: 71   Temp: 97.1 °F (36.2 °C)   SpO2: 95%   Weight: 73 kg (161 lb)     Body mass index is 24.48 kg/m².  Discussed the patient's BMI with him. The BMI is in the acceptable range.    Constitutional: NAD.  Eyes: EOMI. PERRLA. Normal conjunctiva.  Ear, nose, mouth, throat: No tonsillar exudates or erythema.   Normal nasal mucosa. Normal external ear canals and TMs bilaterally.  Cardiovascular: RRR. No murmurs. No LE edema right lower extremity. Radial pulses 1+ bilaterally.  Pulmonary: CTA b/l. Good effort.  Integumentary: No lacerations or wounds on face or upper extremities.  Lymphatic: No anterior cervical lymphadenopathy.  Endocrine: No thyromegaly or palpable thyroid nodules.  Psychiatric: Normal affect. Normal thought content.  Gastrointestinal: Nondistended. No hepatosplenomegaly. No focal tenderness to palpation. Normal bowel sounds.     Assessment & Plan   Assessment and Plan  Pleasant 66-year-old male smoker with PVD status post bypass, CAD, moderate to severe COPD, hypertension, hyperlipidemia, CKD with proteinuria, anxiety, chronic lower back  pain on high risk medications, history of GERD, hypothyroidism, anemia, B12 deficiency, who presents for the following:    Immunizations Discussed/Encouraged (specific immunizations; Pneumococcal 23 )    The above risks/problems have been discussed with the patient.  Pertinent information has been shared with the patient in the After Visit Summary.  Other plans as below:    Diagnoses and all orders for this visit:    1. Encounter for subsequent annual wellness visit (AWV) in Medicare patient (Primary): We discussed preventative care including age and patient-appropriate immunizations and cancer screening. We also discussed the importance of exercise and a healthy diet. This is their annual preventative exam.    2. Essential hypertension: Controlled    3. PVD (peripheral vascular disease) (HCC): No symptoms of claudication.  Continue aspirin and clopidogrel.  Continue to follow with vascular surgery annually.    4. Coronary artery disease involving native coronary artery of native heart without angina pectoris: No chest pain.  We will recheck lipids as below with recent change to atorvastatin due to amlodipine interaction  5. Hyperlipidemia, unspecified hyperlipidemia type  -     Comprehensive Metabolic Panel  -     CBC & Differential  -     Lipid Panel  -     Thyroid Panel With TSH    6. Simple chronic bronchitis (HCC): No exacerbation since his recent hospitalization.  We will give him pneumococcal vaccination today.  Continue to follow with pulmonology.  Encourage continued smoking cessation.  7. History of smoking 30 or more pack years    8. PRISCILLA treated with BiPAP: Adherent    9. Stage 3a chronic kidney disease (HCC): Stable, continue to follow with nephrology  10. Proteinuria, unspecified type    11. Chronic midline low back pain without sciatica: Stable regimen, Adam reviewed which is appropriate.  UDS today.  12. High risk medication use  -     Urine Drug Screen - Urine, Clean Catch    13. Status post  above-knee amputation of left lower extremity (HCC)    14. Anxiety disorder, unspecified type: Recommend reduction in alprazolam as tolerated over the next refills.  He is amenable to this.  -     ALPRAZolam (XANAX) 1 MG tablet; Take 1/2 tab in the morning, 1 tab at noon, 1 tab in the evening  Dispense: 75 tablet; Refill: 2  15. Long term prescription benzodiazepine use    16. Hypothyroidism, unspecified type    17. H/O angiodysplasia of intestinal tract: No symptoms  18. S/P colectomy    19. Vitamin B12 deficiency    20. Elevated glucose  -     Hemoglobin A1c    21. Need for vaccination for pneumococcus  -     Pneumococcal Conjugate Vaccine 20-Valent (PCV20)    22. DNR (do not resuscitate)    Follow Up:  Follow-up in 6 months for controlled substance refill    An After Visit Summary and PPPS were given to the patient.      Seth Hester MD  Family Medicine  O: [phone]  C: [phone]    Disclaimer: Parts of this note were dictated by speech recognition. Minor errors in transcription may be present. Please call if questions.

## 2022-09-16 LAB
ALBUMIN SERPL-MCNC: 4.6 G/DL (ref 3.8–4.8)
ALBUMIN/GLOB SERPL: 2 {RATIO} (ref 1.2–2.2)
ALP SERPL-CCNC: 122 IU/L (ref 44–121)
ALT SERPL-CCNC: 22 IU/L (ref 0–44)
AST SERPL-CCNC: 21 IU/L (ref 0–40)
BASOPHILS # BLD AUTO: 0 X10E3/UL (ref 0–0.2)
BASOPHILS NFR BLD AUTO: 1 %
BILIRUB SERPL-MCNC: 0.5 MG/DL (ref 0–1.2)
BUN SERPL-MCNC: 29 MG/DL (ref 8–27)
BUN/CREAT SERPL: 13 (ref 10–24)
CALCIUM SERPL-MCNC: 9.8 MG/DL (ref 8.6–10.2)
CHLORIDE SERPL-SCNC: 97 MMOL/L (ref 96–106)
CHOLEST SERPL-MCNC: 212 MG/DL (ref 100–199)
CO2 SERPL-SCNC: 27 MMOL/L (ref 20–29)
CREAT SERPL-MCNC: 2.15 MG/DL (ref 0.76–1.27)
EGFRCR-CYS SERPLBLD CKD-EPI 2021: 33 ML/MIN/1.73
EOSINOPHIL # BLD AUTO: 0.1 X10E3/UL (ref 0–0.4)
EOSINOPHIL NFR BLD AUTO: 1 %
ERYTHROCYTE [DISTWIDTH] IN BLOOD BY AUTOMATED COUNT: 13.4 % (ref 11.6–15.4)
FT4I SERPL CALC-MCNC: 4.2 (ref 1.2–4.9)
GLOBULIN SER CALC-MCNC: 2.3 G/DL (ref 1.5–4.5)
GLUCOSE SERPL-MCNC: 99 MG/DL (ref 65–99)
HBA1C MFR BLD: 5.5 % (ref 4.8–5.6)
HCT VFR BLD AUTO: 48.8 % (ref 37.5–51)
HDLC SERPL-MCNC: 50 MG/DL
HGB BLD-MCNC: 16.3 G/DL (ref 13–17.7)
IMM GRANULOCYTES # BLD AUTO: 0 X10E3/UL (ref 0–0.1)
IMM GRANULOCYTES NFR BLD AUTO: 0 %
LDLC SERPL CALC-MCNC: 131 MG/DL (ref 0–99)
LYMPHOCYTES # BLD AUTO: 1.4 X10E3/UL (ref 0.7–3.1)
LYMPHOCYTES NFR BLD AUTO: 20 %
MCH RBC QN AUTO: 30.5 PG (ref 26.6–33)
MCHC RBC AUTO-ENTMCNC: 33.4 G/DL (ref 31.5–35.7)
MCV RBC AUTO: 91 FL (ref 79–97)
MONOCYTES # BLD AUTO: 0.5 X10E3/UL (ref 0.1–0.9)
MONOCYTES NFR BLD AUTO: 7 %
NEUTROPHILS # BLD AUTO: 4.8 X10E3/UL (ref 1.4–7)
NEUTROPHILS NFR BLD AUTO: 71 %
PLATELET # BLD AUTO: 252 X10E3/UL (ref 150–450)
POTASSIUM SERPL-SCNC: 4.4 MMOL/L (ref 3.5–5.2)
PROT SERPL-MCNC: 6.9 G/DL (ref 6–8.5)
RBC # BLD AUTO: 5.35 X10E6/UL (ref 4.14–5.8)
SODIUM SERPL-SCNC: 141 MMOL/L (ref 134–144)
T3RU NFR SERPL: 33 % (ref 24–39)
T4 SERPL-MCNC: 12.7 UG/DL (ref 4.5–12)
TRIGL SERPL-MCNC: 173 MG/DL (ref 0–149)
TSH SERPL DL<=0.005 MIU/L-ACNC: 0.2 UIU/ML (ref 0.45–4.5)
VLDLC SERPL CALC-MCNC: 31 MG/DL (ref 5–40)
WBC # BLD AUTO: 6.8 X10E3/UL (ref 3.4–10.8)

## 2022-09-19 DIAGNOSIS — E78.5 HYPERLIPIDEMIA, UNSPECIFIED HYPERLIPIDEMIA TYPE: ICD-10-CM

## 2022-09-19 DIAGNOSIS — N18.31 STAGE 3A CHRONIC KIDNEY DISEASE: Primary | ICD-10-CM

## 2022-09-19 RX ORDER — ATORVASTATIN CALCIUM 40 MG/1
TABLET, FILM COATED ORAL
Qty: 135 TABLET | Refills: 3 | Status: SHIPPED | OUTPATIENT
Start: 2022-09-19 | End: 2022-11-08 | Stop reason: SDUPTHER

## 2022-09-19 RX ORDER — LEVOTHYROXINE SODIUM 112 UG/1
112 TABLET ORAL DAILY
Qty: 90 TABLET | Refills: 1 | Status: SHIPPED | OUTPATIENT
Start: 2022-09-19 | End: 2023-03-19

## 2022-09-20 NOTE — PROGRESS NOTES
Your kidney function is unexpectedly decreased at our most recent check. This may be related to normal fluctuations, but please call for a lab recheck as soon as you are able so we can repeat this.    We need to decrease your thyroid dose from 125 to 112 I have sent in a new rx    We should also increase your cholesterol medication from 40mg to 60mg. This means you should take atorvastatin 1.5 tablets daily of what you currently have.    The rest of your bloodwork looks good

## 2022-09-22 DIAGNOSIS — Z89.619 STATUS POST ABOVE KNEE AMPUTATION, UNSPECIFIED LATERALITY: ICD-10-CM

## 2022-09-22 RX ORDER — GABAPENTIN 300 MG/1
300 CAPSULE ORAL 3 TIMES DAILY
Qty: 270 CAPSULE | Refills: 1 | Status: SHIPPED | OUTPATIENT
Start: 2022-09-22 | End: 2023-03-17 | Stop reason: SDUPTHER

## 2022-09-22 NOTE — TELEPHONE ENCOUNTER
Rx Refill Note  Requested Prescriptions     Pending Prescriptions Disp Refills   • gabapentin (NEURONTIN) 300 MG capsule 270 capsule 1     Sig: Take 1 capsule by mouth 3 (Three) Times a Day.      Last office visit with prescribing clinician: 9/15/2022      Next office visit with prescribing clinician: 3/31/2023          ToxASSURE Select 13 (PETR) - Urine, Clean Catch (01/05/2021 11:29)      DINA YI MA  09/22/22, 11:34 EDT

## 2022-11-08 ENCOUNTER — PATIENT MESSAGE (OUTPATIENT)
Dept: INTERNAL MEDICINE | Facility: CLINIC | Age: 67
End: 2022-11-08

## 2022-11-08 DIAGNOSIS — E78.5 HYPERLIPIDEMIA, UNSPECIFIED HYPERLIPIDEMIA TYPE: ICD-10-CM

## 2022-11-08 RX ORDER — ATORVASTATIN CALCIUM 40 MG/1
TABLET, FILM COATED ORAL
Qty: 135 TABLET | Refills: 3 | Status: SHIPPED | OUTPATIENT
Start: 2022-11-08

## 2022-11-08 RX ORDER — ATORVASTATIN CALCIUM 40 MG/1
TABLET, FILM COATED ORAL
Qty: 135 TABLET | Refills: 3 | Status: SHIPPED | OUTPATIENT
Start: 2022-11-08 | End: 2022-11-08 | Stop reason: SDUPTHER

## 2022-11-08 NOTE — TELEPHONE ENCOUNTER
From: Sim Agustin  To: Seth Hester MD  Sent: 11/8/2022 11:18 AM EST  Subject: Atorvastatin     Dr. Hester, my prescription for Atorvastatin is for 40mg, you advised me to take 60mg and cut 1 in half to make 60mg. The pharmacy said that Atorvastatin doesn't come in 60mg and they suggested to increase the quantity to 135 pills instead of 90. I guess if you can contact [facility] and up the amount to a quantity of 135, thank you.

## 2022-11-17 ENCOUNTER — PATIENT MESSAGE (OUTPATIENT)
Dept: INTERNAL MEDICINE | Facility: CLINIC | Age: 67
End: 2022-11-17

## 2022-11-23 ENCOUNTER — PRIOR AUTHORIZATION (OUTPATIENT)
Dept: INTERNAL MEDICINE | Facility: CLINIC | Age: 67
End: 2022-11-23

## 2022-11-23 NOTE — TELEPHONE ENCOUNTER
From: Sim Agustin  To: Seth Hester MD  Sent: 11/17/2022 4:36 PM EST  Subject: Atorvastatin     Dr. Hester, [facility] told me that they gave your office a number to call about the change in quantity of the Atorvastatin and they can't refill it until [employer] approves the increase in quantity, I just wanted to know where everything stands at this point. Thanks.

## 2022-12-06 DIAGNOSIS — K21.9 GASTROESOPHAGEAL REFLUX DISEASE: ICD-10-CM

## 2022-12-06 RX ORDER — OMEPRAZOLE 20 MG/1
CAPSULE, DELAYED RELEASE ORAL
Qty: 90 CAPSULE | Refills: 3 | OUTPATIENT
Start: 2022-12-06

## 2022-12-12 DIAGNOSIS — G89.29 CHRONIC MIDLINE LOW BACK PAIN WITHOUT SCIATICA: ICD-10-CM

## 2022-12-12 DIAGNOSIS — M54.50 CHRONIC MIDLINE LOW BACK PAIN WITHOUT SCIATICA: ICD-10-CM

## 2022-12-12 RX ORDER — TRAMADOL HYDROCHLORIDE 50 MG/1
50 TABLET ORAL 2 TIMES DAILY
Qty: 180 TABLET | Refills: 0 | Status: SHIPPED | OUTPATIENT
Start: 2022-12-12 | End: 2023-03-13 | Stop reason: SDUPTHER

## 2022-12-12 NOTE — TELEPHONE ENCOUNTER
Rx Refill Note  Requested Prescriptions     Pending Prescriptions Disp Refills   • traMADol (ULTRAM) 50 MG tablet 180 tablet 0     Sig: Take 1 tablet by mouth 2 (Two) Times a Day.      Last office visit with prescribing clinician: 9/15/2022   Last telemedicine visit with prescribing clinician: 3/31/2023   Next office visit with prescribing clinician: 3/31/2023     CONTRACT NEEDS UPDATED  UDS NEEDS UPDATED   ToxASSURE Select 13 (MW) - Urine, Clean Catch (01/05/2021 11:29)                   Would you like a call back once the refill request has been completed: [] Yes [] No    If the office needs to give you a call back, can they leave a voicemail: [] Yes [] No    Ester Shah LPN  12/12/22, 10:20 EST

## 2023-01-04 DIAGNOSIS — F17.200 SMOKES TOBACCO DAILY: ICD-10-CM

## 2023-01-04 DIAGNOSIS — F41.9 ANXIETY DISORDER, UNSPECIFIED TYPE: ICD-10-CM

## 2023-01-04 RX ORDER — BUPROPION HYDROCHLORIDE 300 MG/1
TABLET ORAL
Qty: 90 TABLET | Refills: 3 | Status: SHIPPED | OUTPATIENT
Start: 2023-01-04

## 2023-01-27 DIAGNOSIS — F41.9 ANXIETY DISORDER, UNSPECIFIED TYPE: ICD-10-CM

## 2023-01-27 RX ORDER — ALPRAZOLAM 1 MG/1
TABLET ORAL
Qty: 75 TABLET | Refills: 2 | Status: SHIPPED | OUTPATIENT
Start: 2023-01-27 | End: 2023-03-28

## 2023-01-27 NOTE — TELEPHONE ENCOUNTER
Rx Refill Note  Requested Prescriptions     Pending Prescriptions Disp Refills   • ALPRAZolam (XANAX) 1 MG tablet 75 tablet 2     Sig: Take 1/2 tab in the morning, 1 tab at noon, 1 tab in the evening      Last office visit with prescribing clinician: 9/15/2022   Last telemedicine visit with prescribing clinician: 3/31/2023   Next office visit with prescribing clinician: 3/31/2023     No current UDS on file.  Last one was 01/05/2021.    Lisa Bains  01/27/23, 08:21 EST

## 2023-01-28 DIAGNOSIS — F41.9 ANXIETY DISORDER, UNSPECIFIED TYPE: ICD-10-CM

## 2023-01-30 RX ORDER — ALPRAZOLAM 1 MG/1
TABLET ORAL
Qty: 75 TABLET | Refills: 2 | OUTPATIENT
Start: 2023-01-30

## 2023-02-17 DIAGNOSIS — I73.9 PVD (PERIPHERAL VASCULAR DISEASE): ICD-10-CM

## 2023-02-17 DIAGNOSIS — I10 ESSENTIAL HYPERTENSION: ICD-10-CM

## 2023-02-17 RX ORDER — CLOPIDOGREL BISULFATE 75 MG/1
75 TABLET ORAL NIGHTLY
Qty: 90 TABLET | Refills: 3 | Status: SHIPPED | OUTPATIENT
Start: 2023-02-17

## 2023-02-17 RX ORDER — AMLODIPINE BESYLATE 10 MG/1
10 TABLET ORAL DAILY
Qty: 90 TABLET | Refills: 3 | Status: SHIPPED | OUTPATIENT
Start: 2023-02-17

## 2023-02-28 ENCOUNTER — APPOINTMENT (OUTPATIENT)
Dept: CT IMAGING | Facility: HOSPITAL | Age: 68
End: 2023-02-28
Payer: MEDICARE

## 2023-02-28 ENCOUNTER — APPOINTMENT (OUTPATIENT)
Dept: GENERAL RADIOLOGY | Facility: HOSPITAL | Age: 68
End: 2023-02-28
Payer: MEDICARE

## 2023-02-28 ENCOUNTER — HOSPITAL ENCOUNTER (OUTPATIENT)
Facility: HOSPITAL | Age: 68
Setting detail: OBSERVATION
Discharge: HOME | End: 2023-03-02
Attending: EMERGENCY MEDICINE | Admitting: STUDENT IN AN ORGANIZED HEALTH CARE EDUCATION/TRAINING PROGRAM
Payer: MEDICARE

## 2023-02-28 DIAGNOSIS — N39.0 ACUTE UTI: Primary | ICD-10-CM

## 2023-02-28 DIAGNOSIS — R53.1 GENERALIZED WEAKNESS: ICD-10-CM

## 2023-02-28 LAB
ALBUMIN SERPL-MCNC: 4.2 G/DL (ref 3.5–5.2)
ALBUMIN/GLOB SERPL: 1.6 G/DL
ALP SERPL-CCNC: 75 U/L (ref 39–117)
ALT SERPL W P-5'-P-CCNC: 11 U/L (ref 1–41)
ANION GAP SERPL CALCULATED.3IONS-SCNC: 6.3 MMOL/L (ref 5–15)
AST SERPL-CCNC: 18 U/L (ref 1–40)
BACTERIA UR QL AUTO: ABNORMAL /HPF
BASOPHILS # BLD AUTO: 0.04 10*3/MM3 (ref 0–0.2)
BASOPHILS NFR BLD AUTO: 0.5 % (ref 0–1.5)
BILIRUB SERPL-MCNC: 0.5 MG/DL (ref 0–1.2)
BILIRUB UR QL STRIP: NEGATIVE
BUN SERPL-MCNC: 34 MG/DL (ref 8–23)
BUN/CREAT SERPL: 13.6 (ref 7–25)
CALCIUM SPEC-SCNC: 10 MG/DL (ref 8.6–10.5)
CHLORIDE SERPL-SCNC: 101 MMOL/L (ref 98–107)
CLARITY UR: CLEAR
CO2 SERPL-SCNC: 30.7 MMOL/L (ref 22–29)
COLOR UR: YELLOW
CREAT SERPL-MCNC: 2.5 MG/DL (ref 0.76–1.27)
D-LACTATE SERPL-SCNC: 1.3 MMOL/L (ref 0.5–2)
DEPRECATED RDW RBC AUTO: 46.2 FL (ref 37–54)
EGFRCR SERPLBLD CKD-EPI 2021: 27.5 ML/MIN/1.73
EOSINOPHIL # BLD AUTO: 0.19 10*3/MM3 (ref 0–0.4)
EOSINOPHIL NFR BLD AUTO: 2.5 % (ref 0.3–6.2)
ERYTHROCYTE [DISTWIDTH] IN BLOOD BY AUTOMATED COUNT: 13.6 % (ref 12.3–15.4)
GLOBULIN UR ELPH-MCNC: 2.6 GM/DL
GLUCOSE SERPL-MCNC: 82 MG/DL (ref 65–99)
GLUCOSE UR STRIP-MCNC: NEGATIVE MG/DL
HCT VFR BLD AUTO: 40.7 % (ref 37.5–51)
HGB BLD-MCNC: 13.9 G/DL (ref 13–17.7)
HGB UR QL STRIP.AUTO: NEGATIVE
HYALINE CASTS UR QL AUTO: ABNORMAL /LPF
IMM GRANULOCYTES # BLD AUTO: 0.03 10*3/MM3 (ref 0–0.05)
IMM GRANULOCYTES NFR BLD AUTO: 0.4 % (ref 0–0.5)
KETONES UR QL STRIP: NEGATIVE
LEUKOCYTE ESTERASE UR QL STRIP.AUTO: ABNORMAL
LYMPHOCYTES # BLD AUTO: 1.38 10*3/MM3 (ref 0.7–3.1)
LYMPHOCYTES NFR BLD AUTO: 18 % (ref 19.6–45.3)
MCH RBC QN AUTO: 31.3 PG (ref 26.6–33)
MCHC RBC AUTO-ENTMCNC: 34.2 G/DL (ref 31.5–35.7)
MCV RBC AUTO: 91.7 FL (ref 79–97)
MONOCYTES # BLD AUTO: 0.73 10*3/MM3 (ref 0.1–0.9)
MONOCYTES NFR BLD AUTO: 9.5 % (ref 5–12)
NEUTROPHILS NFR BLD AUTO: 5.28 10*3/MM3 (ref 1.7–7)
NEUTROPHILS NFR BLD AUTO: 69.1 % (ref 42.7–76)
NITRITE UR QL STRIP: POSITIVE
NRBC BLD AUTO-RTO: 0.1 /100 WBC (ref 0–0.2)
PH UR STRIP.AUTO: 7 [PH] (ref 5–8)
PLATELET # BLD AUTO: 180 10*3/MM3 (ref 140–450)
PMV BLD AUTO: 8.7 FL (ref 6–12)
POTASSIUM SERPL-SCNC: 4.4 MMOL/L (ref 3.5–5.2)
PROT SERPL-MCNC: 6.8 G/DL (ref 6–8.5)
PROT UR QL STRIP: NEGATIVE
QT INTERVAL: 486 MS
RBC # BLD AUTO: 4.44 10*6/MM3 (ref 4.14–5.8)
RBC # UR STRIP: ABNORMAL /HPF
REF LAB TEST METHOD: ABNORMAL
SODIUM SERPL-SCNC: 138 MMOL/L (ref 136–145)
SP GR UR STRIP: 1.01 (ref 1–1.03)
SQUAMOUS #/AREA URNS HPF: ABNORMAL /HPF
TROPONIN T SERPL HS-MCNC: 49 NG/L
TROPONIN T SERPL HS-MCNC: 52 NG/L
UROBILINOGEN UR QL STRIP: ABNORMAL
WBC # UR STRIP: ABNORMAL /HPF
WBC NRBC COR # BLD: 7.65 10*3/MM3 (ref 3.4–10.8)

## 2023-02-28 PROCEDURE — 80053 COMPREHEN METABOLIC PANEL: CPT | Performed by: EMERGENCY MEDICINE

## 2023-02-28 PROCEDURE — 81001 URINALYSIS AUTO W/SCOPE: CPT | Performed by: EMERGENCY MEDICINE

## 2023-02-28 PROCEDURE — G0378 HOSPITAL OBSERVATION PER HR: HCPCS

## 2023-02-28 PROCEDURE — 82570 ASSAY OF URINE CREATININE: CPT | Performed by: INTERNAL MEDICINE

## 2023-02-28 PROCEDURE — 96365 THER/PROPH/DIAG IV INF INIT: CPT

## 2023-02-28 PROCEDURE — 70450 CT HEAD/BRAIN W/O DYE: CPT

## 2023-02-28 PROCEDURE — 87086 URINE CULTURE/COLONY COUNT: CPT | Performed by: EMERGENCY MEDICINE

## 2023-02-28 PROCEDURE — 99285 EMERGENCY DEPT VISIT HI MDM: CPT

## 2023-02-28 PROCEDURE — 84484 ASSAY OF TROPONIN QUANT: CPT | Performed by: EMERGENCY MEDICINE

## 2023-02-28 PROCEDURE — 83605 ASSAY OF LACTIC ACID: CPT | Performed by: EMERGENCY MEDICINE

## 2023-02-28 PROCEDURE — 87077 CULTURE AEROBIC IDENTIFY: CPT | Performed by: EMERGENCY MEDICINE

## 2023-02-28 PROCEDURE — 71045 X-RAY EXAM CHEST 1 VIEW: CPT

## 2023-02-28 PROCEDURE — 85025 COMPLETE CBC W/AUTO DIFF WBC: CPT | Performed by: EMERGENCY MEDICINE

## 2023-02-28 PROCEDURE — 93005 ELECTROCARDIOGRAM TRACING: CPT | Performed by: EMERGENCY MEDICINE

## 2023-02-28 PROCEDURE — 87040 BLOOD CULTURE FOR BACTERIA: CPT | Performed by: EMERGENCY MEDICINE

## 2023-02-28 PROCEDURE — 84156 ASSAY OF PROTEIN URINE: CPT | Performed by: INTERNAL MEDICINE

## 2023-02-28 PROCEDURE — 93010 ELECTROCARDIOGRAM REPORT: CPT | Performed by: INTERNAL MEDICINE

## 2023-02-28 PROCEDURE — 73552 X-RAY EXAM OF FEMUR 2/>: CPT

## 2023-02-28 PROCEDURE — 87186 SC STD MICRODIL/AGAR DIL: CPT | Performed by: EMERGENCY MEDICINE

## 2023-02-28 PROCEDURE — 25010000002 CEFTRIAXONE PER 250 MG: Performed by: EMERGENCY MEDICINE

## 2023-02-28 PROCEDURE — 73502 X-RAY EXAM HIP UNI 2-3 VIEWS: CPT

## 2023-02-28 RX ORDER — TRAMADOL HYDROCHLORIDE 50 MG/1
50 TABLET ORAL 2 TIMES DAILY
Status: DISCONTINUED | OUTPATIENT
Start: 2023-02-28 | End: 2023-03-02 | Stop reason: HOSPADM

## 2023-02-28 RX ORDER — GABAPENTIN 300 MG/1
300 CAPSULE ORAL 3 TIMES DAILY
Status: DISCONTINUED | OUTPATIENT
Start: 2023-02-28 | End: 2023-03-02 | Stop reason: HOSPADM

## 2023-02-28 RX ORDER — AMLODIPINE BESYLATE 10 MG/1
10 TABLET ORAL DAILY
Status: DISCONTINUED | OUTPATIENT
Start: 2023-03-01 | End: 2023-03-02 | Stop reason: HOSPADM

## 2023-02-28 RX ORDER — UREA 10 %
3 LOTION (ML) TOPICAL NIGHTLY PRN
Status: DISCONTINUED | OUTPATIENT
Start: 2023-02-28 | End: 2023-03-02 | Stop reason: HOSPADM

## 2023-02-28 RX ORDER — LEVOTHYROXINE SODIUM 112 UG/1
112 TABLET ORAL DAILY
Status: DISCONTINUED | OUTPATIENT
Start: 2023-03-01 | End: 2023-03-02 | Stop reason: HOSPADM

## 2023-02-28 RX ORDER — SODIUM CHLORIDE 0.9 % (FLUSH) 0.9 %
10 SYRINGE (ML) INJECTION AS NEEDED
Status: DISCONTINUED | OUTPATIENT
Start: 2023-02-28 | End: 2023-03-02 | Stop reason: HOSPADM

## 2023-02-28 RX ORDER — ISOSORBIDE MONONITRATE 30 MG/1
30 TABLET, EXTENDED RELEASE ORAL EVERY MORNING
Status: DISCONTINUED | OUTPATIENT
Start: 2023-03-01 | End: 2023-03-02 | Stop reason: HOSPADM

## 2023-02-28 RX ORDER — BUPROPION HYDROCHLORIDE 300 MG/1
300 TABLET ORAL DAILY
Status: DISCONTINUED | OUTPATIENT
Start: 2023-03-01 | End: 2023-03-02 | Stop reason: HOSPADM

## 2023-02-28 RX ORDER — CLOPIDOGREL BISULFATE 75 MG/1
75 TABLET ORAL NIGHTLY
Status: DISCONTINUED | OUTPATIENT
Start: 2023-02-28 | End: 2023-03-02 | Stop reason: HOSPADM

## 2023-02-28 RX ORDER — LATANOPROST 50 UG/ML
1 SOLUTION/ DROPS OPHTHALMIC NIGHTLY
Status: DISCONTINUED | OUTPATIENT
Start: 2023-02-28 | End: 2023-03-02 | Stop reason: HOSPADM

## 2023-02-28 RX ORDER — ALBUTEROL SULFATE 0.63 MG/3ML
0.63 SOLUTION RESPIRATORY (INHALATION) EVERY 6 HOURS PRN
Status: DISCONTINUED | OUTPATIENT
Start: 2023-02-28 | End: 2023-03-02 | Stop reason: HOSPADM

## 2023-02-28 RX ORDER — ASPIRIN 81 MG/1
81 TABLET, CHEWABLE ORAL NIGHTLY
Status: DISCONTINUED | OUTPATIENT
Start: 2023-02-28 | End: 2023-03-02 | Stop reason: HOSPADM

## 2023-02-28 RX ORDER — ONDANSETRON 2 MG/ML
4 INJECTION INTRAMUSCULAR; INTRAVENOUS EVERY 6 HOURS PRN
Status: DISCONTINUED | OUTPATIENT
Start: 2023-02-28 | End: 2023-03-02 | Stop reason: HOSPADM

## 2023-02-28 RX ORDER — BUDESONIDE AND FORMOTEROL FUMARATE DIHYDRATE 160; 4.5 UG/1; UG/1
2 AEROSOL RESPIRATORY (INHALATION)
Status: DISCONTINUED | OUTPATIENT
Start: 2023-02-28 | End: 2023-03-02 | Stop reason: HOSPADM

## 2023-02-28 RX ORDER — ATORVASTATIN CALCIUM 20 MG/1
40 TABLET, FILM COATED ORAL DAILY
Status: DISCONTINUED | OUTPATIENT
Start: 2023-03-01 | End: 2023-03-02 | Stop reason: HOSPADM

## 2023-02-28 RX ORDER — ONDANSETRON 4 MG/1
4 TABLET, FILM COATED ORAL EVERY 6 HOURS PRN
Status: DISCONTINUED | OUTPATIENT
Start: 2023-02-28 | End: 2023-03-02 | Stop reason: HOSPADM

## 2023-02-28 RX ORDER — ALPRAZOLAM 0.5 MG/1
0.5 TABLET ORAL 3 TIMES DAILY PRN
Status: DISCONTINUED | OUTPATIENT
Start: 2023-02-28 | End: 2023-03-02 | Stop reason: HOSPADM

## 2023-02-28 RX ORDER — ACETAMINOPHEN 325 MG/1
650 TABLET ORAL EVERY 4 HOURS PRN
Status: DISCONTINUED | OUTPATIENT
Start: 2023-02-28 | End: 2023-03-02 | Stop reason: HOSPADM

## 2023-02-28 RX ORDER — MULTIPLE VITAMINS W/ MINERALS TAB 9MG-400MCG
1 TAB ORAL DAILY
Status: DISCONTINUED | OUTPATIENT
Start: 2023-03-01 | End: 2023-03-02 | Stop reason: HOSPADM

## 2023-02-28 RX ADMIN — ASPIRIN 81 MG: 81 TABLET, CHEWABLE ORAL at 22:35

## 2023-02-28 RX ADMIN — GABAPENTIN 300 MG: 300 CAPSULE ORAL at 22:35

## 2023-02-28 RX ADMIN — TRAMADOL HYDROCHLORIDE 50 MG: 50 TABLET, COATED ORAL at 22:35

## 2023-02-28 RX ADMIN — Medication 10 ML: at 15:06

## 2023-02-28 RX ADMIN — LATANOPROST 1 DROP: 50 SOLUTION OPHTHALMIC at 22:34

## 2023-02-28 RX ADMIN — CLOPIDOGREL 75 MG: 75 TABLET, FILM COATED ORAL at 22:35

## 2023-02-28 RX ADMIN — CEFTRIAXONE SODIUM 1 G: 1 INJECTION, POWDER, FOR SOLUTION INTRAMUSCULAR; INTRAVENOUS at 13:45

## 2023-03-01 PROBLEM — N18.32 STAGE 3B CHRONIC KIDNEY DISEASE (CKD): Status: ACTIVE | Noted: 2023-03-01

## 2023-03-01 PROBLEM — N17.9 AKI (ACUTE KIDNEY INJURY): Status: ACTIVE | Noted: 2023-03-01

## 2023-03-01 LAB
ANION GAP SERPL CALCULATED.3IONS-SCNC: 10.8 MMOL/L (ref 5–15)
BUN SERPL-MCNC: 27 MG/DL (ref 8–23)
BUN/CREAT SERPL: 12.7 (ref 7–25)
CALCIUM SPEC-SCNC: 8.6 MG/DL (ref 8.6–10.5)
CHLORIDE SERPL-SCNC: 100 MMOL/L (ref 98–107)
CO2 SERPL-SCNC: 28.2 MMOL/L (ref 22–29)
CREAT SERPL-MCNC: 2.13 MG/DL (ref 0.76–1.27)
CREAT UR-MCNC: 31.1 MG/DL
DEPRECATED RDW RBC AUTO: 46.9 FL (ref 37–54)
EGFRCR SERPLBLD CKD-EPI 2021: 33.3 ML/MIN/1.73
ERYTHROCYTE [DISTWIDTH] IN BLOOD BY AUTOMATED COUNT: 14.1 % (ref 12.3–15.4)
GLUCOSE SERPL-MCNC: 75 MG/DL (ref 65–99)
HCT VFR BLD AUTO: 42.3 % (ref 37.5–51)
HGB BLD-MCNC: 14.6 G/DL (ref 13–17.7)
MCH RBC QN AUTO: 31.2 PG (ref 26.6–33)
MCHC RBC AUTO-ENTMCNC: 34.5 G/DL (ref 31.5–35.7)
MCV RBC AUTO: 90.4 FL (ref 79–97)
PLATELET # BLD AUTO: 181 10*3/MM3 (ref 140–450)
PMV BLD AUTO: 9.2 FL (ref 6–12)
POTASSIUM SERPL-SCNC: 4.1 MMOL/L (ref 3.5–5.2)
PROT ?TM UR-MCNC: 9.4 MG/DL
PROT/CREAT UR: 302.3 MG/G CREA (ref 0–200)
RBC # BLD AUTO: 4.68 10*6/MM3 (ref 4.14–5.8)
SODIUM SERPL-SCNC: 139 MMOL/L (ref 136–145)
WBC NRBC COR # BLD: 9.1 10*3/MM3 (ref 3.4–10.8)

## 2023-03-01 PROCEDURE — 94664 DEMO&/EVAL PT USE INHALER: CPT

## 2023-03-01 PROCEDURE — 97530 THERAPEUTIC ACTIVITIES: CPT | Performed by: PHYSICAL THERAPIST

## 2023-03-01 PROCEDURE — 96366 THER/PROPH/DIAG IV INF ADDON: CPT

## 2023-03-01 PROCEDURE — 36415 COLL VENOUS BLD VENIPUNCTURE: CPT | Performed by: INTERNAL MEDICINE

## 2023-03-01 PROCEDURE — G0378 HOSPITAL OBSERVATION PER HR: HCPCS

## 2023-03-01 PROCEDURE — 94761 N-INVAS EAR/PLS OXIMETRY MLT: CPT

## 2023-03-01 PROCEDURE — 94799 UNLISTED PULMONARY SVC/PX: CPT

## 2023-03-01 PROCEDURE — 94640 AIRWAY INHALATION TREATMENT: CPT

## 2023-03-01 PROCEDURE — 25010000002 CEFTRIAXONE PER 250 MG: Performed by: INTERNAL MEDICINE

## 2023-03-01 PROCEDURE — 85027 COMPLETE CBC AUTOMATED: CPT | Performed by: INTERNAL MEDICINE

## 2023-03-01 PROCEDURE — 80048 BASIC METABOLIC PNL TOTAL CA: CPT | Performed by: INTERNAL MEDICINE

## 2023-03-01 PROCEDURE — 97161 PT EVAL LOW COMPLEX 20 MIN: CPT | Performed by: PHYSICAL THERAPIST

## 2023-03-01 RX ORDER — POTASSIUM CHLORIDE 750 MG/1
40 TABLET, FILM COATED, EXTENDED RELEASE ORAL AS NEEDED
Status: DISCONTINUED | OUTPATIENT
Start: 2023-03-01 | End: 2023-03-02 | Stop reason: HOSPADM

## 2023-03-01 RX ORDER — MAGNESIUM SULFATE HEPTAHYDRATE 40 MG/ML
2 INJECTION, SOLUTION INTRAVENOUS AS NEEDED
Status: DISCONTINUED | OUTPATIENT
Start: 2023-03-01 | End: 2023-03-02 | Stop reason: HOSPADM

## 2023-03-01 RX ORDER — POTASSIUM CHLORIDE 1.5 G/1.77G
40 POWDER, FOR SOLUTION ORAL AS NEEDED
Status: DISCONTINUED | OUTPATIENT
Start: 2023-03-01 | End: 2023-03-02 | Stop reason: HOSPADM

## 2023-03-01 RX ORDER — MAGNESIUM SULFATE HEPTAHYDRATE 40 MG/ML
4 INJECTION, SOLUTION INTRAVENOUS AS NEEDED
Status: DISCONTINUED | OUTPATIENT
Start: 2023-03-01 | End: 2023-03-02 | Stop reason: HOSPADM

## 2023-03-01 RX ORDER — POTASSIUM CHLORIDE 7.45 MG/ML
10 INJECTION INTRAVENOUS
Status: DISCONTINUED | OUTPATIENT
Start: 2023-03-01 | End: 2023-03-02 | Stop reason: HOSPADM

## 2023-03-01 RX ADMIN — MULTIPLE VITAMINS W/ MINERALS TAB 1 TABLET: TAB at 08:44

## 2023-03-01 RX ADMIN — CLOPIDOGREL 75 MG: 75 TABLET, FILM COATED ORAL at 20:47

## 2023-03-01 RX ADMIN — BUDESONIDE AND FORMOTEROL FUMARATE DIHYDRATE 2 PUFF: 160; 4.5 AEROSOL RESPIRATORY (INHALATION) at 07:36

## 2023-03-01 RX ADMIN — ALPRAZOLAM 0.5 MG: 0.5 TABLET ORAL at 21:48

## 2023-03-01 RX ADMIN — GABAPENTIN 300 MG: 300 CAPSULE ORAL at 20:47

## 2023-03-01 RX ADMIN — ALPRAZOLAM 0.5 MG: 0.5 TABLET ORAL at 11:41

## 2023-03-01 RX ADMIN — ISOSORBIDE MONONITRATE 30 MG: 30 TABLET, EXTENDED RELEASE ORAL at 06:12

## 2023-03-01 RX ADMIN — TRAMADOL HYDROCHLORIDE 50 MG: 50 TABLET, COATED ORAL at 08:43

## 2023-03-01 RX ADMIN — TRAMADOL HYDROCHLORIDE 50 MG: 50 TABLET, COATED ORAL at 20:47

## 2023-03-01 RX ADMIN — ASPIRIN 81 MG: 81 TABLET, CHEWABLE ORAL at 20:47

## 2023-03-01 RX ADMIN — ATORVASTATIN CALCIUM 40 MG: 20 TABLET, FILM COATED ORAL at 08:43

## 2023-03-01 RX ADMIN — BUDESONIDE AND FORMOTEROL FUMARATE DIHYDRATE 2 PUFF: 160; 4.5 AEROSOL RESPIRATORY (INHALATION) at 20:28

## 2023-03-01 RX ADMIN — CEFTRIAXONE SODIUM 1 G: 1 INJECTION, POWDER, FOR SOLUTION INTRAMUSCULAR; INTRAVENOUS at 00:01

## 2023-03-01 RX ADMIN — BUPROPION HYDROCHLORIDE 300 MG: 300 TABLET, EXTENDED RELEASE ORAL at 08:44

## 2023-03-01 RX ADMIN — GABAPENTIN 300 MG: 300 CAPSULE ORAL at 08:43

## 2023-03-01 RX ADMIN — TIOTROPIUM BROMIDE INHALATION SPRAY 2 PUFF: 3.12 SPRAY, METERED RESPIRATORY (INHALATION) at 07:38

## 2023-03-01 RX ADMIN — AMLODIPINE BESYLATE 10 MG: 10 TABLET ORAL at 08:43

## 2023-03-01 RX ADMIN — GABAPENTIN 300 MG: 300 CAPSULE ORAL at 15:44

## 2023-03-01 RX ADMIN — LEVOTHYROXINE SODIUM 112 MCG: 0.11 TABLET ORAL at 08:44

## 2023-03-01 NOTE — CASE MANAGEMENT/SOCIAL WORK
Discharge Planning Assessment  [facility]     Patient Name: Sim Agustin  MRN: [id number]  Today's Date: 3/1/2023    Admit Date: 2/28/2023    Plan: Home with family   Discharge Needs Assessment     Row Name 03/01/23 0955       Living Environment    People in Home spouse;child(basim), adult    Name(s) of People in Home Cassie Anthony (wife) and John Agustin (son)    Current Living Arrangements home    Potentially Unsafe Housing Conditions none    Primary Care Provided by self    Provides Primary Care For no one    Family Caregiver if Needed spouse;child(basim), adult    Family Caregiver Names Cassie Anthony (wife) [phone]    Quality of Family Relationships helpful;involved;supportive    Able to Return to Prior Arrangements yes       Resource/Environmental Concerns    Resource/Environmental Concerns none       Transition Planning    Patient/Family Anticipates Transition to home with family    Patient/Family Anticipated Services at Transition none    Transportation Anticipated family or friend will provide       Discharge Needs Assessment    Equipment Currently Used at Home bipap;cane, quad;prosthesis;walker, rolling    Concerns to be Addressed denies needs/concerns at this time    Anticipated Changes Related to Illness none    Equipment Needed After Discharge none               Discharge Plan     Row Name 03/01/23 1000       Plan    Plan Home with family    Patient/Family in Agreement with Plan yes    Plan Comments CCP spoke with pt at bedside to discuss d/c planning. Facesheet verified. CCP role explained. Pt resides with wife, Cassie, and son, John, in a two-level home. Pt. states he rarely goes upstairs and denies any trouble with the two steps leading to the laundry room and one step to the kitchen. Patient uses a walker, cane, prosthetic leg, and bipap with 3L of O2. Pt. has used [facility] in the past (2015) but denies any past sub-acute rehab. Pt confirms pharmacy is [facility] on [address], and denies known d/c needs at  this time. CCP to follow to assist should d/c needs arise. Denice Fraga LCSW              Continued Care and Services - Admitted Since 2/28/2023    Coordination has not been started for this encounter.       Expected Discharge Date and Time     Expected Discharge Date Expected Discharge Time    Mar 2, 2023          Demographic Summary     Row Name 03/01/23 0954       General Information    Admission Type observation    Arrived From home    Reason for Consult discharge planning    Preferred Language English               Functional Status     Row Name 03/01/23 0955       Functional Status    Usual Activity Tolerance good    Current Activity Tolerance good       Functional Status, IADL    Medications independent    Meal Preparation independent    Housekeeping independent    Laundry independent    Shopping independent       Mental Status    General Appearance WDL WDL       Mental Status Summary    Recent Changes in Mental Status/Cognitive Functioning no changes               Psychosocial    No documentation.                Abuse/Neglect    No documentation.                Legal    No documentation.                Substance Abuse    No documentation.                Patient Forms    No documentation.                   Denice Fraga

## 2023-03-01 NOTE — NURSING NOTE
Pt refused bed alarm, BSC set right next to his bed, he promised to call if need to get up. Pt states he has been ambulate to BSC fine by self with support from only right leg.

## 2023-03-01 NOTE — CONSULTS
[facility] Consult Note      Patient Name: Sim Agustin  : 1955  MRN: [id number]  Primary Care Physician:  Seth Hester MD  Referring Physician: Ruth Rosas MD  Date of admission: 2023    Subjective     Reason for Consult: Acute kidney injury on chronic kidney disease    HPI:   Sim Agustin is a 67 y.o. male was admitted on 2023 where he presented with weakness and was found to have UTI he stated that he always feels that way when he is having UTI since antibiotic started he is feeling better.  On admission his creatinine was 2.5 his baseline creatinine since 2022 has been in the 2.1 range.  He is followed in our office for his chronic kidney disease by my partner Dr. Rajesh Sarabia.  The patient has history of chronic kidney disease stage IIIb attributed to renovascular disease since  with incompletely resolved acute kidney injury with related to ATN at that time in  he had urgent aortoduodenal fistula repair when was admitted for evaluation for GI bleed, he required major abdominal surgery along with colostomy creation and then later takedown, he was found to have an infected aortobifemoral graft requiring partial resection complicated by compartment syndrome in depth with the left AKA, he required hemodialysis at that time but recovered enough to be of dialysis and he continued to have abnormal renal function.  He has a history of recurrent UTIs.    At present he denies any chest pain or shortness of air, no orthopnea or PND, no nausea or vomiting, no abdominal pain, complaining of mild dysuria, and he has weakness.      Review of Systems:   14 point review of systems is otherwise negative except for mentioned above on HPI    Personal History     Past Medical History:   Diagnosis Date   • Acute respiratory failure with hypoxia and hypercarbia (HCC) 2019   • Acute upper respiratory infection 2013   • Anemia     per lori  database   • ARF (acute renal failure) ([facility]) 03/04/2016   • Atelectasis, left 03/04/2016   • Bradycardia 05/23/2019   • CAD (coronary artery disease) 04/04/2016   • Chronic obstructive pulmonary disease with acute exacerbation ([facility]) 02/06/2013   • Colon polyps    • Compartment syndrome ([facility])     AFTER ILIAC SURGERY. ABOVE KNEE AMPUTATION   • COPD (chronic obstructive pulmonary disease) ([facility])    • COPD with hypoxia ([facility]) 05/23/2019   • Cough     SINCE APRIL WITH PNEUMONIA.    • Demand myocardial infarction ([facility]) 5/19/2022   • Disease of thyroid gland     HYPOTHYRODISM   • Diverticulosis    • Elevated hematocrit 01/06/2021   • Employs prosthetic leg    • ESRD (end stage renal disease) on dialysis ([facility]) 04/06/2016   • Fracture of patella 03/03/2015   • History of acute renal failure    • History of CHF (congestive heart failure)    • History of GI bleed 02/2016    AORTODUOD FISTULA   • History of sepsis 02/2016   • History of transfusion     MULTIPLE, 2016   • Hyperkalemia 04/11/2016   • Hypertension    • Hypotension    • Hypotension 03/04/2016   • Left lower lobe pneumonia 5/18/2022   • Nonischemic cardiomyopathy ([facility])    • Nosocomial pneumonia 04/06/2016   • Phantom limb pain ([facility])     SL, WITH LEFT ABOUVE KNEE   • Pleural effusion on left 04/11/2016   • Pneumonia     SPRING 2016  AFTER SURGERY   • PVD (peripheral vascular disease) ([facility])    • S/P thoracentesis 04/11/2016   • Sepsis, unspecified organism ([facility]) 04/05/2016       Past Surgical History:   Procedure Laterality Date   • ABOVE KNEE AMPUTATION Left 3/16/2016    Procedure: LT AMPUTATION ABOVE KNEE;  Surgeon: Jose Swann MD;  Location: [facility];  Service:    • ARTERIAL THROMBECTOMY  2001    throbectomy of arterial graft   • AXILLARY FEMORAL BYPASS Right 02/15/2016    Exploratory lapartomy, repair aortoduodenal fistula, resection infected aortobifemoral bypass graft, axillobi-superficial femoral artery Mason-Aneudy bypass graft from right axillary  artery, Repair of duodenotomy 4th portion duodenum-Dr. Jose Swann, Dr. Genaro Perrin   • BRONCHOSCOPY Left 03/04/2016    Dr. NATALIIA Tate   • BRONCHOSCOPY Left 5/20/2022    Procedure: BRONCHOSCOPY WITH BIOPSIES, BRUSHINGS, AND BAL UNDER FLUORO;  Surgeon: Ishmael Tate Jr., MD;  Location: [facility] ENDOSCOPY;  Service: Pulmonary;  Laterality: Left;  PRE OP - LLL CONSOLIDATION  POST OP - SAME   • BRONCHOSCOPY RIGID / FLEXIBLE N/A 03/03/2016    Dr. NATALIIA Tate   • BRONCHOSCOPY RIGID / FLEXIBLE N/A 02/26/2016    Dr. NATALIIA Tate   • CARDIAC CATHETERIZATION N/A 3/18/2019    Procedure: Right and Left Heart Cath;  Surgeon: Nina Storey MD;  Location: [facility] CATH INVASIVE LOCATION;  Service: Cardiovascular   • CATARACT EXTRACTION WITH INTRAOCULAR LENS IMPLANT Bilateral    • COLON RESECTION WITH COLOSTOMY Left 02/28/2016    Exploratory laparotomy with open left hemicolectomy, end-colostomy, G-tube and J-tube-Dr. Endy Chaney   • COLONOSCOPY N/A 2015    Dr. Laughlin   • COLONOSCOPY N/A 10/12/2016    Procedure: COLONOSCOPY TO 20 CM AND PER STOMA TO 30CM;  Surgeon: Genaro Perrin MD;  Location[facility] ENDOSCOPY;  Service:    • COLONOSCOPY N/A 10/21/2016    Procedure: COLONOSCOPY DONE AT BEDSIDE ONTO CECEM;  Surgeon: Genaro Perrin MD;  Location: [facility] ENDOSCOPY;  Service:    • COLONOSCOPY N/A 02/10/2016    Diverticulosis in the entire examined colon, non-bleeding internal hemorrhoids-Dr. Taylor Pina   • COLONOSCOPY N/A 02/11/2016    Poor prep, blood in the entire examined colon, normal ileum-Dr. Taylor Pina   • COLONOSCOPY W/ POLYPECTOMY N/A 07/27/2015    Normal ileum, diverticulosis in the sigmoid colon: resected and retrieved, one 6 mm polyp in the descending colon: resected and retrieved, the distal rectum and anal verge are normal on retroflexion view-Dr. Miguel Ángel Laughlin   • COLOSTOMY CLOSURE N/A 10/13/2016    Procedure: COLOSTOMY TAKEDOWN OR CLOSURE, APPENDECTOMY;  Surgeon: Genaro SARAVIA  MD Marychuy;  Location: [facility] MAIN OR;  Service:    • DEBRIDEMENT LEG Left 03/01/2016    Excisional debridement of the skin, subcutantous tissue, tendon and muscle left calf-Dr. Jose Swann   • DEBRIDEMENT LEG Left 02/25/2016    Excisional debridement full-thcikness skin and subcutaneous tissue and tendon and muscle, left medial and lateral calf muscles-Dr. Jose Swann   • ENDOSCOPY N/A 10/21/2016    Procedure: ESOPHAGOGASTRODUODENOSCOPY DONE AT BEDSIDE;  Surgeon: Genaro Perrin MD;  Location: [facility] ENDOSCOPY;  Service:    • ENDOSCOPY AND COLONOSCOPY N/A 02/28/2016    EGD and Colonoscopy with Candy ink injection-Dr. Endy Chaeny   • ENTEROSCOPY SMALL BOWEL N/A 02/11/2016    Normal esophagus, normal stomach, normal duodenum, portion of the jejunum normal-Dr. Taylor Pina   • ILIAC ARTERY - FEMORAL ARTERY BYPASS GRAFT Bilateral 2002   • ILIAC ARTERY STENT Bilateral 2001   • INSERTION AND REMOVAL PERITONEAL DIALYSIS CATHETER Right 02/29/2016    Exchange right jugular 16 cm Mahurkar dialysis catheter-Dr. Jose Swann   • INSERTION PERITONEAL DIALYSIS CATHETER Left 03/01/2016    Ultrasound-guided access of the left jugular vein, 23 cm left jugular palindrome dialysis catheter placement-Dr. Jose Swann   • INSERTION PERITONEAL DIALYSIS CATHETER Right 02/18/2016    Right jugular Mahrkar catherer placement-Dr. Jose Swann   • JOINT REPLACEMENT Left    • THORACOSCOPY Left 4/18/2016    Procedure: LT THORACOSCOPY VIDEO ASSISTED WITH DECORDICATION;  Surgeon: Genaro Davila III, MD;  Location: [facility] OR;  Service:    • THROMBECTOMY Left 02/16/2016    Thombectomy of left femoral graft, left leg arteriogram, left calf forward compartment fasciotomy-Dr. Jose Swann   • TOTAL HIP ARTHROPLASTY Left    • UPPER GASTROINTESTINAL ENDOSCOPY N/A 02/09/2016    Normal UGI-Dr. Ajay Parkinson   • UPPER GASTROINTESTINAL ENDOSCOPY N/A 11/28/2015    Small hiatal hernia, chronic gastritis: biopsied,  non-bleeding angioectasias in the stomach: treated with argon plasma coagulation, multiple bleeding angioectasias in the dudenum: treated with argon plasma coagulation-Dr. Mykel Jaramillo   • VASCULAR SURGERY      MULTIPLE   • VENTRAL/INCISIONAL HERNIA REPAIR N/A 10/19/2017    Procedure: VENTRAL HERNIA REPAIR WITH MESH AND BILATERAL COMPONENT SEPARATION;  Surgeon: Genaro Perrin MD;  Location: [facility];  Service:    • WISDOM TOOTH EXTRACTION         Family History: family history includes Cancer in his mother; Diabetes in his father; Heart disease in his father; Hypertension in his father; Lung cancer in his mother.    Social History:  reports that he quit smoking about 22 months ago. His smoking use included cigarettes. He has never used smokeless tobacco. He reports that he does not drink alcohol and does not use drugs.    Home Medications:  Prior to Admission medications    Medication Sig Start Date End Date Taking? Authorizing Provider   albuterol (ACCUNEB) 0.63 MG/3ML nebulizer solution Inhale 3 mL Every 6 (Six) Hours As Needed.   Yes Emergency, Nurse Gonzalo, RN   ALPRAZolam (XANAX) 1 MG tablet Take 1/2 tab in the morning, 1 tab at noon, 1 tab in the evening 1/27/23  Yes Seth Hester MD   amLODIPine (NORVASC) 10 MG tablet Take 1 tablet by mouth Daily. 2/17/23  Yes Seth Hester MD   aspirin 81 MG chewable tablet Chew 1 tablet Every Night.   Yes Primitivo Lagunas MD   atorvastatin (LIPITOR) 40 MG tablet Take 1.5 tab PO QD for cholesterol and heart health 11/8/22  Yes Seth Hester MD   buPROPion XL (WELLBUTRIN XL) 300 MG 24 hr tablet TAKE 1 TABLET BY MOUTH EVERY DAY 1/4/23  Yes Seth Hester MD   clopidogrel (PLAVIX) 75 MG tablet Take 1 tablet by mouth Every Night. 2/17/23  Yes Seth Hester MD   Coenzyme Q10 400 MG capsule Take 1 capsule by mouth Every Evening.   Yes Primitivo Lagunas MD   FERROUS SULFATE PO Take 65 mg by mouth Every Night.   Yes Primitivo Lagunas MD    Fluticasone-Umeclidin-Vilant (TRELEGY ELLIPTA) 100-62.5-25 MCG/INH aerosol powder  Inhale 1 each Daily. 3/19/19  Yes Jemima Tran MD   gabapentin (NEURONTIN) 300 MG capsule Take 1 capsule by mouth 3 (Three) Times a Day. 9/22/22  Yes Seth Hester MD   isosorbide mononitrate (IMDUR) 30 MG 24 hr tablet TAKE 1 TABLET BY MOUTH EVERY DAY IN THE MORNING 4/11/22  Yes Seth Hester MD   latanoprost (XALATAN) 0.005 % ophthalmic solution Administer 1 drop to both eyes every night at bedtime. 7/14/21  Yes Primitivo Lagunas MD   levothyroxine (SYNTHROID, LEVOTHROID) 112 MCG tablet Take 1 tablet by mouth Daily. 9/19/22  Yes Seth Hester MD   Multiple Vitamins-Minerals (MULTIVITAMIN ADULT PO) Take 1 tablet by mouth Daily.   Yes Primitivo Lagunas MD   NIACIN PO Take 1 capsule by mouth Every Morning.   Yes Primitivo Lagunas MD   Thiamine HCl (VITAMIN B-1 PO) Take 1 tablet by mouth Daily.   Yes ProviderPrimitivo MD   torsemide (DEMADEX) 20 MG tablet Take 1 tablet by mouth Daily. 4/11/22  Yes Modesto Hu MD   traMADol (ULTRAM) 50 MG tablet Take 1 tablet by mouth 2 (Two) Times a Day. 12/12/22  Yes Seth Hester MD       Allergies:  Allergies   Allergen Reactions   • Augmentin [Amoxicillin-Pot Clavulanate] Hives and Rash     Tolerate cephalosporins       Objective     Vitals:   Temp:  [97.4 °F (36.3 °C)-98.6 °F (37 °C)] 98.6 °F (37 °C)  Heart Rate:  [55-71] 60  Resp:  [16] 16  BP: (122-169)/(71-85) 135/79  Flow (L/min):  [2] 2    Intake/Output Summary (Last 24 hours) at 3/1/2023 0837  Last data filed at 3/1/2023 0538  Gross per 24 hour   Intake 0 ml   Output 1150 ml   Net -1150 ml       Physical Exam:   Constitutional: Awake, alert, no acute distress.  Chronically ill.  HEENT: Sclera anicteric, no conjunctival injection, oral mucosa is normal  Neck: Supple, no thyromegaly, no lymphadenopathy, trachea at midline, no JVD  Respiratory: Scattered rhonchi, nonlabored respiration  Cardiovascular: RRR, no  murmurs, no rubs or gallops, no carotid bruit  Gastrointestinal: Positive bowel sounds, abdomen is soft, nontender and nondistended  : No palpable bladder  Musculoskeletal: No edema, no clubbing or cyanosis, left BKA  Psychiatric: Appropriate affect, cooperative  Neurologic: Oriented x3, moving all extremities, normal speech and mental status  Skin: Warm and dry       Scheduled Meds:     amLODIPine, 10 mg, Oral, Daily  aspirin, 81 mg, Oral, Nightly  atorvastatin, 40 mg, Oral, Daily  budesonide-formoterol, 2 puff, Inhalation, BID - RT   And  tiotropium bromide monohydrate, 2 puff, Inhalation, Daily - RT  buPROPion XL, 300 mg, Oral, Daily  cefTRIAXone, 1 g, Intravenous, Q24H  clopidogrel, 75 mg, Oral, Nightly  gabapentin, 300 mg, Oral, TID  isosorbide mononitrate, 30 mg, Oral, QAM  latanoprost, 1 drop, Both Eyes, Nightly  levothyroxine, 112 mcg, Oral, Daily  multivitamin with minerals, 1 tablet, Oral, Daily  traMADol, 50 mg, Oral, BID      IV Meds:        Results Reviewed:   I have personally reviewed the results from the time of this admission to 3/1/2023 08:37 EST     Lab Results   Component Value Date    GLUCOSE 75 03/01/2023    CALCIUM 8.6 03/01/2023     03/01/2023    K 4.1 03/01/2023    CO2 28.2 03/01/2023     03/01/2023    BUN 27 (H) 03/01/2023    CREATININE 2.13 (H) 03/01/2023    EGFRIFAFRI 51 (L) 09/10/2021    EGFRIFNONA 44 (L) 09/10/2021    BCR 12.7 03/01/2023    ANIONGAP 10.8 03/01/2023      Lab Results   Component Value Date    MG 2.1 04/04/2022    PHOS 4.5 04/08/2022    ALBUMIN 4.2 02/28/2023           Assessment / Plan     ASSESSMENT:  -Acute kidney injury on chronic kidney disease stage IIIb improved since admission back to his baseline, his volume status and electrolyte within acceptable  -CKD stage IIIb related to prior episode of acute kidney injury with renovascular disease and probably hypertensive nephrosclerosis.  -Hypertension with chronic kidney disease, reasonably  controlled  -Severe peripheral vascular disease with prior left AKA  -UTI, currently treated  -History of obstructive sleep apnea  and COPD, followed by Dr. Tate  -Dyslipidemia with statin  -Hypothyroidism on replacement therapy  -History of vitamin D deficiency  -Tobacco dependence, he stopped smoking in 2021 and he smokes marijuana regularly.    PLAN:  -Check random urine for protein to creatinine ratio, check PTH and vitamin D level  -I agree with the present treatment  -Surveillance labs    Thank you for involving us in the care of Sim Agustin.  Please feel free to call with any questions.    Jonathan Lund MD  03/01/23  08:37 [facility]    [facility]  [phone]      Please note that portions of this note were completed with a voice recognition program.

## 2023-03-01 NOTE — PROGRESS NOTES
Name: Sim Agustin ADMIT: 2023   : 1955  PCP: Seth Hester MD    MRN: [id number] LOS: 0 days   AGE/SEX: 67 y.o. male  ROOM: [facility]/     Subjective   Subjective   Patient seen and examined this morning. Hospital day 1. At time of my examination, patient is awake, alert and oriented x3, resting comfortably in bed. Discussed with nursing, no reported acute events overnight. Patient with complaints of dysuria, frequency, improvement from prior.  States that he worked with physical therapy, and did well.    Review of Systems   Constitutional: Negative for chills and fever.   Respiratory: Negative for cough and shortness of breath.    Cardiovascular: Negative for chest pain and palpitations.   Gastrointestinal: Negative for abdominal pain.   Genitourinary: Positive for dysuria and frequency.   Neurological: Negative for dizziness and light-headedness.        Objective   Objective   Vital Signs  Temp:  [97.4 °F (36.3 °C)-98.6 °F (37 °C)] 98.6 °F (37 °C)  Heart Rate:  [55-71] 60  Resp:  [16] 16  BP: (135-169)/(71-85) 135/79  SpO2:  [89 %-97 %] 91 %  on  Flow (L/min):  [2] 2;   Device (Oxygen Therapy): room air  Body mass index is 26.24 kg/m².  Physical Exam  Vitals and nursing note reviewed.   Constitutional:       General: He is awake. He is not in acute distress.     Appearance: He is ill-appearing.   Eyes:      General: No scleral icterus.     Conjunctiva/sclera: Conjunctivae normal.   Cardiovascular:      Rate and Rhythm: Normal rate and regular rhythm.      Pulses: Normal pulses.      Heart sounds: Normal heart sounds.   Pulmonary:      Effort: Pulmonary effort is normal. No respiratory distress.      Breath sounds: Normal breath sounds.   Abdominal:      General: Bowel sounds are normal. There is distension.      Palpations: Abdomen is soft.      Tenderness: There is no abdominal tenderness. There is no guarding.   Musculoskeletal:      Comments: Left AKA   Skin:     General: Skin is warm and dry.    Neurological:      General: No focal deficit present.      Mental Status: He is alert and oriented to person, place, and time.   Psychiatric:         Behavior: Behavior is cooperative.       Results Review     I reviewed the patient's new clinical results.  Results from last 7 days   Lab Units 03/01/23  0444 02/28/23  1128   WBC 10*3/mm3 9.10 7.65   HEMOGLOBIN g/dL 14.6 13.9   PLATELETS 10*3/mm3 181 180     Results from last 7 days   Lab Units 03/01/23  0444 02/28/23  1128   SODIUM mmol/L 139 138   POTASSIUM mmol/L 4.1 4.4   CHLORIDE mmol/L 100 101   CO2 mmol/L 28.2 30.7*   BUN mg/dL 27* 34*   CREATININE mg/dL 2.13* 2.50*   GLUCOSE mg/dL 75 82   EGFR mL/min/1.73 33.3* 27.5*     Results from last 7 days   Lab Units 02/28/23  1128   ALBUMIN g/dL 4.2   BILIRUBIN mg/dL 0.5   ALK PHOS U/L 75   AST (SGOT) U/L 18   ALT (SGPT) U/L 11     Results from last 7 days   Lab Units 03/01/23  0444 02/28/23  1128   CALCIUM mg/dL 8.6 10.0   ALBUMIN g/dL  --  4.2     Results from last 7 days   Lab Units 02/28/23  1345   LACTATE mmol/L 1.3     No results found for: HGBA1C, POCGLU    CT Head Without Contrast    Result Date: 2/28/2023   No evidence for acute traumatic intracranial pathology.   Radiation dose reduction techniques were utilized, including automated exposure control and exposure modulation based on body size.  This report was finalized on 2/28/2023 12:27 PM by Dr. Benton Mackey M.D.      I have personally reviewed all medications:  Scheduled Medications  amLODIPine, 10 mg, Oral, Daily  aspirin, 81 mg, Oral, Nightly  atorvastatin, 40 mg, Oral, Daily  budesonide-formoterol, 2 puff, Inhalation, BID - RT   And  tiotropium bromide monohydrate, 2 puff, Inhalation, Daily - RT  buPROPion XL, 300 mg, Oral, Daily  cefTRIAXone, 1 g, Intravenous, Q24H  clopidogrel, 75 mg, Oral, Nightly  gabapentin, 300 mg, Oral, TID  isosorbide mononitrate, 30 mg, Oral, QAM  latanoprost, 1 drop, Both Eyes, Nightly  levothyroxine, 112 mcg, Oral,  Daily  multivitamin with minerals, 1 tablet, Oral, Daily  traMADol, 50 mg, Oral, BID    Infusions   Diet  Diet: Cardiac Diets; Healthy Heart (2-3 Na+); Texture: Regular Texture (IDDSI 7); Fluid Consistency: Thin (IDDSI 0)    I have personally reviewed:  [x]  Laboratory   [x]  Microbiology   [x]  Radiology   [x]  EKG/Telemetry  [x]  Cardiology/Vascular     [x]  Records       Assessment/Plan     Active Hospital Problems    Diagnosis  POA   • **Acute UTI [N39.0]  Yes   • ARLYN (acute kidney injury) ([facility]) [N17.9]  Yes   • Stage 3b chronic kidney disease (CKD) ([facility]) [N18.32]  Yes   • Hyperlipidemia [E78.5]  Yes   • Hypothyroidism [E03.9]  Yes   • PVD (peripheral vascular disease) ([facility]) [I73.9]  Yes   • CAD (coronary artery disease) [I25.10]  Yes   • Status post above-knee amputation of left lower extremity ([facility]) [Z89.612]  Not Applicable   • COPD (chronic obstructive pulmonary disease) ([facility]) [J44.9]  Yes   • Essential hypertension [I10]  Yes      Resolved Hospital Problems   No resolved problems to display.       67 y.o. male admitted with Acute UTI.    Acute urinary tract infection  - Patient with symptoms of dysuria,frequency + UA findings suggestive of UTI. Started on empiric ceftriaxone.  - Urine culture >100K CFU/mL gram negative bacilli. Final speciation/sensitivities pending.  - Symptoms improving, he is afebrile.   - Continue Ceftriaxone as prescribed.  - Acute urinary retention protocol.  - Order repeat CBC in AM for reassessment.    ARLYN on CKD stage 3B  - Etiology most likely prerenal in nature, due to hypovolemia, hypoperfusion, in setting of acute infection.  -Creatinine 2.50 on admission, subsequently improving on most recent labs, however, remains elevated.  -Nephrology consulted and following, ordering further work-up at this time to guide ongoing management decisions.  Otherwise, they agree with current treatment plan.  - Order repeat BMP in AM for reassessment.  - Will continue to follow Nephrology  plans/recommendations. Greatly appreciate their help.  - Will continue to monitor and trend creatinine to guide ongoing management decisions. Avoid nephrotoxic medications, IVF, strict I/O, daily weights.    Hypertension  - BP acceptable acutely. Appears stable. No indications to warrant acute changes/intervention at this time.  - Continue current medications as prescribed. Trend BP to guide ongoing management decisions.    Coronary artery disease  - Patient appears stable from this perspective at this time, denies any chest pain, dyspnea, palpitations, no signs/symptoms to suggest ACS.  - Continue current treatment as prescribed. Will continue to monitor.  - Continue telemetry monitoring.    Hyperlipidemia  - Stable. Continue Statin as prescribed.    Hypothyroidism  - Stable. Continue Levothyroxine as prescribed.    Peripheral vascular disease  - Appears stable at this time. Continue current treatment as prescribed.      · SCDs for DVT prophylaxis.  · Limited code (no CPR, no intubation).  · Discussed with patient, nursing staff, CCP and care team on multidisciplinary rounds.  · Anticipate discharge home in 1-2 days.      Buddy Cook DO  [facility]  03/01/23

## 2023-03-01 NOTE — H&P
HISTORY AND PHYSICAL   [facility]        Date of Admission: 2023  Patient Identification:  Name: Sim Agustin  Age: 67 y.o.  Sex: male  :  1955  MRN: [id number]                     Primary Care Physician: Seth Hester MD    Chief Complaint:  67 year old gentleman who presented to the emergency room with weakness and confusion; he has a history of left aka and uses a walker to get around he fell trying to back out of the bathroom and then could not get up; his weakness is different from his baseline    History of Present Illness:   As above    Past Medical History:  Past Medical History:   Diagnosis Date   • Acute respiratory failure with hypoxia and hypercarbia ([facility]) 2019   • Acute upper respiratory infection 2013   • Anemia     per [facility] [place]   • ARF (acute renal failure) ([facility]) 2016   • Atelectasis, left 2016   • Bradycardia 2019   • CAD (coronary artery disease) 2016   • Chronic obstructive pulmonary disease with acute exacerbation ([facility]) 2013   • Colon polyps    • Compartment syndrome ([facility])     AFTER ILIAC SURGERY. ABOVE KNEE AMPUTATION   • COPD (chronic obstructive pulmonary disease) ([facility])    • COPD with hypoxia ([facility]) 2019   • Cough     SINCE APRIL WITH PNEUMONIA.    • Demand myocardial infarction ([facility]) 2022   • Disease of thyroid gland     HYPOTHYRODISM   • Diverticulosis    • Elevated hematocrit 2021   • Employs prosthetic leg    • ESRD (end stage renal disease) on dialysis ([facility]) 2016   • Fracture of patella 2015   • History of acute renal failure    • History of CHF (congestive heart failure)    • History of GI bleed 2016    AORTODUOD FISTULA   • History of sepsis 2016   • History of transfusion     MULTIPLE,    • Hyperkalemia 2016   • Hypertension    • Hypotension    • Hypotension 2016   • Left lower lobe pneumonia 2022   • Nonischemic cardiomyopathy ([facility])    • Nosocomial  pneumonia 04/06/2016   • Phantom limb pain (HCC)     SL, WITH LEFT ABOUVE KNEE   • Pleural effusion on left 04/11/2016   • Pneumonia     SPRING 2016  AFTER SURGERY   • PVD (peripheral vascular disease) (HCC)    • S/P thoracentesis 04/11/2016   • Sepsis, unspecified organism (HCC) 04/05/2016     Past Surgical History:  Past Surgical History:   Procedure Laterality Date   • ABOVE KNEE AMPUTATION Left 3/16/2016    Procedure: LT AMPUTATION ABOVE KNEE;  Surgeon: Jose Swann MD;  Location: [facility] MAIN OR;  Service:    • ARTERIAL THROMBECTOMY  2001    throbectomy of arterial graft   • AXILLARY FEMORAL BYPASS Right 02/15/2016    Exploratory lapartomy, repair aortoduodenal fistula, resection infected aortobifemoral bypass graft, axillobi-superficial femoral artery Lind[place]-Aneudy bypass graft from right axillary artery, Repair of duodenotomy 4th portion duodenum-Dr. Jose Swann, Dr. Genaro Perrin   • BRONCHOSCOPY Left 03/04/2016    Dr. NATALIIA Tate   • BRONCHOSCOPY Left 5/20/2022    Procedure: BRONCHOSCOPY WITH BIOPSIES, BRUSHINGS, AND BAL UNDER FLUORO;  Surgeon: Ishmael Tate Jr., MD;  Location: [facility] ENDOSCOPY;  Service: Pulmonary;  Laterality: Left;  PRE OP - LLL CONSOLIDATION  POST OP - SAME   • BRONCHOSCOPY RIGID / FLEXIBLE N/A 03/03/2016    Dr. NATALIIA Tate   • BRONCHOSCOPY RIGID / FLEXIBLE N/A 02/26/2016    Dr. NATALIIA Tate   • CARDIAC CATHETERIZATION N/A 3/18/2019    Procedure: Right and Left Heart Cath;  Surgeon: Nina Storey MD;  Location: [facility] CATH INVASIVE LOCATION;  Service: Cardiovascular   • CATARACT EXTRACTION WITH INTRAOCULAR LENS IMPLANT Bilateral    • COLON RESECTION WITH COLOSTOMY Left 02/28/2016    Exploratory laparotomy with open left hemicolectomy, end-colostomy, G-tube and J-tube-Dr. Endy Chaney   • COLONOSCOPY N/A 2015    Dr. Laughlin   • COLONOSCOPY N/A 10/12/2016    Procedure: COLONOSCOPY TO 20 CM AND PER STOMA TO 30CM;  Surgeon: Genaro Perrin MD;  Location: [facility]  ENDOSCOPY;  Service:    • COLONOSCOPY N/A 10/21/2016    Procedure: COLONOSCOPY DONE AT BEDSIDE ONTO CECEM;  Surgeon: Genaro Perrin MD;  Location: [facility] ENDOSCOPY;  Service:    • COLONOSCOPY N/A 02/10/2016    Diverticulosis in the entire examined colon, non-bleeding internal hemorrhoids-Dr. Taylor Pina   • COLONOSCOPY N/A 02/11/2016    Poor prep, blood in the entire examined colon, normal ileum-Dr. Taylor Pina   • COLONOSCOPY W/ POLYPECTOMY N/A 07/27/2015    Normal ileum, diverticulosis in the sigmoid colon: resected and retrieved, one 6 mm polyp in the descending colon: resected and retrieved, the distal rectum and anal verge are normal on retroflexion view-Dr. Miguel Ángel Laughlin   • COLOSTOMY CLOSURE N/A 10/13/2016    Procedure: COLOSTOMY TAKEDOWN OR CLOSURE, APPENDECTOMY;  Surgeon: Genaro Perrin MD;  Location: [facility] OR;  Service:    • DEBRIDEMENT LEG Left 03/01/2016    Excisional debridement of the skin, subcutantous tissue, tendon and muscle left calf-Dr. Jose Swann   • DEBRIDEMENT LEG Left 02/25/2016    Excisional debridement full-thcikness skin and subcutaneous tissue and tendon and muscle, left medial and lateral calf muscles-Dr. Jose Swann   • ENDOSCOPY N/A 10/21/2016    Procedure: ESOPHAGOGASTRODUODENOSCOPY DONE AT BEDSIDE;  Surgeon: Genaro Perrin MD;  Location: [facility] ENDOSCOPY;  Service:    • ENDOSCOPY AND COLONOSCOPY N/A 02/28/2016    EGD and Colonoscopy with Candy ink injection-Dr. Endy Chaney   • ENTEROSCOPY SMALL BOWEL N/A 02/11/2016    Normal esophagus, normal stomach, normal duodenum, portion of the jejunum normal-Dr. Taylor Pina   • ILIAC ARTERY - FEMORAL ARTERY BYPASS GRAFT Bilateral 2002   • ILIAC ARTERY STENT Bilateral 2001   • INSERTION AND REMOVAL PERITONEAL DIALYSIS CATHETER Right 02/29/2016    Exchange right jugular 16 cm Mahurkar dialysis catheter-Dr. Jose Swann   • INSERTION PERITONEAL DIALYSIS CATHETER Left 03/01/2016    Ultrasound-guided  access of the left jugular vein, 23 cm left jugular palindrome dialysis catheter placement-Dr. Jose Swann   • INSERTION PERITONEAL DIALYSIS CATHETER Right 02/18/2016    Right jugular Mahrkar catherer placement-Dr. Jose Swann   • JOINT REPLACEMENT Left    • THORACOSCOPY Left 4/18/2016    Procedure: LT THORACOSCOPY VIDEO ASSISTED WITH DECORDICATION;  Surgeon: Genaro Davila III, MD;  Location: [facility];  Service:    • THROMBECTOMY Left 02/16/2016    Thombectomy of left femoral graft, left leg arteriogram, left calf forward compartment fasciotomy-Dr. Jose Swann   • TOTAL HIP ARTHROPLASTY Left    • UPPER GASTROINTESTINAL ENDOSCOPY N/A 02/09/2016    Normal UGI-Dr. Ajay Parkinson   • UPPER GASTROINTESTINAL ENDOSCOPY N/A 11/28/2015    Small hiatal hernia, chronic gastritis: biopsied, non-bleeding angioectasias in the stomach: treated with argon plasma coagulation, multiple bleeding angioectasias in the dudenum: treated with argon plasma coagulation-Dr. Mykel Jaramillo   • VASCULAR SURGERY      MULTIPLE   • VENTRAL/INCISIONAL HERNIA REPAIR N/A 10/19/2017    Procedure: VENTRAL HERNIA REPAIR WITH MESH AND BILATERAL COMPONENT SEPARATION;  Surgeon: Genaro Perrin MD;  Location: [facility];  Service:    • WISDOM TOOTH EXTRACTION        Home Meds:  Medications Prior to Admission   Medication Sig Dispense Refill Last Dose   • albuterol (ACCUNEB) 0.63 MG/3ML nebulizer solution Inhale 3 mL Every 6 (Six) Hours As Needed.   2/27/2023   • ALPRAZolam (XANAX) 1 MG tablet Take 1/2 tab in the morning, 1 tab at noon, 1 tab in the evening 75 tablet 2 2/27/2023   • amLODIPine (NORVASC) 10 MG tablet Take 1 tablet by mouth Daily. 90 tablet 3 2/28/2023   • aspirin 81 MG chewable tablet Chew 1 tablet Every Night.   2/27/2023   • atorvastatin (LIPITOR) 40 MG tablet Take 1.5 tab PO QD for cholesterol and heart health 135 tablet 3 2/27/2023   • buPROPion XL (WELLBUTRIN XL) 300 MG 24 hr tablet TAKE 1 TABLET BY MOUTH  EVERY DAY 90 tablet 3 2/28/2023   • clopidogrel (PLAVIX) 75 MG tablet Take 1 tablet by mouth Every Night. 90 tablet 3 2/27/2023   • Coenzyme Q10 400 MG capsule Take 1 capsule by mouth Every Evening.   Past Month   • FERROUS SULFATE PO Take 65 mg by mouth Every Night.   2/27/2023   • Fluticasone-Umeclidin-Vilant (TRELEGY ELLIPTA) 100-62.5-25 MCG/INH aerosol powder  Inhale 1 each Daily. 28 each 2 Past Week   • gabapentin (NEURONTIN) 300 MG capsule Take 1 capsule by mouth 3 (Three) Times a Day. 270 capsule 1 2/27/2023   • isosorbide mononitrate (IMDUR) 30 MG 24 hr tablet TAKE 1 TABLET BY MOUTH EVERY DAY IN THE MORNING 90 tablet 3 2/28/2023   • latanoprost (XALATAN) 0.005 % ophthalmic solution Administer 1 drop to both eyes every night at bedtime.   2/27/2023   • levothyroxine (SYNTHROID, LEVOTHROID) 112 MCG tablet Take 1 tablet by mouth Daily. 90 tablet 1 2/28/2023   • Multiple Vitamins-Minerals (MULTIVITAMIN ADULT PO) Take 1 tablet by mouth Daily.   2/27/2023   • NIACIN PO Take 1 capsule by mouth Every Morning.   2/27/2023   • Thiamine HCl (VITAMIN B-1 PO) Take 1 tablet by mouth Daily.   Past Month   • torsemide (DEMADEX) 20 MG tablet Take 1 tablet by mouth Daily. 30 tablet 0 2/28/2023   • traMADol (ULTRAM) 50 MG tablet Take 1 tablet by mouth 2 (Two) Times a Day. 180 tablet 0 Past Week       Allergies:  Allergies   Allergen Reactions   • Augmentin [Amoxicillin-Pot Clavulanate] Hives and Rash     Tolerate cephalosporins     Immunizations:  Immunization History   Administered Date(s) Administered   • COVID-19 (PFIZER) BIVALENT BOOSTER 12+YRS 09/24/2022   • COVID-19 (PFIZER) PURPLE CAP 03/17/2021, 04/14/2021, 10/18/2021   • Covid-19 (Pfizer) Gray Cap 04/16/2022   • Flu Vaccine Quad PF >36MO 10/26/2016, 10/10/2017   • Fluad Quad 65+ 09/01/2020   • Fluzone High Dose =>65 Years ([employer] ONLY) 10/08/2018, 09/16/2019   • Fluzone High-Dose 65+yrs 09/21/2021   • Pneumococcal Conjugate 20-Valent (PCV20) 09/15/2022   •  Pneumococcal Polysaccharide (PPSV23) 2017   • Shingrix 2019, 2019   • Tdap 03/15/2022     Social History:   Social History     Social History Narrative   • Not on file     Social History     Socioeconomic History   • Marital status:    Tobacco Use   • Smoking status: Former     Packs/day: 0.00     Years: 30.00     Pack years: 0.00     Types: Cigarettes     Quit date: 2021     Years since quittin.8   • Smokeless tobacco: Never   • Tobacco comments:     caffeine - rarely   Vaping Use   • Vaping Use: Never used   Substance and Sexual Activity   • Alcohol use: No   • Drug use: Never   • Sexual activity: Yes     Partners: Female     Birth control/protection: Post-menopausal       Family History:  Family History   Problem Relation Age of Onset   • Lung cancer Mother    • Cancer Mother    • Heart disease Father    • Diabetes Father    • Hypertension Father    • Malig Hyperthermia Neg Hx         Review of Systems  See history of present illness and past medical history.  Patient denies headache, dizziness, syncope, falls, trauma, change in vision, change in hearing, change in taste, changes in weight, changes in appetite, focal weakness, numbness, or paresthesia.  Patient denies chest pain, palpitations, dyspnea, orthopnea, PND, cough, sinus pressure, rhinorrhea, epistaxis, hemoptysis, nausea, vomiting,hematemesis, diarrhea, constipation or hematchezia.  Denies cold or heat intolerance, polydipsia, polyuria, polyphagia. Denies hematuria, pyuria, dysuria, hesitancy, frequency or urgency. Denies consumption of raw and under cooked meats foods or change in water source.  Denies fever, chills, sweats, night sweats.  Denies missing any routine medications. Remainder of ROS is negative.    Objective:  T Max 24 hrs: Temp (24hrs), Av.9 °F (36.6 °C), Min:97.4 °F (36.3 °C), Max:98.4 °F (36.9 °C)    Vitals Ranges:   Temp:  [97.4 °F (36.3 °C)-98.4 °F (36.9 °C)] 97.4 °F (36.3 °C)  Heart Rate:   "[55-71] 62  Resp:  [16] 16  BP: (122-169)/(71-84) 169/75      Exam:  /75 (BP Location: Right arm)   Pulse 62   Temp 97.4 °F (36.3 °C) (Oral)   Resp 16   Ht 172.7 cm (68\")   Wt 81.6 kg (180 lb)   SpO2 95%   BMI 27.37 kg/m²     General Appearance:    Alert, cooperative, no distress, appears stated age   Head:    Normocephalic, without obvious abnormality, atraumatic   Eyes:    PERRL, conjunctivae/corneas clear, EOM's intact, both eyes   Ears:    Normal external ear canals, both ears   Nose:   Nares normal, septum midline, mucosa normal, no drainage    or sinus tenderness   Throat:   Lips, mucosa, and tongue normal   Neck:   Supple, symmetrical, trachea midline, no adenopathy;     thyroid:  no enlargement/tenderness/nodules; no carotid    bruit or JVD   Back:     Symmetric, no curvature, ROM normal, no CVA tenderness   Lungs:     Decreased breath sounds bilaterally, respirations unlabored   Chest Wall:    No tenderness or deformity    Heart:    Regular rate and rhythm, S1 and S2 normal, no murmur, rub   or gallop   Abdomen:     Soft, nontender, bowel sounds active all four quadrants,     no masses, no hepatomegaly, no splenomegaly   Extremities:   Extremities normal, atraumatic, s/p left aka   Pulses:   2+ and symmetric all extremities   Skin:   Skin color, texture, turgor normal, no rashes or lesions               .    Data Review:  Labs in chart were reviewed.  WBC   Date Value Ref Range Status   02/28/2023 7.65 3.40 - 10.80 10*3/mm3 Final     Hemoglobin   Date Value Ref Range Status   02/28/2023 13.9 13.0 - 17.7 g/dL Final     Hematocrit   Date Value Ref Range Status   02/28/2023 40.7 37.5 - 51.0 % Final     Platelets   Date Value Ref Range Status   02/28/2023 180 140 - 450 10*3/mm3 Final     Sodium   Date Value Ref Range Status   02/28/2023 138 136 - 145 mmol/L Final     Potassium   Date Value Ref Range Status   02/28/2023 4.4 3.5 - 5.2 mmol/L Final     Comment:     Slight hemolysis detected by " analyzer. Results may be affected.     Chloride   Date Value Ref Range Status   02/28/2023 101 98 - 107 mmol/L Final     CO2   Date Value Ref Range Status   02/28/2023 30.7 (H) 22.0 - 29.0 mmol/L Final     BUN   Date Value Ref Range Status   02/28/2023 34 (H) 8 - 23 mg/dL Final     Creatinine   Date Value Ref Range Status   02/28/2023 2.50 (H) 0.76 - 1.27 mg/dL Final     Glucose   Date Value Ref Range Status   02/28/2023 82 65 - 99 mg/dL Final     Calcium   Date Value Ref Range Status   02/28/2023 10.0 8.6 - 10.5 mg/dL Final     AST (SGOT)   Date Value Ref Range Status   02/28/2023 18 1 - 40 U/L Final     Comment:     Slight hemolysis detected by analyzer. Results may be affected.     ALT (SGPT)   Date Value Ref Range Status   02/28/2023 11 1 - 41 U/L Final     Alkaline Phosphatase   Date Value Ref Range Status   02/28/2023 75 39 - 117 U/L Final                Imaging Results (All)     Procedure Component Value Units Date/Time    XR Chest 1 View [[id number]] Collected: 02/28/23 1243     Updated: 02/28/23 1251    Narrative:      XR CHEST 1 VW-, XR HIP W OR WO PELVIS 2-3 VIEW LEFT-, XR FEMUR 2 VW  LEFT-     Clinical: Fell with pain     COMPARISON chest radiograph dated 5/23/2022     FINDINGS: The cardiomediastinal silhouette is stable. Left lower lobe  consolidation diminished within the interim however not completely  resolved. No new airspace disease, effusion or edema has developed.     CONCLUSION: Improved however not completely resolved left lower lobe  consolidation, chest is otherwise similar to 5/23/2022     Left hip left femur findings: Left total hip replacement prosthesis  position is satisfactory, no loosening. No femur fracture. No  dislocation of the prosthesis. Vascular arterial calcifications noted  within the soft tissues. No radiopaque foreign body. The left hemipelvis  is within normal limits.     CONCLUSION: Left total hip replacement prosthesis is satisfactory in  appearance. No fracture of the  left femur or hemipelvis seen.     This report was finalized on 2/28/2023 12:48 PM by Dr. Javy Newsome M.D.       XR Hip With or Without Pelvis 2 - 3 View Left [[id number]] Collected: 02/28/23 1243     Updated: 02/28/23 1251    Narrative:      XR CHEST 1 VW-, XR HIP W OR WO PELVIS 2-3 VIEW LEFT-, XR FEMUR 2 VW  LEFT-     Clinical: Fell with pain     COMPARISON chest radiograph dated 5/23/2022     FINDINGS: The cardiomediastinal silhouette is stable. Left lower lobe  consolidation diminished within the interim however not completely  resolved. No new airspace disease, effusion or edema has developed.     CONCLUSION: Improved however not completely resolved left lower lobe  consolidation, chest is otherwise similar to 5/23/2022     Left hip left femur findings: Left total hip replacement prosthesis  position is satisfactory, no loosening. No femur fracture. No  dislocation of the prosthesis. Vascular arterial calcifications noted  within the soft tissues. No radiopaque foreign body. The left hemipelvis  is within normal limits.     CONCLUSION: Left total hip replacement prosthesis is satisfactory in  appearance. No fracture of the left femur or hemipelvis seen.     This report was finalized on 2/28/2023 12:48 PM by Dr. Javy Newsome M.D.       XR Femur 2 View Left [[id number]] Collected: 02/28/23 1243     Updated: 02/28/23 1251    Narrative:      XR CHEST 1 VW-, XR HIP W OR WO PELVIS 2-3 VIEW LEFT-, XR FEMUR 2 VW  LEFT-     Clinical: Fell with pain     COMPARISON chest radiograph dated 5/23/2022     FINDINGS: The cardiomediastinal silhouette is stable. Left lower lobe  consolidation diminished within the interim however not completely  resolved. No new airspace disease, effusion or edema has developed.     CONCLUSION: Improved however not completely resolved left lower lobe  consolidation, chest is otherwise similar to 5/23/2022     Left hip left femur findings: Left total hip replacement prosthesis  position is  satisfactory, no loosening. No femur fracture. No  dislocation of the prosthesis. Vascular arterial calcifications noted  within the soft tissues. No radiopaque foreign body. The left hemipelvis  is within normal limits.     CONCLUSION: Left total hip replacement prosthesis is satisfactory in  appearance. No fracture of the left femur or hemipelvis seen.     This report was finalized on 2/28/2023 12:48 PM by Dr. Javy Newsome M.D.       CT Head Without Contrast [[id number]] Collected: 02/28/23 1222     Updated: 02/28/23 1230    Narrative:      CT HEAD WITHOUT CONTRAST     CLINICAL HISTORY: Fell hitting the back of the head.     TECHNIQUE: CT scan of the head was obtained with 3 mm axial soft tissue  and 2 mm axial bone algorithm algorithm images. No intravenous contrast  was administered. Sagittal and coronal reconstructions were obtained.     COMPARISON: CT head dated 02/26/2015.     FINDINGS:       There is no evidence for a calvarial fracture. There is no evidence for  an acute extra-axial hemorrhage.     The ventricles, sulci, and cisterns are age appropriate. The gray-white  matter differentiation is within normal limits. The basal ganglia and  thalami are unremarkable. The posterior fossa structures are within  normal limits. Incidental atherosclerotic calcifications are noted  within the intracranial vasculature.       Impression:         No evidence for acute traumatic intracranial pathology.        Radiation dose reduction techniques were utilized, including automated  exposure control and exposure modulation based on body size.     This report was finalized on 2/28/2023 12:27 PM by Dr. Benton Mackey M.D.               Assessment:  Active Hospital Problems    Diagnosis  POA   • **Acute UTI [N39.0]  Yes      Resolved Hospital Problems   No resolved problems to display.   copd  Hypertension  Cad  pvd  Sleep apnea  ckd3  román    Plan:  Continue rocephin  Pt.ot to see  Fluids  Monitor on telemetry  Creatinine  is above baseline  Hold torsemide and ask nephrology to see him  DDileepw patient and ED provider    Ruth Rosas MD  2/28/2023  21:06 EST

## 2023-03-01 NOTE — PLAN OF CARE
Goal Outcome Evaluation:  Plan of Care Reviewed With: patient           Outcome Evaluation: Pt admitted with UTI, weakness and recent fall at home. Pt has h/o L AKA and wears a prosthesis. Prosthesis is not here with him. Pt was standing at bedside when PT arrived. He reports he is independently mobile with prosthesis at home at baseline. Pt was able to ambulate with Rwx with supervision and appears to be at baseline level without his prosthesis. Pt denies a need to practice stairs stating he performs them independently at baseline and plans to enter with crutches when he returns home. Pt appears to be functioning at baseline level and is safe to d/c home when medically stable. PT will sign off.

## 2023-03-01 NOTE — PLAN OF CARE
Goal Outcome Evaluation:             Problem: Adult Inpatient Plan of Care  Goal: Plan of Care Review  Outcome: Ongoing, Progressing  Flowsheets  Taken 3/1/2023 1612 by Jany Riley RN  Progress: improving  Outcome Evaluation: Vitals stable. Pt worked w PT and PT will sign off. Possible d/c once culture results are returned. Pt needs met and questions answered. Will continue to monitor.

## 2023-03-01 NOTE — THERAPY DISCHARGE NOTE
Patient Name: Sim Agustin  : 1955    MRN: [id number]                              Today's Date: 3/1/2023       Admit Date: 2023    Visit Dx:     ICD-10-CM ICD-9-CM   1. Acute UTI  N39.0 599.0   2. Generalized weakness  R53.1 780.79     Patient Active Problem List   Diagnosis   • H/O angiodysplasia of intestinal tract   • COPD (chronic obstructive pulmonary disease) ([facility])   • S/P colectomy   • Status post above-knee amputation of left lower extremity ([facility])   • CAD (coronary artery disease)   • PVD (peripheral vascular disease) ([facility])   • Essential hypertension   • Cardiomyopathy, unspecified ([facility])   • PRISCILLA treated with BiPAP   • Anxiety disorder   • Chronic midline low back pain without sciatica   • Long term prescription benzodiazepine use   • History of bradycardia   • Hypothyroidism   • Vitamin B12 deficiency   • Iron deficiency   • History of smoking 30 or more pack years   • High risk medication use   • DNR (do not resuscitate)   • Stage 3a chronic kidney disease ([facility])   • Hyperlipidemia   • Proteinuria   • Hyperkalemia   • Junctional cardiac arrhythmia   • Acute UTI     Past Medical History:   Diagnosis Date   • Acute respiratory failure with hypoxia and hypercarbia ([facility]) 2019   • Acute upper respiratory infection 2013   • Anemia     per [place] database   • ARF (acute renal failure) ([facility]) 2016   • Atelectasis, left 2016   • Bradycardia 2019   • CAD (coronary artery disease) 2016   • Chronic obstructive pulmonary disease with acute exacerbation ([facility]) 2013   • Colon polyps    • Compartment syndrome ([facility])     AFTER ILIAC SURGERY. ABOVE KNEE AMPUTATION   • COPD (chronic obstructive pulmonary disease) ([facility])    • COPD with hypoxia ([facility]) 2019   • Cough     SINCE APRIL WITH PNEUMONIA.    • Demand myocardial infarction ([facility]) 2022   • Disease of thyroid gland     HYPOTHYRODISM   • Diverticulosis    • Elevated hematocrit 2021   • Employs prosthetic  leg    • ESRD (end stage renal disease) on dialysis ([facility]) 04/06/2016   • Fracture of patella 03/03/2015   • History of acute renal failure    • History of CHF (congestive heart failure)    • History of GI bleed 02/2016    AORTODUOD FISTULA   • History of sepsis 02/2016   • History of transfusion     MULTIPLE, 2016   • Hyperkalemia 04/11/2016   • Hypertension    • Hypotension    • Hypotension 03/04/2016   • Left lower lobe pneumonia 5/18/2022   • Nonischemic cardiomyopathy ([facility])    • Nosocomial pneumonia 04/06/2016   • Phantom limb pain ([facility])     SL, WITH LEFT ABOUVE KNEE   • Pleural effusion on left 04/11/2016   • Pneumonia     SPRING 2016  AFTER SURGERY   • PVD (peripheral vascular disease) ([facility])    • S/P thoracentesis 04/11/2016   • Sepsis, unspecified organism ([facility]) 04/05/2016     Past Surgical History:   Procedure Laterality Date   • ABOVE KNEE AMPUTATION Left 3/16/2016    Procedure: LT AMPUTATION ABOVE KNEE;  Surgeon: Jose Swann MD;  Location: [facility] OR;  Service:    • ARTERIAL THROMBECTOMY  2001    throbectomy of arterial graft   • AXILLARY FEMORAL BYPASS Right 02/15/2016    Exploratory lapartomy, repair aortoduodenal fistula, resection infected aortobifemoral bypass graft, axillobi-superficial femoral artery Crary-Aneudy bypass graft from right axillary artery, Repair of duodenotomy 4th portion duodenum-Dr. Jose Swann, Dr. Genaro Perrin   • BRONCHOSCOPY Left 03/04/2016    Dr. NATALIIA Tate   • BRONCHOSCOPY Left 5/20/2022    Procedure: BRONCHOSCOPY WITH BIOPSIES, BRUSHINGS, AND BAL UNDER FLUORO;  Surgeon: Ishmael Tate Jr., MD;  Location: [facility] ENDOSCOPY;  Service: Pulmonary;  Laterality: Left;  PRE OP - LLL CONSOLIDATION  POST OP - SAME   • BRONCHOSCOPY RIGID / FLEXIBLE N/A 03/03/2016    Dr. NATALIIA Tate   • BRONCHOSCOPY RIGID / FLEXIBLE N/A 02/26/2016    Dr. NATALIIA Tate   • CARDIAC CATHETERIZATION N/A 3/18/2019    Procedure: Right and Left Heart Cath;  Surgeon: Nina Storey,  MD;  Location: [facility] CATH INVASIVE LOCATION;  Service: Cardiovascular   • CATARACT EXTRACTION WITH INTRAOCULAR LENS IMPLANT Bilateral    • COLON RESECTION WITH COLOSTOMY Left 02/28/2016    Exploratory laparotomy with open left hemicolectomy, end-colostomy, G-tube and J-tube-Dr. Endy Chaney   • COLONOSCOPY N/A 2015    Dr. Laughlin   • COLONOSCOPY N/A 10/12/2016    Procedure: COLONOSCOPY TO 20 CM AND PER STOMA TO 30CM;  Surgeon: Genaro Perrin MD;  Location: [facility] ENDOSCOPY;  Service:    • COLONOSCOPY N/A 10/21/2016    Procedure: COLONOSCOPY DONE AT BEDSIDE ONTO CECEM;  Surgeon: Genaro Perrin MD;  Location: [facility] ENDOSCOPY;  Service:    • COLONOSCOPY N/A 02/10/2016    Diverticulosis in the entire examined colon, non-bleeding internal hemorrhoids-Dr. Taylor Pina   • COLONOSCOPY N/A 02/11/2016    Poor prep, blood in the entire examined colon, normal ileum-Dr. Taylor Pina   • COLONOSCOPY W/ POLYPECTOMY N/A 07/27/2015    Normal ileum, diverticulosis in the sigmoid colon: resected and retrieved, one 6 mm polyp in the descending colon: resected and retrieved, the distal rectum and anal verge are normal on retroflexion view-Dr. Miguel Ángel Laughlin   • COLOSTOMY CLOSURE N/A 10/13/2016    Procedure: COLOSTOMY TAKEDOWN OR CLOSURE, APPENDECTOMY;  Surgeon: Genaro Perrin MD;  Location: [facility] MAIN OR;  Service:    • DEBRIDEMENT LEG Left 03/01/2016    Excisional debridement of the skin, subcutantous tissue, tendon and muscle left calf-Dr. Jose Swann   • DEBRIDEMENT LEG Left 02/25/2016    Excisional debridement full-thcikness skin and subcutaneous tissue and tendon and muscle, left medial and lateral calf muscles-Dr. Jose Swann   • ENDOSCOPY N/A 10/21/2016    Procedure: ESOPHAGOGASTRODUODENOSCOPY DONE AT BEDSIDE;  Surgeon: Genaro Perrin MD;  Location: [facility] ENDOSCOPY;  Service:    • ENDOSCOPY AND COLONOSCOPY N/A 02/28/2016    EGD and Colonoscopy with Candy ink injection-Dr. Endy Chaney    • ENTEROSCOPY SMALL BOWEL N/A 02/11/2016    Normal esophagus, normal stomach, normal duodenum, portion of the jejunum normal-Dr. Taylor Pina   • ILIAC ARTERY - FEMORAL ARTERY BYPASS GRAFT Bilateral 2002   • ILIAC ARTERY STENT Bilateral 2001   • INSERTION AND REMOVAL PERITONEAL DIALYSIS CATHETER Right 02/29/2016    Exchange right jugular 16 cm Mahurkar dialysis catheter-Dr. Jose Swann   • INSERTION PERITONEAL DIALYSIS CATHETER Left 03/01/2016    Ultrasound-guided access of the left jugular vein, 23 cm left jugular palindrome dialysis catheter placement-Dr. Jose Swann   • INSERTION PERITONEAL DIALYSIS CATHETER Right 02/18/2016    Right jugular Mahrkar catherer placement-Dr. Jose Swann   • JOINT REPLACEMENT Left    • THORACOSCOPY Left 4/18/2016    Procedure: LT THORACOSCOPY VIDEO ASSISTED WITH DECORDICATION;  Surgeon: Genaro Davila III, MD;  Location: [facility];  Service:    • THROMBECTOMY Left 02/16/2016    Thombectomy of left femoral graft, left leg arteriogram, left calf forward compartment fasciotomy-Dr. Jose Swann   • TOTAL HIP ARTHROPLASTY Left    • UPPER GASTROINTESTINAL ENDOSCOPY N/A 02/09/2016    Normal UGI-Dr. Ajay Parkinson   • UPPER GASTROINTESTINAL ENDOSCOPY N/A 11/28/2015    Small hiatal hernia, chronic gastritis: biopsied, non-bleeding angioectasias in the stomach: treated with argon plasma coagulation, multiple bleeding angioectasias in the dudenum: treated with argon plasma coagulation-Dr. Mykel Jaramillo   • VASCULAR SURGERY      MULTIPLE   • VENTRAL/INCISIONAL HERNIA REPAIR N/A 10/19/2017    Procedure: VENTRAL HERNIA REPAIR WITH MESH AND BILATERAL COMPONENT SEPARATION;  Surgeon: Genaro Perrin MD;  Location: [facility];  Service:    • WISDOM TOOTH EXTRACTION        General Information     Row Name 03/01/23 0907          Physical Therapy Time and Intention    Document Type evaluation  -     Mode of Treatment individual therapy;physical therapy  -     Row  Name 03/01/23 0907          General Information    Patient Profile Reviewed yes  -     Prior Level of Function independent:  -     Existing Precautions/Restrictions fall  -     Barriers to Rehab previous functional deficit  AKA, uses prosthesis at home, doesn't have it here  -     Row Name 03/01/23 0907          Living Environment    People in Home spouse  -     Row Name 03/01/23 0907          Home Main Entrance    Number of Stairs, Main Entrance three  -     Row Name 03/01/23 0907          Cognition    Orientation Status (Cognition) oriented x 4  -           User Key  (r) = Recorded By, (t) = Taken By, (c) = Cosigned By    Initials Name Provider Type    Lupe Nino, PT Physical Therapist               Mobility     Row Name 03/01/23 0908          Bed Mobility    Bed Mobility supine-sit;sit-supine  -     Supine-Sit Pine (Bed Mobility) independent  -     Sit-Supine [place] (Bed Mobility) independent  -     Row Name 03/01/23 0908          Sit-Stand Transfer    Sit-Stand [place] (Transfers) standby assist  -     Row Name 03/01/23 0908          Gait/Stairs (Locomotion)    Pine Level (Gait) standby assist  -     Assistive Device (Gait) walker, front-wheeled  -     Distance in Feet (Gait) 40 ft  without LLE prosthesis  -           User Key  (r) = Recorded By, (t) = Taken By, (c) = Cosigned By    Initials Name Provider Type    Lupe Nino, PT Physical Therapist               Obj/Interventions     Row Name 03/01/23 0908          Range of Motion Comprehensive    Comment, General Range of Motion WFL  -     Row Name 03/01/23 0908          Strength Comprehensive (MMT)    Comment, General Manual Muscle Testing (MMT) Assessment generalized weakness  -     Row Name 03/01/23 0908          Balance    Balance Assessment sitting static balance;sitting dynamic balance;standing static balance;standing dynamic balance  -     Static Sitting Balance  independent  -     Dynamic Sitting Balance independent  -     Static Standing Balance standby assist  -     Dynamic Standing Balance standby assist  -     Position/Device Used, Standing Balance walker, rolling  -           User Key  (r) = Recorded By, (t) = Taken By, (c) = Cosigned By    Initials Name Provider Type    Lupe Nino, PT Physical Therapist               Goals/Plan    No documentation.                Clinical Impression     Row Name 03/01/23 0909          Pain    Pretreatment Pain Rating 0/10 - no pain  -     Posttreatment Pain Rating 0/10 - no pain  -     Row Name 03/01/23 0909          Plan of Care Review    Plan of Care Reviewed With patient  -     Outcome Evaluation Pt admitted with UTI, weakness and recent fall at home. Pt has h/o L AKA and wears a prosthesis. Prosthesis is not here with him. Pt was standing at bedside when PT arrived. He reports he is independently mobile with prosthesis at home at baseline. Pt was able to ambulate with Rwx with supervision and appears to be at baseline level without his prosthesis. Pt denies a need to practice stairs stating he performs them independently at baseline and plans to enter with crutches when he returns home. Pt appears to be functioning at baseline level and is safe to d/c home when medically stable. PT will sign off.  -     Row Name 03/01/23 0909          Therapy Assessment/Plan (PT)    Patient/Family Therapy Goals Statement (PT) return home to [facility]  -     Criteria for Skilled Interventions Met (PT) no problems identified which require skilled intervention  -     Therapy Frequency (PT) evaluation only  -     Row Name 03/01/23 0909          Positioning and Restraints    Pre-Treatment Position in bed  -           User Key  (r) = Recorded By, (t) = Taken By, (c) = Cosigned By    Initials Name Provider Type    Lupe Nino, PT Physical Therapist               Outcome Measures     Row Name 03/01/23  0913          How much help from another person do you currently need...    Turning from your back to your side while in flat bed without using bedrails? 4  -KH     Moving from lying on back to sitting on the side of a flat bed without bedrails? 4  -KH     Moving to and from a bed to a chair (including a wheelchair)? 4  -KH     Standing up from a chair using your arms (e.g., wheelchair, bedside chair)? 4  -KH     Climbing 3-5 steps with a railing? 3  -KH     To walk in hospital room? 4  -KH     AM-PAC 6 Clicks Score (PT) 23  -     Highest level of mobility 7 --> Walked 25 feet or more  -     Row Name 03/01/23 0913          Functional Assessment    Outcome Measure Options AM-PAC 6 Clicks Basic Mobility (PT)  -           User Key  (r) = Recorded By, (t) = Taken By, (c) = Cosigned By    Initials Name Provider Type    Lupe Nino, PT Physical Therapist                PT Recommendation and Plan     Plan of Care Reviewed With: patient  Outcome Evaluation: Pt admitted with UTI, weakness and recent fall at home. Pt has h/o L AKA and wears a prosthesis. Prosthesis is not here with him. Pt was standing at bedside when PT arrived. He reports he is independently mobile with prosthesis at home at baseline. Pt was able to ambulate with Rwx with supervision and appears to be at baseline level without his prosthesis. Pt denies a need to practice stairs stating he performs them independently at baseline and plans to enter with crutches when he returns home. Pt appears to be functioning at baseline level and is safe to d/c home when medically stable. PT will sign off.     Time Calculation:    PT Charges     Row Name 03/01/23 0927             Time Calculation    Start Time 0858  -      Stop Time 0920  -      Time Calculation (min) 22 min  -         Time Calculation- PT    Total Timed Code Minutes- PT 8 minute(s)  -KH         Timed Charges    03058 - PT Therapeutic Activity Minutes 8  -KH         Untimed  Charges    PT Eval/Re-eval Minutes 10  -KH         Total Minutes    Timed Charges Total Minutes 8  -KH      Untimed Charges Total Minutes 10  -KH       Total Minutes 18  -KH            User Key  (r) = Recorded By, (t) = Taken By, (c) = Cosigned By    Initials Name Provider Type    Lupe Nino, PT Physical Therapist              Therapy Charges for Today     Code Description Service Date Service Provider Modifiers Qty    97193778625 HC PT THERAPEUTIC ACT EA 15 MIN 3/1/2023 Lupe Fofana, PT GP 1    54464356038 HC PT EVAL LOW COMPLEXITY 1 3/1/2023 Lupe Fofana, PT GP 1          PT G-Codes  Outcome Measure Options: AM-PAC 6 Clicks Basic Mobility (PT)  AM-PAC 6 Clicks Score (PT): 23    PT Discharge Summary  Anticipated Discharge Disposition (PT): home with assist    Lupe Fofana, PT  3/1/2023

## 2023-03-01 NOTE — PLAN OF CARE
Goal Outcome Evaluation:  Plan of Care Reviewed With: patient        Progress: improving  Outcome Evaluation: Pt arrived 4E around 1940. No c/o pain. L AKA, able to transfer self from wheelchair to bed, refused bed alarm, education provided and pt promised to call before getting up. Code status updated to DNR per pt request. VSS, ex HTN, home meds resumed. Will continue to monitor.

## 2023-03-02 ENCOUNTER — READMISSION MANAGEMENT (OUTPATIENT)
Dept: CALL CENTER | Facility: HOSPITAL | Age: 68
End: 2023-03-02
Payer: MEDICARE

## 2023-03-02 VITALS
WEIGHT: 169.8 LBS | HEIGHT: 68 IN | BODY MASS INDEX: 25.73 KG/M2 | SYSTOLIC BLOOD PRESSURE: 160 MMHG | HEART RATE: 62 BPM | DIASTOLIC BLOOD PRESSURE: 92 MMHG | OXYGEN SATURATION: 95 % | RESPIRATION RATE: 18 BRPM | TEMPERATURE: 97.5 F

## 2023-03-02 PROBLEM — A49.8 KLEBSIELLA INFECTION: Status: ACTIVE | Noted: 2023-03-02

## 2023-03-02 LAB
25(OH)D3 SERPL-MCNC: 98.2 NG/ML (ref 30–100)
ALBUMIN SERPL-MCNC: 4.3 G/DL (ref 3.5–5.2)
ANION GAP SERPL CALCULATED.3IONS-SCNC: 10 MMOL/L (ref 5–15)
BACTERIA SPEC AEROBE CULT: ABNORMAL
BASOPHILS # BLD AUTO: 0.03 10*3/MM3 (ref 0–0.2)
BASOPHILS NFR BLD AUTO: 0.4 % (ref 0–1.5)
BUN SERPL-MCNC: 20 MG/DL (ref 8–23)
BUN/CREAT SERPL: 11.6 (ref 7–25)
CALCIUM SPEC-SCNC: 9.3 MG/DL (ref 8.6–10.5)
CHLORIDE SERPL-SCNC: 101 MMOL/L (ref 98–107)
CO2 SERPL-SCNC: 27 MMOL/L (ref 22–29)
CREAT SERPL-MCNC: 1.72 MG/DL (ref 0.76–1.27)
DEPRECATED RDW RBC AUTO: 47.7 FL (ref 37–54)
EGFRCR SERPLBLD CKD-EPI 2021: 43 ML/MIN/1.73
EOSINOPHIL # BLD AUTO: 0.18 10*3/MM3 (ref 0–0.4)
EOSINOPHIL NFR BLD AUTO: 2.4 % (ref 0.3–6.2)
ERYTHROCYTE [DISTWIDTH] IN BLOOD BY AUTOMATED COUNT: 14.1 % (ref 12.3–15.4)
GLUCOSE SERPL-MCNC: 86 MG/DL (ref 65–99)
HCT VFR BLD AUTO: 46.8 % (ref 37.5–51)
HGB BLD-MCNC: 15.6 G/DL (ref 13–17.7)
IMM GRANULOCYTES # BLD AUTO: 0.03 10*3/MM3 (ref 0–0.05)
IMM GRANULOCYTES NFR BLD AUTO: 0.4 % (ref 0–0.5)
LYMPHOCYTES # BLD AUTO: 1.76 10*3/MM3 (ref 0.7–3.1)
LYMPHOCYTES NFR BLD AUTO: 23.2 % (ref 19.6–45.3)
MAGNESIUM SERPL-MCNC: 2.3 MG/DL (ref 1.6–2.4)
MCH RBC QN AUTO: 30.7 PG (ref 26.6–33)
MCHC RBC AUTO-ENTMCNC: 33.3 G/DL (ref 31.5–35.7)
MCV RBC AUTO: 92.1 FL (ref 79–97)
MONOCYTES # BLD AUTO: 0.66 10*3/MM3 (ref 0.1–0.9)
MONOCYTES NFR BLD AUTO: 8.7 % (ref 5–12)
NEUTROPHILS NFR BLD AUTO: 4.94 10*3/MM3 (ref 1.7–7)
NEUTROPHILS NFR BLD AUTO: 64.9 % (ref 42.7–76)
NRBC BLD AUTO-RTO: 0.1 /100 WBC (ref 0–0.2)
PHOSPHATE SERPL-MCNC: 3.2 MG/DL (ref 2.5–4.5)
PLATELET # BLD AUTO: 178 10*3/MM3 (ref 140–450)
PMV BLD AUTO: 9.4 FL (ref 6–12)
POTASSIUM SERPL-SCNC: 4.1 MMOL/L (ref 3.5–5.2)
PTH-INTACT SERPL-MCNC: 93 PG/ML (ref 15–65)
RBC # BLD AUTO: 5.08 10*6/MM3 (ref 4.14–5.8)
SODIUM SERPL-SCNC: 138 MMOL/L (ref 136–145)
URATE SERPL-MCNC: 7.6 MG/DL (ref 3.4–7)
WBC NRBC COR # BLD: 7.6 10*3/MM3 (ref 3.4–10.8)

## 2023-03-02 PROCEDURE — G0378 HOSPITAL OBSERVATION PER HR: HCPCS

## 2023-03-02 PROCEDURE — 80069 RENAL FUNCTION PANEL: CPT | Performed by: INTERNAL MEDICINE

## 2023-03-02 PROCEDURE — 82306 VITAMIN D 25 HYDROXY: CPT | Performed by: INTERNAL MEDICINE

## 2023-03-02 PROCEDURE — 96366 THER/PROPH/DIAG IV INF ADDON: CPT

## 2023-03-02 PROCEDURE — 94799 UNLISTED PULMONARY SVC/PX: CPT

## 2023-03-02 PROCEDURE — 25010000002 CEFTRIAXONE PER 250 MG: Performed by: INTERNAL MEDICINE

## 2023-03-02 PROCEDURE — 83970 ASSAY OF PARATHORMONE: CPT | Performed by: INTERNAL MEDICINE

## 2023-03-02 PROCEDURE — 83735 ASSAY OF MAGNESIUM: CPT | Performed by: STUDENT IN AN ORGANIZED HEALTH CARE EDUCATION/TRAINING PROGRAM

## 2023-03-02 PROCEDURE — 94664 DEMO&/EVAL PT USE INHALER: CPT

## 2023-03-02 PROCEDURE — 85025 COMPLETE CBC W/AUTO DIFF WBC: CPT | Performed by: STUDENT IN AN ORGANIZED HEALTH CARE EDUCATION/TRAINING PROGRAM

## 2023-03-02 PROCEDURE — 84550 ASSAY OF BLOOD/URIC ACID: CPT | Performed by: INTERNAL MEDICINE

## 2023-03-02 RX ORDER — CEPHALEXIN 500 MG/1
500 CAPSULE ORAL 2 TIMES DAILY
Qty: 8 CAPSULE | Refills: 0 | OUTPATIENT
Start: 2023-03-02 | End: 2023-03-12

## 2023-03-02 RX ADMIN — BUDESONIDE AND FORMOTEROL FUMARATE DIHYDRATE 2 PUFF: 160; 4.5 AEROSOL RESPIRATORY (INHALATION) at 08:12

## 2023-03-02 RX ADMIN — AMLODIPINE BESYLATE 10 MG: 10 TABLET ORAL at 08:33

## 2023-03-02 RX ADMIN — BUPROPION HYDROCHLORIDE 300 MG: 300 TABLET, EXTENDED RELEASE ORAL at 08:33

## 2023-03-02 RX ADMIN — TIOTROPIUM BROMIDE INHALATION SPRAY 2 PUFF: 3.12 SPRAY, METERED RESPIRATORY (INHALATION) at 08:12

## 2023-03-02 RX ADMIN — LEVOTHYROXINE SODIUM 112 MCG: 0.11 TABLET ORAL at 08:33

## 2023-03-02 RX ADMIN — ALPRAZOLAM 0.5 MG: 0.5 TABLET ORAL at 08:35

## 2023-03-02 RX ADMIN — TRAMADOL HYDROCHLORIDE 50 MG: 50 TABLET, COATED ORAL at 08:34

## 2023-03-02 RX ADMIN — ISOSORBIDE MONONITRATE 30 MG: 30 TABLET, EXTENDED RELEASE ORAL at 08:33

## 2023-03-02 RX ADMIN — GABAPENTIN 300 MG: 300 CAPSULE ORAL at 08:34

## 2023-03-02 RX ADMIN — CEFTRIAXONE SODIUM 1 G: 1 INJECTION, POWDER, FOR SOLUTION INTRAMUSCULAR; INTRAVENOUS at 00:14

## 2023-03-02 RX ADMIN — ATORVASTATIN CALCIUM 40 MG: 20 TABLET, FILM COATED ORAL at 08:33

## 2023-03-02 RX ADMIN — MULTIPLE VITAMINS W/ MINERALS TAB 1 TABLET: TAB at 08:33

## 2023-03-02 NOTE — DISCHARGE SUMMARY
Patient Name: Sim Agustin  : 1955  MRN: [id number]    Date of Admission: 2023  Date of Discharge:  3/2/2023  Primary Care Physician: Seth Hester MD      Chief Complaint:   Fall, Weakness - Generalized, and Altered Mental Status      Discharge Diagnoses     Active Hospital Problems    Diagnosis  POA   • **Acute UTI [N39.0]  Yes   • Klebsiella infection [A49.8]  Clinically Undetermined   • ARLYN (acute kidney injury) (Formerly [facility]) [N17.9]  Yes   • Stage 3b chronic kidney disease (CKD) (Formerly [facility]) [N18.32]  Yes   • Hyperlipidemia [E78.5]  Yes   • Hypothyroidism [E03.9]  Yes   • PVD (peripheral vascular disease) ([facility]) [I73.9]  Yes   • CAD (coronary artery disease) [I25.10]  Yes   • Status post above-knee amputation of left lower extremity (Formerly [facility]) [Z89.612]  Not Applicable   • COPD (chronic obstructive pulmonary disease) (Formerly [facility]) [J44.9]  Yes   • Essential hypertension [I10]  Yes      Resolved Hospital Problems   No resolved problems to display.        Hospital Course     Mr. Agustin is a 67 y.o. male with a history of CKD stage IIIb, hypertension, hyperlipidemia, peripheral vascular disease status post left AKA, COPD, hypothyroidism who presented to [facility] initially complaining of weakness.  Please see the admitting history and physical for further details.  He was admitted to the hospital for further evaluation and treatment.    Acute urinary tract infection  - Upon admission, patient had complaints of dysuria, urinary frequency, coupled with urinalysis findings showing 3+ leukocyte esterase, positive nitrite, too numerous to count WBC and 4+ bacteria.  All suggestive of urinary tract infection.  He was started on empiric treatment with ceftriaxone while awaiting final culture results.  Urine culture later returned positive for >100K CFU/mL Klebsiella oxytoca.  Discussed with pharmacy, pathogen susceptible to cephalosporins, plan at discharge is to prescribe Keflex 500 mg twice daily for 4 more days  to complete 7-day antibiotic course.  On day of discharge, patient was stable overall, dysuria and frequency both had significantly improved, he was afebrile and WBC count within normal limits.  Recommend close follow-up with PCP for reassessment of symptoms to guide further management decisions.     ARLYN on CKD stage 3B  - Etiology most likely prerenal in nature, due to hypovolemia, hypoperfusion, in setting of acute infection.  Etiology of chronic renal failure, related to prior episode of acute kidney injury with renal vascular disease and likely hypertensive nephrosclerosis.  Following admission, nephrology consulted, followed patient for the duration of his stay. Creatinine 2.50 on admission, subsequently improved on labs thereafter, most recently found to be 1.72 on day of discharge.  Cleared for discharge by nephrology from their perspective. Patient has a follow-up appointment with Dr. Rajesh Sarabia on 3/15/2023.    Hypertension  - BP acceptable acutely. Appears stable. No indications to warrant acute changes/intervention at this time.  We will continue current medications as previously prescribed at time of discharge.  Recommend the patient check his blood pressure 2-3 times daily and record those values into a logbook and take with him to his next follow-up appointment with his PCP to guide further management decisions.  Recommend repeat BMP within 1 week after discharge for assessment of renal function and electrolytes.    COPD  - Appears stable at this time from a respiratory perspective, patient on room air, without acute respiratory complaints.  Continue current medications as previously prescribed after discharge.  Follow-up with pulmonology as scheduled for ongoing evaluation and management decisions.     Coronary artery disease  - Patient appears stable from this perspective at this time, denies any chest pain, dyspnea, palpitations, no signs/symptoms to suggest ACS. Continue current treatment as  prescribed at time of discharge.  Follow-up with cardiology after discharge as scheduled.  - Continue telemetry monitoring.     Hyperlipidemia  - Stable. Continue Statin as prescribed after discharge.     Hypothyroidism  - Stable. Continue Levothyroxine as prescribed after discharge.     Peripheral vascular disease  - Appears stable at this time. Continue aspirin, Plavix, statin as prescribed after discharge.    Secondary hyperparathyroidism  - Noted by Dr. Lund on most recent note prior to discharge.  Vitamin D 98, PTH 93.  Follow-up with nephrology after discharge as scheduled.    Day of Discharge     Subjective:  Patient seen and examined this morning. Hospital day 2. At time of my examination, patient is awake, alert and oriented x3, resting comfortably in bed. Discussed with nursing, no reported acute events overnight. Patient states that he feels better today when compared to prior, dysuria and frequency both significantly improved.  No other acute complaints at this time, has been cleared for discharge by nephrology, patient states that he is ready to go home.    Physical Exam:  Temp:  [97.5 °F (36.4 °C)-98.5 °F (36.9 °C)] 97.5 °F (36.4 °C)  Heart Rate:  [53-69] 62  Resp:  [16-18] 18  BP: (146-166)/(80-92) 160/92  Body mass index is 25.82 kg/m².  Physical Exam  Vitals and nursing note reviewed.   Constitutional:       General: He is awake. He is not in acute distress.     Appearance: He is ill-appearing.   Eyes:      General: No scleral icterus.     Conjunctiva/sclera: Conjunctivae normal.   Cardiovascular:      Rate and Rhythm: Normal rate and regular rhythm.      Pulses: Normal pulses.      Heart sounds: Normal heart sounds.   Pulmonary:      Effort: Pulmonary effort is normal. No respiratory distress.      Breath sounds: Normal breath sounds.   Abdominal:      General: Bowel sounds are normal. There is no distension.      Palpations: Abdomen is soft.      Tenderness: There is no abdominal tenderness.  There is no guarding.   Musculoskeletal:      Comments: Left AKA   Skin:     General: Skin is warm and dry.   Neurological:      General: No focal deficit present.      Mental Status: He is alert and oriented to person, place, and time.   Psychiatric:         Behavior: Behavior is cooperative.         Consultants     Consult Orders (all) (From admission, onward)     Start     Ordered    03/01/23 0000  Inpatient Case Management  Consult  Once        Provider:  (Not yet assigned)    02/28/23 1959 02/28/23 2114  Inpatient Nephrology Consult  Once        Specialty:  Nephrology  Provider:  Rajesh Sarabia MD    02/28/23 2114 02/28/23 1334  [facility] (on-call MD unless specified) Details  Once        Specialty:  Hospitalist  Provider:  Ruth Rosas MD    02/28/23 1333              Procedures     * Surgery not found *      Imaging Results (All)     Procedure Component Value Units Date/Time    XR Chest 1 View [[id number]] Collected: 02/28/23 1243     Updated: 02/28/23 1251    Narrative:      XR CHEST 1 VW-, XR HIP W OR WO PELVIS 2-3 VIEW LEFT-, XR FEMUR 2 VW  LEFT-     Clinical: Fell with pain     COMPARISON chest radiograph dated 5/23/2022     FINDINGS: The cardiomediastinal silhouette is stable. Left lower lobe  consolidation diminished within the interim however not completely  resolved. No new airspace disease, effusion or edema has developed.     CONCLUSION: Improved however not completely resolved left lower lobe  consolidation, chest is otherwise similar to 5/23/2022     Left hip left femur findings: Left total hip replacement prosthesis  position is satisfactory, no loosening. No femur fracture. No  dislocation of the prosthesis. Vascular arterial calcifications noted  within the soft tissues. No radiopaque foreign body. The left hemipelvis  is within normal limits.     CONCLUSION: Left total hip replacement prosthesis is satisfactory in  appearance. No fracture of the left femur or  hemipelvis seen.     This report was finalized on 2/28/2023 12:48 PM by Dr. Javy Newsome M.D.       XR Hip With or Without Pelvis 2 - 3 View Left [[id number]] Collected: 02/28/23 1243     Updated: 02/28/23 1251    Narrative:      XR CHEST 1 VW-, XR HIP W OR WO PELVIS 2-3 VIEW LEFT-, XR FEMUR 2 VW  LEFT-     Clinical: Fell with pain     COMPARISON chest radiograph dated 5/23/2022     FINDINGS: The cardiomediastinal silhouette is stable. Left lower lobe  consolidation diminished within the interim however not completely  resolved. No new airspace disease, effusion or edema has developed.     CONCLUSION: Improved however not completely resolved left lower lobe  consolidation, chest is otherwise similar to 5/23/2022     Left hip left femur findings: Left total hip replacement prosthesis  position is satisfactory, no loosening. No femur fracture. No  dislocation of the prosthesis. Vascular arterial calcifications noted  within the soft tissues. No radiopaque foreign body. The left hemipelvis  is within normal limits.     CONCLUSION: Left total hip replacement prosthesis is satisfactory in  appearance. No fracture of the left femur or hemipelvis seen.     This report was finalized on 2/28/2023 12:48 PM by Dr. Javy Newsome M.D.       XR Femur 2 View Left [[id number]] Collected: 02/28/23 1243     Updated: 02/28/23 1251    Narrative:      XR CHEST 1 VW-, XR HIP W OR WO PELVIS 2-3 VIEW LEFT-, XR FEMUR 2 VW  LEFT-     Clinical: Fell with pain     COMPARISON chest radiograph dated 5/23/2022     FINDINGS: The cardiomediastinal silhouette is stable. Left lower lobe  consolidation diminished within the interim however not completely  resolved. No new airspace disease, effusion or edema has developed.     CONCLUSION: Improved however not completely resolved left lower lobe  consolidation, chest is otherwise similar to 5/23/2022     Left hip left femur findings: Left total hip replacement prosthesis  position is satisfactory, no  loosening. No femur fracture. No  dislocation of the prosthesis. Vascular arterial calcifications noted  within the soft tissues. No radiopaque foreign body. The left hemipelvis  is within normal limits.     CONCLUSION: Left total hip replacement prosthesis is satisfactory in  appearance. No fracture of the left femur or hemipelvis seen.     This report was finalized on 2/28/2023 12:48 PM by Dr. Javy Newsome M.D.       CT Head Without Contrast [[id number]] Collected: 02/28/23 1222     Updated: 02/28/23 1230    Narrative:      CT HEAD WITHOUT CONTRAST     CLINICAL HISTORY: Fell hitting the back of the head.     TECHNIQUE: CT scan of the head was obtained with 3 mm axial soft tissue  and 2 mm axial bone algorithm algorithm images. No intravenous contrast  was administered. Sagittal and coronal reconstructions were obtained.     COMPARISON: CT head dated 02/26/2015.     FINDINGS:       There is no evidence for a calvarial fracture. There is no evidence for  an acute extra-axial hemorrhage.     The ventricles, sulci, and cisterns are age appropriate. The gray-white  matter differentiation is within normal limits. The basal ganglia and  thalami are unremarkable. The posterior fossa structures are within  normal limits. Incidental atherosclerotic calcifications are noted  within the intracranial vasculature.       Impression:         No evidence for acute traumatic intracranial pathology.        Radiation dose reduction techniques were utilized, including automated  exposure control and exposure modulation based on body size.     This report was finalized on 2/28/2023 12:27 PM by Dr. Benton Mackey M.D.           Results for orders placed during the hospital encounter of 05/23/19    Duplex Renal Artery - Bilateral Complete CAR    Interpretation Summary  · Normal right renal artery.  · Normal left renal artery.    Results for orders placed during the hospital encounter of 01/31/20    Adult Transthoracic Echo Complete W/  Cont if Necessary Per Protocol    Interpretation Summary  · Left ventricular systolic function is low normal. Calculated EF = 50%. Estimated EF was in agreement with the calculated EF. Global Longitudinal LV strain = -16.4%. Strain data was reviewed and speckle tracking was considered accurate. Normal left ventricular cavity size noted. Left ventricular wall thickness is consistent with mild concentric hypertrophy. Left ventricular diastolic dysfunction is noted (grade I) consistent with impaired relaxation.  · See wall scoring diagram.    Pertinent Labs     Results from last 7 days   Lab Units 03/02/23  0508 03/01/23 0444 02/28/23  1128   WBC 10*3/mm3 7.60 9.10 7.65   HEMOGLOBIN g/dL 15.6 14.6 13.9   PLATELETS 10*3/mm3 178 181 180     Results from last 7 days   Lab Units 03/02/23  0508 03/01/23 0444 02/28/23  1128   SODIUM mmol/L 138 139 138   POTASSIUM mmol/L 4.1 4.1 4.4   CHLORIDE mmol/L 101 100 101   CO2 mmol/L 27.0 28.2 30.7*   BUN mg/dL 20 27* 34*   CREATININE mg/dL 1.72* 2.13* 2.50*   GLUCOSE mg/dL 86 75 82   EGFR mL/min/1.73 43.0* 33.3* 27.5*     Results from last 7 days   Lab Units 03/02/23  0508 02/28/23  1128   ALBUMIN g/dL 4.3 4.2   BILIRUBIN mg/dL  --  0.5   ALK PHOS U/L  --  75   AST (SGOT) U/L  --  18   ALT (SGPT) U/L  --  11     Results from last 7 days   Lab Units 03/02/23  0508 03/01/23 0444 02/28/23  1128   CALCIUM mg/dL 9.3 8.6 10.0   ALBUMIN g/dL 4.3  --  4.2   MAGNESIUM mg/dL 2.3  --   --    PHOSPHORUS mg/dL 3.2  --   --        Results from last 7 days   Lab Units 02/28/23  1345 02/28/23  1128   HSTROP T ng/L 49* 52*     Results from last 7 days   Lab Units 03/02/23  0508 02/28/23  1228   CREATININE UR mg/dL  --  31.1   PROTEIN TOTAL URINE mg/dL  --  9.4   URIC ACID mg/dL 7.6*  --    PROT/CREAT RATIO UR mg/G Crea  --  302.3*         Invalid input(s): LDLCALC  Results from last 7 days   Lab Units 02/28/23  1345 02/28/23  1228   BLOODCX  No growth at 24 hours  No growth at 24 hours  --     URINECX   --  >100,000 CFU/mL Klebsiella oxytoca*         Test Results Pending at Discharge     Pending Labs     Order Current Status    Blood Culture - Blood, Arm, Left Preliminary result    Blood Culture - Blood, Arm, Left Preliminary result          Discharge Details        Discharge Medications      New Medications      Instructions Start Date   cephalexin 500 MG capsule  Commonly known as: Keflex   500 mg, Oral, 2 Times Daily         Continue These Medications      Instructions Start Date   albuterol 0.63 MG/3ML nebulizer solution  Commonly known as: ACCUNEB   0.63 mg, Inhalation, Every 6 Hours PRN      ALPRAZolam 1 MG tablet  Commonly known as: XANAX   Take 1/2 tab in the morning, 1 tab at noon, 1 tab in the evening      amLODIPine 10 MG tablet  Commonly known as: NORVASC   10 mg, Oral, Daily      aspirin 81 MG chewable tablet   81 mg, Oral, Nightly      atorvastatin 40 MG tablet  Commonly known as: LIPITOR   Take 1.5 tab PO QD for cholesterol and heart health      buPROPion  MG 24 hr tablet  Commonly known as: WELLBUTRIN XL   TAKE 1 TABLET BY MOUTH EVERY DAY      clopidogrel 75 MG tablet  Commonly known as: PLAVIX   75 mg, Oral, Nightly      Coenzyme Q10 400 MG capsule   400 mg, Oral, Every Evening      FERROUS SULFATE PO   65 mg, Oral, Nightly      Fluticasone-Umeclidin-Vilant 100-62.5-25 MCG/INH inhaler  Commonly known as: Trelegy Ellipta   1 each, Inhalation, Daily      gabapentin 300 MG capsule  Commonly known as: NEURONTIN   300 mg, Oral, 3 Times Daily      isosorbide mononitrate 30 MG 24 hr tablet  Commonly known as: IMDUR   TAKE 1 TABLET BY MOUTH EVERY DAY IN THE MORNING      latanoprost 0.005 % ophthalmic solution  Commonly known as: XALATAN   1 drop, Both Eyes, Every Night at Bedtime      levothyroxine 112 MCG tablet  Commonly known as: SYNTHROID, LEVOTHROID   112 mcg, Oral, Daily      multivitamin with minerals tablet tablet   1 tablet, Oral, Daily      NIACIN PO   1 capsule, Oral, Every  Morning      torsemide 20 MG tablet  Commonly known as: DEMADEX   20 mg, Oral, Daily      traMADol 50 MG tablet  Commonly known as: ULTRAM   50 mg, Oral, 2 Times Daily      VITAMIN B-1 PO   1 tablet, Oral, Daily             Allergies   Allergen Reactions   • Augmentin [Amoxicillin-Pot Clavulanate] Hives and Rash     Tolerate cephalosporins       Discharge Disposition:  Home or Self Care      Discharge Diet:  Diet Order   Procedures   • Diet: Cardiac Diets; Healthy Heart (2-3 Na+); Texture: Regular Texture (IDDSI 7); Fluid Consistency: Thin (IDDSI 0)       Discharge Activity:   Activity Instructions     Activity as Tolerated            CODE STATUS:    Code Status and Medical Interventions:   Ordered at: 02/28/23 2107     Medical Intervention Limits:    NO intubation (DNI)     Code Status (Patient has no pulse and is not breathing):    No CPR (Do Not Attempt to Resuscitate)     Medical Interventions (Patient has pulse or is breathing):    Limited Support       Future Appointments   Date Time Provider Department Center   3/31/2023  9:00 AM Seth Hester MD [facility]     Additional Instructions for the Follow-ups that You Need to Schedule     Discharge Follow-up with PCP   As directed       Currently Documented PCP:    Seth Hester MD    PCP Phone Number:    [phone]     Follow Up Details: Within 1 week after discharge for reassessment, medication and symptom review, repeat BMP and CBC         Discharge Follow-up with Specialty: Nephrology, pulmonology, cardiology   As directed      Specialty: Nephrology, pulmonology, cardiology    Follow Up Details: Please follow-up with these services as scheduled after discharge.  If you do not have an appointment, please call their offices to ensure that one is made for you.            Follow-up Information     Seth Hester MD .    Specialties: Family Medicine, Emergency Medicine, Urgent Care  Why: Within 1 week after discharge for reassessment, medication and symptom  review, repeat BMP and CBC  Contact information:  [address] [place] [address] [phone]                         Additional Instructions for the Follow-ups that You Need to Schedule     Discharge Follow-up with PCP   As directed       Currently Documented PCP:    Seth Hester MD    PCP Phone Number:    [phone]     Follow Up Details: Within 1 week after discharge for reassessment, medication and symptom review, repeat BMP and CBC         Discharge Follow-up with Specialty: Nephrology, pulmonology, cardiology   As directed      Specialty: Nephrology, pulmonology, cardiology    Follow Up Details: Please follow-up with these services as scheduled after discharge.  If you do not have an appointment, please call their offices to ensure that one is made for you.           Time Spent on Discharge:  Greater than 30 minutes      Buddy Cook DO  [facility]  03/02/23  09:21 EST

## 2023-03-02 NOTE — CASE MANAGEMENT/SOCIAL WORK
Case Management Discharge Note      Final Note: Home with family. Denies any needs. Transport by private auto         Selected Continued Care - Discharged on 3/2/2023 Admission date: 2/28/2023 - Discharge disposition: Home or Self Care    Destination    No services have been selected for the patient.              Durable Medical Equipment    No services have been selected for the patient.              Dialysis/Infusion    No services have been selected for the patient.              Home Medical Care    No services have been selected for the patient.              Therapy    No services have been selected for the patient.              Community Resources    No services have been selected for the patient.              Community & DME    No services have been selected for the patient.                  Transportation Services  Private: Car    Final Discharge Disposition Code: 01 - home or self-care

## 2023-03-02 NOTE — PROGRESS NOTES
[facility] Progress Note      Patient Name: Sim Agustin  : 1955  MRN: [id number]  Primary Care Physician:  Seth Hester MD  Date of admission: 2023    Subjective     Interval History:   Follow-up acute kidney injury on chronic kidney    The patient is feeling much better today, denies any chest pain or shortness of air, no orthopnea or PND, no nausea or vomiting, no abdominal pain, no dysuria or gross hematuria    Review of Systems:   As noted above    Objective     Vitals:   Temp:  [97.5 °F (36.4 °C)-98.5 °F (36.9 °C)] 97.5 °F (36.4 °C)  Heart Rate:  [53-69] 69  Resp:  [16-18] 17  BP: (146-166)/(80-92) 160/92  Flow (L/min):  [1-2] 2    Intake/Output Summary (Last 24 hours) at 3/2/2023 0746  Last data filed at 3/2/2023 0740  Gross per 24 hour   Intake 240 ml   Output 2550 ml   Net -2310 ml       Physical Exam:    General Appearance: Awake and alert, chronically ill, no acute distress  Skin: warm and dry  HEENT: oral mucosa normal, nonicteric sclera  Neck: supple, no JVD  Lungs: Scattered rhonchi, breathing effort not labored  Heart: RRR, normal S1 and S2, no rub  Abdomen: soft, nontender, nondistended, normoactive bowel  : no palpable bladder  Extremities: no edema, cyanosis or clubbing, left BKA  Neuro: normal speech and mental status     Scheduled Meds:     amLODIPine, 10 mg, Oral, Daily  aspirin, 81 mg, Oral, Nightly  atorvastatin, 40 mg, Oral, Daily  budesonide-formoterol, 2 puff, Inhalation, BID - RT   And  tiotropium bromide monohydrate, 2 puff, Inhalation, Daily - RT  buPROPion XL, 300 mg, Oral, Daily  cefTRIAXone, 1 g, Intravenous, Q24H  clopidogrel, 75 mg, Oral, Nightly  gabapentin, 300 mg, Oral, TID  isosorbide mononitrate, 30 mg, Oral, QAM  latanoprost, 1 drop, Both Eyes, Nightly  levothyroxine, 112 mcg, Oral, Daily  multivitamin with minerals, 1 tablet, Oral, Daily  traMADol, 50 mg, Oral, BID      IV Meds:        Results Reviewed:   I have personally  reviewed the results from the time of this admission to 3/2/2023 07:46 EST     Results from last 7 days   Lab Units 03/02/23  0508 03/01/23  0444 02/28/23  1128   SODIUM mmol/L 138 139 138   POTASSIUM mmol/L 4.1 4.1 4.4   CHLORIDE mmol/L 101 100 101   CO2 mmol/L 27.0 28.2 30.7*   BUN mg/dL 20 27* 34*   CREATININE mg/dL 1.72* 2.13* 2.50*   CALCIUM mg/dL 9.3 8.6 10.0   BILIRUBIN mg/dL  --   --  0.5   ALK PHOS U/L  --   --  75   ALT (SGPT) U/L  --   --  11   AST (SGOT) U/L  --   --  18   GLUCOSE mg/dL 86 75 82       Estimated Creatinine Clearance: 45.4 mL/min (A) (by C-G formula based on SCr of 1.72 mg/dL (H)).    Results from last 7 days   Lab Units 03/02/23  0508   MAGNESIUM mg/dL 2.3   PHOSPHORUS mg/dL 3.2       Results from last 7 days   Lab Units 03/02/23  0508   URIC ACID mg/dL 7.6*       Results from last 7 days   Lab Units 03/02/23  0508 03/01/23  0444 02/28/23  1128   WBC 10*3/mm3 7.60 9.10 7.65   HEMOGLOBIN g/dL 15.6 14.6 13.9   PLATELETS 10*3/mm3 178 181 180             Assessment / Plan     ASSESSMENT:  -Acute kidney injury on chronic kidney disease stage IIIb improved since admission back to his baseline, his volume status and electrolyte within acceptable, creatinine down to 1.73  -CKD stage IIIb related to prior episode of acute kidney injury with renovascular disease and probably hypertensive nephrosclerosis.  -Hypertension with chronic kidney disease, reasonably controlled  -Severe peripheral vascular disease with prior left AKA  -UTI, currently treated  -History of obstructive sleep apnea  and COPD, followed by Dr. Tate  -Dyslipidemia with statin  -Hypothyroidism on replacement therapy  -H secondary hyperparathyroidism, PTH is 93, will monitor  -Tobacco dependence, he stopped smoking in 2021 and he smokes marijuana regularly.    PLAN:  -Continue the same treatment  -The patient could be discharged from the renal standpoint and he has a follow-up appointment with Dr. Rajesh Sarabia on 3/15/2023, he  was advised to keep that appointment.    Thank you for involving us in the care of Sim Agustin.  Please feel free to call with any questions.    Jonathan Lund MD  03/02/23  07:46 [facility]    [facility]  [phone]    Please note that portions of this note were completed with a voice recognition program.

## 2023-03-02 NOTE — PLAN OF CARE
Goal Outcome Evaluation:  Plan of Care Reviewed With: patient        Progress: improving  Outcome Evaluation: A&O. RA. SB/SR. IV abx per order. No complaints of pain. Voiding per urinal. Possible d/c? [facility]

## 2023-03-03 ENCOUNTER — TRANSITIONAL CARE MANAGEMENT TELEPHONE ENCOUNTER (OUTPATIENT)
Dept: CALL CENTER | Facility: HOSPITAL | Age: 68
End: 2023-03-03
Payer: MEDICARE

## 2023-03-03 NOTE — OUTREACH NOTE
Call Center TCM Note    Flowsheet Row Responses   [facility], [place], [facility] patient discharged from? [place]   Does the patient have one of the following disease processes/diagnoses(primary or secondary)? Other   TCM attempt successful? No  [Kaela Powers, sister]   Unsuccessful attempts Attempt 1   Comments Needs repeat BMP/CBC at PCP appt          Kylee Davies RN    3/3/2023, 11:09 EST

## 2023-03-03 NOTE — OUTREACH NOTE
Prep Survey    Flowsheet Row Responses   [facility] facility patient discharged from? [place]   Is LACE score < 7 ? No   Eligibility [facility]   Date of Admission 02/28/23   Date of Discharge 03/02/23   Discharge Disposition Home or Self Care   Discharge diagnosis Acute UTI acute kidney injury   Does the patient have one of the following disease processes/diagnoses(primary or secondary)? Other   Does the patient have Home health ordered? No   Is there a DME ordered? No   Prep survey completed? Yes          UVALDO OH - Registered Nurse

## 2023-03-05 LAB
BACTERIA SPEC AEROBE CULT: NORMAL
BACTERIA SPEC AEROBE CULT: NORMAL

## 2023-03-06 ENCOUNTER — TRANSITIONAL CARE MANAGEMENT TELEPHONE ENCOUNTER (OUTPATIENT)
Dept: CALL CENTER | Facility: HOSPITAL | Age: 68
End: 2023-03-06
Payer: MEDICARE

## 2023-03-06 NOTE — OUTREACH NOTE
Call Center TCM Note    Flowsheet Row Responses   [facility] patient discharged from? [place]   Does the patient have one of the following disease processes/diagnoses(primary or secondary)? Other   TCM attempt successful? No   Unsuccessful attempts Attempt 3          Sapphire Xie RN    3/6/2023, 10:59 EST

## 2023-03-13 DIAGNOSIS — G89.29 CHRONIC MIDLINE LOW BACK PAIN WITHOUT SCIATICA: ICD-10-CM

## 2023-03-13 DIAGNOSIS — M54.50 CHRONIC MIDLINE LOW BACK PAIN WITHOUT SCIATICA: ICD-10-CM

## 2023-03-13 RX ORDER — TRAMADOL HYDROCHLORIDE 50 MG/1
50 TABLET ORAL 2 TIMES DAILY
Qty: 180 TABLET | Refills: 0 | Status: SHIPPED | OUTPATIENT
Start: 2023-03-13

## 2023-03-17 DIAGNOSIS — Z89.619 STATUS POST ABOVE KNEE AMPUTATION, UNSPECIFIED LATERALITY: ICD-10-CM

## 2023-03-17 NOTE — TELEPHONE ENCOUNTER
Rx Refill Note  Requested Prescriptions     Pending Prescriptions Disp Refills   • levothyroxine (SYNTHROID, LEVOTHROID) 112 MCG tablet [Pharmacy Med Name: LEVOTHYROXINE 112 MCG TABLET] 90 tablet 1     Sig: TAKE 1 TABLET BY MOUTH EVERY DAY      Last office visit with prescribing clinician: 9/15/2022   Last telemedicine visit with prescribing clinician: 3/28/2023   Next office visit with prescribing clinician: 3/28/2023        Thyroid Panel With TSH (09/15/2022 10:31)                   Would you like a call back once the refill request has been completed: [] Yes [] No    If the office needs to give you a call back, can they leave a voicemail: [] Yes [] No    Ester Shah LPN  03/17/23, 08:35 EDT

## 2023-03-17 NOTE — TELEPHONE ENCOUNTER
Rx Refill Note  Requested Prescriptions     Pending Prescriptions Disp Refills   • gabapentin (NEURONTIN) 300 MG capsule 270 capsule 1     Sig: Take 1 capsule by mouth 3 (Three) Times a Day.      Last office visit with prescribing clinician: 9/15/2022   Last telemedicine visit with prescribing clinician: 3/28/2023   Next office visit with prescribing clinician: 3/28/2023                         Would you like a call back once the refill request has been completed: [] Yes [] No    If the office needs to give you a call back, can they leave a voicemail: [] Yes [] No    Ester Shah LPN  03/17/23, 09:11 EDT

## 2023-03-19 RX ORDER — GABAPENTIN 300 MG/1
300 CAPSULE ORAL 3 TIMES DAILY
Qty: 270 CAPSULE | Refills: 1 | Status: SHIPPED | OUTPATIENT
Start: 2023-03-19

## 2023-03-19 RX ORDER — LEVOTHYROXINE SODIUM 112 UG/1
TABLET ORAL
Qty: 90 TABLET | Refills: 3 | Status: SHIPPED | OUTPATIENT
Start: 2023-03-19 | End: 2023-03-30 | Stop reason: SDUPTHER

## 2023-03-21 DIAGNOSIS — I73.9 PVD (PERIPHERAL VASCULAR DISEASE): ICD-10-CM

## 2023-03-21 RX ORDER — ISOSORBIDE MONONITRATE 30 MG/1
TABLET, EXTENDED RELEASE ORAL
Qty: 90 TABLET | Refills: 3 | Status: SHIPPED | OUTPATIENT
Start: 2023-03-21

## 2023-03-28 ENCOUNTER — OFFICE VISIT (OUTPATIENT)
Dept: INTERNAL MEDICINE | Facility: CLINIC | Age: 68
End: 2023-03-28
Payer: MEDICARE

## 2023-03-28 VITALS
OXYGEN SATURATION: 93 % | WEIGHT: 169 LBS | HEIGHT: 68 IN | HEART RATE: 60 BPM | BODY MASS INDEX: 25.61 KG/M2 | DIASTOLIC BLOOD PRESSURE: 64 MMHG | TEMPERATURE: 96.6 F | SYSTOLIC BLOOD PRESSURE: 120 MMHG

## 2023-03-28 DIAGNOSIS — Z79.899 HIGH RISK MEDICATION USE: ICD-10-CM

## 2023-03-28 DIAGNOSIS — Z12.5 SCREENING FOR MALIGNANT NEOPLASM OF PROSTATE: ICD-10-CM

## 2023-03-28 DIAGNOSIS — E78.5 HYPERLIPIDEMIA, UNSPECIFIED HYPERLIPIDEMIA TYPE: ICD-10-CM

## 2023-03-28 DIAGNOSIS — Z87.440 HISTORY OF UTI: ICD-10-CM

## 2023-03-28 DIAGNOSIS — Z79.899 LONG TERM PRESCRIPTION BENZODIAZEPINE USE: ICD-10-CM

## 2023-03-28 DIAGNOSIS — F41.9 ANXIETY DISORDER, UNSPECIFIED TYPE: ICD-10-CM

## 2023-03-28 DIAGNOSIS — N18.32 STAGE 3B CHRONIC KIDNEY DISEASE (CKD): Primary | ICD-10-CM

## 2023-03-28 DIAGNOSIS — I25.10 CORONARY ARTERY DISEASE INVOLVING NATIVE CORONARY ARTERY OF NATIVE HEART WITHOUT ANGINA PECTORIS: ICD-10-CM

## 2023-03-28 DIAGNOSIS — N39.0 URINARY TRACT INFECTION WITHOUT HEMATURIA, SITE UNSPECIFIED: ICD-10-CM

## 2023-03-28 PROBLEM — N17.9 AKI (ACUTE KIDNEY INJURY): Status: RESOLVED | Noted: 2023-03-01 | Resolved: 2023-03-28

## 2023-03-28 PROBLEM — E87.5 HYPERKALEMIA: Status: RESOLVED | Noted: 2022-04-05 | Resolved: 2023-03-28

## 2023-03-28 PROCEDURE — 99214 OFFICE O/P EST MOD 30 MIN: CPT | Performed by: FAMILY MEDICINE

## 2023-03-28 PROCEDURE — 3074F SYST BP LT 130 MM HG: CPT | Performed by: FAMILY MEDICINE

## 2023-03-28 PROCEDURE — 3078F DIAST BP <80 MM HG: CPT | Performed by: FAMILY MEDICINE

## 2023-03-28 RX ORDER — ALPRAZOLAM 1 MG/1
TABLET ORAL
Qty: 60 TABLET | Refills: 2 | Status: SHIPPED | OUTPATIENT
Start: 2023-03-28

## 2023-03-28 NOTE — PROGRESS NOTES
Date of Encounter: 2023  Patient: Sim Agustin,  1955    Subjective   History of Presenting Illness  Chief complaint: Follow-up    Patient had a Klebsiella UTI in February, had to get hospitalized but was otherwise treated without complication.  Prostate exam was normal on recent colonoscopy, history of JAYNE by last PCP was normal, he feels his urine flow is normal and he has not had a UTI in several years.  He did have a bit of a prodrome.    CKD 3b followed by Dr. Sarabia.  Recent ARLYN due to UTI but appears to be returning to baseline on last BMP, will recheck today.    Anxiety, doing okay on alprazolam, feels he could go down to twice daily because he has less stress at home now that his wife is working part-time and finances are better.  He would like to do this after his next fill.    Chronic back pain doing well on tramadol.  Uses CBD Gummies as needed.    Tolerating atorvastatin 60 mg daily without any side effects.    The following portions of the patient's history were reviewed and updated as appropriate: allergies, current medications, past family history, past medical history, past social history, past surgical history and problem list.    Patient Active Problem List   Diagnosis   • H/O angiodysplasia of intestinal tract   • COPD (chronic obstructive pulmonary disease) ([facility])   • S/P colectomy   • Status post above-knee amputation of left lower extremity ([facility])   • CAD (coronary artery disease)   • PVD (peripheral vascular disease) ([facility])   • Essential hypertension   • Cardiomyopathy, unspecified ([facility])   • PRISCILLA treated with BiPAP   • Anxiety disorder   • Chronic midline low back pain without sciatica   • Long term prescription benzodiazepine use   • History of bradycardia   • Hypothyroidism   • Vitamin B12 deficiency   • Iron deficiency   • History of smoking 30 or more pack years   • High risk medication use   • DNR (do not resuscitate)   • Stage 3a chronic kidney disease ([facility])   •  Hyperlipidemia   • Proteinuria   • Hyperkalemia   • Junctional cardiac arrhythmia   • Acute UTI   • ARLYN (acute kidney injury) ([facility])   • Stage 3b chronic kidney disease (CKD) ([facility])   • Klebsiella infection     Past Medical History:   Diagnosis Date   • Acute respiratory failure with hypoxia and hypercarbia ([facility]) 03/11/2019   • Acute upper respiratory infection 02/06/2013   • Anemia     per [facility] database   • ARF (acute renal failure) ([facility]) 03/04/2016   • Asthma 02/02/2018   • Atelectasis, left 03/04/2016   • Bradycardia 05/23/2019   • CAD (coronary artery disease) 04/04/2016   • Chronic obstructive pulmonary disease with acute exacerbation ([facility]) 02/06/2013   • Clotting disorder ([facility]) 05/01/2003   • Colon polyps    • Compartment syndrome ([facility])     AFTER ILIAC SURGERY. ABOVE KNEE AMPUTATION   • COPD (chronic obstructive pulmonary disease) ([facility])    • COPD with hypoxia ([facility]) 05/23/2019   • Cough     SINCE APRIL WITH PNEUMONIA.    • Demand myocardial infarction ([facility]) 05/19/2022   • Disease of thyroid gland     HYPOTHYRODISM   • Diverticulosis    • Elevated hematocrit 01/06/2021   • Employs prosthetic leg    • ESRD (end stage renal disease) on dialysis ([facility]) 04/06/2016   • Fracture of patella 03/03/2015   • History of acute renal failure    • History of CHF (congestive heart failure)    • History of GI bleed 02/2016    AORTODUOD FISTULA   • History of sepsis 02/2016   • History of transfusion     MULTIPLE, 2016   • Hyperkalemia 04/11/2016   • Hypertension    • Hypotension    • Hypotension 03/04/2016   • Left lower lobe pneumonia 05/18/2022   • Nonischemic cardiomyopathy ([facility])    • Nosocomial pneumonia 04/06/2016   • Phantom limb pain ([facility])     SL, WITH LEFT ABOUVE KNEE   • Pleural effusion on left 04/11/2016   • Pneumonia     SPRING 2016  AFTER SURGERY   • Pulmonary embolism ([facility])    • PVD (peripheral vascular disease) ([facility])    • Renal insufficiency    • S/P thoracentesis 04/11/2016   • Sepsis, unspecified organism  (HCC) 04/05/2016     Past Surgical History:   Procedure Laterality Date   • ABOVE KNEE AMPUTATION Left 03/16/2016    Procedure: LT AMPUTATION ABOVE KNEE;  Surgeon: Jose Swann MD;  Location: [facility] OR;  Service:    • ARTERIAL THROMBECTOMY  2001    throbectomy of arterial graft   • AXILLARY FEMORAL BYPASS Right 02/15/2016    Exploratory lapartomy, repair aortoduodenal fistula, resection infected aortobifemoral bypass graft, axillobi-superficial femoral artery [place]-Aneudy bypass graft from right axillary artery, Repair of duodenotomy 4th portion duodenum-Dr. Jose Swann, Dr. Genaro Perrin   • BRONCHOSCOPY Left 03/04/2016    Dr. NATALIIA Tate   • BRONCHOSCOPY Left 05/20/2022    Procedure: BRONCHOSCOPY WITH BIOPSIES, BRUSHINGS, AND BAL UNDER FLUORO;  Surgeon: Ishmael Tate Jr., MD;  Location: [facility] ENDOSCOPY;  Service: Pulmonary;  Laterality: Left;  PRE OP - LLL CONSOLIDATION  POST OP - SAME   • BRONCHOSCOPY RIGID / FLEXIBLE N/A 03/03/2016    Dr. NATALIIA Tate   • BRONCHOSCOPY RIGID / FLEXIBLE N/A 02/26/2016    Dr. NATALIIA Tate   • CARDIAC CATHETERIZATION N/A 03/18/2019    Procedure: Right and Left Heart Cath;  Surgeon: Nina Storey MD;  Location: [facility] INVASIVE LOCATION;  Service: Cardiovascular   • CATARACT EXTRACTION WITH INTRAOCULAR LENS IMPLANT Bilateral    • COLON RESECTION WITH COLOSTOMY Left 02/28/2016    Exploratory laparotomy with open left hemicolectomy, end-colostomy, G-tube and J-tube-Dr. Endy Chaney   • COLON SURGERY  3/25/2015    COLOSTOMY   • COLONOSCOPY N/A 2015    Dr. Laughlin   • COLONOSCOPY N/A 10/12/2016    Procedure: COLONOSCOPY TO 20 CM AND PER STOMA TO 30CM;  Surgeon: Genaro Perrin MD;  Location: [facility] ENDOSCOPY;  Service:    • COLONOSCOPY N/A 10/21/2016    Procedure: COLONOSCOPY DONE AT BEDSIDE ONTO CECEM;  Surgeon: Genaro Perrin MD;  Location: [facility] ENDOSCOPY;  Service:    • COLONOSCOPY N/A 02/10/2016    Diverticulosis in the entire examined  colon, non-bleeding internal hemorrhoids-Dr. Taylor Pina   • COLONOSCOPY N/A 02/11/2016    Poor prep, blood in the entire examined colon, normal ileum-Dr. Taylor Pina   • COLONOSCOPY W/ POLYPECTOMY N/A 07/27/2015    Normal ileum, diverticulosis in the sigmoid colon: resected and retrieved, one 6 mm polyp in the descending colon: resected and retrieved, the distal rectum and anal verge are normal on retroflexion view-Dr. Miguel Ángel Laughlin   • COLOSTOMY CLOSURE N/A 10/13/2016    Procedure: COLOSTOMY TAKEDOWN OR CLOSURE, APPENDECTOMY;  Surgeon: Genaro Perrin MD;  Location: [facility] OR;  Service:    • DEBRIDEMENT LEG Left 03/01/2016    Excisional debridement of the skin, subcutantous tissue, tendon and muscle left calf-Dr. Jose Swann   • DEBRIDEMENT LEG Left 02/25/2016    Excisional debridement full-thcikness skin and subcutaneous tissue and tendon and muscle, left medial and lateral calf muscles-Dr. Jose Swann   • ENDOSCOPY N/A 10/21/2016    Procedure: ESOPHAGOGASTRODUODENOSCOPY DONE AT BEDSIDE;  Surgeon: Genaro Perrin MD;  Location: [facility] ENDOSCOPY;  Service:    • ENDOSCOPY AND COLONOSCOPY N/A 02/28/2016    EGD and Colonoscopy with Candy ink injection-Dr. Endy Chaney   • ENTEROSCOPY SMALL BOWEL N/A 02/11/2016    Normal esophagus, normal stomach, normal duodenum, portion of the jejunum normal-Dr. Taylor Pina   • ILIAC ARTERY - FEMORAL ARTERY BYPASS GRAFT Bilateral 2002   • ILIAC ARTERY STENT Bilateral 2001   • INSERTION AND REMOVAL PERITONEAL DIALYSIS CATHETER Right 02/29/2016    Exchange right jugular 16 cm Mahurkar dialysis catheter-Dr. Jose Swann   • INSERTION PERITONEAL DIALYSIS CATHETER Left 03/01/2016    Ultrasound-guided access of the left jugular vein, 23 cm left jugular palindrome dialysis catheter placement-Dr. Jose Swann   • INSERTION PERITONEAL DIALYSIS CATHETER Right 02/18/2016    Right jugular Mahrkar catherer placement-Dr. Jose Swann   • JOINT  REPLACEMENT Left    • THORACOSCOPY Left 04/18/2016    Procedure: LT THORACOSCOPY VIDEO ASSISTED WITH DECORDICATION;  Surgeon: Genaro Davila III, MD;  Location: [facility] OR;  Service:    • THROMBECTOMY Left 02/16/2016    Thombectomy of left femoral graft, left leg arteriogram, left calf forward compartment fasciotomy-Dr. Jose Swann   • TOTAL HIP ARTHROPLASTY Left    • UPPER GASTROINTESTINAL ENDOSCOPY N/A 02/09/2016    Normal UGI-Dr. Ajay Parkinson   • UPPER GASTROINTESTINAL ENDOSCOPY N/A 11/28/2015    Small hiatal hernia, chronic gastritis: biopsied, non-bleeding angioectasias in the stomach: treated with argon plasma coagulation, multiple bleeding angioectasias in the dudenum: treated with argon plasma coagulation-Dr. Mykel Jaramillo   • VASCULAR SURGERY      MULTIPLE   • VENTRAL/INCISIONAL HERNIA REPAIR N/A 10/19/2017    Procedure: VENTRAL HERNIA REPAIR WITH MESH AND BILATERAL COMPONENT SEPARATION;  Surgeon: Genaro Perrin MD;  Location: [facility] OR;  Service:    • WISDOM TOOTH EXTRACTION       Family History   Problem Relation Age of Onset   • Lung cancer Mother    • Cancer Mother    • Heart disease Father    • Diabetes Father    • Hypertension Father    • Malig Hyperthermia Neg Hx        Current Outpatient Medications:   •  albuterol (ACCUNEB) 0.63 MG/3ML nebulizer solution, Inhale 3 mL Every 6 (Six) Hours As Needed., Disp: , Rfl:   •  ALPRAZolam (XANAX) 1 MG tablet, Take 1 tablet twice daily, Disp: 60 tablet, Rfl: 2  •  amLODIPine (NORVASC) 10 MG tablet, Take 1 tablet by mouth Daily., Disp: 90 tablet, Rfl: 3  •  aspirin 81 MG chewable tablet, Chew 1 tablet Every Night., Disp: , Rfl:   •  atorvastatin (LIPITOR) 40 MG tablet, Take 1.5 tab PO QD for cholesterol and heart health, Disp: 135 tablet, Rfl: 3  •  buPROPion XL (WELLBUTRIN XL) 300 MG 24 hr tablet, TAKE 1 TABLET BY MOUTH EVERY DAY, Disp: 90 tablet, Rfl: 3  •  clopidogrel (PLAVIX) 75 MG tablet, Take 1 tablet by mouth Every Night., Disp: 90  tablet, Rfl: 3  •  Coenzyme Q10 400 MG capsule, Take 1 capsule by mouth Every Evening., Disp: , Rfl:   •  FERROUS SULFATE PO, Take 65 mg by mouth Every Night., Disp: , Rfl:   •  Fluticasone-Umeclidin-Vilant (TRELEGY ELLIPTA) 100-62.5-25 MCG/INH aerosol powder , Inhale 1 each Daily., Disp: 28 each, Rfl: 2  •  gabapentin (NEURONTIN) 300 MG capsule, Take 1 capsule by mouth 3 (Three) Times a Day., Disp: 270 capsule, Rfl: 1  •  isosorbide mononitrate (IMDUR) 30 MG 24 hr tablet, TAKE 1 TABLET BY MOUTH EVERY DAY IN THE MORNING, Disp: 90 tablet, Rfl: 3  •  latanoprost (XALATAN) 0.005 % ophthalmic solution, Administer 1 drop to both eyes every night at bedtime., Disp: , Rfl:   •  levothyroxine (SYNTHROID, LEVOTHROID) 112 MCG tablet, TAKE 1 TABLET BY MOUTH EVERY DAY, Disp: 90 tablet, Rfl: 3  •  Multiple Vitamins-Minerals (MULTIVITAMIN ADULT PO), Take 1 tablet by mouth Daily., Disp: , Rfl:   •  NIACIN PO, Take 1 capsule by mouth Every Morning., Disp: , Rfl:   •  Thiamine HCl (VITAMIN B-1 PO), Take 1 tablet by mouth Daily., Disp: , Rfl:   •  torsemide (DEMADEX) 20 MG tablet, Take 1 tablet by mouth Daily., Disp: 30 tablet, Rfl: 0  •  traMADol (ULTRAM) 50 MG tablet, Take 1 tablet by mouth 2 (Two) Times a Day., Disp: 180 tablet, Rfl: 0  Allergies   Allergen Reactions   • Augmentin [Amoxicillin-Pot Clavulanate] Hives and Rash     Tolerate cephalosporins     Social History     Tobacco Use   • Smoking status: Former     Packs/day: 1.00     Years: 30.00     Pack years: 30.00     Types: Cigarettes     Start date: 1974     Quit date: 2021     Years since quittin.9     Passive exposure: Never   • Smokeless tobacco: Never   • Tobacco comments:     caffeine - rarely   Vaping Use   • Vaping Use: Never used   Substance Use Topics   • Alcohol use: No   • Drug use: Not Currently     Types: Other     Comment: THC gummies          Objective   Physical Exam  Vitals:    23 0854   BP: 120/64   BP Location: Left arm   Patient  "Position: Sitting   Cuff Size: Adult   Pulse: 60   Temp: 96.6 °F (35.9 °C)   TempSrc: Infrared   SpO2: 93%   Weight: 76.7 kg (169 lb)   Height: 172.7 cm (68\")     Body mass index is 25.7 kg/m².    Constitutional: NAD.  Psychiatric: Normal affect. Normal thought content.     Assessment & Plan   Assessment and Plan  Pleasant 67-year-old male smoker with PVD status post bypass, CAD, moderate to severe COPD, hypertension, hyperlipidemia, CKD with proteinuria, anxiety, chronic lower back pain on high risk medications, history of GERD, hypothyroidism, anemia, B12 deficiency, who presents for the following:    Diagnoses and all orders for this visit:    1. Stage 3b chronic kidney disease (CKD) (HCC) (Primary): Continue to follow with nephrology.  Avoid NSAIDs and maintain adequate hydration. We will recheck levels today.    2. Coronary artery disease involving native coronary artery of native heart without angina pectoris  3. Hyperlipidemia, unspecified hyperlipidemia type: Tolerating increased statin dose for the past 6 months will recheck.  No chest pain.  -     Lipid Panel  -     Thyroid Panel With TSH    4. History of UTI: We will screen for any urine abnormalities.  No evidence of retention on prior prostate examinations or by symptoms.  -     Urine Culture - Urine, Urine, Clean Catch  -     Urinalysis With Microscopic - Urine, Clean Catch  -     Urine Drug Screen - Urine, Clean Catch  5. Screening for malignant neoplasm of prostate  -     PSA Screen  6. Urinary tract infection without hematuria, site unspecified  -     Urine Culture - Urine, Urine, Clean Catch    7. Anxiety disorder, unspecified type: Decrease alprazolam to 60 mg following patient led wean as tolerated.  If not doing well we can return to previous dose.  He understands the benefits of reducing his benzodiazepine usage.  -     ALPRAZolam (XANAX) 1 MG tablet; Take 1 tablet twice daily  Dispense: 60 tablet; Refill: 2  8. Long term prescription " benzodiazepine use    9. High risk medication use  -     Urine Drug Screen - Urine, Clean Catch    Follow-up in 6 months for Medicare wellness exam    Seth Hester MD  Family Medicine  O: [phone]    Disclaimer: Parts of this note were dictated by speech recognition. Minor errors in transcription may be present. Please call if questions.

## 2023-03-30 DIAGNOSIS — E03.9 HYPOTHYROIDISM, UNSPECIFIED TYPE: Primary | ICD-10-CM

## 2023-03-30 LAB
AMPHETAMINES UR QL SCN: NEGATIVE NG/ML
APPEARANCE UR: CLEAR
BACTERIA #/AREA URNS HPF: NORMAL /[HPF]
BACTERIA UR CULT: NO GROWTH
BACTERIA UR CULT: NORMAL
BARBITURATES UR QL SCN: NEGATIVE NG/ML
BENZODIAZ UR QL SCN: POSITIVE NG/ML
BILIRUB UR QL STRIP: NEGATIVE
BZE UR QL SCN: NEGATIVE NG/ML
CANNABINOIDS UR QL SCN: POSITIVE NG/ML
CASTS URNS QL MICRO: NORMAL /LPF
CHOLEST SERPL-MCNC: 176 MG/DL (ref 100–199)
COLOR UR: YELLOW
CREAT UR-MCNC: 44.5 MG/DL (ref 20–300)
EPI CELLS #/AREA URNS HPF: NORMAL /HPF (ref 0–10)
FT4I SERPL CALC-MCNC: 2.9 (ref 1.2–4.9)
GLUCOSE UR QL STRIP: NEGATIVE
HDLC SERPL-MCNC: 51 MG/DL
HGB UR QL STRIP: NEGATIVE
KETONES UR QL STRIP: NEGATIVE
LABORATORY COMMENT REPORT: ABNORMAL
LDLC SERPL CALC-MCNC: 89 MG/DL (ref 0–99)
LEUKOCYTE ESTERASE UR QL STRIP: NEGATIVE
METHADONE UR QL SCN: NEGATIVE NG/ML
MICRO URNS: NORMAL
MICRO URNS: NORMAL
NITRITE UR QL STRIP: NEGATIVE
OPIATES UR QL SCN: NEGATIVE NG/ML
OXYCODONE+OXYMORPHONE UR QL SCN: NEGATIVE NG/ML
PCP UR QL: NEGATIVE NG/ML
PH UR STRIP: 6 [PH] (ref 5–7.5)
PH UR: 5.4 [PH] (ref 4.5–8.9)
PROPOXYPH UR QL SCN: NEGATIVE NG/ML
PROT UR QL STRIP: NEGATIVE
PSA SERPL-MCNC: 0.7 NG/ML (ref 0–4)
RBC #/AREA URNS HPF: NORMAL /HPF (ref 0–2)
SP GR UR STRIP: 1.01 (ref 1–1.03)
T3RU NFR SERPL: 28 % (ref 24–39)
T4 SERPL-MCNC: 10.2 UG/DL (ref 4.5–12)
TRIGL SERPL-MCNC: 216 MG/DL (ref 0–149)
TSH SERPL DL<=0.005 MIU/L-ACNC: 10.6 UIU/ML (ref 0.45–4.5)
UROBILINOGEN UR STRIP-MCNC: 0.2 MG/DL (ref 0.2–1)
VLDLC SERPL CALC-MCNC: 36 MG/DL (ref 5–40)
WBC #/AREA URNS HPF: NORMAL /HPF (ref 0–5)

## 2023-03-30 RX ORDER — LEVOTHYROXINE SODIUM 137 UG/1
137 TABLET ORAL DAILY
Qty: 90 TABLET | Refills: 3 | Status: SHIPPED | OUTPATIENT
Start: 2023-03-30

## 2023-03-31 NOTE — PROGRESS NOTES
Cholesterol doing much better with your increased dose of statin.  I would like to keep it at your current dose for now.    We need to increase your thyroid medication dose.  I will send in a new prescription for 137 mcg to be taken daily.    Everything else looks good. Stay well

## 2023-04-07 RX ORDER — TORSEMIDE 20 MG/1
20 TABLET ORAL DAILY
Qty: 30 TABLET | Refills: 0 | Status: SHIPPED | OUTPATIENT
Start: 2023-04-07

## 2023-04-07 NOTE — TELEPHONE ENCOUNTER
"Caller: Sim Agustin \"Don Waltersgene\"    Relationship: Self    Best call back number: [phone]    Requested Prescriptions:   Requested Prescriptions     Pending Prescriptions Disp Refills   • torsemide (DEMADEX) 20 MG tablet 30 tablet 0     Sig: Take 1 tablet by mouth Daily.        Pharmacy where request should be sent: [facility]/PHARMACY #[id number] [place], [place] - [address] [phone] [facility] [phone] FX     Last office visit with prescribing clinician: 3/28/2023   Last telemedicine visit with prescribing clinician: 9/28/2023   Next office visit with prescribing clinician: 9/28/2023     Additional details provided by patient:     Does the patient have less than a 3 day supply:  [] Yes  [x] No    Would you like a call back once the refill request has been completed: [] Yes [x] No    If the office needs to give you a call back, can they leave a voicemail: [] Yes [x] No    Sal Barragan Rep   04/07/23 15:29 EDT       "

## 2023-04-13 ENCOUNTER — TELEPHONE (OUTPATIENT)
Dept: INTERNAL MEDICINE | Facility: CLINIC | Age: 68
End: 2023-04-13
Payer: MEDICARE

## 2023-04-13 DIAGNOSIS — N18.32 STAGE 3B CHRONIC KIDNEY DISEASE (CKD): ICD-10-CM

## 2023-04-13 DIAGNOSIS — J41.0 SIMPLE CHRONIC BRONCHITIS: Primary | Chronic | ICD-10-CM

## 2023-04-13 NOTE — TELEPHONE ENCOUNTER
Pt is needing a referral for nephrology with Dr. Sarabia and a referral for [facility] with Dr. Veras for his ins.    Please and thanks

## 2023-04-24 DIAGNOSIS — Z79.899 LONG TERM PRESCRIPTION BENZODIAZEPINE USE: Primary | ICD-10-CM

## 2023-04-24 DIAGNOSIS — F41.9 ANXIETY DISORDER, UNSPECIFIED TYPE: ICD-10-CM

## 2023-04-24 RX ORDER — ALPRAZOLAM 1 MG/1
TABLET ORAL
Qty: 180 TABLET | Refills: 2 | Status: SHIPPED | OUTPATIENT
Start: 2023-04-24

## 2023-04-24 NOTE — TELEPHONE ENCOUNTER
Requested Prescriptions     ALPRAZolam (XANAX) 1 MG tablet         Sig: Take 1 tablet twice daily    Disp:  60 tablet    Refills:  2    Start: 4/24/2023    Class: Normal    Non-formulary For: Anxiety disorder, unspecified type    Last ordered: 3 weeks ago by Seth Hester MD     Patient comment: Thanks Dr. Hester     Rx Controlled Substance Protocol Failed    No Benzodiazepines on Active Med List    Urine Toxicology Performed in Last 12 Months    Recent Appt with me in Past 3 Months    Medication not refilled in past 28 days      To be filled at: [facility]/pharmacy #[id number] - [place], [place] - [facility] [address] [phone] [place] [phone] FX

## 2023-05-03 RX ORDER — TORSEMIDE 20 MG/1
TABLET ORAL
Qty: 30 TABLET | Refills: 2 | Status: SHIPPED | OUTPATIENT
Start: 2023-05-03

## 2023-05-04 RX ORDER — TORSEMIDE 20 MG/1
20 TABLET ORAL DAILY
Qty: 30 TABLET | Refills: 2 | OUTPATIENT
Start: 2023-05-04

## 2023-05-15 DIAGNOSIS — E03.9 HYPOTHYROIDISM, UNSPECIFIED TYPE: ICD-10-CM

## 2023-05-15 RX ORDER — LEVOTHYROXINE SODIUM 137 UG/1
137 TABLET ORAL DAILY
Qty: 90 TABLET | Refills: 3 | Status: SHIPPED | OUTPATIENT
Start: 2023-05-15

## 2023-05-15 RX ORDER — LEVOTHYROXINE SODIUM 0.12 MG/1
TABLET ORAL
Qty: 90 TABLET | Refills: 3 | OUTPATIENT
Start: 2023-05-15

## 2023-06-08 DIAGNOSIS — G89.29 CHRONIC MIDLINE LOW BACK PAIN WITHOUT SCIATICA: ICD-10-CM

## 2023-06-08 DIAGNOSIS — M54.50 CHRONIC MIDLINE LOW BACK PAIN WITHOUT SCIATICA: ICD-10-CM

## 2023-06-09 RX ORDER — TRAMADOL HYDROCHLORIDE 50 MG/1
50 TABLET ORAL 2 TIMES DAILY
Qty: 180 TABLET | Refills: 0 | Status: SHIPPED | OUTPATIENT
Start: 2023-06-09

## 2023-06-09 NOTE — TELEPHONE ENCOUNTER
Rx Refill Note  Requested Prescriptions     Pending Prescriptions Disp Refills    traMADol (ULTRAM) 50 MG tablet 180 tablet 0     Sig: Take 1 tablet by mouth 2 (Two) Times a Day.      Last office visit with prescribing clinician: 3/28/2023   Last telemedicine visit with prescribing clinician: Visit date not found   Next office visit with prescribing clinician: 9/28/2023          Urine Drug Screen - Urine, Clean Catch (03/28/2023 09:29)                  Would you like a call back once the refill request has been completed: [] Yes [] No    If the office needs to give you a call back, can they leave a voicemail: [] Yes [] No    Sharon Torres MA  06/09/23, 10:54 EDT

## 2023-07-31 ENCOUNTER — PATIENT MESSAGE (OUTPATIENT)
Dept: INTERNAL MEDICINE | Facility: CLINIC | Age: 68
End: 2023-07-31
Payer: MEDICARE

## 2023-07-31 DIAGNOSIS — M1A.3490 CHRONIC GOUT OF HAND DUE TO RENAL IMPAIRMENT WITHOUT TOPHUS, UNSPECIFIED LATERALITY: Primary | ICD-10-CM

## 2023-08-01 RX ORDER — PREDNISONE 10 MG/1
TABLET ORAL
Qty: 28 TABLET | Refills: 0 | Status: SHIPPED | OUTPATIENT
Start: 2023-08-01 | End: 2023-08-12

## 2023-08-01 RX ORDER — ALLOPURINOL 100 MG/1
TABLET ORAL
Qty: 90 TABLET | Refills: 3 | Status: SHIPPED | OUTPATIENT
Start: 2023-08-01

## 2023-08-09 DIAGNOSIS — E78.5 HYPERLIPIDEMIA, UNSPECIFIED HYPERLIPIDEMIA TYPE: ICD-10-CM

## 2023-08-09 RX ORDER — ATORVASTATIN CALCIUM 40 MG/1
TABLET, FILM COATED ORAL
Qty: 90 TABLET | Refills: 3 | Status: SHIPPED | OUTPATIENT
Start: 2023-08-09

## 2023-09-05 DIAGNOSIS — M54.50 CHRONIC MIDLINE LOW BACK PAIN WITHOUT SCIATICA: ICD-10-CM

## 2023-09-05 DIAGNOSIS — G89.29 CHRONIC MIDLINE LOW BACK PAIN WITHOUT SCIATICA: ICD-10-CM

## 2023-09-05 RX ORDER — TRAMADOL HYDROCHLORIDE 50 MG/1
50 TABLET ORAL 2 TIMES DAILY
Qty: 180 TABLET | Refills: 0 | Status: SHIPPED | OUTPATIENT
Start: 2023-09-05

## 2023-09-05 NOTE — TELEPHONE ENCOUNTER
Rx Refill Note  Requested Prescriptions     Pending Prescriptions Disp Refills    traMADol (ULTRAM) 50 MG tablet 180 tablet 0     Sig: Take 1 tablet by mouth 2 (Two) Times a Day.      Last office visit with prescribing clinician: 3/28/2023   Last telemedicine visit with prescribing clinician: Visit date not found   Next office visit with prescribing clinician: 9/28/2023   Urine Drug Screen - Urine, Clean Catch (03/28/2023 09:29)

## 2023-09-07 DIAGNOSIS — G89.29 CHRONIC MIDLINE LOW BACK PAIN WITHOUT SCIATICA: ICD-10-CM

## 2023-09-07 DIAGNOSIS — M54.50 CHRONIC MIDLINE LOW BACK PAIN WITHOUT SCIATICA: ICD-10-CM

## 2023-09-08 DIAGNOSIS — M54.50 CHRONIC MIDLINE LOW BACK PAIN WITHOUT SCIATICA: ICD-10-CM

## 2023-09-08 DIAGNOSIS — G89.29 CHRONIC MIDLINE LOW BACK PAIN WITHOUT SCIATICA: ICD-10-CM

## 2023-09-08 RX ORDER — TRAMADOL HYDROCHLORIDE 50 MG/1
50 TABLET ORAL 2 TIMES DAILY
Qty: 180 TABLET | Refills: 0 | OUTPATIENT
Start: 2023-09-08

## 2023-09-08 NOTE — TELEPHONE ENCOUNTER
LVM for pt advising him that the first request was received and that the second and third requests were denied because they were duplicates as the Rx was sent in on 9/5.     Pt advised that pharmacy was called and that they did receive medication, but that it was too early for fill.     After speaking to pharmacy, I called pt to let him know that initial request was received and that he was requesting too early for ins to approve. Advised pt that pharmacy states they will get ready for him.     Pt states that I was incorrect and that pharmacy had no record of this. I advised him I spoke to the pharmacy and that they confirmed all information given.    Pt disconnected phone call

## 2023-09-15 DIAGNOSIS — Z89.619 STATUS POST ABOVE KNEE AMPUTATION, UNSPECIFIED LATERALITY: ICD-10-CM

## 2023-09-15 RX ORDER — GABAPENTIN 300 MG/1
300 CAPSULE ORAL 3 TIMES DAILY
Qty: 270 CAPSULE | Refills: 1 | Status: SHIPPED | OUTPATIENT
Start: 2023-09-15

## 2023-09-15 NOTE — TELEPHONE ENCOUNTER
Requested Prescriptions     gabapentin (NEURONTIN) 300 MG capsule         Sig: Take 1 capsule by mouth 3 (Three) Times a Day.    Disp: 270 capsule    Refills: 1    Start: 9/15/2023    Class: Normal    Non-formulary For: Status post above knee amputation, unspecified laterality    To pharmacy: #90 days    Last ordered: 6 months ago by Seth Hester MD     Patient comment: Dr. Hester,  I apologize for the Tramadol situation,  I used My Chart to refill the medication according to the [facility] pharmacy website, I didn't get the message that My Chart usually sends saying your office has sent the order to [facility],  then I didn't get the text that [facility] usually sends if it's too early for the refill, I have been using the [employer] beatrice for refills,  etc, but [place] the pharmacist said if its not an auto fill med, don't count on the [employer] application date being correct,  glitch    Controlled Substance Med Protocol Ogrxgo45/15/2023 05:57 AM    Recent Appt with me in Past 3 Months    No Benzodiazepines on Active Med List    Urine Toxicology Performed in Last 12 Months    Controlled Substance Agreement is on file    Medication not refilled in past 28 days   Anticonvulsants Protocol Passed    Visit with authorizing provider in past 12 months or upcoming 30 days    CBC in past 12 months      To be filled at: [facility]/pharmacy #[id number] - [place], [place] - [address] [phone] [facility] [phone] FX

## 2023-09-28 ENCOUNTER — OFFICE VISIT (OUTPATIENT)
Dept: INTERNAL MEDICINE | Facility: CLINIC | Age: 68
End: 2023-09-28
Payer: MEDICARE

## 2023-09-28 VITALS
SYSTOLIC BLOOD PRESSURE: 118 MMHG | HEIGHT: 68 IN | HEART RATE: 54 BPM | TEMPERATURE: 98.2 F | WEIGHT: 171 LBS | OXYGEN SATURATION: 96 % | DIASTOLIC BLOOD PRESSURE: 60 MMHG | BODY MASS INDEX: 25.91 KG/M2

## 2023-09-28 DIAGNOSIS — R80.9 PROTEINURIA, UNSPECIFIED TYPE: ICD-10-CM

## 2023-09-28 DIAGNOSIS — Z87.891 ENCOUNTER FOR SCREENING FOR MALIGNANT NEOPLASM OF LUNG IN FORMER SMOKER WHO QUIT IN PAST 15 YEARS WITH 30 PACK YEAR HISTORY OR GREATER: ICD-10-CM

## 2023-09-28 DIAGNOSIS — Z90.49 S/P COLECTOMY: ICD-10-CM

## 2023-09-28 DIAGNOSIS — Z00.00 ENCOUNTER FOR SUBSEQUENT ANNUAL WELLNESS VISIT (AWV) IN MEDICARE PATIENT: Primary | ICD-10-CM

## 2023-09-28 DIAGNOSIS — I10 ESSENTIAL HYPERTENSION: ICD-10-CM

## 2023-09-28 DIAGNOSIS — Z12.2 ENCOUNTER FOR SCREENING FOR MALIGNANT NEOPLASM OF LUNG IN FORMER SMOKER WHO QUIT IN PAST 15 YEARS WITH 30 PACK YEAR HISTORY OR GREATER: ICD-10-CM

## 2023-09-28 DIAGNOSIS — E03.9 HYPOTHYROIDISM, UNSPECIFIED TYPE: ICD-10-CM

## 2023-09-28 DIAGNOSIS — E78.5 HYPERLIPIDEMIA, UNSPECIFIED HYPERLIPIDEMIA TYPE: ICD-10-CM

## 2023-09-28 DIAGNOSIS — I73.9 PVD (PERIPHERAL VASCULAR DISEASE): ICD-10-CM

## 2023-09-28 DIAGNOSIS — Z87.19 H/O ANGIODYSPLASIA OF INTESTINAL TRACT: Chronic | ICD-10-CM

## 2023-09-28 DIAGNOSIS — Z89.612 STATUS POST ABOVE-KNEE AMPUTATION OF LEFT LOWER EXTREMITY: ICD-10-CM

## 2023-09-28 DIAGNOSIS — N18.31 STAGE 3A CHRONIC KIDNEY DISEASE: ICD-10-CM

## 2023-09-28 DIAGNOSIS — I25.10 CORONARY ARTERY DISEASE INVOLVING NATIVE CORONARY ARTERY OF NATIVE HEART WITHOUT ANGINA PECTORIS: ICD-10-CM

## 2023-09-28 DIAGNOSIS — Z87.891 HISTORY OF SMOKING 30 OR MORE PACK YEARS: ICD-10-CM

## 2023-09-28 DIAGNOSIS — J41.0 SIMPLE CHRONIC BRONCHITIS: Chronic | ICD-10-CM

## 2023-09-28 DIAGNOSIS — G89.29 CHRONIC MIDLINE LOW BACK PAIN WITHOUT SCIATICA: ICD-10-CM

## 2023-09-28 DIAGNOSIS — E53.8 VITAMIN B12 DEFICIENCY: ICD-10-CM

## 2023-09-28 DIAGNOSIS — F41.9 ANXIETY DISORDER, UNSPECIFIED TYPE: ICD-10-CM

## 2023-09-28 DIAGNOSIS — Z79.899 LONG TERM PRESCRIPTION BENZODIAZEPINE USE: ICD-10-CM

## 2023-09-28 DIAGNOSIS — M54.50 CHRONIC MIDLINE LOW BACK PAIN WITHOUT SCIATICA: ICD-10-CM

## 2023-09-28 DIAGNOSIS — G47.33 OSA TREATED WITH BIPAP: ICD-10-CM

## 2023-09-28 DIAGNOSIS — E61.1 IRON DEFICIENCY: ICD-10-CM

## 2023-09-28 PROBLEM — A49.8 KLEBSIELLA INFECTION: Status: RESOLVED | Noted: 2023-03-02 | Resolved: 2023-09-28

## 2023-09-28 PROBLEM — N18.32 STAGE 3B CHRONIC KIDNEY DISEASE (CKD): Status: RESOLVED | Noted: 2023-03-01 | Resolved: 2023-09-28

## 2023-09-28 RX ORDER — BRIMONIDINE TARTRATE 2 MG/ML
SOLUTION/ DROPS OPHTHALMIC
COMMUNITY
Start: 2023-09-14

## 2023-09-28 NOTE — PROGRESS NOTES
Date of Encounter: 2023  Patient: Sim Agustin,  1955    SUBSEQUENT MEDICARE WELLNESS VISIT  Subjective   History of Presenting Illness  Chief complaint: Medicare wellness exam     No acute concerns.    He continues to follow with his cardiologist, pulmonologist and nephrologist and there are no recent concerns.  Renal function is stable per Dr. Sarabia.  Vitamin D levels are good.    Coronary artery disease with no recent chest pain or palpitations.    Peripheral vascular disease with history of graft and revision, follows with vascular surgery, doing well on aspirin and Plavix.  No symptoms of claudication.    Stage IIIa chronic kidney disease with proteinuria follows with nephrology, disease is stable.    Hypothyroidism with recent dose adjustment, not having any current symptoms.    History of iron and B12 deficiency, will follow this routinely.    COPD with over 20-pack-year smoking history quit 2 years ago.  No recent episodes of bronchitis.  Follows with pulmonology.  Due for lung cancer screening.  He is on Trelegy inhaler.    Status post left AKA with no recent issues with prosthetic.  He does have phantom limb pain whenever he stops the gabapentin.    Chronic back pain stable on current regimen of tramadol.  Manages constipation well.    Angiodysplasia with history of partial colectomy and no persistent symptoms.  Last colon cancer screening 2016 with 10-year interval.     Anxiety disorder on bupropion, alprazolam. He has been on benzodiazepines long-term.  Previous attempts at dose reduction have not been tolerable.      . 1 child. Over 30-pack-year smoking history quit in .  Does not drink alcohol.  He is active but no dedicated exercise.  He and his wife do not cook, no recent diet changes.  Due for lung cancer screening. Colonoscopy  with 10-year interval.  PSA .  Disabled. Does not have a living will, we have discussed this in the past and provided paperwork, would  like to be DNR.  Up-to-date on vaccines, plans to get COVID-19 booster this  at the pharmacy.    In the past 7 days, how much pain have you felt? 3/10 lower back    Review of Systems:  Negative for fever, congestion, chest pain upon exertion, shortness of breath, vision changes, vomiting, dysuria, lymphadenopathy, muscle weakness, numbness, mood changes, rashes.    Health Risk Assessment:    Recent Hospitalizations:  No hospitalization(s) within the last year.    Current Medical Providers:  Patient Care Team:  Seth Hester MD as PCP - General (Family Medicine)  Jose Swann MD as Consulting Physician (Vascular Surgery)  Krystal Ocampo MD as Consulting Physician (Cardiology)  Ishmael Tate Jr., MD as Consulting Physician (Pulmonary Disease)    Smoking Status:  Social History     Tobacco Use   Smoking Status Former    Packs/day: 1.00    Years: 30.00    Pack years: 30.00    Types: Cigarettes    Start date: 1974    Quit date: 2021    Years since quittin.4    Passive exposure: Never   Smokeless Tobacco Never   Tobacco Comments    caffeine - rarely       Alcohol Consumption:  Social History     Substance and Sexual Activity   Alcohol Use No       Depression Screen:       2023     9:00 AM   PHQ-2/PHQ-9 Depression Screening   Little Interest or Pleasure in Doing Things 0-->not at all   Feeling Down, Depressed or Hopeless 0-->not at all   PHQ-9: Brief Depression Severity Measure Score 0     I spent more than 16 minutes asking patient questions, counseling and documenting in the chart    Fall Risk Screen:  LEDA Fall Risk Assessment was completed, and patient is at LOW risk for falls.Assessment completed on:2023    Health Habits and Functional and Cognitive Screenin/28/2023     9:00 AM   Functional & Cognitive Status   Do you have difficulty preparing food and eating? No   Do you have difficulty bathing yourself, getting dressed or grooming yourself? No   Do you have  difficulty using the toilet? No   Do you have difficulty moving around from place to place? No   Do you have trouble with steps or getting out of a bed or a chair? No   Current Diet Well Balanced Diet   Dental Exam Up to date   Eye Exam Up to date   Do you need help using the phone?  No   Are you deaf or do you have serious difficulty hearing?  No   Do you need help to go to places out of walking distance? No   Do you need help shopping? No   Do you need help preparing meals?  No   Do you need help with housework?  No   Do you need help with laundry? No   Do you need help taking your medications? No   Do you need help managing money? No   Do you ever drive or ride in a car without wearing a seat belt? No   Have you felt unusual stress, anger or loneliness in the last month? No   Who do you live with? Spouse   If you need help, do you have trouble finding someone available to you? No   Do you have difficulty concentrating, remembering or making decisions? No       Does the patient have evidence of cognitive impairment? No    Aspirin use counseling:Taking ASA appropriately as indicated    Recent Lab Results:        Age-appropriate Screening Schedule:  Refer to the list below for future screening recommendations based on patient's age, sex and/or medical conditions. Orders for these recommended tests are listed in the plan section. The patient has been provided with a written plan.    Health Maintenance   Topic Date Due    BMI FOLLOWUP  Never done    COVID-19 Vaccine (6 - Pfizer series) 01/24/2023    LUNG CANCER SCREENING  05/18/2023    ANNUAL WELLNESS VISIT  09/15/2023    INFLUENZA VACCINE  10/01/2023    LIPID PANEL  03/28/2024    COLORECTAL CANCER SCREENING  10/21/2026    TDAP/TD VACCINES (2 - Td or Tdap) 03/15/2032    HEPATITIS C SCREENING  Completed    Pneumococcal Vaccine 65+  Completed    AAA SCREEN (ONE-TIME)  Completed    ZOSTER VACCINE  Completed    Hepatitis B  Discontinued       Compared to one year ago, the  patient feels his physical health is the same.  Compared to one year ago, the patient feels his mental health is the same.    Reviewed chart for potential of high risk medication in the elderly: yes  Reviewed chart for potential of harmful drug interactions in the elderly:yes    Advanced Care Planning:  Advance Care Planning   ACP discussion was held with the patient during this visit. Patient does not have an advance directive, information provided.    The following portions of the patient's history were reviewed and updated as appropriate: allergies, current medications, past family history, past medical history, past social history, past surgical history and problem list.    Patient Active Problem List   Diagnosis    H/O angiodysplasia of intestinal tract    COPD (chronic obstructive pulmonary disease)    S/P colectomy    Status post above-knee amputation of left lower extremity    CAD (coronary artery disease)    PVD (peripheral vascular disease)    Essential hypertension    Cardiomyopathy, unspecified    PRISCILLA treated with BiPAP    Anxiety disorder    Chronic midline low back pain without sciatica    Long term prescription benzodiazepine use    History of bradycardia    Hypothyroidism    Vitamin B12 deficiency    Iron deficiency    History of smoking 30 or more pack years    High risk medication use    DNR (do not resuscitate)    Stage 3a chronic kidney disease    Hyperlipidemia    Proteinuria    Junctional cardiac arrhythmia     Past Medical History:   Diagnosis Date    Acute respiratory failure with hypoxia and hypercarbia 03/11/2019    Acute upper respiratory infection 02/06/2013    Anemia     per [place] [facility]    ARF (acute renal failure) 03/04/2016    Asthma 02/02/2018    Atelectasis, left 03/04/2016    Bradycardia 05/23/2019    CAD (coronary artery disease) 04/04/2016    Chronic obstructive pulmonary disease with acute exacerbation 02/06/2013    Clotting disorder 05/01/2003    Colon polyps      Compartment syndrome     AFTER ILIAC SURGERY. ABOVE KNEE AMPUTATION    COPD (chronic obstructive pulmonary disease)     COPD with hypoxia 05/23/2019    Cough     SINCE APRIL WITH PNEUMONIA.     Demand myocardial infarction 05/19/2022    Disease of thyroid gland     HYPOTHYRODISM    Diverticulosis     Elevated hematocrit 01/06/2021    Employs prosthetic leg     ESRD (end stage renal disease) on dialysis 04/06/2016    Fracture of patella 03/03/2015    History of acute renal failure     History of CHF (congestive heart failure)     History of GI bleed 02/2016    AORTODUOD FISTULA    History of sepsis 02/2016    History of transfusion     MULTIPLE, 2016    Hyperkalemia 04/11/2016    Hypertension     Hypotension     Hypotension 03/04/2016    Left lower lobe pneumonia 05/18/2022    Nonischemic cardiomyopathy     Nosocomial pneumonia 04/06/2016    Phantom limb pain     SL, WITH LEFT ABOUVE KNEE    Pleural effusion on left 04/11/2016    Pneumonia     SPRING 2016  AFTER SURGERY    Pulmonary embolism     PVD (peripheral vascular disease)     Renal insufficiency     S/P thoracentesis 04/11/2016    Sepsis, unspecified organism 04/05/2016     Past Surgical History:   Procedure Laterality Date    ABOVE KNEE AMPUTATION Left 03/16/2016    Procedure: LT AMPUTATION ABOVE KNEE;  Surgeon: Jose Swann MD;  Location: [facility];  Service:     ARTERIAL THROMBECTOMY  2001    throbectomy of arterial graft    AXILLARY FEMORAL BYPASS Right 02/15/2016    Exploratory lapartomy, repair aortoduodenal fistula, resection infected aortobifemoral bypass graft, axillobi-superficial femoral artery [place]-Aneudy bypass graft from right axillary artery, Repair of duodenotomy 4th portion duodenum-Dr. Jose Swann, Dr. Genaro Perrni    BRONCHOSCOPY Left 03/04/2016    Dr. NATALIIA Tate    BRONCHOSCOPY Left 05/20/2022    Procedure: BRONCHOSCOPY WITH BIOPSIES, BRUSHINGS, AND BAL UNDER FLUORO;  Surgeon: Ishmael Tate Jr., MD;  Location:   [facility];  Service: Pulmonary;  Laterality: Left;  PRE OP - LLL CONSOLIDATION  POST OP - SAME    BRONCHOSCOPY RIGID / FLEXIBLE N/A 03/03/2016    Dr. NATALIIA Tate    BRONCHOSCOPY RIGID / FLEXIBLE N/A 02/26/2016    Dr. NATALIIA Tate    CARDIAC CATHETERIZATION N/A 03/18/2019    Procedure: Right and Left Heart Cath;  Surgeon: Nina Storey MD;  Location: [facility] CATH INVASIVE LOCATION;  Service: Cardiovascular    CATARACT EXTRACTION WITH INTRAOCULAR LENS IMPLANT Bilateral     COLON RESECTION WITH COLOSTOMY Left 02/28/2016    Exploratory laparotomy with open left hemicolectomy, end-colostomy, G-tube and J-tube-Dr. Endy Chaney    COLON SURGERY  3/25/2015    COLOSTOMY    COLONOSCOPY N/A 2015    Dr. Laughlin    COLONOSCOPY N/A 10/12/2016    Procedure: COLONOSCOPY TO 20 CM AND PER STOMA TO 30CM;  Surgeon: Genaro Perrin MD;  Location: [facility] ENDOSCOPY;  Service:     COLONOSCOPY N/A 10/21/2016    Procedure: COLONOSCOPY DONE AT BEDSIDE ONTO CECEM;  Surgeon: Genaro Perrin MD;  Location: [facility] ENDOSCOPY;  Service:     COLONOSCOPY N/A 02/10/2016    Diverticulosis in the entire examined colon, non-bleeding internal hemorrhoids-Dr. Taylor Pina    COLONOSCOPY N/A 02/11/2016    Poor prep, blood in the entire examined colon, normal ileum-Dr. Taylor Pina    COLONOSCOPY W/ POLYPECTOMY N/A 07/27/2015    Normal ileum, diverticulosis in the sigmoid colon: resected and retrieved, one 6 mm polyp in the descending colon: resected and retrieved, the distal rectum and anal verge are normal on retroflexion view-Dr. Miguel Ángel Laughlin    COLOSTOMY CLOSURE N/A 10/13/2016    Procedure: COLOSTOMY TAKEDOWN OR CLOSURE, APPENDECTOMY;  Surgeon: Genaro Perrin MD;  Location: [facility] MAIN OR;  Service:     DEBRIDEMENT LEG Left 03/01/2016    Excisional debridement of the skin, subcutantous tissue, tendon and muscle left calf-Dr. Jose Swann    DEBRIDEMENT LEG Left 02/25/2016    Excisional debridement full-thcikness skin and  subcutaneous tissue and tendon and muscle, left medial and lateral calf muscles-Dr. Jose Swann    ENDOSCOPY N/A 10/21/2016    Procedure: ESOPHAGOGASTRODUODENOSCOPY DONE AT BEDSIDE;  Surgeon: Genaro Perrin MD;  Location: [facility] ENDOSCOPY;  Service:     ENDOSCOPY AND COLONOSCOPY N/A 02/28/2016    EGD and Colonoscopy with Candy ink injection-Dr. Endy Chaney    ENTEROSCOPY SMALL BOWEL N/A 02/11/2016    Normal esophagus, normal stomach, normal duodenum, portion of the jejunum normal-Dr. Taylor Pina    ILIAC ARTERY - FEMORAL ARTERY BYPASS GRAFT Bilateral 2002    ILIAC ARTERY STENT Bilateral 2001    INSERTION AND REMOVAL PERITONEAL DIALYSIS CATHETER Right 02/29/2016    Exchange right jugular 16 cm Mahurkar dialysis catheter-Dr. Jose Swann    INSERTION PERITONEAL DIALYSIS CATHETER Left 03/01/2016    Ultrasound-guided access of the left jugular vein, 23 cm left jugular palindrome dialysis catheter placement-Dr. Jose Swann    INSERTION PERITONEAL DIALYSIS CATHETER Right 02/18/2016    Right jugular Mahrkar catherer placement-Dr. Jose Swann    JOINT REPLACEMENT Left     THORACOSCOPY Left 04/18/2016    Procedure: LT THORACOSCOPY VIDEO ASSISTED WITH DECORDICATION;  Surgeon: Genaro Davila III, MD;  Location: [facility] MAIN OR;  Service:     THROMBECTOMY Left 02/16/2016    Thombectomy of left femoral graft, left leg arteriogram, left calf forward compartment fasciotomy-Dr. Jose Swann    TOTAL HIP ARTHROPLASTY Left     UPPER GASTROINTESTINAL ENDOSCOPY N/A 02/09/2016    Normal UGI-Dr. Ajay Parkinson    UPPER GASTROINTESTINAL ENDOSCOPY N/A 11/28/2015    Small hiatal hernia, chronic gastritis: biopsied, non-bleeding angioectasias in the stomach: treated with argon plasma coagulation, multiple bleeding angioectasias in the dudenum: treated with argon plasma coagulation-Dr. Mykel Jaramillo    VASCULAR SURGERY      MULTIPLE    VENTRAL/INCISIONAL HERNIA REPAIR N/A 10/19/2017    Procedure: VENTRAL  HERNIA REPAIR WITH MESH AND BILATERAL COMPONENT SEPARATION;  Surgeon: Genaro Perrin MD;  Location: [facility] OR;  Service:     WISDOM TOOTH EXTRACTION       Family History   Problem Relation Age of Onset    Lung cancer Mother     Cancer Mother     Heart disease Father     Diabetes Father     Hypertension Father     Malig Hyperthermia Neg Hx        Current Outpatient Medications:     brimonidine (ALPHAGAN) 0.2 % ophthalmic solution, instill 1 drop into both eyes twice a day, Disp: , Rfl:     albuterol (ACCUNEB) 0.63 MG/3ML nebulizer solution, Inhale 3 mL Every 6 (Six) Hours As Needed., Disp: , Rfl:     allopurinol (Zyloprim) 100 MG tablet, Take 1 tab by mouth daily for gout, Disp: 90 tablet, Rfl: 3    ALPRAZolam (XANAX) 1 MG tablet, Take 1 tablet twice daily, Disp: 180 tablet, Rfl: 2    amLODIPine (NORVASC) 10 MG tablet, Take 1 tablet by mouth Daily., Disp: 90 tablet, Rfl: 3    aspirin 81 MG chewable tablet, Chew 1 tablet Every Night., Disp: , Rfl:     atorvastatin (LIPITOR) 40 MG tablet, TAKE 1 TABLET BY MOUTH EVERY DAY FOR CHOLESTEROL AND HEART HEALTH, Disp: 90 tablet, Rfl: 3    buPROPion XL (WELLBUTRIN XL) 300 MG 24 hr tablet, TAKE 1 TABLET BY MOUTH EVERY DAY, Disp: 90 tablet, Rfl: 3    clopidogrel (PLAVIX) 75 MG tablet, Take 1 tablet by mouth Every Night., Disp: 90 tablet, Rfl: 3    Coenzyme Q10 400 MG capsule, Take 1 capsule by mouth Every Evening., Disp: , Rfl:     FERROUS SULFATE PO, Take 65 mg by mouth Every Night., Disp: , Rfl:     Fluticasone-Umeclidin-Vilant (TRELEGY ELLIPTA) 100-62.5-25 MCG/INH aerosol powder , Inhale 1 each Daily., Disp: 28 each, Rfl: 2    gabapentin (NEURONTIN) 300 MG capsule, Take 1 capsule by mouth 3 (Three) Times a Day., Disp: 270 capsule, Rfl: 1    isosorbide mononitrate (IMDUR) 30 MG 24 hr tablet, TAKE 1 TABLET BY MOUTH EVERY DAY IN THE MORNING, Disp: 90 tablet, Rfl: 3    latanoprost (XALATAN) 0.005 % ophthalmic solution, Administer 1 drop to both eyes every night at  "bedtime., Disp: , Rfl:     levothyroxine (SYNTHROID, LEVOTHROID) 137 MCG tablet, Take 1 tablet by mouth Daily., Disp: 90 tablet, Rfl: 3    Multiple Vitamins-Minerals (MULTIVITAMIN ADULT PO), Take 1 tablet by mouth Daily., Disp: , Rfl:     Thiamine HCl (VITAMIN B-1 PO), Take 1 tablet by mouth Daily., Disp: , Rfl:     torsemide (DEMADEX) 20 MG tablet, Take 1 tablet by mouth Daily., Disp: 90 tablet, Rfl: 0    traMADol (ULTRAM) 50 MG tablet, Take 1 tablet by mouth 2 (Two) Times a Day., Disp: 180 tablet, Rfl: 0  Allergies   Allergen Reactions    Augmentin [Amoxicillin-Pot Clavulanate] Hives and Rash     Tolerate cephalosporins     Social History     Tobacco Use    Smoking status: Former     Packs/day: 1.00     Years: 30.00     Pack years: 30.00     Types: Cigarettes     Start date: 1974     Quit date: 2021     Years since quittin.4     Passive exposure: Never    Smokeless tobacco: Never    Tobacco comments:     caffeine - rarely   Vaping Use    Vaping Use: Never used   Substance Use Topics    Alcohol use: No    Drug use: Not Currently     Types: Other     Comment: THC gummies          Objective   Physical Exam  Vitals:    23 0956   BP: 118/60   BP Location: Right arm   Patient Position: Sitting   Cuff Size: Adult   Pulse: 54   Temp: 98.2 °F (36.8 °C)   TempSrc: Infrared   SpO2: 96%   Weight: 77.6 kg (171 lb)   Height: 172.7 cm (67.99\")     Body mass index is 26.01 kg/m².  Discussed the patient's BMI with him. The BMI is in the acceptable range.    Constitutional: NAD.  Eyes: EOMI. PERRLA. Normal conjunctiva.  Ear, nose, mouth, throat: Poor dentition.  No tonsillar exudates or erythema.   Normal nasal mucosa. Normal external ear canals and TMs bilaterally.  Cardiovascular: RRR. No murmurs. No LE edema b/l. Radial pulses 2+ bilaterally.  Pulmonary: We have to get him to cough deeply for a clear lung exam.  After this it is CTA b/l. Good effort.  Integumentary: No lacerations or wounds on face or upper " extremities.  Lymphatic: No anterior cervical lymphadenopathy.  Endocrine: No thyromegaly or palpable thyroid nodules.  Psychiatric: Normal affect. Normal thought content.  Gastrointestinal: Nondistended. No hepatosplenomegaly. No focal tenderness to palpation. Normal bowel sounds.     Assessment & Plan   Assessment and Plan  Pleasant 67-year-old male smoker with PVD status post bypass, CAD, moderate to severe COPD, hypertension, hyperlipidemia, CKD with proteinuria, anxiety, chronic lower back pain on high risk medications, history of GERD, hypothyroidism, status post partial colectomy, anemia, B12 deficiency, who presents for the following:     Advance Directive Discussion  Immunizations Discussed/Encouraged (specific immunizations; COVID19 )  Inactivity/Sedentary  Lung Cancer Risk    The above risks/problems have been discussed with the patient.  Pertinent information has been shared with the patient in the After Visit Summary.  Other plans as below:    Diagnoses and all orders for this visit:    1. Encounter for subsequent annual wellness visit (AWV) in Medicare patient (Primary): We discussed preventative care including age and patient-appropriate immunizations and cancer screening. We also discussed the importance of exercise and a healthy diet. This is their annual preventative exam.    2. Coronary artery disease involving native coronary artery of native heart without angina pectoris: Tolerating statin well with no recent chest pain.  Continue to follow with cardiology.    3. Essential hypertension: Controlled    4. Hyperlipidemia, unspecified hyperlipidemia type: Controlled    5. PVD (peripheral vascular disease): No symptoms of claudication now status post bypass graft.  Continue to follow with vascular surgery and continue clopidogrel and aspirin.    6. Stage 3a chronic kidney disease: Continue to follow with nephrology routinely the disease appears to be stable  7. Proteinuria, unspecified type    8.  Hypothyroidism, unspecified type: No recent symptoms we will recheck full blood panel in 6 months and follow-up    9. Vitamin B12 deficiency: Continue to monitor    10. Anxiety disorder, unspecified type: Able, did not tolerate previous dose reduction attempt of alprazolam    11. Simple chronic bronchitis: Stable, no recent episodes of bronchitis, has maintained smoking cessation.  We will arrange for lung cancer screening  12. History of smoking 30 or more pack years  13. Encounter for screening for malignant neoplasm of lung in former smoker who quit in past 15 years with 30 pack year history or greater  -      CT Chest Low Dose Cancer Screening WO    14. Status post above-knee amputation of left lower extremity: Phantom pain when off of gabapentin.  Prosthesis fitting well.    15. Chronic midline low back pain without sciatica: Stable on tramadol    16. H/O angiodysplasia of intestinal tract: Continue to monitor for nutritional deficiencies  17. S/P colectomy  18. Iron deficiency    19. PRISCILLA treated with BiPAP: Adherent    20. Long term prescription benzodiazepine use: Per #10      Follow Up:  No major issues, problems are stable today.  Follow-up in 6 months with full blood panel at that time.  We will continue medications as is.    An After Visit Summary and PPPS were given to the patient.      Seth Hester MD  Family Medicine  O: [phone]    Disclaimer: Parts of this note were dictated by speech recognition. Minor errors in transcription may be present. Please call if questions.

## 2023-10-02 DIAGNOSIS — I25.10 CORONARY ARTERY DISEASE INVOLVING NATIVE CORONARY ARTERY OF NATIVE HEART WITHOUT ANGINA PECTORIS: ICD-10-CM

## 2023-10-02 RX ORDER — TORSEMIDE 20 MG/1
TABLET ORAL
Qty: 90 TABLET | Refills: 0 | Status: SHIPPED | OUTPATIENT
Start: 2023-10-02

## 2023-10-18 DIAGNOSIS — E03.9 HYPOTHYROIDISM, UNSPECIFIED TYPE: ICD-10-CM

## 2023-10-18 RX ORDER — LEVOTHYROXINE SODIUM 0.12 MG/1
TABLET ORAL
Qty: 90 TABLET | Refills: 3 | OUTPATIENT
Start: 2023-10-18

## 2023-10-18 NOTE — TELEPHONE ENCOUNTER
Rx Refill Note  Requested Prescriptions     Pending Prescriptions Disp Refills    levothyroxine (SYNTHROID, LEVOTHROID) 137 MCG tablet 90 tablet 3     Sig: Take 1 tablet by mouth Daily.     Refused Prescriptions Disp Refills    levothyroxine (SYNTHROID, LEVOTHROID) 125 MCG tablet [Pharmacy Med Name: LEVOTHYROXINE 125 MCG TABLET] 90 tablet 3     Sig: TAKE 1 TABLET BY MOUTH EVERY DAY      Last office visit with prescribing clinician: 9/28/2023   Last telemedicine visit with prescribing clinician: Visit date not found   Next office visit with prescribing clinician: 3/29/2024        Thyroid Panel With TSH (03/28/2023 09:29)                  Would you like a call back once the refill request has been completed: [] Yes [] No    If the office needs to give you a call back, can they leave a voicemail: [] Yes [] No    Ester Shah LPN  10/18/23, 11:43 EDT

## 2023-10-19 RX ORDER — LEVOTHYROXINE SODIUM 137 UG/1
137 TABLET ORAL DAILY
Qty: 90 TABLET | Refills: 3 | Status: SHIPPED | OUTPATIENT
Start: 2023-10-19

## 2023-11-18 ENCOUNTER — HOSPITAL ENCOUNTER (OUTPATIENT)
Dept: CT IMAGING | Facility: HOSPITAL | Age: 68
Discharge: HOME | End: 2023-11-18
Admitting: FAMILY MEDICINE
Payer: MEDICARE

## 2023-11-18 PROCEDURE — 71271 CT THORAX LUNG CANCER SCR C-: CPT

## 2023-11-27 DIAGNOSIS — Z87.891 HISTORY OF SMOKING 30 OR MORE PACK YEARS: Primary | ICD-10-CM

## 2023-11-27 DIAGNOSIS — R91.1 LEFT UPPER LOBE PULMONARY NODULE: ICD-10-CM

## 2023-11-30 ENCOUNTER — PATIENT MESSAGE (OUTPATIENT)
Dept: INTERNAL MEDICINE | Facility: CLINIC | Age: 68
End: 2023-11-30
Payer: MEDICARE

## 2023-11-30 DIAGNOSIS — R80.9 PROTEINURIA, UNSPECIFIED TYPE: ICD-10-CM

## 2023-11-30 DIAGNOSIS — N18.31 STAGE 3A CHRONIC KIDNEY DISEASE: Primary | ICD-10-CM

## 2023-11-30 NOTE — TELEPHONE ENCOUNTER
From: Sim Agustin  To: Seth Hester  Sent: 11/30/2023 11:39 AM EST  Subject: Prior Authorization for [facility]     Dr Hester, my insurance company is asking for a prior authorization for my visit to Dr Sarabia at [facility] on September 14th or 15th, I'm guessing you would be the Dr who would be able to get a prior authorization to my insurance company, thanks again and have a Happy Holiday.

## 2023-12-06 DIAGNOSIS — I25.10 CORONARY ARTERY DISEASE INVOLVING NATIVE CORONARY ARTERY OF NATIVE HEART WITHOUT ANGINA PECTORIS: ICD-10-CM

## 2023-12-06 DIAGNOSIS — M54.50 CHRONIC MIDLINE LOW BACK PAIN WITHOUT SCIATICA: ICD-10-CM

## 2023-12-06 DIAGNOSIS — G89.29 CHRONIC MIDLINE LOW BACK PAIN WITHOUT SCIATICA: ICD-10-CM

## 2023-12-06 RX ORDER — TORSEMIDE 20 MG/1
20 TABLET ORAL DAILY
Qty: 90 TABLET | Refills: 3 | Status: SHIPPED | OUTPATIENT
Start: 2023-12-06

## 2023-12-06 RX ORDER — TRAMADOL HYDROCHLORIDE 50 MG/1
50 TABLET ORAL 2 TIMES DAILY
Qty: 180 TABLET | Refills: 0 | Status: SHIPPED | OUTPATIENT
Start: 2023-12-06

## 2023-12-06 NOTE — TELEPHONE ENCOUNTER
Rx Refill Note  Requested Prescriptions     Pending Prescriptions Disp Refills    traMADol (ULTRAM) 50 MG tablet 180 tablet 0     Sig: Take 1 tablet by mouth 2 (Two) Times a Day.    torsemide (DEMADEX) 20 MG tablet 90 tablet 0     Sig: Take 1 tablet by mouth Daily.      Last office visit with prescribing clinician: 9/28/2023   Last telemedicine visit with prescribing clinician: Visit date not found   Next office visit with prescribing clinician: 3/29/2024        Urine Drug Screen - Urine, Clean Catch (03/28/2023 09:29)                  Would you like a call back once the refill request has been completed: [] Yes [] No    If the office needs to give you a call back, can they leave a voicemail: [] Yes [] No    Ester Shah LPN  12/06/23, 15:13 EST

## 2023-12-14 ENCOUNTER — APPOINTMENT (OUTPATIENT)
Dept: CT IMAGING | Facility: HOSPITAL | Age: 68
End: 2023-12-14
Payer: MEDICARE

## 2023-12-14 ENCOUNTER — APPOINTMENT (OUTPATIENT)
Dept: GENERAL RADIOLOGY | Facility: HOSPITAL | Age: 68
End: 2023-12-14
Payer: MEDICARE

## 2023-12-14 ENCOUNTER — HOSPITAL ENCOUNTER (INPATIENT)
Facility: HOSPITAL | Age: 68
LOS: 10 days | Discharge: HOME | End: 2023-12-24
Attending: EMERGENCY MEDICINE | Admitting: INTERNAL MEDICINE
Payer: MEDICARE

## 2023-12-14 DIAGNOSIS — I25.10 CORONARY ARTERY DISEASE INVOLVING NATIVE CORONARY ARTERY OF NATIVE HEART WITHOUT ANGINA PECTORIS: ICD-10-CM

## 2023-12-14 DIAGNOSIS — E83.52 HYPERCALCEMIA: ICD-10-CM

## 2023-12-14 DIAGNOSIS — N17.9 AKI (ACUTE KIDNEY INJURY): Primary | ICD-10-CM

## 2023-12-14 DIAGNOSIS — F17.200 SMOKES TOBACCO DAILY: ICD-10-CM

## 2023-12-14 DIAGNOSIS — F41.9 ANXIETY DISORDER, UNSPECIFIED TYPE: ICD-10-CM

## 2023-12-14 LAB
ALBUMIN SERPL-MCNC: 4.1 G/DL (ref 3.5–5.2)
ALBUMIN/GLOB SERPL: 1.6 G/DL
ALP SERPL-CCNC: 66 U/L (ref 39–117)
ALT SERPL W P-5'-P-CCNC: 14 U/L (ref 1–41)
ANION GAP SERPL CALCULATED.3IONS-SCNC: 16.1 MMOL/L (ref 5–15)
AST SERPL-CCNC: 23 U/L (ref 1–40)
B PARAPERT DNA SPEC QL NAA+PROBE: NOT DETECTED
B PERT DNA SPEC QL NAA+PROBE: NOT DETECTED
BASOPHILS # BLD AUTO: 0.03 10*3/MM3 (ref 0–0.2)
BASOPHILS NFR BLD AUTO: 0.2 % (ref 0–1.5)
BILIRUB SERPL-MCNC: 1.2 MG/DL (ref 0–1.2)
BUN SERPL-MCNC: 61 MG/DL (ref 8–23)
BUN/CREAT SERPL: 12 (ref 7–25)
C PNEUM DNA NPH QL NAA+NON-PROBE: NOT DETECTED
CALCIUM SPEC-SCNC: 17 MG/DL (ref 8.6–10.5)
CHLORIDE SERPL-SCNC: 85 MMOL/L (ref 98–107)
CO2 SERPL-SCNC: 32.9 MMOL/L (ref 22–29)
CREAT SERPL-MCNC: 5.09 MG/DL (ref 0.76–1.27)
D-LACTATE SERPL-SCNC: 1.4 MMOL/L (ref 0.5–2)
D-LACTATE SERPL-SCNC: 4 MMOL/L (ref 0.5–2)
D-LACTATE SERPL-SCNC: 4.4 MMOL/L (ref 0.5–2)
DEPRECATED RDW RBC AUTO: 47.7 FL (ref 37–54)
EGFRCR SERPLBLD CKD-EPI 2021: 11.7 ML/MIN/1.73
EOSINOPHIL # BLD AUTO: 0.09 10*3/MM3 (ref 0–0.4)
EOSINOPHIL NFR BLD AUTO: 0.5 % (ref 0.3–6.2)
ERYTHROCYTE [DISTWIDTH] IN BLOOD BY AUTOMATED COUNT: 13.9 % (ref 12.3–15.4)
FLUAV SUBTYP SPEC NAA+PROBE: NOT DETECTED
FLUBV RNA ISLT QL NAA+PROBE: NOT DETECTED
GLOBULIN UR ELPH-MCNC: 2.6 GM/DL
GLUCOSE SERPL-MCNC: 165 MG/DL (ref 65–99)
HADV DNA SPEC NAA+PROBE: NOT DETECTED
HCOV 229E RNA SPEC QL NAA+PROBE: NOT DETECTED
HCOV HKU1 RNA SPEC QL NAA+PROBE: NOT DETECTED
HCOV NL63 RNA SPEC QL NAA+PROBE: NOT DETECTED
HCOV OC43 RNA SPEC QL NAA+PROBE: NOT DETECTED
HCT VFR BLD AUTO: 47.3 % (ref 37.5–51)
HGB BLD-MCNC: 15.8 G/DL (ref 13–17.7)
HMPV RNA NPH QL NAA+NON-PROBE: NOT DETECTED
HPIV1 RNA ISLT QL NAA+PROBE: NOT DETECTED
HPIV2 RNA SPEC QL NAA+PROBE: NOT DETECTED
HPIV3 RNA NPH QL NAA+PROBE: NOT DETECTED
HPIV4 P GENE NPH QL NAA+PROBE: NOT DETECTED
IMM GRANULOCYTES # BLD AUTO: 0.36 10*3/MM3 (ref 0–0.05)
IMM GRANULOCYTES NFR BLD AUTO: 2.2 % (ref 0–0.5)
LYMPHOCYTES # BLD AUTO: 1.22 10*3/MM3 (ref 0.7–3.1)
LYMPHOCYTES NFR BLD AUTO: 7.4 % (ref 19.6–45.3)
M PNEUMO IGG SER IA-ACNC: NOT DETECTED
MCH RBC QN AUTO: 31 PG (ref 26.6–33)
MCHC RBC AUTO-ENTMCNC: 33.4 G/DL (ref 31.5–35.7)
MCV RBC AUTO: 92.7 FL (ref 79–97)
MONOCYTES # BLD AUTO: 0.69 10*3/MM3 (ref 0.1–0.9)
MONOCYTES NFR BLD AUTO: 4.2 % (ref 5–12)
NEUTROPHILS NFR BLD AUTO: 14.06 10*3/MM3 (ref 1.7–7)
NEUTROPHILS NFR BLD AUTO: 85.5 % (ref 42.7–76)
NRBC BLD AUTO-RTO: 0.1 /100 WBC (ref 0–0.2)
PLATELET # BLD AUTO: 214 10*3/MM3 (ref 140–450)
PMV BLD AUTO: 10.8 FL (ref 6–12)
POTASSIUM SERPL-SCNC: 3.3 MMOL/L (ref 3.5–5.2)
PROCALCITONIN SERPL-MCNC: 0.26 NG/ML (ref 0–0.25)
PROT SERPL-MCNC: 6.7 G/DL (ref 6–8.5)
PTH-INTACT SERPL-MCNC: 29.5 PG/ML (ref 15–65)
RBC # BLD AUTO: 5.1 10*6/MM3 (ref 4.14–5.8)
RHINOVIRUS RNA SPEC NAA+PROBE: NOT DETECTED
RSV RNA NPH QL NAA+NON-PROBE: NOT DETECTED
SARS-COV-2 RNA NPH QL NAA+NON-PROBE: NOT DETECTED
SODIUM SERPL-SCNC: 134 MMOL/L (ref 136–145)
WBC NRBC COR # BLD AUTO: 16.45 10*3/MM3 (ref 3.4–10.8)

## 2023-12-14 PROCEDURE — 93010 ELECTROCARDIOGRAM REPORT: CPT | Performed by: INTERNAL MEDICINE

## 2023-12-14 PROCEDURE — 87040 BLOOD CULTURE FOR BACTERIA: CPT | Performed by: EMERGENCY MEDICINE

## 2023-12-14 PROCEDURE — 74176 CT ABD & PELVIS W/O CONTRAST: CPT

## 2023-12-14 PROCEDURE — 80053 COMPREHEN METABOLIC PANEL: CPT | Performed by: EMERGENCY MEDICINE

## 2023-12-14 PROCEDURE — 83605 ASSAY OF LACTIC ACID: CPT | Performed by: EMERGENCY MEDICINE

## 2023-12-14 PROCEDURE — 0202U NFCT DS 22 TRGT SARS-COV-2: CPT | Performed by: EMERGENCY MEDICINE

## 2023-12-14 PROCEDURE — 93005 ELECTROCARDIOGRAM TRACING: CPT | Performed by: EMERGENCY MEDICINE

## 2023-12-14 PROCEDURE — 71045 X-RAY EXAM CHEST 1 VIEW: CPT

## 2023-12-14 PROCEDURE — 85025 COMPLETE CBC W/AUTO DIFF WBC: CPT | Performed by: EMERGENCY MEDICINE

## 2023-12-14 PROCEDURE — 84145 PROCALCITONIN (PCT): CPT | Performed by: EMERGENCY MEDICINE

## 2023-12-14 PROCEDURE — 25810000003 SODIUM CHLORIDE 0.9 % SOLUTION: Performed by: INTERNAL MEDICINE

## 2023-12-14 PROCEDURE — 83970 ASSAY OF PARATHORMONE: CPT | Performed by: EMERGENCY MEDICINE

## 2023-12-14 PROCEDURE — 25810000003 SODIUM CHLORIDE 0.9 % SOLUTION: Performed by: EMERGENCY MEDICINE

## 2023-12-14 PROCEDURE — 36415 COLL VENOUS BLD VENIPUNCTURE: CPT | Performed by: EMERGENCY MEDICINE

## 2023-12-14 PROCEDURE — 99285 EMERGENCY DEPT VISIT HI MDM: CPT

## 2023-12-14 RX ORDER — POLYETHYLENE GLYCOL 3350 17 G/17G
17 POWDER, FOR SOLUTION ORAL DAILY PRN
Status: DISCONTINUED | OUTPATIENT
Start: 2023-12-14 | End: 2023-12-19

## 2023-12-14 RX ORDER — SODIUM CHLORIDE 9 MG/ML
125 INJECTION, SOLUTION INTRAVENOUS CONTINUOUS
Status: DISCONTINUED | OUTPATIENT
Start: 2023-12-14 | End: 2023-12-18

## 2023-12-14 RX ORDER — BISACODYL 5 MG/1
5 TABLET, DELAYED RELEASE ORAL DAILY PRN
Status: DISCONTINUED | OUTPATIENT
Start: 2023-12-14 | End: 2023-12-19

## 2023-12-14 RX ORDER — ONDANSETRON 2 MG/ML
4 INJECTION INTRAMUSCULAR; INTRAVENOUS EVERY 6 HOURS PRN
Status: DISCONTINUED | OUTPATIENT
Start: 2023-12-14 | End: 2023-12-24 | Stop reason: HOSPADM

## 2023-12-14 RX ORDER — BISACODYL 10 MG
10 SUPPOSITORY, RECTAL RECTAL DAILY PRN
Status: DISCONTINUED | OUTPATIENT
Start: 2023-12-14 | End: 2023-12-19

## 2023-12-14 RX ORDER — ACETAMINOPHEN 325 MG/1
650 TABLET ORAL EVERY 4 HOURS PRN
Status: DISCONTINUED | OUTPATIENT
Start: 2023-12-14 | End: 2023-12-24 | Stop reason: HOSPADM

## 2023-12-14 RX ORDER — CHLORAL HYDRATE 500 MG
1000 CAPSULE ORAL DAILY
COMMUNITY

## 2023-12-14 RX ORDER — ONDANSETRON 4 MG/1
4 TABLET, FILM COATED ORAL EVERY 6 HOURS PRN
Status: DISCONTINUED | OUTPATIENT
Start: 2023-12-14 | End: 2023-12-24 | Stop reason: HOSPADM

## 2023-12-14 RX ORDER — UREA 10 %
3 LOTION (ML) TOPICAL NIGHTLY PRN
Status: DISCONTINUED | OUTPATIENT
Start: 2023-12-14 | End: 2023-12-24 | Stop reason: HOSPADM

## 2023-12-14 RX ORDER — AMOXICILLIN 250 MG
2 CAPSULE ORAL 2 TIMES DAILY
Status: DISCONTINUED | OUTPATIENT
Start: 2023-12-14 | End: 2023-12-19

## 2023-12-14 RX ADMIN — SODIUM CHLORIDE 1000 ML: 9 INJECTION, SOLUTION INTRAVENOUS at 18:12

## 2023-12-14 RX ADMIN — SODIUM CHLORIDE 75 ML/HR: 9 INJECTION, SOLUTION INTRAVENOUS at 21:33

## 2023-12-14 RX ADMIN — SENNOSIDES AND DOCUSATE SODIUM 2 TABLET: 50; 8.6 TABLET ORAL at 21:33

## 2023-12-14 NOTE — Clinical Note
Level of Care: Telemetry [5]   Diagnosis: Acute kidney injury [505329]   Admitting Physician: TIFFANIE MARISCAL [[id number]]   Attending Physician: TIFFANIE MARISCAL [[id number]]   Certification: I certify that inpatient services are medically necessary for this patient for a duration of greater than two midnights. See H&P and MD Progress Notes for additional information about the patient's course of treatment.

## 2023-12-14 NOTE — ED PROVIDER NOTES
EMERGENCY DEPARTMENT ENCOUNTER    Room Number:  LOLA/LOLA  PCP: Seth Hester MD  Historian: Patient      HPI:  Chief Complaint: Fall hypotension  A complete HPI/ROS/PMH/PSH/SH/FH are unobtainable due to: None  Context: Sim Agustin is a 67 y.o. male who presents to the ED c/o fall hypotension.  Patient presents for evaluation of fall.  Patient states he is felt weak since Monday.  Gradually getting worse.  Has had a cough.  Patient states today he tried to get up and fell to the ground.  EMS found patient on the ground.  Had been down there for 2 hours.  EMS states blood pressure was in the 70s.  Patient was given IV fluids prior to arrival.  Had no chest pain pressure tightness.  Patient has had no fevers or chills.  Patient has had no vomiting or diarrhea.  No black stools or dark tarry stools.            PAST MEDICAL HISTORY  Active Ambulatory Problems     Diagnosis Date Noted    H/O angiodysplasia of intestinal tract 03/04/2016    COPD (chronic obstructive pulmonary disease) 03/04/2016    S/P colectomy 03/04/2016    Status post above-knee amputation of left lower extremity 03/28/2016    CAD (coronary artery disease) 04/04/2016    PVD (peripheral vascular disease) 04/06/2016    Essential hypertension 01/31/2014    Cardiomyopathy, unspecified 03/28/2019    PRISCILLA treated with BiPAP 03/03/2020    Anxiety disorder 03/03/2020    Chronic midline low back pain without sciatica 03/03/2020    Long term prescription benzodiazepine use 03/03/2020    History of bradycardia 03/03/2020    Hypothyroidism 03/03/2020    Vitamin B12 deficiency 03/03/2020    Iron deficiency 03/03/2020    History of smoking 30 or more pack years 03/03/2020    High risk medication use 03/03/2020    DNR (do not resuscitate) 09/01/2020    Stage 3a chronic kidney disease 01/06/2021    Hyperlipidemia 09/09/2021    Proteinuria 09/12/2021    Junctional cardiac arrhythmia 05/22/2022     Resolved Ambulatory Problems     Diagnosis Date Noted    Acute upper GI  bleed 03/04/2016    Hypotension 03/04/2016    ARF (acute renal failure) 03/04/2016    Atelectasis, left 03/04/2016    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    Compartment syndrome of left lower extremity 03/05/2016    Peripheral arteriosclerosis 03/17/2016    Sepsis, unspecified organism 04/05/2016    Nosocomial pneumonia 04/06/2016    ESRD (end stage renal disease) on dialysis 04/06/2016    Anemia in chronic kidney disease 04/06/2016    Hyperkalemia 04/11/2016    Pleural effusion on left 04/11/2016    S/P thoracentesis 04/11/2016    Ischemic colitis 05/24/2016    Colostomy prolapse 05/24/2016    Acute upper respiratory infection 02/06/2013    Chronic obstructive pulmonary disease with acute exacerbation 02/06/2013    Fracture of patella 03/03/2015    Ischemia of large intestine 10/13/2016    Incisional hernia, without obstruction or gangrene 07/13/2017    Acute respiratory failure with hypoxia and hypercarbia 03/11/2019    Bradycardia 05/23/2019    COPD with hypoxia 05/23/2019    Vitamin D deficiency 03/03/2020    Elevated hematocrit 01/06/2021    Acute exacerbation of chronic obstructive pulmonary disease (COPD) 04/04/2022    Influenza A 04/05/2022    Acute respiratory failure with hypoxia 04/05/2022    Hyperkalemia 04/05/2022    Left lower lobe pneumonia 05/18/2022    Demand myocardial infarction 05/19/2022    Acute UTI 02/28/2023    ARLYN (acute kidney injury) 03/01/2023    Stage 3b chronic kidney disease (CKD) 03/01/2023    Klebsiella infection 03/02/2023     Past Medical History:   Diagnosis Date    Anemia     Asthma 02/02/2018    Clotting disorder 05/01/2003    Colon polyps     Compartment syndrome     Cough     Disease of thyroid gland     Diverticulosis     Employs prosthetic leg     History of acute renal failure     History of CHF (congestive heart failure)     History of GI bleed 02/2016    History of sepsis 02/2016    History of transfusion     Hypertension      Nonischemic cardiomyopathy     Phantom limb pain     Pneumonia     Pulmonary embolism     Renal insufficiency          PAST SURGICAL HISTORY  Past Surgical History:   Procedure Laterality Date    ABOVE KNEE AMPUTATION Left 03/16/2016    Procedure: LT AMPUTATION ABOVE KNEE;  Surgeon: Jose Swann MD;  Location: [facility] MAIN OR;  Service:     ARTERIAL THROMBECTOMY  2001    throbectomy of arterial graft    AXILLARY FEMORAL BYPASS Right 02/15/2016    Exploratory lapartomy, repair aortoduodenal fistula, resection infected aortobifemoral bypass graft, axillobi-superficial femoral artery Perry-Aneudy bypass graft from right axillary artery, Repair of duodenotomy 4th portion duodenum-Dr. Jose Swann, Dr. Genaro Perrin    BRONCHOSCOPY Left 03/04/2016    Dr. NATALIIA Tate    BRONCHOSCOPY Left 05/20/2022    Procedure: BRONCHOSCOPY WITH BIOPSIES, BRUSHINGS, AND BAL UNDER FLUORO;  Surgeon: Ishmael Tate Jr., MD;  Location: [facility] ENDOSCOPY;  Service: Pulmonary;  Laterality: Left;  PRE OP - LLL CONSOLIDATION  POST OP - SAME    BRONCHOSCOPY RIGID / FLEXIBLE N/A 03/03/2016    Dr. NATALIIA Tate    BRONCHOSCOPY RIGID / FLEXIBLE N/A 02/26/2016    Dr. NATALIIA Tate    CARDIAC CATHETERIZATION N/A 03/18/2019    Procedure: Right and Left Heart Cath;  Surgeon: Nina Storey MD;  Location: [facility] CATH INVASIVE LOCATION;  Service: Cardiovascular    CATARACT EXTRACTION WITH INTRAOCULAR LENS IMPLANT Bilateral     COLON RESECTION WITH COLOSTOMY Left 02/28/2016    Exploratory laparotomy with open left hemicolectomy, end-colostomy, G-tube and J-tube-Dr. Endy Chaney    COLON SURGERY  3/25/2015    COLOSTOMY    COLONOSCOPY N/A 2015    Dr. Laughlin    COLONOSCOPY N/A 10/12/2016    Procedure: COLONOSCOPY TO 20 CM AND PER STOMA TO 30CM;  Surgeon: Genaro Perrin MD;  Location: [facility] ENDOSCOPY;  Service:     COLONOSCOPY N/A 10/21/2016    Procedure: COLONOSCOPY DONE AT BEDSIDE ONTO CECEM;  Surgeon: Genaro Perrin MD;   Location: [facility] ENDOSCOPY;  Service:     COLONOSCOPY N/A 02/10/2016    Diverticulosis in the entire examined colon, non-bleeding internal hemorrhoids-Dr. Taylor Pina    COLONOSCOPY N/A 02/11/2016    Poor prep, blood in the entire examined colon, normal ileum-Dr. Taylor Pina    COLONOSCOPY W/ POLYPECTOMY N/A 07/27/2015    Normal ileum, diverticulosis in the sigmoid colon: resected and retrieved, one 6 mm polyp in the descending colon: resected and retrieved, the distal rectum and anal verge are normal on retroflexion view-Dr. Miguel Ángel Laughlin    COLOSTOMY CLOSURE N/A 10/13/2016    Procedure: COLOSTOMY TAKEDOWN OR CLOSURE, APPENDECTOMY;  Surgeon: Genaro Perrin MD;  Location: [facility] MAIN OR;  Service:     DEBRIDEMENT LEG Left 03/01/2016    Excisional debridement of the skin, subcutantous tissue, tendon and muscle left calf-Dr. Jose Swann    DEBRIDEMENT LEG Left 02/25/2016    Excisional debridement full-thcikness skin and subcutaneous tissue and tendon and muscle, left medial and lateral calf muscles-Dr. Jose Swann    ENDOSCOPY N/A 10/21/2016    Procedure: ESOPHAGOGASTRODUODENOSCOPY DONE AT BEDSIDE;  Surgeon: Genaro Perrin MD;  Location: [facility] ENDOSCOPY;  Service:     ENDOSCOPY AND COLONOSCOPY N/A 02/28/2016    EGD and Colonoscopy with Candy ink injection-Dr. Endy Chaney    ENTEROSCOPY SMALL BOWEL N/A 02/11/2016    Normal esophagus, normal stomach, normal duodenum, portion of the jejunum normal-Dr. Taylor Pina    ILIAC ARTERY - FEMORAL ARTERY BYPASS GRAFT Bilateral 2002    ILIAC ARTERY STENT Bilateral 2001    INSERTION AND REMOVAL PERITONEAL DIALYSIS CATHETER Right 02/29/2016    Exchange right jugular 16 cm Mahurkar dialysis catheter-Dr. Jose Swann    INSERTION PERITONEAL DIALYSIS CATHETER Left 03/01/2016    Ultrasound-guided access of the left jugular vein, 23 cm left jugular palindrome dialysis catheter placement-Dr. Jose Swann    INSERTION PERITONEAL DIALYSIS CATHETER  Right 2016    Right jugular Mahrkar catherer placement-Dr. Jose Swann    JOINT REPLACEMENT Left     THORACOSCOPY Left 2016    Procedure: LT THORACOSCOPY VIDEO ASSISTED WITH DECORDICATION;  Surgeon: Genaro Davila III, MD;  Location: [facility];  Service:     THROMBECTOMY Left 2016    Thombectomy of left femoral graft, left leg arteriogram, left calf forward compartment fasciotomy-Dr. Jose Swann    TOTAL HIP ARTHROPLASTY Left     UPPER GASTROINTESTINAL ENDOSCOPY N/A 2016    Normal UGI-Dr. Ajay Parkinson    UPPER GASTROINTESTINAL ENDOSCOPY N/A 2015    Small hiatal hernia, chronic gastritis: biopsied, non-bleeding angioectasias in the stomach: treated with argon plasma coagulation, multiple bleeding angioectasias in the dudenum: treated with argon plasma coagulation-Dr. Mykel Jaramillo    VASCULAR SURGERY      MULTIPLE    VENTRAL/INCISIONAL HERNIA REPAIR N/A 10/19/2017    Procedure: VENTRAL HERNIA REPAIR WITH MESH AND BILATERAL COMPONENT SEPARATION;  Surgeon: Genaro Perrin MD;  Location: [facility];  Service:     WISDOM TOOTH EXTRACTION           FAMILY HISTORY  Family History   Problem Relation Age of Onset    Lung cancer Mother     Cancer Mother     Heart disease Father     Diabetes Father     Hypertension Father     Malig Hyperthermia Neg Hx          SOCIAL HISTORY  Social History     Socioeconomic History    Marital status: Single   Tobacco Use    Smoking status: Former     Packs/day: 1.00     Years: 30.00     Additional pack years: 0.00     Total pack years: 30.00     Types: Cigarettes     Start date: 1974     Quit date: 2021     Years since quittin.6     Passive exposure: Never    Smokeless tobacco: Never    Tobacco comments:     caffeine - rarely   Vaping Use    Vaping Use: Never used   Substance and Sexual Activity    Alcohol use: No    Drug use: Not Currently     Types: Other     Comment: THC gummies    Sexual activity: Not Currently      Partners: Female     Birth control/protection: Condom, Post-menopausal         ALLERGIES  Augmentin [amoxicillin-pot clavulanate]        REVIEW OF SYSTEMS  Review of Systems   Generalized weakness      PHYSICAL EXAM  ED Triage Vitals [12/14/23 1632]   Temp Heart Rate Resp BP SpO2   95.1 °F (35.1 °C) 60 16 96/60 94 %      Temp src Heart Rate Source Patient Position BP Location FiO2 (%)   Tympanic -- -- -- --       Physical Exam      GENERAL: no acute distress.  Bruising left periorbital area  HENT: nares patent  EYES: no scleral icterus  CV: regular rhythm, normal rate  RESPIRATORY: normal effort  ABDOMEN: soft.  Bruising left abdomen  MUSCULOSKELETAL: no deformity. Left leg amputation  NEURO: alert, moves all extremities, follows commands  PSYCH:  calm, cooperative  SKIN: warm, dry    Vital signs and nursing notes reviewed.          LAB RESULTS  Recent Results (from the past 24 hour(s))   ECG 12 Lead Syncope    Collection Time: 12/14/23  4:52 PM   Result Value Ref Range    QT Interval 580 ms    QTC Interval 540 ms   Comprehensive Metabolic Panel    Collection Time: 12/14/23  5:15 PM    Specimen: Cannula; Blood   Result Value Ref Range    Glucose 165 (H) 65 - 99 mg/dL    BUN 61 (H) 8 - 23 mg/dL    Creatinine 5.09 (H) 0.76 - 1.27 mg/dL    Sodium 134 (L) 136 - 145 mmol/L    Potassium 3.3 (L) 3.5 - 5.2 mmol/L    Chloride 85 (L) 98 - 107 mmol/L    CO2 32.9 (H) 22.0 - 29.0 mmol/L    Calcium 17.0 (C) 8.6 - 10.5 mg/dL    Total Protein 6.7 6.0 - 8.5 g/dL    Albumin 4.1 3.5 - 5.2 g/dL    ALT (SGPT) 14 1 - 41 U/L    AST (SGOT) 23 1 - 40 U/L    Alkaline Phosphatase 66 39 - 117 U/L    Total Bilirubin 1.2 0.0 - 1.2 mg/dL    Globulin 2.6 gm/dL    A/G Ratio 1.6 g/dL    BUN/Creatinine Ratio 12.0 7.0 - 25.0    Anion Gap 16.1 (H) 5.0 - 15.0 mmol/L    eGFR 11.7 (L) >60.0 mL/min/1.73   Lactic Acid, Plasma    Collection Time: 12/14/23  5:15 PM    Specimen: Cannula; Blood   Result Value Ref Range    Lactate 4.0 (C) 0.5 - 2.0 mmol/L    Procalcitonin    Collection Time: 12/14/23  5:15 PM    Specimen: Cannula; Blood   Result Value Ref Range    Procalcitonin 0.26 (H) 0.00 - 0.25 ng/mL   CBC Auto Differential    Collection Time: 12/14/23  5:15 PM    Specimen: Cannula; Blood   Result Value Ref Range    WBC 16.45 (H) 3.40 - 10.80 10*3/mm3    RBC 5.10 4.14 - 5.80 10*6/mm3    Hemoglobin 15.8 13.0 - 17.7 g/dL    Hematocrit 47.3 37.5 - 51.0 %    MCV 92.7 79.0 - 97.0 fL    MCH 31.0 26.6 - 33.0 pg    MCHC 33.4 31.5 - 35.7 g/dL    RDW 13.9 12.3 - 15.4 %    RDW-SD 47.7 37.0 - 54.0 fl    MPV 10.8 6.0 - 12.0 fL    Platelets 214 140 - 450 10*3/mm3    Neutrophil % 85.5 (H) 42.7 - 76.0 %    Lymphocyte % 7.4 (L) 19.6 - 45.3 %    Monocyte % 4.2 (L) 5.0 - 12.0 %    Eosinophil % 0.5 0.3 - 6.2 %    Basophil % 0.2 0.0 - 1.5 %    Immature Grans % 2.2 (H) 0.0 - 0.5 %    Neutrophils, Absolute 14.06 (H) 1.70 - 7.00 10*3/mm3    Lymphocytes, Absolute 1.22 0.70 - 3.10 10*3/mm3    Monocytes, Absolute 0.69 0.10 - 0.90 10*3/mm3    Eosinophils, Absolute 0.09 0.00 - 0.40 10*3/mm3    Basophils, Absolute 0.03 0.00 - 0.20 10*3/mm3    Immature Grans, Absolute 0.36 (H) 0.00 - 0.05 10*3/mm3    nRBC 0.1 0.0 - 0.2 /100 WBC   Respiratory Panel PCR w/COVID-19(SARS-CoV-2) PORFIRIO/MARKUS/MARISOL/PAD/COR/MADDY In-House, NP Swab in UTM/VTM, 2 HR TAT - Swab, Nasopharynx    Collection Time: 12/14/23  5:16 PM    Specimen: Nasopharynx; Swab   Result Value Ref Range    ADENOVIRUS, PCR Not Detected Not Detected    Coronavirus 229E Not Detected Not Detected    Coronavirus HKU1 Not Detected Not Detected    Coronavirus NL63 Not Detected Not Detected    Coronavirus OC43 Not Detected Not Detected    COVID19 Not Detected Not Detected - Ref. Range    Human Metapneumovirus Not Detected Not Detected    Human Rhinovirus/Enterovirus Not Detected Not Detected    Influenza A PCR Not Detected Not Detected    Influenza B PCR Not Detected Not Detected    Parainfluenza Virus 1 Not Detected Not Detected    Parainfluenza Virus 2 Not  Detected Not Detected    Parainfluenza Virus 3 Not Detected Not Detected    Parainfluenza Virus 4 Not Detected Not Detected    RSV, PCR Not Detected Not Detected    Bordetella pertussis pcr Not Detected Not Detected    Bordetella parapertussis PCR Not Detected Not Detected    Chlamydophila pneumoniae PCR Not Detected Not Detected    Mycoplasma pneumo by PCR Not Detected Not Detected       Ordered the above labs and reviewed the results.        RADIOLOGY  XR Chest 1 View    Result Date: 12/14/2023  XR CHEST 1 VW-12/14/2023  HISTORY: Cough.  Heart size is within normal limits. Patient is rotated slightly to the left. Lungs appear clear. There is some aortic calcification.      1. No acute process.   This report was finalized on 12/14/2023 5:09 PM by Dr. Yovanny Ruiz M.D on Workstation: [employer]       Ordered the above noted radiological studies.  Chest x-ray independently interpreted by me and shows no evidence of pneumonia          PROCEDURES  Procedures    EKG          EKG time: 1652  Rhythm/Rate: Sinus bradycardia 52  P waves and TN: Normal P waves  QRS, axis: Normal QRS  ST and T waves: Prolonged QT    Interpreted Contemporaneously by me, independently viewed  Unchanged compared to prior 2/28/2023          MEDICATIONS GIVEN IN ER  Medications   acetaminophen (TYLENOL) tablet 650 mg (has no administration in time range)   sennosides-docusate (PERICOLACE) 8.6-50 MG per tablet 2 tablet (has no administration in time range)     And   polyethylene glycol (MIRALAX) packet 17 g (has no administration in time range)     And   bisacodyl (DULCOLAX) EC tablet 5 mg (has no administration in time range)     And   bisacodyl (DULCOLAX) suppository 10 mg (has no administration in time range)   ondansetron (ZOFRAN) tablet 4 mg (has no administration in time range)     Or   ondansetron (ZOFRAN) injection 4 mg (has no administration in time range)   melatonin tablet 3 mg (has no administration in time range)   sodium chloride  0.9 % infusion (has no administration in time range)   sodium chloride 0.9 % bolus 1,000 mL (1,000 mL Intravenous New Bag 12/14/23 1812)                   MEDICAL DECISION MAKING, PROGRESS, and CONSULTS     Discussion below represents my analysis of pertinent findings related to patient's condition, differential diagnosis, treatment plan and final disposition.      Additional sources:  - Discussed/ obtained information from independent historians: None    - External (non-ED) record review: Epic reviewed patient last seen by primary provider 9/28/2023 for hypertension    - Chronic or social conditions impacting care: None    - Shared decision making: None      Orders placed during this visit:  Orders Placed This Encounter   Procedures    Blood Culture - Blood,    Blood Culture - Blood,    Respiratory Panel PCR w/COVID-19(SARS-CoV-2) PORFIRIO/MARKUS/MARISOL/PAD/COR/MADDY In-House, NP Swab in UTM/VTM, 2 HR TAT - Swab, Nasopharynx    XR Chest 1 View    CT Abdomen Pelvis Without Contrast    Comprehensive Metabolic Panel    Lactic Acid, Plasma    Procalcitonin    CBC Auto Differential    STAT Lactic Acid, Reflex    PTH, Intact    Basic Metabolic Panel    CBC (No Diff)    Diet: Cardiac Diets; Healthy Heart (2-3 Na+); Texture: Regular Texture (IDDSI 7); Fluid Consistency: Thin (IDDSI 0)    Bladder scan    Vital Signs    Up with assistance    Daily Weights    Strict Intake & Output    Oral Care    Place Sequential Compression Device    Maintain Sequential Compression Device    Code Status and Medical Interventions:    DALJIT (on-call MD unless specified) Details    ECG 12 Lead Syncope    Inpatient Admission    Inpatient Admission    CBC & Differential         Additional orders considered but not ordered:  None        Differential diagnosis includes but is not limited to:    Hypercalcemia acute kidney injury sepsis      Independent interpretation of labs, radiology studies, and discussions with consultants:  ED Course as of 12/14/23 1940    Thu Dec 14, 2023   1925 19:25 EST  Patient presents for hypotension and appears to be in acute renal failure.  Patient's creatinine 5.  Patient also hypercalcemic 17.  Patient does have elevated white blood cell count but has no focus of infection.  No fever.  CT scan shows no evidence of urinary retention.  Patient has been discussed with Dr. Rosas and will be admitted.  Discussed antibiotics with admitting and they would like to hold off for now. [SL]      ED Course User Index  [SL] Wood Rich MD                 DIAGNOSIS  Final diagnoses:   ARLYN (acute kidney injury)   Hypercalcemia         DISPOSITION  admit            Latest Documented Vital Signs:  As of 19:40 EST  BP- 121/66 HR- 57 Temp- 95.1 °F (35.1 °C) (Tympanic) O2 sat- 99%              --    Please note that portions of this were completed with a voice recognition program.       Note Disclaimer: At [facility], we believe that sharing information builds trust and better relationships. You are receiving this note because you are receiving care at [facility] or recently visited. It is possible you will see health information before a provider has talked with you about it. This kind of information can be easy to misunderstand. To help you fully understand what it means for your health, we urge you to discuss this note with your provider.            Wood Rich MD  12/14/23 1941

## 2023-12-15 PROBLEM — I95.9 HYPOTENSION: Status: ACTIVE | Noted: 2023-12-15

## 2023-12-15 PROBLEM — D64.9 ANEMIA: Status: ACTIVE | Noted: 2023-12-15

## 2023-12-15 PROBLEM — E87.1 HYPONATREMIA: Status: ACTIVE | Noted: 2023-12-15

## 2023-12-15 PROBLEM — N18.32 STAGE 3B CHRONIC KIDNEY DISEASE (CKD): Status: ACTIVE | Noted: 2023-12-15

## 2023-12-15 PROBLEM — E87.20 LACTIC ACIDOSIS: Status: ACTIVE | Noted: 2023-12-15

## 2023-12-15 PROBLEM — D72.829 LEUKOCYTOSIS: Status: ACTIVE | Noted: 2023-12-15

## 2023-12-15 PROBLEM — E87.6 HYPOKALEMIA: Status: ACTIVE | Noted: 2023-12-15

## 2023-12-15 LAB
25(OH)D3 SERPL-MCNC: 68.1 NG/ML (ref 30–100)
ALBUMIN SERPL-MCNC: 3.4 G/DL (ref 3.5–5.2)
ANION GAP SERPL CALCULATED.3IONS-SCNC: 10 MMOL/L (ref 5–15)
ANION GAP SERPL CALCULATED.3IONS-SCNC: 12.3 MMOL/L (ref 5–15)
BACTERIA UR QL AUTO: ABNORMAL /HPF
BILIRUB UR QL STRIP: NEGATIVE
BUN SERPL-MCNC: 66 MG/DL (ref 8–23)
BUN SERPL-MCNC: 70 MG/DL (ref 8–23)
BUN/CREAT SERPL: 13.1 (ref 7–25)
BUN/CREAT SERPL: 15.2 (ref 7–25)
CALCIUM SPEC-SCNC: 13.6 MG/DL (ref 8.6–10.5)
CALCIUM SPEC-SCNC: 14.7 MG/DL (ref 8.6–10.5)
CHLORIDE SERPL-SCNC: 90 MMOL/L (ref 98–107)
CHLORIDE SERPL-SCNC: 90 MMOL/L (ref 98–107)
CLARITY UR: CLEAR
CO2 SERPL-SCNC: 29.7 MMOL/L (ref 22–29)
CO2 SERPL-SCNC: 31 MMOL/L (ref 22–29)
COD CRY URNS QL: ABNORMAL /HPF
COLOR UR: YELLOW
CREAT SERPL-MCNC: 4.61 MG/DL (ref 0.76–1.27)
CREAT SERPL-MCNC: 5.02 MG/DL (ref 0.76–1.27)
CREAT UR-MCNC: 110.2 MG/DL
DEPRECATED RDW RBC AUTO: 46 FL (ref 37–54)
EGFRCR SERPLBLD CKD-EPI 2021: 11.9 ML/MIN/1.73
EGFRCR SERPLBLD CKD-EPI 2021: 13.2 ML/MIN/1.73
ERYTHROCYTE [DISTWIDTH] IN BLOOD BY AUTOMATED COUNT: 13.9 % (ref 12.3–15.4)
GLUCOSE SERPL-MCNC: 135 MG/DL (ref 65–99)
GLUCOSE SERPL-MCNC: 88 MG/DL (ref 65–99)
GLUCOSE UR STRIP-MCNC: NEGATIVE MG/DL
HCT VFR BLD AUTO: 37.3 % (ref 37.5–51)
HGB BLD-MCNC: 12.9 G/DL (ref 13–17.7)
HGB UR QL STRIP.AUTO: NEGATIVE
HYALINE CASTS UR QL AUTO: ABNORMAL /LPF
KETONES UR QL STRIP: NEGATIVE
LEUKOCYTE ESTERASE UR QL STRIP.AUTO: ABNORMAL
MAGNESIUM SERPL-MCNC: 2.8 MG/DL (ref 1.6–2.4)
MCH RBC QN AUTO: 31.2 PG (ref 26.6–33)
MCHC RBC AUTO-ENTMCNC: 34.6 G/DL (ref 31.5–35.7)
MCV RBC AUTO: 90.3 FL (ref 79–97)
NITRITE UR QL STRIP: NEGATIVE
PH UR STRIP.AUTO: 5.5 [PH] (ref 5–8)
PHOSPHATE SERPL-MCNC: 3.9 MG/DL (ref 2.5–4.5)
PLATELET # BLD AUTO: 173 10*3/MM3 (ref 140–450)
PMV BLD AUTO: 10.3 FL (ref 6–12)
POTASSIUM SERPL-SCNC: 3.1 MMOL/L (ref 3.5–5.2)
POTASSIUM SERPL-SCNC: 3.4 MMOL/L (ref 3.5–5.2)
PROT UR QL STRIP: NEGATIVE
QT INTERVAL: 580 MS
QTC INTERVAL: 540 MS
RBC # BLD AUTO: 4.13 10*6/MM3 (ref 4.14–5.8)
RBC # UR STRIP: ABNORMAL /HPF
REF LAB TEST METHOD: ABNORMAL
SODIUM SERPL-SCNC: 131 MMOL/L (ref 136–145)
SODIUM SERPL-SCNC: 132 MMOL/L (ref 136–145)
SODIUM UR-SCNC: <20 MMOL/L
SP GR UR STRIP: 1.01 (ref 1–1.03)
SQUAMOUS #/AREA URNS HPF: ABNORMAL /HPF
UROBILINOGEN UR QL STRIP: ABNORMAL
WBC # UR STRIP: ABNORMAL /HPF
WBC NRBC COR # BLD AUTO: 13.67 10*3/MM3 (ref 3.4–10.8)

## 2023-12-15 PROCEDURE — 94640 AIRWAY INHALATION TREATMENT: CPT

## 2023-12-15 PROCEDURE — 97530 THERAPEUTIC ACTIVITIES: CPT

## 2023-12-15 PROCEDURE — 94799 UNLISTED PULMONARY SVC/PX: CPT

## 2023-12-15 PROCEDURE — 82397 CHEMILUMINESCENT ASSAY: CPT | Performed by: INTERNAL MEDICINE

## 2023-12-15 PROCEDURE — 84300 ASSAY OF URINE SODIUM: CPT | Performed by: INTERNAL MEDICINE

## 2023-12-15 PROCEDURE — 80069 RENAL FUNCTION PANEL: CPT | Performed by: INTERNAL MEDICINE

## 2023-12-15 PROCEDURE — 81001 URINALYSIS AUTO W/SCOPE: CPT | Performed by: INTERNAL MEDICINE

## 2023-12-15 PROCEDURE — 82306 VITAMIN D 25 HYDROXY: CPT | Performed by: INTERNAL MEDICINE

## 2023-12-15 PROCEDURE — 25810000003 SODIUM CHLORIDE 0.9 % SOLUTION: Performed by: INTERNAL MEDICINE

## 2023-12-15 PROCEDURE — 82570 ASSAY OF URINE CREATININE: CPT | Performed by: INTERNAL MEDICINE

## 2023-12-15 PROCEDURE — 94761 N-INVAS EAR/PLS OXIMETRY MLT: CPT

## 2023-12-15 PROCEDURE — 80048 BASIC METABOLIC PNL TOTAL CA: CPT | Performed by: INTERNAL MEDICINE

## 2023-12-15 PROCEDURE — 94760 N-INVAS EAR/PLS OXIMETRY 1: CPT

## 2023-12-15 PROCEDURE — 83735 ASSAY OF MAGNESIUM: CPT | Performed by: INTERNAL MEDICINE

## 2023-12-15 PROCEDURE — 97162 PT EVAL MOD COMPLEX 30 MIN: CPT

## 2023-12-15 PROCEDURE — 85027 COMPLETE CBC AUTOMATED: CPT | Performed by: INTERNAL MEDICINE

## 2023-12-15 PROCEDURE — 94664 DEMO&/EVAL PT USE INHALER: CPT

## 2023-12-15 RX ORDER — ALPRAZOLAM 1 MG/1
1 TABLET ORAL 2 TIMES DAILY
Status: DISCONTINUED | OUTPATIENT
Start: 2023-12-15 | End: 2023-12-18

## 2023-12-15 RX ORDER — ISOSORBIDE MONONITRATE 30 MG/1
30 TABLET, EXTENDED RELEASE ORAL EVERY MORNING
Status: DISCONTINUED | OUTPATIENT
Start: 2023-12-15 | End: 2023-12-24 | Stop reason: HOSPADM

## 2023-12-15 RX ORDER — ALLOPURINOL 100 MG/1
100 TABLET ORAL DAILY
Status: DISCONTINUED | OUTPATIENT
Start: 2023-12-15 | End: 2023-12-24 | Stop reason: HOSPADM

## 2023-12-15 RX ORDER — MULTIPLE VITAMINS W/ MINERALS TAB 9MG-400MCG
1 TAB ORAL DAILY
Status: DISCONTINUED | OUTPATIENT
Start: 2023-12-15 | End: 2023-12-24 | Stop reason: HOSPADM

## 2023-12-15 RX ORDER — LEVOTHYROXINE SODIUM 137 UG/1
137 TABLET ORAL DAILY
Status: DISCONTINUED | OUTPATIENT
Start: 2023-12-15 | End: 2023-12-24 | Stop reason: HOSPADM

## 2023-12-15 RX ORDER — AMLODIPINE BESYLATE 10 MG/1
10 TABLET ORAL DAILY
Status: DISCONTINUED | OUTPATIENT
Start: 2023-12-15 | End: 2023-12-15

## 2023-12-15 RX ORDER — BUPROPION HYDROCHLORIDE 300 MG/1
300 TABLET ORAL DAILY
Status: DISCONTINUED | OUTPATIENT
Start: 2023-12-15 | End: 2023-12-24 | Stop reason: HOSPADM

## 2023-12-15 RX ORDER — POTASSIUM CHLORIDE 750 MG/1
40 TABLET, FILM COATED, EXTENDED RELEASE ORAL ONCE
Status: COMPLETED | OUTPATIENT
Start: 2023-12-15 | End: 2023-12-15

## 2023-12-15 RX ORDER — LATANOPROST 50 UG/ML
1 SOLUTION/ DROPS OPHTHALMIC NIGHTLY
Status: DISCONTINUED | OUTPATIENT
Start: 2023-12-15 | End: 2023-12-24 | Stop reason: HOSPADM

## 2023-12-15 RX ORDER — ASPIRIN 81 MG/1
81 TABLET, CHEWABLE ORAL NIGHTLY
Status: DISCONTINUED | OUTPATIENT
Start: 2023-12-15 | End: 2023-12-24 | Stop reason: HOSPADM

## 2023-12-15 RX ORDER — BUDESONIDE AND FORMOTEROL FUMARATE DIHYDRATE 160; 4.5 UG/1; UG/1
2 AEROSOL RESPIRATORY (INHALATION)
Status: DISCONTINUED | OUTPATIENT
Start: 2023-12-15 | End: 2023-12-24 | Stop reason: HOSPADM

## 2023-12-15 RX ORDER — CLOPIDOGREL BISULFATE 75 MG/1
75 TABLET ORAL NIGHTLY
Status: DISCONTINUED | OUTPATIENT
Start: 2023-12-15 | End: 2023-12-24 | Stop reason: HOSPADM

## 2023-12-15 RX ORDER — ATORVASTATIN CALCIUM 20 MG/1
40 TABLET, FILM COATED ORAL NIGHTLY
Status: DISCONTINUED | OUTPATIENT
Start: 2023-12-15 | End: 2023-12-19

## 2023-12-15 RX ORDER — BRIMONIDINE TARTRATE 2 MG/ML
1 SOLUTION/ DROPS OPHTHALMIC 2 TIMES DAILY
Status: DISCONTINUED | OUTPATIENT
Start: 2023-12-15 | End: 2023-12-24 | Stop reason: HOSPADM

## 2023-12-15 RX ORDER — GABAPENTIN 300 MG/1
300 CAPSULE ORAL 3 TIMES DAILY
Status: DISCONTINUED | OUTPATIENT
Start: 2023-12-15 | End: 2023-12-16

## 2023-12-15 RX ORDER — ALBUTEROL SULFATE 0.63 MG/3ML
0.63 SOLUTION RESPIRATORY (INHALATION) EVERY 6 HOURS PRN
Status: DISCONTINUED | OUTPATIENT
Start: 2023-12-15 | End: 2023-12-24 | Stop reason: HOSPADM

## 2023-12-15 RX ADMIN — ASPIRIN 81 MG: 81 TABLET, CHEWABLE ORAL at 20:47

## 2023-12-15 RX ADMIN — BUDESONIDE AND FORMOTEROL FUMARATE DIHYDRATE 2 PUFF: 160; 4.5 AEROSOL RESPIRATORY (INHALATION) at 21:01

## 2023-12-15 RX ADMIN — SODIUM CHLORIDE 125 ML/HR: 9 INJECTION, SOLUTION INTRAVENOUS at 23:35

## 2023-12-15 RX ADMIN — SODIUM CHLORIDE 75 ML/HR: 9 INJECTION, SOLUTION INTRAVENOUS at 09:11

## 2023-12-15 RX ADMIN — POTASSIUM CHLORIDE 40 MEQ: 750 TABLET, EXTENDED RELEASE ORAL at 11:03

## 2023-12-15 RX ADMIN — ACETAMINOPHEN 650 MG: 325 TABLET, FILM COATED ORAL at 20:47

## 2023-12-15 RX ADMIN — GABAPENTIN 300 MG: 300 CAPSULE ORAL at 09:07

## 2023-12-15 RX ADMIN — BRIMONIDINE TARTRATE 1 DROP: 2 SOLUTION/ DROPS OPHTHALMIC at 09:08

## 2023-12-15 RX ADMIN — ALLOPURINOL 100 MG: 100 TABLET ORAL at 09:07

## 2023-12-15 RX ADMIN — SODIUM CHLORIDE 125 ML/HR: 9 INJECTION, SOLUTION INTRAVENOUS at 20:47

## 2023-12-15 RX ADMIN — CLOPIDOGREL BISULFATE 75 MG: 75 TABLET, FILM COATED ORAL at 20:47

## 2023-12-15 RX ADMIN — ANORECTAL OINTMENT 1 APPLICATION: 15.7; .44; 24; 20.6 OINTMENT TOPICAL at 20:47

## 2023-12-15 RX ADMIN — LATANOPROST 1 DROP: 50 SOLUTION OPHTHALMIC at 20:48

## 2023-12-15 RX ADMIN — ATORVASTATIN CALCIUM 40 MG: 20 TABLET, FILM COATED ORAL at 01:21

## 2023-12-15 RX ADMIN — ALPRAZOLAM 1 MG: 1 TABLET ORAL at 01:21

## 2023-12-15 RX ADMIN — Medication 1 TABLET: at 09:07

## 2023-12-15 RX ADMIN — LEVOTHYROXINE SODIUM 137 MCG: 0.14 TABLET ORAL at 09:07

## 2023-12-15 RX ADMIN — ISOSORBIDE MONONITRATE 30 MG: 30 TABLET, EXTENDED RELEASE ORAL at 09:07

## 2023-12-15 RX ADMIN — ATORVASTATIN CALCIUM 40 MG: 20 TABLET, FILM COATED ORAL at 22:07

## 2023-12-15 RX ADMIN — BRIMONIDINE TARTRATE 1 DROP: 2 SOLUTION/ DROPS OPHTHALMIC at 20:47

## 2023-12-15 RX ADMIN — TIOTROPIUM BROMIDE INHALATION SPRAY 2 PUFF: 3.12 SPRAY, METERED RESPIRATORY (INHALATION) at 08:32

## 2023-12-15 RX ADMIN — ASPIRIN 81 MG: 81 TABLET, CHEWABLE ORAL at 01:21

## 2023-12-15 RX ADMIN — BUDESONIDE AND FORMOTEROL FUMARATE DIHYDRATE 2 PUFF: 160; 4.5 AEROSOL RESPIRATORY (INHALATION) at 08:31

## 2023-12-15 RX ADMIN — SENNOSIDES AND DOCUSATE SODIUM 2 TABLET: 50; 8.6 TABLET ORAL at 09:07

## 2023-12-15 RX ADMIN — SODIUM CHLORIDE 500 ML: 9 INJECTION, SOLUTION INTRAVENOUS at 19:10

## 2023-12-15 RX ADMIN — BUPROPION HYDROCHLORIDE 300 MG: 300 TABLET, EXTENDED RELEASE ORAL at 09:07

## 2023-12-15 NOTE — NURSING NOTE
12/15/23 1010   Wound 12/14/23 2014 coccyx Pressure Injury   Placement Date/Time: 12/14/23 2014   Present on Original Admission: Yes  Location: coccyx  Primary Wound Type: Pressure Injury   Pressure Injury Stage 2   Dressing Appearance moist drainage   Base non-blanchable;moist;red   Periwound intact   Periwound Temperature warm   Periwound Skin Turgor soft   Edges open   Skin Interventions   Pressure Reduction Devices alternating pressure pump (ADD)  (Currently on Accumax pump)   Pressure Reduction Techniques heels elevated off bed;positioned off wounds;pressure points protected     Wound/Ostomy: Consult received regarding skin issue on Coccyx area. Patient is alert, oriented, able to repositioning. Upon assessment is observed partial-thickness skin loss, Pressure injury stage 2 POA, wound bed described above.  Wound care order and pressure standing measures have been implemented into Epic, patient is currently on Accumax pump.  Please re-consult for any additional needs.

## 2023-12-15 NOTE — PLAN OF CARE
Goal Outcome Evaluation:  Plan of Care Reviewed With: patient           Outcome Evaluation: Pt is 68 yo male admitted to [facility] for falls and acute kidney injury, generalized weakness. PMH significant for L AKA, COPD, HTN, CKD, CAD. Patient lives with spouse, independent with use of prosthesis and rwx prior to admission. Today, patient awake and alert, agreeable to therapy. Patient does not have prosthesis present yet. Pt performed bed mobility with modA, sat on EOB with Ratna to modA to maintain sitting balance. Pt demonstrates impairments consisting of generalized weakness, decreased balance, decreased activity tolerance and would benefit from skilled PT. pt states his d/c plans are home with assist.      Anticipated Discharge Disposition (PT): home with assist, home with home health

## 2023-12-15 NOTE — PAYOR COMM NOTE
"Smi Alonso \"Don Alonso\" (67 y.o. Male)          INPATIENT REQUEST FOR [id number]    CONTACT DIMA CRUZ  P# [phone]  F$ [phone]          Date of Birth   1955    Social Security Number       Address   [address] [place], [place] [address]    Home Phone   [phone]    MRN   [id number]       Yazdanism   Lutheran    Marital Status   Single                            Admission Date   12/14/23    Admission Type   Emergency    Admitting Provider   Ruth Rosas MD    Attending Provider   Buddy Cook DO    Department, Room/Bed   [address], E455/1       Discharge Date       Discharge Disposition       Discharge Destination                                 Attending Provider: Buddy Cook DO    Allergies: Augmentin [Amoxicillin-pot Clavulanate]    Isolation: None   Infection: None   Code Status: CPR    Ht: 172.7 cm (67.99\")   Wt: 72.3 kg (159 lb 6.3 oz)    Admission Cmt: None   Principal Problem: Acute kidney injury [N17.9]                   Active Insurance as of 12/14/2023       Primary Coverage       Payor Plan Insurance Group Employer/Plan Group    AETNA MEDICARE REPLACEMENT AETNA MEDICARE REPLACEMENT [id number]       Payor Plan Address Payor Plan Phone Number Payor Plan Fax Number Effective Dates    [address] [phone]  7/1/2021 - None Entered    [place] [address]         Subscriber Name Subscriber Birth Date Member ID       SIM ALONSO 1955 [id number]               Secondary Coverage       Payor Plan Insurance Group Employer/Plan Group    [place]        Payor Plan Address Payor Plan Phone Number Payor Plan Fax Number Effective Dates    [address] [phone]  3/3/2020 - None Entered    [place] [address]         Subscriber Name Subscriber Birth Date Member ID       SIM ALONSO 1955 [id number]                     Emergency Contacts        (Rel.) Home Phone Work Phone Mobile Phone    " John Agustin (Son) [phone] -- [phone]                 History & Physical        Stingl, Ruth Weathers MD at 23          HISTORY AND PHYSICAL   [facility]        Date of Admission: 2023  Patient Identification:  Name: Sim Agustin  Age: 67 y.o.  Sex: male  :  1955  MRN: [id number]                     Primary Care Physician: Seth Hester MD    Chief Complaint:  67 year old gentleman who presented to the emergency room after a fall; he has been weak for several days; he was not able to get up; ems was called and his blood pressure was in the 70s when they arrived; he denies syncope; no fever or chills; no nausea or vomiting; no sick contacts; his bp recovered with iv fluids given en route; no chest pain or shortness of breath      History of Present Illness:   As above    Past Medical History:  Past Medical History:   Diagnosis Date    Acute respiratory failure with hypoxia and hypercarbia 2019    Acute upper respiratory infection 2013    Amputee, above knee, left     Anemia     per [place] database    ARF (acute renal failure) 2016    Asthma 2018    Atelectasis, left 2016    Bradycardia 2019    CAD (coronary artery disease) 2016    Chronic obstructive pulmonary disease with acute exacerbation 2013    Clotting disorder 2003    Colon polyps     Compartment syndrome     AFTER ILIAC SURGERY. ABOVE KNEE AMPUTATION    COPD (chronic obstructive pulmonary disease)     COPD with hypoxia 2019    Cough     SINCE APRIL WITH PNEUMONIA.     Demand myocardial infarction 2022    Disease of thyroid gland     HYPOTHYRODISM    Diverticulosis     Elevated hematocrit 2021    Employs prosthetic leg     ESRD (end stage renal disease) on dialysis 2016    Fracture of patella 2015    History of acute renal failure     History of CHF (congestive heart failure)     History of GI bleed 2016    AORTODUOD  FISTULA    History of sepsis 02/2016    History of transfusion     MULTIPLE, 2016    Hyperkalemia 04/11/2016    Hyperlipidemia     Hypertension     Hypotension     Hypotension 03/04/2016    Left lower lobe pneumonia 05/18/2022    Nonischemic cardiomyopathy     Nosocomial pneumonia 04/06/2016    Phantom limb pain     SL, WITH LEFT ABOUVE KNEE    Pleural effusion on left 04/11/2016    Pneumonia     SPRING 2016  AFTER SURGERY    Pulmonary embolism     PVD (peripheral vascular disease)     Renal insufficiency     S/P thoracentesis 04/11/2016    Sepsis, unspecified organism 04/05/2016     Past Surgical History:  Past Surgical History:   Procedure Laterality Date    ABOVE KNEE AMPUTATION Left 03/16/2016    Procedure: LT AMPUTATION ABOVE KNEE;  Surgeon: Jose Swann MD;  Location: [facility] OR;  Service:     ARTERIAL THROMBECTOMY  2001    throbectomy of arterial graft    AXILLARY FEMORAL BYPASS Right 02/15/2016    Exploratory lapartomy, repair aortoduodenal fistula, resection infected aortobifemoral bypass graft, axillobi-superficial femoral artery [place]-Aneudy bypass graft from right axillary artery, Repair of duodenotomy 4th portion duodenum-Dr. Jose Swann, Dr. Genaro Perrin    BRONCHOSCOPY Left 03/04/2016    Dr. NATALIIA Tate    BRONCHOSCOPY Left 05/20/2022    Procedure: BRONCHOSCOPY WITH BIOPSIES, BRUSHINGS, AND BAL UNDER FLUORO;  Surgeon: Ishmael Tate Jr., MD;  Location: [facility] ENDOSCOPY;  Service: Pulmonary;  Laterality: Left;  PRE OP - LLL CONSOLIDATION  POST OP - SAME    BRONCHOSCOPY RIGID / FLEXIBLE N/A 03/03/2016    Dr. NATALIIA Tate    BRONCHOSCOPY RIGID / FLEXIBLE N/A 02/26/2016    Dr. NATALIIA Tate    CARDIAC CATHETERIZATION N/A 03/18/2019    Procedure: Right and Left Heart Cath;  Surgeon: Nina Storey MD;  Location: [facility] CATH INVASIVE LOCATION;  Service: Cardiovascular    CATARACT EXTRACTION WITH INTRAOCULAR LENS IMPLANT Bilateral     COLON RESECTION WITH COLOSTOMY Left 02/28/2016     Exploratory laparotomy with open left hemicolectomy, end-colostomy, G-tube and J-tube-Dr. Endy Chaney    COLON SURGERY  3/25/2015    COLOSTOMY    COLONOSCOPY N/A 2015    Dr. Laughlin    COLONOSCOPY N/A 10/12/2016    Procedure: COLONOSCOPY TO 20 CM AND PER STOMA TO 30CM;  Surgeon: Genaro Perrin MD;  Location: [facility] ENDOSCOPY;  Service:     COLONOSCOPY N/A 10/21/2016    Procedure: COLONOSCOPY DONE AT BEDSIDE ONTO CECEM;  Surgeon: Genaro Perrin MD;  Location: [facility] ENDOSCOPY;  Service:     COLONOSCOPY N/A 02/10/2016    Diverticulosis in the entire examined colon, non-bleeding internal hemorrhoids-Dr. Taylor Pina    COLONOSCOPY N/A 02/11/2016    Poor prep, blood in the entire examined colon, normal ileum-Dr. Taylor Pina    COLONOSCOPY W/ POLYPECTOMY N/A 07/27/2015    Normal ileum, diverticulosis in the sigmoid colon: resected and retrieved, one 6 mm polyp in the descending colon: resected and retrieved, the distal rectum and anal verge are normal on retroflexion view-Dr. Miguel Ángel Laughlin    COLOSTOMY CLOSURE N/A 10/13/2016    Procedure: COLOSTOMY TAKEDOWN OR CLOSURE, APPENDECTOMY;  Surgeon: Genaro Perrin MD;  Location: [facility] MAIN OR;  Service:     DEBRIDEMENT LEG Left 03/01/2016    Excisional debridement of the skin, subcutantous tissue, tendon and muscle left calf-Dr. Jose Swann    DEBRIDEMENT LEG Left 02/25/2016    Excisional debridement full-thcikness skin and subcutaneous tissue and tendon and muscle, left medial and lateral calf muscles-Dr. Jose Swann    ENDOSCOPY N/A 10/21/2016    Procedure: ESOPHAGOGASTRODUODENOSCOPY DONE AT BEDSIDE;  Surgeon: Genaro Perrin MD;  Location: [facility] ENDOSCOPY;  Service:     ENDOSCOPY AND COLONOSCOPY N/A 02/28/2016    EGD and Colonoscopy with Candy ink injection-Dr. Endy Chaney    ENTEROSCOPY SMALL BOWEL N/A 02/11/2016    Normal esophagus, normal stomach, normal duodenum, portion of the jejunum normal-Dr. Taylor Pina    ILIAC ARTERY  - FEMORAL ARTERY BYPASS GRAFT Bilateral 2002    ILIAC ARTERY STENT Bilateral 2001    INSERTION AND REMOVAL PERITONEAL DIALYSIS CATHETER Right 02/29/2016    Exchange right jugular 16 cm Mah[place]kar dialysis catheter-Dr. Jose Swann    INSERTION PERITONEAL DIALYSIS CATHETER Left 03/01/2016    Ultrasound-guided access of the left jugular vein, 23 cm left jugular palindrome dialysis catheter placement-Dr. Jose Swann    INSERTION PERITONEAL DIALYSIS CATHETER Right 02/18/2016    Right jugular Mahrkar catherer placement-Dr. Jose Swann    JOINT REPLACEMENT Left     THORACOSCOPY Left 04/18/2016    Procedure: LT THORACOSCOPY VIDEO ASSISTED WITH DECORDICATION;  Surgeon: Genaro Davila III, MD;  Location: [facility];  Service:     THROMBECTOMY Left 02/16/2016    Thombectomy of left femoral graft, left leg arteriogram, left calf forward compartment fasciotomy-Dr. Jose Swann    TOTAL HIP ARTHROPLASTY Left     UPPER GASTROINTESTINAL ENDOSCOPY N/A 02/09/2016    Normal UGI-Dr. Ajay Parkinson    UPPER GASTROINTESTINAL ENDOSCOPY N/A 11/28/2015    Small hiatal hernia, chronic gastritis: biopsied, non-bleeding angioectasias in the stomach: treated with argon plasma coagulation, multiple bleeding angioectasias in the dudenum: treated with argon plasma coagulation-Dr. Mykel Jaramillo    VASCULAR SURGERY      MULTIPLE    VENTRAL/INCISIONAL HERNIA REPAIR N/A 10/19/2017    Procedure: VENTRAL HERNIA REPAIR WITH MESH AND BILATERAL COMPONENT SEPARATION;  Surgeon: Genaro Perrin MD;  Location: [facility];  Service:     WISDOM TOOTH EXTRACTION        Home Meds:  Medications Prior to Admission   Medication Sig Dispense Refill Last Dose    albuterol (ACCUNEB) 0.63 MG/3ML nebulizer solution Inhale 3 mL Every 6 (Six) Hours As Needed.   Past Week    allopurinol (Zyloprim) 100 MG tablet Take 1 tab by mouth daily for gout 90 tablet 3 12/14/2023 at 0900    ALPRAZolam (XANAX) 1 MG tablet Take 1 tablet twice daily 180 tablet  2 12/13/2023 at 2100    amLODIPine (NORVASC) 10 MG tablet Take 1 tablet by mouth Daily. 90 tablet 3 12/14/2023 at 0900    aspirin 81 MG chewable tablet Chew 1 tablet Every Night.   12/14/2023 at 0900    atorvastatin (LIPITOR) 40 MG tablet TAKE 1 TABLET BY MOUTH EVERY DAY FOR CHOLESTEROL AND HEART HEALTH 90 tablet 3 12/13/2023 at 2100    brimonidine (ALPHAGAN) 0.2 % ophthalmic solution instill 1 drop into both eyes twice a day   12/14/2023 at 0900    buPROPion XL (WELLBUTRIN XL) 300 MG 24 hr tablet TAKE 1 TABLET BY MOUTH EVERY DAY 90 tablet 3 12/14/2023 at 0900    clopidogrel (PLAVIX) 75 MG tablet Take 1 tablet by mouth Every Night. 90 tablet 3 12/13/2023 at 2100    Coenzyme Q10 400 MG capsule Take 1 capsule by mouth Every Evening.   12/13/2023 at 2100    FERROUS SULFATE PO Take 65 mg by mouth Every Night.   12/13/2023 at 2100    gabapentin (NEURONTIN) 300 MG capsule Take 1 capsule by mouth 3 (Three) Times a Day. 270 capsule 1 12/13/2023    isosorbide mononitrate (IMDUR) 30 MG 24 hr tablet TAKE 1 TABLET BY MOUTH EVERY DAY IN THE MORNING 90 tablet 3 12/14/2023    latanoprost (XALATAN) 0.005 % ophthalmic solution Administer 1 drop to both eyes every night at bedtime.   12/13/2023 at 2100    levothyroxine (SYNTHROID, LEVOTHROID) 137 MCG tablet Take 1 tablet by mouth Daily. 90 tablet 3 12/14/2023 at 0600    Multiple Vitamins-Minerals (MULTIVITAMIN ADULT PO) Take 1 tablet by mouth Daily.   12/14/2023 at 0900    Omega-3 Fatty Acids (fish oil) 1000 MG capsule capsule Take 1 capsule by mouth Daily. Took this am   12/14/2023 at 0900    Thiamine HCl (VITAMIN B-1 PO) Take 1 tablet by mouth Daily.   12/13/2023 at 0900    torsemide (DEMADEX) 20 MG tablet Take 1 tablet by mouth Daily. 90 tablet 3 12/14/2023 at 0900    traMADol (ULTRAM) 50 MG tablet Take 1 tablet by mouth 2 (Two) Times a Day. 180 tablet 0 12/14/2023 at 0900    Fluticasone-Umeclidin-Vilant (TRELEGY ELLIPTA) 100-62.5-25 MCG/INH aerosol powder  Inhale 1 each Daily. 28  each 2        Allergies:  Allergies   Allergen Reactions    Augmentin [Amoxicillin-Pot Clavulanate] Hives and Rash     Tolerate cephalosporins     Immunizations:  Immunization History   Administered Date(s) Administered    COVID-19 (PFIZER) BIVALENT 12+YRS 2022    COVID-19 (PFIZER) Purple Cap Monovalent 2021, 2021, 10/18/2021    COVID-19 F23 (MODERNA) 12YRS+ (SPIKEVAX) 10/01/2023    Covid-19 (Pfizer) Gray Cap Monovalent 2022    Flu Vaccine Quad PF >36MO 10/26/2016, 10/10/2017    Fluad Quad 65+ 2020    Fluzone High Dose =>65 Years (Vaxcare ONLY) 10/08/2018, 2019    Fluzone High-Dose 65+yrs 2021, 2022, 2023    Pneumococcal Conjugate 20-Valent (PCV20) 09/15/2022    Pneumococcal Polysaccharide (PPSV23) 2017    Shingrix 2019, 2019    Tdap 03/15/2022     Social History:   Social History     Social History Narrative    Not on file     Social History     Socioeconomic History    Marital status: Single   Tobacco Use    Smoking status: Former     Packs/day: 1.00     Years: 30.00     Additional pack years: 0.00     Total pack years: 30.00     Types: Cigarettes     Start date: 1974     Quit date: 2021     Years since quittin.6     Passive exposure: Never    Smokeless tobacco: Never    Tobacco comments:     caffeine - rarely   Vaping Use    Vaping Use: Never used   Substance and Sexual Activity    Alcohol use: No    Drug use: Not Currently     Frequency: 1.0 times per week     Types: Other, Marijuana     Comment: THC gummies; used marijuana 3x a week last week    Sexual activity: Not Currently     Partners: Female     Birth control/protection: Condom, Post-menopausal       Family History:  Family History   Problem Relation Age of Onset    Lung cancer Mother     Cancer Mother     Heart disease Father     Diabetes Father     Hypertension Father     Malig Hyperthermia Neg Hx         Review of Systems  See history of present illness and past medical  history.  Patient denies headache, dizziness, syncope,   trauma, change in vision, change in hearing,, focal weakness, numbness, or paresthesia.  Patient denies chest pain, palpitations, dyspnea, orthopnea, PND, cough, sinus pressure, rhinorrhea, epistaxis, hemoptysis, nausea, vomiting,hematemesis, diarrhea, constipation or hematochezia.  Denies cold or heat intolerance, polydipsia, polyuria, polyphagia. Denies hematuria, pyuria, dysuria, hesitancy, frequency or urgency. Denies consumption of raw and under cooked meats foods or change in water source.  Denies fever, chills, sweats, night sweats.       Objective:  T Max 24 hrs: Temp (24hrs), Av.1 °F (35.1 °C), Min:95.1 °F (35.1 °C), Max:95.1 °F (35.1 °C)    Vitals Ranges:   Temp:  [95.1 °F (35.1 °C)] 95.1 °F (35.1 °C)  Heart Rate:  [57-60] 57  Resp:  [16-18] 18  BP: ()/(60-84) 102/84      Exam:  /84 (BP Location: Right arm, Patient Position: Lying)   Pulse 57   Temp 95.1 °F (35.1 °C) (Tympanic)   Resp 18   Wt 72.3 kg (159 lb 6.3 oz)   SpO2 99%   BMI 24.24 kg/m²     General Appearance:    Alert, cooperative, no distress, appears stated age   Head:    Normocephalic, without obvious abnormality, atraumatic   Eyes:    PERRL, conjunctivae/corneas clear, EOM's intact, both eyes   Ears:    Normal external ear canals, both ears   Nose:   Nares normal, septum midline, mucosa normal, no drainage    or sinus tenderness   Throat:   Lips, mucosa, and tongue normal   Neck:   Supple, symmetrical, trachea midline, no adenopathy;     thyroid:  no enlargement/tenderness/nodules; no carotid    bruit or JVD   Back:     Symmetric, no curvature, ROM normal, no CVA tenderness   Lungs:     Decreased breath sounds bilaterally, respirations unlabored   Chest Wall:    No tenderness or deformity    Heart:    Regular rate and rhythm, S1 and S2 normal, no murmur, rub   or gallop   Abdomen:     Soft, nontender, bowel sounds active all four quadrants,     no masses, no  hepatomegaly, no splenomegaly   Extremities:   Extremities normal, atraumatic, left aka      .    Data Review:  Labs in chart were reviewed.  WBC   Date Value Ref Range Status   12/14/2023 16.45 (H) 3.40 - 10.80 10*3/mm3 Final     Hemoglobin   Date Value Ref Range Status   12/14/2023 15.8 13.0 - 17.7 g/dL Final     Hematocrit   Date Value Ref Range Status   12/14/2023 47.3 37.5 - 51.0 % Final     Platelets   Date Value Ref Range Status   12/14/2023 214 140 - 450 10*3/mm3 Final     Sodium   Date Value Ref Range Status   12/14/2023 134 (L) 136 - 145 mmol/L Final     Potassium   Date Value Ref Range Status   12/14/2023 3.3 (L) 3.5 - 5.2 mmol/L Final     Comment:     Slight hemolysis detected by analyzer. Result may be falsely elevated.     Chloride   Date Value Ref Range Status   12/14/2023 85 (L) 98 - 107 mmol/L Final     CO2   Date Value Ref Range Status   12/14/2023 32.9 (H) 22.0 - 29.0 mmol/L Final     BUN   Date Value Ref Range Status   12/14/2023 61 (H) 8 - 23 mg/dL Final     Creatinine   Date Value Ref Range Status   12/14/2023 5.09 (H) 0.76 - 1.27 mg/dL Final     Glucose   Date Value Ref Range Status   12/14/2023 165 (H) 65 - 99 mg/dL Final     Calcium   Date Value Ref Range Status   12/14/2023 17.0 (C) 8.6 - 10.5 mg/dL Final                Imaging Results (All)       Procedure Component Value Units Date/Time    CT Abdomen Pelvis Without Contrast [[id number]] Collected: 12/14/23 1951     Updated: 12/14/23 2005    Narrative:      CT ABDOMEN PELVIS WO CONTRAST-     INDICATIONS: Abdominal bruising     TECHNIQUE: Radiation dose reduction techniques were utilized, including  automated exposure control and exposure modulation based on body size.  Unenhanced ABDOMEN AND PELVIS CT     COMPARISON: 11/3/2017     FINDINGS:     Assessment of the solid organs for injury is significant limited without  intravenous contrast material. Structures in the pelvis are partly  obscured by attenuation artifact from left hip  arthroplasty hardware.     Cholelithiasis is evident.     Mild stable nonspecific thickening of the left adrenal gland is present.     Arterial calcification is seen at the left renal hilum. The distal  ureter and portions of the urinary bladder are obscured by hardware  artifact.     Pancreas is thinned.     A right renal cyst is demonstrated, 4 cm, showing interval increase,  previously 2.3 cm. Areas of cortical thinning are evident in the left  kidney.     Otherwise unremarkable appearance of the liver, spleen, adrenal glands,  pancreas, kidneys, bladder.     No bowel obstruction or abnormal bowel thickening is identified.     No free intraperitoneal gas or free fluid.     Scattered small mesenteric and para-aortic lymph nodes are seen that are  not significant by size criteria.     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 Aortic and other  arterial calcifications are present.     The lung bases show persistent reticulonodular infiltrate in the left  lower lobe, similar from 11/18/2023, taking into account motion artifact  that is present on the current exam. A 4 mm nodular density in the right  lower lobe axial image 9 is not seen on the recent prior exam, possibly  attributable to motion artifact or may be a new nodule. Chest CT  follow-up is advised as recommended on the recent prior exam (3 months  from the prior exam).     Degenerative changes are seen in the spine. No acute fracture is  identified. Subcutaneous stranding at the anterior abdominal wall may  reflect contusion, for example axial image 51.             Impression:         1. No acute inflammatory process of bowel is identified, follow-up as  indications persist.     2. Cholelithiasis.     3. No urolithiasis or hydronephrosis. Increased right renal cyst.     4. Indeterminate nodularity in the lower  lungs, chest CT follow-up  recommended.     5. Limited as described.     This report was finalized on 12/14/2023 8:01 PM by Dr. Diogo Suarez M.D on Workstation: [id number]       XR Chest 1 View [[id number]] Collected: 12/14/23 1708     Updated: 12/14/23 1713    Narrative:      XR CHEST 1 VW-12/14/2023     HISTORY: Cough.     Heart size is within normal limits. Patient is rotated slightly to the  left. Lungs appear clear. There is some aortic calcification.       Impression:      1. No acute process.        This report was finalized on 12/14/2023 5:09 PM by Dr. Yovanny Ruiz M.D on Workstation: [id number]                 Assessment:  Active Hospital Problems    Diagnosis  POA    **Acute kidney injury [N17.9]  Yes    Hypercalcemia [E83.52]  Yes      Resolved Hospital Problems   No resolved problems to display.   Falls  Weakness  Copd  Hypokalemia  Pad  Hypertension  Hypothyroidism  Ckd3  Cad      Plan:  Continue fluids  Ask nephrology to see him  Monitor on telemetry  Trend labs  Dw patient and ed provider    Ruth Rosas MD  12/14/2023  20:46 EST      Electronically signed by Ruth Rosas MD at 12/14/23 2050          Emergency Department Notes        Noni Ramirez, RN at 12/14/23 1909          ..Nursing report ED to floor  Sim Agustin  67 y.o.  male    HPI :   Chief Complaint   Patient presents with    Hypotension       Admitting doctor:   Ruth Rosas MD    Admitting diagnosis:   There were no encounter diagnoses.    Code status:   Current Code Status       Date Active Code Status Order ID Comments User Context       12/14/2023 1907 CPR (Attempt to Resuscitate) [id number]  Ruth Rosas MD ED        Question Answer    Code Status (Patient has no pulse and is not breathing) CPR (Attempt to Resuscitate)    Medical Interventions (Patient has pulse or is breathing) Full                    Allergies:   Augmentin [amoxicillin-pot clavulanate]    Isolation:   No active  isolations    Intake and Output    Intake/Output Summary (Last 24 hours) at 12/14/2023 1909  Last data filed at 12/14/2023 1630  Gross per 24 hour   Intake 500 ml   Output --   Net 500 ml       Weight:   There were no vitals filed for this visit.    Most recent vitals:   Vitals:    12/14/23 1632 12/14/23 1641   BP: 96/60 121/66   Pulse: 60    Resp: 16    Temp: 95.1 °F (35.1 °C)    TempSrc: Tympanic    SpO2: 94%        Active LDAs/IV Access:   Lines, Drains & Airways       Active LDAs       Name Placement date Placement time Site Days    Peripheral IV 12/14/23 Right Antecubital 12/14/23  --  Antecubital  less than 1                    Labs (abnormal labs have a star):   Labs Reviewed   COMPREHENSIVE METABOLIC PANEL - Abnormal; Notable for the following components:       Result Value    Glucose 165 (*)     BUN 61 (*)     Creatinine 5.09 (*)     Sodium 134 (*)     Potassium 3.3 (*)     Chloride 85 (*)     CO2 32.9 (*)     Calcium 17.0 (*)     Anion Gap 16.1 (*)     eGFR 11.7 (*)     All other components within normal limits    Narrative:     GFR Normal >60  Chronic Kidney Disease <60  Kidney Failure <15     LACTIC ACID, PLASMA - Abnormal; Notable for the following components:    Lactate 4.0 (*)     All other components within normal limits   PROCALCITONIN - Abnormal; Notable for the following components:    Procalcitonin 0.26 (*)     All other components within normal limits    Narrative:     As a Marker for Sepsis (Non-Neonates):    1. <0.5 ng/mL represents a low risk of severe sepsis and/or septic shock.  2. >2 ng/mL represents a high risk of severe sepsis and/or septic shock.    As a Marker for Lower Respiratory Tract Infections that require antibiotic therapy:    PCT on Admission    Antibiotic Therapy       6-12 Hrs later    >0.5                Strongly Recommended  >0.25 - <0.5        Recommended   0.1 - 0.25          Discouraged              Remeasure/reassess PCT  <0.1                Strongly Discouraged      "Remeasure/reassess PCT    As 28 day mortality risk marker: \"Change in Procalcitonin Result\" (>80% or <=80%) if Day 0 (or Day 1) and Day 4 values are available. Refer to http://www.[facility]-pct-calculator.com    Change in PCT <=80%  A decrease of PCT levels below or equal to 80% defines a positive change in PCT test result representing a higher risk for 28-day all-cause mortality of patients diagnosed with severe sepsis for septic shock.    Change in PCT >80%  A decrease of PCT levels of more than 80% defines a negative change in PCT result representing a lower risk for 28-day all-cause mortality of patients diagnosed with severe sepsis or septic shock.      CBC WITH AUTO DIFFERENTIAL - Abnormal; Notable for the following components:    WBC 16.45 (*)     Neutrophil % 85.5 (*)     Lymphocyte % 7.4 (*)     Monocyte % 4.2 (*)     Immature Grans % 2.2 (*)     Neutrophils, Absolute 14.06 (*)     Immature Grans, Absolute 0.36 (*)     All other components within normal limits   RESPIRATORY PANEL PCR W/ COVID-19 (SARS-COV-2), NP SWAB IN UTM/VTP, 2 HR TAT - Normal    Narrative:     In the setting of a positive respiratory panel with a viral infection PLUS a negative procalcitonin without other underlying concern for bacterial infection, consider observing off antibiotics or discontinuation of antibiotics and continue supportive care. If the respiratory panel is positive for atypical bacterial infection (Bordetella pertussis, Chlamydophila pneumoniae, or Mycoplasma pneumoniae), consider antibiotic de-escalation to target atypical bacterial infection.   BLOOD CULTURE   BLOOD CULTURE   LACTIC ACID, REFLEX   PTH, INTACT   CBC AND DIFFERENTIAL    Narrative:     The following orders were created for panel order CBC & Differential.  Procedure                               Abnormality         Status                     ---------                               -----------         ------                     CBC Auto " Differential[[id number]]        Abnormal            Final result                 Please view results for these tests on the individual orders.       EKG:   ECG 12 Lead Syncope   Preliminary Result   HEART RATE= 52  bpm   RR Interval= 1154  ms   WI Interval=   ms   P Horizontal Axis=   deg   P Front Axis=   deg   QRSD Interval= 150  ms   QT Interval= 580  ms   QTcB= 540  ms   QRS Axis= -13  deg   T Wave Axis= 65  deg   - ABNORMAL ECG -   Junctional rhythm   Nonspecific intraventricular conduction delay   Inferior infarct, old   Baseline wander in lead(s) II,III,aVF   Electronically Signed By:    Date and Time of Study: 2023-12-14 16:52:08          Meds given in ED:   Medications   acetaminophen (TYLENOL) tablet 650 mg (has no administration in time range)   sennosides-docusate (PERICOLACE) 8.6-50 MG per tablet 2 tablet (has no administration in time range)     And   polyethylene glycol (MIRALAX) packet 17 g (has no administration in time range)     And   bisacodyl (DULCOLAX) EC tablet 5 mg (has no administration in time range)     And   bisacodyl (DULCOLAX) suppository 10 mg (has no administration in time range)   ondansetron (ZOFRAN) tablet 4 mg (has no administration in time range)     Or   ondansetron (ZOFRAN) injection 4 mg (has no administration in time range)   melatonin tablet 3 mg (has no administration in time range)   sodium chloride 0.9 % bolus 1,000 mL (1,000 mL Intravenous New Bag 12/14/23 1812)       Imaging results:  XR Chest 1 View    Result Date: 12/14/2023  1. No acute process.   This report was finalized on 12/14/2023 5:09 PM by Dr. Yovanny Ruiz M.D on Workstation: [id number]       Ambulatory status:   - assist x1    Social issues:   Social History     Socioeconomic History    Marital status: Single   Tobacco Use    Smoking status: Former     Packs/day: 1.00     Years: 30.00     Additional pack years: 0.00     Total pack years: 30.00     Types: Cigarettes     Start date: 5/1/1974     Quit date:  2021     Years since quittin.6     Passive exposure: Never    Smokeless tobacco: Never    Tobacco comments:     caffeine - rarely   Vaping Use    Vaping Use: Never used   Substance and Sexual Activity    Alcohol use: No    Drug use: Not Currently     Types: Other     Comment: THC gummies    Sexual activity: Not Currently     Partners: Female     Birth control/protection: Condom, Post-menopausal       NIH Stroke Scale:       Noni Ramirez RN  23 19:09 EST          Electronically signed by Noni Ramirez RN at 23 1910       Wood Rich MD at 23 1647           EMERGENCY DEPARTMENT ENCOUNTER    Room Number:  LOLA/LOLA  PCP: Seth Hester MD  Historian: Patient      HPI:  Chief Complaint: Fall hypotension  A complete HPI/ROS/PMH/PSH/SH/FH are unobtainable due to: None  Context: Sim Agustin is a 67 y.o. male who presents to the ED c/o fall hypotension.  Patient presents for evaluation of fall.  Patient states he is felt weak since Monday.  Gradually getting worse.  Has had a cough.  Patient states today he tried to get up and fell to the ground.  EMS found patient on the ground.  Had been down there for 2 hours.  EMS states blood pressure was in the 70s.  Patient was given IV fluids prior to arrival.  Had no chest pain pressure tightness.  Patient has had no fevers or chills.  Patient has had no vomiting or diarrhea.  No black stools or dark tarry stools.            PAST MEDICAL HISTORY  Active Ambulatory Problems     Diagnosis Date Noted    H/O angiodysplasia of intestinal tract 2016    COPD (chronic obstructive pulmonary disease) 2016    S/P colectomy 2016    Status post above-knee amputation of left lower extremity 2016    CAD (coronary artery disease) 2016    PVD (peripheral vascular disease) 2016    Essential hypertension 2014    Cardiomyopathy, unspecified 2019    PRISCILLA treated with BiPAP 2020    Anxiety disorder 2020    Chronic  midline low back pain without sciatica 03/03/2020    Long term prescription benzodiazepine use 03/03/2020    History of bradycardia 03/03/2020    Hypothyroidism 03/03/2020    Vitamin B12 deficiency 03/03/2020    Iron deficiency 03/03/2020    History of smoking 30 or more pack years 03/03/2020    High risk medication use 03/03/2020    DNR (do not resuscitate) 09/01/2020    Stage 3a chronic kidney disease 01/06/2021    Hyperlipidemia 09/09/2021    Proteinuria 09/12/2021    Junctional cardiac arrhythmia 05/22/2022     Resolved Ambulatory Problems     Diagnosis Date Noted    Acute upper GI bleed 03/04/2016    Hypotension 03/04/2016    ARF (acute renal failure) 03/04/2016    Atelectasis, left 03/04/2016    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    Compartment syndrome of left lower extremity 03/05/2016    Peripheral arteriosclerosis 03/17/2016    Sepsis, unspecified organism 04/05/2016    Nosocomial pneumonia 04/06/2016    ESRD (end stage renal disease) on dialysis 04/06/2016    Anemia in chronic kidney disease 04/06/2016    Hyperkalemia 04/11/2016    Pleural effusion on left 04/11/2016    S/P thoracentesis 04/11/2016    Ischemic colitis 05/24/2016    Colostomy prolapse 05/24/2016    Acute upper respiratory infection 02/06/2013    Chronic obstructive pulmonary disease with acute exacerbation 02/06/2013    Fracture of patella 03/03/2015    Ischemia of large intestine 10/13/2016    Incisional hernia, without obstruction or gangrene 07/13/2017    Acute respiratory failure with hypoxia and hypercarbia 03/11/2019    Bradycardia 05/23/2019    COPD with hypoxia 05/23/2019    Vitamin D deficiency 03/03/2020    Elevated hematocrit 01/06/2021    Acute exacerbation of chronic obstructive pulmonary disease (COPD) 04/04/2022    Influenza A 04/05/2022    Acute respiratory failure with hypoxia 04/05/2022    Hyperkalemia 04/05/2022    Left lower lobe pneumonia 05/18/2022    Demand myocardial infarction  05/19/2022    Acute UTI 02/28/2023    ARLYN (acute kidney injury) 03/01/2023    Stage 3b chronic kidney disease (CKD) 03/01/2023    Klebsiella infection 03/02/2023     Past Medical History:   Diagnosis Date    Anemia     Asthma 02/02/2018    Clotting disorder 05/01/2003    Colon polyps     Compartment syndrome     Cough     Disease of thyroid gland     Diverticulosis     Employs prosthetic leg     History of acute renal failure     History of CHF (congestive heart failure)     History of GI bleed 02/2016    History of sepsis 02/2016    History of transfusion     Hypertension     Nonischemic cardiomyopathy     Phantom limb pain     Pneumonia     Pulmonary embolism     Renal insufficiency          PAST SURGICAL HISTORY  Past Surgical History:   Procedure Laterality Date    ABOVE KNEE AMPUTATION Left 03/16/2016    Procedure: LT AMPUTATION ABOVE KNEE;  Surgeon: Jose Swann MD;  Location: [facility] OR;  Service:     ARTERIAL THROMBECTOMY  2001    throbectomy of arterial graft    AXILLARY FEMORAL BYPASS Right 02/15/2016    Exploratory lapartomy, repair aortoduodenal fistula, resection infected aortobifemoral bypass graft, axillobi-superficial femoral artery Causey-Aneudy bypass graft from right axillary artery, Repair of duodenotomy 4th portion duodenum-Dr. Jose Swann, Dr. Genaro Perrin    BRONCHOSCOPY Left 03/04/2016    Dr. NATALIIA Tate    BRONCHOSCOPY Left 05/20/2022    Procedure: BRONCHOSCOPY WITH BIOPSIES, BRUSHINGS, AND BAL UNDER FLUORO;  Surgeon: Ishmael Tate Jr., MD;  Location: [facility] ENDOSCOPY;  Service: Pulmonary;  Laterality: Left;  PRE OP - LLL CONSOLIDATION  POST OP - SAME    BRONCHOSCOPY RIGID / FLEXIBLE N/A 03/03/2016    Dr. NATALIIA Tate    BRONCHOSCOPY RIGID / FLEXIBLE N/A 02/26/2016    Dr. NATALIIA Tate    CARDIAC CATHETERIZATION N/A 03/18/2019    Procedure: Right and Left Heart Cath;  Surgeon: Nina Storey MD;  Location: [facility] CATH INVASIVE LOCATION;  Service: Cardiovascular     CATARACT EXTRACTION WITH INTRAOCULAR LENS IMPLANT Bilateral     COLON RESECTION WITH COLOSTOMY Left 02/28/2016    Exploratory laparotomy with open left hemicolectomy, end-colostomy, G-tube and J-tube-Dr. Endy Chaney    COLON SURGERY  3/25/2015    COLOSTOMY    COLONOSCOPY N/A 2015    Dr. Laughlin    COLONOSCOPY N/A 10/12/2016    Procedure: COLONOSCOPY TO 20 CM AND PER STOMA TO 30CM;  Surgeon: Genaro Perrin MD;  Location: [facility] ENDOSCOPY;  Service:     COLONOSCOPY N/A 10/21/2016    Procedure: COLONOSCOPY DONE AT BEDSIDE ONTO CECEM;  Surgeon: Genaro Perrin MD;  Location: [facility] ENDOSCOPY;  Service:     COLONOSCOPY N/A 02/10/2016    Diverticulosis in the entire examined colon, non-bleeding internal hemorrhoids-Dr. Taylor Pina    COLONOSCOPY N/A 02/11/2016    Poor prep, blood in the entire examined colon, normal ileum-Dr. Taylor Pina    COLONOSCOPY W/ POLYPECTOMY N/A 07/27/2015    Normal ileum, diverticulosis in the sigmoid colon: resected and retrieved, one 6 mm polyp in the descending colon: resected and retrieved, the distal rectum and anal verge are normal on retroflexion view-Dr. Miguel Ángel Laughlin    COLOSTOMY CLOSURE N/A 10/13/2016    Procedure: COLOSTOMY TAKEDOWN OR CLOSURE, APPENDECTOMY;  Surgeon: Genaro Perrin MD;  Location: [facility] MAIN OR;  Service:     DEBRIDEMENT LEG Left 03/01/2016    Excisional debridement of the skin, subcutantous tissue, tendon and muscle left calf-Dr. Jose Swann    DEBRIDEMENT LEG Left 02/25/2016    Excisional debridement full-thcikness skin and subcutaneous tissue and tendon and muscle, left medial and lateral calf muscles-Dr. Jose Swann    ENDOSCOPY N/A 10/21/2016    Procedure: ESOPHAGOGASTRODUODENOSCOPY DONE AT BEDSIDE;  Surgeon: Genaro Perrin MD;  Location: [facility] ENDOSCOPY;  Service:     ENDOSCOPY AND COLONOSCOPY N/A 02/28/2016    EGD and Colonoscopy with Candy ink injection-Dr. Endy Chaney    ENTEROSCOPY SMALL BOWEL N/A 02/11/2016     Normal esophagus, normal stomach, normal duodenum, portion of the jejunum normal-Dr. Taylor Pina    ILIAC ARTERY - FEMORAL ARTERY BYPASS GRAFT Bilateral 2002    ILIAC ARTERY STENT Bilateral 2001    INSERTION AND REMOVAL PERITONEAL DIALYSIS CATHETER Right 02/29/2016    Exchange right jugular 16 cm Mahurkar dialysis catheter-Dr. Jose Swann    INSERTION PERITONEAL DIALYSIS CATHETER Left 03/01/2016    Ultrasound-guided access of the left jugular vein, 23 cm left jugular palindrome dialysis catheter placement-Dr. Jose Swann    INSERTION PERITONEAL DIALYSIS CATHETER Right 02/18/2016    Right jugular Mahrkar catherer placement-Dr. Jose Swann    JOINT REPLACEMENT Left     THORACOSCOPY Left 04/18/2016    Procedure: LT THORACOSCOPY VIDEO ASSISTED WITH DECORDICATION;  Surgeon: Genaro Davila III, MD;  Location: [facility];  Service:     THROMBECTOMY Left 02/16/2016    Thombectomy of left femoral graft, left leg arteriogram, left calf forward compartment fasciotomy-Dr. Jose Swann    TOTAL HIP ARTHROPLASTY Left     UPPER GASTROINTESTINAL ENDOSCOPY N/A 02/09/2016    Normal UGI-Dr. Ajay Parkinson    UPPER GASTROINTESTINAL ENDOSCOPY N/A 11/28/2015    Small hiatal hernia, chronic gastritis: biopsied, non-bleeding angioectasias in the stomach: treated with argon plasma coagulation, multiple bleeding angioectasias in the dudenum: treated with argon plasma coagulation-Dr. Mykel Jaramillo    VASCULAR SURGERY      MULTIPLE    VENTRAL/INCISIONAL HERNIA REPAIR N/A 10/19/2017    Procedure: VENTRAL HERNIA REPAIR WITH MESH AND BILATERAL COMPONENT SEPARATION;  Surgeon: Genaro Perrin MD;  Location: [facility] OR;  Service:     WISDOM TOOTH EXTRACTION           FAMILY HISTORY  Family History   Problem Relation Age of Onset    Lung cancer Mother     Cancer Mother     Heart disease Father     Diabetes Father     Hypertension Father     Malig Hyperthermia Neg Hx          SOCIAL HISTORY  Social History      Socioeconomic History    Marital status: Single   Tobacco Use    Smoking status: Former     Packs/day: 1.00     Years: 30.00     Additional pack years: 0.00     Total pack years: 30.00     Types: Cigarettes     Start date: 1974     Quit date: 2021     Years since quittin.6     Passive exposure: Never    Smokeless tobacco: Never    Tobacco comments:     caffeine - rarely   Vaping Use    Vaping Use: Never used   Substance and Sexual Activity    Alcohol use: No    Drug use: Not Currently     Types: Other     Comment: THC gummies    Sexual activity: Not Currently     Partners: Female     Birth control/protection: Condom, Post-menopausal         ALLERGIES  Augmentin [amoxicillin-pot clavulanate]        REVIEW OF SYSTEMS  Review of Systems   Generalized weakness      PHYSICAL EXAM  ED Triage Vitals [23 1632]   Temp Heart Rate Resp BP SpO2   95.1 °F (35.1 °C) 60 16 96/60 94 %      Temp src Heart Rate Source Patient Position BP Location FiO2 (%)   Tympanic -- -- -- --       Physical Exam      GENERAL: no acute distress.  Bruising left periorbital area  HENT: nares patent  EYES: no scleral icterus  CV: regular rhythm, normal rate  RESPIRATORY: normal effort  ABDOMEN: soft.  Bruising left abdomen  MUSCULOSKELETAL: no deformity. Left leg amputation  NEURO: alert, moves all extremities, follows commands  PSYCH:  calm, cooperative  SKIN: warm, dry    Vital signs and nursing notes reviewed.          LAB RESULTS  Recent Results (from the past 24 hour(s))   ECG 12 Lead Syncope    Collection Time: 23  4:52 PM   Result Value Ref Range    QT Interval 580 ms    QTC Interval 540 ms   Comprehensive Metabolic Panel    Collection Time: 23  5:15 PM    Specimen: Cannula; Blood   Result Value Ref Range    Glucose 165 (H) 65 - 99 mg/dL    BUN 61 (H) 8 - 23 mg/dL    Creatinine 5.09 (H) 0.76 - 1.27 mg/dL    Sodium 134 (L) 136 - 145 mmol/L    Potassium 3.3 (L) 3.5 - 5.2 mmol/L    Chloride 85 (L) 98 - 107 mmol/L     CO2 32.9 (H) 22.0 - 29.0 mmol/L    Calcium 17.0 (C) 8.6 - 10.5 mg/dL    Total Protein 6.7 6.0 - 8.5 g/dL    Albumin 4.1 3.5 - 5.2 g/dL    ALT (SGPT) 14 1 - 41 U/L    AST (SGOT) 23 1 - 40 U/L    Alkaline Phosphatase 66 39 - 117 U/L    Total Bilirubin 1.2 0.0 - 1.2 mg/dL    Globulin 2.6 gm/dL    A/G Ratio 1.6 g/dL    BUN/Creatinine Ratio 12.0 7.0 - 25.0    Anion Gap 16.1 (H) 5.0 - 15.0 mmol/L    eGFR 11.7 (L) >60.0 mL/min/1.73   Lactic Acid, Plasma    Collection Time: 12/14/23  5:15 PM    Specimen: Cannula; Blood   Result Value Ref Range    Lactate 4.0 (C) 0.5 - 2.0 mmol/L   Procalcitonin    Collection Time: 12/14/23  5:15 PM    Specimen: Cannula; Blood   Result Value Ref Range    Procalcitonin 0.26 (H) 0.00 - 0.25 ng/mL   CBC Auto Differential    Collection Time: 12/14/23  5:15 PM    Specimen: Cannula; Blood   Result Value Ref Range    WBC 16.45 (H) 3.40 - 10.80 10*3/mm3    RBC 5.10 4.14 - 5.80 10*6/mm3    Hemoglobin 15.8 13.0 - 17.7 g/dL    Hematocrit 47.3 37.5 - 51.0 %    MCV 92.7 79.0 - 97.0 fL    MCH 31.0 26.6 - 33.0 pg    MCHC 33.4 31.5 - 35.7 g/dL    RDW 13.9 12.3 - 15.4 %    RDW-SD 47.7 37.0 - 54.0 fl    MPV 10.8 6.0 - 12.0 fL    Platelets 214 140 - 450 10*3/mm3    Neutrophil % 85.5 (H) 42.7 - 76.0 %    Lymphocyte % 7.4 (L) 19.6 - 45.3 %    Monocyte % 4.2 (L) 5.0 - 12.0 %    Eosinophil % 0.5 0.3 - 6.2 %    Basophil % 0.2 0.0 - 1.5 %    Immature Grans % 2.2 (H) 0.0 - 0.5 %    Neutrophils, Absolute 14.06 (H) 1.70 - 7.00 10*3/mm3    Lymphocytes, Absolute 1.22 0.70 - 3.10 10*3/mm3    Monocytes, Absolute 0.69 0.10 - 0.90 10*3/mm3    Eosinophils, Absolute 0.09 0.00 - 0.40 10*3/mm3    Basophils, Absolute 0.03 0.00 - 0.20 10*3/mm3    Immature Grans, Absolute 0.36 (H) 0.00 - 0.05 10*3/mm3    nRBC 0.1 0.0 - 0.2 /100 WBC   Respiratory Panel PCR w/COVID-19(SARS-CoV-2) PORFIRIO/MARKUS/MARISOL/PAD/COR/MADDY In-House, NP Swab in [facility], 2 HR TAT - Swab, Nasopharynx    Collection Time: 12/14/23  5:16 PM    Specimen: Nasopharynx; Swab    Result Value Ref Range    ADENOVIRUS, PCR Not Detected Not Detected    Coronavirus 229E Not Detected Not Detected    Coronavirus HKU1 Not Detected Not Detected    Coronavirus NL63 Not Detected Not Detected    Coronavirus OC43 Not Detected Not Detected    COVID19 Not Detected Not Detected - Ref. Range    Human Metapneumovirus Not Detected Not Detected    Human Rhinovirus/Enterovirus Not Detected Not Detected    Influenza A PCR Not Detected Not Detected    Influenza B PCR Not Detected Not Detected    Parainfluenza Virus 1 Not Detected Not Detected    Parainfluenza Virus 2 Not Detected Not Detected    Parainfluenza Virus 3 Not Detected Not Detected    Parainfluenza Virus 4 Not Detected Not Detected    RSV, PCR Not Detected Not Detected    Bordetella pertussis pcr Not Detected Not Detected    Bordetella parapertussis PCR Not Detected Not Detected    Chlamydophila pneumoniae PCR Not Detected Not Detected    Mycoplasma pneumo by PCR Not Detected Not Detected       Ordered the above labs and reviewed the results.        RADIOLOGY  XR Chest 1 View    Result Date: 12/14/2023  XR CHEST 1 VW-12/14/2023  HISTORY: Cough.  Heart size is within normal limits. Patient is rotated slightly to the left. Lungs appear clear. There is some aortic calcification.      1. No acute process.   This report was finalized on 12/14/2023 5:09 PM by Dr. Yovanny Ruiz M.D on Workstation: [email or web address]       Ordered the above noted radiological studies.  Chest x-ray independently interpreted by me and shows no evidence of pneumonia          PROCEDURES  Procedures    EKG          EKG time: 1652  Rhythm/Rate: Sinus bradycardia 52  P waves and UT: Normal P waves  QRS, axis: Normal QRS  ST and T waves: Prolonged QT    Interpreted Contemporaneously by me, independently viewed  Unchanged compared to prior 2/28/2023          MEDICATIONS GIVEN IN ER  Medications   acetaminophen (TYLENOL) tablet 650 mg (has no administration in time range)    sennosides-docusate (PERICOLACE) 8.6-50 MG per tablet 2 tablet (has no administration in time range)     And   polyethylene glycol (MIRALAX) packet 17 g (has no administration in time range)     And   bisacodyl (DULCOLAX) EC tablet 5 mg (has no administration in time range)     And   bisacodyl (DULCOLAX) suppository 10 mg (has no administration in time range)   ondansetron (ZOFRAN) tablet 4 mg (has no administration in time range)     Or   ondansetron (ZOFRAN) injection 4 mg (has no administration in time range)   melatonin tablet 3 mg (has no administration in time range)   sodium chloride 0.9 % infusion (has no administration in time range)   sodium chloride 0.9 % bolus 1,000 mL (1,000 mL Intravenous New Bag 12/14/23 1812)                   MEDICAL DECISION MAKING, PROGRESS, and CONSULTS     Discussion below represents my analysis of pertinent findings related to patient's condition, differential diagnosis, treatment plan and final disposition.      Additional sources:  - Discussed/ obtained information from independent historians: None    - External (non-ED) record review: Epic reviewed patient last seen by primary provider 9/28/2023 for hypertension    - Chronic or social conditions impacting care: None    - Shared decision making: None      Orders placed during this visit:  Orders Placed This Encounter   Procedures    Blood Culture - Blood,    Blood Culture - Blood,    Respiratory Panel PCR w/COVID-19(SARS-CoV-2) PORFIRIO/MARKUS/MARISOL/PAD/COR/MADDY In-House, NP Swab in UTM/VTM, 2 HR TAT - Swab, Nasopharynx    XR Chest 1 View    CT Abdomen Pelvis Without Contrast    Comprehensive Metabolic Panel    Lactic Acid, Plasma    Procalcitonin    CBC Auto Differential    STAT Lactic Acid, Reflex    PTH, Intact    Basic Metabolic Panel    CBC (No Diff)    Diet: Cardiac Diets; Healthy Heart (2-3 Na+); Texture: Regular Texture (IDDSI 7); Fluid Consistency: Thin (IDDSI 0)    Bladder scan    Vital Signs    Up with assistance    Daily  Weights    Strict Intake & Output    Oral Care    Place Sequential Compression Device    Maintain Sequential Compression Device    Code Status and Medical Interventions:    LHA (on-call MD unless specified) Details    ECG 12 Lead Syncope    Inpatient Admission    Inpatient Admission    CBC & Differential         Additional orders considered but not ordered:  None        Differential diagnosis includes but is not limited to:    Hypercalcemia acute kidney injury sepsis      Independent interpretation of labs, radiology studies, and discussions with consultants:  ED Course as of 12/14/23 1940   Thu Dec 14, 2023   1925 19:25 EST  Patient presents for hypotension and appears to be in acute renal failure.  Patient's creatinine 5.  Patient also hypercalcemic 17.  Patient does have elevated white blood cell count but has no focus of infection.  No fever.  CT scan shows no evidence of urinary retention.  Patient has been discussed with Dr. Rosas and will be admitted.  Discussed antibiotics with admitting and they would like to hold off for now. [SL]      ED Course User Index  [SL] Wood Rich MD                 DIAGNOSIS  Final diagnoses:   ARLYN (acute kidney injury)   Hypercalcemia         DISPOSITION  admit            Latest Documented Vital Signs:  As of 19:40 EST  BP- 121/66 HR- 57 Temp- 95.1 °F (35.1 °C) (Tympanic) O2 sat- 99%              --    Please note that portions of this were completed with a voice recognition program.       Note Disclaimer: At [facility], we believe that sharing information builds trust and better relationships. You are receiving this note because you are receiving care at [facility] or recently visited. It is possible you will see health information before a provider has talked with you about it. This kind of information can be easy to misunderstand. To help you fully understand what it means for your health, we urge you to discuss this note with your provider.             Wood Rich MD  23      Electronically signed by Wood Rich MD at 23          Physician Progress Notes (last 48 hours)        Joey BuddyDO at 12/15/23 1025              Name: Sim Agustin ADMIT: 2023   : 1955  PCP: Seth Hester MD    MRN: [id number] LOS: 1 days   AGE/SEX: 67 y.o. male  ROOM: [facility]     Subjective   Subjective   Patient seen and examined this morning.  Hospital day 1.  At time of my examination, patient is awake, alert, sitting up in bed and resting.  He continues to endorse ongoing generalized fatigue and weakness, however, no additional acute complaints at present.      Objective   Objective   Vital Signs  Temp:  [95.1 °F (35.1 °C)-97.7 °F (36.5 °C)] 97.3 °F (36.3 °C)  Heart Rate:  [57-68] 66  Resp:  [16-18] 18  BP: ()/(48-84) 93/48  SpO2:  [90 %-99 %] 93 %  on  Flow (L/min):  [2] 2;   Device (Oxygen Therapy): nasal cannula  Body mass index is 24.24 kg/m².  Physical Exam  Vitals and nursing note reviewed.   Constitutional:       General: He is awake. He is not in acute distress.     Appearance: He is ill-appearing.   HENT:      Head: Atraumatic.   Eyes:      General: No scleral icterus.     Pupils: Pupils are equal, round, and reactive to light.   Cardiovascular:      Rate and Rhythm: Normal rate and regular rhythm.      Pulses: Normal pulses.      Heart sounds: Normal heart sounds.   Pulmonary:      Effort: Pulmonary effort is normal. No respiratory distress.      Breath sounds: Normal breath sounds.   Abdominal:      General: Bowel sounds are normal.      Palpations: Abdomen is soft.      Tenderness: There is no abdominal tenderness.   Musculoskeletal:      Comments: Left AKA   Skin:     General: Skin is warm and dry.      Coloration: Skin is pale.   Neurological:      Mental Status: He is alert.   Psychiatric:         Behavior: Behavior is cooperative.       Results Review     I reviewed the patient's new clinical  "results.  Results from last 7 days   Lab Units 12/15/23  0432 12/14/23  1715   WBC 10*3/mm3 13.67* 16.45*   HEMOGLOBIN g/dL 12.9* 15.8   PLATELETS 10*3/mm3 173 214     Results from last 7 days   Lab Units 12/15/23  0432 12/14/23  1715   SODIUM mmol/L 131* 134*   POTASSIUM mmol/L 3.1* 3.3*   CHLORIDE mmol/L 90* 85*   CO2 mmol/L 31.0* 32.9*   BUN mg/dL 70* 61*   CREATININE mg/dL 4.61* 5.09*   GLUCOSE mg/dL 88 165*   EGFR mL/min/1.73 13.2* 11.7*     Results from last 7 days   Lab Units 12/14/23  1715   ALBUMIN g/dL 4.1   BILIRUBIN mg/dL 1.2   ALK PHOS U/L 66   AST (SGOT) U/L 23   ALT (SGPT) U/L 14     Results from last 7 days   Lab Units 12/15/23  0432 12/14/23  1715   CALCIUM mg/dL 14.7* 17.0*   ALBUMIN g/dL  --  4.1   MAGNESIUM mg/dL 2.8*  --      Results from last 7 days   Lab Units 12/14/23  2313 12/14/23 2003 12/14/23  1715   PROCALCITONIN ng/mL  --   --  0.26*   LACTATE mmol/L 1.4 4.4* 4.0*     No results found for: \"HGBA1C\", \"POCGLU\"    CT Abdomen Pelvis Without Contrast    Result Date: 12/14/2023   1. No acute inflammatory process of bowel is identified, follow-up as indications persist.  2. Cholelithiasis.  3. No urolithiasis or hydronephrosis. Increased right renal cyst.  4. Indeterminate nodularity in the lower lungs, chest CT follow-up recommended.  5. Limited as described.  This report was finalized on 12/14/2023 8:01 PM by Dr. Diogo Suarez M.D on Workstation: [id number]      XR Chest 1 View    Result Date: 12/14/2023  1. No acute process.   This report was finalized on 12/14/2023 5:09 PM by Dr. Yovanny Ruiz M.D on Workstation: [id number]       I have personally reviewed all medications:  Scheduled Medications  allopurinol, 100 mg, Oral, Daily  ALPRAZolam, 1 mg, Oral, BID  aspirin, 81 mg, Oral, Nightly  atorvastatin, 40 mg, Oral, Nightly  brimonidine, 1 drop, Both Eyes, BID  budesonide-formoterol, 2 puff, Inhalation, BID - RT   And  tiotropium bromide monohydrate, 2 puff, Inhalation, Daily - " RT  buPROPion XL, 300 mg, Oral, Daily  clopidogrel, 75 mg, Oral, Nightly  gabapentin, 300 mg, Oral, TID  isosorbide mononitrate, 30 mg, Oral, QAM  latanoprost, 1 drop, Both Eyes, Nightly  levothyroxine, 137 mcg, Oral, Daily  multivitamin with minerals, 1 tablet, Oral, Daily  potassium chloride, 40 mEq, Oral, Once  senna-docusate sodium, 2 tablet, Oral, BID    Infusions  sodium chloride, 125 mL/hr, Last Rate: 75 mL/hr (12/15/23 0911)    Diet  Diet: Cardiac Diets; Healthy Heart (2-3 Na+); Texture: Regular Texture (IDDSI 7); Fluid Consistency: Thin (IDDSI 0)    I have personally reviewed:  [x]  Laboratory   [x]  Microbiology   [x]  Radiology   [x]  EKG/Telemetry  [x]  Cardiology/Vascular   []  Pathology    []  Records      Assessment/Plan     Active Hospital Problems    Diagnosis  POA    **Acute kidney injury [N17.9]  Yes    Stage 3b chronic kidney disease (CKD) [N18.32]  Yes    Anemia [D64.9]  Yes    Leukocytosis [D72.829]  Yes    Hyponatremia [E87.1]  Yes    Hypokalemia [E87.6]  Yes    Lactic acidosis [E87.20]  Yes    Hypotension [I95.9]  Clinically Undetermined    Hypercalcemia [E83.52]  Yes    Hyperlipidemia [E78.5]  Yes    PRISCILLA treated with BiPAP [G47.33]  Yes    Hypothyroidism [E03.9]  Yes    CAD (coronary artery disease) [I25.10]  Yes    COPD (chronic obstructive pulmonary disease) [J44.9]  Yes    Essential hypertension [I10]  Yes      Resolved Hospital Problems   No resolved problems to display.       67 y.o. male admitted with Acute kidney injury.    ARLYN on CKD stage 3B  - Etiology most likely multifactorial in nature, due to hypotension, hypovolemia/dehydration, hypercalcemia.  - Creatinine on admission: 5.09. Baseline creatinine appears to be around 1.7.  CT abdomen pelvis negative for any evidence of hydronephrosis.  - Nephrology consulted and following, home amlodipine has been discontinued to allow for blood pressure to rise, he is receiving IVF.  Most recent creatinine is slightly improved from prior,  however, does remain significantly above baseline.  - Will continue to follow Nephrology plans/recommendations. Greatly appreciate their help.  - Continue IVF for volume resuscitation and closely monitor.  - Order repeat BMP in AM for reassessment of creatinine, further management based on results of testing.  - Avoid nephrotoxic medications, IVF, strict I/O, daily weights, acute urinary retention protocol.    Hypercalcemia  - Calcium level elevated to 17 on arrival. PTH found to be 29.5. Etiology likely multifactorial from calcium supplementation and volume contraction. He is receiving IVF and Nephrology is following. Most recent calcium remains elevated, but improving from prior.  - Continue IVF and closely monitor. Will continue to follow nephrology plans/recommendations. Greatly appreciate their help.  - Trend calcium levels on daily labs.    Hyponatremia  - Na level low at 131, etiology likely a result of hypovolemia.  - Order repeat BMP in AM for reassessment.    Hypokalemia  - K low on most recent labs; Will replete via electrolyte protocol. Follow up repeat labs to guide ongoing management decisions. Replete PRN per protocol. Continue to monitor    Leukocytosis  - WBC count elevated on most recent labs, etiology likely reactive. Patient afebrile, no other signs or symptoms to suggest acute infection at this time, as CT AP and CXR negative and patient without other complaints.  However, do need to closely monitor.  - Order repeat CBC in AM for reassessment.  If no improvement and/or worsens, can pursue further infectious work-up.    Anemia  - Hemoglobin low on most recent labs, however, stable from prior. No evidence of overt blood loss.   - Order iron studies, ferritin, B12, folate.  - Order repeat CBC in AM for reassessment. Continue to monitor, transfuse for hemoglobin <7.    Lactic acidosis  - Noted on arrival, improving with IVF. Continue to monitor.    Hypertension complicated by hypotension prior to  arrival  - Blood pressure appears soft but acceptable at this time. Home Amlodipine stopped.  - Trend BP to guide ongoing management decisions.    Coronary artery disease  - Patient appears stable from this perspective at this time, denies any chest pain, dyspnea, palpitations, no signs/symptoms to suggest ACS.  - Continue current treatment as prescribed. Will continue to monitor.  - Continue telemetry monitoring.    COPD  - Appears stable from this perspective at present.  No evidence to suggest acute exacerbation.  Continue current medications as prescribed and closely monitor.  - Supplemental oxygen as needed to maintain O2 sats 88 to 92%, pulse oximetry, telemetry monitoring.    Hypothyroidism  - Continue levothyroxine as prescribed.    Hyperlipidemia  - Continue Statin as prescribed.    PRISCILLA  - Okay to use home machine if available.    Peripheral vascular disease  - Complicated by compartment syndrome 2016 leading to left AKA.    SCDs for DVT prophylaxis.  Full code.  Discussed with patient, nursing staff, consulting provider, CCP, and care team on multidisciplinary rounds.  Anticipate discharge  TBD  timing yet to be determined.      Buddy Cook DO  [facility]  12/15/23          Electronically signed by Buddy Cook DO at 12/15/23 1218          Consult Notes (last 48 hours)        Rajesh Sarabia MD at 12/15/23 0802            [facility] Consult Note      Patient Name: Sim Agustin  : 1955  MRN: [id number]  Primary Care Physician:  Seth Hester MD  Referring Physician: No ref. provider found  Date of admission: 2023    Subjective     Reason for Consult:  ARLYN on CKD3b    HPI:   Sim Agustin is a 67 y.o. male with CKD3b (baseline SCr 1.7) whom I see in clinic, admitted yesterday after he fell at home and was unable to get up.  Found to be hypotensive with SBP in the 70s; SCR on arrival 5.1 with serum calcium 17; IVF begun, and SCR 4.6  "this morning with calcium 14.7.  CT scan without IV contrast negative for hydronephrosis; indeterminate nodularity of lower lungs noted.  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    Reports feeling very weak and tired since 12/11  Has been taking \"many\" calcium tablets lately  Describes orthostatic symptoms  No shortness of breath or chest pain  Believes his weight has fallen this week due to very poor appetite; no N/V  Denies diarrhea  No urinary complaints  Not aware of any fever or chills    Review of Systems:   14 point review of systems is otherwise negative except for mentioned above on HPI    Personal History     Past Medical History:   Diagnosis Date    Acute respiratory failure with hypoxia and hypercarbia 03/11/2019    Acute upper respiratory infection 02/06/2013    Amputee, above knee, left     Anemia     per [employer] database    ARF (acute renal failure) 03/04/2016    Asthma 02/02/2018    Atelectasis, left 03/04/2016    Bradycardia 05/23/2019    CAD (coronary artery disease) 04/04/2016    Chronic obstructive pulmonary disease with acute exacerbation 02/06/2013    Clotting disorder 05/01/2003    Colon polyps     Compartment syndrome     AFTER ILIAC SURGERY. ABOVE KNEE AMPUTATION    COPD (chronic obstructive pulmonary disease)     COPD with hypoxia 05/23/2019    Cough     SINCE APRIL WITH PNEUMONIA.     Demand myocardial infarction 05/19/2022    Disease of thyroid gland     HYPOTHYRODISM    Diverticulosis     Elevated hematocrit 01/06/2021    Employs prosthetic leg     ESRD (end stage renal disease) on dialysis 04/06/2016    Fracture of patella 03/03/2015    History of acute renal failure     History of CHF (congestive heart failure)     History of GI bleed 02/2016    AORTODUOD FISTULA    History of sepsis 02/2016    History of transfusion     " MULTIPLE, 2016    Hyperkalemia 04/11/2016    Hyperlipidemia     Hypertension     Hypotension     Hypotension 03/04/2016    Left lower lobe pneumonia 05/18/2022    Nonischemic cardiomyopathy     Nosocomial pneumonia 04/06/2016    Phantom limb pain     SL, WITH LEFT ABOUVE KNEE    Pleural effusion on left 04/11/2016    Pneumonia     SPRING 2016  AFTER SURGERY    Pulmonary embolism     PVD (peripheral vascular disease)     Renal insufficiency     S/P thoracentesis 04/11/2016    Sepsis, unspecified organism 04/05/2016       Past Surgical History:   Procedure Laterality Date    ABOVE KNEE AMPUTATION Left 03/16/2016    Procedure: LT AMPUTATION ABOVE KNEE;  Surgeon: Jose Swann MD;  Location: [facility] MAIN OR;  Service:     ARTERIAL THROMBECTOMY  2001    throbectomy of arterial graft    AXILLARY FEMORAL BYPASS Right 02/15/2016    Exploratory lapartomy, repair aortoduodenal fistula, resection infected aortobifemoral bypass graft, axillobi-superficial femoral artery Pilger-Aneudy bypass graft from right axillary artery, Repair of duodenotomy 4th portion duodenum-Dr. Jose Swann, Dr. Genaro Perrin    BRONCHOSCOPY Left 03/04/2016    Dr. NATALIIA Tate    BRONCHOSCOPY Left 05/20/2022    Procedure: BRONCHOSCOPY WITH BIOPSIES, BRUSHINGS, AND BAL UNDER FLUORO;  Surgeon: Ishmael Tate Jr., MD;  Location: [facility] ENDOSCOPY;  Service: Pulmonary;  Laterality: Left;  PRE OP - LLL CONSOLIDATION  POST OP - SAME    BRONCHOSCOPY RIGID / FLEXIBLE N/A 03/03/2016    Dr. NATALIIA Tate    BRONCHOSCOPY RIGID / FLEXIBLE N/A 02/26/2016    Dr. NATALIIA Tate    CARDIAC CATHETERIZATION N/A 03/18/2019    Procedure: Right and Left Heart Cath;  Surgeon: Nina Storey MD;  Location: [facility] CATH INVASIVE LOCATION;  Service: Cardiovascular    CATARACT EXTRACTION WITH INTRAOCULAR LENS IMPLANT Bilateral     COLON RESECTION WITH COLOSTOMY Left 02/28/2016    Exploratory laparotomy with open left hemicolectomy, end-colostomy, G-tube and  J-tube-Dr. Endy Chaney    COLON SURGERY  3/25/2015    COLOSTOMY    COLONOSCOPY N/A 2015    Dr. Laughlin    COLONOSCOPY N/A 10/12/2016    Procedure: COLONOSCOPY TO 20 CM AND PER STOMA TO 30CM;  Surgeon: Genaro Perrin MD;  Location: [facility] ENDOSCOPY;  Service:     COLONOSCOPY N/A 10/21/2016    Procedure: COLONOSCOPY DONE AT BEDSIDE ONTO CECEM;  Surgeon: Genaro Perrin MD;  Location: [facility] ENDOSCOPY;  Service:     COLONOSCOPY N/A 02/10/2016    Diverticulosis in the entire examined colon, non-bleeding internal hemorrhoids-Dr. Taylor Pina    COLONOSCOPY N/A 02/11/2016    Poor prep, blood in the entire examined colon, normal ileum-Dr. Taylor Pina    COLONOSCOPY W/ POLYPECTOMY N/A 07/27/2015    Normal ileum, diverticulosis in the sigmoid colon: resected and retrieved, one 6 mm polyp in the descending colon: resected and retrieved, the distal rectum and anal verge are normal on retroflexion view-Dr. Miguel Ángel Laughlin    COLOSTOMY CLOSURE N/A 10/13/2016    Procedure: COLOSTOMY TAKEDOWN OR CLOSURE, APPENDECTOMY;  Surgeon: Genaro Perrin MD;  Location: [facility] MAIN OR;  Service:     DEBRIDEMENT LEG Left 03/01/2016    Excisional debridement of the skin, subcutantous tissue, tendon and muscle left calf-Dr. Jose Swann    DEBRIDEMENT LEG Left 02/25/2016    Excisional debridement full-thcikness skin and subcutaneous tissue and tendon and muscle, left medial and lateral calf muscles-Dr. Jose Swann    ENDOSCOPY N/A 10/21/2016    Procedure: ESOPHAGOGASTRODUODENOSCOPY DONE AT BEDSIDE;  Surgeon: Genaro Perrin MD;  Location: [facility] ENDOSCOPY;  Service:     ENDOSCOPY AND COLONOSCOPY N/A 02/28/2016    EGD and Colonoscopy with Candy ink injection-Dr. Endy Chaney    ENTEROSCOPY SMALL BOWEL N/A 02/11/2016    Normal esophagus, normal stomach, normal duodenum, portion of the jejunum normal-Dr. Taylor Pina    ILIAC ARTERY - FEMORAL ARTERY BYPASS GRAFT Bilateral 2002    ILIAC ARTERY STENT Bilateral 2001     INSERTION AND REMOVAL PERITONEAL DIALYSIS CATHETER Right 02/29/2016    Exchange right jugular 16 cm Mahurkar dialysis catheter-Dr. Jose Swann    INSERTION PERITONEAL DIALYSIS CATHETER Left 03/01/2016    Ultrasound-guided access of the left jugular vein, 23 cm left jugular palindrome dialysis catheter placement-Dr. Jose Swann    INSERTION PERITONEAL DIALYSIS CATHETER Right 02/18/2016    Right jugular Mahrkar catherer placement-Dr. Jose Swann    JOINT REPLACEMENT Left     THORACOSCOPY Left 04/18/2016    Procedure: LT THORACOSCOPY VIDEO ASSISTED WITH DECORDICATION;  Surgeon: Genaro Davila III, MD;  Location: [facility];  Service:     THROMBECTOMY Left 02/16/2016    Thombectomy of left femoral graft, left leg arteriogram, left calf forward compartment fasciotomy-Dr. Jose Swann    TOTAL HIP ARTHROPLASTY Left     UPPER GASTROINTESTINAL ENDOSCOPY N/A 02/09/2016    Normal UGI-Dr. Ajay Parkinson    UPPER GASTROINTESTINAL ENDOSCOPY N/A 11/28/2015    Small hiatal hernia, chronic gastritis: biopsied, non-bleeding angioectasias in the stomach: treated with argon plasma coagulation, multiple bleeding angioectasias in the dudenum: treated with argon plasma coagulation-Dr. Mykel Jaramillo    VASCULAR SURGERY      MULTIPLE    VENTRAL/INCISIONAL HERNIA REPAIR N/A 10/19/2017    Procedure: VENTRAL HERNIA REPAIR WITH MESH AND BILATERAL COMPONENT SEPARATION;  Surgeon: Genaro Perrin MD;  Location: [facility];  Service:     WISDOM TOOTH EXTRACTION         Family History: family history includes Cancer in his mother; Diabetes in his father; Heart disease in his father; Hypertension in his father; Lung cancer in his mother.    Social History:  reports that he quit smoking about 2 years ago. His smoking use included cigarettes. He started smoking about 49 years ago. He has a 30.00 pack-year smoking history. He has never been exposed to tobacco smoke. He has never used smokeless tobacco. He reports that  he does not currently use drugs after having used the following drugs: Other and Marijuana. Frequency: 1.00 time per week. He reports that he does not drink alcohol.    Home Medications:  Prior to Admission medications    Medication Sig Start Date End Date Taking? Authorizing Provider   albuterol (ACCUNEB) 0.63 MG/3ML nebulizer solution Inhale 3 mL Every 6 (Six) Hours As Needed.   Yes Emergency, Nurse Gonzalo, RN   allopurinol (Zyloprim) 100 MG tablet Take 1 tab by mouth daily for gout 8/1/23  Yes Seth Hester MD   ALPRAZolam (XANAX) 1 MG tablet Take 1 tablet twice daily 4/24/23  Yes Seth Hester MD   amLODIPine (NORVASC) 10 MG tablet Take 1 tablet by mouth Daily. 2/17/23  Yes Seth Hester MD   aspirin 81 MG chewable tablet Chew 1 tablet Every Night.   Yes Primitivo Lagunas MD   atorvastatin (LIPITOR) 40 MG tablet TAKE 1 TABLET BY MOUTH EVERY DAY FOR CHOLESTEROL AND HEART HEALTH 8/9/23  Yes Seth Hester MD   brimonidine (ALPHAGAN) 0.2 % ophthalmic solution instill 1 drop into both eyes twice a day 9/14/23  Yes Primitivo Lagunas MD   buPROPion XL (WELLBUTRIN XL) 300 MG 24 hr tablet TAKE 1 TABLET BY MOUTH EVERY DAY 1/4/23  Yes Seth Hester MD   clopidogrel (PLAVIX) 75 MG tablet Take 1 tablet by mouth Every Night. 2/17/23  Yes Seth Hester MD   Coenzyme Q10 400 MG capsule Take 1 capsule by mouth Every Evening.   Yes Primitivo Lagunas MD   FERROUS SULFATE PO Take 65 mg by mouth Every Night.   Yes Primitivo Lagunas MD   gabapentin (NEURONTIN) 300 MG capsule Take 1 capsule by mouth 3 (Three) Times a Day. 9/15/23  Yes Seth Hester MD   isosorbide mononitrate (IMDUR) 30 MG 24 hr tablet TAKE 1 TABLET BY MOUTH EVERY DAY IN THE MORNING 3/21/23  Yes Seth Hester MD   latanoprost (XALATAN) 0.005 % ophthalmic solution Administer 1 drop to both eyes every night at bedtime. 7/14/21  Yes Primitivo Lagunas MD   levothyroxine (SYNTHROID, LEVOTHROID) 137 MCG tablet Take 1 tablet by mouth Daily. 10/19/23  Yes  Seth Hester MD   Multiple Vitamins-Minerals (MULTIVITAMIN ADULT PO) Take 1 tablet by mouth Daily.   Yes Primitivo Lagunas MD   Omega-3 Fatty Acids (fish oil) 1000 MG capsule capsule Take 1 capsule by mouth Daily. Took this am   Yes Primitivo Lagunas MD   Thiamine HCl (VITAMIN B-1 PO) Take 1 tablet by mouth Daily.   Yes Primitivo Lagunas MD   torsemide (DEMADEX) 20 MG tablet Take 1 tablet by mouth Daily. 12/6/23  Yes Seth Hester MD   traMADol (ULTRAM) 50 MG tablet Take 1 tablet by mouth 2 (Two) Times a Day. 12/6/23  Yes Seth Hester MD   Fluticasone-Umeclidin-Vilant (TRELEGY ELLIPTA) 100-62.5-25 MCG/INH aerosol powder  Inhale 1 each Daily. 3/19/19   Jemima Tran MD       Allergies:  Allergies   Allergen Reactions    Augmentin [Amoxicillin-Pot Clavulanate] Hives and Rash     Tolerate cephalosporins       Objective     Vitals:   Temp:  [95.1 °F (35.1 °C)-97.7 °F (36.5 °C)] 97.3 °F (36.3 °C)  Heart Rate:  [57-68] 68  Resp:  [16-18] 18  BP: ()/(48-84) 93/48  Flow (L/min):  [2] 2    Intake/Output Summary (Last 24 hours) at 12/15/2023 0802  Last data filed at 12/15/2023 0500  Gross per 24 hour   Intake 860 ml   Output 200 ml   Net 660 ml       Physical Exam:   Constitutional: Awake, alert, NAD, oriented, chronically ill  HEENT: Sclera anicteric, no conjunctival injection  Neck: Supple, bilateral carotid bruits, trachea at midline, no JVD  Respiratory: Clear to auscultation bilaterally, nonlabored on 2 L/min  Cardiovascular: RRR, 2/6M, no rub  Gastrointestinal: BS +, home soft, nontender and nondistended  : No palpable bladder  Musculoskeletal: No edema; left AKA s  Psychiatric: Appropriate affect, cooperative, oriented  Neurologic: No asterixis, moving all extremities, normal speech  Skin: Warm and dry       Scheduled Meds:     allopurinol, 100 mg, Oral, Daily  ALPRAZolam, 1 mg, Oral, BID  amLODIPine, 10 mg, Oral, Daily  aspirin, 81 mg, Oral, Nightly  atorvastatin, 40 mg, Oral,  Nightly  brimonidine, 1 drop, Both Eyes, BID  budesonide-formoterol, 2 puff, Inhalation, BID - RT   And  tiotropium bromide monohydrate, 2 puff, Inhalation, Daily - RT  buPROPion XL, 300 mg, Oral, Daily  clopidogrel, 75 mg, Oral, Nightly  gabapentin, 300 mg, Oral, TID  isosorbide mononitrate, 30 mg, Oral, QAM  latanoprost, 1 drop, Both Eyes, Nightly  levothyroxine, 137 mcg, Oral, Daily  multivitamin with minerals, 1 tablet, Oral, Daily  senna-docusate sodium, 2 tablet, Oral, BID      IV Meds:   sodium chloride, 75 mL/hr, Last Rate: 75 mL/hr (12/14/23 2133)        Results Reviewed:   I have personally reviewed the results from the time of this admission to 12/15/2023 08:02 EST     Lab Results   Component Value Date    GLUCOSE 88 12/15/2023    CALCIUM 14.7 (C) 12/15/2023     (L) 12/15/2023    K 3.1 (L) 12/15/2023    CO2 31.0 (H) 12/15/2023    CL 90 (L) 12/15/2023    BUN 70 (H) 12/15/2023    CREATININE 4.61 (H) 12/15/2023    EGFRIFAFRI 51 (L) 09/10/2021    EGFRIFNONA 44 (L) 09/10/2021    BCR 15.2 12/15/2023    ANIONGAP 10.0 12/15/2023      Lab Results   Component Value Date    MG 2.3 03/02/2023    PHOS 3.2 03/02/2023    ALBUMIN 4.1 12/14/2023           Assessment / Plan       Acute kidney injury    Hypercalcemia      ASSESSMENT:  1.  ARLYN on CKD3b, oliguric, likely prerenal from hypotension, hypovolemia, and severe hypercalcemia.  Slight improvement in azotemia with IVF; calcium also slightly better.  Elevated lactate, resolving.  Hypokalemic; hypovolemic hyponatremia  2.  Hypercalcemia, multifactorial from calcium supplements and volume contraction.  PTH ought to be lower in the face of this degree of hypercalcemia.  Malignancy is always a consideration, too  3.  Hypotension, likely from hypovolemia; on amlodipine  4.  Anemia, being unmasked with fluid resuscitation  5.  PVD with compartment syndrome in 2016 leading to left AKA  6.  COPD    PLAN:  1.  Discontinue amlodipine and let blood pressure rise   2.   Increase IVF to 125 mL/h; continue holding torsemide  3.  Replace potassium; check magnesium  4.  Serial calcium levels; check vitamin D and PTH-RP    Thank you for involving us in the care of Sim Agustin.  Please feel free to call with any questions.    Rajesh Sarabia MD  12/15/23  08:02 [facility]    [facility]  [phone]      Please note that portions of this note were completed with a voice recognition program.    Electronically signed by Rajesh Sarabia MD at 12/15/23 8842

## 2023-12-15 NOTE — PROGRESS NOTES
Name: Sim Agustin ADMIT: 2023   : 1955  PCP: Seth Hester MD    MRN: [id number] LOS: 1 days   AGE/SEX: 67 y.o. male  ROOM: [facility]     Subjective   Subjective   Patient seen and examined this morning.  Hospital day 1.  At time of my examination, patient is awake, alert, sitting up in bed and resting.  He continues to endorse ongoing generalized fatigue and weakness, however, no additional acute complaints at present.       Objective   Objective   Vital Signs  Temp:  [95.1 °F (35.1 °C)-97.7 °F (36.5 °C)] 97.3 °F (36.3 °C)  Heart Rate:  [57-68] 66  Resp:  [16-18] 18  BP: ()/(48-84) 93/48  SpO2:  [90 %-99 %] 93 %  on  Flow (L/min):  [2] 2;   Device (Oxygen Therapy): nasal cannula  Body mass index is 24.24 kg/m².  Physical Exam  Vitals and nursing note reviewed.   Constitutional:       General: He is awake. He is not in acute distress.     Appearance: He is ill-appearing.   HENT:      Head: Atraumatic.   Eyes:      General: No scleral icterus.     Pupils: Pupils are equal, round, and reactive to light.   Cardiovascular:      Rate and Rhythm: Normal rate and regular rhythm.      Pulses: Normal pulses.      Heart sounds: Normal heart sounds.   Pulmonary:      Effort: Pulmonary effort is normal. No respiratory distress.      Breath sounds: Normal breath sounds.   Abdominal:      General: Bowel sounds are normal.      Palpations: Abdomen is soft.      Tenderness: There is no abdominal tenderness.   Musculoskeletal:      Comments: Left AKA   Skin:     General: Skin is warm and dry.      Coloration: Skin is pale.   Neurological:      Mental Status: He is alert.   Psychiatric:         Behavior: Behavior is cooperative.       Results Review     I reviewed the patient's new clinical results.  Results from last 7 days   Lab Units 12/15/23  0432 23  1715   WBC 10*3/mm3 13.67* 16.45*   HEMOGLOBIN g/dL 12.9* 15.8   PLATELETS 10*3/mm3 173 214     Results from last 7 days   Lab Units 12/15/23  0432  "12/14/23  1715   SODIUM mmol/L 131* 134*   POTASSIUM mmol/L 3.1* 3.3*   CHLORIDE mmol/L 90* 85*   CO2 mmol/L 31.0* 32.9*   BUN mg/dL 70* 61*   CREATININE mg/dL 4.61* 5.09*   GLUCOSE mg/dL 88 165*   EGFR mL/min/1.73 13.2* 11.7*     Results from last 7 days   Lab Units 12/14/23  1715   ALBUMIN g/dL 4.1   BILIRUBIN mg/dL 1.2   ALK PHOS U/L 66   AST (SGOT) U/L 23   ALT (SGPT) U/L 14     Results from last 7 days   Lab Units 12/15/23  0432 12/14/23  1715   CALCIUM mg/dL 14.7* 17.0*   ALBUMIN g/dL  --  4.1   MAGNESIUM mg/dL 2.8*  --      Results from last 7 days   Lab Units 12/14/23  2313 12/14/23 2003 12/14/23  1715   PROCALCITONIN ng/mL  --   --  0.26*   LACTATE mmol/L 1.4 4.4* 4.0*     No results found for: \"HGBA1C\", \"POCGLU\"    CT Abdomen Pelvis Without Contrast    Result Date: 12/14/2023   1. No acute inflammatory process of bowel is identified, follow-up as indications persist.  2. Cholelithiasis.  3. No urolithiasis or hydronephrosis. Increased right renal cyst.  4. Indeterminate nodularity in the lower lungs, chest CT follow-up recommended.  5. Limited as described.  This report was finalized on 12/14/2023 8:01 PM by Dr. Diogo Suarez M.D on Workstation: [id number]      XR Chest 1 View    Result Date: 12/14/2023  1. No acute process.   This report was finalized on 12/14/2023 5:09 PM by Dr. Yovanny Ruiz M.D on Workstation: [id number]       I have personally reviewed all medications:  Scheduled Medications  allopurinol, 100 mg, Oral, Daily  ALPRAZolam, 1 mg, Oral, BID  aspirin, 81 mg, Oral, Nightly  atorvastatin, 40 mg, Oral, Nightly  brimonidine, 1 drop, Both Eyes, BID  budesonide-formoterol, 2 puff, Inhalation, BID - RT   And  tiotropium bromide monohydrate, 2 puff, Inhalation, Daily - RT  buPROPion XL, 300 mg, Oral, Daily  clopidogrel, 75 mg, Oral, Nightly  gabapentin, 300 mg, Oral, TID  isosorbide mononitrate, 30 mg, Oral, QAM  latanoprost, 1 drop, Both Eyes, Nightly  levothyroxine, 137 mcg, Oral, " Daily  multivitamin with minerals, 1 tablet, Oral, Daily  potassium chloride, 40 mEq, Oral, Once  senna-docusate sodium, 2 tablet, Oral, BID    Infusions  sodium chloride, 125 mL/hr, Last Rate: 75 mL/hr (12/15/23 0911)    Diet  Diet: Cardiac Diets; Healthy Heart (2-3 Na+); Texture: Regular Texture (IDDSI 7); Fluid Consistency: Thin (IDDSI 0)    I have personally reviewed:  [x]  Laboratory   [x]  Microbiology   [x]  Radiology   [x]  EKG/Telemetry  [x]  Cardiology/Vascular   []  Pathology    []  Records       Assessment/Plan     Active Hospital Problems    Diagnosis  POA    **Acute kidney injury [N17.9]  Yes    Stage 3b chronic kidney disease (CKD) [N18.32]  Yes    Anemia [D64.9]  Yes    Leukocytosis [D72.829]  Yes    Hyponatremia [E87.1]  Yes    Hypokalemia [E87.6]  Yes    Lactic acidosis [E87.20]  Yes    Hypotension [I95.9]  Clinically Undetermined    Hypercalcemia [E83.52]  Yes    Hyperlipidemia [E78.5]  Yes    PRISCILLA treated with BiPAP [G47.33]  Yes    Hypothyroidism [E03.9]  Yes    CAD (coronary artery disease) [I25.10]  Yes    COPD (chronic obstructive pulmonary disease) [J44.9]  Yes    Essential hypertension [I10]  Yes      Resolved Hospital Problems   No resolved problems to display.       67 y.o. male admitted with Acute kidney injury.    ARLYN on CKD stage 3B  - Etiology most likely multifactorial in nature, due to hypotension, hypovolemia/dehydration, hypercalcemia.  - Creatinine on admission: 5.09. Baseline creatinine appears to be around 1.7.  CT abdomen pelvis negative for any evidence of hydronephrosis.  - Nephrology consulted and following, home amlodipine has been discontinued to allow for blood pressure to rise, he is receiving IVF.  Most recent creatinine is slightly improved from prior, however, does remain significantly above baseline.  - Will continue to follow Nephrology plans/recommendations. Greatly appreciate their help.  - Continue IVF for volume resuscitation and closely monitor.  - Order repeat  BMP in AM for reassessment of creatinine, further management based on results of testing.  - Avoid nephrotoxic medications, IVF, strict I/O, daily weights, acute urinary retention protocol.    Hypercalcemia  - Calcium level elevated to 17 on arrival. PTH found to be 29.5. Etiology likely multifactorial from calcium supplementation and volume contraction. He is receiving IVF and Nephrology is following. Most recent calcium remains elevated, but improving from prior.  - Continue IVF and closely monitor. Will continue to follow nephrology plans/recommendations. Greatly appreciate their help.  - Trend calcium levels on daily labs.    Hyponatremia  - Na level low at 131, etiology likely a result of hypovolemia.  - Order repeat BMP in AM for reassessment.    Hypokalemia  - K low on most recent labs; Will replete via electrolyte protocol. Follow up repeat labs to guide ongoing management decisions. Replete PRN per protocol. Continue to monitor    Leukocytosis  - WBC count elevated on most recent labs, etiology likely reactive. Patient afebrile, no other signs or symptoms to suggest acute infection at this time, as CT AP and CXR negative and patient without other complaints.  However, do need to closely monitor.  - Order repeat CBC in AM for reassessment.  If no improvement and/or worsens, can pursue further infectious work-up.    Anemia  - Hemoglobin low on most recent labs, however, stable from prior. No evidence of overt blood loss.   - Order iron studies, ferritin, B12, folate.  - Order repeat CBC in AM for reassessment. Continue to monitor, transfuse for hemoglobin <7.    Lactic acidosis  - Noted on arrival, improving with IVF. Continue to monitor.    Hypertension complicated by hypotension prior to arrival  - Blood pressure appears soft but acceptable at this time. Home Amlodipine stopped.  - Trend BP to guide ongoing management decisions.    Coronary artery disease  - Patient appears stable from this perspective at  this time, denies any chest pain, dyspnea, palpitations, no signs/symptoms to suggest ACS.  - Continue current treatment as prescribed. Will continue to monitor.  - Continue telemetry monitoring.    COPD  - Appears stable from this perspective at present.  No evidence to suggest acute exacerbation.  Continue current medications as prescribed and closely monitor.  - Supplemental oxygen as needed to maintain O2 sats 88 to 92%, pulse oximetry, telemetry monitoring.    Hypothyroidism  - Continue levothyroxine as prescribed.    Hyperlipidemia  - Continue Statin as prescribed.    PRISCILLA  - Okay to use home machine if available.    Peripheral vascular disease  - Complicated by compartment syndrome 2016 leading to left AKA.    SCDs for DVT prophylaxis.  Full code.  Discussed with patient, nursing staff, consulting provider, CCP, and care team on multidisciplinary rounds.  Anticipate discharge  TBD  timing yet to be determined.      Buddy Cook DO  [facility]  12/15/23

## 2023-12-15 NOTE — CONSULTS
"  [facility] Consult Note      Patient Name: Sim Agustin  : 1955  MRN: [id number]  Primary Care Physician:  Seth Hester MD  Referring Physician: No ref. provider found  Date of admission: 2023    Subjective     Reason for Consult:  ARLYN on CKD3b    HPI:   Sim Agustin is a 67 y.o. male with CKD3b (baseline SCr 1.7) whom I see in clinic, admitted yesterday after he fell at home and was unable to get up.  Found to be hypotensive with SBP in the 70s; SCR on arrival 5.1 with serum calcium 17; IVF begun, and SCR 4.6 this morning with calcium 14.7.  CT scan without IV contrast negative for hydronephrosis; indeterminate nodularity of lower lungs noted.  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    Reports feeling very weak and tired since   Has been taking \"many\" calcium tablets lately  Describes orthostatic symptoms  No shortness of breath or chest pain  Believes his weight has fallen this week due to very poor appetite; no N/V  Denies diarrhea  No urinary complaints  Not aware of any fever or chills    Review of Systems:   14 point review of systems is otherwise negative except for mentioned above on HPI    Personal History     Past Medical History:   Diagnosis Date    Acute respiratory failure with hypoxia and hypercarbia 2019    Acute upper respiratory infection 2013    Amputee, above knee, left     Anemia     per [place]    ARF (acute renal failure) 2016    Asthma 2018    Atelectasis, left 2016    Bradycardia 2019    CAD (coronary artery disease) 2016    Chronic obstructive pulmonary disease with acute exacerbation 2013    Clotting disorder 2003    Colon polyps     Compartment syndrome     AFTER ILIAC SURGERY. ABOVE KNEE AMPUTATION    COPD (chronic " obstructive pulmonary disease)     COPD with hypoxia 05/23/2019    Cough     SINCE APRIL WITH PNEUMONIA.     Demand myocardial infarction 05/19/2022    Disease of thyroid gland     HYPOTHYRODISM    Diverticulosis     Elevated hematocrit 01/06/2021    Employs prosthetic leg     ESRD (end stage renal disease) on dialysis 04/06/2016    Fracture of patella 03/03/2015    History of acute renal failure     History of CHF (congestive heart failure)     History of GI bleed 02/2016    AORTODUOD FISTULA    History of sepsis 02/2016    History of transfusion     MULTIPLE, 2016    Hyperkalemia 04/11/2016    Hyperlipidemia     Hypertension     Hypotension     Hypotension 03/04/2016    Left lower lobe pneumonia 05/18/2022    Nonischemic cardiomyopathy     Nosocomial pneumonia 04/06/2016    Phantom limb pain     SL, WITH LEFT ABOUVE KNEE    Pleural effusion on left 04/11/2016    Pneumonia     SPRING 2016  AFTER SURGERY    Pulmonary embolism     PVD (peripheral vascular disease)     Renal insufficiency     S/P thoracentesis 04/11/2016    Sepsis, unspecified organism 04/05/2016       Past Surgical History:   Procedure Laterality Date    ABOVE KNEE AMPUTATION Left 03/16/2016    Procedure: LT AMPUTATION ABOVE KNEE;  Surgeon: Jose Swann MD;  Location: [facility] MAIN OR;  Service:     ARTERIAL THROMBECTOMY  2001    throbectomy of arterial graft    AXILLARY FEMORAL BYPASS Right 02/15/2016    Exploratory lapartomy, repair aortoduodenal fistula, resection infected aortobifemoral bypass graft, axillobi-superficial femoral artery [place]-Aneudy bypass graft from right axillary artery, Repair of duodenotomy 4th portion duodenum-Dr. Jose Swann, Dr. Genaro Perrin    BRONCHOSCOPY Left 03/04/2016    Dr. NATALIIA Tate    BRONCHOSCOPY Left 05/20/2022    Procedure: BRONCHOSCOPY WITH BIOPSIES, BRUSHINGS, AND BAL UNDER FLUORO;  Surgeon: Ishmael Tate Jr., MD;  Location: [facility] ENDOSCOPY;  Service: Pulmonary;  Laterality: Left;  PRE OP -  LLL CONSOLIDATION  POST OP - SAME    BRONCHOSCOPY RIGID / FLEXIBLE N/A 03/03/2016    Dr. NATALIIA Tate    BRONCHOSCOPY RIGID / FLEXIBLE N/A 02/26/2016    Dr. NATALIIA Tate    CARDIAC CATHETERIZATION N/A 03/18/2019    Procedure: Right and Left Heart Cath;  Surgeon: Nina Storey MD;  Location: [facility] CATH INVASIVE LOCATION;  Service: Cardiovascular    CATARACT EXTRACTION WITH INTRAOCULAR LENS IMPLANT Bilateral     COLON RESECTION WITH COLOSTOMY Left 02/28/2016    Exploratory laparotomy with open left hemicolectomy, end-colostomy, G-tube and J-tube-Dr. Endy Chaney    COLON SURGERY  3/25/2015    COLOSTOMY    COLONOSCOPY N/A 2015    Dr. Laughlin    COLONOSCOPY N/A 10/12/2016    Procedure: COLONOSCOPY TO 20 CM AND PER STOMA TO 30CM;  Surgeon: Genaro Perrin MD;  Location: [facility] ENDOSCOPY;  Service:     COLONOSCOPY N/A 10/21/2016    Procedure: COLONOSCOPY DONE AT BEDSIDE ONTO CECEM;  Surgeon: Genaro Perrin MD;  Location: [facility] ENDOSCOPY;  Service:     COLONOSCOPY N/A 02/10/2016    Diverticulosis in the entire examined colon, non-bleeding internal hemorrhoids-Dr. Taylor Pina    COLONOSCOPY N/A 02/11/2016    Poor prep, blood in the entire examined colon, normal ileum-Dr. Taylor Pina    COLONOSCOPY W/ POLYPECTOMY N/A 07/27/2015    Normal ileum, diverticulosis in the sigmoid colon: resected and retrieved, one 6 mm polyp in the descending colon: resected and retrieved, the distal rectum and anal verge are normal on retroflexion view-Dr. Miguel Ángel Laughlin    COLOSTOMY CLOSURE N/A 10/13/2016    Procedure: COLOSTOMY TAKEDOWN OR CLOSURE, APPENDECTOMY;  Surgeon: Genaro Perrin MD;  Location: [facility] OR;  Service:     DEBRIDEMENT LEG Left 03/01/2016    Excisional debridement of the skin, subcutantous tissue, tendon and muscle left calf-Dr. Jose Swann    DEBRIDEMENT LEG Left 02/25/2016    Excisional debridement full-thcikness skin and subcutaneous tissue and tendon and muscle, left medial and  lateral calf muscles-Dr. Jose Swann    ENDOSCOPY N/A 10/21/2016    Procedure: ESOPHAGOGASTRODUODENOSCOPY DONE AT BEDSIDE;  Surgeon: Genaro Perrin MD;  Location: [facility] ENDOSCOPY;  Service:     ENDOSCOPY AND COLONOSCOPY N/A 02/28/2016    EGD and Colonoscopy with Candy ink injection-Dr. Endy Chaney    ENTEROSCOPY SMALL BOWEL N/A 02/11/2016    Normal esophagus, normal stomach, normal duodenum, portion of the jejunum normal-Dr. Taylor Pina    ILIAC ARTERY - FEMORAL ARTERY BYPASS GRAFT Bilateral 2002    ILIAC ARTERY STENT Bilateral 2001    INSERTION AND REMOVAL PERITONEAL DIALYSIS CATHETER Right 02/29/2016    Exchange right jugular 16 cm Mah[place]kar dialysis catheter-Dr. Jose Swann    INSERTION PERITONEAL DIALYSIS CATHETER Left 03/01/2016    Ultrasound-guided access of the left jugular vein, 23 cm left jugular palindrome dialysis catheter placement-Dr. Jose Swann    INSERTION PERITONEAL DIALYSIS CATHETER Right 02/18/2016    Right jugular Mahrkar catherer placement-Dr. Jose Swann    JOINT REPLACEMENT Left     THORACOSCOPY Left 04/18/2016    Procedure: LT THORACOSCOPY VIDEO ASSISTED WITH DECORDICATION;  Surgeon: Genaro Davila III, MD;  Location: [facility] OR;  Service:     THROMBECTOMY Left 02/16/2016    Thombectomy of left femoral graft, left leg arteriogram, left calf forward compartment fasciotomy-Dr. Jose Swann    TOTAL HIP ARTHROPLASTY Left     UPPER GASTROINTESTINAL ENDOSCOPY N/A 02/09/2016    Normal UGI-Dr. Ajay Parkinson    UPPER GASTROINTESTINAL ENDOSCOPY N/A 11/28/2015    Small hiatal hernia, chronic gastritis: biopsied, non-bleeding angioectasias in the stomach: treated with argon plasma coagulation, multiple bleeding angioectasias in the dudenum: treated with argon plasma coagulation-Dr. Mykel Jaramillo    VASCULAR SURGERY      MULTIPLE    VENTRAL/INCISIONAL HERNIA REPAIR N/A 10/19/2017    Procedure: VENTRAL HERNIA REPAIR WITH MESH AND BILATERAL COMPONENT SEPARATION;   Surgeon: Genaro Perrin MD;  Location: [facility];  Service:     WISDOM TOOTH EXTRACTION         Family History: family history includes Cancer in his mother; Diabetes in his father; Heart disease in his father; Hypertension in his father; Lung cancer in his mother.    Social History:  reports that he quit smoking about 2 years ago. His smoking use included cigarettes. He started smoking about 49 years ago. He has a 30.00 pack-year smoking history. He has never been exposed to tobacco smoke. He has never used smokeless tobacco. He reports that he does not currently use drugs after having used the following drugs: Other and Marijuana. Frequency: 1.00 time per week. He reports that he does not drink alcohol.    Home Medications:  Prior to Admission medications    Medication Sig Start Date End Date Taking? Authorizing Provider   albuterol (ACCUNEB) 0.63 MG/3ML nebulizer solution Inhale 3 mL Every 6 (Six) Hours As Needed.   Yes Emergency, Nurse Gonzalo, RN   allopurinol (Zyloprim) 100 MG tablet Take 1 tab by mouth daily for gout 8/1/23  Yes Seth Hester MD   ALPRAZolam (XANAX) 1 MG tablet Take 1 tablet twice daily 4/24/23  Yes Seth Hester MD   amLODIPine (NORVASC) 10 MG tablet Take 1 tablet by mouth Daily. 2/17/23  Yes Seth Hester MD   aspirin 81 MG chewable tablet Chew 1 tablet Every Night.   Yes Primitivo Lagunas MD   atorvastatin (LIPITOR) 40 MG tablet TAKE 1 TABLET BY MOUTH EVERY DAY FOR CHOLESTEROL AND HEART HEALTH 8/9/23  Yes Seth Hester MD   brimonidine (ALPHAGAN) 0.2 % ophthalmic solution instill 1 drop into both eyes twice a day 9/14/23  Yes Primitivo Lagunas MD   buPROPion XL (WELLBUTRIN XL) 300 MG 24 hr tablet TAKE 1 TABLET BY MOUTH EVERY DAY 1/4/23  Yes Seth Hester MD   clopidogrel (PLAVIX) 75 MG tablet Take 1 tablet by mouth Every Night. 2/17/23  Yes Seth Hester MD   Coenzyme Q10 400 MG capsule Take 1 capsule by mouth Every Evening.   Yes Primitivo Lagunas MD   FERROUS SULFATE  PO Take 65 mg by mouth Every Night.   Yes Primitivo Lagunas MD   gabapentin (NEURONTIN) 300 MG capsule Take 1 capsule by mouth 3 (Three) Times a Day. 9/15/23  Yes Seth Hester MD   isosorbide mononitrate (IMDUR) 30 MG 24 hr tablet TAKE 1 TABLET BY MOUTH EVERY DAY IN THE MORNING 3/21/23  Yes Seth Hester MD   latanoprost (XALATAN) 0.005 % ophthalmic solution Administer 1 drop to both eyes every night at bedtime. 7/14/21  Yes Primitivo Lagunas MD   levothyroxine (SYNTHROID, LEVOTHROID) 137 MCG tablet Take 1 tablet by mouth Daily. 10/19/23  Yes Seth Hester MD   Multiple Vitamins-Minerals (MULTIVITAMIN ADULT PO) Take 1 tablet by mouth Daily.   Yes Primitivo Lagunas MD   Omega-3 Fatty Acids (fish oil) 1000 MG capsule capsule Take 1 capsule by mouth Daily. Took this am   Yes Primitivo Lagunas MD   Thiamine HCl (VITAMIN B-1 PO) Take 1 tablet by mouth Daily.   Yes Primitivo Lagunas MD   torsemide (DEMADEX) 20 MG tablet Take 1 tablet by mouth Daily. 12/6/23  Yes Seth Hester MD   traMADol (ULTRAM) 50 MG tablet Take 1 tablet by mouth 2 (Two) Times a Day. 12/6/23  Yes Seth Hester MD   Fluticasone-Umeclidin-Vilant (TRELEGY ELLIPTA) 100-62.5-25 MCG/INH aerosol powder  Inhale 1 each Daily. 3/19/19   Jemima Tran MD       Allergies:  Allergies   Allergen Reactions    Augmentin [Amoxicillin-Pot Clavulanate] Hives and Rash     Tolerate cephalosporins       Objective     Vitals:   Temp:  [95.1 °F (35.1 °C)-97.7 °F (36.5 °C)] 97.3 °F (36.3 °C)  Heart Rate:  [57-68] 68  Resp:  [16-18] 18  BP: ()/(48-84) 93/48  Flow (L/min):  [2] 2    Intake/Output Summary (Last 24 hours) at 12/15/2023 0802  Last data filed at 12/15/2023 0500  Gross per 24 hour   Intake 860 ml   Output 200 ml   Net 660 ml       Physical Exam:   Constitutional: Awake, alert, NAD, oriented, chronically ill  HEENT: Sclera anicteric, no conjunctival injection  Neck: Supple, bilateral carotid bruits, trachea at midline, no JVD  Respiratory:  Clear to auscultation bilaterally, nonlabored on 2 L/min  Cardiovascular: RRR, 2/6M, no rub  Gastrointestinal: BS +, home soft, nontender and nondistended  : No palpable bladder  Musculoskeletal: No edema; left AKA s  Psychiatric: Appropriate affect, cooperative, oriented  Neurologic: No asterixis, moving all extremities, normal speech  Skin: Warm and dry       Scheduled Meds:     allopurinol, 100 mg, Oral, Daily  ALPRAZolam, 1 mg, Oral, BID  amLODIPine, 10 mg, Oral, Daily  aspirin, 81 mg, Oral, Nightly  atorvastatin, 40 mg, Oral, Nightly  brimonidine, 1 drop, Both Eyes, BID  budesonide-formoterol, 2 puff, Inhalation, BID - RT   And  tiotropium bromide monohydrate, 2 puff, Inhalation, Daily - RT  buPROPion XL, 300 mg, Oral, Daily  clopidogrel, 75 mg, Oral, Nightly  gabapentin, 300 mg, Oral, TID  isosorbide mononitrate, 30 mg, Oral, QAM  latanoprost, 1 drop, Both Eyes, Nightly  levothyroxine, 137 mcg, Oral, Daily  multivitamin with minerals, 1 tablet, Oral, Daily  senna-docusate sodium, 2 tablet, Oral, BID      IV Meds:   sodium chloride, 75 mL/hr, Last Rate: 75 mL/hr (12/14/23 2133)        Results Reviewed:   I have personally reviewed the results from the time of this admission to 12/15/2023 08:02 EST     Lab Results   Component Value Date    GLUCOSE 88 12/15/2023    CALCIUM 14.7 (C) 12/15/2023     (L) 12/15/2023    K 3.1 (L) 12/15/2023    CO2 31.0 (H) 12/15/2023    CL 90 (L) 12/15/2023    BUN 70 (H) 12/15/2023    CREATININE 4.61 (H) 12/15/2023    EGFRIFAFRI 51 (L) 09/10/2021    EGFRIFNONA 44 (L) 09/10/2021    BCR 15.2 12/15/2023    ANIONGAP 10.0 12/15/2023      Lab Results   Component Value Date    MG 2.3 03/02/2023    PHOS 3.2 03/02/2023    ALBUMIN 4.1 12/14/2023           Assessment / Plan       Acute kidney injury    Hypercalcemia      ASSESSMENT:  1.  ARLYN on CKD3b, oliguric, likely prerenal from hypotension, hypovolemia, and severe hypercalcemia.  Slight improvement in azotemia with IVF; calcium also  slightly better.  Elevated lactate, resolving.  Hypokalemic; hypovolemic hyponatremia  2.  Hypercalcemia, multifactorial from calcium supplements and volume contraction.  PTH ought to be lower in the face of this degree of hypercalcemia.  Malignancy is always a consideration, too  3.  Hypotension, likely from hypovolemia; on amlodipine  4.  Anemia, being unmasked with fluid resuscitation  5.  PVD with compartment syndrome in 2016 leading to left AKA  6.  COPD    PLAN:  1.  Discontinue amlodipine and let blood pressure rise   2.  Increase IVF to 125 mL/h; continue holding torsemide  3.  Replace potassium; check magnesium  4.  Serial calcium levels; check vitamin D and PTH-RP    Thank you for involving us in the care of Sim Agustin.  Please feel free to call with any questions.    Rajesh Sarabia MD  12/15/23  08:02 [facility]    [facility]  [phone]      Please note that portions of this note were completed with a voice recognition program.

## 2023-12-15 NOTE — ED NOTES
..Nursing report ED to floor  Sim Agustin  67 y.o.  male    HPI :   Chief Complaint   Patient presents with    Hypotension       Admitting doctor:   Ruth Rosas MD    Admitting diagnosis:   There were no encounter diagnoses.    Code status:   Current Code Status       Date Active Code Status Order ID Comments User Context       12/14/2023 1907 CPR (Attempt to Resuscitate) [id number]  Ruth Rosas MD ED        Question Answer    Code Status (Patient has no pulse and is not breathing) CPR (Attempt to Resuscitate)    Medical Interventions (Patient has pulse or is breathing) Full                    Allergies:   Augmentin [amoxicillin-pot clavulanate]    Isolation:   No active isolations    Intake and Output    Intake/Output Summary (Last 24 hours) at 12/14/2023 1909  Last data filed at 12/14/2023 1630  Gross per 24 hour   Intake 500 ml   Output --   Net 500 ml       Weight:   There were no vitals filed for this visit.    Most recent vitals:   Vitals:    12/14/23 1632 12/14/23 1641   BP: 96/60 121/66   Pulse: 60    Resp: 16    Temp: 95.1 °F (35.1 °C)    TempSrc: Tympanic    SpO2: 94%        Active LDAs/IV Access:   Lines, Drains & Airways       Active LDAs       Name Placement date Placement time Site Days    Peripheral IV 12/14/23 Right Antecubital 12/14/23  --  Antecubital  less than 1                    Labs (abnormal labs have a star):   Labs Reviewed   COMPREHENSIVE METABOLIC PANEL - Abnormal; Notable for the following components:       Result Value    Glucose 165 (*)     BUN 61 (*)     Creatinine 5.09 (*)     Sodium 134 (*)     Potassium 3.3 (*)     Chloride 85 (*)     CO2 32.9 (*)     Calcium 17.0 (*)     Anion Gap 16.1 (*)     eGFR 11.7 (*)     All other components within normal limits    Narrative:     GFR Normal >60  Chronic Kidney Disease <60  Kidney Failure <15     LACTIC ACID, PLASMA - Abnormal; Notable for the following components:    Lactate 4.0 (*)     All other components within  "normal limits   PROCALCITONIN - Abnormal; Notable for the following components:    Procalcitonin 0.26 (*)     All other components within normal limits    Narrative:     As a Marker for Sepsis (Non-Neonates):    1. <0.5 ng/mL represents a low risk of severe sepsis and/or septic shock.  2. >2 ng/mL represents a high risk of severe sepsis and/or septic shock.    As a Marker for Lower Respiratory Tract Infections that require antibiotic therapy:    PCT on Admission    Antibiotic Therapy       6-12 Hrs later    >0.5                Strongly Recommended  >0.25 - <0.5        Recommended   0.1 - 0.25          Discouraged              Remeasure/reassess PCT  <0.1                Strongly Discouraged     Remeasure/reassess PCT    As 28 day mortality risk marker: \"Change in Procalcitonin Result\" (>80% or <=80%) if Day 0 (or Day 1) and Day 4 values are available. Refer to http://www.[employer]-pct-calculator.com    Change in PCT <=80%  A decrease of PCT levels below or equal to 80% defines a positive change in PCT test result representing a higher risk for 28-day all-cause mortality of patients diagnosed with severe sepsis for septic shock.    Change in PCT >80%  A decrease of PCT levels of more than 80% defines a negative change in PCT result representing a lower risk for 28-day all-cause mortality of patients diagnosed with severe sepsis or septic shock.      CBC WITH AUTO DIFFERENTIAL - Abnormal; Notable for the following components:    WBC 16.45 (*)     Neutrophil % 85.5 (*)     Lymphocyte % 7.4 (*)     Monocyte % 4.2 (*)     Immature Grans % 2.2 (*)     Neutrophils, Absolute 14.06 (*)     Immature Grans, Absolute 0.36 (*)     All other components within normal limits   RESPIRATORY PANEL PCR W/ COVID-19 (SARS-COV-2), NP SWAB IN UTM/VTP, 2 HR TAT - Normal    Narrative:     In the setting of a positive respiratory panel with a viral infection PLUS a negative procalcitonin without other underlying concern for bacterial infection, " consider observing off antibiotics or discontinuation of antibiotics and continue supportive care. If the respiratory panel is positive for atypical bacterial infection (Bordetella pertussis, Chlamydophila pneumoniae, or Mycoplasma pneumoniae), consider antibiotic de-escalation to target atypical bacterial infection.   BLOOD CULTURE   BLOOD CULTURE   LACTIC ACID, REFLEX   PTH, INTACT   CBC AND DIFFERENTIAL    Narrative:     The following orders were created for panel order CBC & Differential.  Procedure                               Abnormality         Status                     ---------                               -----------         ------                     CBC Auto Differential[[id number]]        Abnormal            Final result                 Please view results for these tests on the individual orders.       EKG:   ECG 12 Lead Syncope   Preliminary Result   HEART RATE= 52  bpm   RR Interval= 1154  ms   ND Interval=   ms   P Horizontal Axis=   deg   P Front Axis=   deg   QRSD Interval= 150  ms   QT Interval= 580  ms   QTcB= 540  ms   QRS Axis= -13  deg   T Wave Axis= 65  deg   - ABNORMAL ECG -   Junctional rhythm   Nonspecific intraventricular conduction delay   Inferior infarct, old   Baseline wander in lead(s) II,III,aVF   Electronically Signed By:    Date and Time of Study: 2023-12-14 16:52:08          Meds given in ED:   Medications   acetaminophen (TYLENOL) tablet 650 mg (has no administration in time range)   sennosides-docusate (PERICOLACE) 8.6-50 MG per tablet 2 tablet (has no administration in time range)     And   polyethylene glycol (MIRALAX) packet 17 g (has no administration in time range)     And   bisacodyl (DULCOLAX) EC tablet 5 mg (has no administration in time range)     And   bisacodyl (DULCOLAX) suppository 10 mg (has no administration in time range)   ondansetron (ZOFRAN) tablet 4 mg (has no administration in time range)     Or   ondansetron (ZOFRAN) injection 4 mg (has no  administration in time range)   melatonin tablet 3 mg (has no administration in time range)   sodium chloride 0.9 % bolus 1,000 mL (1,000 mL Intravenous New Bag 23 1812)       Imaging results:  XR Chest 1 View    Result Date: 2023  1. No acute process.   This report was finalized on 2023 5:09 PM by Dr. Yovanny Ruiz M.D on Workstation: [email or web address]       Ambulatory status:   - assist x1    Social issues:   Social History     Socioeconomic History    Marital status: Single   Tobacco Use    Smoking status: Former     Packs/day: 1.00     Years: 30.00     Additional pack years: 0.00     Total pack years: 30.00     Types: Cigarettes     Start date: 1974     Quit date: 2021     Years since quittin.6     Passive exposure: Never    Smokeless tobacco: Never    Tobacco comments:     caffeine - rarely   Vaping Use    Vaping Use: Never used   Substance and Sexual Activity    Alcohol use: No    Drug use: Not Currently     Types: Other     Comment: THC gummies    Sexual activity: Not Currently     Partners: Female     Birth control/protection: Condom, Post-menopausal       NIH Stroke Scale:       Noni Ramirez RN  23 19:09 EST

## 2023-12-15 NOTE — H&P
HISTORY AND PHYSICAL   [facility]        Date of Admission: 2023  Patient Identification:  Name: Sim Agustin  Age: 67 y.o.  Sex: male  :  1955  MRN: [id number]                     Primary Care Physician: Seth Hester MD    Chief Complaint:  67 year old gentleman who presented to the emergency room after a fall; he has been weak for several days; he was not able to get up; ems was called and his blood pressure was in the 70s when they arrived; he denies syncope; no fever or chills; no nausea or vomiting; no sick contacts; his bp recovered with iv fluids given en route; no chest pain or shortness of breath      History of Present Illness:   As above    Past Medical History:  Past Medical History:   Diagnosis Date    Acute respiratory failure with hypoxia and hypercarbia 2019    Acute upper respiratory infection 2013    Amputee, above knee, left     Anemia     per [employer] database    ARF (acute renal failure) 2016    Asthma 2018    Atelectasis, left 2016    Bradycardia 2019    CAD (coronary artery disease) 2016    Chronic obstructive pulmonary disease with acute exacerbation 2013    Clotting disorder 2003    Colon polyps     Compartment syndrome     AFTER ILIAC SURGERY. ABOVE KNEE AMPUTATION    COPD (chronic obstructive pulmonary disease)     COPD with hypoxia 2019    Cough     SINCE APRIL WITH PNEUMONIA.     Demand myocardial infarction 2022    Disease of thyroid gland     HYPOTHYRODISM    Diverticulosis     Elevated hematocrit 2021    Employs prosthetic leg     ESRD (end stage renal disease) on dialysis 2016    Fracture of patella 2015    History of acute renal failure     History of CHF (congestive heart failure)     History of GI bleed 2016    AORTODUOD FISTULA    History of sepsis 2016    History of transfusion     MULTIPLE, 2016    Hyperkalemia 2016    Hyperlipidemia     Hypertension      Hypotension     Hypotension 03/04/2016    Left lower lobe pneumonia 05/18/2022    Nonischemic cardiomyopathy     Nosocomial pneumonia 04/06/2016    Phantom limb pain     SL, WITH LEFT ABOUVE KNEE    Pleural effusion on left 04/11/2016    Pneumonia     SPRING 2016  AFTER SURGERY    Pulmonary embolism     PVD (peripheral vascular disease)     Renal insufficiency     S/P thoracentesis 04/11/2016    Sepsis, unspecified organism 04/05/2016     Past Surgical History:  Past Surgical History:   Procedure Laterality Date    ABOVE KNEE AMPUTATION Left 03/16/2016    Procedure: LT AMPUTATION ABOVE KNEE;  Surgeon: Jose Swann MD;  Location: [facility] OR;  Service:     ARTERIAL THROMBECTOMY  2001    throbectomy of arterial graft    AXILLARY FEMORAL BYPASS Right 02/15/2016    Exploratory lapartomy, repair aortoduodenal fistula, resection infected aortobifemoral bypass graft, axillobi-superficial femoral artery [place]-Aneudy bypass graft from right axillary artery, Repair of duodenotomy 4th portion duodenum-Dr. Jose Swann, Dr. Genaro Perrin    BRONCHOSCOPY Left 03/04/2016    Dr. NATALIIA Tate    BRONCHOSCOPY Left 05/20/2022    Procedure: BRONCHOSCOPY WITH BIOPSIES, BRUSHINGS, AND BAL UNDER FLUORO;  Surgeon: Ishmael Tate Jr., MD;  Location: [facility] ENDOSCOPY;  Service: Pulmonary;  Laterality: Left;  PRE OP - LLL CONSOLIDATION  POST OP - SAME    BRONCHOSCOPY RIGID / FLEXIBLE N/A 03/03/2016    Dr. NATALIIA Tate    BRONCHOSCOPY RIGID / FLEXIBLE N/A 02/26/2016    Dr. NATALIIA Tate    CARDIAC CATHETERIZATION N/A 03/18/2019    Procedure: Right and Left Heart Cath;  Surgeon: Nina Storey MD;  Location: [facility] CATH INVASIVE LOCATION;  Service: Cardiovascular    CATARACT EXTRACTION WITH INTRAOCULAR LENS IMPLANT Bilateral     COLON RESECTION WITH COLOSTOMY Left 02/28/2016    Exploratory laparotomy with open left hemicolectomy, end-colostomy, G-tube and J-tube-Dr. Endy Chaney    COLON SURGERY  3/25/2015     COLOSTOMY    COLONOSCOPY N/A 2015    Dr. Laughlin    COLONOSCOPY N/A 10/12/2016    Procedure: COLONOSCOPY TO 20 CM AND PER STOMA TO 30CM;  Surgeon: Genaro Perrin MD;  Location: [facility] ENDOSCOPY;  Service:     COLONOSCOPY N/A 10/21/2016    Procedure: COLONOSCOPY DONE AT BEDSIDE ONTO CECEM;  Surgeon: Genaro Perrin MD;  Location: [facility] ENDOSCOPY;  Service:     COLONOSCOPY N/A 02/10/2016    Diverticulosis in the entire examined colon, non-bleeding internal hemorrhoids-Dr. Taylor Pina    COLONOSCOPY N/A 02/11/2016    Poor prep, blood in the entire examined colon, normal ileum-Dr. Taylor Pina    COLONOSCOPY W/ POLYPECTOMY N/A 07/27/2015    Normal ileum, diverticulosis in the sigmoid colon: resected and retrieved, one 6 mm polyp in the descending colon: resected and retrieved, the distal rectum and anal verge are normal on retroflexion view-Dr. Miguel Ángel Laughlin    COLOSTOMY CLOSURE N/A 10/13/2016    Procedure: COLOSTOMY TAKEDOWN OR CLOSURE, APPENDECTOMY;  Surgeon: Genaro Perrin MD;  Location: [facility] MAIN OR;  Service:     DEBRIDEMENT LEG Left 03/01/2016    Excisional debridement of the skin, subcutantous tissue, tendon and muscle left calf-Dr. Jose Swann    DEBRIDEMENT LEG Left 02/25/2016    Excisional debridement full-thcikness skin and subcutaneous tissue and tendon and muscle, left medial and lateral calf muscles-Dr. Jose Swann    ENDOSCOPY N/A 10/21/2016    Procedure: ESOPHAGOGASTRODUODENOSCOPY DONE AT BEDSIDE;  Surgeon: Genaro Perrin MD;  Location: [facility] ENDOSCOPY;  Service:     ENDOSCOPY AND COLONOSCOPY N/A 02/28/2016    EGD and Colonoscopy with Candy ink injection-Dr. Endy Chaney    ENTEROSCOPY SMALL BOWEL N/A 02/11/2016    Normal esophagus, normal stomach, normal duodenum, portion of the jejunum normal-Dr. Taylor Pina    ILIAC ARTERY - FEMORAL ARTERY BYPASS GRAFT Bilateral 2002    ILIAC ARTERY STENT Bilateral 2001    INSERTION AND REMOVAL PERITONEAL DIALYSIS CATHETER  Right 02/29/2016    Exchange right jugular 16 cm Mahurkar dialysis catheter-Dr. Jose Swann    INSERTION PERITONEAL DIALYSIS CATHETER Left 03/01/2016    Ultrasound-guided access of the left jugular vein, 23 cm left jugular palindrome dialysis catheter placement-Dr. Jose Swann    INSERTION PERITONEAL DIALYSIS CATHETER Right 02/18/2016    Right jugular Mahrkar catherer placement-Dr. Jose Swann    JOINT REPLACEMENT Left     THORACOSCOPY Left 04/18/2016    Procedure: LT THORACOSCOPY VIDEO ASSISTED WITH DECORDICATION;  Surgeon: Genaro Davila III, MD;  Location[facility];  Service:     THROMBECTOMY Left 02/16/2016    Thombectomy of left femoral graft, left leg arteriogram, left calf forward compartment fasciotomy-Dr. Jose Swann    TOTAL HIP ARTHROPLASTY Left     UPPER GASTROINTESTINAL ENDOSCOPY N/A 02/09/2016    Normal UGI-Dr. Ajay Parkinson    UPPER GASTROINTESTINAL ENDOSCOPY N/A 11/28/2015    Small hiatal hernia, chronic gastritis: biopsied, non-bleeding angioectasias in the stomach: treated with argon plasma coagulation, multiple bleeding angioectasias in the dudenum: treated with argon plasma coagulation-Dr. Mykel Jaramillo    VASCULAR SURGERY      MULTIPLE    VENTRAL/INCISIONAL HERNIA REPAIR N/A 10/19/2017    Procedure: VENTRAL HERNIA REPAIR WITH MESH AND BILATERAL COMPONENT SEPARATION;  Surgeon: Genaro Perrin MD;  Location[facility];  Service:     WISDOM TOOTH EXTRACTION        Home Meds:  Medications Prior to Admission   Medication Sig Dispense Refill Last Dose    albuterol (ACCUNEB) 0.63 MG/3ML nebulizer solution Inhale 3 mL Every 6 (Six) Hours As Needed.   Past Week    allopurinol (Zyloprim) 100 MG tablet Take 1 tab by mouth daily for gout 90 tablet 3 12/14/2023 at 0900    ALPRAZolam (XANAX) 1 MG tablet Take 1 tablet twice daily 180 tablet 2 12/13/2023 at 2100    amLODIPine (NORVASC) 10 MG tablet Take 1 tablet by mouth Daily. 90 tablet 3 12/14/2023 at 0900    aspirin 81 MG  chewable tablet Chew 1 tablet Every Night.   12/14/2023 at 0900    atorvastatin (LIPITOR) 40 MG tablet TAKE 1 TABLET BY MOUTH EVERY DAY FOR CHOLESTEROL AND HEART HEALTH 90 tablet 3 12/13/2023 at 2100    brimonidine (ALPHAGAN) 0.2 % ophthalmic solution instill 1 drop into both eyes twice a day   12/14/2023 at 0900    buPROPion XL (WELLBUTRIN XL) 300 MG 24 hr tablet TAKE 1 TABLET BY MOUTH EVERY DAY 90 tablet 3 12/14/2023 at 0900    clopidogrel (PLAVIX) 75 MG tablet Take 1 tablet by mouth Every Night. 90 tablet 3 12/13/2023 at 2100    Coenzyme Q10 400 MG capsule Take 1 capsule by mouth Every Evening.   12/13/2023 at 2100    FERROUS SULFATE PO Take 65 mg by mouth Every Night.   12/13/2023 at 2100    gabapentin (NEURONTIN) 300 MG capsule Take 1 capsule by mouth 3 (Three) Times a Day. 270 capsule 1 12/13/2023    isosorbide mononitrate (IMDUR) 30 MG 24 hr tablet TAKE 1 TABLET BY MOUTH EVERY DAY IN THE MORNING 90 tablet 3 12/14/2023    latanoprost (XALATAN) 0.005 % ophthalmic solution Administer 1 drop to both eyes every night at bedtime.   12/13/2023 at 2100    levothyroxine (SYNTHROID, LEVOTHROID) 137 MCG tablet Take 1 tablet by mouth Daily. 90 tablet 3 12/14/2023 at 0600    Multiple Vitamins-Minerals (MULTIVITAMIN ADULT PO) Take 1 tablet by mouth Daily.   12/14/2023 at 0900    Omega-3 Fatty Acids (fish oil) 1000 MG capsule capsule Take 1 capsule by mouth Daily. Took this am   12/14/2023 at 0900    Thiamine HCl (VITAMIN B-1 PO) Take 1 tablet by mouth Daily.   12/13/2023 at 0900    torsemide (DEMADEX) 20 MG tablet Take 1 tablet by mouth Daily. 90 tablet 3 12/14/2023 at 0900    traMADol (ULTRAM) 50 MG tablet Take 1 tablet by mouth 2 (Two) Times a Day. 180 tablet 0 12/14/2023 at 0900    Fluticasone-Umeclidin-Vilant (TRELEGY ELLIPTA) 100-62.5-25 MCG/INH aerosol powder  Inhale 1 each Daily. 28 each 2        Allergies:  Allergies   Allergen Reactions    Augmentin [Amoxicillin-Pot Clavulanate] Hives and Rash     Tolerate  cephalosporins     Immunizations:  Immunization History   Administered Date(s) Administered    COVID-19 (PFIZER) BIVALENT 12+YRS 2022    COVID-19 (PFIZER) Purple Cap Monovalent 2021, 2021, 10/18/2021    COVID-19 F23 (MODERNA) 12YRS+ (SPIKEVAX) 10/01/2023    Covid-19 (Pfizer) Gray Cap Monovalent 2022    Flu Vaccine Quad PF >36MO 10/26/2016, 10/10/2017    Fluad Quad 65+ 2020    Fluzone High Dose =>65 Years (Vaxcare ONLY) 10/08/2018, 2019    Fluzone High-Dose 65+yrs 2021, 2022, 2023    Pneumococcal Conjugate 20-Valent (PCV20) 09/15/2022    Pneumococcal Polysaccharide (PPSV23) 2017    Shingrix 2019, 2019    Tdap 03/15/2022     Social History:   Social History     Social History Narrative    Not on file     Social History     Socioeconomic History    Marital status: Single   Tobacco Use    Smoking status: Former     Packs/day: 1.00     Years: 30.00     Additional pack years: 0.00     Total pack years: 30.00     Types: Cigarettes     Start date: 1974     Quit date: 2021     Years since quittin.6     Passive exposure: Never    Smokeless tobacco: Never    Tobacco comments:     caffeine - rarely   Vaping Use    Vaping Use: Never used   Substance and Sexual Activity    Alcohol use: No    Drug use: Not Currently     Frequency: 1.0 times per week     Types: Other, Marijuana     Comment: THC gummies; used marijuana 3x a week last week    Sexual activity: Not Currently     Partners: Female     Birth control/protection: Condom, Post-menopausal       Family History:  Family History   Problem Relation Age of Onset    Lung cancer Mother     Cancer Mother     Heart disease Father     Diabetes Father     Hypertension Father     Malig Hyperthermia Neg Hx         Review of Systems  See history of present illness and past medical history.  Patient denies headache, dizziness, syncope,   trauma, change in vision, change in hearing,, focal weakness, numbness,  or paresthesia.  Patient denies chest pain, palpitations, dyspnea, orthopnea, PND, cough, sinus pressure, rhinorrhea, epistaxis, hemoptysis, nausea, vomiting,hematemesis, diarrhea, constipation or hematochezia.  Denies cold or heat intolerance, polydipsia, polyuria, polyphagia. Denies hematuria, pyuria, dysuria, hesitancy, frequency or urgency. Denies consumption of raw and under cooked meats foods or change in water source.  Denies fever, chills, sweats, night sweats.       Objective:  T Max 24 hrs: Temp (24hrs), Av.1 °F (35.1 °C), Min:95.1 °F (35.1 °C), Max:95.1 °F (35.1 °C)    Vitals Ranges:   Temp:  [95.1 °F (35.1 °C)] 95.1 °F (35.1 °C)  Heart Rate:  [57-60] 57  Resp:  [16-18] 18  BP: ()/(60-84) 102/84      Exam:  /84 (BP Location: Right arm, Patient Position: Lying)   Pulse 57   Temp 95.1 °F (35.1 °C) (Tympanic)   Resp 18   Wt 72.3 kg (159 lb 6.3 oz)   SpO2 99%   BMI 24.24 kg/m²     General Appearance:    Alert, cooperative, no distress, appears stated age   Head:    Normocephalic, without obvious abnormality, atraumatic   Eyes:    PERRL, conjunctivae/corneas clear, EOM's intact, both eyes   Ears:    Normal external ear canals, both ears   Nose:   Nares normal, septum midline, mucosa normal, no drainage    or sinus tenderness   Throat:   Lips, mucosa, and tongue normal   Neck:   Supple, symmetrical, trachea midline, no adenopathy;     thyroid:  no enlargement/tenderness/nodules; no carotid    bruit or JVD   Back:     Symmetric, no curvature, ROM normal, no CVA tenderness   Lungs:     Decreased breath sounds bilaterally, respirations unlabored   Chest Wall:    No tenderness or deformity    Heart:    Regular rate and rhythm, S1 and S2 normal, no murmur, rub   or gallop   Abdomen:     Soft, nontender, bowel sounds active all four quadrants,     no masses, no hepatomegaly, no splenomegaly   Extremities:   Extremities normal, atraumatic, left aka      .    Data Review:  Labs in chart were  reviewed.  WBC   Date Value Ref Range Status   12/14/2023 16.45 (H) 3.40 - 10.80 10*3/mm3 Final     Hemoglobin   Date Value Ref Range Status   12/14/2023 15.8 13.0 - 17.7 g/dL Final     Hematocrit   Date Value Ref Range Status   12/14/2023 47.3 37.5 - 51.0 % Final     Platelets   Date Value Ref Range Status   12/14/2023 214 140 - 450 10*3/mm3 Final     Sodium   Date Value Ref Range Status   12/14/2023 134 (L) 136 - 145 mmol/L Final     Potassium   Date Value Ref Range Status   12/14/2023 3.3 (L) 3.5 - 5.2 mmol/L Final     Comment:     Slight hemolysis detected by analyzer. Result may be falsely elevated.     Chloride   Date Value Ref Range Status   12/14/2023 85 (L) 98 - 107 mmol/L Final     CO2   Date Value Ref Range Status   12/14/2023 32.9 (H) 22.0 - 29.0 mmol/L Final     BUN   Date Value Ref Range Status   12/14/2023 61 (H) 8 - 23 mg/dL Final     Creatinine   Date Value Ref Range Status   12/14/2023 5.09 (H) 0.76 - 1.27 mg/dL Final     Glucose   Date Value Ref Range Status   12/14/2023 165 (H) 65 - 99 mg/dL Final     Calcium   Date Value Ref Range Status   12/14/2023 17.0 (C) 8.6 - 10.5 mg/dL Final                Imaging Results (All)       Procedure Component Value Units Date/Time    CT Abdomen Pelvis Without Contrast [[id number]] Collected: 12/14/23 1951     Updated: 12/14/23 2005    Narrative:      CT ABDOMEN PELVIS WO CONTRAST-     INDICATIONS: Abdominal bruising     TECHNIQUE: Radiation dose reduction techniques were utilized, including  automated exposure control and exposure modulation based on body size.  Unenhanced ABDOMEN AND PELVIS CT     COMPARISON: 11/3/2017     FINDINGS:     Assessment of the solid organs for injury is significant limited without  intravenous contrast material. Structures in the pelvis are partly  obscured by attenuation artifact from left hip arthroplasty hardware.     Cholelithiasis is evident.     Mild stable nonspecific thickening of the left adrenal gland is present.      Arterial calcification is seen at the left renal hilum. The distal  ureter and portions of the urinary bladder are obscured by hardware  artifact.     Pancreas is thinned.     A right renal cyst is demonstrated, 4 cm, showing interval increase,  previously 2.3 cm. Areas of cortical thinning are evident in the left  kidney.     Otherwise unremarkable appearance of the liver, spleen, adrenal glands,  pancreas, kidneys, bladder.     No bowel obstruction or abnormal bowel thickening is identified.     No free intraperitoneal gas or free fluid.     Scattered small mesenteric and para-aortic lymph nodes are seen that are  not significant by size criteria.     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 Aortic and other  arterial calcifications are present.     The lung bases show persistent reticulonodular infiltrate in the left  lower lobe, similar from 11/18/2023, taking into account motion artifact  that is present on the current exam. A 4 mm nodular density in the right  lower lobe axial image 9 is not seen on the recent prior exam, possibly  attributable to motion artifact or may be a new nodule. Chest CT  follow-up is advised as recommended on the recent prior exam (3 months  from the prior exam).     Degenerative changes are seen in the spine. No acute fracture is  identified. Subcutaneous stranding at the anterior abdominal wall may  reflect contusion, for example axial image 51.             Impression:         1. No acute inflammatory process of bowel is identified, follow-up as  indications persist.     2. Cholelithiasis.     3. No urolithiasis or hydronephrosis. Increased right renal cyst.     4. Indeterminate nodularity in the lower lungs, chest CT follow-up  recommended.     5. Limited as described.     This report was finalized on 12/14/2023 8:01 PM by Dr. Lind  SANDEEP Suarez M.D on Workstation: [id number]       XR Chest 1 View [[id number]] Collected: 12/14/23 1708     Updated: 12/14/23 1713    Narrative:      XR CHEST 1 VW-12/14/2023     HISTORY: Cough.     Heart size is within normal limits. Patient is rotated slightly to the  left. Lungs appear clear. There is some aortic calcification.       Impression:      1. No acute process.        This report was finalized on 12/14/2023 5:09 PM by Dr. Yovanny Ruiz M.D on Workstation: [id number]                 Assessment:  Active Hospital Problems    Diagnosis  POA    **Acute kidney injury [N17.9]  Yes    Hypercalcemia [E83.52]  Yes      Resolved Hospital Problems   No resolved problems to display.   Falls  Weakness  Copd  Hypokalemia  Pad  Hypertension  Hypothyroidism  Ckd3  Cad      Plan:  Continue fluids  Ask nephrology to see him  Monitor on telemetry  Trend labs  Dw patient and ed provider    Ruth Rosas MD  12/14/2023  20:46 EST

## 2023-12-15 NOTE — PROGRESS NOTES
Nutrition Services    Patient Name:  Sim Agustin  YOB: 1955  MRN: [id number]  Admit Date:  12/14/2023  Assessment Date:  12/15/23    Summary: Nutrition screen for skin integrity  This is a 66 yo male admitted for hypotensive, s/p fall, weakness, ARLYN  Labs: Na 131, k 3.1, BUN 70, cr 4.61  Skin: Coccyx Pressure Injury stage 2  IVF's at 125, potassium chloride, pericolace, MVI, synthroid, Lipitor  Pt reports appetite has been down lately but was able to eat a good breakfast this am. He is having difficulty using right arm with eating, Discussed finger foods available as needed    Plan/recommendation  Good po intake encouraged with all meals  RD to follow    CLINICAL NUTRITION ASSESSMENT      Reason for Assessment Pressure Injury and/or Non-Healing Wound     Diagnosis/Problem    hypotensive, s/p fall, weakness, ARLYN/CKD   Medical/Surgical History Past Medical History:   Diagnosis Date    Acute respiratory failure with hypoxia and hypercarbia 03/11/2019    Acute upper respiratory infection 02/06/2013    Amputee, above knee, left     Anemia     per [place]    ARF (acute renal failure) 03/04/2016    Asthma 02/02/2018    Atelectasis, left 03/04/2016    Bradycardia 05/23/2019    CAD (coronary artery disease) 04/04/2016    Chronic obstructive pulmonary disease with acute exacerbation 02/06/2013    Clotting disorder 05/01/2003    Colon polyps     Compartment syndrome     AFTER ILIAC SURGERY. ABOVE KNEE AMPUTATION    COPD (chronic obstructive pulmonary disease)     COPD with hypoxia 05/23/2019    Cough     SINCE APRIL WITH PNEUMONIA.     Demand myocardial infarction 05/19/2022    Disease of thyroid gland     HYPOTHYRODISM    Diverticulosis     Elevated hematocrit 01/06/2021    Employs prosthetic leg     ESRD (end stage renal disease) on dialysis 04/06/2016    Fracture of patella 03/03/2015    History of acute renal failure     History of CHF (congestive heart failure)     History of GI bleed 02/2016     AORTODUOD FISTULA    History of sepsis 02/2016    History of transfusion     MULTIPLE, 2016    Hyperkalemia 04/11/2016    Hyperlipidemia     Hypertension     Hypotension     Hypotension 03/04/2016    Left lower lobe pneumonia 05/18/2022    Nonischemic cardiomyopathy     Nosocomial pneumonia 04/06/2016    Phantom limb pain     SL, WITH LEFT ABOUVE KNEE    Pleural effusion on left 04/11/2016    Pneumonia     SPRING 2016  AFTER SURGERY    Pulmonary embolism     PVD (peripheral vascular disease)     Renal insufficiency     S/P thoracentesis 04/11/2016    Sepsis, unspecified organism 04/05/2016       Past Surgical History:   Procedure Laterality Date    ABOVE KNEE AMPUTATION Left 03/16/2016    Procedure: LT AMPUTATION ABOVE KNEE;  Surgeon: Jose Swann MD;  Location: [facility] OR;  Service:     ARTERIAL THROMBECTOMY  2001    throbectomy of arterial graft    AXILLARY FEMORAL BYPASS Right 02/15/2016    Exploratory lapartomy, repair aortoduodenal fistula, resection infected aortobifemoral bypass graft, axillobi-superficial femoral artery [place]-Aneudy bypass graft from right axillary artery, Repair of duodenotomy 4th portion duodenum-Dr. Jose Swann, Dr. Genaro Perrin    BRONCHOSCOPY Left 03/04/2016    Dr. NATALIIA Tate    BRONCHOSCOPY Left 05/20/2022    Procedure: BRONCHOSCOPY WITH BIOPSIES, BRUSHINGS, AND BAL UNDER FLUORO;  Surgeon: Ishmael Tate Jr., MD;  Location: [facility] ENDOSCOPY;  Service: Pulmonary;  Laterality: Left;  PRE OP - LLL CONSOLIDATION  POST OP - SAME    BRONCHOSCOPY RIGID / FLEXIBLE N/A 03/03/2016    Dr. NATALIIA Tate    BRONCHOSCOPY RIGID / FLEXIBLE N/A 02/26/2016    Dr. NATALIIA Tate    CARDIAC CATHETERIZATION N/A 03/18/2019    Procedure: Right and Left Heart Cath;  Surgeon: Nina Storey MD;  Location: [facility] CATH INVASIVE LOCATION;  Service: Cardiovascular    CATARACT EXTRACTION WITH INTRAOCULAR LENS IMPLANT Bilateral     COLON RESECTION WITH COLOSTOMY Left 02/28/2016     Exploratory laparotomy with open left hemicolectomy, end-colostomy, G-tube and J-tube-Dr. Endy Chaney    COLON SURGERY  3/25/2015    COLOSTOMY    COLONOSCOPY N/A 2015    Dr. Laughlin    COLONOSCOPY N/A 10/12/2016    Procedure: COLONOSCOPY TO 20 CM AND PER STOMA TO 30CM;  Surgeon: Genaro Perrin MD;  Location: [facility] ENDOSCOPY;  Service:     COLONOSCOPY N/A 10/21/2016    Procedure: COLONOSCOPY DONE AT BEDSIDE ONTO CECEM;  Surgeon: Genaro Perrin MD;  Location: [facility] ENDOSCOPY;  Service:     COLONOSCOPY N/A 02/10/2016    Diverticulosis in the entire examined colon, non-bleeding internal hemorrhoids-Dr. Taylor Pina    COLONOSCOPY N/A 02/11/2016    Poor prep, blood in the entire examined colon, normal ileum-Dr. Taylor Pina    COLONOSCOPY W/ POLYPECTOMY N/A 07/27/2015    Normal ileum, diverticulosis in the sigmoid colon: resected and retrieved, one 6 mm polyp in the descending colon: resected and retrieved, the distal rectum and anal verge are normal on retroflexion view-Dr. Miguel Ángel Laughlin    COLOSTOMY CLOSURE N/A 10/13/2016    Procedure: COLOSTOMY TAKEDOWN OR CLOSURE, APPENDECTOMY;  Surgeon: Genaro Perrin MD;  Location: [facility] MAIN OR;  Service:     DEBRIDEMENT LEG Left 03/01/2016    Excisional debridement of the skin, subcutantous tissue, tendon and muscle left calf-Dr. Jose Swann    DEBRIDEMENT LEG Left 02/25/2016    Excisional debridement full-thcikness skin and subcutaneous tissue and tendon and muscle, left medial and lateral calf muscles-Dr. Jose Swann    ENDOSCOPY N/A 10/21/2016    Procedure: ESOPHAGOGASTRODUODENOSCOPY DONE AT BEDSIDE;  Surgeon: Genaro Perrin MD;  Location: [facility] ENDOSCOPY;  Service:     ENDOSCOPY AND COLONOSCOPY N/A 02/28/2016    EGD and Colonoscopy with Candy ink injection-Dr. Endy Chaney    ENTEROSCOPY SMALL BOWEL N/A 02/11/2016    Normal esophagus, normal stomach, normal duodenum, portion of the jejunum normal-Dr. Taylor Pina    ILIAC ARTERY -  FEMORAL ARTERY BYPASS GRAFT Bilateral 2002    ILIAC ARTERY STENT Bilateral 2001    INSERTION AND REMOVAL PERITONEAL DIALYSIS CATHETER Right 02/29/2016    Exchange right jugular 16 cm Mahurkar dialysis catheter-Dr. Jose Swann    INSERTION PERITONEAL DIALYSIS CATHETER Left 03/01/2016    Ultrasound-guided access of the left jugular vein, 23 cm left jugular palindrome dialysis catheter placement-Dr. Jose Swann    INSERTION PERITONEAL DIALYSIS CATHETER Right 02/18/2016    Right jugular Mahrkar catherer placement-Dr. Jose Sawnn    JOINT REPLACEMENT Left     THORACOSCOPY Left 04/18/2016    Procedure: LT THORACOSCOPY VIDEO ASSISTED WITH DECORDICATION;  Surgeon: Genaro Davila III, MD;  Location: [facility];  Service:     THROMBECTOMY Left 02/16/2016    Thombectomy of left femoral graft, left leg arteriogram, left calf forward compartment fasciotomy-Dr. Jose Swann    TOTAL HIP ARTHROPLASTY Left     UPPER GASTROINTESTINAL ENDOSCOPY N/A 02/09/2016    Normal UGI-Dr. Ajay Parkinson    UPPER GASTROINTESTINAL ENDOSCOPY N/A 11/28/2015    Small hiatal hernia, chronic gastritis: biopsied, non-bleeding angioectasias in the stomach: treated with argon plasma coagulation, multiple bleeding angioectasias in the dudenum: treated with argon plasma coagulation-Dr. Mykel Jaramillo    VASCULAR SURGERY      MULTIPLE    VENTRAL/INCISIONAL HERNIA REPAIR N/A 10/19/2017    Procedure: VENTRAL HERNIA REPAIR WITH MESH AND BILATERAL COMPONENT SEPARATION;  Surgeon: Genaro Perrin MD;  Location: [facility];  Service:     WISDOM TOOTH EXTRACTION          Anthropometrics        Current Height  Current Weight  BMI kg/m2    Weight: 72.3 kg (159 lb 6.3 oz) (12/14/23 1957)  Body mass index is 24.24 kg/m².   Adjusted BMI (if applicable)    BMI Category Normal/Healthy (18.4 - 24.9)   Ideal Body Weight (IBW) 150   Usual Body Weight (UBW) 170   Weight Trend Loss, Gain   Weight History Wt Readings from Last 30 Encounters:    12/14/23 1957 72.3 kg (159 lb 6.3 oz)   09/28/23 0956 77.6 kg (171 lb)   03/28/23 0854 76.7 kg (169 lb)   03/12/23 1254 76.7 kg (169 lb)   03/02/23 0356 77 kg (169 lb 12.8 oz)   03/01/23 0538 78.3 kg (172 lb 9.6 oz)   02/28/23 1102 81.6 kg (180 lb)   09/15/22 0946 73 kg (161 lb)   07/16/22 0955 74.8 kg (165 lb)   05/18/22 2054 75.3 kg (165 lb 14.4 oz)   05/18/22 1621 78 kg (172 lb)   05/14/22 1149 77.1 kg (170 lb)   04/10/22 0510 77.5 kg (170 lb 13.7 oz)   04/09/22 0712 77.4 kg (170 lb 10.2 oz)   04/08/22 0616 78.9 kg (173 lb 15.1 oz)   04/07/22 0500 82.6 kg (182 lb 1.6 oz)   04/06/22 0500 80.1 kg (176 lb 9.4 oz)   04/05/22 0501 79.2 kg (174 lb 9.7 oz)   04/04/22 2138 78.9 kg (173 lb 15.1 oz)   03/15/22 1351 88 kg (194 lb)   09/09/21 0951 88.1 kg (194 lb 3.2 oz)   05/04/21 0935 89.2 kg (196 lb 9.6 oz)   01/05/21 0935 89.2 kg (196 lb 9.6 oz)   09/01/20 0958 79.2 kg (174 lb 9.6 oz)   07/27/20 1015 79.4 kg (175 lb)   06/05/20 1023 79.8 kg (176 lb)   03/03/20 1000 83.1 kg (183 lb 3.2 oz)   01/31/20 1013 84.4 kg (186 lb)   01/31/20 0938 84.5 kg (186 lb 3.2 oz)   07/01/19 1143 80.7 kg (178 lb)   06/05/19 1301 80.7 kg (178 lb)   05/23/19 0752 80.4 kg (177 lb 4.8 oz)   03/28/19 0944 80.7 kg (178 lb)   03/14/19 1827 80 kg (176 lb 5.9 oz)   03/14/19 0703 80.9 kg (178 lb 6.4 oz)   03/12/19 2340 82.6 kg (182 lb)   03/12/19 0003 80.2 kg (176 lb 12.9 oz)   03/11/19 1853 79.4 kg (175 lb)   01/30/18 0931 83.9 kg (185 lb)   10/19/17 0742 79.2 kg (174 lb 8 oz)   10/16/17 0904 86.2 kg (190 lb)   09/19/17 1027 86.4 kg (190 lb 6.4 oz)   06/20/17 0953 80.7 kg (178 lb)      --  Labs       Pertinent Labs    Results from last 7 days   Lab Units 12/15/23  0432 12/14/23  1715   SODIUM mmol/L 131* 134*   POTASSIUM mmol/L 3.1* 3.3*   CHLORIDE mmol/L 90* 85*   CO2 mmol/L 31.0* 32.9*   BUN mg/dL 70* 61*   CREATININE mg/dL 4.61* 5.09*   CALCIUM mg/dL 14.7* 17.0*   BILIRUBIN mg/dL  --  1.2   ALK PHOS U/L  --  66   ALT (SGPT) U/L  --  14   AST  (SGOT) U/L  --  23   GLUCOSE mg/dL 88 165*     Results from last 7 days   Lab Units 12/15/23  0432 12/14/23  1715   HEMOGLOBIN g/dL 12.9* 15.8   HEMATOCRIT % 37.3* 47.3   WBC 10*3/mm3 13.67* 16.45*   ALBUMIN g/dL  --  4.1     Results from last 7 days   Lab Units 12/15/23  0432 12/14/23  1715   PLATELETS 10*3/mm3 173 214     COVID19   Date Value Ref Range Status   12/14/2023 Not Detected Not Detected - Ref. Range Final     Lab Results   Component Value Date    HGBA1C 5.5 09/15/2022          Medications           Scheduled Medications allopurinol, 100 mg, Oral, Daily  ALPRAZolam, 1 mg, Oral, BID  aspirin, 81 mg, Oral, Nightly  atorvastatin, 40 mg, Oral, Nightly  brimonidine, 1 drop, Both Eyes, BID  budesonide-formoterol, 2 puff, Inhalation, BID - RT   And  tiotropium bromide monohydrate, 2 puff, Inhalation, Daily - RT  buPROPion XL, 300 mg, Oral, Daily  clopidogrel, 75 mg, Oral, Nightly  gabapentin, 300 mg, Oral, TID  isosorbide mononitrate, 30 mg, Oral, QAM  latanoprost, 1 drop, Both Eyes, Nightly  levothyroxine, 137 mcg, Oral, Daily  multivitamin with minerals, 1 tablet, Oral, Daily  potassium chloride, 40 mEq, Oral, Once  senna-docusate sodium, 2 tablet, Oral, BID       Infusions sodium chloride, 125 mL/hr, Last Rate: 75 mL/hr (12/15/23 0911)       PRN Medications   acetaminophen    albuterol    senna-docusate sodium **AND** polyethylene glycol **AND** bisacodyl **AND** bisacodyl    melatonin    ondansetron **OR** ondansetron     Physical Findings          General Findings alert   Oral/Mouth Cavity tooth or teeth missing   Edema  no edema   Gastrointestinal last bowel movement: 12/13   Skin  pressure injury: coccyx stage 2   Tubes/Drains/Lines none   NFPE No clinical signs of muscle wasting or fat loss   --  Estimated/Assessed Needs        Current Weight  Weight: 72.3 kg (159 lb 6.3 oz) (12/14/23 1957)       Energy Requirements    Weight for Calculation 72.3 kg   Method for Estimation  30 kcal/kg   EST Needs  (kcal/day) 2169       Protein Requirements    Weight for Calculation 72.3 kg   EST Protein Needs (g/kg) 1.0 - 1.2 gm/kg   EST Daily Needs (g/day) 72-86       Fluid Requirements     Method for Estimation 1 mL/kcal    EST Needs (mL/day)      Current Nutrition Orders & Evaluation of Intake       Oral Nutrition     Food Allergies NKFA   Current PO Diet Diet: Cardiac Diets; Healthy Heart (2-3 Na+); Texture: Regular Texture (IDDSI 7); Fluid Consistency: Thin (IDDSI 0)   Supplement n/a   PO Evaluation     % PO Intake States ate a good breakfast this am    Factors Affecting Intake: decreased appetite   --  PES STATEMENT / NUTRITION DIAGNOSIS      Nutrition Dx Problem  Problem: Inadequate Oral Intake and Increased Nutrient Needs  Etiology: Medical Diagnosis - hypotensive    Signs/Symptoms: PO intake and Other (comment) stage 2 PI     NUTRITION INTERVENTION / PLAN OF CARE      Intervention Goal(s) Maintain nutrition status, Reduce/improve symptoms, Meet estimated needs, Disease management/therapy, Maintain weight, and PO intake goal %: 75+         RD Intervention/Action Interview for preferences, Advise alternative selection, Encourage intake, Continue to monitor, and Care plan reviewed   --      Prescription/Orders:       PO Diet       Supplements       Enteral Nutrition       Parenteral Nutrition    New Prescription Ordered? Continue same per protocol   --      Monitor/Evaluation Per protocol   Discharge Plan/Needs Pending clinical course   --    RD to follow per protocol.      Electronically signed by:  Lesli Ortiz RD  12/15/23 10:01 EST   Yes

## 2023-12-15 NOTE — THERAPY EVALUATION
Patient Name: Sim Agustin  : 1955    MRN: [id number]                              Today's Date: 12/15/2023       Admit Date: 2023    Visit Dx:     ICD-10-CM ICD-9-CM   1. ARLYN (acute kidney injury)  N17.9 584.9   2. Hypercalcemia  E83.52 275.42     Patient Active Problem List   Diagnosis    H/O angiodysplasia of intestinal tract    COPD (chronic obstructive pulmonary disease)    S/P colectomy    Status post above-knee amputation of left lower extremity    CAD (coronary artery disease)    PVD (peripheral vascular disease)    Essential hypertension    Cardiomyopathy, unspecified    PRISCILLA treated with BiPAP    Anxiety disorder    Chronic midline low back pain without sciatica    Long term prescription benzodiazepine use    History of bradycardia    Hypothyroidism    Vitamin B12 deficiency    Iron deficiency    History of smoking 30 or more pack years    High risk medication use    DNR (do not resuscitate)    Stage 3a chronic kidney disease    Hyperlipidemia    Proteinuria    Junctional cardiac arrhythmia    Acute kidney injury    Hypercalcemia    Stage 3b chronic kidney disease (CKD)    Anemia    Leukocytosis    Hyponatremia    Hypokalemia    Lactic acidosis    Hypotension     Past Medical History:   Diagnosis Date    Acute respiratory failure with hypoxia and hypercarbia 2019    Acute upper respiratory infection 2013    Amputee, above knee, left     Anemia     per [place] database    ARF (acute renal failure) 2016    Asthma 2018    Atelectasis, left 2016    Bradycardia 2019    CAD (coronary artery disease) 2016    Chronic obstructive pulmonary disease with acute exacerbation 2013    Clotting disorder 2003    Colon polyps     Compartment syndrome     AFTER ILIAC SURGERY. ABOVE KNEE AMPUTATION    COPD (chronic obstructive pulmonary disease)     COPD with hypoxia 2019    Cough     SINCE APRIL WITH PNEUMONIA.     Demand myocardial infarction  05/19/2022    Disease of thyroid gland     HYPOTHYRODISM    Diverticulosis     Elevated hematocrit 01/06/2021    Employs prosthetic leg     ESRD (end stage renal disease) on dialysis 04/06/2016    Fracture of patella 03/03/2015    History of acute renal failure     History of CHF (congestive heart failure)     History of GI bleed 02/2016    AORTODUOD FISTULA    History of sepsis 02/2016    History of transfusion     MULTIPLE, 2016    Hyperkalemia 04/11/2016    Hyperlipidemia     Hypertension     Hypotension     Hypotension 03/04/2016    Left lower lobe pneumonia 05/18/2022    Nonischemic cardiomyopathy     Nosocomial pneumonia 04/06/2016    Phantom limb pain     SL, WITH LEFT ABOUVE KNEE    Pleural effusion on left 04/11/2016    Pneumonia     SPRING 2016  AFTER SURGERY    Pulmonary embolism     PVD (peripheral vascular disease)     Renal insufficiency     S/P thoracentesis 04/11/2016    Sepsis, unspecified organism 04/05/2016     Past Surgical History:   Procedure Laterality Date    ABOVE KNEE AMPUTATION Left 03/16/2016    Procedure: LT AMPUTATION ABOVE KNEE;  Surgeon: Jose Swann MD;  Location: [facility] MAIN OR;  Service:     ARTERIAL THROMBECTOMY  2001    throbectomy of arterial graft    AXILLARY FEMORAL BYPASS Right 02/15/2016    Exploratory lapartomy, repair aortoduodenal fistula, resection infected aortobifemoral bypass graft, axillobi-superficial femoral artery [place]-Aneudy bypass graft from right axillary artery, Repair of duodenotomy 4th portion duodenum-Dr. Jose Swann, Dr. Genaro Perrin    BRONCHOSCOPY Left 03/04/2016    Dr. NATALIIA Tate    BRONCHOSCOPY Left 05/20/2022    Procedure: BRONCHOSCOPY WITH BIOPSIES, BRUSHINGS, AND BAL UNDER FLUORO;  Surgeon: Ishmael Tate Jr., MD;  Location: [facility] ENDOSCOPY;  Service: Pulmonary;  Laterality: Left;  PRE OP - LLL CONSOLIDATION  POST OP - SAME    BRONCHOSCOPY RIGID / FLEXIBLE N/A 03/03/2016    Dr. NATALIIA Tate    BRONCHOSCOPY RIGID / FLEXIBLE N/A  02/26/2016    Dr. NATALIIA Tate    CARDIAC CATHETERIZATION N/A 03/18/2019    Procedure: Right and Left Heart Cath;  Surgeon: Nina Storey MD;  Location: [facility] CATH INVASIVE LOCATION;  Service: Cardiovascular    CATARACT EXTRACTION WITH INTRAOCULAR LENS IMPLANT Bilateral     COLON RESECTION WITH COLOSTOMY Left 02/28/2016    Exploratory laparotomy with open left hemicolectomy, end-colostomy, G-tube and J-tube-Dr. Endy Chaney    COLON SURGERY  3/25/2015    COLOSTOMY    COLONOSCOPY N/A 2015    Dr. Laughlin    COLONOSCOPY N/A 10/12/2016    Procedure: COLONOSCOPY TO 20 CM AND PER STOMA TO 30CM;  Surgeon: Genaro Perrin MD;  Location: [facility] ENDOSCOPY;  Service:     COLONOSCOPY N/A 10/21/2016    Procedure: COLONOSCOPY DONE AT BEDSIDE ONTO CECEM;  Surgeon: Genaro Perrin MD;  Location: [facility] ENDOSCOPY;  Service:     COLONOSCOPY N/A 02/10/2016    Diverticulosis in the entire examined colon, non-bleeding internal hemorrhoids-Dr. Taylor Pina    COLONOSCOPY N/A 02/11/2016    Poor prep, blood in the entire examined colon, normal ileum-Dr. Taylor Pina    COLONOSCOPY W/ POLYPECTOMY N/A 07/27/2015    Normal ileum, diverticulosis in the sigmoid colon: resected and retrieved, one 6 mm polyp in the descending colon: resected and retrieved, the distal rectum and anal verge are normal on retroflexion view-Dr. Miguel Ángel Laughlin    COLOSTOMY CLOSURE N/A 10/13/2016    Procedure: COLOSTOMY TAKEDOWN OR CLOSURE, APPENDECTOMY;  Surgeon: Genaro Perrin MD;  Location: [facility] OR;  Service:     DEBRIDEMENT LEG Left 03/01/2016    Excisional debridement of the skin, subcutantous tissue, tendon and muscle left calf-Dr. Jose Swann    DEBRIDEMENT LEG Left 02/25/2016    Excisional debridement full-thcikness skin and subcutaneous tissue and tendon and muscle, left medial and lateral calf muscles-Dr. Jose Swann    ENDOSCOPY N/A 10/21/2016    Procedure: ESOPHAGOGASTRODUODENOSCOPY DONE AT BEDSIDE;  Surgeon: Genaro SARAVIA  MD Marychuy;  Location: [facility] ENDOSCOPY;  Service:     ENDOSCOPY AND COLONOSCOPY N/A 02/28/2016    EGD and Colonoscopy with Candy ink injection-Dr. Endy Chaney    ENTEROSCOPY SMALL BOWEL N/A 02/11/2016    Normal esophagus, normal stomach, normal duodenum, portion of the jejunum normal-Dr. Taylor Pina    ILIAC ARTERY - FEMORAL ARTERY BYPASS GRAFT Bilateral 2002    ILIAC ARTERY STENT Bilateral 2001    INSERTION AND REMOVAL PERITONEAL DIALYSIS CATHETER Right 02/29/2016    Exchange right jugular 16 cm Mahurkar dialysis catheter-Dr. Jose Swann    INSERTION PERITONEAL DIALYSIS CATHETER Left 03/01/2016    Ultrasound-guided access of the left jugular vein, 23 cm left jugular palindrome dialysis catheter placement-Dr. Jose Swann    INSERTION PERITONEAL DIALYSIS CATHETER Right 02/18/2016    Right jugular Mahrkar catherer placement-Dr. Jose Swann    JOINT REPLACEMENT Left     THORACOSCOPY Left 04/18/2016    Procedure: LT THORACOSCOPY VIDEO ASSISTED WITH DECORDICATION;  Surgeon: Genaro Davila III, MD;  Location: [facility] MAIN OR;  Service:     THROMBECTOMY Left 02/16/2016    Thombectomy of left femoral graft, left leg arteriogram, left calf forward compartment fasciotomy-Dr. Jose Swann    TOTAL HIP ARTHROPLASTY Left     UPPER GASTROINTESTINAL ENDOSCOPY N/A 02/09/2016    Normal UGI-Dr. Ajay Parkinson    UPPER GASTROINTESTINAL ENDOSCOPY N/A 11/28/2015    Small hiatal hernia, chronic gastritis: biopsied, non-bleeding angioectasias in the stomach: treated with argon plasma coagulation, multiple bleeding angioectasias in the dudenum: treated with argon plasma coagulation-Dr. Mykel Jaramillo    VASCULAR SURGERY      MULTIPLE    VENTRAL/INCISIONAL HERNIA REPAIR N/A 10/19/2017    Procedure: VENTRAL HERNIA REPAIR WITH MESH AND BILATERAL COMPONENT SEPARATION;  Surgeon: Genaro ePrrin MD;  Location: [facility] MAIN OR;  Service:     WISDOM TOOTH EXTRACTION        General Information       Row Name 12/15/23  1516          Physical Therapy Time and Intention    Document Type evaluation  -EM     Mode of Treatment individual therapy;physical therapy  -EM       Row Name 12/15/23 1516          General Information    Patient Profile Reviewed yes  -EM     Prior Level of Function independent:;all household mobility  uses rwx  -EM     Existing Precautions/Restrictions fall;oxygen therapy device and L/min  -EM       Row Name 12/15/23 1516          Living Environment    People in Home spouse  -EM       Row Name 12/15/23 1516          Cognition    Orientation Status (Cognition) oriented x 3  -EM       Row Name 12/15/23 1516          Safety Issues, Functional Mobility    Impairments Affecting Function (Mobility) balance;endurance/activity tolerance;strength  -EM               User Key  (r) = Recorded By, (t) = Taken By, (c) = Cosigned By      Initials Name Provider Type    Petrona Paul PT Physical Therapist                   Mobility       Row Name 12/15/23 1519          Bed Mobility    Bed Mobility supine-sit;sit-supine  -EM     Supine-Sit [place] (Bed Mobility) moderate assist (50% patient effort)  -EM     Sit-Supine [place] (Bed Mobility) moderate assist (50% patient effort)  -EM     Assistive Device (Bed Mobility) head of bed elevated;bed rails  -EM       Row Name 12/15/23 1519          Transfers    Comment, (Transfers) did not attempt tsf today, pt hypotensive per RN, unable to maintain static sitting balance on EOB without significant assistance today  -EM               User Key  (r) = Recorded By, (t) = Taken By, (c) = Cosigned By      Initials Name Provider Type    Petrona Paul PT Physical Therapist                   Obj/Interventions       Row Name 12/15/23 1520          Range of Motion Comprehensive    General Range of Motion no range of motion deficits identified  -EM       Row Name 12/15/23 1520          Strength Comprehensive (MMT)    General Manual Muscle Testing (MMT) Assessment other  (see comments)  -EM     Comment, General Manual Muscle Testing (MMT) Assessment no focal deficits identified, generalized weakness noted  -EM       Row Name 12/15/23 1520          Balance    Balance Assessment sitting static balance;sitting dynamic balance  -EM     Static Sitting Balance minimal assist  -EM     Dynamic Sitting Balance moderate assist  -EM     Comment, Balance pt sat on EOB, requiring min to modA to maintain sitting balance  -EM       Row Name 12/15/23 1520          Sensory Assessment (Somatosensory)    Sensory Assessment (Somatosensory) sensation intact  -EM               User Key  (r) = Recorded By, (t) = Taken By, (c) = Cosigned By      Initials Name Provider Type    EM Petrona Vazquez, PT Physical Therapist                   Goals/Plan       Row Name 12/15/23 1527          Bed Mobility Goal 1 (PT)    Activity/Assistive Device (Bed Mobility Goal 1, PT) bed mobility activities, all  -EM     [place] Level/Cues Needed (Bed Mobility Goal 1, PT) contact guard required  -EM     Time Frame (Bed Mobility Goal 1, PT) 1 week  -EM       Row Name 12/15/23 1527          Transfer Goal 1 (PT)    Activity/Assistive Device (Transfer Goal 1, PT) transfers, all;walker, rolling  -EM     [place] Level/Cues Needed (Transfer Goal 1, PT) contact guard required  -EM     Time Frame (Transfer Goal 1, PT) 1 week  -EM       Row Name 12/15/23 1527          Gait Training Goal 1 (PT)    Activity/Assistive Device (Gait Training Goal 1, PT) gait (walking locomotion);walker, rolling  -EM     [place] Level (Gait Training Goal 1, PT) contact guard required  -EM     Distance (Gait Training Goal 1, PT) 50  -EM     Time Frame (Gait Training Goal 1, PT) 1 week  -EM       Row Name 12/15/23 1527          Therapy Assessment/Plan (PT)    Planned Therapy Interventions (PT) bed mobility training;gait training;home exercise program;transfer training;strengthening;patient/family education  -EM               User Key  (r) =  Recorded By, (t) = Taken By, (c) = Cosigned By      Initials Name Provider Type    Petrona Paul PT Physical Therapist                   Clinical Impression       Row Name 12/15/23 1521          Pain    Pretreatment Pain Rating 0/10 - no pain  -EM       Row Name 12/15/23 1521          Plan of Care Review    Plan of Care Reviewed With patient  -EM     Outcome Evaluation Pt is 68 yo male admitted to [facility] for falls and acute kidney injury, generalized weakness. PMH significant for L AKA, COPD, HTN, CKD, CAD. Patient lives with spouse, independent with use of prosthesis and rwx prior to admission. Today, patient awake and alert, agreeable to therapy. Patient does not have prosthesis present yet. Pt performed bed mobility with modA, sat on EOB with Ratna to modA to maintain sitting balance. Pt demonstrates impairments consisting of generalized weakness, decreased balance, decreased activity tolerance and would benefit from skilled PT. pt states his d/c plans are home with assist.  -EM       Row Name 12/15/23 1521          Therapy Assessment/Plan (PT)    Patient/Family Therapy Goals Statement (PT) get back to normal activities  -EM     Rehab Potential (PT) good, to achieve stated therapy goals  -EM     Criteria for Skilled Interventions Met (PT) yes;skilled treatment is necessary  -EM     Therapy Frequency (PT) 6 times/wk  -EM       Row Name 12/15/23 1521          Positioning and Restraints    Pre-Treatment Position in bed  -EM     Post Treatment Position bed  -EM     In Bed supine;call light within reach;exit alarm on  -EM               User Key  (r) = Recorded By, (t) = Taken By, (c) = Cosigned By      Initials Name Provider Type    Petrona Paul PT Physical Therapist                   Outcome Measures       Row Name 12/15/23 1529 12/15/23 0800       How much help from another person do you currently need...    Turning from your back to your side while in flat bed without using bedrails? 3  -EM 3   -CW    Moving from lying on back to sitting on the side of a flat bed without bedrails? 2  -EM 3  -CW    Moving to and from a bed to a chair (including a wheelchair)? 2  -EM 3  -CW    Standing up from a chair using your arms (e.g., wheelchair, bedside chair)? 2  -EM 3  -CW    Climbing 3-5 steps with a railing? 1  -EM 3  -CW    To walk in hospital room? 1  -EM 3  -CW    AM-PAC 6 Clicks Score (PT) 11  -EM 18  -CW    Highest Level of Mobility Goal 4 --> Transfer to chair/commode  -EM 6 --> Walk 10 steps or more  -CW              User Key  (r) = Recorded By, (t) = Taken By, (c) = Cosigned By      Initials Name Provider Type    EM Petrona Vazquez, PT Physical Therapist    CW Martha Harp, RN Registered Nurse                                 Physical Therapy Education       Title: PT OT SLP Therapies (In Progress)       Topic: Physical Therapy (In Progress)       Point: Mobility training (Done)       Learning Progress Summary             Patient Acceptance, E, VU by  at 12/15/2023 1531                         Point: Home exercise program (Not Started)       Learner Progress:  Not documented in this visit.              Point: Body mechanics (Not Started)       Learner Progress:  Not documented in this visit.              Point: Precautions (Not Started)       Learner Progress:  Not documented in this visit.                              User Key       Initials Effective Dates Name Provider Type Discipline     06/16/21 -  Petrona Vazquez, PT Physical Therapist PT                  PT Recommendation and Plan  Planned Therapy Interventions (PT): bed mobility training, gait training, home exercise program, transfer training, strengthening, patient/family education  Plan of Care Reviewed With: patient  Outcome Evaluation: Pt is 66 yo male admitted to [facility] for falls and acute kidney injury, generalized weakness. PMH significant for L AKA, COPD, HTN, CKD, CAD. Patient lives with spouse, independent with use of  prosthesis and rwx prior to admission. Today, patient awake and alert, agreeable to therapy. Patient does not have prosthesis present yet. Pt performed bed mobility with modA, sat on EOB with Ratna to modA to maintain sitting balance. Pt demonstrates impairments consisting of generalized weakness, decreased balance, decreased activity tolerance and would benefit from skilled PT. pt states his d/c plans are home with assist.     Time Calculation:         PT Charges       Row Name 12/15/23 1531             Time Calculation    Start Time 1450  -EM      Stop Time 1507  -EM      Time Calculation (min) 17 min  -EM      PT Received On 12/15/23  -EM      PT - Next Appointment 12/16/23  -EM      PT Goal Re-Cert Due Date 12/22/23  -EM         Time Calculation- PT    Total Timed Code Minutes- PT 10 minute(s)  -EM         Timed Charges    22264 - PT Therapeutic Activity Minutes 10  -EM         Total Minutes    Timed Charges Total Minutes 10  -EM       Total Minutes 10  -EM                User Key  (r) = Recorded By, (t) = Taken By, (c) = Cosigned By      Initials Name Provider Type    EM Petrona Vazquez, PT Physical Therapist                  Therapy Charges for Today       Code Description Service Date Service Provider Modifiers Qty    41492355198  PT THERAPEUTIC ACT EA 15 MIN 12/15/2023 Petrona Vazquez, PT GP 1    60980084033 HC PT EVAL MOD COMPLEXITY 2 12/15/2023 Petrona Vazquez PT GP 1            PT G-Codes  AM-PAC 6 Clicks Score (PT): 11  PT Discharge Summary  Anticipated Discharge Disposition (PT): home with assist, home with home health    Petrona Vazquez PT  12/15/2023

## 2023-12-15 NOTE — PLAN OF CARE
Goal Outcome Evaluation:  Plan of Care Reviewed With: patient           Outcome Evaluation: BP improving. Denies pain. C/o generalized weakness. IVFs infusing. Lactic level trending up. Notified [facility] NP on call. No further order given. On 2L at HS. Consulted WOCN for pressure injury coccyx. Called in Renal consult.

## 2023-12-16 ENCOUNTER — APPOINTMENT (OUTPATIENT)
Dept: GENERAL RADIOLOGY | Facility: HOSPITAL | Age: 68
End: 2023-12-16
Payer: MEDICARE

## 2023-12-16 LAB
ALBUMIN SERPL-MCNC: 2.9 G/DL (ref 3.5–5.2)
ANION GAP SERPL CALCULATED.3IONS-SCNC: 8 MMOL/L (ref 5–15)
ARTERIAL PATENCY WRIST A: POSITIVE
ATMOSPHERIC PRESS: 753.8 MMHG
BASE EXCESS BLDA CALC-SCNC: 2 MMOL/L (ref 0–2)
BDY SITE: ABNORMAL
BUN SERPL-MCNC: 77 MG/DL (ref 8–23)
BUN/CREAT SERPL: 16.7 (ref 7–25)
CALCIUM SPEC-SCNC: 11.7 MG/DL (ref 8.6–10.5)
CHLORIDE SERPL-SCNC: 100 MMOL/L (ref 98–107)
CO2 BLDA-SCNC: 28.4 MMOL/L (ref 23–27)
CO2 SERPL-SCNC: 27 MMOL/L (ref 22–29)
CREAT SERPL-MCNC: 4.61 MG/DL (ref 0.76–1.27)
DEPRECATED RDW RBC AUTO: 43.8 FL (ref 37–54)
DEVICE COMMENT: ABNORMAL
EGFRCR SERPLBLD CKD-EPI 2021: 13.2 ML/MIN/1.73
ERYTHROCYTE [DISTWIDTH] IN BLOOD BY AUTOMATED COUNT: 13.4 % (ref 12.3–15.4)
FERRITIN SERPL-MCNC: 400 NG/ML (ref 30–400)
FOLATE SERPL-MCNC: >20 NG/ML (ref 4.78–24.2)
GAS FLOW AIRWAY: 15 LPM
GLUCOSE BLDC GLUCOMTR-MCNC: 120 MG/DL (ref 70–130)
GLUCOSE SERPL-MCNC: 95 MG/DL (ref 65–99)
HBA1C MFR BLD: 5 % (ref 4.8–5.6)
HCO3 BLDA-SCNC: 27 MMOL/L (ref 22–28)
HCT VFR BLD AUTO: 31.8 % (ref 37.5–51)
HEMODILUTION: NO
HGB BLD-MCNC: 10.3 G/DL (ref 13–17.7)
IRON 24H UR-MRATE: 12 MCG/DL (ref 59–158)
IRON SATN MFR SERPL: 7 % (ref 20–50)
LYMPHOCYTES # BLD MANUAL: 0.75 10*3/MM3 (ref 0.7–3.1)
LYMPHOCYTES NFR BLD MANUAL: 2 % (ref 5–12)
MAGNESIUM SERPL-MCNC: 3 MG/DL (ref 1.6–2.4)
MCH RBC QN AUTO: 29.1 PG (ref 26.6–33)
MCHC RBC AUTO-ENTMCNC: 32.4 G/DL (ref 31.5–35.7)
MCV RBC AUTO: 89.8 FL (ref 79–97)
MODALITY: ABNORMAL
MONOCYTES # BLD: 0.37 10*3/MM3 (ref 0.1–0.9)
MRSA DNA SPEC QL NAA+PROBE: NORMAL
NEUTROPHILS # BLD AUTO: 17.53 10*3/MM3 (ref 1.7–7)
NEUTROPHILS NFR BLD MANUAL: 94 % (ref 42.7–76)
PCO2 BLDA: 42.9 MM HG (ref 35–45)
PH BLDA: 7.41 PH UNITS (ref 7.35–7.45)
PHOSPHATE SERPL-MCNC: 3.8 MG/DL (ref 2.5–4.5)
PLAT MORPH BLD: NORMAL
PLATELET # BLD AUTO: 166 10*3/MM3 (ref 140–450)
PMV BLD AUTO: 10.1 FL (ref 6–12)
PO2 BLDA: 69.2 MM HG (ref 80–100)
POTASSIUM SERPL-SCNC: 4 MMOL/L (ref 3.5–5.2)
RBC # BLD AUTO: 3.54 10*6/MM3 (ref 4.14–5.8)
RBC MORPH BLD: NORMAL
SAO2 % BLDCOA: 93.6 % (ref 92–98.5)
SET MECH RESP RATE: 24
SODIUM SERPL-SCNC: 135 MMOL/L (ref 136–145)
TIBC SERPL-MCNC: 165 MCG/DL (ref 298–536)
TOTAL RATE: 24 BREATHS/MINUTE
TRANSFERRIN SERPL-MCNC: 111 MG/DL (ref 200–360)
VARIANT LYMPHS NFR BLD MANUAL: 4 % (ref 19.6–45.3)
VIT B12 BLD-MCNC: 1170 PG/ML (ref 211–946)
WBC MORPH BLD: NORMAL
WBC NRBC COR # BLD AUTO: 18.65 10*3/MM3 (ref 3.4–10.8)

## 2023-12-16 PROCEDURE — 36600 WITHDRAWAL OF ARTERIAL BLOOD: CPT

## 2023-12-16 PROCEDURE — 25810000003 SODIUM CHLORIDE 0.9 % SOLUTION: Performed by: INTERNAL MEDICINE

## 2023-12-16 PROCEDURE — 82803 BLOOD GASES ANY COMBINATION: CPT

## 2023-12-16 PROCEDURE — 80069 RENAL FUNCTION PANEL: CPT | Performed by: INTERNAL MEDICINE

## 2023-12-16 PROCEDURE — 85007 BL SMEAR W/DIFF WBC COUNT: CPT | Performed by: STUDENT IN AN ORGANIZED HEALTH CARE EDUCATION/TRAINING PROGRAM

## 2023-12-16 PROCEDURE — 71045 X-RAY EXAM CHEST 1 VIEW: CPT

## 2023-12-16 PROCEDURE — 25010000002 PIPERACILLIN SOD-TAZOBACTAM PER 1 G: Performed by: STUDENT IN AN ORGANIZED HEALTH CARE EDUCATION/TRAINING PROGRAM

## 2023-12-16 PROCEDURE — 83735 ASSAY OF MAGNESIUM: CPT | Performed by: STUDENT IN AN ORGANIZED HEALTH CARE EDUCATION/TRAINING PROGRAM

## 2023-12-16 PROCEDURE — 82728 ASSAY OF FERRITIN: CPT | Performed by: STUDENT IN AN ORGANIZED HEALTH CARE EDUCATION/TRAINING PROGRAM

## 2023-12-16 PROCEDURE — 94799 UNLISTED PULMONARY SVC/PX: CPT

## 2023-12-16 PROCEDURE — 94761 N-INVAS EAR/PLS OXIMETRY MLT: CPT

## 2023-12-16 PROCEDURE — 97535 SELF CARE MNGMENT TRAINING: CPT

## 2023-12-16 PROCEDURE — 82746 ASSAY OF FOLIC ACID SERUM: CPT | Performed by: STUDENT IN AN ORGANIZED HEALTH CARE EDUCATION/TRAINING PROGRAM

## 2023-12-16 PROCEDURE — 25810000003 SODIUM CHLORIDE 0.9 % SOLUTION: Performed by: NURSE PRACTITIONER

## 2023-12-16 PROCEDURE — 83540 ASSAY OF IRON: CPT | Performed by: STUDENT IN AN ORGANIZED HEALTH CARE EDUCATION/TRAINING PROGRAM

## 2023-12-16 PROCEDURE — 85025 COMPLETE CBC W/AUTO DIFF WBC: CPT | Performed by: STUDENT IN AN ORGANIZED HEALTH CARE EDUCATION/TRAINING PROGRAM

## 2023-12-16 PROCEDURE — 87641 MR-STAPH DNA AMP PROBE: CPT | Performed by: STUDENT IN AN ORGANIZED HEALTH CARE EDUCATION/TRAINING PROGRAM

## 2023-12-16 PROCEDURE — 94664 DEMO&/EVAL PT USE INHALER: CPT

## 2023-12-16 PROCEDURE — 83036 HEMOGLOBIN GLYCOSYLATED A1C: CPT | Performed by: STUDENT IN AN ORGANIZED HEALTH CARE EDUCATION/TRAINING PROGRAM

## 2023-12-16 PROCEDURE — 84466 ASSAY OF TRANSFERRIN: CPT | Performed by: STUDENT IN AN ORGANIZED HEALTH CARE EDUCATION/TRAINING PROGRAM

## 2023-12-16 PROCEDURE — 94760 N-INVAS EAR/PLS OXIMETRY 1: CPT

## 2023-12-16 PROCEDURE — 97165 OT EVAL LOW COMPLEX 30 MIN: CPT

## 2023-12-16 PROCEDURE — 82948 REAGENT STRIP/BLOOD GLUCOSE: CPT

## 2023-12-16 PROCEDURE — 82607 VITAMIN B-12: CPT | Performed by: STUDENT IN AN ORGANIZED HEALTH CARE EDUCATION/TRAINING PROGRAM

## 2023-12-16 RX ORDER — VANCOMYCIN/0.9 % SOD CHLORIDE 1.5G/250ML
20 PLASTIC BAG, INJECTION (ML) INTRAVENOUS ONCE
Status: DISCONTINUED | OUTPATIENT
Start: 2023-12-16 | End: 2023-12-16

## 2023-12-16 RX ORDER — MIDODRINE HYDROCHLORIDE 5 MG/1
5 TABLET ORAL ONCE
Status: COMPLETED | OUTPATIENT
Start: 2023-12-16 | End: 2023-12-16

## 2023-12-16 RX ORDER — GABAPENTIN 300 MG/1
300 CAPSULE ORAL NIGHTLY
Status: DISCONTINUED | OUTPATIENT
Start: 2023-12-16 | End: 2023-12-18

## 2023-12-16 RX ORDER — GUAIFENESIN 600 MG/1
600 TABLET, EXTENDED RELEASE ORAL EVERY 12 HOURS SCHEDULED
Status: DISCONTINUED | OUTPATIENT
Start: 2023-12-16 | End: 2023-12-24 | Stop reason: HOSPADM

## 2023-12-16 RX ORDER — BENZONATATE 100 MG/1
200 CAPSULE ORAL 3 TIMES DAILY PRN
Status: DISCONTINUED | OUTPATIENT
Start: 2023-12-16 | End: 2023-12-24 | Stop reason: HOSPADM

## 2023-12-16 RX ADMIN — ALLOPURINOL 100 MG: 100 TABLET ORAL at 10:45

## 2023-12-16 RX ADMIN — BUDESONIDE AND FORMOTEROL FUMARATE DIHYDRATE 2 PUFF: 160; 4.5 AEROSOL RESPIRATORY (INHALATION) at 08:02

## 2023-12-16 RX ADMIN — SODIUM CHLORIDE 125 ML/HR: 9 INJECTION, SOLUTION INTRAVENOUS at 03:54

## 2023-12-16 RX ADMIN — BRIMONIDINE TARTRATE 1 DROP: 2 SOLUTION/ DROPS OPHTHALMIC at 20:50

## 2023-12-16 RX ADMIN — GUAIFENESIN 600 MG: 600 TABLET, EXTENDED RELEASE ORAL at 20:50

## 2023-12-16 RX ADMIN — ASPIRIN 81 MG: 81 TABLET, CHEWABLE ORAL at 20:50

## 2023-12-16 RX ADMIN — ATORVASTATIN CALCIUM 40 MG: 20 TABLET, FILM COATED ORAL at 20:50

## 2023-12-16 RX ADMIN — SODIUM CHLORIDE 500 ML: 9 INJECTION, SOLUTION INTRAVENOUS at 00:17

## 2023-12-16 RX ADMIN — Medication 1 TABLET: at 10:45

## 2023-12-16 RX ADMIN — ANORECTAL OINTMENT 1 APPLICATION: 15.7; .44; 24; 20.6 OINTMENT TOPICAL at 20:54

## 2023-12-16 RX ADMIN — CLOPIDOGREL BISULFATE 75 MG: 75 TABLET, FILM COATED ORAL at 20:50

## 2023-12-16 RX ADMIN — GABAPENTIN 300 MG: 300 CAPSULE ORAL at 20:50

## 2023-12-16 RX ADMIN — PIPERACILLIN SODIUM AND TAZOBACTAM SODIUM 3.38 G: 3; .375 INJECTION, SOLUTION INTRAVENOUS at 20:50

## 2023-12-16 RX ADMIN — TIOTROPIUM BROMIDE INHALATION SPRAY 2 PUFF: 3.12 SPRAY, METERED RESPIRATORY (INHALATION) at 08:03

## 2023-12-16 RX ADMIN — MIDODRINE HYDROCHLORIDE 5 MG: 5 TABLET ORAL at 03:31

## 2023-12-16 RX ADMIN — SODIUM CHLORIDE 125 ML/HR: 9 INJECTION, SOLUTION INTRAVENOUS at 12:16

## 2023-12-16 RX ADMIN — LATANOPROST 1 DROP: 50 SOLUTION OPHTHALMIC at 20:50

## 2023-12-16 RX ADMIN — PIPERACILLIN SODIUM AND TAZOBACTAM SODIUM 3.38 G: 3; .375 INJECTION, SOLUTION INTRAVENOUS at 13:28

## 2023-12-16 RX ADMIN — ALPRAZOLAM 1 MG: 1 TABLET ORAL at 20:50

## 2023-12-16 RX ADMIN — ANORECTAL OINTMENT 1 APPLICATION: 15.7; .44; 24; 20.6 OINTMENT TOPICAL at 07:45

## 2023-12-16 RX ADMIN — BUPROPION HYDROCHLORIDE 300 MG: 300 TABLET, EXTENDED RELEASE ORAL at 10:45

## 2023-12-16 RX ADMIN — BRIMONIDINE TARTRATE 1 DROP: 2 SOLUTION/ DROPS OPHTHALMIC at 10:46

## 2023-12-16 RX ADMIN — SODIUM CHLORIDE 125 ML/HR: 9 INJECTION, SOLUTION INTRAVENOUS at 21:52

## 2023-12-16 RX ADMIN — LEVOTHYROXINE SODIUM 137 MCG: 0.14 TABLET ORAL at 10:45

## 2023-12-16 RX ADMIN — BUDESONIDE AND FORMOTEROL FUMARATE DIHYDRATE 2 PUFF: 160; 4.5 AEROSOL RESPIRATORY (INHALATION) at 21:12

## 2023-12-16 RX ADMIN — GUAIFENESIN 600 MG: 600 TABLET, EXTENDED RELEASE ORAL at 13:28

## 2023-12-16 RX ADMIN — ALPRAZOLAM 1 MG: 1 TABLET ORAL at 10:45

## 2023-12-16 NOTE — PROGRESS NOTES
[facility] Clinical Pharmacy Services: Vancomycin Monitoring Note    Sim Agustin is a 67 y.o. male who is on day 1/7 of pharmacy to dose vancomycin for Pneumonia and Sepsis.    Previous Vancomycin Dose:   1500 mg IV  loading dose    Updated Cultures and Sensitivities:  12/14  B/C x 2  NG x 24 hours                                                                 12/16 MRSA pending      Vitals/Labs  Ht:  ; Wt: 75.2 kg (165 lb 12.6 oz)   Temp Readings from Last 1 Encounters:   12/16/23 98.1 °F (36.7 °C) (Oral)     Estimated Creatinine Clearance: 16.5 mL/min (A) (by C-G formula based on SCr of 4.61 mg/dL (H)).        Results from last 7 days   Lab Units 12/16/23  0607 12/15/23  1057 12/15/23  0432 12/14/23  1715   CREATININE mg/dL 4.61* 5.02* 4.61* 5.09*   WBC 10*3/mm3 18.65*  --  13.67* 16.45*     Assessment/Plan    Current Vancomycin Dose: will begin intermittent dosing based on poor renal function   Next Level Date and Time: Vanc Random on 12/17 at 0600  We will continue to monitor patient changes and renal function     Thank you for involving pharmacy in this patient's care. Please contact pharmacy with any questions or concerns.       Sherine Mcneill [facility]  Clinical Pharmacist

## 2023-12-16 NOTE — PROGRESS NOTES
[facility] Clinical Pharmacy Services: Piperacillin-Tazobactam Consult    Pt Name: Sim Agustin   : 1955    Indication: Pneumonia    Relevant clinical data and objective history reviewed:    Past Medical History:   Diagnosis Date    Acute respiratory failure with hypoxia and hypercarbia 2019    Acute upper respiratory infection 2013    Amputee, above knee, left     Anemia     [facility]    ARF (acute renal failure) 2016    Asthma 2018    Atelectasis, left 2016    Bradycardia 2019    CAD (coronary artery disease) 2016    Chronic obstructive pulmonary disease with acute exacerbation 2013    Clotting disorder 2003    Colon polyps     Compartment syndrome     AFTER ILIAC SURGERY. ABOVE KNEE AMPUTATION    COPD (chronic obstructive pulmonary disease)     COPD with hypoxia 2019    Cough     SINCE APRIL WITH PNEUMONIA.     Demand myocardial infarction 2022    Disease of thyroid gland     HYPOTHYRODISM    Diverticulosis     Elevated hematocrit 2021    Employs prosthetic leg     ESRD (end stage renal disease) on dialysis 2016    Fracture of patella 2015    History of acute renal failure     History of CHF (congestive heart failure)     History of GI bleed 2016    AORTODUOD FISTULA    History of sepsis 2016    History of transfusion     MULTIPLE, 2016    Hyperkalemia 2016    Hyperlipidemia     Hypertension     Hypotension     Hypotension 2016    Left lower lobe pneumonia 2022    Nonischemic cardiomyopathy     Nosocomial pneumonia 2016    Phantom limb pain     SL, WITH LEFT ABOUVE KNEE    Pleural effusion on left 2016    Pneumonia     SPRING 2016  AFTER SURGERY    Pulmonary embolism     PVD (peripheral vascular disease)     Renal insufficiency     S/P thoracentesis 2016    Sepsis, unspecified organism 2016     Creatinine   Date Value Ref Range Status   2023 4.61 (H)  0.76 - 1.27 mg/dL Final   12/15/2023 5.02 (H) 0.76 - 1.27 mg/dL Final   12/15/2023 4.61 (H) 0.76 - 1.27 mg/dL Final   11/03/2017 1.20 0.60 - 1.30 mg/dL Final     Comment:     Serial Number: [id number]:  [id number]     BUN   Date Value Ref Range Status   12/16/2023 77 (H) 8 - 23 mg/dL Final     Estimated Creatinine Clearance: 16.5 mL/min (A) (by C-G formula based on SCr of 4.61 mg/dL (H)).    Lab Results   Component Value Date    WBC 18.65 (H) 12/16/2023     Temp Readings from Last 3 Encounters:   12/16/23 98.1 °F (36.7 °C) (Oral)   09/28/23 98.2 °F (36.8 °C) (Infrared)   03/28/23 96.6 °F (35.9 °C) (Infrared)      Assessment/Plan  Estimated CrCl <20 mL/min at this time; BMI 25.21 kg/m2  Will start piperacillin-tazobactam 3.375 g IV every 12 hours     Pharmacy will continue to follow daily while on piperacillin-tazobactam and adjust as needed. Thank you for this consult.   “Pharmacy will discontinue the Pharmacy to Dose consult at this time. Renal function will continue to be monitored and dosing adjustments will be made by pharmacy based on renal function if necessary.”     Sherine Mcneill, [facility]  Clinical Pharmacist

## 2023-12-16 NOTE — PLAN OF CARE
Problem: Adult Inpatient Plan of Care  Goal: Plan of Care Review  Outcome: Ongoing, Progressing  Flowsheets (Taken 12/16/2023 2379)  Progress: improving  Plan of Care Reviewed With: patient  Outcome Evaluation: Pt vitals stable. Weaned to 2L O2 this evening. Rapid called this am for pt O2 demand. MRSA nares neg. Zosyn started.Q2 turn. IVF continued. Labs in am. Pt safety maintained

## 2023-12-16 NOTE — PROGRESS NOTES
[facility] Progress Note      Patient Name: Sim Agustin  : 1955  MRN: [id number]  Primary Care Physician:  Seth Hester MD  Date of admission: 2023    Subjective     Interval History:   Patient sitting comfortably.  Still has shortness of breath.  On oxygen with nasal cannula  Patient had episode of hypoxia and hypotension last night.  He received IV fluid bolus    Review of Systems:   As noted above    Objective     Vitals:   Temp:  [97.7 °F (36.5 °C)-100.9 °F (38.3 °C)] 98.1 °F (36.7 °C)  Heart Rate:  [58-85] 72  Resp:  [16-20] 20  BP: ()/(38-71) 111/71  Flow (L/min):  [2-15] 4    Intake/Output Summary (Last 24 hours) at 2023 1156  Last data filed at 2023 0745  Gross per 24 hour   Intake 0 ml   Output 550 ml   Net -550 ml       Physical Exam:    General Appearance: NAD, chronically ill-appearing  HEENT: oral mucosa normal, nonicteric sclera  Neck: supple, no JVD  Lungs: Clear to wheezes heart: RRR, normal S1 and S2  Abdomen: soft, nondistended  Extremities: no edema  Neuro: Awake alert and moving all extremities    Scheduled Meds:     allopurinol, 100 mg, Oral, Daily  ALPRAZolam, 1 mg, Oral, BID  aspirin, 81 mg, Oral, Nightly  atorvastatin, 40 mg, Oral, Nightly  brimonidine, 1 drop, Both Eyes, BID  budesonide-formoterol, 2 puff, Inhalation, BID - RT   And  tiotropium bromide monohydrate, 2 puff, Inhalation, Daily - RT  buPROPion XL, 300 mg, Oral, Daily  clopidogrel, 75 mg, Oral, Nightly  gabapentin, 300 mg, Oral, Nightly  isosorbide mononitrate, 30 mg, Oral, QAM  latanoprost, 1 drop, Both Eyes, Nightly  levothyroxine, 137 mcg, Oral, Daily  Menthol-Zinc Oxide, 1 application , Topical, Q12H  multivitamin with minerals, 1 tablet, Oral, Daily  senna-docusate sodium, 2 tablet, Oral, BID      IV Meds:   sodium chloride, 125 mL/hr, Last Rate: 125 mL/hr (23 0359)        Results Reviewed:   I have personally reviewed the results from the time of this  admission to 12/16/2023 11:56 EST     Results from last 7 days   Lab Units 12/16/23  0607 12/15/23  1057 12/15/23  0432 12/14/23  1715   SODIUM mmol/L 135* 132* 131* 134*   POTASSIUM mmol/L 4.0 3.4* 3.1* 3.3*   CHLORIDE mmol/L 100 90* 90* 85*   CO2 mmol/L 27.0 29.7* 31.0* 32.9*   BUN mg/dL 77* 66* 70* 61*   CREATININE mg/dL 4.61* 5.02* 4.61* 5.09*   CALCIUM mg/dL 11.7* 13.6* 14.7* 17.0*   BILIRUBIN mg/dL  --   --   --  1.2   ALK PHOS U/L  --   --   --  66   ALT (SGPT) U/L  --   --   --  14   AST (SGOT) U/L  --   --   --  23   GLUCOSE mg/dL 95 135* 88 165*     Estimated Creatinine Clearance: 16.5 mL/min (A) (by C-G formula based on SCr of 4.61 mg/dL (H)).  Results from last 7 days   Lab Units 12/16/23  0607 12/15/23  1057 12/15/23  0432   MAGNESIUM mg/dL 3.0*  --  2.8*   PHOSPHORUS mg/dL 3.8 3.9  --          Results from last 7 days   Lab Units 12/16/23  0607 12/15/23  0432 12/14/23  1715   WBC 10*3/mm3 18.65* 13.67* 16.45*   HEMOGLOBIN g/dL 10.3* 12.9* 15.8   PLATELETS 10*3/mm3 166 173 214           Assessment / Plan     ASSESSMENT:  1.  ARLYN on CKD3b, known oliguric, likely prerenal from hypotension, hypovolemia, and severe hypercalcemia.  Creatinine improving but very slowly  2.  Hypercalcemia, multifactorial from calcium supplements and volume contraction.  PTH ought to be lower in the face of this degree of hypercalcemia.  Malignancy is always a consideration, too  3.  Hypotension, likely from hypovolemia; on amlodipine  4.  Anemia, being unmasked with fluid resuscitation  5.  PVD with compartment syndrome in 2016 leading to left AKA  6.  COPD    PLAN:  Keep off antihypertensives diuretics  Continue IV hydration.  Reviewed chest x-ray.  No evidence of fluid overload  Labs in a.m.    Rafael Montes MD  12/16/23  11:56 EST    [facility]  [phone]

## 2023-12-16 NOTE — CODE DOCUMENTATION
It is reported to me that this patient has titrated up from 2 liters to 15 liters and the spo2 was reading 80%. Rapid response was called. Pt was placed on 100% NRB mask prior to my arrival. ABG and xray was ordered prior to RRT. Pt was able to cough and expel some secretion per nursing.   Currently pt is reading 98%  on spo2. O2 is being titrated down. He is on 10 liters. Pt is in no distress. He has normal resp pattern and able to talk without SOA.

## 2023-12-16 NOTE — PROGRESS NOTES
Name: Sim Agustin ADMIT: 2023   : 1955  PCP: Seth Hester MD    MRN: [id number] LOS: 2 days   AGE/SEX: 67 y.o. male  ROOM: E4/1     Subjective   Subjective   Patient seen and examined this morning.  Hospital day 2.  Notified by nursing earlier this morning, rapid response called due to patient becoming hypoxic and having sudden increase in oxygen requirements.  He subsequently coughed up a significant amount of mucus and oxygen requirements later improved.  At present, he is on 4 L O2 via NC with O2 sats greater than 90%, he denies dyspnea at present, but does continue to endorse ongoing generalized fatigue and weakness.       Objective   Objective   Vital Signs  Temp:  [97.7 °F (36.5 °C)-100.9 °F (38.3 °C)] 98.1 °F (36.7 °C)  Heart Rate:  [58-85] 72  Resp:  [16-20] 20  BP: ()/(38-71) 111/71  SpO2:  [82 %-100 %] 93 %  on  Flow (L/min):  [2-15] 4;   Device (Oxygen Therapy): nasal cannula;humidified  Body mass index is 25.21 kg/m².  Physical Exam  Vitals and nursing note reviewed.   Constitutional:       General: He is awake. He is not in acute distress.     Appearance: He is ill-appearing.   HENT:      Head: Atraumatic.   Eyes:      General: No scleral icterus.     Pupils: Pupils are equal, round, and reactive to light.   Cardiovascular:      Rate and Rhythm: Normal rate and regular rhythm.      Pulses: Normal pulses.      Heart sounds: Normal heart sounds.   Pulmonary:      Effort: Pulmonary effort is normal. No respiratory distress.      Breath sounds: Decreased breath sounds present.      Comments: On O2  Abdominal:      General: Bowel sounds are normal.      Palpations: Abdomen is soft.      Tenderness: There is no abdominal tenderness.   Musculoskeletal:      Comments: Left AKA   Skin:     General: Skin is warm and dry.      Coloration: Skin is pale.   Neurological:      Mental Status: He is alert.   Psychiatric:         Behavior: Behavior is cooperative.       Results Review     I  reviewed the patient's new clinical results.  Results from last 7 days   Lab Units 12/16/23  0607 12/15/23  0432 12/14/23  1715   WBC 10*3/mm3 18.65* 13.67* 16.45*   HEMOGLOBIN g/dL 10.3* 12.9* 15.8   PLATELETS 10*3/mm3 166 173 214     Results from last 7 days   Lab Units 12/16/23  0607 12/15/23  1057 12/15/23  0432 12/14/23  1715   SODIUM mmol/L 135* 132* 131* 134*   POTASSIUM mmol/L 4.0 3.4* 3.1* 3.3*   CHLORIDE mmol/L 100 90* 90* 85*   CO2 mmol/L 27.0 29.7* 31.0* 32.9*   BUN mg/dL 77* 66* 70* 61*   CREATININE mg/dL 4.61* 5.02* 4.61* 5.09*   GLUCOSE mg/dL 95 135* 88 165*   EGFR mL/min/1.73 13.2* 11.9* 13.2* 11.7*     Results from last 7 days   Lab Units 12/16/23  0607 12/15/23  1057 12/14/23  1715   ALBUMIN g/dL 2.9* 3.4* 4.1   BILIRUBIN mg/dL  --   --  1.2   ALK PHOS U/L  --   --  66   AST (SGOT) U/L  --   --  23   ALT (SGPT) U/L  --   --  14     Results from last 7 days   Lab Units 12/16/23  0607 12/15/23  1057 12/15/23  0432 12/14/23  1715   CALCIUM mg/dL 11.7* 13.6* 14.7* 17.0*   ALBUMIN g/dL 2.9* 3.4*  --  4.1   MAGNESIUM mg/dL 3.0*  --  2.8*  --    PHOSPHORUS mg/dL 3.8 3.9  --   --      Results from last 7 days   Lab Units 12/14/23 2313 12/14/23 2003 12/14/23  1715   PROCALCITONIN ng/mL  --   --  0.26*   LACTATE mmol/L 1.4 4.4* 4.0*     Hemoglobin A1C   Date/Time Value Ref Range Status   12/16/2023 0607 5.00 4.80 - 5.60 % Final     Glucose   Date/Time Value Ref Range Status   12/16/2023 0838 120 70 - 130 mg/dL Final       XR Chest 1 View    Result Date: 12/16/2023  Small left basilar atelectasis or infiltrate.    This report was finalized on 12/16/2023 8:54 AM by Dr. Diogo Suarez M.D on Workstation: [id number]      CT Abdomen Pelvis Without Contrast    Result Date: 12/14/2023   1. No acute inflammatory process of bowel is identified, follow-up as indications persist.  2. Cholelithiasis.  3. No urolithiasis or hydronephrosis. Increased right renal cyst.  4. Indeterminate nodularity in the lower lungs,  chest CT follow-up recommended.  5. Limited as described.  This report was finalized on 12/14/2023 8:01 PM by Dr. Diogo Suarez M.D on Workstation: [id number]      XR Chest 1 View    Result Date: 12/14/2023  1. No acute process.   This report was finalized on 12/14/2023 5:09 PM by Dr. Yovanny Ruiz M.D on Workstation: [id number]       I have personally reviewed all medications:  Scheduled Medications  allopurinol, 100 mg, Oral, Daily  ALPRAZolam, 1 mg, Oral, BID  aspirin, 81 mg, Oral, Nightly  atorvastatin, 40 mg, Oral, Nightly  brimonidine, 1 drop, Both Eyes, BID  budesonide-formoterol, 2 puff, Inhalation, BID - RT   And  tiotropium bromide monohydrate, 2 puff, Inhalation, Daily - RT  buPROPion XL, 300 mg, Oral, Daily  clopidogrel, 75 mg, Oral, Nightly  gabapentin, 300 mg, Oral, Nightly  isosorbide mononitrate, 30 mg, Oral, QAM  latanoprost, 1 drop, Both Eyes, Nightly  levothyroxine, 137 mcg, Oral, Daily  Menthol-Zinc Oxide, 1 application , Topical, Q12H  multivitamin with minerals, 1 tablet, Oral, Daily  senna-docusate sodium, 2 tablet, Oral, BID    Infusions  sodium chloride, 125 mL/hr, Last Rate: 125 mL/hr (12/16/23 1216)    Diet  Diet: Cardiac Diets; Healthy Heart (2-3 Na+); Texture: Regular Texture (IDDSI 7); Fluid Consistency: Thin (IDDSI 0)    I have personally reviewed:  [x]  Laboratory   [x]  Microbiology   [x]  Radiology   [x]  EKG/Telemetry  [x]  Cardiology/Vascular   []  Pathology    []  Records       Assessment/Plan     Active Hospital Problems    Diagnosis  POA    **Acute kidney injury [N17.9]  Yes    Stage 3b chronic kidney disease (CKD) [N18.32]  Yes    Anemia [D64.9]  Yes    Leukocytosis [D72.829]  Yes    Hyponatremia [E87.1]  Yes    Hypokalemia [E87.6]  Yes    Lactic acidosis [E87.20]  Yes    Hypotension [I95.9]  Yes    Hypercalcemia [E83.52]  Yes    Hyperlipidemia [E78.5]  Yes    PRISCILLA treated with BiPAP [G47.33]  Yes    Hypothyroidism [E03.9]  Yes    CAD (coronary artery disease) [I25.10]   Yes    COPD (chronic obstructive pulmonary disease) [J44.9]  Yes    Essential hypertension [I10]  Yes      Resolved Hospital Problems   No resolved problems to display.       67 y.o. male admitted with Acute kidney injury.    Acute respiratory failure with hypoxia  Sepsis secondary to suspected pneumonia  - Patient with abruptly worsening dyspnea and documented hypoxia this morning, coupled with increase in oxygen requirements. Imaging showing small left infiltrate, ABG with P/F ratio <300. Also, patient febrile to 100.9 on evening of 12/15 and WBC has worsened. Concern for possible aspiration, which is usually right sided, but given overall clinical picture, further evaluation and management warranted. Blood cultures no growth at 24 hours, procalcitonin not likely to be helpful due to concomitant renal failure which will alter results, same for D-dimer. His respiratory status is improving after coughing up mucus, he is not tachycardic, so less likely PE.  - Start Zosyn and Vancomycin now, dosing per pharmacy. Continue IVF.  - Closely monitor respiratory status, wean O2 as tolerated, telemetry, pulse oximetry.  - Follow up blood cultures.  - Order MRSA nares (will d/c vancomycin if negative).  - Order Mucinex and Tessalon Perles.    ARLYN on CKD stage 3B  - Etiology most likely multifactorial in nature, due to hypotension, hypovolemia/dehydration, hypercalcemia.  - Creatinine on admission: 5.09. Baseline creatinine appears to be around 1.7.  CT abdomen pelvis negative for any evidence of hydronephrosis.  - Nephrology consulted and following, home amlodipine has been discontinued to allow for blood pressure to rise, he is receiving IVF.  Most recent creatinine is slightly improved from prior, however, does remain significantly above baseline.  - Will continue to follow Nephrology plans/recommendations. Greatly appreciate their help.  - Continue IVF for volume resuscitation and closely monitor.  - Order repeat BMP in AM  for reassessment of creatinine, further management based on results of testing.  - Avoid nephrotoxic medications, IVF, strict I/O, daily weights, acute urinary retention protocol.    Hypercalcemia  - Calcium level elevated to 17 on arrival. PTH found to be 29.5. Etiology likely multifactorial from calcium supplementation and volume contraction. He is receiving IVF and Nephrology is following. Most recent calcium remains elevated, but improving from prior.  - Continue IVF and closely monitor. Will continue to follow nephrology plans/recommendations. Greatly appreciate their help.  - Trend calcium levels on daily labs.    Hypotension  - Noted on vitals, currently receiving IVF and holding home anti-hypertensive medications, BP slightly improved, continue treatments as ordered and monitor.    Hyponatremia  - Na level low at 135, improved from prior, etiology likely a result of hypovolemia.  - Order repeat BMP in AM for reassessment.    Leukocytosis  - WBC count elevated on most recent labs, worse from prior. Initial CT AP and CXR negative, however, now has left infiltrate on repeat imaging (see above).  - Order repeat CBC in AM for reassessment.     Anemia  - Hemoglobin low on most recent labs, however, stable from prior. No evidence of overt blood loss.  Iron studies consistent with anemia of chronic disease, B12 and folate acceptable.  - Order repeat CBC in AM for reassessment. Continue to monitor, transfuse for hemoglobin <7.    Lactic acidosis  - Noted on arrival, improving with IVF. Continue to monitor.    Hypertension complicated by hypotension prior to arrival  - Blood pressure appears soft but acceptable at this time. Home Amlodipine stopped.  - Trend BP to guide ongoing management decisions.    Coronary artery disease  - Patient appears stable from this perspective at this time, denies any chest pain, dyspnea, palpitations, no signs/symptoms to suggest ACS.  - Continue current treatment as prescribed. Will  continue to monitor.  - Continue telemetry monitoring.    COPD  - Appears stable from this perspective at present.  No evidence to suggest acute exacerbation.  Continue current medications as prescribed and closely monitor.  - Supplemental oxygen as needed to maintain O2 sats 88 to 92%, pulse oximetry, telemetry monitoring.    Hypothyroidism  - Continue levothyroxine as prescribed.    Hyperlipidemia  - Continue Statin as prescribed.    PRISCILLA  - Okay to use home machine if available.    Peripheral vascular disease  - Complicated by compartment syndrome 2016 leading to left AKA.    Hypokalemia, resolved  - K low on prior labs, repleted via electrolyte protocol. Follow up repeat labs to guide ongoing management decisions. Replete PRN per protocol. Continue to monitor    SCDs for DVT prophylaxis.  Full code.  Discussed with patient and nursing staff.  Anticipate discharge  TBD  timing yet to be determined.      Buddy Cook DO  [facility]  12/16/23

## 2023-12-16 NOTE — THERAPY EVALUATION
Patient Name: Sim Agustin  : 1955    MRN: [id number]                              Today's Date: 2023       Admit Date: 2023    Visit Dx:     ICD-10-CM ICD-9-CM   1. ARLYN (acute kidney injury)  N17.9 584.9   2. Hypercalcemia  E83.52 275.42     Patient Active Problem List   Diagnosis    H/O angiodysplasia of intestinal tract    COPD (chronic obstructive pulmonary disease)    S/P colectomy    Status post above-knee amputation of left lower extremity    CAD (coronary artery disease)    PVD (peripheral vascular disease)    Essential hypertension    Cardiomyopathy, unspecified    PRISCILLA treated with BiPAP    Anxiety disorder    Chronic midline low back pain without sciatica    Long term prescription benzodiazepine use    History of bradycardia    Hypothyroidism    Vitamin B12 deficiency    Iron deficiency    History of smoking 30 or more pack years    High risk medication use    DNR (do not resuscitate)    Stage 3a chronic kidney disease    Hyperlipidemia    Proteinuria    Junctional cardiac arrhythmia    Acute kidney injury    Hypercalcemia    Stage 3b chronic kidney disease (CKD)    Anemia    Leukocytosis    Hyponatremia    Hypokalemia    Lactic acidosis    Hypotension     Past Medical History:   Diagnosis Date    Acute respiratory failure with hypoxia and hypercarbia 2019    Acute upper respiratory infection 2013    Amputee, above knee, left     Anemia     per [place] database    ARF (acute renal failure) 2016    Asthma 2018    Atelectasis, left 2016    Bradycardia 2019    CAD (coronary artery disease) 2016    Chronic obstructive pulmonary disease with acute exacerbation 2013    Clotting disorder 2003    Colon polyps     Compartment syndrome     AFTER ILIAC SURGERY. ABOVE KNEE AMPUTATION    COPD (chronic obstructive pulmonary disease)     COPD with hypoxia 2019    Cough     SINCE APRIL WITH PNEUMONIA.     Demand myocardial infarction  05/19/2022    Disease of thyroid gland     HYPOTHYRODISM    Diverticulosis     Elevated hematocrit 01/06/2021    Employs prosthetic leg     ESRD (end stage renal disease) on dialysis 04/06/2016    Fracture of patella 03/03/2015    History of acute renal failure     History of CHF (congestive heart failure)     History of GI bleed 02/2016    AORTODUOD FISTULA    History of sepsis 02/2016    History of transfusion     MULTIPLE, 2016    Hyperkalemia 04/11/2016    Hyperlipidemia     Hypertension     Hypotension     Hypotension 03/04/2016    Left lower lobe pneumonia 05/18/2022    Nonischemic cardiomyopathy     Nosocomial pneumonia 04/06/2016    Phantom limb pain     SL, WITH LEFT ABOUVE KNEE    Pleural effusion on left 04/11/2016    Pneumonia     SPRING 2016  AFTER SURGERY    Pulmonary embolism     PVD (peripheral vascular disease)     Renal insufficiency     S/P thoracentesis 04/11/2016    Sepsis, unspecified organism 04/05/2016     Past Surgical History:   Procedure Laterality Date    ABOVE KNEE AMPUTATION Left 03/16/2016    Procedure: LT AMPUTATION ABOVE KNEE;  Surgeon: Jose Swann MD;  Location: [facility] MAIN OR;  Service:     ARTERIAL THROMBECTOMY  2001    throbectomy of arterial graft    AXILLARY FEMORAL BYPASS Right 02/15/2016    Exploratory lapartomy, repair aortoduodenal fistula, resection infected aortobifemoral bypass graft, axillobi-superficial femoral artery C[place]man-Aneudy bypass graft from right axillary artery, Repair of duodenotomy 4th portion duodenum-Dr. Jose Swann, Dr. Genaro Perrin    BRONCHOSCOPY Left 03/04/2016    Dr. NATALIIA Tate    BRONCHOSCOPY Left 05/20/2022    Procedure: BRONCHOSCOPY WITH BIOPSIES, BRUSHINGS, AND BAL UNDER FLUORO;  Surgeon: Ishmael Tate Jr., MD;  Location: [facility] ENDOSCOPY;  Service: Pulmonary;  Laterality: Left;  PRE OP - LLL CONSOLIDATION  POST OP - SAME    BRONCHOSCOPY RIGID / FLEXIBLE N/A 03/03/2016    Dr. NATALIIA Tate    BRONCHOSCOPY RIGID / FLEXIBLE N/A  02/26/2016    Dr. NATALIIA Tate    CARDIAC CATHETERIZATION N/A 03/18/2019    Procedure: Right and Left Heart Cath;  Surgeon: Nina Storey MD;  Location: [facility] CATH INVASIVE LOCATION;  Service: Cardiovascular    CATARACT EXTRACTION WITH INTRAOCULAR LENS IMPLANT Bilateral     COLON RESECTION WITH COLOSTOMY Left 02/28/2016    Exploratory laparotomy with open left hemicolectomy, end-colostomy, G-tube and J-tube-Dr. Endy Chaney    COLON SURGERY  3/25/2015    COLOSTOMY    COLONOSCOPY N/A 2015    Dr. Laughlin    COLONOSCOPY N/A 10/12/2016    Procedure: COLONOSCOPY TO 20 CM AND PER STOMA TO 30CM;  Surgeon: Genaro Perrin MD;  Location: [facility] ENDOSCOPY;  Service:     COLONOSCOPY N/A 10/21/2016    Procedure: COLONOSCOPY DONE AT BEDSIDE ONTO CECEM;  Surgeon: Genaro Perrin MD;  Location: [facility] ENDOSCOPY;  Service:     COLONOSCOPY N/A 02/10/2016    Diverticulosis in the entire examined colon, non-bleeding internal hemorrhoids-Dr. Taylor Pina    COLONOSCOPY N/A 02/11/2016    Poor prep, blood in the entire examined colon, normal ileum-Dr. Taylor Pina    COLONOSCOPY W/ POLYPECTOMY N/A 07/27/2015    Normal ileum, diverticulosis in the sigmoid colon: resected and retrieved, one 6 mm polyp in the descending colon: resected and retrieved, the distal rectum and anal verge are normal on retroflexion view-Dr. Miguel Ángel Laughlin    COLOSTOMY CLOSURE N/A 10/13/2016    Procedure: COLOSTOMY TAKEDOWN OR CLOSURE, APPENDECTOMY;  Surgeon: Genaro Perrin MD;  Location: [facility] OR;  Service:     DEBRIDEMENT LEG Left 03/01/2016    Excisional debridement of the skin, subcutantous tissue, tendon and muscle left calf-Dr. Jose Swann    DEBRIDEMENT LEG Left 02/25/2016    Excisional debridement full-thcikness skin and subcutaneous tissue and tendon and muscle, left medial and lateral calf muscles-Dr. Jose Swann    ENDOSCOPY N/A 10/21/2016    Procedure: ESOPHAGOGASTRODUODENOSCOPY DONE AT BEDSIDE;  Surgeon: Genaro SARAVIA  MD Marychuy;  Location: [facility] ENDOSCOPY;  Service:     ENDOSCOPY AND COLONOSCOPY N/A 02/28/2016    EGD and Colonoscopy with Candy ink injection-Dr. Endy Chaney    ENTEROSCOPY SMALL BOWEL N/A 02/11/2016    Normal esophagus, normal stomach, normal duodenum, portion of the jejunum normal-Dr. Taylor Pina    ILIAC ARTERY - FEMORAL ARTERY BYPASS GRAFT Bilateral 2002    ILIAC ARTERY STENT Bilateral 2001    INSERTION AND REMOVAL PERITONEAL DIALYSIS CATHETER Right 02/29/2016    Exchange right jugular 16 cm Mahurkar dialysis catheter-Dr. Jose Swann    INSERTION PERITONEAL DIALYSIS CATHETER Left 03/01/2016    Ultrasound-guided access of the left jugular vein, 23 cm left jugular palindrome dialysis catheter placement-Dr. Jose Swann    INSERTION PERITONEAL DIALYSIS CATHETER Right 02/18/2016    Right jugular Mahrkar catherer placement-Dr. Jose Swann    JOINT REPLACEMENT Left     THORACOSCOPY Left 04/18/2016    Procedure: LT THORACOSCOPY VIDEO ASSISTED WITH DECORDICATION;  Surgeon: Genaro Davila III, MD;  Location: [facility] MAIN OR;  Service:     THROMBECTOMY Left 02/16/2016    Thombectomy of left femoral graft, left leg arteriogram, left calf forward compartment fasciotomy-Dr. Jose Swann    TOTAL HIP ARTHROPLASTY Left     UPPER GASTROINTESTINAL ENDOSCOPY N/A 02/09/2016    Normal UGI-Dr. Ajay Parkinson    UPPER GASTROINTESTINAL ENDOSCOPY N/A 11/28/2015    Small hiatal hernia, chronic gastritis: biopsied, non-bleeding angioectasias in the stomach: treated with argon plasma coagulation, multiple bleeding angioectasias in the dudenum: treated with argon plasma coagulation-Dr. Mykel Jaramillo    VASCULAR SURGERY      MULTIPLE    VENTRAL/INCISIONAL HERNIA REPAIR N/A 10/19/2017    Procedure: VENTRAL HERNIA REPAIR WITH MESH AND BILATERAL COMPONENT SEPARATION;  Surgeon: Genaro Perrin MD;  Location: [facility] MAIN OR;  Service:     WISDOM TOOTH EXTRACTION        General Information       Row Name 12/16/23  1532          OT Time and Intention    Document Type evaluation  -RE     Mode of Treatment individual therapy;occupational therapy  -RE       Row Name 12/16/23 1532          General Information    Patient Profile Reviewed yes  -RE     Prior Level of Function independent:;all household mobility;ADL's  -RE     Existing Precautions/Restrictions fall;oxygen therapy device and L/min  -RE     Barriers to Rehab medically complex  -RE       Row Name 12/16/23 1532          Living Environment    People in Home spouse  -RE       Row Name 12/16/23 1532          Cognition    Orientation Status (Cognition) oriented x 3  -RE       Row Name 12/16/23 1532          Safety Issues, Functional Mobility    Safety Issues Affecting Function (Mobility) awareness of need for assistance  -RE     Impairments Affecting Function (Mobility) shortness of breath;endurance/activity tolerance  -RE               User Key  (r) = Recorded By, (t) = Taken By, (c) = Cosigned By      Initials Name Provider Type    Judith Mccormick OTR Occupational Therapist                     Mobility/ADL's       Row Name 12/16/23 1534          Bed Mobility    Bed Mobility supine-sit;sit-supine  -RE     Supine-Sit Bl[place] (Bed Mobility) contact guard  -RE     Sit-Supine [place] (Bed Mobility) contact guard;minimum assist (75% patient effort)  -RE     Bed Mobility, Safety Issues decreased use of legs for bridging/pushing  -RE     Assistive Device (Bed Mobility) bed rails;head of bed elevated  -RE       Row Name 12/16/23 1534          Transfers    Comment, (Transfers) Would like to use BSC but needs prosthesis.  -RE               User Key  (r) = Recorded By, (t) = Taken By, (c) = Cosigned By      Initials Name Provider Type    Judith Mccormick OTR Occupational Therapist                   Obj/Interventions       Row Name 12/16/23 1538          Range of Motion Comprehensive    General Range of Motion bilateral upper extremity ROM WFL  -RE       Row Name 12/16/23  1538          Strength Comprehensive (MMT)    General Manual Muscle Testing (MMT) Assessment no strength deficits identified  -RE       Row Name 12/16/23 1538          Balance    Balance Assessment sitting static balance  -RE     Static Sitting Balance contact guard;verbal cues  -RE     Position, Sitting Balance supported;sitting edge of bed  -RE     Comment, Balance needed to hold bed rail at times to maintain balance.  -RE               User Key  (r) = Recorded By, (t) = Taken By, (c) = Cosigned By      Initials Name Provider Type    RE Judith Bennett OTR Occupational Therapist                   Goals/Plan       Row Name 12/16/23 1552          Bathing Goal 1 (OT)    Activity/Device (Bathing Goal 1, OT) bathing skills, all  -RE     [place]man Level/Cues Needed (Bathing Goal 1, OT) standby assist  -RE     Time Frame (Bathing Goal 1, OT) long term goal (LTG);by discharge  -RE     Progress/Outcomes (Bathing Goal 1, OT) continuing progress toward goal  -RE       [facility] Name 12/16/23 1552          Dressing Goal 1 (OT)    Activity/Device (Dressing Goal 1, OT) dressing skills, all  -RE     [place]/Cues Needed (Dressing Goal 1, OT) standby assist  -RE     Time Frame (Dressing Goal 1, OT) long term goal (LTG);by discharge  -RE     Progress/Outcome (Dressing Goal 1, OT) continuing progress toward goal  -RE       [facility] Name 12/16/23 1552          Grooming Goal 1 (OT)    Activity/Device (Grooming Goal 1, OT) grooming skills, all  -RE     [place] (Grooming Goal 1, OT) modified independence  -RE     Time Frame (Grooming Goal 1, OT) long term goal (LTG);by discharge  -RE     Progress/Outcome (Grooming Goal 1, OT) continuing progress toward goal  -RE       [facility] Name 12/16/23 1552          Therapy Assessment/Plan (OT)    Planned Therapy Interventions (OT) activity tolerance training;BADL retraining;ROM/therapeutic exercise  -RE               User Key  (r) = Recorded By, (t) = Taken By, (c) = Cosigned By      Initials Name  Provider Type    Judith Mccormick OTR Occupational Therapist                   Clinical Impression       Row Name 12/16/23 1542          Pain Assessment    Pretreatment Pain Rating 0/10 - no pain  -RE     Posttreatment Pain Rating 0/10 - no pain  -RE       Row Name 12/16/23 1542          Plan of Care Review    Progress improving  -RE     Outcome Evaluation Patient presents to OT with multiple medical issues including ARLYN. Edelmira has a L AKA. He normally walks with a cane. Patient was able to sit up in the side of the bed with CGA and maintained his blance with occasional min A and/or holding onto the bed rail. His O2 sats remained above 92 with conversation and no UE activity. He was not able to perform ADL's due to decreased endurance and SOA. He could benefit from OT to address his decreased endurance and independance with ADL's.  -RE       Row Name 12/16/23 1544          Therapy Assessment/Plan (OT)    Rehab Potential (OT) good, to achieve stated therapy goals  -RE     Criteria for Skilled Therapeutic Interventions Met (OT) yes;skilled treatment is necessary  -RE     Therapy Frequency (OT) 5 times/wk  -RE     Predicted Duration of Therapy Intervention (OT) 1-2 weeka  -RE       Row Name 12/16/23 1542          Therapy Plan Review/Discharge Plan (OT)    Anticipated Discharge Disposition (OT) home with 24/7 care;sub acute care setting  -RE       Row Name 12/16/23 1547          Positioning and Restraints    Pre-Treatment Position in bed  -RE     Post Treatment Position bed  -RE     In Bed exit alarm on;side rails up x2;sitting;call light within reach  -RE               User Key  (r) = Recorded By, (t) = Taken By, (c) = Cosigned By      Initials Name Provider Type    Judith Mccormick OTR Occupational Therapist                   Outcome Measures       Row Name 12/16/23 3510          How much help from another is currently needed...    Putting on and taking off regular lower body clothing? 3  -RE     Bathing  (including washing, rinsing, and drying) 1  -RE     Toileting (which includes using toilet bed pan or urinal) 1  -RE     Putting on and taking off regular upper body clothing 4  -RE     Taking care of personal grooming (such as brushing teeth) 4  -RE     Eating meals 4  -RE     AM-PAC 6 Clicks Score (OT) 17  -RE       Row Name 12/16/23 1557          Functional Assessment    Outcome Measure Options AM-PAC 6 Clicks Daily Activity (OT)  -RE               User Key  (r) = Recorded By, (t) = Taken By, (c) = Cosigned By      Initials Name Provider Type    Judith Mccormick OTOLIVIA Occupational Therapist                    Occupational Therapy Education       Title: PT OT SLP Therapies (In Progress)       Topic: Occupational Therapy (Done)       Point: ADL training (Done)       Description:   Instruct learner(s) on proper safety adaptation and remediation techniques during self care or transfers.   Instruct in proper use of assistive devices.                  Learning Progress Summary             Patient Acceptance, E,TB,D, VU,NR by RE at 12/16/2023 1559    Comment: pursed lip breathing and POC                         Point: Home exercise program (Done)       Description:   Instruct learner(s) on appropriate technique for monitoring, assisting and/or progressing therapeutic exercises/activities.                  Learning Progress Summary             Patient Acceptance, E,TB,D, VU,NR by RE at 12/16/2023 1559    Comment: pursed lip breathing and POC                         Point: Precautions (Done)       Description:   Instruct learner(s) on prescribed precautions during self-care and functional transfers.                  Learning Progress Summary             Patient Acceptance, E,TB,D, VU,NR by RE at 12/16/2023 1559    Comment: pursed lip breathing and POC                         Point: Body mechanics (Done)       Description:   Instruct learner(s) on proper positioning and spine alignment during self-care, functional mobility  activities and/or exercises.                  Learning Progress Summary             Patient Acceptance, E,TB,D, VU,NR by RE at 12/16/2023 6819    Comment: pursed lip breathing and POC                                         User Key       Initials Effective Dates Name Provider Type Discipline    RE 06/16/21 -  Judith Bennett OTR Occupational Therapist OT                  OT Recommendation and Plan  Planned Therapy Interventions (OT): activity tolerance training, BADL retraining, ROM/therapeutic exercise  Therapy Frequency (OT): 5 times/wk  Plan of Care Review  Progress: improving  Outcome Evaluation: Patient presents to OT with multiple medical issues including ARLYN. Edelmira has a L AKA. He normally walks with a cane. Patient was able to sit up in the side of the bed with CGA and maintained his blance with occasional min A and/or holding onto the bed rail. His O2 sats remained above 92 with conversation and no UE activity. He was not able to perform ADL's due to decreased endurance and SOA. He could benefit from OT to address his decreased endurance and independance with ADL's.     Time Calculation:         Time Calculation- OT       Row Name 12/16/23 1600             Time Calculation- OT    OT Start Time 1455  -RE      OT Stop Time 1525  -RE      OT Time Calculation (min) 30 min  -RE      Total Timed Code Minutes- OT 20 minute(s)  -RE         Timed Charges    21520 - OT Self Care/Mgmt Minutes 20  -RE         Untimed Charges    OT Eval/Re-eval Minutes 10  -RE         Total Minutes    Timed Charges Total Minutes 20  -RE      Untimed Charges Total Minutes 10  -RE       Total Minutes 30  -RE                User Key  (r) = Recorded By, (t) = Taken By, (c) = Cosigned By      Initials Name Provider Type    RE Judith Bennett OTR Occupational Therapist                  Therapy Charges for Today       Code Description Service Date Service Provider Modifiers Qty    71256059497 HC OT SELF CARE/MGMT/TRAIN EA 15 MIN 12/16/2023  Judith Bennett OTR GO 1    [id number]  OT EVAL LOW COMPLEXITY 2 12/16/2023 Judith Bennett OTR GO 1                 COBY Guillory  12/16/2023

## 2023-12-16 NOTE — PLAN OF CARE
Goal Outcome Evaluation:           Progress: improving  Outcome Evaluation: Patient presents to OT with multiple medical issues including ARLYN. Edelmira has a L AKA. He normally walks with a cane. Patient was able to sit up in the side of the bed with CGA and maintained his blance with occasional min A and/or holding onto the bed rail. His O2 sats remained above 92 with conversation and no UE activity. He was not able to perform ADL's due to decreased endurance and SOA. He could benefit from OT to address his decreased endurance and independance with ADL's.      Anticipated Discharge Disposition (OT): home with 24/7 care, sub acute care setting

## 2023-12-16 NOTE — PLAN OF CARE
Goal Outcome Evaluation:  Plan of Care Reviewed With: patient        Progress: no change  Outcome Evaluation: Continued treatment for an ARLYN. Fluids infusing. 2 500ml boluses given. Has been hypotensive most of shift. Systolic now in the 90s. Patient reports feeling much better and looks more alert than at the start of shift. Skin care provided. Has progressively needed more oxygen is currently on 5L from 2L at the start of shift. A chest x-ray has been ordered. Tmax of 100.9 - resolved with tylenol.

## 2023-12-16 NOTE — CASE MANAGEMENT/SOCIAL WORK
Discharge Planning Assessment  [facility]     Patient Name: Sim Agustin  MRN: [id number]  Today's Date: 12/15/2023    Admit Date: 12/14/2023    Plan: Home with wife. PT eval pending. May need SNF or HH at D/C. Follow for possible HD need at D/C.             Row Name 12/15/23 1920       Plan    Plan Home with wife. PT eval pending. May need SNF or HH at D/C. Follow for possible HD need at D/C.    Patient/Family in Agreement with Plan yes    Plan Comments Met with pt. at bedside. Explained roll of . Face sheet and pharmacy verified. Pt lives with his wife and son.  There are 2 steps to enter home. Home DME includes a nebulizer, walker, cane, wheelchair, prosthetic leg, and BiPAP with O2@3L through [employer].  Pt is normally independent with ADLs. Pt has never been to Rehab. He has used [facility] in the past. Pt's PCP is Seth Hester MD. Uses [facility] on [address] Pt's wife drives him to appointments. Admitted with ARLYN. Creat this AM 5.02. Baseline creat is 1.7.  Will need to follow for possible HD need. PT eval pending. Wife to bring in prosthetic leg so pt can work with PT. Follow for possible HH or SNF need at D/C.  Explained that CCP would follow to assess for discharge needs.  Padilla Birmingham RN-BC                  Continued Care and Services - Admitted Since 12/14/2023    Coordination has not been started for this encounter.       Expected Discharge Date and Time       Expected Discharge Date Expected Discharge Time    Dec 18, 2023                     Padilla Birmingham RN

## 2023-12-17 LAB
ALBUMIN SERPL-MCNC: 2.5 G/DL (ref 3.5–5.2)
ANION GAP SERPL CALCULATED.3IONS-SCNC: 11.7 MMOL/L (ref 5–15)
BASOPHILS # BLD AUTO: 0.05 10*3/MM3 (ref 0–0.2)
BASOPHILS NFR BLD AUTO: 0.3 % (ref 0–1.5)
BUN SERPL-MCNC: 62 MG/DL (ref 8–23)
BUN/CREAT SERPL: 16.8 (ref 7–25)
CALCIUM SPEC-SCNC: 10 MG/DL (ref 8.6–10.5)
CHLORIDE SERPL-SCNC: 108 MMOL/L (ref 98–107)
CO2 SERPL-SCNC: 21.3 MMOL/L (ref 22–29)
CREAT SERPL-MCNC: 3.68 MG/DL (ref 0.76–1.27)
DEPRECATED RDW RBC AUTO: 48.4 FL (ref 37–54)
EGFRCR SERPLBLD CKD-EPI 2021: 17.3 ML/MIN/1.73
EOSINOPHIL # BLD AUTO: 0.32 10*3/MM3 (ref 0–0.4)
EOSINOPHIL NFR BLD AUTO: 1.8 % (ref 0.3–6.2)
ERYTHROCYTE [DISTWIDTH] IN BLOOD BY AUTOMATED COUNT: 14.3 % (ref 12.3–15.4)
GLUCOSE SERPL-MCNC: 89 MG/DL (ref 65–99)
HCT VFR BLD AUTO: 32.1 % (ref 37.5–51)
HGB BLD-MCNC: 11 G/DL (ref 13–17.7)
LYMPHOCYTES # BLD AUTO: 0.68 10*3/MM3 (ref 0.7–3.1)
LYMPHOCYTES NFR BLD AUTO: 3.9 % (ref 19.6–45.3)
MAGNESIUM SERPL-MCNC: 2 MG/DL (ref 1.6–2.4)
MCH RBC QN AUTO: 31.8 PG (ref 26.6–33)
MCHC RBC AUTO-ENTMCNC: 34.3 G/DL (ref 31.5–35.7)
MCV RBC AUTO: 92.8 FL (ref 79–97)
MONOCYTES # BLD AUTO: 0.6 10*3/MM3 (ref 0.1–0.9)
MONOCYTES NFR BLD AUTO: 3.4 % (ref 5–12)
NEUTROPHILS NFR BLD AUTO: 15.39 10*3/MM3 (ref 1.7–7)
NEUTROPHILS NFR BLD AUTO: 88.5 % (ref 42.7–76)
PHOSPHATE SERPL-MCNC: 2 MG/DL (ref 2.5–4.5)
PLATELET # BLD AUTO: 168 10*3/MM3 (ref 140–450)
PMV BLD AUTO: 11.2 FL (ref 6–12)
POTASSIUM SERPL-SCNC: 3.9 MMOL/L (ref 3.5–5.2)
RBC # BLD AUTO: 3.46 10*6/MM3 (ref 4.14–5.8)
SODIUM SERPL-SCNC: 141 MMOL/L (ref 136–145)
VANCOMYCIN SERPL-MCNC: <4 MCG/ML (ref 5–40)
WBC NRBC COR # BLD AUTO: 17.41 10*3/MM3 (ref 3.4–10.8)

## 2023-12-17 PROCEDURE — 94799 UNLISTED PULMONARY SVC/PX: CPT

## 2023-12-17 PROCEDURE — 94664 DEMO&/EVAL PT USE INHALER: CPT

## 2023-12-17 PROCEDURE — 80202 ASSAY OF VANCOMYCIN: CPT | Performed by: STUDENT IN AN ORGANIZED HEALTH CARE EDUCATION/TRAINING PROGRAM

## 2023-12-17 PROCEDURE — 94761 N-INVAS EAR/PLS OXIMETRY MLT: CPT

## 2023-12-17 PROCEDURE — 25810000003 SODIUM CHLORIDE 0.9 % SOLUTION: Performed by: INTERNAL MEDICINE

## 2023-12-17 PROCEDURE — 87040 BLOOD CULTURE FOR BACTERIA: CPT | Performed by: STUDENT IN AN ORGANIZED HEALTH CARE EDUCATION/TRAINING PROGRAM

## 2023-12-17 PROCEDURE — 25010000002 PIPERACILLIN SOD-TAZOBACTAM PER 1 G: Performed by: STUDENT IN AN ORGANIZED HEALTH CARE EDUCATION/TRAINING PROGRAM

## 2023-12-17 PROCEDURE — 97530 THERAPEUTIC ACTIVITIES: CPT

## 2023-12-17 PROCEDURE — 85025 COMPLETE CBC W/AUTO DIFF WBC: CPT | Performed by: STUDENT IN AN ORGANIZED HEALTH CARE EDUCATION/TRAINING PROGRAM

## 2023-12-17 PROCEDURE — 80069 RENAL FUNCTION PANEL: CPT | Performed by: INTERNAL MEDICINE

## 2023-12-17 PROCEDURE — 83735 ASSAY OF MAGNESIUM: CPT | Performed by: STUDENT IN AN ORGANIZED HEALTH CARE EDUCATION/TRAINING PROGRAM

## 2023-12-17 RX ADMIN — ALPRAZOLAM 1 MG: 1 TABLET ORAL at 20:35

## 2023-12-17 RX ADMIN — ANORECTAL OINTMENT 1 APPLICATION: 15.7; .44; 24; 20.6 OINTMENT TOPICAL at 08:24

## 2023-12-17 RX ADMIN — BUPROPION HYDROCHLORIDE 300 MG: 300 TABLET, EXTENDED RELEASE ORAL at 08:22

## 2023-12-17 RX ADMIN — PIPERACILLIN SODIUM AND TAZOBACTAM SODIUM 3.38 G: 3; .375 INJECTION, SOLUTION INTRAVENOUS at 20:37

## 2023-12-17 RX ADMIN — ALLOPURINOL 100 MG: 100 TABLET ORAL at 08:22

## 2023-12-17 RX ADMIN — BRIMONIDINE TARTRATE 1 DROP: 2 SOLUTION/ DROPS OPHTHALMIC at 08:24

## 2023-12-17 RX ADMIN — ANORECTAL OINTMENT 1 APPLICATION: 15.7; .44; 24; 20.6 OINTMENT TOPICAL at 20:34

## 2023-12-17 RX ADMIN — Medication 1 TABLET: at 08:22

## 2023-12-17 RX ADMIN — BUDESONIDE AND FORMOTEROL FUMARATE DIHYDRATE 2 PUFF: 160; 4.5 AEROSOL RESPIRATORY (INHALATION) at 10:39

## 2023-12-17 RX ADMIN — ATORVASTATIN CALCIUM 40 MG: 20 TABLET, FILM COATED ORAL at 20:35

## 2023-12-17 RX ADMIN — BRIMONIDINE TARTRATE 1 DROP: 2 SOLUTION/ DROPS OPHTHALMIC at 20:37

## 2023-12-17 RX ADMIN — GUAIFENESIN 600 MG: 600 TABLET, EXTENDED RELEASE ORAL at 08:22

## 2023-12-17 RX ADMIN — ISOSORBIDE MONONITRATE 30 MG: 30 TABLET, EXTENDED RELEASE ORAL at 06:12

## 2023-12-17 RX ADMIN — GUAIFENESIN 600 MG: 600 TABLET, EXTENDED RELEASE ORAL at 20:35

## 2023-12-17 RX ADMIN — SODIUM CHLORIDE 125 ML/HR: 9 INJECTION, SOLUTION INTRAVENOUS at 06:11

## 2023-12-17 RX ADMIN — LATANOPROST 1 DROP: 50 SOLUTION OPHTHALMIC at 20:37

## 2023-12-17 RX ADMIN — LEVOTHYROXINE SODIUM 137 MCG: 0.14 TABLET ORAL at 08:22

## 2023-12-17 RX ADMIN — CLOPIDOGREL BISULFATE 75 MG: 75 TABLET, FILM COATED ORAL at 20:35

## 2023-12-17 RX ADMIN — SENNOSIDES AND DOCUSATE SODIUM 2 TABLET: 50; 8.6 TABLET ORAL at 20:34

## 2023-12-17 RX ADMIN — ACETAMINOPHEN 650 MG: 325 TABLET, FILM COATED ORAL at 16:54

## 2023-12-17 RX ADMIN — SODIUM CHLORIDE 125 ML/HR: 9 INJECTION, SOLUTION INTRAVENOUS at 14:03

## 2023-12-17 RX ADMIN — GABAPENTIN 300 MG: 300 CAPSULE ORAL at 20:35

## 2023-12-17 RX ADMIN — PIPERACILLIN SODIUM AND TAZOBACTAM SODIUM 3.38 G: 3; .375 INJECTION, SOLUTION INTRAVENOUS at 08:27

## 2023-12-17 RX ADMIN — ALPRAZOLAM 1 MG: 1 TABLET ORAL at 08:22

## 2023-12-17 RX ADMIN — ASPIRIN 81 MG: 81 TABLET, CHEWABLE ORAL at 20:35

## 2023-12-17 RX ADMIN — TIOTROPIUM BROMIDE INHALATION SPRAY 2 PUFF: 3.12 SPRAY, METERED RESPIRATORY (INHALATION) at 10:40

## 2023-12-17 RX ADMIN — BUDESONIDE AND FORMOTEROL FUMARATE DIHYDRATE 2 PUFF: 160; 4.5 AEROSOL RESPIRATORY (INHALATION) at 20:54

## 2023-12-17 NOTE — PROGRESS NOTES
[facility] Progress Note      Patient Name: Sim Agustin  : 1955  MRN: [id number]  Primary Care Physician:  Seth Hester MD  Date of admission: 2023    Subjective     Interval History:     Patient resting comfortably.  No acute distress overnight  Urine output better per patient  Denies any chest pain, nausea or vomiting    Review of Systems:   As noted above    Objective     Vitals:   Temp:  [97.5 °F (36.4 °C)-100.2 °F (37.9 °C)] 97.5 °F (36.4 °C)  Heart Rate:  [64-73] 72  Resp:  [20] 20  BP: (105-133)/(55-61) 117/59  Flow (L/min):  [2-4] 2    Intake/Output Summary (Last 24 hours) at 2023 0950  Last data filed at 2023 0300  Gross per 24 hour   Intake 240 ml   Output 200 ml   Net 40 ml       Physical Exam:    General Appearance: NAD, chronically ill-appearing  HEENT: oral mucosa normal, nonicteric sclera  Neck: supple, no JVD  Lungs: Clear to wheezes heart: RRR, normal S1 and S2  Abdomen: soft, nondistended  Extremities: no edema  Neuro: Awake alert and moving all extremities    Scheduled Meds:     allopurinol, 100 mg, Oral, Daily  ALPRAZolam, 1 mg, Oral, BID  aspirin, 81 mg, Oral, Nightly  atorvastatin, 40 mg, Oral, Nightly  brimonidine, 1 drop, Both Eyes, BID  budesonide-formoterol, 2 puff, Inhalation, BID - RT   And  tiotropium bromide monohydrate, 2 puff, Inhalation, Daily - RT  buPROPion XL, 300 mg, Oral, Daily  clopidogrel, 75 mg, Oral, Nightly  gabapentin, 300 mg, Oral, Nightly  guaiFENesin, 600 mg, Oral, Q12H  isosorbide mononitrate, 30 mg, Oral, QAM  latanoprost, 1 drop, Both Eyes, Nightly  levothyroxine, 137 mcg, Oral, Daily  Menthol-Zinc Oxide, 1 application , Topical, Q12H  multivitamin with minerals, 1 tablet, Oral, Daily  piperacillin-tazobactam, 3.375 g, Intravenous, Q12H  senna-docusate sodium, 2 tablet, Oral, BID  Vancomycin Pharmacy Intermittent/Pulse Dosing, , Does not apply, Daily      IV Meds:   Pharmacy to dose vancomycin,   Pharmacy to  Dose Zosyn,   sodium chloride, 125 mL/hr, Last Rate: 125 mL/hr (12/17/23 0611)        Results Reviewed:   I have personally reviewed the results from the time of this admission to 12/17/2023 09:50 EST     Results from last 7 days   Lab Units 12/16/23  0607 12/15/23  1057 12/15/23  0432 12/14/23  1715   SODIUM mmol/L 135* 132* 131* 134*   POTASSIUM mmol/L 4.0 3.4* 3.1* 3.3*   CHLORIDE mmol/L 100 90* 90* 85*   CO2 mmol/L 27.0 29.7* 31.0* 32.9*   BUN mg/dL 77* 66* 70* 61*   CREATININE mg/dL 4.61* 5.02* 4.61* 5.09*   CALCIUM mg/dL 11.7* 13.6* 14.7* 17.0*   BILIRUBIN mg/dL  --   --   --  1.2   ALK PHOS U/L  --   --   --  66   ALT (SGPT) U/L  --   --   --  14   AST (SGOT) U/L  --   --   --  23   GLUCOSE mg/dL 95 135* 88 165*     Estimated Creatinine Clearance: 17.1 mL/min (A) (by C-G formula based on SCr of 4.61 mg/dL (H)).  Results from last 7 days   Lab Units 12/16/23  0607 12/15/23  1057 12/15/23  0432   MAGNESIUM mg/dL 3.0*  --  2.8*   PHOSPHORUS mg/dL 3.8 3.9  --          Results from last 7 days   Lab Units 12/17/23  0628 12/16/23  0607 12/15/23  0432 12/14/23  1715   WBC 10*3/mm3 17.41* 18.65* 13.67* 16.45*   HEMOGLOBIN g/dL 11.0* 10.3* 12.9* 15.8   PLATELETS 10*3/mm3 168 166 173 214           Assessment / Plan     ASSESSMENT:  1.  ARLYN on CKD3b, known oliguric, likely prerenal from hypotension, hypovolemia, and severe hypercalcemia.  Creatinine improving but very slowly  2.  Hypercalcemia, multifactorial from calcium supplements and volume contraction.  PTH ought to be lower in the face of this degree of hypercalcemia.  Malignancy is always a consideration, too  3.  Hypotension, likely from hypovolemia; on amlodipine  4.  Anemia, being unmasked with fluid resuscitation  5.  PVD with compartment syndrome in 2016 leading to left AKA  6.  COPD    PLAN:  Continue IV hydration.   Keep off diuretics  Labs in a.m.    Rafael Montes MD  12/17/23  09:50 EST    [facility]  [phone]

## 2023-12-17 NOTE — THERAPY TREATMENT NOTE
Patient Name: Sim Agustin  : 1955    MRN: [id number]                              Today's Date: 2023       Admit Date: 2023    Visit Dx:     ICD-10-CM ICD-9-CM   1. ARLYN (acute kidney injury)  N17.9 584.9   2. Hypercalcemia  E83.52 275.42     Patient Active Problem List   Diagnosis    H/O angiodysplasia of intestinal tract    COPD (chronic obstructive pulmonary disease)    S/P colectomy    Status post above-knee amputation of left lower extremity    CAD (coronary artery disease)    PVD (peripheral vascular disease)    Essential hypertension    Cardiomyopathy, unspecified    PRISCILLA treated with BiPAP    Anxiety disorder    Chronic midline low back pain without sciatica    Long term prescription benzodiazepine use    History of bradycardia    Hypothyroidism    Vitamin B12 deficiency    Iron deficiency    History of smoking 30 or more pack years    High risk medication use    DNR (do not resuscitate)    Stage 3a chronic kidney disease    Hyperlipidemia    Proteinuria    Junctional cardiac arrhythmia    Acute kidney injury    Hypercalcemia    Stage 3b chronic kidney disease (CKD)    Anemia    Leukocytosis    Hyponatremia    Hypokalemia    Lactic acidosis    Hypotension     Past Medical History:   Diagnosis Date    Acute respiratory failure with hypoxia and hypercarbia 2019    Acute upper respiratory infection 2013    Amputee, above knee, left     Anemia     per [place]    ARF (acute renal failure) 2016    Asthma 2018    Atelectasis, left 2016    Bradycardia 2019    CAD (coronary artery disease) 2016    Chronic obstructive pulmonary disease with acute exacerbation 2013    Clotting disorder 2003    Colon polyps     Compartment syndrome     AFTER ILIAC SURGERY. ABOVE KNEE AMPUTATION    COPD (chronic obstructive pulmonary disease)     COPD with hypoxia 2019    Cough     SINCE APRIL WITH PNEUMONIA.     Demand myocardial infarction  05/19/2022    Disease of thyroid gland     HYPOTHYRODISM    Diverticulosis     Elevated hematocrit 01/06/2021    Employs prosthetic leg     ESRD (end stage renal disease) on dialysis 04/06/2016    Fracture of patella 03/03/2015    History of acute renal failure     History of CHF (congestive heart failure)     History of GI bleed 02/2016    AORTODUOD FISTULA    History of sepsis 02/2016    History of transfusion     MULTIPLE, 2016    Hyperkalemia 04/11/2016    Hyperlipidemia     Hypertension     Hypotension     Hypotension 03/04/2016    Left lower lobe pneumonia 05/18/2022    Nonischemic cardiomyopathy     Nosocomial pneumonia 04/06/2016    Phantom limb pain     SL, WITH LEFT ABOUVE KNEE    Pleural effusion on left 04/11/2016    Pneumonia     SPRING 2016  AFTER SURGERY    Pulmonary embolism     PVD (peripheral vascular disease)     Renal insufficiency     S/P thoracentesis 04/11/2016    Sepsis, unspecified organism 04/05/2016     Past Surgical History:   Procedure Laterality Date    ABOVE KNEE AMPUTATION Left 03/16/2016    Procedure: LT AMPUTATION ABOVE KNEE;  Surgeon: Jose Swann MD;  Location: [facility] MAIN OR;  Service:     ARTERIAL THROMBECTOMY  2001    throbectomy of arterial graft    AXILLARY FEMORAL BYPASS Right 02/15/2016    Exploratory lapartomy, repair aortoduodenal fistula, resection infected aortobifemoral bypass graft, axillobi-superficial femoral artery Bradley-Aneudy bypass graft from right axillary artery, Repair of duodenotomy 4th portion duodenum-Dr. Jose Swann, Dr. Genaro Perrin    BRONCHOSCOPY Left 03/04/2016    Dr. NATALIIA Tate    BRONCHOSCOPY Left 05/20/2022    Procedure: BRONCHOSCOPY WITH BIOPSIES, BRUSHINGS, AND BAL UNDER FLUORO;  Surgeon: Ishmael Tate Jr., MD;  Location: [facility] ENDOSCOPY;  Service: Pulmonary;  Laterality: Left;  PRE OP - LLL CONSOLIDATION  POST OP - SAME    BRONCHOSCOPY RIGID / FLEXIBLE N/A 03/03/2016    Dr. NATALIIA Tate    BRONCHOSCOPY RIGID / FLEXIBLE N/A  02/26/2016    Dr. NATALIIA Tate    CARDIAC CATHETERIZATION N/A 03/18/2019    Procedure: Right and Left Heart Cath;  Surgeon: Nina Storey MD;  Location: [facility] CATH INVASIVE LOCATION;  Service: Cardiovascular    CATARACT EXTRACTION WITH INTRAOCULAR LENS IMPLANT Bilateral     COLON RESECTION WITH COLOSTOMY Left 02/28/2016    Exploratory laparotomy with open left hemicolectomy, end-colostomy, G-tube and J-tube-Dr. Endy Chaney    COLON SURGERY  3/25/2015    COLOSTOMY    COLONOSCOPY N/A 2015    Dr. Laughlin    COLONOSCOPY N/A 10/12/2016    Procedure: COLONOSCOPY TO 20 CM AND PER STOMA TO 30CM;  Surgeon: Genaro Perrin MD;  Location: [facility] ENDOSCOPY;  Service:     COLONOSCOPY N/A 10/21/2016    Procedure: COLONOSCOPY DONE AT BEDSIDE ONTO CECEM;  Surgeon: Genaro Perrin MD;  Location: [facility] ENDOSCOPY;  Service:     COLONOSCOPY N/A 02/10/2016    Diverticulosis in the entire examined colon, non-bleeding internal hemorrhoids-Dr. Taylor Pina    COLONOSCOPY N/A 02/11/2016    Poor prep, blood in the entire examined colon, normal ileum-Dr. Taylor Pina    COLONOSCOPY W/ POLYPECTOMY N/A 07/27/2015    Normal ileum, diverticulosis in the sigmoid colon: resected and retrieved, one 6 mm polyp in the descending colon: resected and retrieved, the distal rectum and anal verge are normal on retroflexion view-Dr. Miguel Ángel Laughlin    COLOSTOMY CLOSURE N/A 10/13/2016    Procedure: COLOSTOMY TAKEDOWN OR CLOSURE, APPENDECTOMY;  Surgeon: Genaro Perrin MD;  Location: [facility] OR;  Service:     DEBRIDEMENT LEG Left 03/01/2016    Excisional debridement of the skin, subcutantous tissue, tendon and muscle left calf-Dr. Jose Swann    DEBRIDEMENT LEG Left 02/25/2016    Excisional debridement full-thcikness skin and subcutaneous tissue and tendon and muscle, left medial and lateral calf muscles-Dr. Jose Swann    ENDOSCOPY N/A 10/21/2016    Procedure: ESOPHAGOGASTRODUODENOSCOPY DONE AT BEDSIDE;  Surgeon: Genaro SARAVIA  MD Marychuy;  Location: [facility] ENDOSCOPY;  Service:     ENDOSCOPY AND COLONOSCOPY N/A 02/28/2016    EGD and Colonoscopy with Candy ink injection-Dr. Endy Chaney    ENTEROSCOPY SMALL BOWEL N/A 02/11/2016    Normal esophagus, normal stomach, normal duodenum, portion of the jejunum normal-Dr. Taylor Pina    ILIAC ARTERY - FEMORAL ARTERY BYPASS GRAFT Bilateral 2002    ILIAC ARTERY STENT Bilateral 2001    INSERTION AND REMOVAL PERITONEAL DIALYSIS CATHETER Right 02/29/2016    Exchange right jugular 16 cm Mahurkar dialysis catheter-Dr. Jose Swann    INSERTION PERITONEAL DIALYSIS CATHETER Left 03/01/2016    Ultrasound-guided access of the left jugular vein, 23 cm left jugular palindrome dialysis catheter placement-Dr. Jose Swann    INSERTION PERITONEAL DIALYSIS CATHETER Right 02/18/2016    Right jugular Mahrkar catherer placement-Dr. Jose Swann    JOINT REPLACEMENT Left     THORACOSCOPY Left 04/18/2016    Procedure: LT THORACOSCOPY VIDEO ASSISTED WITH DECORDICATION;  Surgeon: Genaro Davila III, MD;  Location: [facility] MAIN OR;  Service:     THROMBECTOMY Left 02/16/2016    Thombectomy of left femoral graft, left leg arteriogram, left calf forward compartment fasciotomy-Dr. Jose Swann    TOTAL HIP ARTHROPLASTY Left     UPPER GASTROINTESTINAL ENDOSCOPY N/A 02/09/2016    Normal UGI-Dr. Ajay Parkinson    UPPER GASTROINTESTINAL ENDOSCOPY N/A 11/28/2015    Small hiatal hernia, chronic gastritis: biopsied, non-bleeding angioectasias in the stomach: treated with argon plasma coagulation, multiple bleeding angioectasias in the dudenum: treated with argon plasma coagulation-Dr. Mykel Jaramillo    VASCULAR SURGERY      MULTIPLE    VENTRAL/INCISIONAL HERNIA REPAIR N/A 10/19/2017    Procedure: VENTRAL HERNIA REPAIR WITH MESH AND BILATERAL COMPONENT SEPARATION;  Surgeon: Gnearo Perrin MD;  Location: [facility] MAIN OR;  Service:     WISDOM TOOTH EXTRACTION        General Information       Row Name 12/17/23  1545          Physical Therapy Time and Intention    Document Type therapy note (daily note)  -     Mode of Treatment individual therapy;physical therapy  -       Row Name 12/17/23 1545          General Information    Patient Profile Reviewed yes  -     Existing Precautions/Restrictions fall;oxygen therapy device and L/min  -EB       Row Name 12/17/23 1545          Cognition    Orientation Status (Cognition) oriented x 3  -       Row Name 12/17/23 1545          Safety Issues, Functional Mobility    Safety Issues Affecting Function (Mobility) awareness of need for assistance;insight into deficits/self-awareness;judgment;problem-solving;sequencing abilities  -     Impairments Affecting Function (Mobility) balance;cognition;endurance/activity tolerance;strength  -               User Key  (r) = Recorded By, (t) = Taken By, (c) = Cosigned By      Initials Name Provider Type    EB Sameera Osborne PTA Physical Therapist Assistant                   Mobility       Row Name 12/17/23 1546          Bed Mobility    Supine-Sit Remer (Bed Mobility) not tested  -     Sit-Supine Remer (Bed Mobility) contact guard;verbal cues;nonverbal cues (demo/gesture)  -     Assistive Device (Bed Mobility) bed rails  -       Row Name 12/17/23 1546          Transfers    Comment, (Transfers) chair to bed transfer, stand/pivot  -       Row Name 12/17/23 1546          Bed-Chair Transfer    Bed-Chair Remer (Transfers) moderate assist (50% patient effort);2 person assist;verbal cues  -     Assistive Device (Bed-Chair Transfers) --  HHAX2  -       Row Name 12/17/23 1546          Sit-Stand Transfer    Sit-Stand Remer (Transfers) moderate assist (50% patient effort);verbal cues;nonverbal cues (demo/gesture)  -     Assistive Device (Sit-Stand Transfers) walker, front-wheeled  -     Comment, (Sit-Stand Transfer) 3 attempts from chair, Pt unable to stand fully upright and lock prosthetic  -                User Key  (r) = Recorded By, (t) = Taken By, (c) = Cosigned By      Initials Name Provider Type    Sameera Alejo PTA Physical Therapist Assistant                   Obj/Interventions       Row Name 12/17/23 1549          Balance    Balance Assessment sitting static balance  -EB     Static Sitting Balance standby assist  -EB     Comment, Balance Pt [facility]IC. Pt having difficulty donning the liner. Pt seemed to have difficulty processing and also SOA, however pt's stats were at 95%  -               User Key  (r) = Recorded By, (t) = Taken By, (c) = Cosigned By      Initials Name Provider Type    Sameera Alejo PTA Physical Therapist Assistant                   Goals/Plan    No documentation.                  Clinical Impression       Row Name 12/17/23 5343          Plan of Care Review    Plan of Care Reviewed With patient  -EB     Progress no change  -EB     Outcome Evaluation Pt seen for PT treatment today.  Pt's prosthesis in room and pt sitting [facility]. Pt seemed to have difficulty with processing and donning liner and prosthesis today. Pt would take multiple rest breaks and seemed SOA however stats were 95-96%. Attempted multiple stands in order for pt to lock prosthesis in place, but pt having difficulty with standing fully upright needing ModA. Pt visibly fatigued and stand/pivot pt btb from chair with ModAX2. Will continue to progress pt as able. SNF vs  PT and assist.  -       Row Name 12/17/23 4563          Therapy Assessment/Plan (PT)    Therapy Frequency (PT) 6 times/wk  -       Row Name 12/17/23 1879          Positioning and Restraints    Pre-Treatment Position sitting in chair/recliner  -     Post Treatment Position bed  -EB     In Bed supine;call light within reach;encouraged to call for assist;exit alarm on;with nsg  -EB               User Key  (r) = Recorded By, (t) = Taken By, (c) = Cosigned By      Initials Name Provider Type    Sameera Alejo PTA Physical Therapist Assistant                    Outcome Measures       Row Name 12/17/23 1558 12/17/23 0400       How much help from another person do you currently need...    Turning from your back to your side while in flat bed without using bedrails? 3  -EB 3  -ZL    Moving from lying on back to sitting on the side of a flat bed without bedrails? 2  -EB 2  -ZL    Moving to and from a bed to a chair (including a wheelchair)? 2  -EB 2  -ZL    Standing up from a chair using your arms (e.g., wheelchair, bedside chair)? 2  -EB 2  -ZL    Climbing 3-5 steps with a railing? 1  -EB 1  -ZL    To walk in hospital room? 1  -EB 1  -ZL    AM-PAC 6 Clicks Score (PT) 11  -EB 11  -ZL    Highest Level of Mobility Goal 4 --> Transfer to chair/commode  -EB 4 --> Transfer to chair/commode  -ZL              User Key  (r) = Recorded By, (t) = Taken By, (c) = Cosigned By      Initials Name Provider Type    Sameera Alejo PTA Physical Therapist Assistant    Leif Ramos, RN Registered Nurse                                 Physical Therapy Education       Title: PT OT SLP Therapies (In Progress)       Topic: Physical Therapy (In Progress)       Point: Mobility training (Done)       Learning Progress Summary             Patient Acceptance, E,D, VU,NR by  at 12/17/2023 1559    Acceptance, E, VU by  at 12/15/2023 1531                         Point: Home exercise program (Not Started)       Learner Progress:  Not documented in this visit.              Point: Body mechanics (Done)       Learning Progress Summary             Patient Acceptance, E,D, VU,NR by  at 12/17/2023 1559                         Point: Precautions (Done)       Learning Progress Summary             Patient Acceptance, E,D, VU,NR by  at 12/17/2023 1559                                         User Key       Initials Effective Dates Name Provider Type Discipline    EM 06/16/21 -  Petrona Vazquez PT Physical Therapist PT    EB 02/14/23 -  Sameera Osborne PTA Physical Therapist Assistant PT                   PT Recommendation and Plan     Plan of Care Reviewed With: patient  Progress: no change  Outcome Evaluation: Pt seen for PT treatment today.  Pt's prosthesis in room and pt sitting [facility]. Pt seemed to have difficulty with processing and donning liner and prosthesis today. Pt would take multiple rest breaks and seemed SOA however stats were 95-96%. Attempted multiple stands in order for pt to lock prosthesis in place, but pt having difficulty with standing fully upright needing ModA. Pt visibly fatigued and stand/pivot pt btb from chair with ModAX2. Will continue to progress pt as able. SNF vs  PT and assist.     Time Calculation:         PT Charges       Row Name 12/17/23 1545             Time Calculation    Start Time 1324  -EB      Stop Time 1358  -EB      Time Calculation (min) 34 min  -EB      PT Received On 12/17/23  -EB      PT - Next Appointment 12/18/23  -EB         Time Calculation- PT    Total Timed Code Minutes- PT 34 minute(s)  -EB                User Key  (r) = Recorded By, (t) = Taken By, (c) = Cosigned By      Initials Name Provider Type    EB Sameera Osborne PTA Physical Therapist Assistant                  Therapy Charges for Today       Code Description Service Date Service Provider Modifiers Qty    38895619228 HC PT THERAPEUTIC ACT EA 15 MIN 12/17/2023 Sameera Osborne PTA GP 2    48514002279 HC PT THER SUPP EA 15 MIN 12/17/2023 Sameera Osborne PTA GP 1            PT G-Codes  Outcome Measure Options: AM-PAC 6 Clicks Daily Activity (OT)  AM-PAC 6 Clicks Score (PT): 11  AM-PAC 6 Clicks Score (OT): 17       Sameera sOborne PTA  12/17/2023

## 2023-12-17 NOTE — PROGRESS NOTES
Name: Sim Agustin ADMIT: 2023   : 1955  PCP: Seth Hester MD    MRN: [id number] LOS: 3 days   AGE/SEX: 67 y.o. male  ROOM: [facility]     Subjective   Subjective   Patient seen and examined this morning.  Hospital day 3.  Patient awake and resting in bed, oxygen requirements improved, continues to have intermittent dyspnea and ongoing generalized weakness and fatigue, however, improved from prior.  Tmax 101.1 °F       Objective   Objective   Vital Signs  Temp:  [97.5 °F (36.4 °C)-100.2 °F (37.9 °C)] 99 °F (37.2 °C)  Heart Rate:  [64-75] 75  Resp:  [18-20] 18  BP: (115-133)/(55-61) 122/56  SpO2:  [91 %-100 %] 98 %  on  Flow (L/min):  [1-3] 1;   Device (Oxygen Therapy): humidified;nasal cannula  Body mass index is 26.05 kg/m².  Physical Exam  Vitals and nursing note reviewed.   Constitutional:       General: He is awake. He is not in acute distress.     Appearance: He is ill-appearing.   HENT:      Head: Atraumatic.   Eyes:      General: No scleral icterus.     Pupils: Pupils are equal, round, and reactive to light.   Cardiovascular:      Rate and Rhythm: Normal rate and regular rhythm.      Pulses: Normal pulses.      Heart sounds: Normal heart sounds.   Pulmonary:      Effort: Pulmonary effort is normal. No respiratory distress.      Breath sounds: Decreased breath sounds and rales present. No wheezing.      Comments: On O2  Abdominal:      General: Bowel sounds are normal.      Palpations: Abdomen is soft.      Tenderness: There is no abdominal tenderness.   Musculoskeletal:      Comments: Left AKA   Skin:     General: Skin is warm and dry.      Coloration: Skin is pale.   Neurological:      Mental Status: He is alert.   Psychiatric:         Behavior: Behavior is cooperative.       Results Review     I reviewed the patient's new clinical results.  Results from last 7 days   Lab Units 23  0628 23  0607 12/15/23  0432 23  1715   WBC 10*3/mm3 17.41* 18.65* 13.67* 16.45*   HEMOGLOBIN  g/dL 11.0* 10.3* 12.9* 15.8   PLATELETS 10*3/mm3 168 166 173 214     Results from last 7 days   Lab Units 12/17/23  0950 12/16/23  0607 12/15/23  1057 12/15/23  0432   SODIUM mmol/L 141 135* 132* 131*   POTASSIUM mmol/L 3.9 4.0 3.4* 3.1*   CHLORIDE mmol/L 108* 100 90* 90*   CO2 mmol/L 21.3* 27.0 29.7* 31.0*   BUN mg/dL 62* 77* 66* 70*   CREATININE mg/dL 3.68* 4.61* 5.02* 4.61*   GLUCOSE mg/dL 89 95 135* 88   EGFR mL/min/1.73 17.3* 13.2* 11.9* 13.2*     Results from last 7 days   Lab Units 12/17/23  0950 12/16/23  0607 12/15/23  1057 12/14/23  1715   ALBUMIN g/dL 2.5* 2.9* 3.4* 4.1   BILIRUBIN mg/dL  --   --   --  1.2   ALK PHOS U/L  --   --   --  66   AST (SGOT) U/L  --   --   --  23   ALT (SGPT) U/L  --   --   --  14     Results from last 7 days   Lab Units 12/17/23  0950 12/16/23  0607 12/15/23  1057 12/15/23  0432 12/14/23  1715   CALCIUM mg/dL 10.0 11.7* 13.6* 14.7* 17.0*   ALBUMIN g/dL 2.5* 2.9* 3.4*  --  4.1   MAGNESIUM mg/dL 2.0 3.0*  --  2.8*  --    PHOSPHORUS mg/dL 2.0* 3.8 3.9  --   --      Results from last 7 days   Lab Units 12/14/23  2313 12/14/23  2003 12/14/23  1715   PROCALCITONIN ng/mL  --   --  0.26*   LACTATE mmol/L 1.4 4.4* 4.0*     Hemoglobin A1C   Date/Time Value Ref Range Status   12/16/2023 0607 5.00 4.80 - 5.60 % Final     Glucose   Date/Time Value Ref Range Status   12/16/2023 0838 120 70 - 130 mg/dL Final       XR Chest 1 View    Result Date: 12/16/2023  Small left basilar atelectasis or infiltrate.    This report was finalized on 12/16/2023 8:54 AM by Dr. Diogo Suarez M.D on Workstation: [email or web address]       I have personally reviewed all medications:  Scheduled Medications  allopurinol, 100 mg, Oral, Daily  ALPRAZolam, 1 mg, Oral, BID  aspirin, 81 mg, Oral, Nightly  atorvastatin, 40 mg, Oral, Nightly  brimonidine, 1 drop, Both Eyes, BID  budesonide-formoterol, 2 puff, Inhalation, BID - RT   And  tiotropium bromide monohydrate, 2 puff, Inhalation, Daily - RT  buPROPion XL, 300 mg, Oral,  Daily  clopidogrel, 75 mg, Oral, Nightly  gabapentin, 300 mg, Oral, Nightly  guaiFENesin, 600 mg, Oral, Q12H  isosorbide mononitrate, 30 mg, Oral, QAM  latanoprost, 1 drop, Both Eyes, Nightly  levothyroxine, 137 mcg, Oral, Daily  Menthol-Zinc Oxide, 1 application , Topical, Q12H  multivitamin with minerals, 1 tablet, Oral, Daily  piperacillin-tazobactam, 3.375 g, Intravenous, Q12H  senna-docusate sodium, 2 tablet, Oral, BID    Infusions  Pharmacy to Dose Zosyn,   sodium chloride, 125 mL/hr, Last Rate: 125 mL/hr (12/17/23 1403)    Diet  Diet: Cardiac Diets; Healthy Heart (2-3 Na+); Texture: Regular Texture (IDDSI 7); Fluid Consistency: Thin (IDDSI 0)    I have personally reviewed:  [x]  Laboratory   [x]  Microbiology   [x]  Radiology   [x]  EKG/Telemetry  [x]  Cardiology/Vascular   []  Pathology    []  Records       Assessment/Plan     Active Hospital Problems    Diagnosis  POA    **Acute kidney injury [N17.9]  Yes    Stage 3b chronic kidney disease (CKD) [N18.32]  Yes    Anemia [D64.9]  Yes    Leukocytosis [D72.829]  Yes    Hyponatremia [E87.1]  Yes    Hypokalemia [E87.6]  Yes    Lactic acidosis [E87.20]  Yes    Hypotension [I95.9]  Yes    Hypercalcemia [E83.52]  Yes    Hyperlipidemia [E78.5]  Yes    PRISCILLA treated with BiPAP [G47.33]  Yes    Hypothyroidism [E03.9]  Yes    CAD (coronary artery disease) [I25.10]  Yes    COPD (chronic obstructive pulmonary disease) [J44.9]  Yes    Essential hypertension [I10]  Yes      Resolved Hospital Problems   No resolved problems to display.       67 y.o. male admitted with Acute kidney injury.    Acute respiratory failure with hypoxia  Sepsis secondary to suspected pneumonia  - Patient with abruptly worsening dyspnea and documented hypoxia on morning of 12/16, coupled with increase in oxygen requirements. Imaging showing small left infiltrate, ABG with P/F ratio <300. Also, patient febrile to 100.9 on evening of 12/15 and WBC had worsened. Concern for possible aspiration, which is  usually right sided, but given overall clinical picture, further evaluation and management warranted. Blood cultures no growth at 24 hours, procalcitonin not likely to be helpful due to concomitant renal failure which will alter results, same for D-dimer. His respiratory status is improving after coughing up mucus, he is not tachycardic, so less likely PE.  - Started vancomycin and Zosyn, but later discontinued vancomycin once MRSA nares was negative.  He has still been febrile over the past 24 hours, WBC count remains elevated but slightly improved, oxygen requirements improving.  Going to order repeat blood cultures now, but continue Zosyn as prescribed and closely monitor.  - Closely monitor respiratory status, wean O2 as tolerated, telemetry, pulse oximetry.  Continue with supportive medications for symptom control.  - Follow up blood cultures.    ARLYN on CKD stage 3B  - Etiology most likely multifactorial in nature, due to hypotension, hypovolemia/dehydration, hypercalcemia.  - Creatinine on admission: 5.09. Baseline creatinine appears to be around 1.7.  CT abdomen pelvis negative for any evidence of hydronephrosis.  - Nephrology consulted and following, planning to continue with IVF and hold diuretics.  - Will continue to follow Nephrology plans/recommendations. Greatly appreciate their help.  - Continue IVF for volume resuscitation and closely monitor.  - Order repeat BMP in AM for reassessment of creatinine, further management based on results of testing.  - Avoid nephrotoxic medications, IVF, strict I/O, daily weights, acute urinary retention protocol.    Hypercalcemia  - Calcium level elevated to 17 on arrival. PTH found to be 29.5. Etiology likely multifactorial from calcium supplementation and volume contraction. He is receiving IVF and Nephrology is following. Most recent calcium has normalized.  - Continue IVF and closely monitor. Will continue to follow nephrology plans/recommendations. Greatly  appreciate their help.  - Trend calcium levels on daily labs.    Hyponatremia  - Na level low on prior labs, mild in nature, monitor for now.  - Order repeat BMP in AM for reassessment.    Leukocytosis  - WBC count elevated on most recent labs, likely attributed to pneumonia, as discussed elsewhere.   - Order repeat CBC in AM for reassessment.     Anemia  - Hemoglobin low on most recent labs, however, stable from prior. No evidence of overt blood loss.  Iron studies consistent with anemia of chronic disease, B12 and folate acceptable.  - Order repeat CBC in AM for reassessment. Continue to monitor, transfuse for hemoglobin <7.    Lactic acidosis  - Noted on arrival, improved with IVF. Continue to monitor.    Hypertension complicated by hypotension prior to arrival  - Home antihypertensive medications have been held, he is receiving IVF.  - Trend BP to guide ongoing management decisions.    Coronary artery disease  - Patient appears stable from this perspective at this time, denies any chest pain, dyspnea, palpitations, no signs/symptoms to suggest ACS.  - Continue current treatment as prescribed. Will continue to monitor.  - Continue telemetry monitoring.    COPD  - Appears stable from this perspective at present.  No evidence to suggest acute exacerbation.  Continue current medications as prescribed and closely monitor.  - Supplemental oxygen as needed to maintain O2 sats 88 to 92%, pulse oximetry, telemetry monitoring.    Hypothyroidism  - Continue levothyroxine as prescribed.    Hyperlipidemia  - Continue Statin as prescribed.    PRISCILLA  - Okay to use home machine if available.    Peripheral vascular disease  - Complicated by compartment syndrome 2016 leading to left AKA.    Hypokalemia, resolved  - K low on prior labs, repleted via electrolyte protocol. Follow up repeat labs to guide ongoing management decisions. Replete PRN per protocol. Continue to monitor    SCDs for DVT prophylaxis.  Full code.  Discussed with  patient and nursing staff.  Anticipate discharge  TBD  timing yet to be determined.      Buddy Cook DO  [facility]  12/17/23

## 2023-12-17 NOTE — PLAN OF CARE
Goal Outcome Evaluation:  Plan of Care Reviewed With: patient        Progress: no change  Outcome Evaluation: Pt seen for PT treatment today.  Pt's prosthesis in room and pt sitting UI. Pt seemed to have difficulty with processing and donning liner and prosthesis today. Pt would take multiple rest breaks and seemed SOA however stats were 95-96%. Attempted multiple stands in order for pt to lock prosthesis in place, but pt having difficulty with standing fully upright needing ModA. Pt visibly fatigued and stand/pivot pt btb from chair with ModAX2. Will continue to progress pt as able. SNF vs  PT and assist.

## 2023-12-17 NOTE — PLAN OF CARE
Problem: Adult Inpatient Plan of Care  Goal: Plan of Care Review  Outcome: Ongoing, Progressing  Flowsheets (Taken 12/17/2023 1815)  Progress: no change  Plan of Care Reviewed With: patient  Outcome Evaluation: Pt vitals stable. Fever this evening. Blood cultures drawn. Tylenol given. Pt up to chair for 3 hours this morning. Pt very weak. IV abx continued. Labs in am. Pt safety maintained

## 2023-12-17 NOTE — PLAN OF CARE
Goal Outcome Evaluation:  Plan of Care Reviewed With: patient        Progress: improving  Outcome Evaluation: VSS, weight shifted, turned. Incontinent care done, brief changed, skin care done, cream applied to coccyx. Stable on 2L. Will continue to monitor.

## 2023-12-18 ENCOUNTER — APPOINTMENT (OUTPATIENT)
Dept: GENERAL RADIOLOGY | Facility: HOSPITAL | Age: 68
End: 2023-12-18
Payer: MEDICARE

## 2023-12-18 LAB
ANION GAP SERPL CALCULATED.3IONS-SCNC: 12.6 MMOL/L (ref 5–15)
BASOPHILS # BLD AUTO: 0.03 10*3/MM3 (ref 0–0.2)
BASOPHILS NFR BLD AUTO: 0.2 % (ref 0–1.5)
BUN SERPL-MCNC: 55 MG/DL (ref 8–23)
BUN/CREAT SERPL: 16.2 (ref 7–25)
CALCIUM SPEC-SCNC: 9.7 MG/DL (ref 8.6–10.5)
CHLORIDE SERPL-SCNC: 109 MMOL/L (ref 98–107)
CO2 SERPL-SCNC: 19.4 MMOL/L (ref 22–29)
CREAT SERPL-MCNC: 3.39 MG/DL (ref 0.76–1.27)
DEPRECATED RDW RBC AUTO: 44.5 FL (ref 37–54)
EGFRCR SERPLBLD CKD-EPI 2021: 19.1 ML/MIN/1.73
EOSINOPHIL # BLD AUTO: 0.38 10*3/MM3 (ref 0–0.4)
EOSINOPHIL NFR BLD AUTO: 2.7 % (ref 0.3–6.2)
ERYTHROCYTE [DISTWIDTH] IN BLOOD BY AUTOMATED COUNT: 13.4 % (ref 12.3–15.4)
GLUCOSE SERPL-MCNC: 92 MG/DL (ref 65–99)
HCT VFR BLD AUTO: 33.2 % (ref 37.5–51)
HGB BLD-MCNC: 11.4 G/DL (ref 13–17.7)
IMM GRANULOCYTES # BLD AUTO: 0.16 10*3/MM3 (ref 0–0.05)
IMM GRANULOCYTES NFR BLD AUTO: 1.1 % (ref 0–0.5)
LYMPHOCYTES # BLD AUTO: 0.29 10*3/MM3 (ref 0.7–3.1)
LYMPHOCYTES NFR BLD AUTO: 2 % (ref 19.6–45.3)
MAGNESIUM SERPL-MCNC: 2.1 MG/DL (ref 1.6–2.4)
MCH RBC QN AUTO: 31.4 PG (ref 26.6–33)
MCHC RBC AUTO-ENTMCNC: 34.3 G/DL (ref 31.5–35.7)
MCV RBC AUTO: 91.5 FL (ref 79–97)
MONOCYTES # BLD AUTO: 0.43 10*3/MM3 (ref 0.1–0.9)
MONOCYTES NFR BLD AUTO: 3 % (ref 5–12)
NEUTROPHILS NFR BLD AUTO: 12.97 10*3/MM3 (ref 1.7–7)
NEUTROPHILS NFR BLD AUTO: 91 % (ref 42.7–76)
NRBC BLD AUTO-RTO: 0 /100 WBC (ref 0–0.2)
PHOSPHATE SERPL-MCNC: 2.2 MG/DL (ref 2.5–4.5)
PLATELET # BLD AUTO: 207 10*3/MM3 (ref 140–450)
PMV BLD AUTO: 10.5 FL (ref 6–12)
POTASSIUM SERPL-SCNC: 3.4 MMOL/L (ref 3.5–5.2)
RBC # BLD AUTO: 3.63 10*6/MM3 (ref 4.14–5.8)
SODIUM SERPL-SCNC: 141 MMOL/L (ref 136–145)
WBC NRBC COR # BLD AUTO: 14.26 10*3/MM3 (ref 3.4–10.8)

## 2023-12-18 PROCEDURE — 25010000002 FUROSEMIDE PER 20 MG: Performed by: INTERNAL MEDICINE

## 2023-12-18 PROCEDURE — 25010000002 HEPARIN (PORCINE) PER 1000 UNITS: Performed by: STUDENT IN AN ORGANIZED HEALTH CARE EDUCATION/TRAINING PROGRAM

## 2023-12-18 PROCEDURE — 84100 ASSAY OF PHOSPHORUS: CPT | Performed by: STUDENT IN AN ORGANIZED HEALTH CARE EDUCATION/TRAINING PROGRAM

## 2023-12-18 PROCEDURE — 71046 X-RAY EXAM CHEST 2 VIEWS: CPT

## 2023-12-18 PROCEDURE — 83735 ASSAY OF MAGNESIUM: CPT | Performed by: STUDENT IN AN ORGANIZED HEALTH CARE EDUCATION/TRAINING PROGRAM

## 2023-12-18 PROCEDURE — 94799 UNLISTED PULMONARY SVC/PX: CPT

## 2023-12-18 PROCEDURE — 25010000002 PIPERACILLIN SOD-TAZOBACTAM PER 1 G: Performed by: STUDENT IN AN ORGANIZED HEALTH CARE EDUCATION/TRAINING PROGRAM

## 2023-12-18 PROCEDURE — 85025 COMPLETE CBC W/AUTO DIFF WBC: CPT | Performed by: STUDENT IN AN ORGANIZED HEALTH CARE EDUCATION/TRAINING PROGRAM

## 2023-12-18 PROCEDURE — 25810000003 SODIUM CHLORIDE 0.9 % SOLUTION: Performed by: INTERNAL MEDICINE

## 2023-12-18 PROCEDURE — 97530 THERAPEUTIC ACTIVITIES: CPT

## 2023-12-18 PROCEDURE — 94664 DEMO&/EVAL PT USE INHALER: CPT

## 2023-12-18 PROCEDURE — 80048 BASIC METABOLIC PNL TOTAL CA: CPT | Performed by: STUDENT IN AN ORGANIZED HEALTH CARE EDUCATION/TRAINING PROGRAM

## 2023-12-18 RX ORDER — IPRATROPIUM BROMIDE AND ALBUTEROL SULFATE 2.5; .5 MG/3ML; MG/3ML
3 SOLUTION RESPIRATORY (INHALATION) 2 TIMES DAILY
Status: DISCONTINUED | OUTPATIENT
Start: 2023-12-18 | End: 2023-12-19

## 2023-12-18 RX ORDER — FUROSEMIDE 10 MG/ML
40 INJECTION INTRAMUSCULAR; INTRAVENOUS ONCE
Status: COMPLETED | OUTPATIENT
Start: 2023-12-18 | End: 2023-12-18

## 2023-12-18 RX ORDER — HEPARIN SODIUM 5000 [USP'U]/ML
5000 INJECTION, SOLUTION INTRAVENOUS; SUBCUTANEOUS EVERY 8 HOURS SCHEDULED
Status: DISCONTINUED | OUTPATIENT
Start: 2023-12-18 | End: 2023-12-24 | Stop reason: HOSPADM

## 2023-12-18 RX ORDER — FENTANYL/ROPIVACAINE/NS/PF 2-625MCG/1
15 PLASTIC BAG, INJECTION (ML) EPIDURAL ONCE
Status: COMPLETED | OUTPATIENT
Start: 2023-12-18 | End: 2023-12-18

## 2023-12-18 RX ORDER — ALPRAZOLAM 0.5 MG/1
0.5 TABLET ORAL 2 TIMES DAILY
Status: DISCONTINUED | OUTPATIENT
Start: 2023-12-18 | End: 2023-12-19

## 2023-12-18 RX ADMIN — BRIMONIDINE TARTRATE 1 DROP: 2 SOLUTION/ DROPS OPHTHALMIC at 20:53

## 2023-12-18 RX ADMIN — LATANOPROST 1 DROP: 50 SOLUTION OPHTHALMIC at 21:06

## 2023-12-18 RX ADMIN — ALPRAZOLAM 1 MG: 1 TABLET ORAL at 08:11

## 2023-12-18 RX ADMIN — ALBUTEROL SULFATE 0.63 MG: 0.63 SOLUTION RESPIRATORY (INHALATION) at 05:13

## 2023-12-18 RX ADMIN — BRIMONIDINE TARTRATE 1 DROP: 2 SOLUTION/ DROPS OPHTHALMIC at 08:11

## 2023-12-18 RX ADMIN — POTASSIUM PHOSPHATE, MONOBASIC POTASSIUM PHOSPHATE, DIBASIC 15 MMOL: 224; 236 INJECTION, SOLUTION, CONCENTRATE INTRAVENOUS at 20:49

## 2023-12-18 RX ADMIN — ASPIRIN 81 MG: 81 TABLET, CHEWABLE ORAL at 21:07

## 2023-12-18 RX ADMIN — HEPARIN SODIUM 5000 UNITS: 5000 INJECTION INTRAVENOUS; SUBCUTANEOUS at 22:51

## 2023-12-18 RX ADMIN — ISOSORBIDE MONONITRATE 30 MG: 30 TABLET, EXTENDED RELEASE ORAL at 06:04

## 2023-12-18 RX ADMIN — LEVOTHYROXINE SODIUM 137 MCG: 0.14 TABLET ORAL at 08:11

## 2023-12-18 RX ADMIN — ANORECTAL OINTMENT 1 APPLICATION: 15.7; .44; 24; 20.6 OINTMENT TOPICAL at 08:11

## 2023-12-18 RX ADMIN — GUAIFENESIN 600 MG: 600 TABLET, EXTENDED RELEASE ORAL at 08:11

## 2023-12-18 RX ADMIN — SODIUM CHLORIDE 125 ML/HR: 9 INJECTION, SOLUTION INTRAVENOUS at 12:03

## 2023-12-18 RX ADMIN — ANORECTAL OINTMENT 1 APPLICATION: 15.7; .44; 24; 20.6 OINTMENT TOPICAL at 20:53

## 2023-12-18 RX ADMIN — TIOTROPIUM BROMIDE INHALATION SPRAY 2 PUFF: 3.12 SPRAY, METERED RESPIRATORY (INHALATION) at 05:30

## 2023-12-18 RX ADMIN — FUROSEMIDE 40 MG: 10 INJECTION, SOLUTION INTRAMUSCULAR; INTRAVENOUS at 18:16

## 2023-12-18 RX ADMIN — Medication 1 TABLET: at 08:11

## 2023-12-18 RX ADMIN — CLOPIDOGREL BISULFATE 75 MG: 75 TABLET, FILM COATED ORAL at 21:06

## 2023-12-18 RX ADMIN — BUDESONIDE AND FORMOTEROL FUMARATE DIHYDRATE 2 PUFF: 160; 4.5 AEROSOL RESPIRATORY (INHALATION) at 05:29

## 2023-12-18 RX ADMIN — ATORVASTATIN CALCIUM 40 MG: 20 TABLET, FILM COATED ORAL at 21:09

## 2023-12-18 RX ADMIN — BUPROPION HYDROCHLORIDE 300 MG: 300 TABLET, EXTENDED RELEASE ORAL at 08:11

## 2023-12-18 RX ADMIN — GUAIFENESIN 600 MG: 600 TABLET, EXTENDED RELEASE ORAL at 21:07

## 2023-12-18 RX ADMIN — ALLOPURINOL 100 MG: 100 TABLET ORAL at 08:11

## 2023-12-18 RX ADMIN — PIPERACILLIN SODIUM AND TAZOBACTAM SODIUM 3.38 G: 3; .375 INJECTION, SOLUTION INTRAVENOUS at 08:11

## 2023-12-18 RX ADMIN — SODIUM CHLORIDE 125 ML/HR: 9 INJECTION, SOLUTION INTRAVENOUS at 03:15

## 2023-12-18 NOTE — THERAPY TREATMENT NOTE
Patient Name: Sim Agustin  : 1955    MRN: [id number]                              Today's Date: 2023       Admit Date: 2023    Visit Dx:     ICD-10-CM ICD-9-CM   1. ARLYN (acute kidney injury)  N17.9 584.9   2. Hypercalcemia  E83.52 275.42     Patient Active Problem List   Diagnosis    H/O angiodysplasia of intestinal tract    COPD (chronic obstructive pulmonary disease)    S/P colectomy    Status post above-knee amputation of left lower extremity    CAD (coronary artery disease)    PVD (peripheral vascular disease)    Essential hypertension    Cardiomyopathy, unspecified    PRISCILLA treated with BiPAP    Anxiety disorder    Chronic midline low back pain without sciatica    Long term prescription benzodiazepine use    History of bradycardia    Hypothyroidism    Vitamin B12 deficiency    Iron deficiency    History of smoking 30 or more pack years    High risk medication use    DNR (do not resuscitate)    Stage 3a chronic kidney disease    Hyperlipidemia    Proteinuria    Junctional cardiac arrhythmia    Acute kidney injury    Hypercalcemia    Stage 3b chronic kidney disease (CKD)    Anemia    Leukocytosis    Hyponatremia    Hypokalemia    Lactic acidosis    Hypotension     Past Medical History:   Diagnosis Date    Acute respiratory failure with hypoxia and hypercarbia 2019    Acute upper respiratory infection 2013    Amputee, above knee, left     Anemia     per [place]    ARF (acute renal failure) 2016    Asthma 2018    Atelectasis, left 2016    Bradycardia 2019    CAD (coronary artery disease) 2016    Chronic obstructive pulmonary disease with acute exacerbation 2013    Clotting disorder 2003    Colon polyps     Compartment syndrome     AFTER ILIAC SURGERY. ABOVE KNEE AMPUTATION    COPD (chronic obstructive pulmonary disease)     COPD with hypoxia 2019    Cough     SINCE APRIL WITH PNEUMONIA.     Demand myocardial infarction  05/19/2022    Disease of thyroid gland     HYPOTHYRODISM    Diverticulosis     Elevated hematocrit 01/06/2021    Employs prosthetic leg     ESRD (end stage renal disease) on dialysis 04/06/2016    Fracture of patella 03/03/2015    History of acute renal failure     History of CHF (congestive heart failure)     History of GI bleed 02/2016    AORTODUOD FISTULA    History of sepsis 02/2016    History of transfusion     MULTIPLE, 2016    Hyperkalemia 04/11/2016    Hyperlipidemia     Hypertension     Hypotension     Hypotension 03/04/2016    Left lower lobe pneumonia 05/18/2022    Nonischemic cardiomyopathy     Nosocomial pneumonia 04/06/2016    Phantom limb pain     SL, WITH LEFT ABOUVE KNEE    Pleural effusion on left 04/11/2016    Pneumonia     SPRING 2016  AFTER SURGERY    Pulmonary embolism     PVD (peripheral vascular disease)     Renal insufficiency     S/P thoracentesis 04/11/2016    Sepsis, unspecified organism 04/05/2016     Past Surgical History:   Procedure Laterality Date    ABOVE KNEE AMPUTATION Left 03/16/2016    Procedure: LT AMPUTATION ABOVE KNEE;  Surgeon: Jose Swann MD;  Location: [facility] MAIN OR;  Service:     ARTERIAL THROMBECTOMY  2001    throbectomy of arterial graft    AXILLARY FEMORAL BYPASS Right 02/15/2016    Exploratory lapartomy, repair aortoduodenal fistula, resection infected aortobifemoral bypass graft, axillobi-superficial femoral artery Ferdinand-Aneudy bypass graft from right axillary artery, Repair of duodenotomy 4th portion duodenum-Dr. Jose Swann, Dr. Genaro Perrin    BRONCHOSCOPY Left 03/04/2016    Dr. NATALIIA Tate    BRONCHOSCOPY Left 05/20/2022    Procedure: BRONCHOSCOPY WITH BIOPSIES, BRUSHINGS, AND BAL UNDER FLUORO;  Surgeon: Ishmael Tate Jr., MD;  Location: [facility] ENDOSCOPY;  Service: Pulmonary;  Laterality: Left;  PRE OP - LLL CONSOLIDATION  POST OP - SAME    BRONCHOSCOPY RIGID / FLEXIBLE N/A 03/03/2016    Dr. NATALIIA Tate    BRONCHOSCOPY RIGID / FLEXIBLE N/A  02/26/2016    Dr. NATALIIA Tate    CARDIAC CATHETERIZATION N/A 03/18/2019    Procedure: Right and Left Heart Cath;  Surgeon: Nina Storey MD;  Location: [facility] CATH INVASIVE LOCATION;  Service: Cardiovascular    CATARACT EXTRACTION WITH INTRAOCULAR LENS IMPLANT Bilateral     COLON RESECTION WITH COLOSTOMY Left 02/28/2016    Exploratory laparotomy with open left hemicolectomy, end-colostomy, G-tube and J-tube-Dr. Endy Chaney    COLON SURGERY  3/25/2015    COLOSTOMY    COLONOSCOPY N/A 2015    Dr. Laughlin    COLONOSCOPY N/A 10/12/2016    Procedure: COLONOSCOPY TO 20 CM AND PER STOMA TO 30CM;  Surgeon: Genaro Perrin MD;  Location: [facility] ENDOSCOPY;  Service:     COLONOSCOPY N/A 10/21/2016    Procedure: COLONOSCOPY DONE AT BEDSIDE ONTO CECEM;  Surgeon: Genaro Perrin MD;  Location: [facility] ENDOSCOPY;  Service:     COLONOSCOPY N/A 02/10/2016    Diverticulosis in the entire examined colon, non-bleeding internal hemorrhoids-Dr. Taylor Pina    COLONOSCOPY N/A 02/11/2016    Poor prep, blood in the entire examined colon, normal ileum-Dr. Taylor Pina    COLONOSCOPY W/ POLYPECTOMY N/A 07/27/2015    Normal ileum, diverticulosis in the sigmoid colon: resected and retrieved, one 6 mm polyp in the descending colon: resected and retrieved, the distal rectum and anal verge are normal on retroflexion view-Dr. Miguel Ángel Laughlin    COLOSTOMY CLOSURE N/A 10/13/2016    Procedure: COLOSTOMY TAKEDOWN OR CLOSURE, APPENDECTOMY;  Surgeon: Genaro Perrin MD;  Location: [facility] OR;  Service:     DEBRIDEMENT LEG Left 03/01/2016    Excisional debridement of the skin, subcutantous tissue, tendon and muscle left calf-Dr. Jose Swann    DEBRIDEMENT LEG Left 02/25/2016    Excisional debridement full-thcikness skin and subcutaneous tissue and tendon and muscle, left medial and lateral calf muscles-Dr. Jose Swann    ENDOSCOPY N/A 10/21/2016    Procedure: ESOPHAGOGASTRODUODENOSCOPY DONE AT BEDSIDE;  Surgeon: Genaro SARAVIA  MD Marychuy;  Location: [facility] ENDOSCOPY;  Service:     ENDOSCOPY AND COLONOSCOPY N/A 02/28/2016    EGD and Colonoscopy with Candy ink injection-Dr. Endy Chaney    ENTEROSCOPY SMALL BOWEL N/A 02/11/2016    Normal esophagus, normal stomach, normal duodenum, portion of the jejunum normal-Dr. Taylor Pina    ILIAC ARTERY - FEMORAL ARTERY BYPASS GRAFT Bilateral 2002    ILIAC ARTERY STENT Bilateral 2001    INSERTION AND REMOVAL PERITONEAL DIALYSIS CATHETER Right 02/29/2016    Exchange right jugular 16 cm Mahurkar dialysis catheter-Dr. Jose Swann    INSERTION PERITONEAL DIALYSIS CATHETER Left 03/01/2016    Ultrasound-guided access of the left jugular vein, 23 cm left jugular palindrome dialysis catheter placement-Dr. Jose Swann    INSERTION PERITONEAL DIALYSIS CATHETER Right 02/18/2016    Right jugular Mahrkar catherer placement-Dr. Jose Swann    JOINT REPLACEMENT Left     THORACOSCOPY Left 04/18/2016    Procedure: LT THORACOSCOPY VIDEO ASSISTED WITH DECORDICATION;  Surgeon: Genaro Davila III, MD;  Location: [facility] MAIN OR;  Service:     THROMBECTOMY Left 02/16/2016    Thombectomy of left femoral graft, left leg arteriogram, left calf forward compartment fasciotomy-Dr. Jose Swann    TOTAL HIP ARTHROPLASTY Left     UPPER GASTROINTESTINAL ENDOSCOPY N/A 02/09/2016    Normal UGI-Dr. Ajay Parkinson    UPPER GASTROINTESTINAL ENDOSCOPY N/A 11/28/2015    Small hiatal hernia, chronic gastritis: biopsied, non-bleeding angioectasias in the stomach: treated with argon plasma coagulation, multiple bleeding angioectasias in the dudenum: treated with argon plasma coagulation-Dr. Mykel Jaramillo    VASCULAR SURGERY      MULTIPLE    VENTRAL/INCISIONAL HERNIA REPAIR N/A 10/19/2017    Procedure: VENTRAL HERNIA REPAIR WITH MESH AND BILATERAL COMPONENT SEPARATION;  Surgeon: Genaro Perrin MD;  Location: [facility] MAIN OR;  Service:     WISDOM TOOTH EXTRACTION        General Information       Row Name 12/18/23  1155          Physical Therapy Time and Intention    Document Type therapy note (daily note)  -CB     Mode of Treatment individual therapy;physical therapy  -CB       Row Name 12/18/23 1155          General Information    Existing Precautions/Restrictions fall;oxygen therapy device and L/min  L AKA with prosthesis at bedside  -CB       Row Name 12/18/23 1155          Cognition    Orientation Status (Cognition) oriented x 3  -CB       Row Name 12/18/23 1155          Safety Issues, Functional Mobility    Safety Issues Affecting Function (Mobility) insight into deficits/self-awareness;awareness of need for assistance;judgment;safety precaution awareness;safety precautions follow-through/compliance;problem-solving  -CB     Impairments Affecting Function (Mobility) balance;cognition;endurance/activity tolerance;strength;postural/trunk control  -CB               User Key  (r) = Recorded By, (t) = Taken By, (c) = Cosigned By      Initials Name Provider Type    Patti Maurice PT Physical Therapist                   Mobility       Row Name 12/18/23 1156          Bed Mobility    Bed Mobility supine-sit;sit-supine  -CB     Supine-Sit [place] (Bed Mobility) contact guard;verbal cues  -CB     Sit-Supine [place] (Bed Mobility) contact guard;verbal cues  -CB     Assistive Device (Bed Mobility) head of bed elevated;bed rails  -CB     Comment, (Bed Mobility) sat EOB 10min with multiple LOB and VC for posture; pt attempted to don L prosthesis and unable to complete. noted O2 decreased to 84% when attempting to don L prosthesis.  -CB       Row Name 12/18/23 1156          Transfers    Comment, (Transfers) unable to attempt today  -CB               User Key  (r) = Recorded By, (t) = Taken By, (c) = Cosigned By      Initials Name Provider Type    Patti Maurice PT Physical Therapist                   Obj/Interventions       Row Name 12/18/23 1157          Motor Skills    Therapeutic Exercise --  seated RLE LAQ x5 reps   -CB       Row Name 12/18/23 1157          Balance    Balance Assessment sitting static balance;sitting dynamic balance  -CB     Comment, Balance variable assist CGA-modA in sitting with L lateral/posterior lean noted at times as pt reports he is fatigued and leans onto L forearm. pt bed in sitting position post session  -CB               User Key  (r) = Recorded By, (t) = Taken By, (c) = Cosigned By      Initials Name Provider Type    CB Patti Campbell, TO Physical Therapist                   Goals/Plan    No documentation.                  Clinical Impression       Row Name 12/18/23 1159          Pain    Pretreatment Pain Rating 0/10 - no pain  -CB     Posttreatment Pain Rating 0/10 - no pain  -CB       Row Name 12/18/23 1159          Plan of Care Review    Plan of Care Reviewed With patient  -CB     Progress declining  -CB     Outcome Evaluation Patient is agreebale to PT this AM. He completed bed mobility requiring CGA then sitting EOB pt required variable assist from CGA-modA for 10min. Pt attempted to don LLE prosthesis and unable to don. Pt becomes very fatigued and noted O2 decreased to 84% when attempting to don prosthesis. Pt returned to supine in bed and then PT placed bed in chair position. Encouraged pt to sit in chair position for 30min-1hour. Will continue to progress as pt tolerates. Spoke with pt regarding need to rehab at [place] and pt is agreeable.  -CB       Row Name 12/18/23 1151          Vital Signs    O2 Delivery Pre Treatment supplemental O2  -CB     Intra SpO2 (%) 84  -CB     O2 Delivery Intra Treatment supplemental O2  -CB     Post SpO2 (%) --  >90%  -CB     O2 Delivery Post Treatment supplemental O2  -CB       Row Name 12/18/23 1154          Positioning and Restraints    Pre-Treatment Position in bed  -CB     Post Treatment Position bed  -CB     In Bed fowlers;notified nsg;call light within reach;encouraged to call for assist;exit alarm on;SCD pump applied;side rails up x2  -CB                User Key  (r) = Recorded By, (t) = Taken By, (c) = Cosigned By      Initials Name Provider Type    Patti Maurice PT Physical Therapist                   Outcome Measures       Row Name 12/18/23 1202          How much help from another person do you currently need...    Turning from your back to your side while in flat bed without using bedrails? 3  -CB     Moving from lying on back to sitting on the side of a flat bed without bedrails? 3  -CB     Moving to and from a bed to a chair (including a wheelchair)? 1  -CB     Standing up from a chair using your arms (e.g., wheelchair, bedside chair)? 2  -CB     Climbing 3-5 steps with a railing? 1  -CB     To walk in hospital room? 1  -CB     AM-PAC 6 Clicks Score (PT) 11  -CB     Highest Level of Mobility Goal 4 --> Transfer to chair/commode  -CB       Row Name 12/18/23 1202          Functional Assessment    Outcome Measure Options AM-PAC 6 Clicks Basic Mobility (PT)  -CB               User Key  (r) = Recorded By, (t) = Taken By, (c) = Cosigned By      Initials Name Provider Type    Patti Maurice PT Physical Therapist                                 Physical Therapy Education       Title: PT OT SLP Therapies (Done)       Topic: Physical Therapy (Done)       Point: Mobility training (Done)       Learning Progress Summary             Patient Acceptance, E,TB, VU,NR by CB at 12/18/2023 1202    Acceptance, E,D, VU,NR by EB at 12/17/2023 1559    Acceptance, E, VU by EM at 12/15/2023 1531                         Point: Home exercise program (Done)       Learning Progress Summary             Patient Acceptance, E,TB, VU,NR by CB at 12/18/2023 1202                         Point: Body mechanics (Done)       Learning Progress Summary             Patient Acceptance, E,TB, VU,NR by CB at 12/18/2023 1202    Acceptance, E,D, VU,NR by EB at 12/17/2023 1559                         Point: Precautions (Done)       Learning Progress Summary             Patient Acceptance, E,TB,  VU,NR by CB at 12/18/2023 1202    Acceptance, E,D, VU,NR by EB at 12/17/2023 1559                                         User Key       Initials Effective Dates Name Provider Type Discipline    EM 06/16/21 -  Petrona Vazquez, PT Physical Therapist PT    EB 02/14/23 -  Sameera Osborne PTA Physical Therapist Assistant PT    CB 10/22/21 -  Patti Campbell PT Physical Therapist PT                  PT Recommendation and Plan     Plan of Care Reviewed With: patient  Progress: declining  Outcome Evaluation: Patient is agreebale to PT this AM. He completed bed mobility requiring CGA then sitting EOB pt required variable assist from CGA-modA for 10min. Pt attempted to don LLE prosthesis and unable to don. Pt becomes very fatigued and noted O2 decreased to 84% when attempting to don prosthesis. Pt returned to supine in bed and then PT placed bed in chair position. Encouraged pt to sit in chair position for 30min-1hour. Will continue to progress as pt tolerates. Spoke with pt regarding need to rehab at [place] and pt is agreeable.     Time Calculation:         PT Charges       Row Name 12/18/23 1203             Time Calculation    Start Time 0901  -CB      Stop Time 0918  -CB      Time Calculation (min) 17 min  -CB      PT Received On 12/18/23  -CB      PT - Next Appointment 12/19/23  -CB         Time Calculation- PT    Total Timed Code Minutes- PT 17 minute(s)  -CB         Timed Charges    48881 - PT Therapeutic Activity Minutes 17  -CB         Total Minutes    Timed Charges Total Minutes 17  -CB       Total Minutes 17  -CB                User Key  (r) = Recorded By, (t) = Taken By, (c) = Cosigned By      Initials Name Provider Type    CB Patti Campbell, PT Physical Therapist                  Therapy Charges for Today       Code Description Service Date Service Provider Modifiers Qty    42192592723 HC PT THERAPEUTIC ACT EA 15 MIN 12/18/2023 Patti Campbell, PT GP 1    82482071034 HC PT THER SUPP EA 15 MIN 12/18/2023 Adrian  Patti, PT GP 1            PT G-Codes  Outcome Measure Options: AM-PAC 6 Clicks Basic Mobility (PT)  AM-PAC 6 Clicks Score (PT): 11  AM-PAC 6 Clicks Score (OT): 17  PT Discharge Summary  Anticipated Discharge Disposition (PT): inpatient rehabilitation facility    Patti Campbell, TO  12/18/2023

## 2023-12-18 NOTE — PLAN OF CARE
Problem: Adult Inpatient Plan of Care  Goal: Plan of Care Review  Outcome: Ongoing, Progressing  Flowsheets (Taken 12/18/2023 1712)  Progress: no change  Plan of Care Reviewed With: patient  Outcome Evaluation: Pt vitals stable. Able to wean to room air this afternoon.Son to bring cpap this evening. Pt very weak. Confused at times but self orients.  Chest xray today. q 2 turn.IV abx and fluids continued Pt safety maintained.

## 2023-12-18 NOTE — PROGRESS NOTES
Continued Stay Note  [facility]     Patient Name: Sim Agustin  MRN: [id number]  Today's Date: 12/18/2023    Admit Date: 12/14/2023    Plan: SNF   Discharge Plan       Row Name 12/18/23 170       Plan    Plan SNF    Plan Comments Therapy recommendation for SNF noted. Follow up with patient at the bedside, patient requested CCP discuss dc planning with his spouse. Left [facility] for spouse, Cassie [phone], awaiting call back. Will need pre-cert for SNF.                   Discharge Codes    No documentation.                 Expected Discharge Date and Time       Expected Discharge Date Expected Discharge Time    Dec 19, 2023               Lesly Estrada RN

## 2023-12-18 NOTE — PROGRESS NOTES
[facility] Progress Note      Patient Name: Sim Agustin  : 1955  MRN: [id number]  Primary Care Physician:  Seth Hester MD  Date of admission: 2023    Subjective     Interval History:   Slightly confused; able to tell me month and year  Not eating much  IVF infusing; complains of orthopnea  Breathing is comfortable on supplemental O2    Review of Systems:   As noted above    Objective     Vitals:   Temp:  [97.7 °F (36.5 °C)-98.4 °F (36.9 °C)] 97.7 °F (36.5 °C)  Heart Rate:  [67-75] 75  Resp:  [18-20] 20  BP: (122-136)/(50-62) 126/62  Flow (L/min):  [0.5-1] 0.5    Intake/Output Summary (Last 24 hours) at 2023 1802  Last data filed at 2023 1549  Gross per 24 hour   Intake 9637.5 ml   Output 100 ml   Net 9537.5 ml       Physical Exam:    General: NAD, chronically ill-appearing, pleasant, slightly confused  HEENT: oral mucosa normal, nonicteric sclera  Neck: supple, no JVD  Lungs: Crackles bilaterally, not labored on supplemental O2   RRR, normal S1 and S2  Abdomen: soft, nondistended,  Extremities: +1 edema right leg; left AKA  Neuro: moving all extremities  Psychiatric: Blunted sensorium    Scheduled Meds:     allopurinol, 100 mg, Oral, Daily  ALPRAZolam, 1 mg, Oral, BID  aspirin, 81 mg, Oral, Nightly  atorvastatin, 40 mg, Oral, Nightly  brimonidine, 1 drop, Both Eyes, BID  budesonide-formoterol, 2 puff, Inhalation, BID - RT   And  tiotropium bromide monohydrate, 2 puff, Inhalation, Daily - RT  buPROPion XL, 300 mg, Oral, Daily  clopidogrel, 75 mg, Oral, Nightly  gabapentin, 300 mg, Oral, Nightly  guaiFENesin, 600 mg, Oral, Q12H  ipratropium-albuterol, 3 mL, Nebulization, BID  isosorbide mononitrate, 30 mg, Oral, QAM  latanoprost, 1 drop, Both Eyes, Nightly  levothyroxine, 137 mcg, Oral, Daily  Menthol-Zinc Oxide, 1 application , Topical, Q12H  multivitamin with minerals, 1 tablet, Oral, Daily  piperacillin-tazobactam, 3.375 g, Intravenous,  Q12H  senna-docusate sodium, 2 tablet, Oral, BID      IV Meds:   Pharmacy to Dose Zosyn,   sodium chloride, 125 mL/hr, Last Rate: 125 mL/hr (12/18/23 1203)        Results Reviewed:   I have personally reviewed the results from the time of this admission to 12/18/2023 18:02 EST     Results from last 7 days   Lab Units 12/18/23 0445 12/17/23  0950 12/16/23  0607 12/15/23  0432 12/14/23  1715   SODIUM mmol/L 141 141 135*   < > 134*   POTASSIUM mmol/L 3.4* 3.9 4.0   < > 3.3*   CHLORIDE mmol/L 109* 108* 100   < > 85*   CO2 mmol/L 19.4* 21.3* 27.0   < > 32.9*   BUN mg/dL 55* 62* 77*   < > 61*   CREATININE mg/dL 3.39* 3.68* 4.61*   < > 5.09*   CALCIUM mg/dL 9.7 10.0 11.7*   < > 17.0*   BILIRUBIN mg/dL  --   --   --   --  1.2   ALK PHOS U/L  --   --   --   --  66   ALT (SGPT) U/L  --   --   --   --  14   AST (SGOT) U/L  --   --   --   --  23   GLUCOSE mg/dL 92 89 95   < > 165*    < > = values in this interval not displayed.     Estimated Creatinine Clearance: 22.3 mL/min (A) (by C-G formula based on SCr of 3.39 mg/dL (H)).  Results from last 7 days   Lab Units 12/18/23 0445 12/17/23  0950 12/16/23  0607   MAGNESIUM mg/dL 2.1 2.0 3.0*   PHOSPHORUS mg/dL 2.2* 2.0* 3.8         Results from last 7 days   Lab Units 12/18/23 0445 12/17/23  0628 12/16/23  0607 12/15/23  0432 12/14/23  1715   WBC 10*3/mm3 14.26* 17.41* 18.65* 13.67* 16.45*   HEMOGLOBIN g/dL 11.4* 11.0* 10.3* 12.9* 15.8   PLATELETS 10*3/mm3 207 168 166 173 214           Assessment / Plan     ASSESSMENT:  1.  ARLYN on CKD3b, with UOP not quantified, improving:  likely prerenal from hypotension, hypovolemia, and severe hypercalcemia.  Volume excess by exam and imaging; low potassium and low phosphorus   2.  Hypercalcemia, multifactorial from calcium supplements and volume contraction, resolving.  PTH ought to be lower in the face of this degree of hypercalcemia.  Malignancy is always a consideration, too  3.  Hypotension, resolved after fluid resuscitation  4.   Anemia, stable  5.  PVD with compartment syndrome in 2016 leading to left AKA  6.  COPD  7.  Confusion    PLAN:  1.  Stop IVF and give 1 dose of furosemide 40 mg IV  2.  Discontinue gabapentin, and place hold orders on Xanax for lethargy  3.  Potassium phosphate IV    Rajesh Sarabia MD  12/18/23  18:02 [facility]    [facility]  [phone]

## 2023-12-18 NOTE — PLAN OF CARE
Goal Outcome Evaluation:  Plan of Care Reviewed With: patient           Outcome Evaluation: No fever. Denies soa, pain. 1L O2 tolerating well. Nonproductive cough, congested. VSS.

## 2023-12-18 NOTE — PROGRESS NOTES
Name: Sim Agustin ADMIT: 2023   : 1955  PCP: Seth Hester MD    MRN: [id number] LOS: 5 days   AGE/SEX: 67 y.o. male  ROOM: [facility]     Subjective   Subjective   Sitting up in the bed.  Per report patient was lethargic, disoriented to situation.  This morning more alert, oriented x 3.  Reported shortness of breath improved, primarily with exertion.  Still coughing with sputum production, improving.  Chest pain or palpitations.  Review of Systems   As above  Objective   Objective   Vital Signs  Temp:  [97.7 °F (36.5 °C)-98.6 °F (37 °C)] 98.1 °F (36.7 °C)  Heart Rate:  [77-96] 86  Resp:  [17-22] 22  BP: (124-150)/(57-67) 124/59  SpO2:  [90 %-100 %] 98 %  on  Flow (L/min):  [0.5-1.5] 1.5;   Device (Oxygen Therapy): humidified;nasal cannula  Body mass index is 28.13 kg/m².  Physical Exam    General: Alert, sitting up in the bed, oriented x 3, not in distress, clinical ill appearing  HEENT: Normocephalic, atraumatic  CV: Regular rate and rhythm, no murmurs rubs or gallops  Lungs: Manage, no wheezing, nonlabored breathing  Abdomen: Soft, nontender, nondistended  Extremities: No significant peripheral edema , left AKA      Results Review     I reviewed the patient's new clinical results.  Results from last 7 days   Lab Units 23  0408 23  0445 23  0628 23  0607   WBC 10*3/mm3 13.41* 14.26* 17.41* 18.65*   HEMOGLOBIN g/dL 10.8* 11.4* 11.0* 10.3*   PLATELETS 10*3/mm3 242 207 168 166     Results from last 7 days   Lab Units 23  0408 23  0445 23  0950 23  0607   SODIUM mmol/L 142 141 141 135*   POTASSIUM mmol/L 2.8* 3.4* 3.9 4.0   CHLORIDE mmol/L 109* 109* 108* 100   CO2 mmol/L 20.0* 19.4* 21.3* 27.0   BUN mg/dL 43* 55* 62* 77*   CREATININE mg/dL 3.01* 3.39* 3.68* 4.61*   GLUCOSE mg/dL 107* 92 89 95   Estimated Creatinine Clearance: 25.1 mL/min (A) (by C-G formula based on SCr of 3.01 mg/dL (H)).  Results from last 7 days   Lab Units 23  5866  "12/17/23  0950 12/16/23  0607 12/15/23  1057 12/14/23  1715   ALBUMIN g/dL 2.5* 2.5* 2.9* 3.4* 4.1   BILIRUBIN mg/dL 0.3  --   --   --  1.2   ALK PHOS U/L 145*  --   --   --  66   AST (SGOT) U/L 46*  --   --   --  23   ALT (SGPT) U/L 51*  --   --   --  14     Results from last 7 days   Lab Units 12/19/23  0408 12/18/23  0445 12/17/23  0950 12/16/23  0607 12/15/23  1057   CALCIUM mg/dL 8.4* 9.7 10.0 11.7* 13.6*   ALBUMIN g/dL 2.5*  --  2.5* 2.9* 3.4*   MAGNESIUM mg/dL 1.6 2.1 2.0 3.0*  --    PHOSPHORUS mg/dL 1.7* 2.2* 2.0* 3.8 3.9     Results from last 7 days   Lab Units 12/14/23  2313 12/14/23 2003 12/14/23  1715   PROCALCITONIN ng/mL  --   --  0.26*   LACTATE mmol/L 1.4 4.4* 4.0*     COVID19   Date Value Ref Range Status   12/14/2023 Not Detected Not Detected - Ref. Range Final   05/18/2022 Not Detected Not Detected - Ref. Range Final     No results found for: \"HGBA1C\", \"POCGLU\"          XR Chest PA & Lateral  Narrative: XR CHEST PA AND LATERAL-     INDICATION: Follow-up pneumonia     COMPARISON: Chest radiographs dating back to 11/26/2015 CT abdomen  pelvis 12/14/2023     Impression: Small left pleural effusion. Similar left lower lobe  retrocardiac opacity suggest infectious/inflammatory process. This is  hard to discriminate from the pleural fluid on lateral injection. No  pneumothorax. Stable normal size cardiomediastinal silhouette. No focal  osseous abnormality.     This report was finalized on 12/18/2023 2:52 PM by Dr. Marco Smalls M.D on Workstation: [id number]       Scheduled Medications  allopurinol, 100 mg, Oral, Daily  ALPRAZolam, 0.25 mg, Oral, BID  aspirin, 81 mg, Oral, Nightly  atorvastatin, 40 mg, Oral, Nightly  brimonidine, 1 drop, Both Eyes, BID  budesonide-formoterol, 2 puff, Inhalation, BID - RT   And  tiotropium bromide monohydrate, 2 puff, Inhalation, Daily - RT  buPROPion XL, 300 mg, Oral, Daily  clopidogrel, 75 mg, Oral, Nightly  guaiFENesin, 600 mg, Oral, Q12H  heparin (porcine), 5,000 " Units, Subcutaneous, Q8H  ipratropium-albuterol, 3 mL, Nebulization, 4x Daily - RT  isosorbide mononitrate, 30 mg, Oral, QAM  latanoprost, 1 drop, Both Eyes, Nightly  levothyroxine, 137 mcg, Oral, Daily  Menthol-Zinc Oxide, 1 application , Topical, Q12H  multivitamin with minerals, 1 tablet, Oral, Daily  piperacillin-tazobactam, 3.375 g, Intravenous, Q8H  potassium phosphate, 15 mmol, Intravenous, Q3H  senna-docusate sodium, 2 tablet, Oral, BID    Infusions  Pharmacy to Dose Zosyn,     Diet  Diet: Cardiac Diets; Healthy Heart (2-3 Na+); Texture: Regular Texture (IDDSI 7); Fluid Consistency: Thin (IDDSI 0)    I have personally reviewed     [x]  Laboratory   []  Microbiology   []  Radiology   []  EKG/Telemetry  []  Cardiology/Vascular   []  Pathology    []  Records       Assessment/Plan     Active Hospital Problems    Diagnosis  POA    **Acute kidney injury [N17.9]  Yes    Stage 3b chronic kidney disease (CKD) [N18.32]  Yes    Anemia [D64.9]  Yes    Leukocytosis [D72.829]  Yes    Hyponatremia [E87.1]  Yes    Hypokalemia [E87.6]  Yes    Lactic acidosis [E87.20]  Yes    Hypotension [I95.9]  Yes    Hypercalcemia [E83.52]  Yes    Hyperlipidemia [E78.5]  Yes    PRISCILLA treated with BiPAP [G47.33]  Yes    Hypothyroidism [E03.9]  Yes    CAD (coronary artery disease) [I25.10]  Yes    COPD (chronic obstructive pulmonary disease) [J44.9]  Yes    Essential hypertension [I10]  Yes      Resolved Hospital Problems   No resolved problems to display.       Acute respiratory failure with hypoxia  Sepsis secondary to suspected pneumonia  -Repeat PA lateral chest x-ray 12/18-Small left pleural effusion. Similar left lower lobe retrocardiac opacity suggest -infectious/inflammatory process.   -Status post vancomycin, was discontinued as MRSA nares was negative.  -Continue Zosyn for 7 days.  WBC improving.      ARLYN on CKD stage 3B-secondary to hypotension, hypovolemia, and severe hypercalcemia: Creatinine gradually improving.  Nephrology  managing.    Hypophosphatemia/hypokalemia.  Monitor and replete as needed.      Hypercalcemia-Calcium level was elevated to 17 on arrival. PTH found to be 29.5. Etiology likely multifactorial from calcium supplementation and volume contraction.  Resolved.  Nephrology managing.     Hyponatremia-resolved with IV fluids.       Anemia.  Iron studies consistent with anemia of chronic disease, however iron saturation of 7 likely has underlying iron deficiency anemia.  Will need repeat iron studies once acute illness resolves.  However stable.        Hypertension complicated by hypotension prior to arrival-BP stable.  Home BP meds on hold.    Coronary artery disease-continue aspirin, Plavix, statin.       COPD-.  No evidence to suggest acute exacerbation.  Continue Symbicort, tiotropium,- DuoNebs.  Scheduled Mucinex.  Supplemental oxygen as needed to maintain O2 sats 88 to 92%, pulse oximetry,     Hypothyroidism- Continue levothyroxine as prescribed.     Hyperlipidemia-Continue Statin as prescribed.     PRSICILLA-Okay to use home machine if available.     Peripheral vascular disease-Complicated by compartment syndrome 2016 leading to left AKA.           Discussed with patient  Discussed in multidisciplinary rounds with nursing staff, CCP, pharmacy  Disposition: SNF, likely stable to discharge in 1-2 days if LFTs if creatinine continues to improve and cleared by nephrology.      Copied text in this note has been reviewed and is accurate as of 12/19/23.         Dictated utilizing Dragon dictation        Margaux Puente MD  [facility]  12/19/23  09:57 EST

## 2023-12-18 NOTE — PLAN OF CARE
Goal Outcome Evaluation:  Plan of Care Reviewed With: patient        Progress: declining  Outcome Evaluation: Patient is agreebale to PT this AM. He completed bed mobility requiring CGA then sitting EOB pt required variable assist from CGA-modA for 10min. Pt attempted to don LLE prosthesis and unable to don. Pt becomes very fatigued and noted O2 decreased to 84% when attempting to don prosthesis. Pt returned to supine in bed and then PT placed bed in chair position. Encouraged pt to sit in chair position for 30min-1hour. Will continue to progress as pt tolerates. Spoke with pt regarding need to rehab at [place] and pt is agreeable.      Anticipated Discharge Disposition (PT): inpatient rehabilitation facility

## 2023-12-19 LAB
ALBUMIN SERPL-MCNC: 2.5 G/DL (ref 3.5–5.2)
ALBUMIN SERPL-MCNC: 2.5 G/DL (ref 3.5–5.2)
ALBUMIN/GLOB SERPL: 1 G/DL
ALP SERPL-CCNC: 145 U/L (ref 39–117)
ALT SERPL W P-5'-P-CCNC: 51 U/L (ref 1–41)
ANION GAP SERPL CALCULATED.3IONS-SCNC: 12.3 MMOL/L (ref 5–15)
ANION GAP SERPL CALCULATED.3IONS-SCNC: 13 MMOL/L (ref 5–15)
AST SERPL-CCNC: 46 U/L (ref 1–40)
BACTERIA SPEC AEROBE CULT: NORMAL
BASOPHILS # BLD AUTO: 0.02 10*3/MM3 (ref 0–0.2)
BASOPHILS NFR BLD AUTO: 0.1 % (ref 0–1.5)
BILIRUB SERPL-MCNC: 0.3 MG/DL (ref 0–1.2)
BUN SERPL-MCNC: 40 MG/DL (ref 8–23)
BUN SERPL-MCNC: 43 MG/DL (ref 8–23)
BUN/CREAT SERPL: 14.3 (ref 7–25)
BUN/CREAT SERPL: 14.9 (ref 7–25)
CALCIUM SPEC-SCNC: 8 MG/DL (ref 8.6–10.5)
CALCIUM SPEC-SCNC: 8.4 MG/DL (ref 8.6–10.5)
CHLORIDE SERPL-SCNC: 109 MMOL/L (ref 98–107)
CHLORIDE SERPL-SCNC: 110 MMOL/L (ref 98–107)
CO2 SERPL-SCNC: 20 MMOL/L (ref 22–29)
CO2 SERPL-SCNC: 20.7 MMOL/L (ref 22–29)
CREAT SERPL-MCNC: 2.69 MG/DL (ref 0.76–1.27)
CREAT SERPL-MCNC: 3.01 MG/DL (ref 0.76–1.27)
DEPRECATED RDW RBC AUTO: 44.6 FL (ref 37–54)
EGFRCR SERPLBLD CKD-EPI 2021: 22 ML/MIN/1.73
EGFRCR SERPLBLD CKD-EPI 2021: 25.2 ML/MIN/1.73
EOSINOPHIL # BLD AUTO: 0.4 10*3/MM3 (ref 0–0.4)
EOSINOPHIL NFR BLD AUTO: 3 % (ref 0.3–6.2)
ERYTHROCYTE [DISTWIDTH] IN BLOOD BY AUTOMATED COUNT: 13.6 % (ref 12.3–15.4)
GLOBULIN UR ELPH-MCNC: 2.6 GM/DL
GLUCOSE SERPL-MCNC: 100 MG/DL (ref 65–99)
GLUCOSE SERPL-MCNC: 107 MG/DL (ref 65–99)
HCT VFR BLD AUTO: 30.6 % (ref 37.5–51)
HGB BLD-MCNC: 10.8 G/DL (ref 13–17.7)
IMM GRANULOCYTES # BLD AUTO: 0.07 10*3/MM3 (ref 0–0.05)
IMM GRANULOCYTES NFR BLD AUTO: 0.5 % (ref 0–0.5)
LYMPHOCYTES # BLD AUTO: 0.35 10*3/MM3 (ref 0.7–3.1)
LYMPHOCYTES NFR BLD AUTO: 2.6 % (ref 19.6–45.3)
MAGNESIUM SERPL-MCNC: 1.6 MG/DL (ref 1.6–2.4)
MCH RBC QN AUTO: 32 PG (ref 26.6–33)
MCHC RBC AUTO-ENTMCNC: 35.3 G/DL (ref 31.5–35.7)
MCV RBC AUTO: 90.5 FL (ref 79–97)
MONOCYTES # BLD AUTO: 0.45 10*3/MM3 (ref 0.1–0.9)
MONOCYTES NFR BLD AUTO: 3.4 % (ref 5–12)
NEUTROPHILS NFR BLD AUTO: 12.12 10*3/MM3 (ref 1.7–7)
NEUTROPHILS NFR BLD AUTO: 90.4 % (ref 42.7–76)
NRBC BLD AUTO-RTO: 0.1 /100 WBC (ref 0–0.2)
PHOSPHATE SERPL-MCNC: 1.7 MG/DL (ref 2.5–4.5)
PHOSPHATE SERPL-MCNC: 3.4 MG/DL (ref 2.5–4.5)
PLATELET # BLD AUTO: 242 10*3/MM3 (ref 140–450)
PMV BLD AUTO: 10 FL (ref 6–12)
POTASSIUM SERPL-SCNC: 2.8 MMOL/L (ref 3.5–5.2)
POTASSIUM SERPL-SCNC: 3.6 MMOL/L (ref 3.5–5.2)
PROT SERPL-MCNC: 5.1 G/DL (ref 6–8.5)
RBC # BLD AUTO: 3.38 10*6/MM3 (ref 4.14–5.8)
SODIUM SERPL-SCNC: 142 MMOL/L (ref 136–145)
SODIUM SERPL-SCNC: 143 MMOL/L (ref 136–145)
WBC NRBC COR # BLD AUTO: 13.41 10*3/MM3 (ref 3.4–10.8)

## 2023-12-19 PROCEDURE — 84100 ASSAY OF PHOSPHORUS: CPT | Performed by: STUDENT IN AN ORGANIZED HEALTH CARE EDUCATION/TRAINING PROGRAM

## 2023-12-19 PROCEDURE — 85025 COMPLETE CBC W/AUTO DIFF WBC: CPT | Performed by: STUDENT IN AN ORGANIZED HEALTH CARE EDUCATION/TRAINING PROGRAM

## 2023-12-19 PROCEDURE — 97530 THERAPEUTIC ACTIVITIES: CPT

## 2023-12-19 PROCEDURE — 94799 UNLISTED PULMONARY SVC/PX: CPT

## 2023-12-19 PROCEDURE — 84100 ASSAY OF PHOSPHORUS: CPT | Performed by: INTERNAL MEDICINE

## 2023-12-19 PROCEDURE — 83735 ASSAY OF MAGNESIUM: CPT | Performed by: STUDENT IN AN ORGANIZED HEALTH CARE EDUCATION/TRAINING PROGRAM

## 2023-12-19 PROCEDURE — 25010000002 PIPERACILLIN SOD-TAZOBACTAM PER 1 G: Performed by: STUDENT IN AN ORGANIZED HEALTH CARE EDUCATION/TRAINING PROGRAM

## 2023-12-19 PROCEDURE — 94761 N-INVAS EAR/PLS OXIMETRY MLT: CPT

## 2023-12-19 PROCEDURE — 80053 COMPREHEN METABOLIC PANEL: CPT | Performed by: STUDENT IN AN ORGANIZED HEALTH CARE EDUCATION/TRAINING PROGRAM

## 2023-12-19 PROCEDURE — 97110 THERAPEUTIC EXERCISES: CPT

## 2023-12-19 PROCEDURE — 97535 SELF CARE MNGMENT TRAINING: CPT

## 2023-12-19 PROCEDURE — 25810000003 SODIUM CHLORIDE 0.9 % SOLUTION: Performed by: INTERNAL MEDICINE

## 2023-12-19 PROCEDURE — 94664 DEMO&/EVAL PT USE INHALER: CPT

## 2023-12-19 PROCEDURE — 94760 N-INVAS EAR/PLS OXIMETRY 1: CPT

## 2023-12-19 PROCEDURE — 25010000002 HEPARIN (PORCINE) PER 1000 UNITS: Performed by: STUDENT IN AN ORGANIZED HEALTH CARE EDUCATION/TRAINING PROGRAM

## 2023-12-19 RX ORDER — BISACODYL 5 MG/1
5 TABLET, DELAYED RELEASE ORAL DAILY PRN
Status: DISCONTINUED | OUTPATIENT
Start: 2023-12-19 | End: 2023-12-24 | Stop reason: HOSPADM

## 2023-12-19 RX ORDER — IPRATROPIUM BROMIDE AND ALBUTEROL SULFATE 2.5; .5 MG/3ML; MG/3ML
3 SOLUTION RESPIRATORY (INHALATION)
Status: DISCONTINUED | OUTPATIENT
Start: 2023-12-19 | End: 2023-12-24 | Stop reason: HOSPADM

## 2023-12-19 RX ORDER — POLYETHYLENE GLYCOL 3350 17 G/17G
17 POWDER, FOR SOLUTION ORAL DAILY PRN
Status: DISCONTINUED | OUTPATIENT
Start: 2023-12-19 | End: 2023-12-24 | Stop reason: HOSPADM

## 2023-12-19 RX ORDER — AMOXICILLIN 250 MG
2 CAPSULE ORAL NIGHTLY PRN
Status: DISCONTINUED | OUTPATIENT
Start: 2023-12-19 | End: 2023-12-24 | Stop reason: HOSPADM

## 2023-12-19 RX ORDER — ALPRAZOLAM 0.25 MG/1
0.25 TABLET ORAL 2 TIMES DAILY
Status: DISCONTINUED | OUTPATIENT
Start: 2023-12-19 | End: 2023-12-24 | Stop reason: HOSPADM

## 2023-12-19 RX ORDER — POTASSIUM CHLORIDE 750 MG/1
40 TABLET, FILM COATED, EXTENDED RELEASE ORAL ONCE
Status: COMPLETED | OUTPATIENT
Start: 2023-12-19 | End: 2023-12-19

## 2023-12-19 RX ORDER — BISACODYL 10 MG
10 SUPPOSITORY, RECTAL RECTAL DAILY PRN
Status: DISCONTINUED | OUTPATIENT
Start: 2023-12-19 | End: 2023-12-24 | Stop reason: HOSPADM

## 2023-12-19 RX ORDER — FENTANYL/ROPIVACAINE/NS/PF 2-625MCG/1
15 PLASTIC BAG, INJECTION (ML) EPIDURAL
Qty: 500 ML | Refills: 0 | Status: COMPLETED | OUTPATIENT
Start: 2023-12-19 | End: 2023-12-19

## 2023-12-19 RX ADMIN — IPRATROPIUM BROMIDE AND ALBUTEROL SULFATE 3 ML: 2.5; .5 SOLUTION RESPIRATORY (INHALATION) at 10:52

## 2023-12-19 RX ADMIN — TIOTROPIUM BROMIDE INHALATION SPRAY 2 PUFF: 3.12 SPRAY, METERED RESPIRATORY (INHALATION) at 07:04

## 2023-12-19 RX ADMIN — POTASSIUM PHOSPHATE, MONOBASIC POTASSIUM PHOSPHATE, DIBASIC 15 MMOL: 224; 236 INJECTION, SOLUTION, CONCENTRATE INTRAVENOUS at 08:41

## 2023-12-19 RX ADMIN — CLOPIDOGREL BISULFATE 75 MG: 75 TABLET, FILM COATED ORAL at 20:19

## 2023-12-19 RX ADMIN — HEPARIN SODIUM 5000 UNITS: 5000 INJECTION INTRAVENOUS; SUBCUTANEOUS at 20:19

## 2023-12-19 RX ADMIN — HEPARIN SODIUM 5000 UNITS: 5000 INJECTION INTRAVENOUS; SUBCUTANEOUS at 13:24

## 2023-12-19 RX ADMIN — ALPRAZOLAM 0.5 MG: 0.5 TABLET ORAL at 08:34

## 2023-12-19 RX ADMIN — BRIMONIDINE TARTRATE 1 DROP: 2 SOLUTION/ DROPS OPHTHALMIC at 08:35

## 2023-12-19 RX ADMIN — BUDESONIDE AND FORMOTEROL FUMARATE DIHYDRATE 2 PUFF: 160; 4.5 AEROSOL RESPIRATORY (INHALATION) at 19:50

## 2023-12-19 RX ADMIN — Medication 1 TABLET: at 08:35

## 2023-12-19 RX ADMIN — ALPRAZOLAM 0.25 MG: 0.25 TABLET ORAL at 20:19

## 2023-12-19 RX ADMIN — PIPERACILLIN SODIUM AND TAZOBACTAM SODIUM 3.38 G: 3; .375 INJECTION, SOLUTION INTRAVENOUS at 15:50

## 2023-12-19 RX ADMIN — IPRATROPIUM BROMIDE AND ALBUTEROL SULFATE 3 ML: 2.5; .5 SOLUTION RESPIRATORY (INHALATION) at 06:57

## 2023-12-19 RX ADMIN — BUPROPION HYDROCHLORIDE 300 MG: 300 TABLET, EXTENDED RELEASE ORAL at 08:35

## 2023-12-19 RX ADMIN — PIPERACILLIN SODIUM AND TAZOBACTAM SODIUM 3.38 G: 3; .375 INJECTION, SOLUTION INTRAVENOUS at 23:44

## 2023-12-19 RX ADMIN — ASPIRIN 81 MG: 81 TABLET, CHEWABLE ORAL at 20:19

## 2023-12-19 RX ADMIN — GUAIFENESIN 600 MG: 600 TABLET, EXTENDED RELEASE ORAL at 20:19

## 2023-12-19 RX ADMIN — IPRATROPIUM BROMIDE AND ALBUTEROL SULFATE 3 ML: 2.5; .5 SOLUTION RESPIRATORY (INHALATION) at 19:47

## 2023-12-19 RX ADMIN — POTASSIUM PHOSPHATE, MONOBASIC POTASSIUM PHOSPHATE, DIBASIC 15 MMOL: 224; 236 INJECTION, SOLUTION, CONCENTRATE INTRAVENOUS at 11:27

## 2023-12-19 RX ADMIN — POTASSIUM CHLORIDE 40 MEQ: 750 TABLET, EXTENDED RELEASE ORAL at 08:35

## 2023-12-19 RX ADMIN — PIPERACILLIN SODIUM AND TAZOBACTAM SODIUM 3.38 G: 3; .375 INJECTION, SOLUTION INTRAVENOUS at 00:33

## 2023-12-19 RX ADMIN — LEVOTHYROXINE SODIUM 137 MCG: 0.14 TABLET ORAL at 08:35

## 2023-12-19 RX ADMIN — IPRATROPIUM BROMIDE AND ALBUTEROL SULFATE 3 ML: 2.5; .5 SOLUTION RESPIRATORY (INHALATION) at 15:11

## 2023-12-19 RX ADMIN — HEPARIN SODIUM 5000 UNITS: 5000 INJECTION INTRAVENOUS; SUBCUTANEOUS at 06:38

## 2023-12-19 RX ADMIN — ISOSORBIDE MONONITRATE 30 MG: 30 TABLET, EXTENDED RELEASE ORAL at 06:38

## 2023-12-19 RX ADMIN — ANORECTAL OINTMENT 1 APPLICATION: 15.7; .44; 24; 20.6 OINTMENT TOPICAL at 08:35

## 2023-12-19 RX ADMIN — BRIMONIDINE TARTRATE 1 DROP: 2 SOLUTION/ DROPS OPHTHALMIC at 20:20

## 2023-12-19 RX ADMIN — ANORECTAL OINTMENT 1 APPLICATION: 15.7; .44; 24; 20.6 OINTMENT TOPICAL at 20:20

## 2023-12-19 RX ADMIN — LATANOPROST 1 DROP: 50 SOLUTION OPHTHALMIC at 20:20

## 2023-12-19 RX ADMIN — ALLOPURINOL 100 MG: 100 TABLET ORAL at 08:34

## 2023-12-19 RX ADMIN — SENNOSIDES AND DOCUSATE SODIUM 2 TABLET: 50; 8.6 TABLET ORAL at 08:35

## 2023-12-19 RX ADMIN — BUDESONIDE AND FORMOTEROL FUMARATE DIHYDRATE 2 PUFF: 160; 4.5 AEROSOL RESPIRATORY (INHALATION) at 07:04

## 2023-12-19 RX ADMIN — GUAIFENESIN 600 MG: 600 TABLET, EXTENDED RELEASE ORAL at 08:35

## 2023-12-19 RX ADMIN — PIPERACILLIN SODIUM AND TAZOBACTAM SODIUM 3.38 G: 3; .375 INJECTION, SOLUTION INTRAVENOUS at 08:38

## 2023-12-19 NOTE — THERAPY TREATMENT NOTE
Patient Name: Sim Agustin  : 1955    MRN: [id number]                              Today's Date: 2023       Admit Date: 2023    Visit Dx:     ICD-10-CM ICD-9-CM   1. ARLYN (acute kidney injury)  N17.9 584.9   2. Hypercalcemia  E83.52 275.42     Patient Active Problem List   Diagnosis    H/O angiodysplasia of intestinal tract    COPD (chronic obstructive pulmonary disease)    S/P colectomy    Status post above-knee amputation of left lower extremity    CAD (coronary artery disease)    PVD (peripheral vascular disease)    Essential hypertension    Cardiomyopathy, unspecified    PRISCILLA treated with BiPAP    Anxiety disorder    Chronic midline low back pain without sciatica    Long term prescription benzodiazepine use    History of bradycardia    Hypothyroidism    Vitamin B12 deficiency    Iron deficiency    History of smoking 30 or more pack years    High risk medication use    DNR (do not resuscitate)    Stage 3a chronic kidney disease    Hyperlipidemia    Proteinuria    Junctional cardiac arrhythmia    Acute kidney injury    Hypercalcemia    Stage 3b chronic kidney disease (CKD)    Anemia    Leukocytosis    Hyponatremia    Hypokalemia    Lactic acidosis    Hypotension     Past Medical History:   Diagnosis Date    Acute respiratory failure with hypoxia and hypercarbia 2019    Acute upper respiratory infection 2013    Amputee, above knee, left     Anemia     per [place]    ARF (acute renal failure) 2016    Asthma 2018    Atelectasis, left 2016    Bradycardia 2019    CAD (coronary artery disease) 2016    Chronic obstructive pulmonary disease with acute exacerbation 2013    Clotting disorder 2003    Colon polyps     Compartment syndrome     AFTER ILIAC SURGERY. ABOVE KNEE AMPUTATION    COPD (chronic obstructive pulmonary disease)     COPD with hypoxia 2019    Cough     SINCE APRIL WITH PNEUMONIA.     Demand myocardial infarction  05/19/2022    Disease of thyroid gland     HYPOTHYRODISM    Diverticulosis     Elevated hematocrit 01/06/2021    Employs prosthetic leg     ESRD (end stage renal disease) on dialysis 04/06/2016    Fracture of patella 03/03/2015    History of acute renal failure     History of CHF (congestive heart failure)     History of GI bleed 02/2016    AORTODUOD FISTULA    History of sepsis 02/2016    History of transfusion     MULTIPLE, 2016    Hyperkalemia 04/11/2016    Hyperlipidemia     Hypertension     Hypotension     Hypotension 03/04/2016    Left lower lobe pneumonia 05/18/2022    Nonischemic cardiomyopathy     Nosocomial pneumonia 04/06/2016    Phantom limb pain     SL, WITH LEFT ABOUVE KNEE    Pleural effusion on left 04/11/2016    Pneumonia     SPRING 2016  AFTER SURGERY    Pulmonary embolism     PVD (peripheral vascular disease)     Renal insufficiency     S/P thoracentesis 04/11/2016    Sepsis, unspecified organism 04/05/2016     Past Surgical History:   Procedure Laterality Date    ABOVE KNEE AMPUTATION Left 03/16/2016    Procedure: LT AMPUTATION ABOVE KNEE;  Surgeon: Jose Swann MD;  Location: [facility] MAIN OR;  Service:     ARTERIAL THROMBECTOMY  2001    throbectomy of arterial graft    AXILLARY FEMORAL BYPASS Right 02/15/2016    Exploratory lapartomy, repair aortoduodenal fistula, resection infected aortobifemoral bypass graft, axillobi-superficial femoral artery [place]-Aneudy bypass graft from right axillary artery, Repair of duodenotomy 4th portion duodenum-Dr. Jose Swann, Dr. Genaro Perrin    BRONCHOSCOPY Left 03/04/2016    Dr. NATALIIA Tate    BRONCHOSCOPY Left 05/20/2022    Procedure: BRONCHOSCOPY WITH BIOPSIES, BRUSHINGS, AND BAL UNDER FLUORO;  Surgeon: Ishmael Tate Jr., MD;  Location: [facility] ENDOSCOPY;  Service: Pulmonary;  Laterality: Left;  PRE OP - LLL CONSOLIDATION  POST OP - SAME    BRONCHOSCOPY RIGID / FLEXIBLE N/A 03/03/2016    Dr. NATALIIA Tate    BRONCHOSCOPY RIGID / FLEXIBLE N/A  02/26/2016    Dr. NATALIIA Tate    CARDIAC CATHETERIZATION N/A 03/18/2019    Procedure: Right and Left Heart Cath;  Surgeon: Nina Storey MD;  Location: [facility] CATH INVASIVE LOCATION;  Service: Cardiovascular    CATARACT EXTRACTION WITH INTRAOCULAR LENS IMPLANT Bilateral     COLON RESECTION WITH COLOSTOMY Left 02/28/2016    Exploratory laparotomy with open left hemicolectomy, end-colostomy, G-tube and J-tube-Dr. Endy Chaney    COLON SURGERY  3/25/2015    COLOSTOMY    COLONOSCOPY N/A 2015    Dr. Laughlin    COLONOSCOPY N/A 10/12/2016    Procedure: COLONOSCOPY TO 20 CM AND PER STOMA TO 30CM;  Surgeon: Genaro Perrin MD;  Location: [facility] ENDOSCOPY;  Service:     COLONOSCOPY N/A 10/21/2016    Procedure: COLONOSCOPY DONE AT BEDSIDE ONTO CECEM;  Surgeon: Genaro Perrin MD;  Location: [facility] ENDOSCOPY;  Service:     COLONOSCOPY N/A 02/10/2016    Diverticulosis in the entire examined colon, non-bleeding internal hemorrhoids-Dr. Taylor Pina    COLONOSCOPY N/A 02/11/2016    Poor prep, blood in the entire examined colon, normal ileum-Dr. Taylor Pina    COLONOSCOPY W/ POLYPECTOMY N/A 07/27/2015    Normal ileum, diverticulosis in the sigmoid colon: resected and retrieved, one 6 mm polyp in the descending colon: resected and retrieved, the distal rectum and anal verge are normal on retroflexion view-Dr. Miguel Ángel Laughlin    COLOSTOMY CLOSURE N/A 10/13/2016    Procedure: COLOSTOMY TAKEDOWN OR CLOSURE, APPENDECTOMY;  Surgeon: Genaro Perrin MD;  Location: [facility] OR;  Service:     DEBRIDEMENT LEG Left 03/01/2016    Excisional debridement of the skin, subcutantous tissue, tendon and muscle left calf-Dr. Jose Swann    DEBRIDEMENT LEG Left 02/25/2016    Excisional debridement full-thcikness skin and subcutaneous tissue and tendon and muscle, left medial and lateral calf muscles-Dr. Jose Swann    ENDOSCOPY N/A 10/21/2016    Procedure: ESOPHAGOGASTRODUODENOSCOPY DONE AT BEDSIDE;  Surgeon: Genaro SARAVIA  MD Marychuy;  Location: [facility] ENDOSCOPY;  Service:     ENDOSCOPY AND COLONOSCOPY N/A 02/28/2016    EGD and Colonoscopy with Candy ink injection-Dr. Endy Chaney    ENTEROSCOPY SMALL BOWEL N/A 02/11/2016    Normal esophagus, normal stomach, normal duodenum, portion of the jejunum normal-Dr. Taylor Pina    ILIAC ARTERY - FEMORAL ARTERY BYPASS GRAFT Bilateral 2002    ILIAC ARTERY STENT Bilateral 2001    INSERTION AND REMOVAL PERITONEAL DIALYSIS CATHETER Right 02/29/2016    Exchange right jugular 16 cm Mah[place]kar dialysis catheter-Dr. Jose Swann    INSERTION PERITONEAL DIALYSIS CATHETER Left 03/01/2016    Ultrasound-guided access of the left jugular vein, 23 cm left jugular palindrome dialysis catheter placement-Dr. Jose Swann    INSERTION PERITONEAL DIALYSIS CATHETER Right 02/18/2016    Right jugular Mahrkar catherer placement-Dr. Jose Swann    JOINT REPLACEMENT Left     THORACOSCOPY Left 04/18/2016    Procedure: LT THORACOSCOPY VIDEO ASSISTED WITH DECORDICATION;  Surgeon: Genaro Davila III, MD;  Location: [facility] MAIN OR;  Service:     THROMBECTOMY Left 02/16/2016    Thombectomy of left femoral graft, left leg arteriogram, left calf forward compartment fasciotomy-Dr. Jose Swann    TOTAL HIP ARTHROPLASTY Left     UPPER GASTROINTESTINAL ENDOSCOPY N/A 02/09/2016    Normal UGI-Dr. Ajay Parkinson    UPPER GASTROINTESTINAL ENDOSCOPY N/A 11/28/2015    Small hiatal hernia, chronic gastritis: biopsied, non-bleeding angioectasias in the stomach: treated with argon plasma coagulation, multiple bleeding angioectasias in the dudenum: treated with argon plasma coagulation-Dr. Mykel Jaramillo    VASCULAR SURGERY      MULTIPLE    VENTRAL/INCISIONAL HERNIA REPAIR N/A 10/19/2017    Procedure: VENTRAL HERNIA REPAIR WITH MESH AND BILATERAL COMPONENT SEPARATION;  Surgeon: Genaro Perrin MD;  Location: [facility] MAIN OR;  Service:     WISDOM TOOTH EXTRACTION        General Information       Row Name 12/19/23  1124          Physical Therapy Time and Intention    Document Type therapy note (daily note)  -EB     Mode of Treatment co-treatment;occupational therapy;physical therapy  -EB       Row Name 12/19/23 1124          General Information    Patient Profile Reviewed yes  -EB     Existing Precautions/Restrictions fall;oxygen therapy device and L/min  Left AKA, prosthesis at bedside.  -EB       Row Name 12/19/23 1124          Cognition    Orientation Status (Cognition) oriented x 3  -EB       Row Name 12/19/23 1124          Safety Issues, Functional Mobility    Safety Issues Affecting Function (Mobility) problem-solving;insight into deficits/self-awareness  -EB     Impairments Affecting Function (Mobility) balance;endurance/activity tolerance;strength;cognition  -EB     Comment, Safety Issues/Impairments (Mobility) Co treatment medically appropriate and necessary due to patient acuity level, activity tolerance and safety of patient and staff. Treatment is focusing on progression of care and goals established in the POC.  -EB               User Key  (r) = Recorded By, (t) = Taken By, (c) = Cosigned By      Initials Name Provider Type    EB Sameera Osborne PTA Physical Therapist Assistant                   Mobility       Row Name 12/19/23 1124          Bed Mobility    Supine-Sit [place] (Bed Mobility) contact guard;verbal cues  -EB     Sit-Supine [place] (Bed Mobility) not tested  -EB     Assistive Device (Bed Mobility) bed rails;head of bed elevated  -EB     Comment, (Bed Mobility) pt sat EOB to don left prosthesis. Pt needed multiple rest breaks while donning posthetic sheath/sleeve due to feeling SOA and fatigue.  -EB       Row Name 12/19/23 1124          Bed-Chair Transfer    Bed-Chair [place] (Transfers) moderate assist (50% patient effort);2 person assist;verbal cues  -EB     Assistive Device (Bed-Chair Transfers) --  HHAX2  -EB     Comment, (Bed-Chair Transfer) stand/pivot from bed to chair.  -EB        Row Name 12/19/23 1124          Sit-Stand Transfer    Sit-Stand [place] (Transfers) moderate assist (50% patient effort);1 person assist;2 person assist  -EB     Comment, (Sit-Stand Transfer) 2 STS transfers today from EOB to attempt locking prosthetic into place. Pt unsucessful with locking. Pt reported battery was dead and could not get a good seal.  -EB               User Key  (r) = Recorded By, (t) = Taken By, (c) = Cosigned By      Initials Name Provider Type    Sameera Alejo PTA Physical Therapist Assistant                   Obj/Interventions       Row Name 12/19/23 1130          Balance    Balance Assessment sitting static balance;sitting dynamic balance;standing dynamic balance  -EB     Static Sitting Balance standby assist  -EB     Dynamic Sitting Balance maximum assist  -EB     Position, Sitting Balance sitting edge of bed  -EB     Dynamic Standing Balance moderate assist  -EB               User Key  (r) = Recorded By, (t) = Taken By, (c) = Cosigned By      Initials Name Provider Type    Sameera Alejo PTA Physical Therapist Assistant                   Goals/Plan    No documentation.                  Clinical Impression       Row Name 12/19/23 7173          Plan of Care Review    Plan of Care Reviewed With patient  -EB     Progress no change  -EB     Outcome Evaluation Pt seen for PT treatment today. Pt more alert and oriented today and following commands. Pt however, still needs increased time to complete tasks due to fatigue and SOA. Pt required CGA with supine to sit. Pt needed increased time with donning left prosthetic. Pt needed Max support with dynamic sitting balance. Pt completed 2 STS transfers with ModAX1-2. Pt unable to get a good seal with prosthetic but pt also commented that the battery was dead. ModAX2 was needed to stand/pivot to the chair. Pt on 2L of O2 and stats at 88% with activity. Will continue to progress pt as able.  -EB       Row Name 12/19/23 3775          Therapy  Assessment/Plan (PT)    Therapy Frequency (PT) 6 times/wk  -EB       Row Name 12/19/23 1130          Positioning and Restraints    Pre-Treatment Position in bed  -EB     Post Treatment Position chair  -EB     In Chair reclined;call light within reach;encouraged to call for assist;exit alarm on  -EB               User Key  (r) = Recorded By, (t) = Taken By, (c) = Cosigned By      Initials Name Provider Type    Sameera Alejo PTA Physical Therapist Assistant                   Outcome Measures       Row Name 12/19/23 1138          How much help from another person do you currently need...    Turning from your back to your side while in flat bed without using bedrails? 3  -EB     Moving from lying on back to sitting on the side of a flat bed without bedrails? 3  -EB     Moving to and from a bed to a chair (including a wheelchair)? 2  -EB     Standing up from a chair using your arms (e.g., wheelchair, bedside chair)? 2  -EB     Climbing 3-5 steps with a railing? 1  -EB     To walk in hospital room? 1  -EB     AM-PAC 6 Clicks Score (PT) 12  -EB     Highest Level of Mobility Goal 4 --> Transfer to chair/commode  -EB               User Key  (r) = Recorded By, (t) = Taken By, (c) = Cosigned By      Initials Name Provider Type    Sameera Alejo PTA Physical Therapist Assistant                                 Physical Therapy Education       Title: PT OT SLP Therapies (Done)       Topic: Physical Therapy (Done)       Point: Mobility training (Done)       Learning Progress Summary             Patient Acceptance, E,D, VU by EB at 12/19/2023 1141    Acceptance, E,TB, VU,NR by CB at 12/18/2023 1202    Acceptance, E,D, VU,NR by EB at 12/17/2023 1559    Acceptance, E, VU by EM at 12/15/2023 1531                         Point: Home exercise program (Done)       Learning Progress Summary             Patient Acceptance, E,D, VU by EB at 12/19/2023 1141    Acceptance, E,TB, VU,NR by CB at 12/18/2023 1202                          Point: Body mechanics (Done)       Learning Progress Summary             Patient Acceptance, E,D, VU by EB at 12/19/2023 1141    Acceptance, E,TB, VU,NR by CB at 12/18/2023 1202    Acceptance, E,D, VU,NR by EB at 12/17/2023 1559                         Point: Precautions (Done)       Learning Progress Summary             Patient Acceptance, E,D, VU by EB at 12/19/2023 1141    Acceptance, E,TB, VU,NR by CB at 12/18/2023 1202    Acceptance, E,D, VU,NR by EB at 12/17/2023 1559                                         User Key       Initials Effective Dates Name Provider Type Discipline     06/16/21 -  Petrona Vazquez PT Physical Therapist PT    EB 02/14/23 -  Sameera Osborne PTA Physical Therapist Assistant PT    CB 10/22/21 -  Patti Campbell, PT Physical Therapist PT                  PT Recommendation and Plan     Plan of Care Reviewed With: patient  Progress: no change  Outcome Evaluation: Pt seen for PT treatment today. Pt more alert and oriented today and following commands. Pt however, still needs increased time to complete tasks due to fatigue and SOA. Pt required CGA with supine to sit. Pt needed increased time with donning left prosthetic. Pt needed Max support with dynamic sitting balance. Pt completed 2 STS transfers with ModAX1-2. Pt unable to get a good seal with prosthetic but pt also commented that the battery was dead. ModAX2 was needed to stand/pivot to the chair. Pt on 2L of O2 and stats at 88% with activity. Will continue to progress pt as able.     Time Calculation:         PT Charges       Row Name 12/19/23 1123             Time Calculation    Start Time 0853  -EB      Stop Time 0927  -EB      Time Calculation (min) 34 min  -EB      PT Received On 12/19/23  -EB      PT - Next Appointment 12/20/23  -         Time Calculation- PT    Total Timed Code Minutes- PT 34 minute(s)  -EB                User Key  (r) = Recorded By, (t) = Taken By, (c) = Cosigned By      Initials Name Provider Type      Sameera Osborne PTA Physical Therapist Assistant                  Therapy Charges for Today       Code Description Service Date Service Provider Modifiers Qty    65008321961 HC PT THERAPEUTIC ACT EA 15 MIN 12/19/2023 Sameera Osborne PTA GP 1    88599085288 HC PT THER PROC EA 15 MIN 12/19/2023 Sameera Osborne PTA GP 1            PT G-Codes  Outcome Measure Options: AM-PAC 6 Clicks Basic Mobility (PT)  AM-PAC 6 Clicks Score (PT): 12  AM-PAC 6 Clicks Score (OT): 17       Sameera Osborne PTA  12/19/2023     normal (ped)...

## 2023-12-19 NOTE — PROGRESS NOTES
Name: Sim Agustin ADMIT: 2023   : 1955  PCP: Seth Hester MD    MRN: [id number] LOS: 5 days   AGE/SEX: 67 y.o. male  ROOM: [facility]     Subjective   Subjective   Laying in bed, no acute events overnight.  Denies shortness of breath, fevers, chills.  No chest pain palpitations.  Continues to have cough, improving.  Continues to have intermittent confusion per report, this morning alert, x 3.  Patient is on Xanax chronically at home, 1 mg twice daily, discussed with patient, reports he takes it chronically for anxiety.  Review of Systems   As above  Objective   Objective   Vital Signs  Temp:  [98.1 °F (36.7 °C)-98.6 °F (37 °C)] 98.4 °F (36.9 °C)  Heart Rate:  [76-96] 78  Resp:  [17-22] 20  BP: (124-150)/(57-85) 130/85  SpO2:  [90 %-100 %] 94 %  on  Flow (L/min):  [1-1.5] 1;   Device (Oxygen Therapy): room air  Body mass index is 28.13 kg/m².  Physical Exam    General: Alert, sitting up in the bed,  not in distress, clinical appearing,  HEENT: Normocephalic, atraumatic  CV: Regular rate and rhythm, no murmurs rubs or gallops  Lungs: Manage, no wheezing, nonlabored breathing  Abdomen: Soft, nontender, nondistended  Extremities: No significant peripheral edema , left AKA      Results Review     I reviewed the patient's new clinical results.  Results from last 7 days   Lab Units 23  0408 23  0445 23  0628 23  0607   WBC 10*3/mm3 13.41* 14.26* 17.41* 18.65*   HEMOGLOBIN g/dL 10.8* 11.4* 11.0* 10.3*   PLATELETS 10*3/mm3 242 207 168 166     Results from last 7 days   Lab Units 23  1258 23  0408 23  0445 23  0950   SODIUM mmol/L 143 142 141 141   POTASSIUM mmol/L 3.6 2.8* 3.4* 3.9   CHLORIDE mmol/L 110* 109* 109* 108*   CO2 mmol/L 20.7* 20.0* 19.4* 21.3*   BUN mg/dL 40* 43* 55* 62*   CREATININE mg/dL 2.69* 3.01* 3.39* 3.68*   GLUCOSE mg/dL 100* 107* 92 89   Estimated Creatinine Clearance: 28.1 mL/min (A) (by C-G formula based on SCr of 2.69 mg/dL  "(H)).  Results from last 7 days   Lab Units 12/19/23  1258 12/19/23  0408 12/17/23  0950 12/16/23  0607 12/15/23  1057 12/14/23  1715   ALBUMIN g/dL 2.5* 2.5* 2.5* 2.9*   < > 4.1   BILIRUBIN mg/dL  --  0.3  --   --   --  1.2   ALK PHOS U/L  --  145*  --   --   --  66   AST (SGOT) U/L  --  46*  --   --   --  23   ALT (SGPT) U/L  --  51*  --   --   --  14    < > = values in this interval not displayed.     Results from last 7 days   Lab Units 12/19/23  1258 12/19/23  0408 12/18/23  0445 12/17/23  0950 12/16/23  0607   CALCIUM mg/dL 8.0* 8.4* 9.7 10.0 11.7*   ALBUMIN g/dL 2.5* 2.5*  --  2.5* 2.9*   MAGNESIUM mg/dL  --  1.6 2.1 2.0 3.0*   PHOSPHORUS mg/dL 3.4 1.7* 2.2* 2.0* 3.8     Results from last 7 days   Lab Units 12/14/23  2313 12/14/23 2003 12/14/23  1715   PROCALCITONIN ng/mL  --   --  0.26*   LACTATE mmol/L 1.4 4.4* 4.0*     COVID19   Date Value Ref Range Status   12/14/2023 Not Detected Not Detected - Ref. Range Final   05/18/2022 Not Detected Not Detected - Ref. Range Final     No results found for: \"HGBA1C\", \"POCGLU\"          XR Chest PA & Lateral  Narrative: XR CHEST PA AND LATERAL-     INDICATION: Follow-up pneumonia     COMPARISON: Chest radiographs dating back to 11/26/2015 CT abdomen  pelvis 12/14/2023     Impression: Small left pleural effusion. Similar left lower lobe  retrocardiac opacity suggest infectious/inflammatory process. This is  hard to discriminate from the pleural fluid on lateral injection. No  pneumothorax. Stable normal size cardiomediastinal silhouette. No focal  osseous abnormality.     This report was finalized on 12/18/2023 2:52 PM by Dr. Marco Smalls M.D on Workstation: [id number]       Scheduled Medications  allopurinol, 100 mg, Oral, Daily  ALPRAZolam, 0.25 mg, Oral, BID  aspirin, 81 mg, Oral, Nightly  brimonidine, 1 drop, Both Eyes, BID  budesonide-formoterol, 2 puff, Inhalation, BID - RT   And  tiotropium bromide monohydrate, 2 puff, Inhalation, Daily - RT  buPROPion XL, 300 " mg, Oral, Daily  clopidogrel, 75 mg, Oral, Nightly  guaiFENesin, 600 mg, Oral, Q12H  heparin (porcine), 5,000 Units, Subcutaneous, Q8H  ipratropium-albuterol, 3 mL, Nebulization, 4x Daily - RT  isosorbide mononitrate, 30 mg, Oral, QAM  latanoprost, 1 drop, Both Eyes, Nightly  levothyroxine, 137 mcg, Oral, Daily  Menthol-Zinc Oxide, 1 application , Topical, Q12H  multivitamin with minerals, 1 tablet, Oral, Daily  piperacillin-tazobactam, 3.375 g, Intravenous, Q8H    Infusions  Pharmacy to Dose Zosyn,     Diet  Diet: Cardiac Diets; Healthy Heart (2-3 Na+); Texture: Regular Texture (IDDSI 7); Fluid Consistency: Thin (IDDSI 0)    I have personally reviewed     [x]  Laboratory   []  Microbiology   []  Radiology   []  EKG/Telemetry  []  Cardiology/Vascular   []  Pathology    []  Records       Assessment/Plan     Active Hospital Problems    Diagnosis  POA    **Acute kidney injury [N17.9]  Yes    Stage 3b chronic kidney disease (CKD) [N18.32]  Yes    Anemia [D64.9]  Yes    Leukocytosis [D72.829]  Yes    Hyponatremia [E87.1]  Yes    Hypokalemia [E87.6]  Yes    Lactic acidosis [E87.20]  Yes    Hypotension [I95.9]  Yes    Hypercalcemia [E83.52]  Yes    Hyperlipidemia [E78.5]  Yes    PRISCILLA treated with BiPAP [G47.33]  Yes    Hypothyroidism [E03.9]  Yes    CAD (coronary artery disease) [I25.10]  Yes    COPD (chronic obstructive pulmonary disease) [J44.9]  Yes    Essential hypertension [I10]  Yes      Resolved Hospital Problems   No resolved problems to display.       Acute respiratory failure with hypoxia  Sepsis secondary to suspected pneumonia  -Repeat PA lateral chest x-ray 12/18-Small left pleural effusion. Similar left lower lobe retrocardiac opacity suggest -infectious/inflammatory process.   -Status post vancomycin, was discontinued as MRSA nares was negative.  -Continue Zosyn for 7 days.  WBC gradually improving.      ARLYN on CKD stage 3B-secondary to hypotension, hypovolemia, and severe hypercalcemia: Creatinine gradually  improving.  Nephrology managing.    Hypophosphatemia/hypokalemia.  Potassium 2.8, phosphorus 1.7.  Repleted per protocol.  Repeat labs this afternoon improved.  Monitor  And resume per protocol as needed.      Hypercalcemia-Calcium level was elevated to 17 on arrival. PTH found to be 29.5. Etiology likely multifactorial from calcium supplementation and volume contraction.  Resolved.  Nephrology managing.     Hyponatremia-resolved with IV fluids.     Transaminitis.  LFTs mildly elevated, AST 46, and ALT 51.  Monitor daily CMP.  If continues to trend up we will initiate workup and  may need to hold atorvastatin.       Anemia.  Iron studies consistent with anemia of chronic disease, however iron saturation of 7 likely has underlying iron deficiency anemia.  Will need repeat iron studies once acute illness resolves.  However stable.        Hypertension complicated by hypotension prior to arrival-BP stable.  Home BP meds on hold.    Coronary artery disease-continue aspirin, Plavix, statin.       COPD-.  No evidence to suggest acute exacerbation.  Continue Symbicort, tiotropium,- DuoNebs.  Scheduled Mucinex.  Supplemental oxygen as needed to maintain O2 sats 88 to 92%, pulse oximetry,     Hypothyroidism- Continue levothyroxine as prescribed.     Hyperlipidemia-Continue Statin as prescribed.     PRISCILLA-Okay to use home machine if available.     Peripheral vascular disease-Complicated by compartment syndrome 2016 leading to left AKA.     Anxiety: Decrease Xanax to 0.25 mg twice daily as likely contributing to intermittent confusion in the setting of acute illness.          Discussed with patient  Discussed in multidisciplinary rounds with nursing staff, CCP, pharmacy  Disposition: SNF, likely stable to discharge tomorrow if continues to improve and cleared by consultants.      Copied text in this note has been reviewed and is accurate as of 12/19/23.         Dictated utilizing Dragon dictation        Margaux Puente,  MD  [facility]  12/19/23  18:57 EST

## 2023-12-19 NOTE — PLAN OF CARE
Goal Outcome Evaluation:  Plan of Care Reviewed With: patient           Outcome Evaluation: Some lethargy during this shift. Opens eyes to voice. Follows commands. However, this morning patient is disoriented to situation , time. Critical labs called in to Dr. Sarabia . New orders given. Lab recheck at 1pm. Dr. Sarabia wants him notified results of Renal Panel drawn from 1pm.

## 2023-12-19 NOTE — PLAN OF CARE
Goal Outcome Evaluation:  Plan of Care Reviewed With: patient        Progress: improving  Outcome Evaluation: Pt alert and oriented during shift. Xanax dose decreased. Continues on IV zosyn. Up to chair with PT, back to bed with assist x2, gait belt, and walker. Possible discharge in the next few day. Rehab at discharge. Potassium and phos replaced per renal order, repeat lab called to Dr. Sarabia.

## 2023-12-19 NOTE — PLAN OF CARE
Goal Outcome Evaluation:  Plan of Care Reviewed With: patient        Progress: no change  Outcome Evaluation: Pt seen for PT treatment today. Pt more alert and oriented today and following commands. Pt however, still needs increased time to complete tasks due to fatigue and SOA. Pt required CGA with supine to sit. Pt needed increased time with donning left prosthetic. Pt needed Max support with dynamic sitting balance. Pt completed 2 STS transfers with ModAX1-2. Pt unable to get a good seal with prosthetic but pt also commented that the battery was dead. ModAX2 was needed to stand/pivot to the chair. Pt on 2L of O2 and stats at 88% with activity. Will continue to progress pt as able.

## 2023-12-19 NOTE — PLAN OF CARE
Goal Outcome Evaluation:  Plan of Care Reviewed With: patient           Outcome Evaluation: Pt was sitting EOB on OT arrival and agreeable to treatment this AM. Pt sat EOB for oral care and to wash face with S/U and increased time and rest breaks d/t SOA and decreased endurance. Pt began to fatigue after ADLs and required CGA-MOD A for static and dynamic sitting balance EOB while donning L prosthesis MOD-MAX A and stood 2x to attempt to lock and seal but unsuccesful d/t dead battery and increased SOA/fatigue. Pt MOD A x2 stand pivot from bed to chair. Pt tolerated treatment fair with no c/o pain noted. Continue with current POC.      Anticipated Discharge Disposition (OT): home with 24/7 care, sub acute care setting

## 2023-12-19 NOTE — THERAPY TREATMENT NOTE
Patient Name: Sim Agustin  : 1955    MRN: [id number]                              Today's Date: 2023       Admit Date: 2023    Visit Dx:     ICD-10-CM ICD-9-CM   1. ARLYN (acute kidney injury)  N17.9 584.9   2. Hypercalcemia  E83.52 275.42     Patient Active Problem List   Diagnosis    H/O angiodysplasia of intestinal tract    COPD (chronic obstructive pulmonary disease)    S/P colectomy    Status post above-knee amputation of left lower extremity    CAD (coronary artery disease)    PVD (peripheral vascular disease)    Essential hypertension    Cardiomyopathy, unspecified    PRISCILLA treated with BiPAP    Anxiety disorder    Chronic midline low back pain without sciatica    Long term prescription benzodiazepine use    History of bradycardia    Hypothyroidism    Vitamin B12 deficiency    Iron deficiency    History of smoking 30 or more pack years    High risk medication use    DNR (do not resuscitate)    Stage 3a chronic kidney disease    Hyperlipidemia    Proteinuria    Junctional cardiac arrhythmia    Acute kidney injury    Hypercalcemia    Stage 3b chronic kidney disease (CKD)    Anemia    Leukocytosis    Hyponatremia    Hypokalemia    Lactic acidosis    Hypotension     Past Medical History:   Diagnosis Date    Acute respiratory failure with hypoxia and hypercarbia 2019    Acute upper respiratory infection 2013    Amputee, above knee, left     Anemia     per [place] [facility]    ARF (acute renal failure) 2016    Asthma 2018    Atelectasis, left 2016    Bradycardia 2019    CAD (coronary artery disease) 2016    Chronic obstructive pulmonary disease with acute exacerbation 2013    Clotting disorder 2003    Colon polyps     Compartment syndrome     AFTER ILIAC SURGERY. ABOVE KNEE AMPUTATION    COPD (chronic obstructive pulmonary disease)     COPD with hypoxia 2019    Cough     SINCE APRIL WITH PNEUMONIA.     Demand myocardial infarction  05/19/2022    Disease of thyroid gland     HYPOTHYRODISM    Diverticulosis     Elevated hematocrit 01/06/2021    Employs prosthetic leg     ESRD (end stage renal disease) on dialysis 04/06/2016    Fracture of patella 03/03/2015    History of acute renal failure     History of CHF (congestive heart failure)     History of GI bleed 02/2016    AORTODUOD FISTULA    History of sepsis 02/2016    History of transfusion     MULTIPLE, 2016    Hyperkalemia 04/11/2016    Hyperlipidemia     Hypertension     Hypotension     Hypotension 03/04/2016    Left lower lobe pneumonia 05/18/2022    Nonischemic cardiomyopathy     Nosocomial pneumonia 04/06/2016    Phantom limb pain     SL, WITH LEFT ABOUVE KNEE    Pleural effusion on left 04/11/2016    Pneumonia     SPRING 2016  AFTER SURGERY    Pulmonary embolism     PVD (peripheral vascular disease)     Renal insufficiency     S/P thoracentesis 04/11/2016    Sepsis, unspecified organism 04/05/2016     Past Surgical History:   Procedure Laterality Date    ABOVE KNEE AMPUTATION Left 03/16/2016    Procedure: LT AMPUTATION ABOVE KNEE;  Surgeon: Jose Swann MD;  Location: [facility] MAIN OR;  Service:     ARTERIAL THROMBECTOMY  2001    throbectomy of arterial graft    AXILLARY FEMORAL BYPASS Right 02/15/2016    Exploratory lapartomy, repair aortoduodenal fistula, resection infected aortobifemoral bypass graft, axillobi-superficial femoral artery [place]-Aneudy bypass graft from right axillary artery, Repair of duodenotomy 4th portion duodenum-Dr. Jose Swann, Dr. Genaro Perrin    BRONCHOSCOPY Left 03/04/2016    Dr. NATALIIA Tate    BRONCHOSCOPY Left 05/20/2022    Procedure: BRONCHOSCOPY WITH BIOPSIES, BRUSHINGS, AND BAL UNDER FLUORO;  Surgeon: Ishmael Tate Jr., MD;  Location: [facility] ENDOSCOPY;  Service: Pulmonary;  Laterality: Left;  PRE OP - LLL CONSOLIDATION  POST OP - SAME    BRONCHOSCOPY RIGID / FLEXIBLE N/A 03/03/2016    Dr. NATALIIA Tate    BRONCHOSCOPY RIGID / FLEXIBLE N/A  02/26/2016    Dr. NATALIIA Tate    CARDIAC CATHETERIZATION N/A 03/18/2019    Procedure: Right and Left Heart Cath;  Surgeon: Nina Storey MD;  Location: [facility] CATH INVASIVE LOCATION;  Service: Cardiovascular    CATARACT EXTRACTION WITH INTRAOCULAR LENS IMPLANT Bilateral     COLON RESECTION WITH COLOSTOMY Left 02/28/2016    Exploratory laparotomy with open left hemicolectomy, end-colostomy, G-tube and J-tube-Dr. Endy Chaney    COLON SURGERY  3/25/2015    COLOSTOMY    COLONOSCOPY N/A 2015    Dr. Laughlin    COLONOSCOPY N/A 10/12/2016    Procedure: COLONOSCOPY TO 20 CM AND PER STOMA TO 30CM;  Surgeon: Genaro Perrin MD;  Location: [facility] ENDOSCOPY;  Service:     COLONOSCOPY N/A 10/21/2016    Procedure: COLONOSCOPY DONE AT BEDSIDE ONTO CECEM;  Surgeon: Genaro Perrin MD;  Location: [facility] ENDOSCOPY;  Service:     COLONOSCOPY N/A 02/10/2016    Diverticulosis in the entire examined colon, non-bleeding internal hemorrhoids-Dr. Taylor Pina    COLONOSCOPY N/A 02/11/2016    Poor prep, blood in the entire examined colon, normal ileum-Dr. Taylor Pina    COLONOSCOPY W/ POLYPECTOMY N/A 07/27/2015    Normal ileum, diverticulosis in the sigmoid colon: resected and retrieved, one 6 mm polyp in the descending colon: resected and retrieved, the distal rectum and anal verge are normal on retroflexion view-Dr. Miguel Ángel Laughlin    COLOSTOMY CLOSURE N/A 10/13/2016    Procedure: COLOSTOMY TAKEDOWN OR CLOSURE, APPENDECTOMY;  Surgeon: Genaro Perrin MD;  Location: [facility] OR;  Service:     DEBRIDEMENT LEG Left 03/01/2016    Excisional debridement of the skin, subcutantous tissue, tendon and muscle left calf-Dr. Jose Swann    DEBRIDEMENT LEG Left 02/25/2016    Excisional debridement full-thcikness skin and subcutaneous tissue and tendon and muscle, left medial and lateral calf muscles-Dr. Jose Swann    ENDOSCOPY N/A 10/21/2016    Procedure: ESOPHAGOGASTRODUODENOSCOPY DONE AT BEDSIDE;  Surgeon: Genaro SARAVIA  MD Marychuy;  Location: [facility] ENDOSCOPY;  Service:     ENDOSCOPY AND COLONOSCOPY N/A 02/28/2016    EGD and Colonoscopy with Candy ink injection-Dr. Endy Chaney    ENTEROSCOPY SMALL BOWEL N/A 02/11/2016    Normal esophagus, normal stomach, normal duodenum, portion of the jejunum normal-Dr. Taylor Pina    ILIAC ARTERY - FEMORAL ARTERY BYPASS GRAFT Bilateral 2002    ILIAC ARTERY STENT Bilateral 2001    INSERTION AND REMOVAL PERITONEAL DIALYSIS CATHETER Right 02/29/2016    Exchange right jugular 16 cm [place]kar dialysis catheter-Dr. Jose Swann    INSERTION PERITONEAL DIALYSIS CATHETER Left 03/01/2016    Ultrasound-guided access of the left jugular vein, 23 cm left jugular palindrome dialysis catheter placement-Dr. Jose Swann    INSERTION PERITONEAL DIALYSIS CATHETER Right 02/18/2016    Right jugular Mah[place]kar catherer placement-Dr. Jose Swann    JOINT REPLACEMENT Left     THORACOSCOPY Left 04/18/2016    Procedure: LT THORACOSCOPY VIDEO ASSISTED WITH DECORDICATION;  Surgeon: Genaro Davila III, MD;  Location: [facility] MAIN OR;  Service:     THROMBECTOMY Left 02/16/2016    Thombectomy of left femoral graft, left leg arteriogram, left calf forward compartment fasciotomy-Dr. Jose Swann    TOTAL HIP ARTHROPLASTY Left     UPPER GASTROINTESTINAL ENDOSCOPY N/A 02/09/2016    Normal UGI-Dr. Ajay Parkinson    UPPER GASTROINTESTINAL ENDOSCOPY N/A 11/28/2015    Small hiatal hernia, chronic gastritis: biopsied, non-bleeding angioectasias in the stomach: treated with argon plasma coagulation, multiple bleeding angioectasias in the dudenum: treated with argon plasma coagulation-Dr. Mykel Jaramillo    VASCULAR SURGERY      MULTIPLE    VENTRAL/INCISIONAL HERNIA REPAIR N/A 10/19/2017    Procedure: VENTRAL HERNIA REPAIR WITH MESH AND BILATERAL COMPONENT SEPARATION;  Surgeon: Genaro Perrin MD;  Location: [facility] MAIN OR;  Service:     WISDOM TOOTH EXTRACTION        General Information       Row Name 12/19/23  1503          OT Time and Intention    Document Type therapy note (daily note)  -HEATHER     Mode of Treatment co-treatment;physical therapy;occupational therapy  -HEATHER       Row Name 12/19/23 1503          General Information    Patient Profile Reviewed yes  -HEATHER     Barriers to Rehab medically complex  -HEATHER       Row Name 12/19/23 1503          Cognition    Orientation Status (Cognition) oriented x 3  -HEATHER       Row Name 12/19/23 1503          Safety Issues, Functional Mobility    Impairments Affecting Function (Mobility) balance;endurance/activity tolerance;strength;cognition;shortness of breath  -HEATHER     Comment, Safety Issues/Impairments (Mobility) Co-treatment medically appropriate and necessary d/t pt acuity level, activity tolerance, and safety of pt and staff. Gait belt and non-skid shoe worn.  -HEATHER               User Key  (r) = Recorded By, (t) = Taken By, (c) = Cosigned By      Initials Name Provider Type    HEATHER Dea Gibbons, OT Occupational Therapist                     Mobility/ADL's       Row Name 12/19/23 1505          Bed Mobility    Supine-Sit [place] (Bed Mobility) contact guard;verbal cues  -HEATHER     Sit-Supine [place] (Bed Mobility) not tested  -HEATHER     Bed Mobility, Safety Issues decreased use of legs for bridging/pushing  -HEATHER     Assistive Device (Bed Mobility) bed rails;head of bed elevated  -HEATHER     Comment, (Bed Mobility) Pt sat EOB for ADLs and to don L prosthesis with increased time and rest breaks d/t decreased endurance and SOA.  -HEATHER       Row Name 12/19/23 1505          Transfers    Transfers bed-chair transfer;sit-stand transfer  -HEATHER     Comment, (Transfers) STS x 2 at EOB and stand-pivot from bed to chair  -HEATHER       Row Name 12/19/23 1505          Bed-Chair Transfer    Bed-Chair [place] (Transfers) moderate assist (50% patient effort);2 person assist;verbal cues  -HEATHER     Assistive Device (Bed-Chair Transfers) --  HHA  -HEATHER     Comment, (Bed-Chair Transfer) stand pivot  -HEATHER       Row  Name 12/19/23 1505          Sit-Stand Transfer    Sit-Stand [place] (Transfers) moderate assist (50% patient effort);1 person assist;2 person assist  -HEATHER     Assistive Device (Sit-Stand Transfers) --  HHA  -HEATHER     Comment, (Sit-Stand Transfer) STS 2x at EOB to lock prosthesis but unable to get good seal and lock either attempt and pt too fatigued after second attempt and reports that battery is dead and placed on  to charge.  -HEATHER       Row Name 12/19/23 1505          Functional Mobility    Functional Mobility- Ind. Level not tested  -HEATHER     Functional Mobility- Comment NT d/t increased fatigue and SOA  -HEATHER       Row Name 12/19/23 1505          Activities of Daily Living    BADL Assessment/Intervention lower body dressing;grooming  -HEATHER       Row Name 12/19/23 1505          Lower Body Dressing Assessment/Training    [place] Level (Lower Body Dressing) lower body dressing skills;don;doff;shoes/slippers;moderate assist (50% patient effort);maximum assist (25% patient effort)  Pt sat EOB to don shoe on R foot min A and to don L prothesis with increased time and rest breaks in supported and unsupported sitting and supported standing MAX A  -HEATHER     Position (Lower Body Dressing) edge of bed sitting;supported sitting;supported standing;unsupported sitting  -HEATHER       Row Name 12/19/23 1505          Grooming Assessment/Training    [place] Level (Grooming) grooming skills;oral care regimen;wash face, hands;set up;standby assist  -HEATHER     Position (Grooming) edge of bed sitting;unsupported sitting  -HEATHER               User Key  (r) = Recorded By, (t) = Taken By, (c) = Cosigned By      Initials Name Provider Type    HEATHERDea Caceres OT Occupational Therapist                   Obj/Interventions       Row Name 12/19/23 1518          Motor Skills    Motor Skills functional endurance  -HEATHER     Functional Endurance Poor+  -HEATHER       Row Name 12/19/23 1518          Balance    Balance Assessment sitting static  balance;sitting dynamic balance;standing static balance;standing dynamic balance  -HEATHER     Static Sitting Balance standby assist  -HEATHER     Dynamic Sitting Balance maximum assist  -HEATHER     Position, Sitting Balance sitting edge of bed  -HEATHER     Static Standing Balance maximum assist;moderate assist;2-person assist  -HEATHER     Dynamic Standing Balance moderate assist;maximum assist;2-person assist  -HEATHER     Position/Device Used, Standing Balance --  no AD  -HEATHER     Balance Interventions standing;sitting;occupation based/functional task  -HEATHER     Comment, Balance Pt demonstrates R lateral lean sitting EOB with CGA-MOD A required at times d/t decreased endurace and fatigue  -HEATHER               User Key  (r) = Recorded By, (t) = Taken By, (c) = Cosigned By      Initials Name Provider Type    Dea Cochran OT Occupational Therapist                   Goals/Plan    No documentation.                  Clinical Impression       Row Name 12/19/23 1522          Pain Assessment    Pretreatment Pain Rating 0/10 - no pain  -HEATHER     Posttreatment Pain Rating 0/10 - no pain  -HEATHER       Row Name 12/19/23 1522          Plan of Care Review    Plan of Care Reviewed With patient  -HEATHER     Outcome Evaluation Pt was sitting EOB on OT arrival and agreeable to treatment this AM. Pt sat EOB for oral care and to wash face with S/U and increased time and rest breaks d/t SOA and decreased endurance. Pt began to fatigue after ADLs and required CGA-MOD A for static and dynamic sitting balance EOB while donning L prosthesis MOD-MAX A and stood 2x to attempt to lock and seal but unsuccesful d/t dead battery and increased SOA/fatigue. Pt MOD A x2 stand pivot from bed to chair. Pt tolerated treatment fair with no c/o pain noted. Continue with current POC.  -HEATHER       Row Name 12/19/23 1522          Therapy Plan Review/Discharge Plan (OT)    Anticipated Discharge Disposition (OT) home with 24/7 care;sub acute care setting  -HEATHER       Row Name 12/19/23 1522           Vital Signs    O2 Delivery Pre Treatment supplemental O2  -HEATHER     Intra SpO2 (%) 88  -HEATHER     O2 Delivery Intra Treatment supplemental O2  -HEATHER     O2 Delivery Post Treatment supplemental O2  -HEATHER       Row Name 12/19/23 1522          Positioning and Restraints    Pre-Treatment Position in bed  -HEATHER     Post Treatment Position chair  -HEATHER     In Chair reclined;exit alarm on;encouraged to call for assist;call light within reach  -HEATHER               User Key  (r) = Recorded By, (t) = Taken By, (c) = Cosigned By      Initials Name Provider Type    Dea Cochran OT Occupational Therapist                   Outcome Measures       Row Name 12/19/23 1138          How much help from another person do you currently need...    Turning from your back to your side while in flat bed without using bedrails? 3  -EB     Moving from lying on back to sitting on the side of a flat bed without bedrails? 3  -EB     Moving to and from a bed to a chair (including a wheelchair)? 2  -EB     Standing up from a chair using your arms (e.g., wheelchair, bedside chair)? 2  -EB     Climbing 3-5 steps with a railing? 1  -EB     To walk in hospital room? 1  -EB     AM-PAC 6 Clicks Score (PT) 12  -EB     Highest Level of Mobility Goal 4 --> Transfer to chair/commode  -EB               User Key  (r) = Recorded By, (t) = Taken By, (c) = Cosigned By      Initials Name Provider Type    Sameera Alejo PTA Physical Therapist Assistant                    Occupational Therapy Education       Title: PT OT SLP Therapies (Done)       Topic: Occupational Therapy (Done)       Point: ADL training (Done)       Description:   Instruct learner(s) on proper safety adaptation and remediation techniques during self care or transfers.   Instruct in proper use of assistive devices.                  Learning Progress Summary             Patient Acceptance, E,TB,D, VU,NR by RE at 12/16/2023 [id number]    Comment: pursed lip breathing and POC                         Point: Home  exercise program (Done)       Description:   Instruct learner(s) on appropriate technique for monitoring, assisting and/or progressing therapeutic exercises/activities.                  Learning Progress Summary             Patient Acceptance, E,TB,D, VU,NR by RE at 12/16/2023 1559    Comment: pursed lip breathing and POC                         Point: Precautions (Done)       Description:   Instruct learner(s) on prescribed precautions during self-care and functional transfers.                  Learning Progress Summary             Patient Acceptance, E,TB,D, VU,NR by RE at 12/16/2023 1559    Comment: pursed lip breathing and POC                         Point: Body mechanics (Done)       Description:   Instruct learner(s) on proper positioning and spine alignment during self-care, functional mobility activities and/or exercises.                  Learning Progress Summary             Patient Acceptance, E,TB,D, VU,NR by RE at 12/16/2023 1559    Comment: pursed lip breathing and POC                                         User Key       Initials Effective Dates Name Provider Type Discipline    RE 06/16/21 -  Judith Bennett OTR Occupational Therapist OT                  OT Recommendation and Plan     Plan of Care Review  Plan of Care Reviewed With: patient  Outcome Evaluation: Pt was sitting EOB on OT arrival and agreeable to treatment this AM. Pt sat EOB for oral care and to wash face with S/U and increased time and rest breaks d/t SOA and decreased endurance. Pt began to fatigue after ADLs and required CGA-MOD A for static and dynamic sitting balance EOB while donning L prosthesis MOD-MAX A and stood 2x to attempt to lock and seal but unsuccesful d/t dead battery and increased SOA/fatigue. Pt MOD A x2 stand pivot from bed to chair. Pt tolerated treatment fair with no c/o pain noted. Continue with current POC.     Time Calculation:         Time Calculation- OT       Row Name 12/19/23 1000             Time  Calculation- OT    OT Start Time 0853  -HEATHER      OT Stop Time 0929  -HEATHER      OT Time Calculation (min) 36 min  -HEATHER      Total Timed Code Minutes- OT 36 minute(s)  -HEATHER      OT Received On 12/19/23  -HEATHER      OT - Next Appointment 12/20/23  -HEATHER         Timed Charges    81788 - OT Self Care/Mgmt Minutes 36  -HEATHER         Total Minutes    Timed Charges Total Minutes 36  -HEATHER       Total Minutes 36  -HEATHER                User Key  (r) = Recorded By, (t) = Taken By, (c) = Cosigned By      Initials Name Provider Type    Dea Cochran OT Occupational Therapist                  Therapy Charges for Today       Code Description Service Date Service Provider Modifiers Qty    [id number] HC OT SELF CARE/MGMT/TRAIN EA 15 MIN 12/19/2023 Dea Gibbons OT GO 2                 Dea Gibbons OT  12/19/2023

## 2023-12-19 NOTE — PROGRESS NOTES
[facility] Progress Note      Patient Name: Sim Agustin  : 1955  MRN: [id number]  Primary Care Physician:  Seth Hester MD  Date of admission: 2023    Subjective     Interval History:   More awake and appropriate than yesterday; still not at baseline  Very wet cough; denies shortness of breath on 1.5 L/min    Review of Systems:   As noted above    Objective     Vitals:   Temp:  [97.7 °F (36.5 °C)-98.6 °F (37 °C)] 98.1 °F (36.7 °C)  Heart Rate:  [76-96] 76  Resp:  [17-22] 20  BP: (124-150)/(57-67) 124/59  Flow (L/min):  [0.5-1.5] 1.5    Intake/Output Summary (Last 24 hours) at 2023 1120  Last data filed at 2023 0725  Gross per 24 hour   Intake 490 ml   Output 100 ml   Net 390 ml       Physical Exam:    General: NAD, chr ill-appearing, pleasant, less blunted than yesterday  HEENT: oral mucosa normal, nonicteric sclera  Neck: supple, no JVD  Lungs: Crackles bilaterally, fewer than yesterday, not labored on 1.5 L/min RRR, normal S1 and S2  Abdomen: soft, nondistended, NT, BS +  Extremities: +1 edema right leg; left AKA  Neuro: moving all extremities  Psychiatric: Oriented    Scheduled Meds:     allopurinol, 100 mg, Oral, Daily  ALPRAZolam, 0.25 mg, Oral, BID  aspirin, 81 mg, Oral, Nightly  atorvastatin, 40 mg, Oral, Nightly  brimonidine, 1 drop, Both Eyes, BID  budesonide-formoterol, 2 puff, Inhalation, BID - RT   And  tiotropium bromide monohydrate, 2 puff, Inhalation, Daily - RT  buPROPion XL, 300 mg, Oral, Daily  clopidogrel, 75 mg, Oral, Nightly  guaiFENesin, 600 mg, Oral, Q12H  heparin (porcine), 5,000 Units, Subcutaneous, Q8H  ipratropium-albuterol, 3 mL, Nebulization, 4x Daily - RT  isosorbide mononitrate, 30 mg, Oral, QAM  latanoprost, 1 drop, Both Eyes, Nightly  levothyroxine, 137 mcg, Oral, Daily  Menthol-Zinc Oxide, 1 application , Topical, Q12H  multivitamin with minerals, 1 tablet, Oral, Daily  piperacillin-tazobactam, 3.375 g, Intravenous,  Q8H  potassium phosphate, 15 mmol, Intravenous, Q3H      IV Meds:   Pharmacy to Dose Zosyn,         Results Reviewed:   I have personally reviewed the results from the time of this admission to 12/19/2023 11:20 EST     Results from last 7 days   Lab Units 12/19/23 0408 12/18/23 0445 12/17/23  0950 12/15/23  0432 12/14/23  1715   SODIUM mmol/L 142 141 141   < > 134*   POTASSIUM mmol/L 2.8* 3.4* 3.9   < > 3.3*   CHLORIDE mmol/L 109* 109* 108*   < > 85*   CO2 mmol/L 20.0* 19.4* 21.3*   < > 32.9*   BUN mg/dL 43* 55* 62*   < > 61*   CREATININE mg/dL 3.01* 3.39* 3.68*   < > 5.09*   CALCIUM mg/dL 8.4* 9.7 10.0   < > 17.0*   BILIRUBIN mg/dL 0.3  --   --   --  1.2   ALK PHOS U/L 145*  --   --   --  66   ALT (SGPT) U/L 51*  --   --   --  14   AST (SGOT) U/L 46*  --   --   --  23   GLUCOSE mg/dL 107* 92 89   < > 165*    < > = values in this interval not displayed.     Estimated Creatinine Clearance: 25.1 mL/min (A) (by C-G formula based on SCr of 3.01 mg/dL (H)).  Results from last 7 days   Lab Units 12/19/23 0408 12/18/23 0445 12/17/23  0950   MAGNESIUM mg/dL 1.6 2.1 2.0   PHOSPHORUS mg/dL 1.7* 2.2* 2.0*         Results from last 7 days   Lab Units 12/19/23  0408 12/18/23 0445 12/17/23  0628 12/16/23  0607 12/15/23  0432   WBC 10*3/mm3 13.41* 14.26* 17.41* 18.65* 13.67*   HEMOGLOBIN g/dL 10.8* 11.4* 11.0* 10.3* 12.9*   PLATELETS 10*3/mm3 242 207 168 166 173           Assessment / Plan     ASSESSMENT:  1.  ARLYN on CKD3b, with UOP not quantified, improving:  likely prerenal from hypotension, hypovolemia, and severe hypercalcemia.  Volume excess, better by exam; very low potassium, phosphorus, and magnesium  2.  Hypercalcemia, multifactorial from calcium supplements and volume contraction, resolved.  PTH ought to be lower in the face of this degree of hypercalcemia.  Malignancy is always a consideration, too  3.  Hypotension, resolved after fluid resuscitation  4.  Anemia, stable  5.  PVD with compartment syndrome in 2016  leading to left AKA  6.  COPD  7.  Confusion, improving  8.  Transaminitis    PLAN:  1.  Replace potassium, phosphorus, and magnesium  2.  Hold Lipitor given elevated LFTs  3.  Surveillance labs    Rajesh Sarabia MD  12/19/23  11:20 [facility]    [facility]""  [phone]

## 2023-12-20 LAB
ALBUMIN SERPL-MCNC: 2.2 G/DL (ref 3.5–5.2)
ALBUMIN/GLOB SERPL: 0.8 G/DL
ALP SERPL-CCNC: 140 U/L (ref 39–117)
ALT SERPL W P-5'-P-CCNC: 49 U/L (ref 1–41)
ANION GAP SERPL CALCULATED.3IONS-SCNC: 14.2 MMOL/L (ref 5–15)
AST SERPL-CCNC: 40 U/L (ref 1–40)
BACTERIA SPEC AEROBE CULT: NORMAL
BASOPHILS # BLD AUTO: 0.02 10*3/MM3 (ref 0–0.2)
BASOPHILS NFR BLD AUTO: 0.1 % (ref 0–1.5)
BILIRUB SERPL-MCNC: 0.4 MG/DL (ref 0–1.2)
BUN SERPL-MCNC: 40 MG/DL (ref 8–23)
BUN/CREAT SERPL: 14.8 (ref 7–25)
CALCIUM SPEC-SCNC: 7.7 MG/DL (ref 8.6–10.5)
CHLORIDE SERPL-SCNC: 106 MMOL/L (ref 98–107)
CO2 SERPL-SCNC: 19.8 MMOL/L (ref 22–29)
CREAT SERPL-MCNC: 2.7 MG/DL (ref 0.76–1.27)
DEPRECATED RDW RBC AUTO: 45.5 FL (ref 37–54)
EGFRCR SERPLBLD CKD-EPI 2021: 25 ML/MIN/1.73
EOSINOPHIL # BLD AUTO: 0.37 10*3/MM3 (ref 0–0.4)
EOSINOPHIL NFR BLD AUTO: 2.5 % (ref 0.3–6.2)
ERYTHROCYTE [DISTWIDTH] IN BLOOD BY AUTOMATED COUNT: 13.8 % (ref 12.3–15.4)
GLOBULIN UR ELPH-MCNC: 2.7 GM/DL
GLUCOSE SERPL-MCNC: 92 MG/DL (ref 65–99)
HCT VFR BLD AUTO: 31.6 % (ref 37.5–51)
HGB BLD-MCNC: 10.1 G/DL (ref 13–17.7)
IMM GRANULOCYTES # BLD AUTO: 0.25 10*3/MM3 (ref 0–0.05)
IMM GRANULOCYTES NFR BLD AUTO: 1.7 % (ref 0–0.5)
LYMPHOCYTES # BLD AUTO: 0.57 10*3/MM3 (ref 0.7–3.1)
LYMPHOCYTES NFR BLD AUTO: 3.8 % (ref 19.6–45.3)
MAGNESIUM SERPL-MCNC: 1.6 MG/DL (ref 1.6–2.4)
MCH RBC QN AUTO: 28.9 PG (ref 26.6–33)
MCHC RBC AUTO-ENTMCNC: 32 G/DL (ref 31.5–35.7)
MCV RBC AUTO: 90.5 FL (ref 79–97)
MONOCYTES # BLD AUTO: 0.38 10*3/MM3 (ref 0.1–0.9)
MONOCYTES NFR BLD AUTO: 2.5 % (ref 5–12)
NEUTROPHILS NFR BLD AUTO: 13.45 10*3/MM3 (ref 1.7–7)
NEUTROPHILS NFR BLD AUTO: 89.4 % (ref 42.7–76)
NRBC BLD AUTO-RTO: 0 /100 WBC (ref 0–0.2)
PHOSPHATE SERPL-MCNC: 2.9 MG/DL (ref 2.5–4.5)
PLATELET # BLD AUTO: 261 10*3/MM3 (ref 140–450)
PMV BLD AUTO: 9.8 FL (ref 6–12)
POTASSIUM SERPL-SCNC: 3.3 MMOL/L (ref 3.5–5.2)
PROT SERPL-MCNC: 4.9 G/DL (ref 6–8.5)
RBC # BLD AUTO: 3.49 10*6/MM3 (ref 4.14–5.8)
SODIUM SERPL-SCNC: 140 MMOL/L (ref 136–145)
WBC NRBC COR # BLD AUTO: 15.04 10*3/MM3 (ref 3.4–10.8)

## 2023-12-20 PROCEDURE — 83735 ASSAY OF MAGNESIUM: CPT | Performed by: STUDENT IN AN ORGANIZED HEALTH CARE EDUCATION/TRAINING PROGRAM

## 2023-12-20 PROCEDURE — 25010000002 PIPERACILLIN SOD-TAZOBACTAM PER 1 G: Performed by: STUDENT IN AN ORGANIZED HEALTH CARE EDUCATION/TRAINING PROGRAM

## 2023-12-20 PROCEDURE — 85025 COMPLETE CBC W/AUTO DIFF WBC: CPT | Performed by: STUDENT IN AN ORGANIZED HEALTH CARE EDUCATION/TRAINING PROGRAM

## 2023-12-20 PROCEDURE — 94799 UNLISTED PULMONARY SVC/PX: CPT

## 2023-12-20 PROCEDURE — 94761 N-INVAS EAR/PLS OXIMETRY MLT: CPT

## 2023-12-20 PROCEDURE — 25010000002 HEPARIN (PORCINE) PER 1000 UNITS: Performed by: STUDENT IN AN ORGANIZED HEALTH CARE EDUCATION/TRAINING PROGRAM

## 2023-12-20 PROCEDURE — 84100 ASSAY OF PHOSPHORUS: CPT | Performed by: INTERNAL MEDICINE

## 2023-12-20 PROCEDURE — 94760 N-INVAS EAR/PLS OXIMETRY 1: CPT

## 2023-12-20 PROCEDURE — 93005 ELECTROCARDIOGRAM TRACING: CPT | Performed by: STUDENT IN AN ORGANIZED HEALTH CARE EDUCATION/TRAINING PROGRAM

## 2023-12-20 PROCEDURE — 80053 COMPREHEN METABOLIC PANEL: CPT | Performed by: INTERNAL MEDICINE

## 2023-12-20 PROCEDURE — 97530 THERAPEUTIC ACTIVITIES: CPT

## 2023-12-20 PROCEDURE — 93010 ELECTROCARDIOGRAM REPORT: CPT | Performed by: INTERNAL MEDICINE

## 2023-12-20 PROCEDURE — 25010000002 MAGNESIUM SULFATE 2 GM/50ML SOLUTION: Performed by: STUDENT IN AN ORGANIZED HEALTH CARE EDUCATION/TRAINING PROGRAM

## 2023-12-20 PROCEDURE — 94664 DEMO&/EVAL PT USE INHALER: CPT

## 2023-12-20 PROCEDURE — 25010000002 FUROSEMIDE PER 20 MG: Performed by: INTERNAL MEDICINE

## 2023-12-20 RX ORDER — POTASSIUM CHLORIDE 750 MG/1
20 TABLET, FILM COATED, EXTENDED RELEASE ORAL ONCE
Status: COMPLETED | OUTPATIENT
Start: 2023-12-20 | End: 2023-12-20

## 2023-12-20 RX ORDER — MAGNESIUM SULFATE HEPTAHYDRATE 40 MG/ML
2 INJECTION, SOLUTION INTRAVENOUS ONCE
Status: COMPLETED | OUTPATIENT
Start: 2023-12-20 | End: 2023-12-20

## 2023-12-20 RX ORDER — POTASSIUM CHLORIDE 750 MG/1
40 TABLET, FILM COATED, EXTENDED RELEASE ORAL ONCE
Status: COMPLETED | OUTPATIENT
Start: 2023-12-20 | End: 2023-12-20

## 2023-12-20 RX ORDER — FUROSEMIDE 10 MG/ML
40 INJECTION INTRAMUSCULAR; INTRAVENOUS EVERY 12 HOURS
Status: DISCONTINUED | OUTPATIENT
Start: 2023-12-20 | End: 2023-12-21

## 2023-12-20 RX ADMIN — ALPRAZOLAM 0.25 MG: 0.25 TABLET ORAL at 08:29

## 2023-12-20 RX ADMIN — HEPARIN SODIUM 5000 UNITS: 5000 INJECTION INTRAVENOUS; SUBCUTANEOUS at 06:17

## 2023-12-20 RX ADMIN — CLOPIDOGREL BISULFATE 75 MG: 75 TABLET, FILM COATED ORAL at 21:46

## 2023-12-20 RX ADMIN — GUAIFENESIN 600 MG: 600 TABLET, EXTENDED RELEASE ORAL at 08:29

## 2023-12-20 RX ADMIN — HEPARIN SODIUM 5000 UNITS: 5000 INJECTION INTRAVENOUS; SUBCUTANEOUS at 21:48

## 2023-12-20 RX ADMIN — IPRATROPIUM BROMIDE AND ALBUTEROL SULFATE 3 ML: 2.5; .5 SOLUTION RESPIRATORY (INHALATION) at 15:06

## 2023-12-20 RX ADMIN — HEPARIN SODIUM 5000 UNITS: 5000 INJECTION INTRAVENOUS; SUBCUTANEOUS at 14:05

## 2023-12-20 RX ADMIN — GUAIFENESIN 600 MG: 600 TABLET, EXTENDED RELEASE ORAL at 21:46

## 2023-12-20 RX ADMIN — BRIMONIDINE TARTRATE 1 DROP: 2 SOLUTION/ DROPS OPHTHALMIC at 08:31

## 2023-12-20 RX ADMIN — PIPERACILLIN SODIUM AND TAZOBACTAM SODIUM 3.38 G: 3; .375 INJECTION, SOLUTION INTRAVENOUS at 08:29

## 2023-12-20 RX ADMIN — BUDESONIDE AND FORMOTEROL FUMARATE DIHYDRATE 2 PUFF: 160; 4.5 AEROSOL RESPIRATORY (INHALATION) at 21:20

## 2023-12-20 RX ADMIN — ASPIRIN 81 MG: 81 TABLET, CHEWABLE ORAL at 21:46

## 2023-12-20 RX ADMIN — ISOSORBIDE MONONITRATE 30 MG: 30 TABLET, EXTENDED RELEASE ORAL at 06:17

## 2023-12-20 RX ADMIN — LATANOPROST 1 DROP: 50 SOLUTION OPHTHALMIC at 21:59

## 2023-12-20 RX ADMIN — IPRATROPIUM BROMIDE AND ALBUTEROL SULFATE 3 ML: 2.5; .5 SOLUTION RESPIRATORY (INHALATION) at 11:11

## 2023-12-20 RX ADMIN — FUROSEMIDE 40 MG: 10 INJECTION, SOLUTION INTRAMUSCULAR; INTRAVENOUS at 11:10

## 2023-12-20 RX ADMIN — PIPERACILLIN SODIUM AND TAZOBACTAM SODIUM 3.38 G: 3; .375 INJECTION, SOLUTION INTRAVENOUS at 15:46

## 2023-12-20 RX ADMIN — TIOTROPIUM BROMIDE INHALATION SPRAY 2 PUFF: 3.12 SPRAY, METERED RESPIRATORY (INHALATION) at 08:02

## 2023-12-20 RX ADMIN — IPRATROPIUM BROMIDE AND ALBUTEROL SULFATE 3 ML: 2.5; .5 SOLUTION RESPIRATORY (INHALATION) at 21:15

## 2023-12-20 RX ADMIN — ANORECTAL OINTMENT 1 APPLICATION: 15.7; .44; 24; 20.6 OINTMENT TOPICAL at 08:29

## 2023-12-20 RX ADMIN — BUPROPION HYDROCHLORIDE 300 MG: 300 TABLET, EXTENDED RELEASE ORAL at 08:29

## 2023-12-20 RX ADMIN — BRIMONIDINE TARTRATE 1 DROP: 2 SOLUTION/ DROPS OPHTHALMIC at 21:43

## 2023-12-20 RX ADMIN — POTASSIUM CHLORIDE 40 MEQ: 750 TABLET, EXTENDED RELEASE ORAL at 08:29

## 2023-12-20 RX ADMIN — ALLOPURINOL 100 MG: 100 TABLET ORAL at 08:29

## 2023-12-20 RX ADMIN — FUROSEMIDE 40 MG: 10 INJECTION, SOLUTION INTRAMUSCULAR; INTRAVENOUS at 21:44

## 2023-12-20 RX ADMIN — POTASSIUM CHLORIDE 20 MEQ: 750 TABLET, EXTENDED RELEASE ORAL at 11:10

## 2023-12-20 RX ADMIN — ALPRAZOLAM 0.25 MG: 0.25 TABLET ORAL at 21:46

## 2023-12-20 RX ADMIN — ANORECTAL OINTMENT 1 APPLICATION: 15.7; .44; 24; 20.6 OINTMENT TOPICAL at 21:44

## 2023-12-20 RX ADMIN — MAGNESIUM SULFATE HEPTAHYDRATE 2 G: 40 INJECTION, SOLUTION INTRAVENOUS at 17:24

## 2023-12-20 RX ADMIN — LEVOTHYROXINE SODIUM 137 MCG: 0.14 TABLET ORAL at 08:29

## 2023-12-20 RX ADMIN — IPRATROPIUM BROMIDE AND ALBUTEROL SULFATE 3 ML: 2.5; .5 SOLUTION RESPIRATORY (INHALATION) at 07:54

## 2023-12-20 RX ADMIN — BUDESONIDE AND FORMOTEROL FUMARATE DIHYDRATE 2 PUFF: 160; 4.5 AEROSOL RESPIRATORY (INHALATION) at 08:02

## 2023-12-20 RX ADMIN — Medication 1 TABLET: at 08:29

## 2023-12-20 NOTE — CASE MANAGEMENT/SOCIAL WORK
Continued Stay Note  [facility]     Patient Name: Sim Agustin  MRN: [id number]  Today's Date: 12/20/2023    Admit Date: 12/14/2023    Plan: [facility] has accepted and starting pre cert. Wife's backup choice of [facility] is not in network with [employer].   Discharge Plan       Row Name 12/20/23 1520       Plan    Plan [facility] has accepted and starting pre cert. Wife's backup choice of [facility] is not in network with [employer].    Patient/Family in Agreement with Plan yes    Plan Comments Spoke with wife this AM regarding rehab placement. Requests [facility] and [facility] referrals. Per Valerie/Osman Flores, they are not in network with [employer]. Per Luis, she discussed case with Dr Bonilla and they can accept to [facility] pending pre cert. Luis to start pre cert. Padilla Birmingham RN-BC                   Discharge Codes    No documentation.                 Expected Discharge Date and Time       Expected Discharge Date Expected Discharge Time    Dec 24, 2023               Padilla Birmingham RN

## 2023-12-20 NOTE — PLAN OF CARE
Goal Outcome Evaluation:  Plan of Care Reviewed With: patient        Progress: improving  Outcome Evaluation: No c/o pain or soa. Renal added IV lasix this morning. Replacing electrolytes per orders. [facility] has accepted patient, precert started today. Up to chair with PT. Skin care completed, turn [id number].

## 2023-12-20 NOTE — PLAN OF CARE
Goal Outcome Evaluation:  Plan of Care Reviewed With: patient        Progress: improving  Outcome Evaluation: Vitals stable. AOx4. CPAP+3L worn while sleeping - 1L/RA when awake. IV zosyn continued. Wound care completed to coccyx. Patient remains excoriated and tender in periarea. Prosthetic for L AKA at bedside. Q 2 turns. I&Os documented. New IV obtained - 18g RUE. Patient safety maintained

## 2023-12-20 NOTE — DISCHARGE PLACEMENT REQUEST
"Sim Alonso \"Don Alonso\" (67 y.o. Male)       Date of Birth   1955    Social Security Number       Address   [address] [place], [place] [address]    Home Phone   [phone]    MRN   [id number]       Rastafari   Islam    Marital Status   Single                            Admission Date   12/14/23    Admission Type   Emergency    Admitting Provider   Ruth Rosas MD    Attending Provider   Carl Fitch MD    Department, Room/Bed   [address], E455/1       Discharge Date       Discharge Disposition       Discharge Destination                                 Attending Provider: Carl Fitch MD    Allergies: Augmentin [Amoxicillin-pot Clavulanate]    Isolation: None   Infection: None   Code Status: CPR    Ht: 172.7 cm (67.99\")   Wt: 80.6 kg (177 lb 11.1 oz)    Admission Cmt: None   Principal Problem: Acute kidney injury [N17.9]                   Active Insurance as of 12/14/2023       Primary Coverage       Payor Plan Insurance Group Employer/Plan Group    [employer] MEDICARE REPLACEMENT [employer] MEDICARE REPLACEMENT [id number]       Payor Plan Address Payor Plan Phone Number Payor Plan Fax Number Effective Dates    [address] [phone]  7/1/2021 - None Entered    [place] [address]         Subscriber Name Subscriber Birth Date Member ID       SIM ALONSO 1955 [id number]               Secondary Coverage       Payor Plan Insurance Group Employer/Plan Group    [place]        Payor Plan Address Payor Plan Phone Number Payor Plan Fax Number Effective Dates    [address] [phone]  3/3/2020 - None Entered    [facility] [address]         Subscriber Name Subscriber Birth Date Member ID       SIM ALONSO 1955 [id number]                     Emergency Contacts        (Rel.) Home Phone Work Phone Mobile Phone    Cassie Anthony (Spouse) -- [phone]    John Alonso (Son) [phone] -- [phone]      "

## 2023-12-20 NOTE — PROGRESS NOTES
Name: Sim Agustin ADMIT: 2023   : 1955  PCP: Seth Hester MD    MRN: [id number] LOS: 6 days   AGE/SEX: 67 y.o. male  ROOM: [facility]     Subjective   Subjective     No acute events overnight. Scr improving    Review of Systems     Objective   Objective   Vital Signs  Temp:  [97.9 °F (36.6 °C)-99 °F (37.2 °C)] 97.9 °F (36.6 °C)  Heart Rate:  [72-85] 73  Resp:  [18-20] 20  BP: ()/(56-85) 98/56  SpO2:  [93 %-98 %] 97 %  on  Flow (L/min):  [1-3] 1;   Device (Oxygen Therapy): humidified;nasal cannula  Body mass index is 27.02 kg/m².  Physical Exam    General: Alert, sitting up in the bed,  not in distress, clinical appearing,  HEENT: Normocephalic, atraumatic  CV: Regular rate and rhythm, no murmurs rubs or gallops  Lungs: Manage, no wheezing, nonlabored breathing  Abdomen: Soft, nontender, nondistended  Extremities: No significant peripheral edema, left AKA    Results Review     I reviewed the patient's new clinical results.  Results from last 7 days   Lab Units 23  0447 23  0408 23  0445 23  0628   WBC 10*3/mm3 15.04* 13.41* 14.26* 17.41*   HEMOGLOBIN g/dL 10.1* 10.8* 11.4* 11.0*   PLATELETS 10*3/mm3 261 242 207 168     Results from last 7 days   Lab Units 23  0447 23  1258 23  0408 23  0445   SODIUM mmol/L 140 143 142 141   POTASSIUM mmol/L 3.3* 3.6 2.8* 3.4*   CHLORIDE mmol/L 106 110* 109* 109*   CO2 mmol/L 19.8* 20.7* 20.0* 19.4*   BUN mg/dL 40* 40* 43* 55*   CREATININE mg/dL 2.70* 2.69* 3.01* 3.39*   GLUCOSE mg/dL 92 100* 107* 92   Estimated Creatinine Clearance: 30.3 mL/min (A) (by C-G formula based on SCr of 2.7 mg/dL (H)).  Results from last 7 days   Lab Units 23  0447 23  1258 23  0408 23  0950 12/15/23  1057 23  1715   ALBUMIN g/dL 2.2* 2.5* 2.5* 2.5*   < > 4.1   BILIRUBIN mg/dL 0.4  --  0.3  --   --  1.2   ALK PHOS U/L 140*  --  145*  --   --  66   AST (SGOT) U/L 40  --  46*  --   --  23   ALT (SGPT) U/L 49*  " --  51*  --   --  14    < > = values in this interval not displayed.     Results from last 7 days   Lab Units 12/20/23  0447 12/19/23  1258 12/19/23  0408 12/18/23  0445 12/17/23  0950   CALCIUM mg/dL 7.7* 8.0* 8.4* 9.7 10.0   ALBUMIN g/dL 2.2* 2.5* 2.5*  --  2.5*   MAGNESIUM mg/dL 1.6  --  1.6 2.1 2.0   PHOSPHORUS mg/dL 2.9 3.4 1.7* 2.2* 2.0*     Results from last 7 days   Lab Units 12/14/23  2313 12/14/23 2003 12/14/23  1715   PROCALCITONIN ng/mL  --   --  0.26*   LACTATE mmol/L 1.4 4.4* 4.0*     COVID19   Date Value Ref Range Status   12/14/2023 Not Detected Not Detected - Ref. Range Final   05/18/2022 Not Detected Not Detected - Ref. Range Final     No results found for: \"HGBA1C\", \"POCGLU\"          XR Chest PA & Lateral  Narrative: XR CHEST PA AND LATERAL-     INDICATION: Follow-up pneumonia     COMPARISON: Chest radiographs dating back to 11/26/2015 CT abdomen  pelvis 12/14/2023     Impression: Small left pleural effusion. Similar left lower lobe  retrocardiac opacity suggest infectious/inflammatory process. This is  hard to discriminate from the pleural fluid on lateral injection. No  pneumothorax. Stable normal size cardiomediastinal silhouette. No focal  osseous abnormality.     This report was finalized on 12/18/2023 2:52 PM by Dr. Marco Smalls M.D on Workstation: [id number]       Scheduled Medications  allopurinol, 100 mg, Oral, Daily  ALPRAZolam, 0.25 mg, Oral, BID  aspirin, 81 mg, Oral, Nightly  brimonidine, 1 drop, Both Eyes, BID  budesonide-formoterol, 2 puff, Inhalation, BID - RT   And  tiotropium bromide monohydrate, 2 puff, Inhalation, Daily - RT  buPROPion XL, 300 mg, Oral, Daily  clopidogrel, 75 mg, Oral, Nightly  furosemide, 40 mg, Intravenous, Q12H  guaiFENesin, 600 mg, Oral, Q12H  heparin (porcine), 5,000 Units, Subcutaneous, Q8H  ipratropium-albuterol, 3 mL, Nebulization, 4x Daily - RT  isosorbide mononitrate, 30 mg, Oral, QAM  latanoprost, 1 drop, Both Eyes, Nightly  levothyroxine, 137 " mcg, Oral, Daily  Menthol-Zinc Oxide, 1 application , Topical, Q12H  multivitamin with minerals, 1 tablet, Oral, Daily  piperacillin-tazobactam, 3.375 g, Intravenous, Q8H    Infusions     Diet  Diet: Cardiac Diets; Healthy Heart (2-3 Na+); Texture: Regular Texture (IDDSI 7); Fluid Consistency: Thin (IDDSI 0)    I have personally reviewed     [x]  Laboratory   []  Microbiology   []  Radiology   []  EKG/Telemetry  []  Cardiology/Vascular   []  Pathology    []  Records       Assessment/Plan     Active Hospital Problems    Diagnosis  POA    **Acute kidney injury [N17.9]  Yes    Stage 3b chronic kidney disease (CKD) [N18.32]  Yes    Anemia [D64.9]  Yes    Leukocytosis [D72.829]  Yes    Hyponatremia [E87.1]  Yes    Hypokalemia [E87.6]  Yes    Lactic acidosis [E87.20]  Yes    Hypotension [I95.9]  Yes    Hypercalcemia [E83.52]  Yes    Hyperlipidemia [E78.5]  Yes    PRISCILLA treated with BiPAP [G47.33]  Yes    Hypothyroidism [E03.9]  Yes    CAD (coronary artery disease) [I25.10]  Yes    COPD (chronic obstructive pulmonary disease) [J44.9]  Yes    Essential hypertension [I10]  Yes      Resolved Hospital Problems   No resolved problems to display.       Acute respiratory failure with hypoxia  Sepsis secondary to suspected pneumonia  -Repeat PA lateral chest x-ray 12/18-Small left pleural effusion. Similar left lower lobe retrocardiac opacity suggest -infectious/inflammatory process.   -Status post vancomycin, was discontinued as MRSA nares was negative.  -Continue Zosyn for 7 days.  WBC somewhat improved. Patient has been afebrile.      ARLYN on CKD stage 3B-secondary to hypotension, hypovolemia, and severe hypercalcemia: Creatinine gradually improving.  Nephrology managing. IV diuretic to be administered today    Hypophosphatemia/hypokalemia.  Monitor  And resume per protocol as needed.    Hypercalcemia-Calcium level was elevated to 17 on arrival. PTH found to be 29.5. Etiology likely multifactorial from calcium supplementation and  volume contraction.  Resolved.  Nephrology managing.     Hyponatremia-resolved with IV fluids.     Transaminitis.  LFTs mildly elevated, AST 46, and ALT 51.  Monitor daily CMP.  If continues to trend up we will initiate workup and  may need to hold atorvastatin.  -Slowly improving.      Anemia.  Iron studies consistent with anemia of chronic disease, however iron saturation of 7 likely has underlying iron deficiency anemia.  Will need repeat iron studies once acute illness resolves.  However stable.    Hypertension complicated by hypotension prior to arrival-BP stable.  Home BP meds on hold.    Coronary artery disease-continue aspirin, Plavix, statin.    COPD-.  No evidence to suggest acute exacerbation.  Continue Symbicort, tiotropium,- DuoNebs.  Scheduled Mucinex.  Supplemental oxygen as needed to maintain O2 sats 88 to 92%, pulse oximetry,     Hypothyroidism- Continue levothyroxine as prescribed.     Hyperlipidemia-Continue Statin as prescribed.     PRISCILLA-Okay to use home machine if available.     Peripheral vascular disease-Complicated by compartment syndrome 2016 leading to left AKA.     Anxiety: Decrease Xanax to 0.25 mg twice daily as likely contributing to intermittent confusion in the setting of acute illness.        Full Code   Discussed with patient  Discussed in multidisciplinary rounds with nursing staff, CCP, pharmacy  Disposition: Home in two to three days          Dictated utilizing Dragon dictation        Carl Fitch MD  [facility]  12/20/23  12:46 EST

## 2023-12-20 NOTE — THERAPY TREATMENT NOTE
Patient Name: Sim Agustin  : 1955    MRN: [id number]                              Today's Date: 2023       Admit Date: 2023    Visit Dx:     ICD-10-CM ICD-9-CM   1. ARLYN (acute kidney injury)  N17.9 584.9   2. Hypercalcemia  E83.52 275.42     Patient Active Problem List   Diagnosis    H/O angiodysplasia of intestinal tract    COPD (chronic obstructive pulmonary disease)    S/P colectomy    Status post above-knee amputation of left lower extremity    CAD (coronary artery disease)    PVD (peripheral vascular disease)    Essential hypertension    Cardiomyopathy, unspecified    PRISCILLA treated with BiPAP    Anxiety disorder    Chronic midline low back pain without sciatica    Long term prescription benzodiazepine use    History of bradycardia    Hypothyroidism    Vitamin B12 deficiency    Iron deficiency    History of smoking 30 or more pack years    High risk medication use    DNR (do not resuscitate)    Stage 3a chronic kidney disease    Hyperlipidemia    Proteinuria    Junctional cardiac arrhythmia    Acute kidney injury    Hypercalcemia    Stage 3b chronic kidney disease (CKD)    Anemia    Leukocytosis    Hyponatremia    Hypokalemia    Lactic acidosis    Hypotension     Past Medical History:   Diagnosis Date    Acute respiratory failure with hypoxia and hypercarbia 2019    Acute upper respiratory infection 2013    Amputee, above knee, left     Anemia     per [place]    ARF (acute renal failure) 2016    Asthma 2018    Atelectasis, left 2016    Bradycardia 2019    CAD (coronary artery disease) 2016    Chronic obstructive pulmonary disease with acute exacerbation 2013    Clotting disorder 2003    Colon polyps     Compartment syndrome     AFTER ILIAC SURGERY. ABOVE KNEE AMPUTATION    COPD (chronic obstructive pulmonary disease)     COPD with hypoxia 2019    Cough     SINCE APRIL WITH PNEUMONIA.     Demand myocardial infarction  05/19/2022    Disease of thyroid gland     HYPOTHYRODISM    Diverticulosis     Elevated hematocrit 01/06/2021    Employs prosthetic leg     ESRD (end stage renal disease) on dialysis 04/06/2016    Fracture of patella 03/03/2015    History of acute renal failure     History of CHF (congestive heart failure)     History of GI bleed 02/2016    AORTODUOD FISTULA    History of sepsis 02/2016    History of transfusion     MULTIPLE, 2016    Hyperkalemia 04/11/2016    Hyperlipidemia     Hypertension     Hypotension     Hypotension 03/04/2016    Left lower lobe pneumonia 05/18/2022    Nonischemic cardiomyopathy     Nosocomial pneumonia 04/06/2016    Phantom limb pain     SL, WITH LEFT ABOUVE KNEE    Pleural effusion on left 04/11/2016    Pneumonia     SPRING 2016  AFTER SURGERY    Pulmonary embolism     PVD (peripheral vascular disease)     Renal insufficiency     S/P thoracentesis 04/11/2016    Sepsis, unspecified organism 04/05/2016     Past Surgical History:   Procedure Laterality Date    ABOVE KNEE AMPUTATION Left 03/16/2016    Procedure: LT AMPUTATION ABOVE KNEE;  Surgeon: Jose Swann MD;  Location: [facility] MAIN OR;  Service:     ARTERIAL THROMBECTOMY  2001    throbectomy of arterial graft    AXILLARY FEMORAL BYPASS Right 02/15/2016    Exploratory lapartomy, repair aortoduodenal fistula, resection infected aortobifemoral bypass graft, axillobi-superficial femoral artery [place]-Aneudy bypass graft from right axillary artery, Repair of duodenotomy 4th portion duodenum-Dr. Jose Swann, Dr. Genaro Perrin    BRONCHOSCOPY Left 03/04/2016    Dr. NATALIIA Tate    BRONCHOSCOPY Left 05/20/2022    Procedure: BRONCHOSCOPY WITH BIOPSIES, BRUSHINGS, AND BAL UNDER FLUORO;  Surgeon: Ishmael Tate Jr., MD;  Location: [facility] ENDOSCOPY;  Service: Pulmonary;  Laterality: Left;  PRE OP - LLL CONSOLIDATION  POST OP - SAME    BRONCHOSCOPY RIGID / FLEXIBLE N/A 03/03/2016    Dr. NATALIIA Tate    BRONCHOSCOPY RIGID / FLEXIBLE N/A  02/26/2016    Dr. NATALIIA Tate    CARDIAC CATHETERIZATION N/A 03/18/2019    Procedure: Right and Left Heart Cath;  Surgeon: Nina Storey MD;  Location: [facility] CATH INVASIVE LOCATION;  Service: Cardiovascular    CATARACT EXTRACTION WITH INTRAOCULAR LENS IMPLANT Bilateral     COLON RESECTION WITH COLOSTOMY Left 02/28/2016    Exploratory laparotomy with open left hemicolectomy, end-colostomy, G-tube and J-tube-Dr. Endy Chaney    COLON SURGERY  3/25/2015    COLOSTOMY    COLONOSCOPY N/A 2015    Dr. Laughlin    COLONOSCOPY N/A 10/12/2016    Procedure: COLONOSCOPY TO 20 CM AND PER STOMA TO 30CM;  Surgeon: Genaro Perrin MD;  Location: [facility] ENDOSCOPY;  Service:     COLONOSCOPY N/A 10/21/2016    Procedure: COLONOSCOPY DONE AT BEDSIDE ONTO CECEM;  Surgeon: Genaro Perrin MD;  Location: [facility] ENDOSCOPY;  Service:     COLONOSCOPY N/A 02/10/2016    Diverticulosis in the entire examined colon, non-bleeding internal hemorrhoids-Dr. Taylor Pina    COLONOSCOPY N/A 02/11/2016    Poor prep, blood in the entire examined colon, normal ileum-Dr. Taylor Pina    COLONOSCOPY W/ POLYPECTOMY N/A 07/27/2015    Normal ileum, diverticulosis in the sigmoid colon: resected and retrieved, one 6 mm polyp in the descending colon: resected and retrieved, the distal rectum and anal verge are normal on retroflexion view-Dr. Miguel Ángel Laughlin    COLOSTOMY CLOSURE N/A 10/13/2016    Procedure: COLOSTOMY TAKEDOWN OR CLOSURE, APPENDECTOMY;  Surgeon: Genaro Perrin MD;  Location: [facility] OR;  Service:     DEBRIDEMENT LEG Left 03/01/2016    Excisional debridement of the skin, subcutantous tissue, tendon and muscle left calf-Dr. Jose Swann    DEBRIDEMENT LEG Left 02/25/2016    Excisional debridement full-thcikness skin and subcutaneous tissue and tendon and muscle, left medial and lateral calf muscles-Dr. Jose Swann    ENDOSCOPY N/A 10/21/2016    Procedure: ESOPHAGOGASTRODUODENOSCOPY DONE AT BEDSIDE;  Surgeon: Genaro SARAVIA  MD Marychuy;  Location: [facility] ENDOSCOPY;  Service:     ENDOSCOPY AND COLONOSCOPY N/A 02/28/2016    EGD and Colonoscopy with Candy ink injection-Dr. Endy Chaney    ENTEROSCOPY SMALL BOWEL N/A 02/11/2016    Normal esophagus, normal stomach, normal duodenum, portion of the jejunum normal-Dr. Taylor Pina    ILIAC ARTERY - FEMORAL ARTERY BYPASS GRAFT Bilateral 2002    ILIAC ARTERY STENT Bilateral 2001    INSERTION AND REMOVAL PERITONEAL DIALYSIS CATHETER Right 02/29/2016    Exchange right jugular 16 cm Mahurkar dialysis catheter-Dr. Jose Swann    INSERTION PERITONEAL DIALYSIS CATHETER Left 03/01/2016    Ultrasound-guided access of the left jugular vein, 23 cm left jugular palindrome dialysis catheter placement-Dr. Jose Swann    INSERTION PERITONEAL DIALYSIS CATHETER Right 02/18/2016    Right jugular Mahrkar catherer placement-Dr. Jose Swann    JOINT REPLACEMENT Left     THORACOSCOPY Left 04/18/2016    Procedure: LT THORACOSCOPY VIDEO ASSISTED WITH DECORDICATION;  Surgeon: Genaro Davial III, MD;  Location: [facility] MAIN OR;  Service:     THROMBECTOMY Left 02/16/2016    Thombectomy of left femoral graft, left leg arteriogram, left calf forward compartment fasciotomy-Dr. Jose Swann    TOTAL HIP ARTHROPLASTY Left     UPPER GASTROINTESTINAL ENDOSCOPY N/A 02/09/2016    Normal UGI-Dr. Ajay Parkinson    UPPER GASTROINTESTINAL ENDOSCOPY N/A 11/28/2015    Small hiatal hernia, chronic gastritis: biopsied, non-bleeding angioectasias in the stomach: treated with argon plasma coagulation, multiple bleeding angioectasias in the dudenum: treated with argon plasma coagulation-Dr. Mykel Jaramillo    VASCULAR SURGERY      MULTIPLE    VENTRAL/INCISIONAL HERNIA REPAIR N/A 10/19/2017    Procedure: VENTRAL HERNIA REPAIR WITH MESH AND BILATERAL COMPONENT SEPARATION;  Surgeon: Genaro Perrin MD;  Location: [facility] MAIN OR;  Service:     WISDOM TOOTH EXTRACTION        General Information       Row Name 12/20/23  1631          Physical Therapy Time and Intention    Document Type therapy note (daily note)  -EB     Mode of Treatment co-treatment;occupational therapy;physical therapy  -EB       Row Name 12/20/23 1631          General Information    Existing Precautions/Restrictions fall;oxygen therapy device and L/min  Left AKA, prosthesis at bedside.  -EB       Row Name 12/20/23 1631          Cognition    Orientation Status (Cognition) oriented x 3  -EB       Row Name 12/20/23 1631          Safety Issues, Functional Mobility    Impairments Affecting Function (Mobility) balance;endurance/activity tolerance;strength;shortness of breath  -EB     Comment, Safety Issues/Impairments (Mobility) Co treatment medically appropriate and necessary due to patient acuity level, activity tolerance and safety of patient and staff. Treatment is focusing on progression of care and goals established in the POC.  -EB               User Key  (r) = Recorded By, (t) = Taken By, (c) = Cosigned By      Initials Name Provider Type    EB Sameera Osborne PTA Physical Therapist Assistant                   Mobility       Row Name 12/20/23 1632          Bed Mobility    Supine-Sit [place] (Bed Mobility) contact guard  -EB     Sit-Supine [place] (Bed Mobility) not tested  -EB     Assistive Device (Bed Mobility) bed rails;head of bed elevated  -EB     Comment, (Bed Mobility) increased time  -EB       Row Name 12/20/23 1632          Sit-Stand Transfer    Sit-Stand [place] (Transfers) minimum assist (75% patient effort);2 person assist;moderate assist (50% patient effort)  -EB     Assistive Device (Sit-Stand Transfers) walker, front-wheeled  -EB       Row Name 12/20/23 1632          Gait/Stairs (Locomotion)    [place] Level (Gait) moderate assist (50% patient effort);2 person assist  -EB     Assistive Device (Gait) walker, front-wheeled  -EB     Distance in Feet (Gait) 3ft  -EB     Deviations/Abnormal Patterns (Gait) gait speed  decreased;chaz decreased;stride length decreased  -EB     Comment, (Gait/Stairs) pt able to hop to the chair.  -EB               User Key  (r) = Recorded By, (t) = Taken By, (c) = Cosigned By      Initials Name Provider Type    Sameera Alejo PTA Physical Therapist Assistant                   Obj/Interventions       Row Name 12/20/23 7700          Balance    Balance Assessment sitting static balance;sitting dynamic balance  -EB     Static Sitting Balance standby assist  -EB     Dynamic Sitting Balance standby assist  -EB     Position, Sitting Balance sitting edge of bed  -EB     Static Standing Balance minimal assist  -EB               User Key  (r) = Recorded By, (t) = Taken By, (c) = Cosigned By      Initials Name Provider Type    Sameera Alejo PTA Physical Therapist Assistant                   Goals/Plan    No documentation.                  Clinical Impression       Row Name 12/20/23 1633          Plan of Care Review    Plan of Care Reviewed With patient  -EB     Progress improving  -EB     Outcome Evaluation Pt seen for PT treatment today. Pt is CGA with bed mobility and SBA with sitting EOB. Pt required increased time and rest breaks due to SOA. Pt stood with MinAX2/ModA and rwx. Pt declined to don left prosthetic to get to the chair. Pt had a hop gait pattern with ModAX2 to get to the chair. Will continue to progress pt as able.  -EB       Row Name 12/20/23 0563          Therapy Assessment/Plan (PT)    Therapy Frequency (PT) 6 times/wk  -       Row Name 12/20/23 1632          Positioning and Restraints    Pre-Treatment Position in bed  -EB     Post Treatment Position chair  -EB     In Chair sitting;call light within reach;encouraged to call for assist;exit alarm on  -EB               User Key  (r) = Recorded By, (t) = Taken By, (c) = Cosigned By      Initials Name Provider Type    Sameera Alejo PTA Physical Therapist Assistant                   Outcome Measures       Row Name 12/20/23 1640  12/20/23 0829       How much help from another person do you currently need...    Turning from your back to your side while in flat bed without using bedrails? 3  -EB 3  -JH    Moving from lying on back to sitting on the side of a flat bed without bedrails? 3  -EB 3  -JH    Moving to and from a bed to a chair (including a wheelchair)? 2  -EB 2  -JH    Standing up from a chair using your arms (e.g., wheelchair, bedside chair)? 2  -EB 2  -JH    Climbing 3-5 steps with a railing? 1  -EB 1  -JH    To walk in hospital room? 2  -EB 1  -JH    AM-PAC 6 Clicks Score (PT) 13  -EB 12  -JH    Highest Level of Mobility Goal 4 --> Transfer to chair/commode  -EB 4 --> Transfer to chair/commode  -JH              User Key  (r) = Recorded By, (t) = Taken By, (c) = Cosigned By      Initials Name Provider Type    Yun Orr RN Registered Nurse    Sameera Alejo PTA Physical Therapist Assistant                                 Physical Therapy Education       Title: PT OT SLP Therapies (Done)       Topic: Physical Therapy (Done)       Point: Mobility training (Done)       Learning Progress Summary             Patient Acceptance, E,D, VU,NR by EB at 12/20/2023 1640    Acceptance, E,D, VU by EB at 12/19/2023 1141    Acceptance, E,TB, VU,NR by CB at 12/18/2023 1202    Acceptance, E,D, VU,NR by EB at 12/17/2023 1559    Acceptance, E, VU by EM at 12/15/2023 1531                         Point: Home exercise program (Done)       Learning Progress Summary             Patient Acceptance, E,D, VU by EB at 12/19/2023 1141    Acceptance, E,TB, VU,NR by CB at 12/18/2023 1202                         Point: Body mechanics (Done)       Learning Progress Summary             Patient Acceptance, E,D, VU,NR by EB at 12/20/2023 1640    Acceptance, E,D, VU by EB at 12/19/2023 1141    Acceptance, E,TB, VU,NR by CB at 12/18/2023 1202    Acceptance, E,D, VU,NR by EB at 12/17/2023 1559                         Point: Precautions (Done)       Learning  Progress Summary             Patient Acceptance, E,D, VU,NR by EB at 12/20/2023 1640    Acceptance, E,D, VU by EB at 12/19/2023 1141    Acceptance, E,TB, VU,NR by CB at 12/18/2023 1202    Acceptance, E,D, VU,NR by EB at 12/17/2023 1559                                         User Key       Initials Effective Dates Name Provider Type Discipline    EM 06/16/21 -  Petrona Vazquez PT Physical Therapist PT    EB 02/14/23 -  Sameera Osborne PTA Physical Therapist Assistant PT    CB 10/22/21 -  Patti Campbell PT Physical Therapist PT                  PT Recommendation and Plan     Plan of Care Reviewed With: patient  Progress: improving  Outcome Evaluation: Pt seen for PT treatment today. Pt is CGA with bed mobility and SBA with sitting EOB. Pt required increased time and rest breaks due to SOA. Pt stood with MinAX2/ModA and rwx. Pt declined to don left prosthetic to get to the chair. Pt had a hop gait pattern with ModAX2 to get to the chair. Will continue to progress pt as able.     Time Calculation:         PT Charges       Row Name 12/20/23 1630             Time Calculation    Start Time 1504  -EB      Stop Time 1527  -EB      Time Calculation (min) 23 min  -EB      PT Received On 12/20/23  -      PT - Next Appointment 12/21/23  -         Time Calculation- PT    Total Timed Code Minutes- PT 23 minute(s)  -EB                User Key  (r) = Recorded By, (t) = Taken By, (c) = Cosigned By      Initials Name Provider Type    EB Sameera Osborne PTA Physical Therapist Assistant                  Therapy Charges for Today       Code Description Service Date Service Provider Modifiers Qty    74621198364 HC PT THERAPEUTIC ACT EA 15 MIN 12/19/2023 Sameera Osborne PTA GP 1    26085371377 HC PT THER PROC EA 15 MIN 12/19/2023 Sameera Osborne PTA GP 1    14148192834 HC PT THERAPEUTIC ACT EA 15 MIN 12/20/2023 Sameera Osborne PTA GP 2            PT G-Codes  Outcome Measure Options: AM-PAC 6 Clicks Basic Mobility (PT)  AM-PAC 6  Clicks Score (PT): 13  AM-PAC 6 Clicks Score (OT): 17       Sameera Osborne, [place]  12/20/2023

## 2023-12-20 NOTE — PROGRESS NOTES
Nutrition Services    Patient Name:  Sim Agustin  YOB: 1955  MRN: [id number]  Admit Date:  12/14/2023  Assessment Date:  12/20/23    Summary: Nutrition Follow Up    Cough, short winded.  Replacing K.  IV lasix.  Multiple liquid Bms.  Excoriated and tender in periarea.  Eating 0-50% at meals.  Plans for rehab at [place].    Unavailable at time of attempted visit.    RD to continue to follow.    CLINICAL NUTRITION ASSESSMENT      Reason for Assessment Follow-up Protocol     Diagnosis/Problem  hypotensive, s/p fall, weakness, ARLYN/CKD     Anthropometrics        Current Height  Current Weight  BMI kg/m2    Weight: 80.6 kg (177 lb 11.1 oz) (12/20/23 0500)  Body mass index is 27.02 kg/m².   Adjusted BMI (if applicable)    BMI Category Overweight (25 - 29.9)   Ideal Body Weight (IBW) 150   Usual Body Weight (UBW) 170   Weight Trend Loss, Gain   --  Labs       Pertinent Labs    Results from last 7 days   Lab Units 12/20/23 0447 12/19/23 1258 12/19/23 0408 12/15/23  0432 12/14/23  1715   SODIUM mmol/L 140 143 142   < > 134*   POTASSIUM mmol/L 3.3* 3.6 2.8*   < > 3.3*   CHLORIDE mmol/L 106 110* 109*   < > 85*   CO2 mmol/L 19.8* 20.7* 20.0*   < > 32.9*   BUN mg/dL 40* 40* 43*   < > 61*   CREATININE mg/dL 2.70* 2.69* 3.01*   < > 5.09*   CALCIUM mg/dL 7.7* 8.0* 8.4*   < > 17.0*   BILIRUBIN mg/dL 0.4  --  0.3  --  1.2   ALK PHOS U/L 140*  --  145*  --  66   ALT (SGPT) U/L 49*  --  51*  --  14   AST (SGOT) U/L 40  --  46*  --  23   GLUCOSE mg/dL 92 100* 107*   < > 165*    < > = values in this interval not displayed.     Results from last 7 days   Lab Units 12/20/23 0447 12/19/23  1258 12/19/23 0408 12/18/23  0445   MAGNESIUM mg/dL 1.6  --  1.6 2.1   PHOSPHORUS mg/dL 2.9   < > 1.7* 2.2*   HEMOGLOBIN g/dL 10.1*  --  10.8* 11.4*   HEMATOCRIT % 31.6*  --  30.6* 33.2*   WBC 10*3/mm3 15.04*  --  13.41* 14.26*   ALBUMIN g/dL 2.2*   < > 2.5*  --     < > = values in this interval not displayed.     Results from last 7  days   Lab Units 12/20/23  0447 12/19/23  0408 12/18/23  0445 12/17/23  0628 12/16/23  0607   PLATELETS 10*3/mm3 261 242 207 168 166     COVID19   Date Value Ref Range Status   12/14/2023 Not Detected Not Detected - Ref. Range Final     Lab Results   Component Value Date    HGBA1C 5.00 12/16/2023          Medications           Scheduled Medications allopurinol, 100 mg, Oral, Daily  ALPRAZolam, 0.25 mg, Oral, BID  aspirin, 81 mg, Oral, Nightly  brimonidine, 1 drop, Both Eyes, BID  budesonide-formoterol, 2 puff, Inhalation, BID - RT   And  tiotropium bromide monohydrate, 2 puff, Inhalation, Daily - RT  buPROPion XL, 300 mg, Oral, Daily  clopidogrel, 75 mg, Oral, Nightly  furosemide, 40 mg, Intravenous, Q12H  guaiFENesin, 600 mg, Oral, Q12H  heparin (porcine), 5,000 Units, Subcutaneous, Q8H  ipratropium-albuterol, 3 mL, Nebulization, 4x Daily - RT  isosorbide mononitrate, 30 mg, Oral, QAM  latanoprost, 1 drop, Both Eyes, Nightly  levothyroxine, 137 mcg, Oral, Daily  Menthol-Zinc Oxide, 1 application , Topical, Q12H  multivitamin with minerals, 1 tablet, Oral, Daily  piperacillin-tazobactam, 3.375 g, Intravenous, Q8H       Infusions       PRN Medications   acetaminophen    albuterol    benzonatate    senna-docusate sodium **AND** polyethylene glycol **AND** bisacodyl **AND** bisacodyl    melatonin    ondansetron **OR** ondansetron     Physical Findings          General Findings alert, oriented, overweight, on oxygen therapy   Oral/Mouth Cavity tooth or teeth missing   Edema  lower extremity , upper extremity, 1+ (trace)   Gastrointestinal fecal incontinence, last bowel movement: 12/20   Skin  bruising, pressure injury: coccyx stage 2   Tubes/Drains/Lines none   NFPE No clinical signs of muscle wasting or fat loss   --  Estimated/Assessed Needs        Current Weight  Weight: 80.6 kg (177 lb 11.1 oz) (12/20/23 0500)       Energy Requirements    Weight for Calculation 72.3 kg   Method for Estimation  30 kcal/kg   EST Needs  (kcal/day) 2169       Protein Requirements    Weight for Calculation 72.3 kg   EST Protein Needs (g/kg) 1.0 - 1.2 gm/kg   EST Daily Needs (g/day) 72-86       Fluid Requirements     Method for Estimation 1 mL/kcal    EST Needs (mL/day)      Current Nutrition Orders & Evaluation of Intake       Oral Nutrition     Food Allergies NKFA   Current PO Diet Diet: Cardiac Diets; Healthy Heart (2-3 Na+); Texture: Regular Texture (IDDSI 7); Fluid Consistency: Thin (IDDSI 0)   Supplement n/a   PO Evaluation     % PO Intake 0-50%    Factors Affecting Intake: decreased appetite   --  PES STATEMENT / NUTRITION DIAGNOSIS      Nutrition Dx Problem  Problem: Inadequate Oral Intake and Increased Nutrient Needs  Etiology: Medical Diagnosis - hypotensive    Signs/Symptoms: PO intake and Other (comment) stage 2 PI     NUTRITION INTERVENTION / PLAN OF CARE      Intervention Goal(s) Maintain nutrition status, Reduce/improve symptoms, Meet estimated needs, Disease management/therapy, Maintain weight, and PO intake goal %: 75+         RD Intervention/Action Interview for preferences, Advise alternative selection, Encourage intake, Continue to monitor, and Care plan reviewed   --      Prescription/Orders:       PO Diet       Supplements       Enteral Nutrition       Parenteral Nutrition    New Prescription Ordered? Continue same per protocol   --      Monitor/Evaluation Per protocol   Discharge Plan/Needs Pending clinical course   --    RD to follow per protocol.      Electronically signed by:  Selam Vargas RD  12/20/23 15:25 EST

## 2023-12-20 NOTE — PLAN OF CARE
Goal Outcome Evaluation:  Plan of Care Reviewed With: patient           Outcome Evaluation: Pt fowlers in bed on OT arrival and agreeable to treatment. Pt CGA with bed mobility and donned shoe with SBA at EOB. Pt requires increased time and rest breaks. Pt STS with MIN/MOD Ax2 and declined donning L prosthetic for transfer d/t decreased endurance. Pt hopped with MOD Ax2 to chair and was UIC on OT departure. Pt tolerated treatment fair with increased activity tolerance noted on this date. Continue with current POC.      Anticipated Discharge Disposition (OT): home with 24/7 care, sub acute care setting

## 2023-12-20 NOTE — PROGRESS NOTES
[facility] Progress Note      Patient Name: Sim Agustin  : 1955  MRN: [id number]  Primary Care Physician:  Seth Hester MD  Date of admission: 2023    Subjective     Interval History:   Follow-up acute kidney injury on CKD 3B.  Feels a little short winded.  Audible secretions in his chest.  Weak cough.  A little hungry.  Output not quantified because he is incontinent.  Multiple bowel movements, liquid.  Some abdominal cramping when bowels move.      Review of Systems:   As noted above    Objective     Vitals:   Temp:  [97.9 °F (36.6 °C)-99 °F (37.2 °C)] 97.9 °F (36.6 °C)  Heart Rate:  [72-85] 72  Resp:  [18-20] 20  BP: ()/(56-85) 98/56  Flow (L/min):  [1-3] 1    Intake/Output Summary (Last 24 hours) at 2023 0802  Last data filed at 2023 0725  Gross per 24 hour   Intake 360 ml   Output --   Net 360 ml       Physical Exam:    General Appearance: alert, oriented x 3, no acute distress.    Skin: warm and dry  HEENT: oral mucosa dry, nonicteric sclera  Neck: supple, no JVD  Lungs: Coarse upper airway rhonchi.  Heart: RRR, normal S1 and S2  Abdomen: soft, nontender, nondistended.+bs  : no palpable bladder  Extremities: 1+ lower extremity edema right lower extremity, left lower extremity AKA.  Neuro: normal speech and mental status     Scheduled Meds:     allopurinol, 100 mg, Oral, Daily  ALPRAZolam, 0.25 mg, Oral, BID  aspirin, 81 mg, Oral, Nightly  brimonidine, 1 drop, Both Eyes, BID  budesonide-formoterol, 2 puff, Inhalation, BID - RT   And  tiotropium bromide monohydrate, 2 puff, Inhalation, Daily - RT  buPROPion XL, 300 mg, Oral, Daily  clopidogrel, 75 mg, Oral, Nightly  guaiFENesin, 600 mg, Oral, Q12H  heparin (porcine), 5,000 Units, Subcutaneous, Q8H  ipratropium-albuterol, 3 mL, Nebulization, 4x Daily - RT  isosorbide mononitrate, 30 mg, Oral, QAM  latanoprost, 1 drop, Both Eyes, Nightly  levothyroxine, 137 mcg, Oral, Daily  Menthol-Zinc Oxide, 1  application , Topical, Q12H  multivitamin with minerals, 1 tablet, Oral, Daily  piperacillin-tazobactam, 3.375 g, Intravenous, Q8H  potassium chloride, 20 mEq, Oral, Once  potassium chloride, 40 mEq, Oral, Once      IV Meds:   Pharmacy to Dose Zosyn,         Results Reviewed:   I have personally reviewed the results from the time of this admission to 12/20/2023 08:02 EST     Results from last 7 days   Lab Units 12/20/23 0447 12/19/23 1258 12/19/23 0408 12/15/23  0432 12/14/23  1715   SODIUM mmol/L 140 143 142   < > 134*   POTASSIUM mmol/L 3.3* 3.6 2.8*   < > 3.3*   CHLORIDE mmol/L 106 110* 109*   < > 85*   CO2 mmol/L 19.8* 20.7* 20.0*   < > 32.9*   BUN mg/dL 40* 40* 43*   < > 61*   CREATININE mg/dL 2.70* 2.69* 3.01*   < > 5.09*   CALCIUM mg/dL 7.7* 8.0* 8.4*   < > 17.0*   BILIRUBIN mg/dL 0.4  --  0.3  --  1.2   ALK PHOS U/L 140*  --  145*  --  66   ALT (SGPT) U/L 49*  --  51*  --  14   AST (SGOT) U/L 40  --  46*  --  23   GLUCOSE mg/dL 92 100* 107*   < > 165*    < > = values in this interval not displayed.       Estimated Creatinine Clearance: 30.3 mL/min (A) (by C-G formula based on SCr of 2.7 mg/dL (H)).    Results from last 7 days   Lab Units 12/20/23 0447 12/19/23  1258 12/19/23 0408 12/18/23  0445   MAGNESIUM mg/dL 1.6  --  1.6 2.1   PHOSPHORUS mg/dL 2.9 3.4 1.7* 2.2*             Results from last 7 days   Lab Units 12/20/23 0447 12/19/23  0408 12/18/23  0445 12/17/23  0628 12/16/23  0607   WBC 10*3/mm3 15.04* 13.41* 14.26* 17.41* 18.65*   HEMOGLOBIN g/dL 10.1* 10.8* 11.4* 11.0* 10.3*   PLATELETS 10*3/mm3 261 242 207 168 166             Assessment / Plan     ASSESSMENT:  Acute kidney injury on CKD 3B baseline creatinine 1.7.  Multifactorial with hypotension, hypovolemia and hypercalcemia.  Replacing potassium.  Waste products at plateau.  Magnesium and phosphorus replete.  2.  Hypercalcemia.  Calcium now corrects to 9.3 given albumin 2.2.  PTH in normal range which is inappropriate for his level of  hypercalcemia on admission.  3.  Hypotension resolved, 1 low reading this morning.  Will repeat.  4.  Referral arterial disease, left AKA.  5.  Anemia, hemoglobin stable.  6.  Transaminitis resolving.  Statin on hold.  7.  Hypothyroid on replacement.  8.  Coronary artery disease on aspirin, Plavix .  Statin on hold.    9.  Shortness of air, upper airway rhonchi.  Chest x-ray 12/18 with left lower lobe opacity.  Chest CT 11/18/2023 with 12 mm stellate left upper lobe nodule.  Reticulonodular infiltrate left base.  On Zosyn day 5.  Needs pulmonary toilet.  PLAN:  Replace potassium.  2.  IV Lasix today.  Thank you for involving us in the care of Sim Agustin.  Please feel free to call with any questions.    Colleen Tesfaye MD  12/20/23  08:02 [facility]    [facility]  [phone]    Please note that portions of this note were completed with a voice recognition program.

## 2023-12-20 NOTE — THERAPY TREATMENT NOTE
Patient Name: Sim Agustin  : 1955    MRN: [id number]                              Today's Date: 2023       Admit Date: 2023    Visit Dx:     ICD-10-CM ICD-9-CM   1. ARLYN (acute kidney injury)  N17.9 584.9   2. Hypercalcemia  E83.52 275.42     Patient Active Problem List   Diagnosis    H/O angiodysplasia of intestinal tract    COPD (chronic obstructive pulmonary disease)    S/P colectomy    Status post above-knee amputation of left lower extremity    CAD (coronary artery disease)    PVD (peripheral vascular disease)    Essential hypertension    Cardiomyopathy, unspecified    PRISCILLA treated with BiPAP    Anxiety disorder    Chronic midline low back pain without sciatica    Long term prescription benzodiazepine use    History of bradycardia    Hypothyroidism    Vitamin B12 deficiency    Iron deficiency    History of smoking 30 or more pack years    High risk medication use    DNR (do not resuscitate)    Stage 3a chronic kidney disease    Hyperlipidemia    Proteinuria    Junctional cardiac arrhythmia    Acute kidney injury    Hypercalcemia    Stage 3b chronic kidney disease (CKD)    Anemia    Leukocytosis    Hyponatremia    Hypokalemia    Lactic acidosis    Hypotension     Past Medical History:   Diagnosis Date    Acute respiratory failure with hypoxia and hypercarbia 2019    Acute upper respiratory infection 2013    Amputee, above knee, left     Anemia     per [place]    ARF (acute renal failure) 2016    Asthma 2018    Atelectasis, left 2016    Bradycardia 2019    CAD (coronary artery disease) 2016    Chronic obstructive pulmonary disease with acute exacerbation 2013    Clotting disorder 2003    Colon polyps     Compartment syndrome     AFTER ILIAC SURGERY. ABOVE KNEE AMPUTATION    COPD (chronic obstructive pulmonary disease)     COPD with hypoxia 2019    Cough     SINCE APRIL WITH PNEUMONIA.     Demand myocardial infarction  05/19/2022    Disease of thyroid gland     HYPOTHYRODISM    Diverticulosis     Elevated hematocrit 01/06/2021    Employs prosthetic leg     ESRD (end stage renal disease) on dialysis 04/06/2016    Fracture of patella 03/03/2015    History of acute renal failure     History of CHF (congestive heart failure)     History of GI bleed 02/2016    AORTODUOD FISTULA    History of sepsis 02/2016    History of transfusion     MULTIPLE, 2016    Hyperkalemia 04/11/2016    Hyperlipidemia     Hypertension     Hypotension     Hypotension 03/04/2016    Left lower lobe pneumonia 05/18/2022    Nonischemic cardiomyopathy     Nosocomial pneumonia 04/06/2016    Phantom limb pain     SL, WITH LEFT ABOUVE KNEE    Pleural effusion on left 04/11/2016    Pneumonia     SPRING 2016  AFTER SURGERY    Pulmonary embolism     PVD (peripheral vascular disease)     Renal insufficiency     S/P thoracentesis 04/11/2016    Sepsis, unspecified organism 04/05/2016     Past Surgical History:   Procedure Laterality Date    ABOVE KNEE AMPUTATION Left 03/16/2016    Procedure: LT AMPUTATION ABOVE KNEE;  Surgeon: Jose Swann MD;  Location: [facility] MAIN OR;  Service:     ARTERIAL THROMBECTOMY  2001    throbectomy of arterial graft    AXILLARY FEMORAL BYPASS Right 02/15/2016    Exploratory lapartomy, repair aortoduodenal fistula, resection infected aortobifemoral bypass graft, axillobi-superficial femoral artery Hepler-Aneudy bypass graft from right axillary artery, Repair of duodenotomy 4th portion duodenum-Dr. Jose Swann, Dr. Genaro Perrin    BRONCHOSCOPY Left 03/04/2016    Dr. NATALIIA Tate    BRONCHOSCOPY Left 05/20/2022    Procedure: BRONCHOSCOPY WITH BIOPSIES, BRUSHINGS, AND BAL UNDER FLUORO;  Surgeon: Ishmael Tate Jr., MD;  Location: [facility] ENDOSCOPY;  Service: Pulmonary;  Laterality: Left;  PRE OP - LLL CONSOLIDATION  POST OP - SAME    BRONCHOSCOPY RIGID / FLEXIBLE N/A 03/03/2016    Dr. NATALIIA Tate    BRONCHOSCOPY RIGID / FLEXIBLE N/A  02/26/2016    Dr. NATALIIA Tate    CARDIAC CATHETERIZATION N/A 03/18/2019    Procedure: Right and Left Heart Cath;  Surgeon: Nina Storey MD;  Location: [facility] CATH INVASIVE LOCATION;  Service: Cardiovascular    CATARACT EXTRACTION WITH INTRAOCULAR LENS IMPLANT Bilateral     COLON RESECTION WITH COLOSTOMY Left 02/28/2016    Exploratory laparotomy with open left hemicolectomy, end-colostomy, G-tube and J-tube-Dr. Endy Chaney    COLON SURGERY  3/25/2015    COLOSTOMY    COLONOSCOPY N/A 2015    Dr. Laughlin    COLONOSCOPY N/A 10/12/2016    Procedure: COLONOSCOPY TO 20 CM AND PER STOMA TO 30CM;  Surgeon: Genaro Perrin MD;  Location: [facility] ENDOSCOPY;  Service:     COLONOSCOPY N/A 10/21/2016    Procedure: COLONOSCOPY DONE AT BEDSIDE ONTO CECEM;  Surgeon: Genaro Perrin MD;  Location: [facility] ENDOSCOPY;  Service:     COLONOSCOPY N/A 02/10/2016    Diverticulosis in the entire examined colon, non-bleeding internal hemorrhoids-Dr. Taylor Pina    COLONOSCOPY N/A 02/11/2016    Poor prep, blood in the entire examined colon, normal ileum-Dr. Taylor Pina    COLONOSCOPY W/ POLYPECTOMY N/A 07/27/2015    Normal ileum, diverticulosis in the sigmoid colon: resected and retrieved, one 6 mm polyp in the descending colon: resected and retrieved, the distal rectum and anal verge are normal on retroflexion view-Dr. Miguel Ángel Laughlin    COLOSTOMY CLOSURE N/A 10/13/2016    Procedure: COLOSTOMY TAKEDOWN OR CLOSURE, APPENDECTOMY;  Surgeon: Genaro Perrin MD;  Location: [facility] OR;  Service:     DEBRIDEMENT LEG Left 03/01/2016    Excisional debridement of the skin, subcutantous tissue, tendon and muscle left calf-Dr. Jose Swann    DEBRIDEMENT LEG Left 02/25/2016    Excisional debridement full-thcikness skin and subcutaneous tissue and tendon and muscle, left medial and lateral calf muscles-Dr. Jose Swann    ENDOSCOPY N/A 10/21/2016    Procedure: ESOPHAGOGASTRODUODENOSCOPY DONE AT BEDSIDE;  Surgeon: Genaro SARAVIA  MD Marychuy;  Location: [facility] ENDOSCOPY;  Service:     ENDOSCOPY AND COLONOSCOPY N/A 02/28/2016    EGD and Colonoscopy with Candy ink injection-Dr. Endy Chaney    ENTEROSCOPY SMALL BOWEL N/A 02/11/2016    Normal esophagus, normal stomach, normal duodenum, portion of the jejunum normal-Dr. Taylor Pina    ILIAC ARTERY - FEMORAL ARTERY BYPASS GRAFT Bilateral 2002    ILIAC ARTERY STENT Bilateral 2001    INSERTION AND REMOVAL PERITONEAL DIALYSIS CATHETER Right 02/29/2016    Exchange right jugular 16 cm Mahurkar dialysis catheter-Dr. Jose Swann    INSERTION PERITONEAL DIALYSIS CATHETER Left 03/01/2016    Ultrasound-guided access of the left jugular vein, 23 cm left jugular palindrome dialysis catheter placement-Dr. Jose Swann    INSERTION PERITONEAL DIALYSIS CATHETER Right 02/18/2016    Right jugular Mahrkar catherer placement-Dr. Jose Swann    JOINT REPLACEMENT Left     THORACOSCOPY Left 04/18/2016    Procedure: LT THORACOSCOPY VIDEO ASSISTED WITH DECORDICATION;  Surgeon: Genaro Davila III, MD;  Location: [facility] MAIN OR;  Service:     THROMBECTOMY Left 02/16/2016    Thombectomy of left femoral graft, left leg arteriogram, left calf forward compartment fasciotomy-Dr. Jose Swann    TOTAL HIP ARTHROPLASTY Left     UPPER GASTROINTESTINAL ENDOSCOPY N/A 02/09/2016    Normal UGI-Dr. Ajay Parkinson    UPPER GASTROINTESTINAL ENDOSCOPY N/A 11/28/2015    Small hiatal hernia, chronic gastritis: biopsied, non-bleeding angioectasias in the stomach: treated with argon plasma coagulation, multiple bleeding angioectasias in the dudenum: treated with argon plasma coagulation-Dr. Mykel Jaramillo    VASCULAR SURGERY      MULTIPLE    VENTRAL/INCISIONAL HERNIA REPAIR N/A 10/19/2017    Procedure: VENTRAL HERNIA REPAIR WITH MESH AND BILATERAL COMPONENT SEPARATION;  Surgeon: Genaro Perrin MD;  Location: [facility] MAIN OR;  Service:     WISDOM TOOTH EXTRACTION        General Information       Row Name 12/20/23  1652          OT Time and Intention    Document Type therapy note (daily note)  -HEATHER     Mode of Treatment co-treatment;occupational therapy;physical therapy  -HEATHER       Row Name 12/20/23 1652          General Information    Patient Profile Reviewed yes  -HEATHER     Existing Precautions/Restrictions fall;oxygen therapy device and L/min  -HEATHER     Barriers to Rehab medically complex  -HEATHER       Row Name 12/20/23 1652          Cognition    Orientation Status (Cognition) oriented x 3  -HEATHER       Row Name 12/20/23 1652          Safety Issues, Functional Mobility    Impairments Affecting Function (Mobility) balance;endurance/activity tolerance;strength;shortness of breath  -HEATHER     Comment, Safety Issues/Impairments (Mobility) Co-treat medically necessary and appropriate d/t pt's acuity level, activity tolerance and safety of pt and staff.  -HEATHER               User Key  (r) = Recorded By, (t) = Taken By, (c) = Cosigned By      Initials Name Provider Type    HEATHER Dea Gibbons, OT Occupational Therapist                     Mobility/ADL's       Row Name 12/20/23 1652          Bed Mobility    Bed Mobility supine-sit  -HEATHER     Supine-Sit [place] (Bed Mobility) contact guard  -HEATHER     Bed Mobility, Safety Issues decreased use of legs for bridging/pushing  -HEATHER     Assistive Device (Bed Mobility) bed rails;head of bed elevated  -HEATHER     Comment, (Bed Mobility) increased time  -HEATHER       Row Name 12/20/23 1652          Transfers    Transfers bed-chair transfer;sit-stand transfer  -HEATHER       Row Name 12/20/23 1652          Bed-Chair Transfer    Bed-Chair [place] (Transfers) moderate assist (50% patient effort);2 person assist;verbal cues  -HEATHER     Assistive Device (Bed-Chair Transfers) walker, front-wheeled  -HEATHER       Row Name 12/20/23 1652          Sit-Stand Transfer    Sit-Stand [place] (Transfers) minimum assist (75% patient effort);2 person assist;moderate assist (50% patient effort)  -HEATHER     Assistive Device (Sit-Stand Transfers)  walker, front-wheeled  -HEATHER       Row Name 12/20/23 1652          Functional Mobility    Functional Mobility- Ind. Level 2 person assist required;moderate assist (50% patient effort)  -HEATHER     Functional Mobility- Device walker, front-wheeled  -HEATHER       Row Name 12/20/23 1652          Lower Body Dressing Assessment/Training    [place] Level (Lower Body Dressing) lower body dressing skills;don;doff;shoes/slippers;contact guard assist  -HEATHER     Position (Lower Body Dressing) edge of bed sitting;unsupported sitting  -HEATHER               User Key  (r) = Recorded By, (t) = Taken By, (c) = Cosigned By      Initials Name Provider Type    Dea Cochran OT Occupational Therapist                   Obj/Interventions       Row Name 12/20/23 1654          Motor Skills    Motor Skills functional endurance  -HEATHER     Functional Endurance Fair-  -HEATHER       Row Name 12/20/23 1654          Balance    Balance Assessment sitting static balance;sitting dynamic balance;standing static balance  -HEATHER     Static Sitting Balance standby assist  -HEATHER     Dynamic Sitting Balance standby assist  -HEATHER     Position, Sitting Balance sitting edge of bed  -HEATHER     Static Standing Balance minimal assist;2-person assist  -EHATHER     Balance Interventions sitting;standing;occupation based/functional task  -HEATHER               User Key  (r) = Recorded By, (t) = Taken By, (c) = Cosigned By      Initials Name Provider Type    Dea Cochran OT Occupational Therapist                   Goals/Plan    No documentation.                  Clinical Impression       Row Name 12/20/23 1655          Pain Assessment    Pretreatment Pain Rating 0/10 - no pain  -HEATHER       Row Name 12/20/23 1655          Plan of Care Review    Plan of Care Reviewed With patient  -HEATHER     Outcome Evaluation Pt fowlers in bed on OT arrival and agreeable to treatment. Pt CGA with bed mobility and donned shoe with SBA at EOB. Pt requires increased time and rest breaks. Pt STS with MIN/MOD Ax2 and  declined donning L prosthetic for transfer d/t decreased endurance. Pt hopped with MOD Ax2 to chair and was [facility] on OT departure. Pt tolerated treatment fair with increased activity tolerance noted on this date. Continue with current POC.  -HEATHER       Row Name 12/20/23 1655          Therapy Plan Review/Discharge Plan (OT)    Anticipated Discharge Disposition (OT) home with 24/7 care;sub acute care setting  -HEATHER               User Key  (r) = Recorded By, (t) = Taken By, (c) = Cosigned By      Initials Name Provider Type    Dea Cochran, MATTHEW Occupational Therapist                   Outcome Measures       Row Name 12/20/23 1640 12/20/23 0829       How much help from another person do you currently need...    Turning from your back to your side while in flat bed without using bedrails? 3  -EB 3  -JH    Moving from lying on back to sitting on the side of a flat bed without bedrails? 3  -EB 3  -JH    Moving to and from a bed to a chair (including a wheelchair)? 2  -EB 2  -JH    Standing up from a chair using your arms (e.g., wheelchair, bedside chair)? 2  -EB 2  -JH    Climbing 3-5 steps with a railing? 1  -EB 1  -JH    To walk in hospital room? 2  -EB 1  -JH    AM-PAC 6 Clicks Score (PT) 13  -EB 12  -JH    Highest Level of Mobility Goal 4 --> Transfer to chair/commode  -EB 4 --> Transfer to chair/commode  -JH              User Key  (r) = Recorded By, (t) = Taken By, (c) = Cosigned By      Initials Name Provider Type    Yun Orr RN Registered Nurse    Sameera Alejo PTA Physical Therapist Assistant                    Occupational Therapy Education       Title: PT OT SLP Therapies (Done)       Topic: Occupational Therapy (Done)       Point: ADL training (Done)       Description:   Instruct learner(s) on proper safety adaptation and remediation techniques during self care or transfers.   Instruct in proper use of assistive devices.                  Learning Progress Summary             Patient Acceptance, E,TB,D,  VU,NR by RE at 12/16/2023 1559    Comment: pursed lip breathing and POC                         Point: Home exercise program (Done)       Description:   Instruct learner(s) on appropriate technique for monitoring, assisting and/or progressing therapeutic exercises/activities.                  Learning Progress Summary             Patient Acceptance, E,TB,D, VU,NR by RE at 12/16/2023 1559    Comment: pursed lip breathing and POC                         Point: Precautions (Done)       Description:   Instruct learner(s) on prescribed precautions during self-care and functional transfers.                  Learning Progress Summary             Patient Acceptance, E,TB,D, VU,NR by RE at 12/16/2023 1559    Comment: pursed lip breathing and POC                         Point: Body mechanics (Done)       Description:   Instruct learner(s) on proper positioning and spine alignment during self-care, functional mobility activities and/or exercises.                  Learning Progress Summary             Patient Acceptance, E,TB,D, VU,NR by RE at 12/16/2023 1559    Comment: pursed lip breathing and POC                                         User Key       Initials Effective Dates Name Provider Type Discipline    RE 06/16/21 -  Judith Bennett OTR Occupational Therapist OT                  OT Recommendation and Plan     Plan of Care Review  Plan of Care Reviewed With: patient  Outcome Evaluation: Pt fowlers in bed on OT arrival and agreeable to treatment. Pt CGA with bed mobility and donned shoe with SBA at EOB. Pt requires increased time and rest breaks. Pt STS with MIN/MOD Ax2 and declined donning L prosthetic for transfer d/t decreased endurance. Pt hopped with MOD Ax2 to chair and was [facility] on OT departure. Pt tolerated treatment fair with increased activity tolerance noted on this date. Continue with current POC.     Time Calculation:         Time Calculation- OT       Row Name 12/20/23 0119             Time Calculation- OT     OT Start Time 1505  -HEATHER      OT Stop Time 1530  -HEATHER      OT Time Calculation (min) 25 min  -HEATHER      Total Timed Code Minutes- OT 25 minute(s)  -HEATHER      OT Received On 12/20/23  -HEATHER      OT - Next Appointment 12/21/23  -HEATHER         Timed Charges    80991 - OT Therapeutic Activity Minutes 25  -HEATHER         Total Minutes    Timed Charges Total Minutes 25  -HEATHER       Total Minutes 25  -HEATHER                User Key  (r) = Recorded By, (t) = Taken By, (c) = Cosigned By      Initials Name Provider Type    Dea Cochran OT Occupational Therapist                  Therapy Charges for Today       Code Description Service Date Service Provider Modifiers Qty    86817420040 HC OT SELF CARE/MGMT/TRAIN EA 15 MIN 12/19/2023 Dea Gibbons OT GO 2    54818483892 HC OT THERAPEUTIC ACT EA 15 MIN 12/20/2023 Dea Gibbons OT GO 2                 Dea Gibbons OT  12/20/2023

## 2023-12-20 NOTE — PLAN OF CARE
Goal Outcome Evaluation:  Plan of Care Reviewed With: patient        Progress: improving  Outcome Evaluation: Pt seen for PT treatment today. Pt is CGA with bed mobility and SBA with sitting EOB. Pt required increased time and rest breaks due to SOA. Pt stood with MinAX2/ModA and rwx. Pt declined to don left prosthetic to get to the chair. Pt had a hop gait pattern with ModAX2 to get to the chair. Will continue to progress pt as able.

## 2023-12-20 NOTE — PROGRESS NOTES
[facility]    Referral received. Chart work done. Spoke with pt. Acute Rehab discussed. Noted pt was unable to ambulate yesterday due to issues with his prosthesis. States those issues have been resolved. Have asked CCP to have PT see patient again today for therapy tolerance.     Will follow for progress and tolerance of therapy    Soraya Jackson RN  Acute Rehab Admission Nurse      Discussed with Dr. Bonilla. Pt was on Acute Rehab in 2016. He has accepted pt. Will initiate precert  CCP informed

## 2023-12-21 DIAGNOSIS — F41.9 ANXIETY DISORDER, UNSPECIFIED TYPE: ICD-10-CM

## 2023-12-21 DIAGNOSIS — F17.200 SMOKES TOBACCO DAILY: ICD-10-CM

## 2023-12-21 LAB
ALBUMIN SERPL-MCNC: 2.4 G/DL (ref 3.5–5.2)
ALBUMIN/GLOB SERPL: 1 G/DL
ALP SERPL-CCNC: 150 U/L (ref 39–117)
ALT SERPL W P-5'-P-CCNC: 51 U/L (ref 1–41)
ANION GAP SERPL CALCULATED.3IONS-SCNC: 13 MMOL/L (ref 5–15)
AST SERPL-CCNC: 33 U/L (ref 1–40)
BILIRUB SERPL-MCNC: 0.4 MG/DL (ref 0–1.2)
BUN SERPL-MCNC: 36 MG/DL (ref 8–23)
BUN/CREAT SERPL: 14.6 (ref 7–25)
BURR CELLS BLD QL SMEAR: ABNORMAL
CALCIUM SPEC-SCNC: 7.5 MG/DL (ref 8.6–10.5)
CHLORIDE SERPL-SCNC: 106 MMOL/L (ref 98–107)
CO2 SERPL-SCNC: 20 MMOL/L (ref 22–29)
CREAT SERPL-MCNC: 2.46 MG/DL (ref 0.76–1.27)
DEPRECATED RDW RBC AUTO: 45.6 FL (ref 37–54)
EGFRCR SERPLBLD CKD-EPI 2021: 27.8 ML/MIN/1.73
EOSINOPHIL # BLD MANUAL: 0.28 10*3/MM3 (ref 0–0.4)
EOSINOPHIL NFR BLD MANUAL: 2.1 % (ref 0.3–6.2)
ERYTHROCYTE [DISTWIDTH] IN BLOOD BY AUTOMATED COUNT: 14 % (ref 12.3–15.4)
GLOBULIN UR ELPH-MCNC: 2.4 GM/DL
GLUCOSE SERPL-MCNC: 105 MG/DL (ref 65–99)
HCT VFR BLD AUTO: 31.3 % (ref 37.5–51)
HGB BLD-MCNC: 10.5 G/DL (ref 13–17.7)
LYMPHOCYTES # BLD MANUAL: 0.84 10*3/MM3 (ref 0.7–3.1)
LYMPHOCYTES NFR BLD MANUAL: 3.1 % (ref 5–12)
MAGNESIUM SERPL-MCNC: 1.7 MG/DL (ref 1.6–2.4)
MCH RBC QN AUTO: 30.4 PG (ref 26.6–33)
MCHC RBC AUTO-ENTMCNC: 33.5 G/DL (ref 31.5–35.7)
MCV RBC AUTO: 90.7 FL (ref 79–97)
MONOCYTES # BLD: 0.41 10*3/MM3 (ref 0.1–0.9)
NEUTROPHILS # BLD AUTO: 11.81 10*3/MM3 (ref 1.7–7)
NEUTROPHILS NFR BLD MANUAL: 88.5 % (ref 42.7–76)
NRBC BLD AUTO-RTO: 0 /100 WBC (ref 0–0.2)
PLAT MORPH BLD: NORMAL
PLATELET # BLD AUTO: 266 10*3/MM3 (ref 140–450)
PMV BLD AUTO: 10 FL (ref 6–12)
POIKILOCYTOSIS BLD QL SMEAR: ABNORMAL
POTASSIUM SERPL-SCNC: 3.2 MMOL/L (ref 3.5–5.2)
PROT SERPL-MCNC: 4.8 G/DL (ref 6–8.5)
PTH RELATED PROT SERPL-SCNC: <2 PMOL/L
RBC # BLD AUTO: 3.45 10*6/MM3 (ref 4.14–5.8)
SODIUM SERPL-SCNC: 139 MMOL/L (ref 136–145)
TSH SERPL DL<=0.05 MIU/L-ACNC: 0.5 UIU/ML (ref 0.27–4.2)
VARIANT LYMPHS NFR BLD MANUAL: 6.3 % (ref 19.6–45.3)
WBC MORPH BLD: NORMAL
WBC NRBC COR # BLD AUTO: 13.34 10*3/MM3 (ref 3.4–10.8)

## 2023-12-21 PROCEDURE — 85025 COMPLETE CBC W/AUTO DIFF WBC: CPT | Performed by: STUDENT IN AN ORGANIZED HEALTH CARE EDUCATION/TRAINING PROGRAM

## 2023-12-21 PROCEDURE — 94799 UNLISTED PULMONARY SVC/PX: CPT

## 2023-12-21 PROCEDURE — 94664 DEMO&/EVAL PT USE INHALER: CPT

## 2023-12-21 PROCEDURE — 25010000002 HEPARIN (PORCINE) PER 1000 UNITS: Performed by: STUDENT IN AN ORGANIZED HEALTH CARE EDUCATION/TRAINING PROGRAM

## 2023-12-21 PROCEDURE — 80053 COMPREHEN METABOLIC PANEL: CPT | Performed by: INTERNAL MEDICINE

## 2023-12-21 PROCEDURE — 84443 ASSAY THYROID STIM HORMONE: CPT | Performed by: INTERNAL MEDICINE

## 2023-12-21 PROCEDURE — 25010000002 PIPERACILLIN SOD-TAZOBACTAM PER 1 G: Performed by: STUDENT IN AN ORGANIZED HEALTH CARE EDUCATION/TRAINING PROGRAM

## 2023-12-21 PROCEDURE — 94761 N-INVAS EAR/PLS OXIMETRY MLT: CPT

## 2023-12-21 PROCEDURE — 85007 BL SMEAR W/DIFF WBC COUNT: CPT | Performed by: STUDENT IN AN ORGANIZED HEALTH CARE EDUCATION/TRAINING PROGRAM

## 2023-12-21 PROCEDURE — 83735 ASSAY OF MAGNESIUM: CPT | Performed by: STUDENT IN AN ORGANIZED HEALTH CARE EDUCATION/TRAINING PROGRAM

## 2023-12-21 PROCEDURE — 97535 SELF CARE MNGMENT TRAINING: CPT

## 2023-12-21 PROCEDURE — 25010000002 FUROSEMIDE PER 20 MG: Performed by: INTERNAL MEDICINE

## 2023-12-21 RX ORDER — POTASSIUM CHLORIDE 750 MG/1
40 TABLET, FILM COATED, EXTENDED RELEASE ORAL ONCE
Status: COMPLETED | OUTPATIENT
Start: 2023-12-21 | End: 2023-12-21

## 2023-12-21 RX ORDER — TORSEMIDE 20 MG/1
40 TABLET ORAL DAILY
Status: DISCONTINUED | OUTPATIENT
Start: 2023-12-21 | End: 2023-12-22

## 2023-12-21 RX ORDER — BUPROPION HYDROCHLORIDE 300 MG/1
TABLET ORAL
Qty: 90 TABLET | Refills: 3 | Status: SHIPPED | OUTPATIENT
Start: 2023-12-21

## 2023-12-21 RX ADMIN — HEPARIN SODIUM 5000 UNITS: 5000 INJECTION INTRAVENOUS; SUBCUTANEOUS at 14:52

## 2023-12-21 RX ADMIN — ALLOPURINOL 100 MG: 100 TABLET ORAL at 08:51

## 2023-12-21 RX ADMIN — Medication 1 TABLET: at 08:51

## 2023-12-21 RX ADMIN — IPRATROPIUM BROMIDE AND ALBUTEROL SULFATE 3 ML: 2.5; .5 SOLUTION RESPIRATORY (INHALATION) at 07:48

## 2023-12-21 RX ADMIN — BUDESONIDE AND FORMOTEROL FUMARATE DIHYDRATE 2 PUFF: 160; 4.5 AEROSOL RESPIRATORY (INHALATION) at 20:30

## 2023-12-21 RX ADMIN — IPRATROPIUM BROMIDE AND ALBUTEROL SULFATE 3 ML: 2.5; .5 SOLUTION RESPIRATORY (INHALATION) at 15:32

## 2023-12-21 RX ADMIN — BUPROPION HYDROCHLORIDE 300 MG: 300 TABLET, EXTENDED RELEASE ORAL at 08:51

## 2023-12-21 RX ADMIN — POTASSIUM CHLORIDE 40 MEQ: 750 TABLET, EXTENDED RELEASE ORAL at 06:47

## 2023-12-21 RX ADMIN — ALPRAZOLAM 0.25 MG: 0.25 TABLET ORAL at 21:59

## 2023-12-21 RX ADMIN — IPRATROPIUM BROMIDE AND ALBUTEROL SULFATE 3 ML: 2.5; .5 SOLUTION RESPIRATORY (INHALATION) at 10:55

## 2023-12-21 RX ADMIN — PIPERACILLIN SODIUM AND TAZOBACTAM SODIUM 3.38 G: 3; .375 INJECTION, SOLUTION INTRAVENOUS at 00:21

## 2023-12-21 RX ADMIN — BUDESONIDE AND FORMOTEROL FUMARATE DIHYDRATE 2 PUFF: 160; 4.5 AEROSOL RESPIRATORY (INHALATION) at 07:54

## 2023-12-21 RX ADMIN — ASPIRIN 81 MG: 81 TABLET, CHEWABLE ORAL at 21:59

## 2023-12-21 RX ADMIN — GUAIFENESIN 600 MG: 600 TABLET, EXTENDED RELEASE ORAL at 08:51

## 2023-12-21 RX ADMIN — PIPERACILLIN SODIUM AND TAZOBACTAM SODIUM 3.38 G: 3; .375 INJECTION, SOLUTION INTRAVENOUS at 08:50

## 2023-12-21 RX ADMIN — TIOTROPIUM BROMIDE INHALATION SPRAY 2 PUFF: 3.12 SPRAY, METERED RESPIRATORY (INHALATION) at 07:55

## 2023-12-21 RX ADMIN — CLOPIDOGREL BISULFATE 75 MG: 75 TABLET, FILM COATED ORAL at 21:59

## 2023-12-21 RX ADMIN — BRIMONIDINE TARTRATE 1 DROP: 2 SOLUTION/ DROPS OPHTHALMIC at 08:52

## 2023-12-21 RX ADMIN — ANORECTAL OINTMENT 1 APPLICATION: 15.7; .44; 24; 20.6 OINTMENT TOPICAL at 08:52

## 2023-12-21 RX ADMIN — FUROSEMIDE 40 MG: 10 INJECTION, SOLUTION INTRAMUSCULAR; INTRAVENOUS at 11:16

## 2023-12-21 RX ADMIN — PIPERACILLIN SODIUM AND TAZOBACTAM SODIUM 3.38 G: 3; .375 INJECTION, SOLUTION INTRAVENOUS at 15:04

## 2023-12-21 RX ADMIN — HEPARIN SODIUM 5000 UNITS: 5000 INJECTION INTRAVENOUS; SUBCUTANEOUS at 06:47

## 2023-12-21 RX ADMIN — LEVOTHYROXINE SODIUM 137 MCG: 0.14 TABLET ORAL at 08:51

## 2023-12-21 RX ADMIN — BRIMONIDINE TARTRATE 1 DROP: 2 SOLUTION/ DROPS OPHTHALMIC at 22:01

## 2023-12-21 RX ADMIN — LATANOPROST 1 DROP: 50 SOLUTION OPHTHALMIC at 22:02

## 2023-12-21 RX ADMIN — HEPARIN SODIUM 5000 UNITS: 5000 INJECTION INTRAVENOUS; SUBCUTANEOUS at 22:00

## 2023-12-21 RX ADMIN — ANORECTAL OINTMENT 1 APPLICATION: 15.7; .44; 24; 20.6 OINTMENT TOPICAL at 22:00

## 2023-12-21 RX ADMIN — IPRATROPIUM BROMIDE AND ALBUTEROL SULFATE 3 ML: 2.5; .5 SOLUTION RESPIRATORY (INHALATION) at 20:30

## 2023-12-21 RX ADMIN — ALPRAZOLAM 0.25 MG: 0.25 TABLET ORAL at 08:52

## 2023-12-21 RX ADMIN — GUAIFENESIN 600 MG: 600 TABLET, EXTENDED RELEASE ORAL at 21:59

## 2023-12-21 RX ADMIN — TORSEMIDE 40 MG: 20 TABLET ORAL at 16:30

## 2023-12-21 RX ADMIN — ISOSORBIDE MONONITRATE 30 MG: 30 TABLET, EXTENDED RELEASE ORAL at 06:48

## 2023-12-21 NOTE — PROGRESS NOTES
[facility] Progress Note      Patient Name: Sim Agustin  : 1955  MRN: [id number]  Primary Care Physician:  Seth Hester MD  Date of admission: 2023    Subjective     Interval History:   Follow-up acute kidney injury on CKD 3B. Weight not consistent.  On 1 L nasal cannula.  Unable to wear CPAP last night so now on nasal oxygen when sleeping.  Urinary incontinence but improving and now feels that he can use the urinal.  Urine output has increased with Lasix by his report but not quantified.    Review of Systems:   As noted above    Objective     Vitals:   Temp:  [97.7 °F (36.5 °C)-98.6 °F (37 °C)] 98.6 °F (37 °C)  Heart Rate:  [66-76] 66  Resp:  [20] 20  BP: (103-111)/(51-62) 110/60  Flow (L/min):  [1-3] 1    Intake/Output Summary (Last 24 hours) at 2023 1144  Last data filed at 2023 0850  Gross per 24 hour   Intake 590 ml   Output --   Net 590 ml       Physical Exam:    General Appearance: alert, oriented x 3, no acute distress.    Skin: warm and dry  HEENT: oral mucosa dry, nonicteric sclera  Neck: supple, no JVD  Lungs: Coarse upper airway rhonchi.  Heart: RRR, normal S1 and S2  Abdomen: soft, nontender, nondistended.+bs  : no palpable bladder  Extremities: 1+ lower extremity edema right lower extremity, left lower extremity AKA.  Neuro: normal speech and mental status     Scheduled Meds:     allopurinol, 100 mg, Oral, Daily  ALPRAZolam, 0.25 mg, Oral, BID  aspirin, 81 mg, Oral, Nightly  brimonidine, 1 drop, Both Eyes, BID  budesonide-formoterol, 2 puff, Inhalation, BID - RT   And  tiotropium bromide monohydrate, 2 puff, Inhalation, Daily - RT  buPROPion XL, 300 mg, Oral, Daily  clopidogrel, 75 mg, Oral, Nightly  furosemide, 40 mg, Intravenous, Q12H  guaiFENesin, 600 mg, Oral, Q12H  heparin (porcine), 5,000 Units, Subcutaneous, Q8H  ipratropium-albuterol, 3 mL, Nebulization, 4x Daily - RT  isosorbide mononitrate, 30 mg, Oral, QAM  latanoprost, 1 drop,  Both Eyes, Nightly  levothyroxine, 137 mcg, Oral, Daily  Menthol-Zinc Oxide, 1 application , Topical, Q12H  multivitamin with minerals, 1 tablet, Oral, Daily  piperacillin-tazobactam, 3.375 g, Intravenous, Q8H      IV Meds:          Results Reviewed:   I have personally reviewed the results from the time of this admission to 12/21/2023 11:44 EST     Results from last 7 days   Lab Units 12/21/23 0456 12/20/23 0447 12/19/23 1258 12/19/23 0408   SODIUM mmol/L 139 140 143 142   POTASSIUM mmol/L 3.2* 3.3* 3.6 2.8*   CHLORIDE mmol/L 106 106 110* 109*   CO2 mmol/L 20.0* 19.8* 20.7* 20.0*   BUN mg/dL 36* 40* 40* 43*   CREATININE mg/dL 2.46* 2.70* 2.69* 3.01*   CALCIUM mg/dL 7.5* 7.7* 8.0* 8.4*   BILIRUBIN mg/dL 0.4 0.4  --  0.3   ALK PHOS U/L 150* 140*  --  145*   ALT (SGPT) U/L 51* 49*  --  51*   AST (SGOT) U/L 33 40  --  46*   GLUCOSE mg/dL 105* 92 100* 107*       Estimated Creatinine Clearance: 30.6 mL/min (A) (by C-G formula based on SCr of 2.46 mg/dL (H)).    Results from last 7 days   Lab Units 12/21/23 0456 12/20/23 0447 12/19/23 1258 12/19/23 0408   MAGNESIUM mg/dL 1.7 1.6  --  1.6   PHOSPHORUS mg/dL  --  2.9 3.4 1.7*             Results from last 7 days   Lab Units 12/21/23 0456 12/20/23 0447 12/19/23 0408 12/18/23 0445 12/17/23  0628   WBC 10*3/mm3 13.34* 15.04* 13.41* 14.26* 17.41*   HEMOGLOBIN g/dL 10.5* 10.1* 10.8* 11.4* 11.0*   PLATELETS 10*3/mm3 266 261 242 207 168             Assessment / Plan     ASSESSMENT:  Acute kidney injury on CKD 3B baseline creatinine 1.7.  Multifactorial with hypotension, hypovolemia and hypercalcemia.  Replacing potassium.  Waste products improved.  Magnesium and phosphorus replete.  Volume status improved.  Changed to p.o. torsemide today.  2.  Hypercalcemia.  Calcium now corrects to 9.3 given albumin 2.2.  PTH in normal range which is inappropriate for his level of hypercalcemia on admission.  3.  Hypotension resolved.  4.  Peripheral arterial disease, left AKA.  5.   Anemia, hemoglobin stable.  6.  Transaminitis resolving.  Statin on hold.  7.  Hypothyroid on replacement.  8.  Coronary artery disease on aspirin, Plavix .  Statin on hold.    9.  Shortness of air, upper airway rhonchi.  Chest x-ray 12/18 with left lower lobe opacity.  Chest CT 11/18/2023 with 12 mm stellate left upper lobe nodule.  Reticulonodular infiltrate left base.  On Zosyn day 6.  Needs pulmonary toilet.  PLAN:  Replace potassium.  2.  Change diuretic to p.o. torsemide today.  Thank you for involving us in the care of Sim Agustin.  Please feel free to call with any questions.    Colleen Tesfaye MD  12/21/23  11:44 EST    [facility]  [phone]    Please note that portions of this note were completed with a voice recognition program.

## 2023-12-21 NOTE — PLAN OF CARE
Goal Outcome Evaluation:  Plan of Care Reviewed With: patient           Outcome Evaluation: Denies pain. SOA on activity. Unable to tolerate Home Bipap c/o about the mask sling despite adjustment. Currently on 1L NC. VSS.

## 2023-12-21 NOTE — THERAPY TREATMENT NOTE
Patient Name: Sim Agustin  : 1955    MRN: [id number]                              Today's Date: 2023       Admit Date: 2023    Visit Dx:     ICD-10-CM ICD-9-CM   1. ARLYN (acute kidney injury)  N17.9 584.9   2. Hypercalcemia  E83.52 275.42     Patient Active Problem List   Diagnosis    H/O angiodysplasia of intestinal tract    COPD (chronic obstructive pulmonary disease)    S/P colectomy    Status post above-knee amputation of left lower extremity    CAD (coronary artery disease)    PVD (peripheral vascular disease)    Essential hypertension    Cardiomyopathy, unspecified    PRISCILLA treated with BiPAP    Anxiety disorder    Chronic midline low back pain without sciatica    Long term prescription benzodiazepine use    History of bradycardia    Hypothyroidism    Vitamin B12 deficiency    Iron deficiency    History of smoking 30 or more pack years    High risk medication use    DNR (do not resuscitate)    Stage 3a chronic kidney disease    Hyperlipidemia    Proteinuria    Junctional cardiac arrhythmia    Acute kidney injury    Hypercalcemia    Stage 3b chronic kidney disease (CKD)    Anemia    Leukocytosis    Hyponatremia    Hypokalemia    Lactic acidosis    Hypotension     Past Medical History:   Diagnosis Date    Acute respiratory failure with hypoxia and hypercarbia 2019    Acute upper respiratory infection 2013    Amputee, above knee, left     Anemia     per [place] database    ARF (acute renal failure) 2016    Asthma 2018    Atelectasis, left 2016    Bradycardia 2019    CAD (coronary artery disease) 2016    Chronic obstructive pulmonary disease with acute exacerbation 2013    Clotting disorder 2003    Colon polyps     Compartment syndrome     AFTER ILIAC SURGERY. ABOVE KNEE AMPUTATION    COPD (chronic obstructive pulmonary disease)     COPD with hypoxia 2019    Cough     SINCE APRIL WITH PNEUMONIA.     Demand myocardial infarction  05/19/2022    Disease of thyroid gland     HYPOTHYRODISM    Diverticulosis     Elevated hematocrit 01/06/2021    Employs prosthetic leg     ESRD (end stage renal disease) on dialysis 04/06/2016    Fracture of patella 03/03/2015    History of acute renal failure     History of CHF (congestive heart failure)     History of GI bleed 02/2016    AORTODUOD FISTULA    History of sepsis 02/2016    History of transfusion     MULTIPLE, 2016    Hyperkalemia 04/11/2016    Hyperlipidemia     Hypertension     Hypotension     Hypotension 03/04/2016    Left lower lobe pneumonia 05/18/2022    Nonischemic cardiomyopathy     Nosocomial pneumonia 04/06/2016    Phantom limb pain     SL, WITH LEFT ABOUVE KNEE    Pleural effusion on left 04/11/2016    Pneumonia     SPRING 2016  AFTER SURGERY    Pulmonary embolism     PVD (peripheral vascular disease)     Renal insufficiency     S/P thoracentesis 04/11/2016    Sepsis, unspecified organism 04/05/2016     Past Surgical History:   Procedure Laterality Date    ABOVE KNEE AMPUTATION Left 03/16/2016    Procedure: LT AMPUTATION ABOVE KNEE;  Surgeon: Jose Swann MD;  Location: [facility] MAIN OR;  Service:     ARTERIAL THROMBECTOMY  2001    throbectomy of arterial graft    AXILLARY FEMORAL BYPASS Right 02/15/2016    Exploratory lapartomy, repair aortoduodenal fistula, resection infected aortobifemoral bypass graft, axillobi-superficial femoral artery [place]Aneudy bypass graft from right axillary artery, Repair of duodenotomy 4th portion duodenum-Dr. Jose Swann, Dr. Genaro Perrin    BRONCHOSCOPY Left 03/04/2016    Dr. NATALIIA Tate    BRONCHOSCOPY Left 05/20/2022    Procedure: BRONCHOSCOPY WITH BIOPSIES, BRUSHINGS, AND BAL UNDER FLUORO;  Surgeon: Ishmael Tate Jr., MD;  Location: [facility] ENDOSCOPY;  Service: Pulmonary;  Laterality: Left;  PRE OP - LLL CONSOLIDATION  POST OP - SAME    BRONCHOSCOPY RIGID / FLEXIBLE N/A 03/03/2016    Dr. NATALIIA Tate    BRONCHOSCOPY RIGID / FLEXIBLE N/A  02/26/2016    Dr. NATALIIA Tate    CARDIAC CATHETERIZATION N/A 03/18/2019    Procedure: Right and Left Heart Cath;  Surgeon: Nina Storey MD;  Location: [facility] CATH INVASIVE LOCATION;  Service: Cardiovascular    CATARACT EXTRACTION WITH INTRAOCULAR LENS IMPLANT Bilateral     COLON RESECTION WITH COLOSTOMY Left 02/28/2016    Exploratory laparotomy with open left hemicolectomy, end-colostomy, G-tube and J-tube-Dr. Endy Chaney    COLON SURGERY  3/25/2015    COLOSTOMY    COLONOSCOPY N/A 2015    Dr. Laughlin    COLONOSCOPY N/A 10/12/2016    Procedure: COLONOSCOPY TO 20 CM AND PER STOMA TO 30CM;  Surgeon: Genaro Perrin MD;  Location: [facility] ENDOSCOPY;  Service:     COLONOSCOPY N/A 10/21/2016    Procedure: COLONOSCOPY DONE AT BEDSIDE ONTO CECEM;  Surgeon: Genaro Perrin MD;  Location: [facility] ENDOSCOPY;  Service:     COLONOSCOPY N/A 02/10/2016    Diverticulosis in the entire examined colon, non-bleeding internal hemorrhoids-Dr. Taylor Pina    COLONOSCOPY N/A 02/11/2016    Poor prep, blood in the entire examined colon, normal ileum-Dr. Taylor Pina    COLONOSCOPY W/ POLYPECTOMY N/A 07/27/2015    Normal ileum, diverticulosis in the sigmoid colon: resected and retrieved, one 6 mm polyp in the descending colon: resected and retrieved, the distal rectum and anal verge are normal on retroflexion view-Dr. Miguel Ángel Laughlin    COLOSTOMY CLOSURE N/A 10/13/2016    Procedure: COLOSTOMY TAKEDOWN OR CLOSURE, APPENDECTOMY;  Surgeon: Genaro Perrin MD;  Location: [facility] OR;  Service:     DEBRIDEMENT LEG Left 03/01/2016    Excisional debridement of the skin, subcutantous tissue, tendon and muscle left calf-Dr. Jose Swann    DEBRIDEMENT LEG Left 02/25/2016    Excisional debridement full-thcikness skin and subcutaneous tissue and tendon and muscle, left medial and lateral calf muscles-Dr. Jose Swann    ENDOSCOPY N/A 10/21/2016    Procedure: ESOPHAGOGASTRODUODENOSCOPY DONE AT BEDSIDE;  Surgeon: Genaro SARAVIA  MD Marychuy;  Location: [facility] ENDOSCOPY;  Service:     ENDOSCOPY AND COLONOSCOPY N/A 02/28/2016    EGD and Colonoscopy with Candy ink injection-Dr. Endy Chaney    ENTEROSCOPY SMALL BOWEL N/A 02/11/2016    Normal esophagus, normal stomach, normal duodenum, portion of the jejunum normal-Dr. Taylor Pina    ILIAC ARTERY - FEMORAL ARTERY BYPASS GRAFT Bilateral 2002    ILIAC ARTERY STENT Bilateral 2001    INSERTION AND REMOVAL PERITONEAL DIALYSIS CATHETER Right 02/29/2016    Exchange right jugular 16 cm [place]kar dialysis catheter-Dr. Jose Swann    INSERTION PERITONEAL DIALYSIS CATHETER Left 03/01/2016    Ultrasound-guided access of the left jugular vein, 23 cm left jugular palindrome dialysis catheter placement-Dr. Jose Swann    INSERTION PERITONEAL DIALYSIS CATHETER Right 02/18/2016    Right jugular Mahrkar catherer placement-Dr. Jose Swann    JOINT REPLACEMENT Left     THORACOSCOPY Left 04/18/2016    Procedure: LT THORACOSCOPY VIDEO ASSISTED WITH DECORDICATION;  Surgeon: Genaro Davila III, MD;  Location: [facility] MAIN OR;  Service:     THROMBECTOMY Left 02/16/2016    Thombectomy of left femoral graft, left leg arteriogram, left calf forward compartment fasciotomy-Dr. Jose Swann    TOTAL HIP ARTHROPLASTY Left     UPPER GASTROINTESTINAL ENDOSCOPY N/A 02/09/2016    Normal UGI-Dr. Ajay Parkinson    UPPER GASTROINTESTINAL ENDOSCOPY N/A 11/28/2015    Small hiatal hernia, chronic gastritis: biopsied, non-bleeding angioectasias in the stomach: treated with argon plasma coagulation, multiple bleeding angioectasias in the dudenum: treated with argon plasma coagulation-Dr. Mykel Jaramillo    VASCULAR SURGERY      MULTIPLE    VENTRAL/INCISIONAL HERNIA REPAIR N/A 10/19/2017    Procedure: VENTRAL HERNIA REPAIR WITH MESH AND BILATERAL COMPONENT SEPARATION;  Surgeon: Genaro Perrin MD;  Location: [facility] MAIN OR;  Service:     WISDOM TOOTH EXTRACTION        General Information       Row Name 12/21/23  "1514          OT Time and Intention    Document Type therapy note (daily note)  -CE     Mode of Treatment occupational therapy;individual therapy  -CE       Row Name 12/21/23 1514          General Information    Patient Profile Reviewed yes  -CE     Existing Precautions/Restrictions fall  -CE       Row Name 12/21/23 1514          Cognition    Orientation Status (Cognition) oriented x 3  -CE       Row Name 12/21/23 1514          Safety Issues, Functional Mobility    Impairments Affecting Function (Mobility) balance;endurance/activity tolerance;strength;shortness of breath  -CE               User Key  (r) = Recorded By, (t) = Taken By, (c) = Cosigned By      Initials Name Provider Type    CE Erica Gross, OT Occupational Therapist                     Mobility/ADL's       Row Name 12/21/23 1516          Bed Mobility    Bed Mobility supine-sit  -CE     Supine-Sit [place] (Bed Mobility) standby assist  towards L side  -CE     Assistive Device (Bed Mobility) bed rails;head of bed elevated  -CE       Row Name 12/21/23 1516          Transfers    Transfers bed-chair transfer;sit-stand transfer  -CE       Row Name 12/21/23 1516          Bed-Chair Transfer    Bed-Chair [place] (Transfers) moderate assist (50% patient effort);1 person assist  -CE     Comment, (Bed-Chair Transfer) B HHA for stand-pivot towards L side to recliner; pt declining LLE prosthetic stating, \"I think it'll just wear me out trying to get it on right now.\"  -CE       Row Name 12/21/23 1516          Sit-Stand Transfer    Sit-Stand [place] (Transfers) 1 person assist;moderate assist (50% patient effort)  -CE     Comment, (Sit-Stand Transfer) HHA  -CE       Row Name 12/21/23 1516          Activities of Daily Living    BADL Assessment/Intervention lower body dressing  -CE       Row Name 12/21/23 1516          Lower Body Dressing Assessment/Training    [place] Level (Lower Body Dressing) don;shoes/slippers;standby assist;set up  R shoe  " "-CE     Position (Lower Body Dressing) edge of bed sitting;unsupported sitting  -CE               User Key  (r) = Recorded By, (t) = Taken By, (c) = Cosigned By      Initials Name Provider Type    Erica Gongora OT Occupational Therapist                   Obj/Interventions       Row Name 12/21/23 1519          Motor Skills    Motor Skills functional endurance  -CE     Functional Endurance fair-; pt initially stating he was \"lightheaded\" when sitting at EOB. pt endorsing that he has laid in bed all day and this is the first time he's sat up. encouraged pt to elevate bed>chair position or call for assist to get OOBTC for all meals. after a few minutes EOB pt reporting improvement of symptoms and agreeable to transfer to chair. post transfer, pt with increased SOB and fatigue requiring rest.  -CE       Row Name 12/21/23 1519          Balance    Balance Assessment standing dynamic balance  -CE     Dynamic Standing Balance 1-person assist;moderate assist  -CE               User Key  (r) = Recorded By, (t) = Taken By, (c) = Cosigned By      Initials Name Provider Type    Erica Gongora OT Occupational Therapist                   Goals/Plan    No documentation.                  Clinical Impression       Row Name 12/21/23 1521          Pain Assessment    Pretreatment Pain Rating 0/10 - no pain  -CE     Posttreatment Pain Rating 0/10 - no pain  -CE       Row Name 12/21/23 1521          Plan of Care Review    Plan of Care Reviewed With patient  -CE     Progress no change  -CE     Outcome Evaluation Pt seen for OT tx focusing on functional transfers and LB ADLs seated unsupported. Pt agreeable to mobilize OOBTC but required increased time at EOB 2/2 feeling lightheaded as well as time for recovery 2/2 fatigue with little mobility. Able to complete stand-pivot transfer with modA x 1 towards L side this date. Declined donning prosthetic stating he thought it would just \"wear me out.\" Will con't to follow for stated " goals and recommend d/c to inpt rehab.  -CE       Row Name 12/21/23 1521          Therapy Plan Review/Discharge Plan (OT)    Anticipated Discharge Disposition (OT) skilled nursing facility;inpatient rehabilitation facility  -CE       Row Name 12/21/23 1521          Vital Signs    O2 Delivery Pre Treatment room air  -CE     O2 Delivery Intra Treatment room air  -CE     O2 Delivery Post Treatment room air  -CE     Pre Patient Position Supine  -CE     Intra Patient Position Standing  -CE     Post Patient Position Sitting  -CE       Row Name 12/21/23 1521          Positioning and Restraints    Pre-Treatment Position in bed  -CE     Post Treatment Position chair  -CE     In Chair notified nsg;sitting;call light within reach;encouraged to call for assist;exit alarm on  -CE               User Key  (r) = Recorded By, (t) = Taken By, (c) = Cosigned By      Initials Name Provider Type    Erica Gongora OT Occupational Therapist                   Outcome Measures       Row Name 12/21/23 1524          How much help from another is currently needed...    Putting on and taking off regular lower body clothing? 3  -CE     Bathing (including washing, rinsing, and drying) 2  -CE     Toileting (which includes using toilet bed pan or urinal) 2  -CE     Putting on and taking off regular upper body clothing 4  -CE     Taking care of personal grooming (such as brushing teeth) 4  -CE     Eating meals 4  -CE     AM-PAC 6 Clicks Score (OT) 19  -CE       Row Name 12/21/23 1524          Functional Assessment    Outcome Measure Options AM-PAC 6 Clicks Daily Activity (OT)  -CE               User Key  (r) = Recorded By, (t) = Taken By, (c) = Cosigned By      Initials Name Provider Type    Erica Gongora OT Occupational Therapist                    Occupational Therapy Education       Title: PT OT SLP Therapies (Done)       Topic: Occupational Therapy (Done)       Point: ADL training (Done)       Description:   Instruct learner(s) on  proper safety adaptation and remediation techniques during self care or transfers.   Instruct in proper use of assistive devices.                  Learning Progress Summary             Patient Acceptance, E,TB,D, VU,NR by RE at 12/16/2023 1559    Comment: pursed lip breathing and POC                         Point: Home exercise program (Done)       Description:   Instruct learner(s) on appropriate technique for monitoring, assisting and/or progressing therapeutic exercises/activities.                  Learning Progress Summary             Patient Acceptance, E,TB,D, VU,NR by RE at 12/16/2023 1559    Comment: pursed lip breathing and POC                         Point: Precautions (Done)       Description:   Instruct learner(s) on prescribed precautions during self-care and functional transfers.                  Learning Progress Summary             Patient Acceptance, E,TB,D, VU,NR by RE at 12/16/2023 1559    Comment: pursed lip breathing and POC                         Point: Body mechanics (Done)       Description:   Instruct learner(s) on proper positioning and spine alignment during self-care, functional mobility activities and/or exercises.                  Learning Progress Summary             Patient Acceptance, E,TB,D, VU,NR by RE at 12/16/2023 1555    Comment: pursed lip breathing and POC                                         User Key       Initials Effective Dates Name Provider Type Discipline    RE 06/16/21 -  Judith Bennett, OTR Occupational Therapist OT                  OT Recommendation and Plan     Plan of Care Review  Plan of Care Reviewed With: patient  Progress: no change  Outcome Evaluation: Pt seen for OT tx focusing on functional transfers and LB ADLs seated unsupported. Pt agreeable to mobilize OOBTC but required increased time at EOB 2/2 feeling lightheaded as well as time for recovery 2/2 fatigue with little mobility. Able to complete stand-pivot transfer with modA x 1 towards L side this  "date. Declined donning prosthetic stating he thought it would just \"wear me out.\" Will con't to follow for stated goals and recommend d/c to inpt rehab.     Time Calculation:         Time Calculation- OT       Row Name 12/21/23 1525             Time Calculation- OT    OT Start Time 1356  -CE      OT Stop Time 1412  -CE      OT Time Calculation (min) 16 min  -CE      Total Timed Code Minutes- OT 16 minute(s)  -CE      OT Received On 12/21/23  -CE      OT - Next Appointment 12/22/23  -CE         Timed Charges    09271 - OT Self Care/Mgmt Minutes 16  -CE         Total Minutes    Timed Charges Total Minutes 16  -CE       Total Minutes 16  -CE                User Key  (r) = Recorded By, (t) = Taken By, (c) = Cosigned By      Initials Name Provider Type    Erica Gongora OT Occupational Therapist                  Therapy Charges for Today       Code Description Service Date Service Provider Modifiers Qty    [id number] HC OT SELF CARE/MGMT/TRAIN EA 15 MIN 12/21/2023 rEica Gross OT GO 1                 Erica Gross OT  12/21/2023  "

## 2023-12-21 NOTE — SIGNIFICANT NOTE
12/21/23 1603   OTHER   Discipline physical therapy assistant   Rehab Time/Intention   Session Not Performed other (see comments)  (Checked on pt this PM and pt [facility] with respiratory having a tx. Will follow up with pt tomorrow.)   Recommendation   PT - Next Appointment 12/22/23

## 2023-12-21 NOTE — PLAN OF CARE
"Goal Outcome Evaluation:  Plan of Care Reviewed With: patient        Progress: no change  Outcome Evaluation: Pt seen for OT tx focusing on functional transfers and LB ADLs seated unsupported. Pt agreeable to mobilize OOBTC but required increased time at EOB 2/2 feeling lightheaded as well as time for recovery 2/2 fatigue with little mobility. Able to complete stand-pivot transfer with modA x 1 towards L side this date. Declined donning prosthetic stating he thought it would just \"wear me out.\" Will con't to follow for stated goals and recommend d/c to inpt rehab.      Anticipated Discharge Disposition (OT): skilled nursing facility, inpatient rehabilitation facility  "

## 2023-12-21 NOTE — PROGRESS NOTES
Name: Sim Agustin ADMIT: 2023   : 1955  PCP: Seth Hester MD    MRN: [id number] LOS: 7 days   AGE/SEX: 68 y.o. male  ROOM: [facility]     Subjective   Subjective     Examined at bedside. Endorsing urinary and fecal incontinence that started on the day he arrived at the hospital. No saddle anesthesia or back pain.   Otherwise, no new complaints.     Review of Systems     Objective   Objective   Vital Signs  Temp:  [97.7 °F (36.5 °C)-98.6 °F (37 °C)] 98.4 °F (36.9 °C)  Heart Rate:  [66-76] 66  Resp:  [18-20] 18  BP: (103-125)/(51-69) 125/69  SpO2:  [97 %-99 %] 98 %  on  Flow (L/min):  [1-3] 1;   Device (Oxygen Therapy): nasal cannula  Body mass index is 28.7 kg/m².  Physical Exam    General: Alert, sitting up in the bed,  not in distress  HEENT: Normocephalic, atraumatic  CV: Regular rate and rhythm, no murmurs rubs or gallops  Lungs:  no wheezing, nonlabored breathing  Abdomen: Soft, nontender, nondistended  Extremities: No significant peripheral edema, left AKA    Results Review     I reviewed the patient's new clinical results.  Results from last 7 days   Lab Units 23  0456 237 23  0408 23  0445   WBC 10*3/mm3 13.34* 15.04* 13.41* 14.26*   HEMOGLOBIN g/dL 10.5* 10.1* 10.8* 11.4*   PLATELETS 10*3/mm3 266 261 242 207     Results from last 7 days   Lab Units 23  0456 23  0447 23  1258 23  0408   SODIUM mmol/L 139 140 143 142   POTASSIUM mmol/L 3.2* 3.3* 3.6 2.8*   CHLORIDE mmol/L 106 106 110* 109*   CO2 mmol/L 20.0* 19.8* 20.7* 20.0*   BUN mg/dL 36* 40* 40* 43*   CREATININE mg/dL 2.46* 2.70* 2.69* 3.01*   GLUCOSE mg/dL 105* 92 100* 107*   Estimated Creatinine Clearance: 30.6 mL/min (A) (by C-G formula based on SCr of 2.46 mg/dL (H)).  Results from last 7 days   Lab Units 23  0456 23  0447 23  1258 23  0408 12/15/23  1057 23  1715   ALBUMIN g/dL 2.4* 2.2* 2.5* 2.5*   < > 4.1   BILIRUBIN mg/dL 0.4 0.4  --  0.3  --  1.2  "  ALK PHOS U/L 150* 140*  --  145*  --  66   AST (SGOT) U/L 33 40  --  46*  --  23   ALT (SGPT) U/L 51* 49*  --  51*  --  14    < > = values in this interval not displayed.     Results from last 7 days   Lab Units 12/21/23  0456 12/20/23  0447 12/19/23  1258 12/19/23  0408 12/18/23  0445   CALCIUM mg/dL 7.5* 7.7* 8.0* 8.4* 9.7   ALBUMIN g/dL 2.4* 2.2* 2.5* 2.5*  --    MAGNESIUM mg/dL 1.7 1.6  --  1.6 2.1   PHOSPHORUS mg/dL  --  2.9 3.4 1.7* 2.2*     Results from last 7 days   Lab Units 12/14/23  2313 12/14/23 2003 12/14/23  1715   PROCALCITONIN ng/mL  --   --  0.26*   LACTATE mmol/L 1.4 4.4* 4.0*     COVID19   Date Value Ref Range Status   12/14/2023 Not Detected Not Detected - Ref. Range Final   05/18/2022 Not Detected Not Detected - Ref. Range Final     No results found for: \"HGBA1C\", \"POCGLU\"          XR Chest PA & Lateral  Narrative: XR CHEST PA AND LATERAL-     INDICATION: Follow-up pneumonia     COMPARISON: Chest radiographs dating back to 11/26/2015 CT abdomen  pelvis 12/14/2023     Impression: Small left pleural effusion. Similar left lower lobe  retrocardiac opacity suggest infectious/inflammatory process. This is  hard to discriminate from the pleural fluid on lateral injection. No  pneumothorax. Stable normal size cardiomediastinal silhouette. No focal  osseous abnormality.     This report was finalized on 12/18/2023 2:52 PM by Dr. Marco Smalls M.D on Workstation: [id number]       Scheduled Medications  allopurinol, 100 mg, Oral, Daily  ALPRAZolam, 0.25 mg, Oral, BID  aspirin, 81 mg, Oral, Nightly  brimonidine, 1 drop, Both Eyes, BID  budesonide-formoterol, 2 puff, Inhalation, BID - RT   And  tiotropium bromide monohydrate, 2 puff, Inhalation, Daily - RT  buPROPion XL, 300 mg, Oral, Daily  clopidogrel, 75 mg, Oral, Nightly  guaiFENesin, 600 mg, Oral, Q12H  heparin (porcine), 5,000 Units, Subcutaneous, Q8H  ipratropium-albuterol, 3 mL, Nebulization, 4x Daily - RT  isosorbide mononitrate, 30 mg, Oral, " QAM  latanoprost, 1 drop, Both Eyes, Nightly  levothyroxine, 137 mcg, Oral, Daily  Menthol-Zinc Oxide, 1 application , Topical, Q12H  multivitamin with minerals, 1 tablet, Oral, Daily  piperacillin-tazobactam, 3.375 g, Intravenous, Q8H  torsemide, 40 mg, Oral, Daily    Infusions     Diet  Diet: Cardiac Diets; Healthy Heart (2-3 Na+); Texture: Regular Texture (IDDSI 7); Fluid Consistency: Thin (IDDSI 0)    I have personally reviewed     [x]  Laboratory   []  Microbiology   []  Radiology   []  EKG/Telemetry  []  Cardiology/Vascular   []  Pathology    []  Records       Assessment/Plan     Active Hospital Problems    Diagnosis  POA    **Acute kidney injury [N17.9]  Yes    Stage 3b chronic kidney disease (CKD) [N18.32]  Yes    Anemia [D64.9]  Yes    Leukocytosis [D72.829]  Yes    Hyponatremia [E87.1]  Yes    Hypokalemia [E87.6]  Yes    Lactic acidosis [E87.20]  Yes    Hypotension [I95.9]  Yes    Hypercalcemia [E83.52]  Yes    Hyperlipidemia [E78.5]  Yes    PRISCILLA treated with BiPAP [G47.33]  Yes    Hypothyroidism [E03.9]  Yes    CAD (coronary artery disease) [I25.10]  Yes    COPD (chronic obstructive pulmonary disease) [J44.9]  Yes    Essential hypertension [I10]  Yes      Resolved Hospital Problems   No resolved problems to display.       Acute respiratory failure with hypoxia  Sepsis secondary to suspected pneumonia  -Repeat PA lateral chest x-ray 12/18-Small left pleural effusion. Similar left lower lobe retrocardiac opacity suggest -infectious/inflammatory process.   -Status post vancomycin, was discontinued as MRSA nares was negative.  -Continue Zosyn for 7 days.  WBC somewhat improved. Patient has been afebrile.    Fecal and Urinary Incontinence  -considering an MRI, however disease progression is very unusual. In addition, would expect neurogenic bladder to result in retention. Hold off on MRI for now and reassess in the AM    ARLYN on CKD stage 3B-secondary to hypotension, hypovolemia, and severe hypercalcemia:  Creatinine gradually improving.  Nephrology managing.     Hypophosphatemia/hypokalemia.  Monitor  And resume per protocol as needed.    Hypercalcemia-Calcium level was elevated to 17 on arrival. PTH found to be 29.5. Etiology likely multifactorial from calcium supplementation and volume contraction.  Resolved.  Nephrology managing.     Hyponatremia-resolved with IV fluids.     Transaminitis.  LFTs mildly elevated, AST 46, and ALT 51.  Monitor daily CMP.  If continues to trend up we will initiate workup and  may need to hold atorvastatin.  -Slowly improving.      Anemia.  Iron studies consistent with anemia of chronic disease, however iron saturation of 7 likely has underlying iron deficiency anemia.  Will need repeat iron studies once acute illness resolves.  However stable.    Hypertension complicated by hypotension prior to arrival-BP stable.  Home BP meds on hold.    Coronary artery disease-continue aspirin, Plavix, statin.    COPD-.  No evidence to suggest acute exacerbation.  Continue Symbicort, tiotropium,- DuoNebs.  Scheduled Mucinex.  Supplemental oxygen as needed to maintain O2 sats 88 to 92%, pulse oximetry,     Hypothyroidism- Continue levothyroxine as prescribed.     Hyperlipidemia-Continue Statin as prescribed.     PRISCILLA-Okay to use home machine if available.     Peripheral vascular disease-Complicated by compartment syndrome 2016 leading to left AKA.     Anxiety: Decrease Xanax to 0.25 mg twice daily as likely contributing to intermittent confusion in the setting of acute illness.        Full Code   Discussed with patient  Discussed in multidisciplinary rounds with nursing staff, CCP, pharmacy  Disposition: rehab when arrangements have been made and he is cleared by consultants.       Carl Fitch MD  [facility]  12/21/23  14:53 EST

## 2023-12-22 LAB
ALBUMIN SERPL-MCNC: 2.7 G/DL (ref 3.5–5.2)
ALP SERPL-CCNC: 174 U/L (ref 39–117)
ALT SERPL W P-5'-P-CCNC: 45 U/L (ref 1–41)
ANION GAP SERPL CALCULATED.3IONS-SCNC: 12 MMOL/L (ref 5–15)
AST SERPL-CCNC: 29 U/L (ref 1–40)
BACTERIA SPEC AEROBE CULT: NORMAL
BACTERIA SPEC AEROBE CULT: NORMAL
BASOPHILS # BLD MANUAL: 0.14 10*3/MM3 (ref 0–0.2)
BASOPHILS NFR BLD MANUAL: 1 % (ref 0–1.5)
BILIRUB CONJ SERPL-MCNC: <0.2 MG/DL (ref 0–0.3)
BILIRUB INDIRECT SERPL-MCNC: ABNORMAL MG/DL
BILIRUB SERPL-MCNC: 0.4 MG/DL (ref 0–1.2)
BUN SERPL-MCNC: 36 MG/DL (ref 8–23)
BUN/CREAT SERPL: 14.7 (ref 7–25)
CALCIUM SPEC-SCNC: 7.3 MG/DL (ref 8.6–10.5)
CHLORIDE SERPL-SCNC: 108 MMOL/L (ref 98–107)
CO2 SERPL-SCNC: 22 MMOL/L (ref 22–29)
CREAT SERPL-MCNC: 2.45 MG/DL (ref 0.76–1.27)
DEPRECATED RDW RBC AUTO: 47 FL (ref 37–54)
EGFRCR SERPLBLD CKD-EPI 2021: 28 ML/MIN/1.73
EOSINOPHIL # BLD MANUAL: 0.42 10*3/MM3 (ref 0–0.4)
EOSINOPHIL NFR BLD MANUAL: 3 % (ref 0.3–6.2)
ERYTHROCYTE [DISTWIDTH] IN BLOOD BY AUTOMATED COUNT: 14.2 % (ref 12.3–15.4)
GLUCOSE SERPL-MCNC: 96 MG/DL (ref 65–99)
HCT VFR BLD AUTO: 32.2 % (ref 37.5–51)
HGB BLD-MCNC: 10.9 G/DL (ref 13–17.7)
LYMPHOCYTES # BLD MANUAL: 1.29 10*3/MM3 (ref 0.7–3.1)
MAGNESIUM SERPL-MCNC: 1.6 MG/DL (ref 1.6–2.4)
MCH RBC QN AUTO: 30.6 PG (ref 26.6–33)
MCHC RBC AUTO-ENTMCNC: 33.9 G/DL (ref 31.5–35.7)
MCV RBC AUTO: 90.4 FL (ref 79–97)
NEUTROPHILS # BLD AUTO: 12.3 10*3/MM3 (ref 1.7–7)
NEUTROPHILS NFR BLD MANUAL: 86.9 % (ref 42.7–76)
PHOSPHATE SERPL-MCNC: 1.2 MG/DL (ref 2.5–4.5)
PLAT MORPH BLD: NORMAL
PLATELET # BLD AUTO: 296 10*3/MM3 (ref 140–450)
PMV BLD AUTO: 10.1 FL (ref 6–12)
POTASSIUM SERPL-SCNC: 2.9 MMOL/L (ref 3.5–5.2)
PROT SERPL-MCNC: 5 G/DL (ref 6–8.5)
QT INTERVAL: 425 MS
QTC INTERVAL: 478 MS
RBC # BLD AUTO: 3.56 10*6/MM3 (ref 4.14–5.8)
RBC MORPH BLD: NORMAL
SMUDGE CELLS BLD QL SMEAR: ABNORMAL
SODIUM SERPL-SCNC: 142 MMOL/L (ref 136–145)
VARIANT LYMPHS NFR BLD MANUAL: 2 % (ref 0–5)
VARIANT LYMPHS NFR BLD MANUAL: 7.1 % (ref 19.6–45.3)
WBC NRBC COR # BLD AUTO: 14.15 10*3/MM3 (ref 3.4–10.8)

## 2023-12-22 PROCEDURE — 97530 THERAPEUTIC ACTIVITIES: CPT

## 2023-12-22 PROCEDURE — 25010000002 PIPERACILLIN SOD-TAZOBACTAM PER 1 G: Performed by: STUDENT IN AN ORGANIZED HEALTH CARE EDUCATION/TRAINING PROGRAM

## 2023-12-22 PROCEDURE — 94799 UNLISTED PULMONARY SVC/PX: CPT

## 2023-12-22 PROCEDURE — 25810000003 SODIUM CHLORIDE 0.9 % SOLUTION: Performed by: INTERNAL MEDICINE

## 2023-12-22 PROCEDURE — 85007 BL SMEAR W/DIFF WBC COUNT: CPT | Performed by: STUDENT IN AN ORGANIZED HEALTH CARE EDUCATION/TRAINING PROGRAM

## 2023-12-22 PROCEDURE — 80076 HEPATIC FUNCTION PANEL: CPT | Performed by: INTERNAL MEDICINE

## 2023-12-22 PROCEDURE — 25010000002 HEPARIN (PORCINE) PER 1000 UNITS: Performed by: STUDENT IN AN ORGANIZED HEALTH CARE EDUCATION/TRAINING PROGRAM

## 2023-12-22 PROCEDURE — 85025 COMPLETE CBC W/AUTO DIFF WBC: CPT | Performed by: STUDENT IN AN ORGANIZED HEALTH CARE EDUCATION/TRAINING PROGRAM

## 2023-12-22 PROCEDURE — 80048 BASIC METABOLIC PNL TOTAL CA: CPT | Performed by: INTERNAL MEDICINE

## 2023-12-22 PROCEDURE — 83735 ASSAY OF MAGNESIUM: CPT | Performed by: STUDENT IN AN ORGANIZED HEALTH CARE EDUCATION/TRAINING PROGRAM

## 2023-12-22 PROCEDURE — 25010000002 MAGNESIUM SULFATE 2 GM/50ML SOLUTION: Performed by: INTERNAL MEDICINE

## 2023-12-22 PROCEDURE — 84100 ASSAY OF PHOSPHORUS: CPT | Performed by: INTERNAL MEDICINE

## 2023-12-22 PROCEDURE — 94761 N-INVAS EAR/PLS OXIMETRY MLT: CPT

## 2023-12-22 RX ORDER — MAGNESIUM SULFATE HEPTAHYDRATE 40 MG/ML
2 INJECTION, SOLUTION INTRAVENOUS ONCE
Status: COMPLETED | OUTPATIENT
Start: 2023-12-22 | End: 2023-12-22

## 2023-12-22 RX ORDER — POTASSIUM CHLORIDE 750 MG/1
40 TABLET, FILM COATED, EXTENDED RELEASE ORAL ONCE
Status: COMPLETED | OUTPATIENT
Start: 2023-12-22 | End: 2023-12-22

## 2023-12-22 RX ORDER — FENTANYL/ROPIVACAINE/NS/PF 2-625MCG/1
15 PLASTIC BAG, INJECTION (ML) EPIDURAL
Status: COMPLETED | OUTPATIENT
Start: 2023-12-22 | End: 2023-12-22

## 2023-12-22 RX ADMIN — POTASSIUM CHLORIDE 40 MEQ: 750 TABLET, EXTENDED RELEASE ORAL at 09:02

## 2023-12-22 RX ADMIN — LATANOPROST 1 DROP: 50 SOLUTION OPHTHALMIC at 21:28

## 2023-12-22 RX ADMIN — ISOSORBIDE MONONITRATE 30 MG: 30 TABLET, EXTENDED RELEASE ORAL at 06:26

## 2023-12-22 RX ADMIN — BUDESONIDE AND FORMOTEROL FUMARATE DIHYDRATE 2 PUFF: 160; 4.5 AEROSOL RESPIRATORY (INHALATION) at 20:39

## 2023-12-22 RX ADMIN — MAGNESIUM SULFATE HEPTAHYDRATE 2 G: 40 INJECTION, SOLUTION INTRAVENOUS at 09:01

## 2023-12-22 RX ADMIN — GUAIFENESIN 600 MG: 600 TABLET, EXTENDED RELEASE ORAL at 09:02

## 2023-12-22 RX ADMIN — ANORECTAL OINTMENT 1 APPLICATION: 15.7; .44; 24; 20.6 OINTMENT TOPICAL at 21:28

## 2023-12-22 RX ADMIN — ALPRAZOLAM 0.25 MG: 0.25 TABLET ORAL at 09:02

## 2023-12-22 RX ADMIN — ALLOPURINOL 100 MG: 100 TABLET ORAL at 09:02

## 2023-12-22 RX ADMIN — ASPIRIN 81 MG: 81 TABLET, CHEWABLE ORAL at 21:29

## 2023-12-22 RX ADMIN — POTASSIUM PHOSPHATE, MONOBASIC POTASSIUM PHOSPHATE, DIBASIC 15 MMOL: 224; 236 INJECTION, SOLUTION, CONCENTRATE INTRAVENOUS at 14:13

## 2023-12-22 RX ADMIN — PIPERACILLIN SODIUM AND TAZOBACTAM SODIUM 3.38 G: 3; .375 INJECTION, SOLUTION INTRAVENOUS at 09:01

## 2023-12-22 RX ADMIN — HEPARIN SODIUM 5000 UNITS: 5000 INJECTION INTRAVENOUS; SUBCUTANEOUS at 06:27

## 2023-12-22 RX ADMIN — GUAIFENESIN 600 MG: 600 TABLET, EXTENDED RELEASE ORAL at 21:29

## 2023-12-22 RX ADMIN — IPRATROPIUM BROMIDE AND ALBUTEROL SULFATE 3 ML: 2.5; .5 SOLUTION RESPIRATORY (INHALATION) at 20:39

## 2023-12-22 RX ADMIN — CLOPIDOGREL BISULFATE 75 MG: 75 TABLET, FILM COATED ORAL at 21:29

## 2023-12-22 RX ADMIN — ANORECTAL OINTMENT 1 APPLICATION: 15.7; .44; 24; 20.6 OINTMENT TOPICAL at 09:02

## 2023-12-22 RX ADMIN — Medication 1 TABLET: at 09:02

## 2023-12-22 RX ADMIN — POTASSIUM PHOSPHATE, MONOBASIC POTASSIUM PHOSPHATE, DIBASIC 15 MMOL: 224; 236 INJECTION, SOLUTION, CONCENTRATE INTRAVENOUS at 11:12

## 2023-12-22 RX ADMIN — BRIMONIDINE TARTRATE 1 DROP: 2 SOLUTION/ DROPS OPHTHALMIC at 21:28

## 2023-12-22 RX ADMIN — IPRATROPIUM BROMIDE AND ALBUTEROL SULFATE 3 ML: 2.5; .5 SOLUTION RESPIRATORY (INHALATION) at 14:25

## 2023-12-22 RX ADMIN — PIPERACILLIN SODIUM AND TAZOBACTAM SODIUM 3.38 G: 3; .375 INJECTION, SOLUTION INTRAVENOUS at 00:29

## 2023-12-22 RX ADMIN — IPRATROPIUM BROMIDE AND ALBUTEROL SULFATE 3 ML: 2.5; .5 SOLUTION RESPIRATORY (INHALATION) at 06:42

## 2023-12-22 RX ADMIN — ALPRAZOLAM 0.25 MG: 0.25 TABLET ORAL at 21:28

## 2023-12-22 RX ADMIN — LEVOTHYROXINE SODIUM 137 MCG: 0.14 TABLET ORAL at 06:27

## 2023-12-22 RX ADMIN — BUPROPION HYDROCHLORIDE 300 MG: 300 TABLET, EXTENDED RELEASE ORAL at 09:02

## 2023-12-22 RX ADMIN — IPRATROPIUM BROMIDE AND ALBUTEROL SULFATE 3 ML: 2.5; .5 SOLUTION RESPIRATORY (INHALATION) at 11:02

## 2023-12-22 RX ADMIN — TIOTROPIUM BROMIDE INHALATION SPRAY 2 PUFF: 3.12 SPRAY, METERED RESPIRATORY (INHALATION) at 06:43

## 2023-12-22 RX ADMIN — HEPARIN SODIUM 5000 UNITS: 5000 INJECTION INTRAVENOUS; SUBCUTANEOUS at 14:13

## 2023-12-22 RX ADMIN — HEPARIN SODIUM 5000 UNITS: 5000 INJECTION INTRAVENOUS; SUBCUTANEOUS at 21:29

## 2023-12-22 RX ADMIN — BRIMONIDINE TARTRATE 1 DROP: 2 SOLUTION/ DROPS OPHTHALMIC at 14:13

## 2023-12-22 RX ADMIN — BUDESONIDE AND FORMOTEROL FUMARATE DIHYDRATE 2 PUFF: 160; 4.5 AEROSOL RESPIRATORY (INHALATION) at 06:42

## 2023-12-22 NOTE — PROGRESS NOTES
Name: Sim Agustin ADMIT: 2023   : 1955  PCP: Seth Hester MD    MRN: [id number] LOS: 8 days   AGE/SEX: 68 y.o. male  ROOM: [facility]     Subjective   Subjective     New red rash over the patient's body.  Maculopapular, nontender, nonpruritic  Possibly reaction to Zosyn.    Review of Systems     Objective   Objective   Vital Signs  Temp:  [98.2 °F (36.8 °C)-99.1 °F (37.3 °C)] 98.2 °F (36.8 °C)  Heart Rate:  [70-82] 78  Resp:  [18] 18  BP: (113-135)/(53-67) 116/57  SpO2:  [93 %-100 %] 97 %  on   ;   Device (Oxygen Therapy): room air  Body mass index is 25.28 kg/m².  Physical Exam    General: Alert, sitting up in the bed,  not in distress  HEENT: Normocephalic, atraumatic  CV: Regular rate and rhythm, no murmurs rubs or gallops  Lungs:  no wheezing, nonlabored breathing  Abdomen: Soft, nontender, nondistended  Extremities: No significant peripheral edema, left AKA  SkinL: Maculopapular rash throughout patient's body, most concentrated on his right arm.    Results Review     I reviewed the patient's new clinical results.  Results from last 7 days   Lab Units 23  0408   WBC 10*3/mm3 14.15* 13.34* 15.04* 13.41*   HEMOGLOBIN g/dL 10.9* 10.5* 10.1* 10.8*   PLATELETS 10*3/mm3 296 266 261 242     Results from last 7 days   Lab Units 23  04523  0456 23  1258   SODIUM mmol/L 142 139 140 143   POTASSIUM mmol/L 2.9* 3.2* 3.3* 3.6   CHLORIDE mmol/L 108* 106 106 110*   CO2 mmol/L 22.0 20.0* 19.8* 20.7*   BUN mg/dL 36* 36* 40* 40*   CREATININE mg/dL 2.45* 2.46* 2.70* 2.69*   GLUCOSE mg/dL 96 105* 92 100*   Estimated Creatinine Clearance: 30.8 mL/min (A) (by C-G formula based on SCr of 2.45 mg/dL (H)).  Results from last 7 days   Lab Units 23  0459 23  0456 23  0447 23  1258 23  0408   ALBUMIN g/dL 2.7* 2.4* 2.2* 2.5* 2.5*   BILIRUBIN mg/dL 0.4 0.4 0.4  --  0.3   ALK PHOS U/L 174* 150* 140*  --  145*  "  AST (SGOT) U/L 29 33 40  --  46*   ALT (SGPT) U/L 45* 51* 49*  --  51*     Results from last 7 days   Lab Units 12/22/23  0459 12/21/23  0456 12/20/23  0447 12/19/23  1258 12/19/23  0408   CALCIUM mg/dL 7.3* 7.5* 7.7* 8.0* 8.4*   ALBUMIN g/dL 2.7* 2.4* 2.2* 2.5* 2.5*   MAGNESIUM mg/dL 1.6 1.7 1.6  --  1.6   PHOSPHORUS mg/dL 1.2*  --  2.9 3.4 1.7*           COVID19   Date Value Ref Range Status   12/14/2023 Not Detected Not Detected - Ref. Range Final   05/18/2022 Not Detected Not Detected - Ref. Range Final     No results found for: \"HGBA1C\", \"POCGLU\"          XR Chest PA & Lateral  Narrative: XR CHEST PA AND LATERAL-     INDICATION: Follow-up pneumonia     COMPARISON: Chest radiographs dating back to 11/26/2015 CT abdomen  pelvis 12/14/2023     Impression: Small left pleural effusion. Similar left lower lobe  retrocardiac opacity suggest infectious/inflammatory process. This is  hard to discriminate from the pleural fluid on lateral injection. No  pneumothorax. Stable normal size cardiomediastinal silhouette. No focal  osseous abnormality.     This report was finalized on 12/18/2023 2:52 PM by Dr. Marco Smalls M.D on Workstation: [id number]       Scheduled Medications  allopurinol, 100 mg, Oral, Daily  ALPRAZolam, 0.25 mg, Oral, BID  aspirin, 81 mg, Oral, Nightly  brimonidine, 1 drop, Both Eyes, BID  budesonide-formoterol, 2 puff, Inhalation, BID - RT   And  tiotropium bromide monohydrate, 2 puff, Inhalation, Daily - RT  buPROPion XL, 300 mg, Oral, Daily  clopidogrel, 75 mg, Oral, Nightly  guaiFENesin, 600 mg, Oral, Q12H  heparin (porcine), 5,000 Units, Subcutaneous, Q8H  ipratropium-albuterol, 3 mL, Nebulization, 4x Daily - RT  isosorbide mononitrate, 30 mg, Oral, QAM  latanoprost, 1 drop, Both Eyes, Nightly  levothyroxine, 137 mcg, Oral, Daily  Menthol-Zinc Oxide, 1 application , Topical, Q12H  multivitamin with minerals, 1 tablet, Oral, Daily  potassium phosphate, 15 mmol, Intravenous, Q3H    Infusions   "   Diet  Diet: Cardiac Diets; Healthy Heart (2-3 Na+); Texture: Regular Texture (IDDSI 7); Fluid Consistency: Thin (IDDSI 0)    I have personally reviewed     [x]  Laboratory   []  Microbiology   []  Radiology   []  EKG/Telemetry  []  Cardiology/Vascular   []  Pathology    []  Records       Assessment/Plan     Active Hospital Problems    Diagnosis  POA    **Acute kidney injury [N17.9]  Yes    Stage 3b chronic kidney disease (CKD) [N18.32]  Yes    Anemia [D64.9]  Yes    Leukocytosis [D72.829]  Yes    Hyponatremia [E87.1]  Yes    Hypokalemia [E87.6]  Yes    Lactic acidosis [E87.20]  Yes    Hypotension [I95.9]  Yes    Hypercalcemia [E83.52]  Yes    Hyperlipidemia [E78.5]  Yes    PRISCILLA treated with BiPAP [G47.33]  Yes    Hypothyroidism [E03.9]  Yes    CAD (coronary artery disease) [I25.10]  Yes    COPD (chronic obstructive pulmonary disease) [J44.9]  Yes    Essential hypertension [I10]  Yes      Resolved Hospital Problems   No resolved problems to display.     Hypersensitivity rash  Suspect this is secondary to Zosyn.  Today is the last day and this will be discontinued.     Acute respiratory failure with hypoxia  Sepsis secondary to suspected pneumonia  -Repeat PA lateral chest x-ray 12/18-Small left pleural effusion. Similar left lower lobe retrocardiac opacity suggest -infectious/inflammatory process.   -Status post vancomycin, was discontinued as MRSA nares was negative.  -Continue Zosyn for 7 days.  WBC somewhat improved. Patient has been afebrile.    ARLYN on CKD stage 3B-secondary to hypotension, hypovolemia, and severe hypercalcemia: Creatinine gradually improving.  Nephrology managing.     Hypophosphatemia/hypokalemia.  Monitor  And resume per protocol as needed.    Hypercalcemia-Calcium level was elevated to 17 on arrival. PTH found to be 29.5. Etiology likely multifactorial from calcium supplementation and volume contraction.  Resolved.  Nephrology managing.     Hyponatremia-resolved with IV fluids.      Transaminitis.  LFTs mildly elevated, AST 46, and ALT 51.  Monitor daily CMP.  If continues to trend up we will initiate workup and  may need to hold atorvastatin.  -Slowly improving.      Anemia.  Iron studies consistent with anemia of chronic disease, however iron saturation of 7 likely has underlying iron deficiency anemia.  Will need repeat iron studies once acute illness resolves.  However stable.    Hypertension complicated by hypotension prior to arrival-BP stable.  Home BP meds on hold.    Coronary artery disease-continue aspirin, Plavix, statin.    COPD-.  No evidence to suggest acute exacerbation.  Continue Symbicort, tiotropium,- DuoNebs.  Scheduled Mucinex.  Supplemental oxygen as needed to maintain O2 sats 88 to 92%, pulse oximetry,     Hypothyroidism- Continue levothyroxine as prescribed.     Hyperlipidemia-Continue Statin as prescribed.     PRISCILLA-Okay to use home machine if available.     Peripheral vascular disease-Complicated by compartment syndrome 2016 leading to left AKA.     Anxiety: Decrease Xanax to 0.25 mg twice daily as likely contributing to intermittent confusion in the setting of acute illness.        Full Code   Discussed with patient  Discussed in multidisciplinary rounds with nursing staff, CCP, pharmacy  Disposition: SNF when arrangements have been made       Carl Fitch MD  [facility]  12/22/23  13:29 EST

## 2023-12-22 NOTE — PLAN OF CARE
Goal Outcome Evaluation:  Plan of Care Reviewed With: patient        Progress: improving  Outcome Evaluation: Pt seen for OT tx focusing on functional endurance and safety with Fxnl xfers. Pt able to complete LBD of shoe Set Up seated EOB. Pt CGA for STS w/ FWW. Pt tolerated standing supported briefly before needing to take seated rest d/t fatigue. Pt CGA for safety using FWW for pivoting on RLE from bed <> chair. Pt tolerated hopping a few steps during xfer. Pt O2 noted to drop to 87% after xfer but returned to 90 after seated rest and Ed in deep breathing tech. Pt also Ed in energy conservation tech for seated ADLs. Pt left sitting EOB w/ respiratory therapy. OT will continue to follow for stated goals and anticipate d/c to SNF.      Anticipated Discharge Disposition (OT): skilled nursing facility, inpatient rehabilitation facility

## 2023-12-22 NOTE — PROGRESS NOTES
[facility] Progress Note      Patient Name: Sim Agustin  : 1955  MRN: [id number]  Primary Care Physician:  Seth Hester MD  Date of admission: 2023    Subjective     Interval History:   Follow-up ARLYN on CKD3b    Feels much better than yesterday  Appetite and energy level improving; actually walked earlier today  Has developed significant erythroderma of left arm, chest, and face  Breathing is comfortable    Review of Systems:   As noted above    Objective     Vitals:   Temp:  [98.2 °F (36.8 °C)-99.1 °F (37.3 °C)] 98.2 °F (36.8 °C)  Heart Rate:  [70-86] 86  Resp:  [18] 18  BP: (113-135)/(53-67) 116/57    Intake/Output Summary (Last 24 hours) at 2023 1819  Last data filed at 2023 1755  Gross per 24 hour   Intake 740 ml   Output 2700 ml   Net -1960 ml       Physical Exam:    General Appearance: alert, oriented x 3, NAD  Skin: warm and dry; erythroderma left arm, chest, and face  HEENT: oral mucosa dry, nonicteric sclera  Neck: supple, no JVD  Lungs: Coarse upper airway rhonchi; no wheezing; not labored  Heart: RRR, normal S1 and S2  Abdomen: soft, nontender, nondistended.+bs  : no palpable bladder  Extremities: 1+ edema right lower extremity, left AKA.  Neuro: normal speech and mental status     Scheduled Meds:     allopurinol, 100 mg, Oral, Daily  ALPRAZolam, 0.25 mg, Oral, BID  aspirin, 81 mg, Oral, Nightly  brimonidine, 1 drop, Both Eyes, BID  budesonide-formoterol, 2 puff, Inhalation, BID - RT   And  tiotropium bromide monohydrate, 2 puff, Inhalation, Daily - RT  buPROPion XL, 300 mg, Oral, Daily  clopidogrel, 75 mg, Oral, Nightly  guaiFENesin, 600 mg, Oral, Q12H  heparin (porcine), 5,000 Units, Subcutaneous, Q8H  ipratropium-albuterol, 3 mL, Nebulization, 4x Daily - RT  isosorbide mononitrate, 30 mg, Oral, QAM  latanoprost, 1 drop, Both Eyes, Nightly  levothyroxine, 137 mcg, Oral, Daily  Menthol-Zinc Oxide, 1 application , Topical, Q12H  multivitamin  with minerals, 1 tablet, Oral, Daily      IV Meds:          Results Reviewed:   I have personally reviewed the results from the time of this admission to 12/22/2023 18:19 EST     Results from last 7 days   Lab Units 12/22/23 0459 12/21/23 0456 12/20/23 0447   SODIUM mmol/L 142 139 140   POTASSIUM mmol/L 2.9* 3.2* 3.3*   CHLORIDE mmol/L 108* 106 106   CO2 mmol/L 22.0 20.0* 19.8*   BUN mg/dL 36* 36* 40*   CREATININE mg/dL 2.45* 2.46* 2.70*   CALCIUM mg/dL 7.3* 7.5* 7.7*   BILIRUBIN mg/dL 0.4 0.4 0.4   ALK PHOS U/L 174* 150* 140*   ALT (SGPT) U/L 45* 51* 49*   AST (SGOT) U/L 29 33 40   GLUCOSE mg/dL 96 105* 92       Estimated Creatinine Clearance: 30.8 mL/min (A) (by C-G formula based on SCr of 2.45 mg/dL (H)).    Results from last 7 days   Lab Units 12/22/23 0459 12/21/23 0456 12/20/23 0447 12/19/23  1258   MAGNESIUM mg/dL 1.6 1.7 1.6  --    PHOSPHORUS mg/dL 1.2*  --  2.9 3.4             Results from last 7 days   Lab Units 12/22/23 0459 12/21/23 0456 12/20/23 0447 12/19/23 0408 12/18/23 0445   WBC 10*3/mm3 14.15* 13.34* 15.04* 13.41* 14.26*   HEMOGLOBIN g/dL 10.9* 10.5* 10.1* 10.8* 11.4*   PLATELETS 10*3/mm3 296 266 261 242 207             Assessment / Plan     ASSESSMENT:  ARLYN on CKD3b, nonoliguric, improving; his baseline SCr is 1.7.  ARLYN is multifactorial from hypotension, hypovolemia and hypercalcemia, with the latter resolved.  Severe hypophosphatemia and hypokalemia from poor nutrition.  Volume status improved.    Hypercalcemia, resolved, and now has hypocalcemia.  Serum calcium is still low when corrected for his low albumin  Hypotension, resolved  PAD, s/p left AKA  Anemia  Transaminitis, with statin on hold  CAD  Shortness of breath and abnormal CXR 12/18 with left lower lobe opacity.  Chest CT 11/18/2023 with 12 mm stellate left upper lobe nodule.  Reticulonodular infiltrate left base; has had 7 days of Zosyn as of today (12/22)  Erythroderma, possible drug reaction; Zosyn discontinued  12/22    PLAN:  1.  Potassium, magnesium, and phosphorus replacement  2.  Stop torsemide until potassium and magnesium have been replaced  3.  Hopefully rehab soon    Thank you for involving us in the care of Sim Agustin.  Please feel free to call with any questions.    Rajesh Sarabia MD  12/22/23  18:19 [facility]    [facility]  [phone]    Please note that portions of this note were completed with a voice recognition program.

## 2023-12-22 NOTE — CASE MANAGEMENT/SOCIAL WORK
Continued Stay Note  [facility]     Patient Name: Sim Agustin  MRN: [id number]  Today's Date: 12/22/2023    Admit Date: 12/14/2023    Plan: [facility] accepted but Insurance denied Acute Rehab. Referrals placed for [facility] and [facility] pending. Will need pre cert for SNF   Discharge Plan       Row Name 12/22/23 1611       Plan    Plan [facility] accepted but Insurance denied Acute Rehab. Referrals placed for [facility] and [facility] pending. Will need pre cert for SNF    Patient/Family in Agreement with Plan yes    Plan Comments Insurance denied Acute Rehab. Spoke with wife and referrals for [facility] and [facility] placed in Epic and pending. Currently no beds at either facility, may have bed on Tuesday. Will need pre cert. Pt will be here through long holiday weekend. Padilla Birmingham RN-BC                   Discharge Codes    No documentation.                 Expected Discharge Date and Time       Expected Discharge Date Expected Discharge Time    Dec 24, 2023               Padilla Birmingham RN

## 2023-12-22 NOTE — PLAN OF CARE
Goal Outcome Evaluation:  Plan of Care Reviewed With: patient           Outcome Evaluation: pt sitting up in chair, awake and alert, agreeable to therapy. Patient able to lisa prosthesis, requiring seated rest breaks in between sit to stand trials while attempting to get a good seal. Pt performed sit to stand with Ratna, cues for hand placement. Pt ambulated short distance to door and back with rwx and CGA. Pt on room air, sats at 85% upon return to chair, increases to 89% with seated rest break. Pt demonstrates improvement with mobility today, able to initiate ambulation with prosthesis but continues to be well below functional baseline as he was ambulating community distances with cane prior to admission. Recommend acute rehab at d/c.      Anticipated Discharge Disposition (PT): inpatient rehabilitation facility

## 2023-12-22 NOTE — PLAN OF CARE
Goal Outcome Evaluation:  Plan of Care Reviewed With: patient        Progress: improving  Outcome Evaluation: No c/o pain or soa. Up to chair and ambulated to door with PT. BAR denied patient, plan to start precert for subacute rehab. IV zosyn discontinued due to increased rash. Mag,K, Phos replaced per order. Turn [id number].

## 2023-12-22 NOTE — THERAPY TREATMENT NOTE
Patient Name: Sim Agustin  : 1955    MRN: [id number]                              Today's Date: 2023       Admit Date: 2023    Visit Dx:     ICD-10-CM ICD-9-CM   1. ARLYN (acute kidney injury)  N17.9 584.9   2. Hypercalcemia  E83.52 275.42     Patient Active Problem List   Diagnosis    H/O angiodysplasia of intestinal tract    COPD (chronic obstructive pulmonary disease)    S/P colectomy    Status post above-knee amputation of left lower extremity    CAD (coronary artery disease)    PVD (peripheral vascular disease)    Essential hypertension    Cardiomyopathy, unspecified    PRISCILLA treated with BiPAP    Anxiety disorder    Chronic midline low back pain without sciatica    Long term prescription benzodiazepine use    History of bradycardia    Hypothyroidism    Vitamin B12 deficiency    Iron deficiency    History of smoking 30 or more pack years    High risk medication use    DNR (do not resuscitate)    Stage 3a chronic kidney disease    Hyperlipidemia    Proteinuria    Junctional cardiac arrhythmia    Acute kidney injury    Hypercalcemia    Stage 3b chronic kidney disease (CKD)    Anemia    Leukocytosis    Hyponatremia    Hypokalemia    Lactic acidosis    Hypotension     Past Medical History:   Diagnosis Date    Acute respiratory failure with hypoxia and hypercarbia 2019    Acute upper respiratory infection 2013    Amputee, above knee, left     Anemia     per [place] database    ARF (acute renal failure) 2016    Asthma 2018    Atelectasis, left 2016    Bradycardia 2019    CAD (coronary artery disease) 2016    Chronic obstructive pulmonary disease with acute exacerbation 2013    Clotting disorder 2003    Colon polyps     Compartment syndrome     AFTER ILIAC SURGERY. ABOVE KNEE AMPUTATION    COPD (chronic obstructive pulmonary disease)     COPD with hypoxia 2019    Cough     SINCE APRIL WITH PNEUMONIA.     Demand myocardial infarction  05/19/2022    Disease of thyroid gland     HYPOTHYRODISM    Diverticulosis     Elevated hematocrit 01/06/2021    Employs prosthetic leg     ESRD (end stage renal disease) on dialysis 04/06/2016    Fracture of patella 03/03/2015    History of acute renal failure     History of CHF (congestive heart failure)     History of GI bleed 02/2016    AORTODUOD FISTULA    History of sepsis 02/2016    History of transfusion     MULTIPLE, 2016    Hyperkalemia 04/11/2016    Hyperlipidemia     Hypertension     Hypotension     Hypotension 03/04/2016    Left lower lobe pneumonia 05/18/2022    Nonischemic cardiomyopathy     Nosocomial pneumonia 04/06/2016    Phantom limb pain     SL, WITH LEFT ABOUVE KNEE    Pleural effusion on left 04/11/2016    Pneumonia     SPRING 2016  AFTER SURGERY    Pulmonary embolism     PVD (peripheral vascular disease)     Renal insufficiency     S/P thoracentesis 04/11/2016    Sepsis, unspecified organism 04/05/2016     Past Surgical History:   Procedure Laterality Date    ABOVE KNEE AMPUTATION Left 03/16/2016    Procedure: LT AMPUTATION ABOVE KNEE;  Surgeon: Jose Swann MD;  Location: [facility] MAIN OR;  Service:     ARTERIAL THROMBECTOMY  2001    throbectomy of arterial graft    AXILLARY FEMORAL BYPASS Right 02/15/2016    Exploratory lapartomy, repair aortoduodenal fistula, resection infected aortobifemoral bypass graft, axillobi-superficial femoral artery Beyer-Aneudy bypass graft from right axillary artery, Repair of duodenotomy 4th portion duodenum-Dr. Jose Swann, Dr. Genaro Perrin    BRONCHOSCOPY Left 03/04/2016    Dr. NATALIIA Tate    BRONCHOSCOPY Left 05/20/2022    Procedure: BRONCHOSCOPY WITH BIOPSIES, BRUSHINGS, AND BAL UNDER FLUORO;  Surgeon: Ishmael Tate Jr., MD;  Location: [facility] ENDOSCOPY;  Service: Pulmonary;  Laterality: Left;  PRE OP - LLL CONSOLIDATION  POST OP - SAME    BRONCHOSCOPY RIGID / FLEXIBLE N/A 03/03/2016    Dr. NATALIIA Tate    BRONCHOSCOPY RIGID / FLEXIBLE N/A  02/26/2016    Dr. NATALIIA Tate    CARDIAC CATHETERIZATION N/A 03/18/2019    Procedure: Right and Left Heart Cath;  Surgeon: Nina Storey MD;  Location: [facility] CATH INVASIVE LOCATION;  Service: Cardiovascular    CATARACT EXTRACTION WITH INTRAOCULAR LENS IMPLANT Bilateral     COLON RESECTION WITH COLOSTOMY Left 02/28/2016    Exploratory laparotomy with open left hemicolectomy, end-colostomy, G-tube and J-tube-Dr. Endy Chaney    COLON SURGERY  3/25/2015    COLOSTOMY    COLONOSCOPY N/A 2015    Dr. Laughlin    COLONOSCOPY N/A 10/12/2016    Procedure: COLONOSCOPY TO 20 CM AND PER STOMA TO 30CM;  Surgeon: Genaro Perrin MD;  Location: [facility] ENDOSCOPY;  Service:     COLONOSCOPY N/A 10/21/2016    Procedure: COLONOSCOPY DONE AT BEDSIDE ONTO CECEM;  Surgeon: Genaro Perrin MD;  Location: [facility] ENDOSCOPY;  Service:     COLONOSCOPY N/A 02/10/2016    Diverticulosis in the entire examined colon, non-bleeding internal hemorrhoids-Dr. Taylor Pina    COLONOSCOPY N/A 02/11/2016    Poor prep, blood in the entire examined colon, normal ileum-Dr. Taylor Pina    COLONOSCOPY W/ POLYPECTOMY N/A 07/27/2015    Normal ileum, diverticulosis in the sigmoid colon: resected and retrieved, one 6 mm polyp in the descending colon: resected and retrieved, the distal rectum and anal verge are normal on retroflexion view-Dr. Miguel Ángel Laughlin    COLOSTOMY CLOSURE N/A 10/13/2016    Procedure: COLOSTOMY TAKEDOWN OR CLOSURE, APPENDECTOMY;  Surgeon: Genaro Perrin MD;  Location: [facility] OR;  Service:     DEBRIDEMENT LEG Left 03/01/2016    Excisional debridement of the skin, subcutantous tissue, tendon and muscle left calf-Dr. Jose Swann    DEBRIDEMENT LEG Left 02/25/2016    Excisional debridement full-thcikness skin and subcutaneous tissue and tendon and muscle, left medial and lateral calf muscles-Dr. Jose Swann    ENDOSCOPY N/A 10/21/2016    Procedure: ESOPHAGOGASTRODUODENOSCOPY DONE AT BEDSIDE;  Surgeon: Genaro SARAVIA  MD Marychuy;  Location: [facility] ENDOSCOPY;  Service:     ENDOSCOPY AND COLONOSCOPY N/A 02/28/2016    EGD and Colonoscopy with Candy ink injection-Dr. Endy Chaney    ENTEROSCOPY SMALL BOWEL N/A 02/11/2016    Normal esophagus, normal stomach, normal duodenum, portion of the jejunum normal-Dr. Taylor Pina    ILIAC ARTERY - FEMORAL ARTERY BYPASS GRAFT Bilateral 2002    ILIAC ARTERY STENT Bilateral 2001    INSERTION AND REMOVAL PERITONEAL DIALYSIS CATHETER Right 02/29/2016    Exchange right jugular 16 cm Mahurkar dialysis catheter-Dr. Jose Swann    INSERTION PERITONEAL DIALYSIS CATHETER Left 03/01/2016    Ultrasound-guided access of the left jugular vein, 23 cm left jugular palindrome dialysis catheter placement-Dr. Jose Swann    INSERTION PERITONEAL DIALYSIS CATHETER Right 02/18/2016    Right jugular Mahrkar catherer placement-Dr. Jose Swann    JOINT REPLACEMENT Left     THORACOSCOPY Left 04/18/2016    Procedure: LT THORACOSCOPY VIDEO ASSISTED WITH DECORDICATION;  Surgeon: Genaro Davila III, MD;  Location: [facility] MAIN OR;  Service:     THROMBECTOMY Left 02/16/2016    Thombectomy of left femoral graft, left leg arteriogram, left calf forward compartment fasciotomy-Dr. Jose Swann    TOTAL HIP ARTHROPLASTY Left     UPPER GASTROINTESTINAL ENDOSCOPY N/A 02/09/2016    Normal UGI-Dr. Ajay Parkinson    UPPER GASTROINTESTINAL ENDOSCOPY N/A 11/28/2015    Small hiatal hernia, chronic gastritis: biopsied, non-bleeding angioectasias in the stomach: treated with argon plasma coagulation, multiple bleeding angioectasias in the dudenum: treated with argon plasma coagulation-Dr. Mykel Jaramillo    VASCULAR SURGERY      MULTIPLE    VENTRAL/INCISIONAL HERNIA REPAIR N/A 10/19/2017    Procedure: VENTRAL HERNIA REPAIR WITH MESH AND BILATERAL COMPONENT SEPARATION;  Surgeon: Genaro Perrin MD;  Location: [facility] MAIN OR;  Service:     WISDOM TOOTH EXTRACTION        General Information       Row Name 12/22/23  1530          OT Time and Intention    Document Type therapy note (daily note)  -PP     Mode of Treatment individual therapy;occupational therapy  -PP       Row Name 12/22/23 1530          General Information    Patient Profile Reviewed yes  -PP     Existing Precautions/Restrictions fall  -PP     Barriers to Rehab medically complex  -PP       Row Name 12/22/23 1530          Cognition    Orientation Status (Cognition) oriented x 3  -PP       Row Name 12/22/23 1530          Safety Issues, Functional Mobility    Impairments Affecting Function (Mobility) balance;endurance/activity tolerance;strength;shortness of breath  -PP     Comment, Safety Issues/Impairments (Mobility) gait belt and non skid socks worn for safety  -PP               User Key  (r) = Recorded By, (t) = Taken By, (c) = Cosigned By      Initials Name Provider Type    PP Karly Medina OT Occupational Therapist                     Mobility/ADL's       Row Name 12/22/23 1535          Bed Mobility    Supine-Sit [place] (Bed Mobility) standby assist  -PP     Sit-Supine [place] (Bed Mobility) not tested  -PP     Comment, (Bed Mobility) Pt left sitting EOB w/ respiratory therapist  -PP       Row Name 12/22/23 1535          Transfers    Transfers sit-stand transfer;stand-sit transfer;bed-chair transfer  -PP       Row Name 12/22/23 1535          Bed-Chair Transfer    Bed-Chair [place] (Transfers) contact guard  -PP     Assistive Device (Bed-Chair Transfers) walker, front-wheeled  -PP     Comment, (Bed-Chair Transfer) Pt able to hop during fxnl xfer to/from bed to/from chair using Fww.  -PP       Row Name 12/22/23 1535          Sit-Stand Transfer    Sit-Stand [place] (Transfers) contact guard  -PP     Assistive Device (Sit-Stand Transfers) walker, front-wheeled  -PP       Row Name 12/22/23 1535          Functional Mobility    Functional Mobility- Comment Pt able to hop on RLE for short distance w/ Fww.  -PP       Row Name 12/22/23 1535           Activities of Daily Living    BADL Assessment/Intervention --  Pt denies needing to perform this pm. Previously toileting w/ Nsg aid  -PP               User Key  (r) = Recorded By, (t) = Taken By, (c) = Cosigned By      Initials Name Provider Type    PP Karly Medina OT Occupational Therapist                   Obj/Interventions       Row Name 12/22/23 1539          Balance    Static Sitting Balance standby assist  -PP     Dynamic Sitting Balance standby assist  -PP     Position, Sitting Balance unsupported;sitting edge of bed  -PP     Balance Interventions dynamic reaching  -PP     Comment, Balance Pt able to shift weight and participate in dyn reaching sitting EOB SBA. Pt CGA for standing bal w/ Fww.  -PP               User Key  (r) = Recorded By, (t) = Taken By, (c) = Cosigned By      Initials Name Provider Type    PP Karly Medina, OT Occupational Therapist                   Goals/Plan    No documentation.                  Clinical Impression       Row Name 12/22/23 1540          Pain Assessment    Pretreatment Pain Rating 0/10 - no pain  -PP     Posttreatment Pain Rating 0/10 - no pain  -PP     Pain Intervention(s) Rest  -PP       Row Name 12/22/23 1540          Plan of Care Review    Plan of Care Reviewed With patient  -PP     Progress improving  -PP     Outcome Evaluation Pt seen for OT tx focusing on functional endurance and safety with Fxnl xfers. Pt able to complete LBD of shoe Set Up seated EOB. Pt CGA for STS w/ FWW. Pt tolerated standing supported briefly before needing to take seated rest d/t fatigue. Pt CGA for safety using FWW for pivoting on RLE from bed <> chair. Pt tolerated hopping a few steps during xfer. Pt O2 noted to drop to 87% after xfer but returned to 90 after seated rest and Ed in deep breathing tech. Pt also Ed in energy conservation tech for seated ADLs. Pt left sitting EOB w/ respiratory therapy. OT will continue to follow for stated goals and anticipate d/c to SNF.   -PP       Row Name 12/22/23 1540          Therapy Plan Review/Discharge Plan (OT)    Anticipated Discharge Disposition (OT) skilled nursing facility;inpatient rehabilitation facility  -PP       Row Name 12/22/23 1540          Vital Signs    O2 Delivery Pre Treatment room air  -PP     Pre Patient Position Supine  -PP     Intra Patient Position Standing  -PP     Post Patient Position Sitting  -PP       Row Name 12/22/23 1540          Positioning and Restraints    Pre-Treatment Position in bed  -PP     Post Treatment Position bed  -PP     In Bed sitting EOB;call light within reach;encouraged to call for assist;notified nsg;exit alarm on;with other staff  left with RT  -PP               User Key  (r) = Recorded By, (t) = Taken By, (c) = Cosigned By      Initials Name Provider Type    Karly Lang, MATTHEW Occupational Therapist                   Outcome Measures       Row Name 12/22/23 1542          How much help from another is currently needed...    Putting on and taking off regular lower body clothing? 3  -PP     Bathing (including washing, rinsing, and drying) 2  -PP     Toileting (which includes using toilet bed pan or urinal) 2  -PP     Putting on and taking off regular upper body clothing 4  -PP     Taking care of personal grooming (such as brushing teeth) 4  -PP     Eating meals 4  -PP     AM-PAC 6 Clicks Score (OT) 19  -PP       Row Name 12/22/23 1052 12/22/23 0500       How much help from another person do you currently need...    Turning from your back to your side while in flat bed without using bedrails? 4  -EM 4  -BB    Moving from lying on back to sitting on the side of a flat bed without bedrails? 3  -EM 3  -BB    Moving to and from a bed to a chair (including a wheelchair)? 3  -EM 2  -BB    Standing up from a chair using your arms (e.g., wheelchair, bedside chair)? 3  -EM 2  -BB    Climbing 3-5 steps with a railing? 2  -EM 2  -BB    To walk in hospital room? 3  -EM 2  -BB    AM-PAC 6 Clicks Score  (PT) 18  -EM 15  -BB    Highest Level of Mobility Goal 6 --> Walk 10 steps or more  -EM 4 --> Transfer to chair/commode  -BB      Row Name 12/22/23 1542          Functional Assessment    Outcome Measure Options AM-PAC 6 Clicks Daily Activity (OT)  -PP               User Key  (r) = Recorded By, (t) = Taken By, (c) = Cosigned By      Initials Name Provider Type    EM Petrona Vazquez, PT Physical Therapist    BB Alphonse Oconnor, RN Registered Nurse    PP Karly Medina, OT Occupational Therapist                    Occupational Therapy Education       Title: PT OT SLP Therapies (Done)       Topic: Occupational Therapy (Done)       Point: ADL training (Done)       Description:   Instruct learner(s) on proper safety adaptation and remediation techniques during self care or transfers.   Instruct in proper use of assistive devices.                  Learning Progress Summary             Patient Acceptance, E,TB,D, VU,NR by RE at 12/16/2023 1559    Comment: pursed lip breathing and POC                         Point: Home exercise program (Done)       Description:   Instruct learner(s) on appropriate technique for monitoring, assisting and/or progressing therapeutic exercises/activities.                  Learning Progress Summary             Patient Acceptance, E,TB,D, VU,NR by RE at 12/16/2023 1559    Comment: pursed lip breathing and POC                         Point: Precautions (Done)       Description:   Instruct learner(s) on prescribed precautions during self-care and functional transfers.                  Learning Progress Summary             Patient Acceptance, E, VU by PP at 12/22/2023 1542    Comment: Pt Ed in energy conservation and deep breathing tech. Pt demo's good understanding.    Acceptance, E,TB,D, VU,NR by RE at 12/16/2023 1559    Comment: pursed lip breathing and POC                         Point: Body mechanics (Done)       Description:   Instruct learner(s) on proper positioning and spine  alignment during self-care, functional mobility activities and/or exercises.                  Learning Progress Summary             Patient Acceptance, E,TB,D, VU,NR by RE at 12/16/2023 [id number]    Comment: pursed lip breathing and POC                                         User Key       Initials Effective Dates Name Provider Type Discipline    RE 06/16/21 -  Judith Bennett OTR Occupational Therapist OT    PP 06/09/23 -  Karly Medina OT Occupational Therapist OT                  OT Recommendation and Plan     Plan of Care Review  Plan of Care Reviewed With: patient  Progress: improving  Outcome Evaluation: Pt seen for OT tx focusing on functional endurance and safety with Fxnl xfers. Pt able to complete LBD of shoe Set Up seated EOB. Pt CGA for STS w/ FWW. Pt tolerated standing supported briefly before needing to take seated rest d/t fatigue. Pt CGA for safety using FWW for pivoting on RLE from bed <> chair. Pt tolerated hopping a few steps during xfer. Pt O2 noted to drop to 87% after xfer but returned to 90 after seated rest and Ed in deep breathing tech. Pt also Ed in energy conservation tech for seated ADLs. Pt left sitting EOB w/ respiratory therapy. OT will continue to follow for stated goals and anticipate d/c to SNF.     Time Calculation:         Time Calculation- OT       Row Name 12/22/23 1543             Time Calculation- OT    OT Start Time 1409  -PP      OT Stop Time 1426  -PP      OT Time Calculation (min) 17 min  -PP      Total Timed Code Minutes- OT 17 minute(s)  -PP      OT Received On 12/22/23  -PP      OT - Next Appointment 12/26/23  -PP         Timed Charges    09866 - OT Therapeutic Activity Minutes 17  -PP         Total Minutes    Timed Charges Total Minutes 17  -PP       Total Minutes 17  -PP                User Key  (r) = Recorded By, (t) = Taken By, (c) = Cosigned By      Initials Name Provider Type    PP Karly Medina, MATTHEW Occupational Therapist                  Therapy Charges for  Today       Code Description Service Date Service Provider Modifiers Qty    [id number]  OT THERAPEUTIC ACT EA 15 MIN 12/22/2023 Karly Medina OT GO 1                 Karly Medina OT  12/22/2023   I have personally performed a face to face diagnostic evaluation on this patient. I have reviewed the ACP note and agree with the history, exam and plan of care, except as noted.

## 2023-12-22 NOTE — PLAN OF CARE
Goal Outcome Evaluation:   Patient uses urinal with good urine output.VS stable , On room air the whole night and tolerating it. No other complaints made.

## 2023-12-22 NOTE — PROGRESS NOTES
[facility]    Per CCP Chelsy, P2P attempted, but no call back from [employer]. She states pt is now planning SNU    Soraya Jackson,IVON  Acute Rehab Admission Nurse

## 2023-12-22 NOTE — THERAPY TREATMENT NOTE
Patient Name: Sim Agustin  : 1955    MRN: [id number]                              Today's Date: 2023       Admit Date: 2023    Visit Dx:     ICD-10-CM ICD-9-CM   1. ARLYN (acute kidney injury)  N17.9 584.9   2. Hypercalcemia  E83.52 275.42     Patient Active Problem List   Diagnosis    H/O angiodysplasia of intestinal tract    COPD (chronic obstructive pulmonary disease)    S/P colectomy    Status post above-knee amputation of left lower extremity    CAD (coronary artery disease)    PVD (peripheral vascular disease)    Essential hypertension    Cardiomyopathy, unspecified    PRISCILLA treated with BiPAP    Anxiety disorder    Chronic midline low back pain without sciatica    Long term prescription benzodiazepine use    History of bradycardia    Hypothyroidism    Vitamin B12 deficiency    Iron deficiency    History of smoking 30 or more pack years    High risk medication use    DNR (do not resuscitate)    Stage 3a chronic kidney disease    Hyperlipidemia    Proteinuria    Junctional cardiac arrhythmia    Acute kidney injury    Hypercalcemia    Stage 3b chronic kidney disease (CKD)    Anemia    Leukocytosis    Hyponatremia    Hypokalemia    Lactic acidosis    Hypotension     Past Medical History:   Diagnosis Date    Acute respiratory failure with hypoxia and hypercarbia 2019    Acute upper respiratory infection 2013    Amputee, above knee, left     Anemia     per [place] database    ARF (acute renal failure) 2016    Asthma 2018    Atelectasis, left 2016    Bradycardia 2019    CAD (coronary artery disease) 2016    Chronic obstructive pulmonary disease with acute exacerbation 2013    Clotting disorder 2003    Colon polyps     Compartment syndrome     AFTER ILIAC SURGERY. ABOVE KNEE AMPUTATION    COPD (chronic obstructive pulmonary disease)     COPD with hypoxia 2019    Cough     SINCE APRIL WITH PNEUMONIA.     Demand myocardial infarction  05/19/2022    Disease of thyroid gland     HYPOTHYRODISM    Diverticulosis     Elevated hematocrit 01/06/2021    Employs prosthetic leg     ESRD (end stage renal disease) on dialysis 04/06/2016    Fracture of patella 03/03/2015    History of acute renal failure     History of CHF (congestive heart failure)     History of GI bleed 02/2016    AORTODUOD FISTULA    History of sepsis 02/2016    History of transfusion     MULTIPLE, 2016    Hyperkalemia 04/11/2016    Hyperlipidemia     Hypertension     Hypotension     Hypotension 03/04/2016    Left lower lobe pneumonia 05/18/2022    Nonischemic cardiomyopathy     Nosocomial pneumonia 04/06/2016    Phantom limb pain     SL, WITH LEFT ABOUVE KNEE    Pleural effusion on left 04/11/2016    Pneumonia     SPRING 2016  AFTER SURGERY    Pulmonary embolism     PVD (peripheral vascular disease)     Renal insufficiency     S/P thoracentesis 04/11/2016    Sepsis, unspecified organism 04/05/2016     Past Surgical History:   Procedure Laterality Date    ABOVE KNEE AMPUTATION Left 03/16/2016    Procedure: LT AMPUTATION ABOVE KNEE;  Surgeon: Jose Swann MD;  Location: [facility] MAIN OR;  Service:     ARTERIAL THROMBECTOMY  2001    throbectomy of arterial graft    AXILLARY FEMORAL BYPASS Right 02/15/2016    Exploratory lapartomy, repair aortoduodenal fistula, resection infected aortobifemoral bypass graft, axillobi-superficial femoral artery Kenton-Aneudy bypass graft from right axillary artery, Repair of duodenotomy 4th portion duodenum-Dr. Jose Swann, Dr. Genaro Perrin    BRONCHOSCOPY Left 03/04/2016    Dr. NATALIIA Tate    BRONCHOSCOPY Left 05/20/2022    Procedure: BRONCHOSCOPY WITH BIOPSIES, BRUSHINGS, AND BAL UNDER FLUORO;  Surgeon: Ishmael Tate Jr., MD;  Location: [facility] ENDOSCOPY;  Service: Pulmonary;  Laterality: Left;  PRE OP - LLL CONSOLIDATION  POST OP - SAME    BRONCHOSCOPY RIGID / FLEXIBLE N/A 03/03/2016    Dr. NATALIIA Tate    BRONCHOSCOPY RIGID / FLEXIBLE N/A  02/26/2016    Dr. NATALIIA Tate    CARDIAC CATHETERIZATION N/A 03/18/2019    Procedure: Right and Left Heart Cath;  Surgeon: Nina Storey MD;  Location: [facility] CATH INVASIVE LOCATION;  Service: Cardiovascular    CATARACT EXTRACTION WITH INTRAOCULAR LENS IMPLANT Bilateral     COLON RESECTION WITH COLOSTOMY Left 02/28/2016    Exploratory laparotomy with open left hemicolectomy, end-colostomy, G-tube and J-tube-Dr. Endy Chaney    COLON SURGERY  3/25/2015    COLOSTOMY    COLONOSCOPY N/A 2015    Dr. Laughlin    COLONOSCOPY N/A 10/12/2016    Procedure: COLONOSCOPY TO 20 CM AND PER STOMA TO 30CM;  Surgeon: Genaro Perrin MD;  Location: [facility] ENDOSCOPY;  Service:     COLONOSCOPY N/A 10/21/2016    Procedure: COLONOSCOPY DONE AT BEDSIDE ONTO CECEM;  Surgeon: Genaro Perrin MD;  Location: [facility] ENDOSCOPY;  Service:     COLONOSCOPY N/A 02/10/2016    Diverticulosis in the entire examined colon, non-bleeding internal hemorrhoids-Dr. Taylor Pina    COLONOSCOPY N/A 02/11/2016    Poor prep, blood in the entire examined colon, normal ileum-Dr. Taylor Pina    COLONOSCOPY W/ POLYPECTOMY N/A 07/27/2015    Normal ileum, diverticulosis in the sigmoid colon: resected and retrieved, one 6 mm polyp in the descending colon: resected and retrieved, the distal rectum and anal verge are normal on retroflexion view-Dr. Miguel Ángel Laughlin    COLOSTOMY CLOSURE N/A 10/13/2016    Procedure: COLOSTOMY TAKEDOWN OR CLOSURE, APPENDECTOMY;  Surgeon: Genaro Perrin MD;  Location: [facility] OR;  Service:     DEBRIDEMENT LEG Left 03/01/2016    Excisional debridement of the skin, subcutantous tissue, tendon and muscle left calf-Dr. Jose Swann    DEBRIDEMENT LEG Left 02/25/2016    Excisional debridement full-thcikness skin and subcutaneous tissue and tendon and muscle, left medial and lateral calf muscles-Dr. Jose Swann    ENDOSCOPY N/A 10/21/2016    Procedure: ESOPHAGOGASTRODUODENOSCOPY DONE AT BEDSIDE;  Surgeon: Genaro SARAVIA  MD Marychuy;  Location: [facility] ENDOSCOPY;  Service:     ENDOSCOPY AND COLONOSCOPY N/A 02/28/2016    EGD and Colonoscopy with Candy ink injection-Dr. Endy Chaney    ENTEROSCOPY SMALL BOWEL N/A 02/11/2016    Normal esophagus, normal stomach, normal duodenum, portion of the jejunum normal-Dr. Taylor Pina    ILIAC ARTERY - FEMORAL ARTERY BYPASS GRAFT Bilateral 2002    ILIAC ARTERY STENT Bilateral 2001    INSERTION AND REMOVAL PERITONEAL DIALYSIS CATHETER Right 02/29/2016    Exchange right jugular 16 cm Mahurkar dialysis catheter-Dr. Jose Swann    INSERTION PERITONEAL DIALYSIS CATHETER Left 03/01/2016    Ultrasound-guided access of the left jugular vein, 23 cm left jugular palindrome dialysis catheter placement-Dr. Jose Swann    INSERTION PERITONEAL DIALYSIS CATHETER Right 02/18/2016    Right jugular Mahrkar catherer placement-Dr. Jose Swann    JOINT REPLACEMENT Left     THORACOSCOPY Left 04/18/2016    Procedure: LT THORACOSCOPY VIDEO ASSISTED WITH DECORDICATION;  Surgeon: Genaro Davila III, MD;  Location: [facility] MAIN OR;  Service:     THROMBECTOMY Left 02/16/2016    Thombectomy of left femoral graft, left leg arteriogram, left calf forward compartment fasciotomy-Dr. Jose Swann    TOTAL HIP ARTHROPLASTY Left     UPPER GASTROINTESTINAL ENDOSCOPY N/A 02/09/2016    Normal UGI-Dr. Ajay Parkinson    UPPER GASTROINTESTINAL ENDOSCOPY N/A 11/28/2015    Small hiatal hernia, chronic gastritis: biopsied, non-bleeding angioectasias in the stomach: treated with argon plasma coagulation, multiple bleeding angioectasias in the dudenum: treated with argon plasma coagulation-Dr. Mykel Jaramillo    VASCULAR SURGERY      MULTIPLE    VENTRAL/INCISIONAL HERNIA REPAIR N/A 10/19/2017    Procedure: VENTRAL HERNIA REPAIR WITH MESH AND BILATERAL COMPONENT SEPARATION;  Surgeon: Genaro Perrin MD;  Location: [facility] MAIN OR;  Service:     WISDOM TOOTH EXTRACTION        General Information       Row Name 12/22/23  1044          Physical Therapy Time and Intention    Document Type therapy note (daily note)  -EM     Mode of Treatment individual therapy;physical therapy  -EM       Row Name 12/22/23 1044          General Information    Existing Precautions/Restrictions fall  -EM               User Key  (r) = Recorded By, (t) = Taken By, (c) = Cosigned By      Initials Name Provider Type    Petrona Paul PT Physical Therapist                   Mobility       Row Name 12/22/23 1044          Bed Mobility    Comment, (Bed Mobility) not tested, up in chair  -EM       Row Name 12/22/23 1044          Sit-Stand Transfer    Sit-Stand Kenedy (Transfers) minimum assist (75% patient effort);verbal cues  -EM     Assistive Device (Sit-Stand Transfers) walker, front-wheeled  -EM     Comment, (Sit-Stand Transfer) 3 sit to stand tsf while donning prosthesis, seated rest breaks before ambulating  -EM       Row Name 12/22/23 1044          Gait/Stairs (Locomotion)    Kenedy Level (Gait) contact guard  -EM     Assistive Device (Gait) walker, front-wheeled  -EM     Distance in Feet (Gait) 15  -EM     Deviations/Abnormal Patterns (Gait) gait speed decreased  -EM     Comment, (Gait/Stairs) pt donned prosthesis, able to ambulate short distance with rwx and CGA  -EM               User Key  (r) = Recorded By, (t) = Taken By, (c) = Cosigned By      Initials Name Provider Type    Petrona Paul PT Physical Therapist                   Obj/Interventions    No documentation.                  Goals/Plan       Row Name 12/22/23 1049          Bed Mobility Goal 1 (PT)    Activity/Assistive Device (Bed Mobility Goal 1, PT) bed mobility activities, all  -EM     Kenedy Level/Cues Needed (Bed Mobility Goal 1, PT) standby assist  -EM     Time Frame (Bed Mobility Goal 1, PT) 1 week  -EM     Progress/Outcomes (Bed Mobility Goal 1, PT) goal revised this date  -EM       Row Name 12/22/23 1049          Transfer Goal 1 (PT)     Activity/Assistive Device (Transfer Goal 1, PT) transfers, all;walker, rolling  -EM     Jennings Level/Cues Needed (Transfer Goal 1, PT) contact guard required  -EM     Time Frame (Transfer Goal 1, PT) 1 week  -EM     Progress/Outcome (Transfer Goal 1, PT) goal ongoing  -EM       Row Name 12/22/23 1049          Gait Training Goal 1 (PT)    Activity/Assistive Device (Gait Training Goal 1, PT) gait (walking locomotion);walker, rolling  -EM     Jennings Level (Gait Training Goal 1, PT) contact guard required  -EM     Distance (Gait Training Goal 1, PT) 50  -EM     Time Frame (Gait Training Goal 1, PT) 1 week  -EM     Progress/Outcome (Gait Training Goal 1, PT) goal ongoing  -EM               User Key  (r) = Recorded By, (t) = Taken By, (c) = Cosigned By      Initials Name Provider Type    EM Petrona Vazquez, PT Physical Therapist                   Clinical Impression       Row Name 12/22/23 1046          Pain    Pretreatment Pain Rating 0/10 - no pain  -EM       Row Name 12/22/23 1046          Plan of Care Review    Plan of Care Reviewed With patient  -EM     Outcome Evaluation pt sitting up in chair, awake and alert, agreeable to therapy. Patient able to lisa prosthesis, requiring seated rest breaks in between sit to stand trials while attempting to get a good seal. Pt performed sit to stand with Ratna, cues for hand placement. Pt ambulated short distance to door and back with rwx and CGA. Pt on room air, sats at 85% upon return to chair, increases to 89% with seated rest break. Pt demonstrates improvement with mobility today, able to initiate ambulation with prosthesis but continues to be well below functional baseline as he was ambulating community distances with cane prior to admission. Recommend acute rehab at d/c.  -EM       Row Name 12/22/23 1046          Vital Signs    Pre SpO2 (%) 94  -EM     O2 Delivery Pre Treatment room air  -EM     Intra SpO2 (%) 85  -EM     O2 Delivery Intra Treatment room air   -EM     Post SpO2 (%) 89  -EM     O2 Delivery Post Treatment room air  -EM       Row Name 12/22/23 1046          Positioning and Restraints    Pre-Treatment Position sitting in chair/recliner  -EM     Post Treatment Position chair  -EM     In Chair sitting;call light within reach;exit alarm on  -EM               User Key  (r) = Recorded By, (t) = Taken By, (c) = Cosigned By      Initials Name Provider Type     Petrona Vazquez PT Physical Therapist                   Outcome Measures       Row Name 12/22/23 1052 12/22/23 0500       How much help from another person do you currently need...    Turning from your back to your side while in flat bed without using bedrails? 4  -EM 4  -BB    Moving from lying on back to sitting on the side of a flat bed without bedrails? 3  -EM 3  -BB    Moving to and from a bed to a chair (including a wheelchair)? 3  -EM 2  -BB    Standing up from a chair using your arms (e.g., wheelchair, bedside chair)? 3  -EM 2  -BB    Climbing 3-5 steps with a railing? 2  -EM 2  -BB    To walk in hospital room? 3  -EM 2  -BB    AM-PAC 6 Clicks Score (PT) 18  -EM 15  -BB    Highest Level of Mobility Goal 6 --> Walk 10 steps or more  -EM 4 --> Transfer to chair/commode  -BB              User Key  (r) = Recorded By, (t) = Taken By, (c) = Cosigned By      Initials Name Provider Type    Petrona Paul PT Physical Therapist    Alphonse Brito, RN Registered Nurse                                 Physical Therapy Education       Title: PT OT SLP Therapies (Done)       Topic: Physical Therapy (Done)       Point: Mobility training (Done)       Learning Progress Summary             Patient Acceptance, E, VU by EM at 12/22/2023 1053    Acceptance, E,D, VU,NR by EB at 12/20/2023 1640    Acceptance, E,D, VU by EB at 12/19/2023 1141    Acceptance, E,TB, VU,NR by CB at 12/18/2023 1202    Acceptance, E,D, VU,NR by EB at 12/17/2023 1559    Acceptance, E, VU by EM at 12/15/2023 1531                          Point: Home exercise program (Done)       Learning Progress Summary             Patient Acceptance, E,D, VU by EB at 12/19/2023 1141    Acceptance, E,TB, VU,NR by CB at 12/18/2023 1202                         Point: Body mechanics (Done)       Learning Progress Summary             Patient Acceptance, E,D, VU,NR by EB at 12/20/2023 1640    Acceptance, E,D, VU by EB at 12/19/2023 1141    Acceptance, E,TB, VU,NR by CB at 12/18/2023 1202    Acceptance, E,D, VU,NR by EB at 12/17/2023 1559                         Point: Precautions (Done)       Learning Progress Summary             Patient Acceptance, E,D, VU,NR by EB at 12/20/2023 1640    Acceptance, E,D, VU by EB at 12/19/2023 1141    Acceptance, E,TB, VU,NR by CB at 12/18/2023 1202    Acceptance, E,D, VU,NR by EB at 12/17/2023 1559                                         User Key       Initials Effective Dates Name Provider Type Discipline    EM 06/16/21 -  Petrona Vazquez, PT Physical Therapist PT    EB 02/14/23 -  Sameear Osborne PTA Physical Therapist Assistant PT    CB 10/22/21 -  Patti Campbell PT Physical Therapist PT                  PT Recommendation and Plan  Planned Therapy Interventions (PT): bed mobility training, gait training, home exercise program, transfer training, strengthening, patient/family education  Plan of Care Reviewed With: patient  Outcome Evaluation: pt sitting up in chair, awake and alert, agreeable to therapy. Patient able to lisa prosthesis, requiring seated rest breaks in between sit to stand trials while attempting to get a good seal. Pt performed sit to stand with Ratna, cues for hand placement. Pt ambulated short distance to door and back with rwx and CGA. Pt on room air, sats at 85% upon return to chair, increases to 89% with seated rest break. Pt demonstrates improvement with mobility today, able to initiate ambulation with prosthesis but continues to be well below functional baseline as he was ambulating community  distances with cane prior to admission. Recommend acute rehab at d/c.     Time Calculation:         PT Charges       Row Name 12/22/23 1055             Time Calculation    Start Time 1004  -EM      Stop Time 1037  -EM      Time Calculation (min) 33 min  -EM      PT Received On 12/22/23  -EM      PT - Next Appointment 12/23/23  -EM      PT Goal Re-Cert Due Date 12/29/23  -EM         Time Calculation- PT    Total Timed Code Minutes- PT 33 minute(s)  -EM         Timed Charges    75146 - PT Therapeutic Activity Minutes 33  -EM         Total Minutes    Timed Charges Total Minutes 33  -EM       Total Minutes 33  -EM                User Key  (r) = Recorded By, (t) = Taken By, (c) = Cosigned By      Initials Name Provider Type    EM Petrona Vazquez PT Physical Therapist                  Therapy Charges for Today       Code Description Service Date Service Provider Modifiers Qty    16878181315 HC PT THERAPEUTIC ACT EA 15 MIN 12/22/2023 Petrona Vazquez PT GP 2            PT G-Codes  Outcome Measure Options: AM-PAC 6 Clicks Daily Activity (OT)  AM-PAC 6 Clicks Score (PT): 18  AM-PAC 6 Clicks Score (OT): 19  PT Discharge Summary  Anticipated Discharge Disposition (PT): inpatient rehabilitation facility    Petrona Vazquez PT  12/22/2023

## 2023-12-22 NOTE — PLAN OF CARE
Goal Outcome Evaluation:      Pt on NC-1L during the night instead of home CPAP per pt's request, on RA while awake. VSS, diuresing tolerated. Able to use the urinal with some spills, requested bedpan for BM. Pivot to recliner with assist x 1. Plan to D/C to [facility]

## 2023-12-23 LAB
ALBUMIN SERPL-MCNC: 2.7 G/DL (ref 3.5–5.2)
ANION GAP SERPL CALCULATED.3IONS-SCNC: 13 MMOL/L (ref 5–15)
BUN SERPL-MCNC: 26 MG/DL (ref 8–23)
BUN/CREAT SERPL: 14.9 (ref 7–25)
CA-I BLD-MCNC: 3.9 MG/DL (ref 4.6–5.4)
CA-I SERPL ISE-MCNC: 0.97 MMOL/L (ref 1.15–1.35)
CALCIUM SPEC-SCNC: 6.6 MG/DL (ref 8.6–10.5)
CHLORIDE SERPL-SCNC: 109 MMOL/L (ref 98–107)
CO2 SERPL-SCNC: 20 MMOL/L (ref 22–29)
CREAT SERPL-MCNC: 1.75 MG/DL (ref 0.76–1.27)
DEPRECATED RDW RBC AUTO: 47.1 FL (ref 37–54)
EGFRCR SERPLBLD CKD-EPI 2021: 41.9 ML/MIN/1.73
EOSINOPHIL # BLD MANUAL: 0.54 10*3/MM3 (ref 0–0.4)
EOSINOPHIL NFR BLD MANUAL: 4.2 % (ref 0.3–6.2)
ERYTHROCYTE [DISTWIDTH] IN BLOOD BY AUTOMATED COUNT: 14.3 % (ref 12.3–15.4)
GLUCOSE SERPL-MCNC: 84 MG/DL (ref 65–99)
HCT VFR BLD AUTO: 29.2 % (ref 37.5–51)
HGB BLD-MCNC: 9.9 G/DL (ref 13–17.7)
LYMPHOCYTES # BLD MANUAL: 0.93 10*3/MM3 (ref 0.7–3.1)
MAGNESIUM SERPL-MCNC: 1.8 MG/DL (ref 1.6–2.4)
MCH RBC QN AUTO: 30.7 PG (ref 26.6–33)
MCHC RBC AUTO-ENTMCNC: 33.9 G/DL (ref 31.5–35.7)
MCV RBC AUTO: 90.7 FL (ref 79–97)
NEUTROPHILS # BLD AUTO: 11.3 10*3/MM3 (ref 1.7–7)
NEUTROPHILS NFR BLD MANUAL: 88.5 % (ref 42.7–76)
PHOSPHATE SERPL-MCNC: 1.6 MG/DL (ref 2.5–4.5)
PLAT MORPH BLD: NORMAL
PLATELET # BLD AUTO: 289 10*3/MM3 (ref 140–450)
PMV BLD AUTO: 9.6 FL (ref 6–12)
POTASSIUM SERPL-SCNC: 3.2 MMOL/L (ref 3.5–5.2)
RBC # BLD AUTO: 3.22 10*6/MM3 (ref 4.14–5.8)
RBC MORPH BLD: NORMAL
SMUDGE CELLS BLD QL SMEAR: ABNORMAL
SODIUM SERPL-SCNC: 142 MMOL/L (ref 136–145)
VARIANT LYMPHS NFR BLD MANUAL: 2.1 % (ref 0–5)
VARIANT LYMPHS NFR BLD MANUAL: 5.2 % (ref 19.6–45.3)
WBC NRBC COR # BLD AUTO: 12.77 10*3/MM3 (ref 3.4–10.8)

## 2023-12-23 PROCEDURE — 97530 THERAPEUTIC ACTIVITIES: CPT

## 2023-12-23 PROCEDURE — 25810000003 SODIUM CHLORIDE 0.9 % SOLUTION: Performed by: INTERNAL MEDICINE

## 2023-12-23 PROCEDURE — 25010000002 HEPARIN (PORCINE) PER 1000 UNITS: Performed by: STUDENT IN AN ORGANIZED HEALTH CARE EDUCATION/TRAINING PROGRAM

## 2023-12-23 PROCEDURE — 94799 UNLISTED PULMONARY SVC/PX: CPT

## 2023-12-23 PROCEDURE — 97110 THERAPEUTIC EXERCISES: CPT

## 2023-12-23 PROCEDURE — 85025 COMPLETE CBC W/AUTO DIFF WBC: CPT | Performed by: STUDENT IN AN ORGANIZED HEALTH CARE EDUCATION/TRAINING PROGRAM

## 2023-12-23 PROCEDURE — 82330 ASSAY OF CALCIUM: CPT | Performed by: INTERNAL MEDICINE

## 2023-12-23 PROCEDURE — 94761 N-INVAS EAR/PLS OXIMETRY MLT: CPT

## 2023-12-23 PROCEDURE — 80069 RENAL FUNCTION PANEL: CPT | Performed by: INTERNAL MEDICINE

## 2023-12-23 PROCEDURE — 85007 BL SMEAR W/DIFF WBC COUNT: CPT | Performed by: STUDENT IN AN ORGANIZED HEALTH CARE EDUCATION/TRAINING PROGRAM

## 2023-12-23 PROCEDURE — 83735 ASSAY OF MAGNESIUM: CPT | Performed by: STUDENT IN AN ORGANIZED HEALTH CARE EDUCATION/TRAINING PROGRAM

## 2023-12-23 PROCEDURE — 94664 DEMO&/EVAL PT USE INHALER: CPT

## 2023-12-23 PROCEDURE — 97116 GAIT TRAINING THERAPY: CPT

## 2023-12-23 RX ORDER — POTASSIUM CHLORIDE 750 MG/1
40 TABLET, FILM COATED, EXTENDED RELEASE ORAL ONCE
Status: COMPLETED | OUTPATIENT
Start: 2023-12-23 | End: 2023-12-23

## 2023-12-23 RX ORDER — FENTANYL/ROPIVACAINE/NS/PF 2-625MCG/1
15 PLASTIC BAG, INJECTION (ML) EPIDURAL
Status: COMPLETED | OUTPATIENT
Start: 2023-12-23 | End: 2023-12-23

## 2023-12-23 RX ADMIN — ASPIRIN 81 MG: 81 TABLET, CHEWABLE ORAL at 21:41

## 2023-12-23 RX ADMIN — CLOPIDOGREL BISULFATE 75 MG: 75 TABLET, FILM COATED ORAL at 21:41

## 2023-12-23 RX ADMIN — ALPRAZOLAM 0.25 MG: 0.25 TABLET ORAL at 21:41

## 2023-12-23 RX ADMIN — BRIMONIDINE TARTRATE 1 DROP: 2 SOLUTION/ DROPS OPHTHALMIC at 21:42

## 2023-12-23 RX ADMIN — BRIMONIDINE TARTRATE 1 DROP: 2 SOLUTION/ DROPS OPHTHALMIC at 08:43

## 2023-12-23 RX ADMIN — Medication 1 PACKET: at 17:37

## 2023-12-23 RX ADMIN — LEVOTHYROXINE SODIUM 137 MCG: 0.14 TABLET ORAL at 06:46

## 2023-12-23 RX ADMIN — IPRATROPIUM BROMIDE AND ALBUTEROL SULFATE 3 ML: 2.5; .5 SOLUTION RESPIRATORY (INHALATION) at 11:23

## 2023-12-23 RX ADMIN — IPRATROPIUM BROMIDE AND ALBUTEROL SULFATE 3 ML: 2.5; .5 SOLUTION RESPIRATORY (INHALATION) at 14:31

## 2023-12-23 RX ADMIN — ANORECTAL OINTMENT 1 APPLICATION: 15.7; .44; 24; 20.6 OINTMENT TOPICAL at 08:43

## 2023-12-23 RX ADMIN — ANORECTAL OINTMENT 1 APPLICATION: 15.7; .44; 24; 20.6 OINTMENT TOPICAL at 21:42

## 2023-12-23 RX ADMIN — POTASSIUM PHOSPHATE, MONOBASIC POTASSIUM PHOSPHATE, DIBASIC 15 MMOL: 224; 236 INJECTION, SOLUTION, CONCENTRATE INTRAVENOUS at 11:58

## 2023-12-23 RX ADMIN — IPRATROPIUM BROMIDE AND ALBUTEROL SULFATE 3 ML: 2.5; .5 SOLUTION RESPIRATORY (INHALATION) at 07:36

## 2023-12-23 RX ADMIN — TIOTROPIUM BROMIDE INHALATION SPRAY 2 PUFF: 3.12 SPRAY, METERED RESPIRATORY (INHALATION) at 07:43

## 2023-12-23 RX ADMIN — POTASSIUM CHLORIDE 40 MEQ: 750 TABLET, EXTENDED RELEASE ORAL at 06:46

## 2023-12-23 RX ADMIN — GUAIFENESIN 600 MG: 600 TABLET, EXTENDED RELEASE ORAL at 08:43

## 2023-12-23 RX ADMIN — POTASSIUM PHOSPHATE, MONOBASIC POTASSIUM PHOSPHATE, DIBASIC 15 MMOL: 224; 236 INJECTION, SOLUTION, CONCENTRATE INTRAVENOUS at 08:43

## 2023-12-23 RX ADMIN — HEPARIN SODIUM 5000 UNITS: 5000 INJECTION INTRAVENOUS; SUBCUTANEOUS at 14:08

## 2023-12-23 RX ADMIN — BUDESONIDE AND FORMOTEROL FUMARATE DIHYDRATE 2 PUFF: 160; 4.5 AEROSOL RESPIRATORY (INHALATION) at 07:41

## 2023-12-23 RX ADMIN — HEPARIN SODIUM 5000 UNITS: 5000 INJECTION INTRAVENOUS; SUBCUTANEOUS at 21:41

## 2023-12-23 RX ADMIN — ALLOPURINOL 100 MG: 100 TABLET ORAL at 08:43

## 2023-12-23 RX ADMIN — ALPRAZOLAM 0.25 MG: 0.25 TABLET ORAL at 08:43

## 2023-12-23 RX ADMIN — Medication 1 TABLET: at 08:43

## 2023-12-23 RX ADMIN — ISOSORBIDE MONONITRATE 30 MG: 30 TABLET, EXTENDED RELEASE ORAL at 06:49

## 2023-12-23 RX ADMIN — GUAIFENESIN 600 MG: 600 TABLET, EXTENDED RELEASE ORAL at 21:41

## 2023-12-23 RX ADMIN — HEPARIN SODIUM 5000 UNITS: 5000 INJECTION INTRAVENOUS; SUBCUTANEOUS at 06:46

## 2023-12-23 RX ADMIN — BUPROPION HYDROCHLORIDE 300 MG: 300 TABLET, EXTENDED RELEASE ORAL at 08:43

## 2023-12-23 RX ADMIN — LATANOPROST 1 DROP: 50 SOLUTION OPHTHALMIC at 21:47

## 2023-12-23 NOTE — THERAPY TREATMENT NOTE
Patient Name: Sim Agustin  : 1955    MRN: [id number]                              Today's Date: 2023       Admit Date: 2023    Visit Dx:     ICD-10-CM ICD-9-CM   1. ARLYN (acute kidney injury)  N17.9 584.9   2. Hypercalcemia  E83.52 275.42     Patient Active Problem List   Diagnosis    H/O angiodysplasia of intestinal tract    COPD (chronic obstructive pulmonary disease)    S/P colectomy    Status post above-knee amputation of left lower extremity    CAD (coronary artery disease)    PVD (peripheral vascular disease)    Essential hypertension    Cardiomyopathy, unspecified    PRISCILLA treated with BiPAP    Anxiety disorder    Chronic midline low back pain without sciatica    Long term prescription benzodiazepine use    History of bradycardia    Hypothyroidism    Vitamin B12 deficiency    Iron deficiency    History of smoking 30 or more pack years    High risk medication use    DNR (do not resuscitate)    Stage 3a chronic kidney disease    Hyperlipidemia    Proteinuria    Junctional cardiac arrhythmia    Acute kidney injury    Hypercalcemia    Stage 3b chronic kidney disease (CKD)    Anemia    Leukocytosis    Hyponatremia    Hypokalemia    Lactic acidosis    Hypotension     Past Medical History:   Diagnosis Date    Acute respiratory failure with hypoxia and hypercarbia 2019    Acute upper respiratory infection 2013    Amputee, above knee, left     Anemia     per [place] database    ARF (acute renal failure) 2016    Asthma 2018    Atelectasis, left 2016    Bradycardia 2019    CAD (coronary artery disease) 2016    Chronic obstructive pulmonary disease with acute exacerbation 2013    Clotting disorder 2003    Colon polyps     Compartment syndrome     AFTER ILIAC SURGERY. ABOVE KNEE AMPUTATION    COPD (chronic obstructive pulmonary disease)     COPD with hypoxia 2019    Cough     SINCE APRIL WITH PNEUMONIA.     Demand myocardial infarction  05/19/2022    Disease of thyroid gland     HYPOTHYRODISM    Diverticulosis     Elevated hematocrit 01/06/2021    Employs prosthetic leg     ESRD (end stage renal disease) on dialysis 04/06/2016    Fracture of patella 03/03/2015    History of acute renal failure     History of CHF (congestive heart failure)     History of GI bleed 02/2016    AORTODUOD FISTULA    History of sepsis 02/2016    History of transfusion     MULTIPLE, 2016    Hyperkalemia 04/11/2016    Hyperlipidemia     Hypertension     Hypotension     Hypotension 03/04/2016    Left lower lobe pneumonia 05/18/2022    Nonischemic cardiomyopathy     Nosocomial pneumonia 04/06/2016    Phantom limb pain     SL, WITH LEFT ABOUVE KNEE    Pleural effusion on left 04/11/2016    Pneumonia     SPRING 2016  AFTER SURGERY    Pulmonary embolism     PVD (peripheral vascular disease)     Renal insufficiency     S/P thoracentesis 04/11/2016    Sepsis, unspecified organism 04/05/2016     Past Surgical History:   Procedure Laterality Date    ABOVE KNEE AMPUTATION Left 03/16/2016    Procedure: LT AMPUTATION ABOVE KNEE;  Surgeon: Jose Swann MD;  Location: [facility] MAIN OR;  Service:     ARTERIAL THROMBECTOMY  2001    throbectomy of arterial graft    AXILLARY FEMORAL BYPASS Right 02/15/2016    Exploratory lapartomy, repair aortoduodenal fistula, resection infected aortobifemoral bypass graft, axillobi-superficial femoral artery [place]-Aneudy bypass graft from right axillary artery, Repair of duodenotomy 4th portion duodenum-Dr. Jose Swann, Dr. Genaro Perrin    BRONCHOSCOPY Left 03/04/2016    Dr. NATALIIA Tate    BRONCHOSCOPY Left 05/20/2022    Procedure: BRONCHOSCOPY WITH BIOPSIES, BRUSHINGS, AND BAL UNDER FLUORO;  Surgeon: Ishmael Tate Jr., MD;  Location: [facility] ENDOSCOPY;  Service: Pulmonary;  Laterality: Left;  PRE OP - LLL CONSOLIDATION  POST OP - SAME    BRONCHOSCOPY RIGID / FLEXIBLE N/A 03/03/2016    Dr. NATALIIA Tate    BRONCHOSCOPY RIGID / FLEXIBLE N/A  02/26/2016    Dr. NATALIIA Tate    CARDIAC CATHETERIZATION N/A 03/18/2019    Procedure: Right and Left Heart Cath;  Surgeon: Nina Storey MD;  Location: [facility] CATH INVASIVE LOCATION;  Service: Cardiovascular    CATARACT EXTRACTION WITH INTRAOCULAR LENS IMPLANT Bilateral     COLON RESECTION WITH COLOSTOMY Left 02/28/2016    Exploratory laparotomy with open left hemicolectomy, end-colostomy, G-tube and J-tube-Dr. Endy Chaney    COLON SURGERY  3/25/2015    COLOSTOMY    COLONOSCOPY N/A 2015    Dr. Laughlin    COLONOSCOPY N/A 10/12/2016    Procedure: COLONOSCOPY TO 20 CM AND PER STOMA TO 30CM;  Surgeon: Genaro Perrin MD;  Location: [facility] ENDOSCOPY;  Service:     COLONOSCOPY N/A 10/21/2016    Procedure: COLONOSCOPY DONE AT BEDSIDE ONTO CECEM;  Surgeon: Genaro Perrin MD;  Location: [facility] ENDOSCOPY;  Service:     COLONOSCOPY N/A 02/10/2016    Diverticulosis in the entire examined colon, non-bleeding internal hemorrhoids-Dr. Taylor Pina    COLONOSCOPY N/A 02/11/2016    Poor prep, blood in the entire examined colon, normal ileum-Dr. Taylor Pina    COLONOSCOPY W/ POLYPECTOMY N/A 07/27/2015    Normal ileum, diverticulosis in the sigmoid colon: resected and retrieved, one 6 mm polyp in the descending colon: resected and retrieved, the distal rectum and anal verge are normal on retroflexion view-Dr. Miguel Ángel Laughlin    COLOSTOMY CLOSURE N/A 10/13/2016    Procedure: COLOSTOMY TAKEDOWN OR CLOSURE, APPENDECTOMY;  Surgeon: Genaro Perrin MD;  Location: [facility] OR;  Service:     DEBRIDEMENT LEG Left 03/01/2016    Excisional debridement of the skin, subcutantous tissue, tendon and muscle left calf-Dr. Jose Swann    DEBRIDEMENT LEG Left 02/25/2016    Excisional debridement full-thcikness skin and subcutaneous tissue and tendon and muscle, left medial and lateral calf muscles-Dr. Jose Swann    ENDOSCOPY N/A 10/21/2016    Procedure: ESOPHAGOGASTRODUODENOSCOPY DONE AT BEDSIDE;  Surgeon: Genaro SARAVIA  MD Marychuy;  Location: [facility] ENDOSCOPY;  Service:     ENDOSCOPY AND COLONOSCOPY N/A 02/28/2016    EGD and Colonoscopy with Candy ink injection-Dr. Endy Chaney    ENTEROSCOPY SMALL BOWEL N/A 02/11/2016    Normal esophagus, normal stomach, normal duodenum, portion of the jejunum normal-Dr. Taylor Pina    ILIAC ARTERY - FEMORAL ARTERY BYPASS GRAFT Bilateral 2002    ILIAC ARTERY STENT Bilateral 2001    INSERTION AND REMOVAL PERITONEAL DIALYSIS CATHETER Right 02/29/2016    Exchange right jugular 16 cm [place]kar dialysis catheter-Dr. Jose Swann    INSERTION PERITONEAL DIALYSIS CATHETER Left 03/01/2016    Ultrasound-guided access of the left jugular vein, 23 cm left jugular palindrome dialysis catheter placement-Dr. Jose Swann    INSERTION PERITONEAL DIALYSIS CATHETER Right 02/18/2016    Right jugular Mahrkar catherer placement-Dr. Jose Swann    JOINT REPLACEMENT Left     THORACOSCOPY Left 04/18/2016    Procedure: LT THORACOSCOPY VIDEO ASSISTED WITH DECORDICATION;  Surgeon: Genaro Davila III, MD;  Location: [facility] MAIN OR;  Service:     THROMBECTOMY Left 02/16/2016    Thombectomy of left femoral graft, left leg arteriogram, left calf forward compartment fasciotomy-Dr. Jose Swann    TOTAL HIP ARTHROPLASTY Left     UPPER GASTROINTESTINAL ENDOSCOPY N/A 02/09/2016    Normal UGI-Dr. Ajay Parkinson    UPPER GASTROINTESTINAL ENDOSCOPY N/A 11/28/2015    Small hiatal hernia, chronic gastritis: biopsied, non-bleeding angioectasias in the stomach: treated with argon plasma coagulation, multiple bleeding angioectasias in the dudenum: treated with argon plasma coagulation-Dr. Mykel Jaramillo    VASCULAR SURGERY      MULTIPLE    VENTRAL/INCISIONAL HERNIA REPAIR N/A 10/19/2017    Procedure: VENTRAL HERNIA REPAIR WITH MESH AND BILATERAL COMPONENT SEPARATION;  Surgeon: Genaro Perrin MD;  Location: [facility] MAIN OR;  Service:     WISDOM TOOTH EXTRACTION        General Information       Row Name 12/23/23  1211          Physical Therapy Time and Intention    Document Type therapy note (daily note)  -     Mode of Treatment physical therapy  -       Row Name 12/23/23 1211          General Information    Existing Precautions/Restrictions fall  -       Row Name 12/23/23 1211          Cognition    Orientation Status (Cognition) oriented x 4  -               User Key  (r) = Recorded By, (t) = Taken By, (c) = Cosigned By      Initials Name Provider Type     Sunshine Hoyt PTA Physical Therapist Assistant                   Mobility       Row Name 12/23/23 1211          Bed Mobility    Bed Mobility supine-sit  -     Supine-Sit [place] (Bed Mobility) standby assist  -     Assistive Device (Bed Mobility) bed rails;head of bed elevated  -       Row Name 12/23/23 1211          Sit-Stand Transfer    Sit-Stand [place] (Transfers) standby assist  -     Assistive Device (Sit-Stand Transfers) walker, front-wheeled  -       Row Name 12/23/23 1211          Gait/Stairs (Locomotion)    [place] Level (Gait) contact guard  -     Assistive Device (Gait) walker, front-wheeled  -     Distance in Feet (Gait) 20  -SM     Deviations/Abnormal Patterns (Gait) chaz decreased;stride length decreased  -SM     Bilateral Gait Deviations forward flexed posture  -               User Key  (r) = Recorded By, (t) = Taken By, (c) = Cosigned By      Initials Name Provider Type     Sunshine Hoyt PTA Physical Therapist Assistant                   Obj/Interventions       Row Name 12/23/23 1213          Motor Skills    Therapeutic Exercise --  standing heel/toe raises, marches, SLR flexion x10 reps  -               User Key  (r) = Recorded By, (t) = Taken By, (c) = Cosigned By      Initials Name Provider Type    Sunshine Hart PTA Physical Therapist Assistant                   Goals/Plan    No documentation.                  Clinical Impression       Row Name 12/23/23 1213          Pain     Pretreatment Pain Rating 0/10 - no pain  -SM     Posttreatment Pain Rating 0/10 - no pain  -SM       Row Name 12/23/23 1213          Positioning and Restraints    Pre-Treatment Position in bed  -SM     Post Treatment Position chair  -SM     In Chair call light within reach;encouraged to call for assist;exit alarm on;sitting  -               User Key  (r) = Recorded By, (t) = Taken By, (c) = Cosigned By      Initials Name Provider Type    Sunshine Hart, [place] Physical Therapist Assistant                   Outcome Measures       Row Name 12/23/23 1214 12/23/23 0843       How much help from another person do you currently need...    Turning from your back to your side while in flat bed without using bedrails? 4  -SM 4  -JH    Moving from lying on back to sitting on the side of a flat bed without bedrails? 4  -SM 4  -JH    Moving to and from a bed to a chair (including a wheelchair)? 3  -SM 3  -JH    Standing up from a chair using your arms (e.g., wheelchair, bedside chair)? 3  -SM 2  -JH    Climbing 3-5 steps with a railing? 3  -SM 2  -JH    To walk in hospital room? 3  -SM 2  -JH    AM-PAC 6 Clicks Score (PT) 20  -SM 17  -JH    Highest Level of Mobility Goal 6 --> Walk 10 steps or more  - 5 --> Static standing  -JH      Row Name 12/23/23 0500          How much help from another person do you currently need...    Turning from your back to your side while in flat bed without using bedrails? 4  -BB     Moving from lying on back to sitting on the side of a flat bed without bedrails? 4  -BB     Moving to and from a bed to a chair (including a wheelchair)? 3  -BB     Standing up from a chair using your arms (e.g., wheelchair, bedside chair)? 2  -BB     Climbing 3-5 steps with a railing? 2  -BB     To walk in hospital room? 3  -BB     AM-PAC 6 Clicks Score (PT) 18  -BB     Highest Level of Mobility Goal 6 --> Walk 10 steps or more  -BB       Row Name 12/23/23 1214          Functional Assessment    Outcome Measure  Options AM-PAC 6 Clicks Basic Mobility (PT)  -               User Key  (r) = Recorded By, (t) = Taken By, (c) = Cosigned By      Initials Name Provider Type    Yun Orr, RN Registered Nurse    Sunshine Hart, [place] Physical Therapist Assistant    Alphonse rBito RN Registered Nurse                                 Physical Therapy Education       Title: PT OT SLP Therapies (Done)       Topic: Physical Therapy (Done)       Point: Mobility training (Done)       Learning Progress Summary             Patient Acceptance, E,TB,D, VU,NR by  at 12/23/2023 1215    Acceptance, E, VU by EM at 12/22/2023 1053    Acceptance, E,D, VU,NR by EB at 12/20/2023 1640    Acceptance, E,D, VU by EB at 12/19/2023 1141    Acceptance, E,TB, VU,NR by CB at 12/18/2023 1202    Acceptance, E,D, VU,NR by EB at 12/17/2023 1559    Acceptance, E, VU by EM at 12/15/2023 1531                         Point: Home exercise program (Done)       Learning Progress Summary             Patient Acceptance, E,TB,D, VU,NR by  at 12/23/2023 1215    Acceptance, E,D, VU by EB at 12/19/2023 1141    Acceptance, E,TB, VU,NR by CB at 12/18/2023 1202                         Point: Body mechanics (Done)       Learning Progress Summary             Patient Acceptance, E,TB,D, VU,NR by  at 12/23/2023 1215    Acceptance, E,D, VU,NR by EB at 12/20/2023 1640    Acceptance, E,D, VU by EB at 12/19/2023 1141    Acceptance, E,TB, VU,NR by CB at 12/18/2023 1202    Acceptance, E,D, VU,NR by EB at 12/17/2023 1559                         Point: Precautions (Done)       Learning Progress Summary             Patient Acceptance, E,TB,D, VU,NR by  at 12/23/2023 1215    Acceptance, E,D, VU,NR by EB at 12/20/2023 1640    Acceptance, E,D, VU by EB at 12/19/2023 1141    Acceptance, E,TB, VU,NR by CB at 12/18/2023 1202    Acceptance, E,D, VU,NR by EB at 12/17/2023 0222                                         User Key       Initials Effective Dates Name Provider  Type Discipline    EM 06/16/21 -  Petrona Vazquez PT Physical Therapist PT     03/07/18 -  Sunshine Hoyt PTA Physical Therapist Assistant PT    EB 02/14/23 -  Sameera Osborne PTA Physical Therapist Assistant PT    CB 10/22/21 -  Patti Campbell, TO Physical Therapist PT                  PT Recommendation and Plan     Plan of Care Reviewed With: patient  Progress: improving  Outcome Evaluation: Pt tolerated treatment well this date. Required SBA for bed mobility and to stand after donning L prosthesis. Ambulated 20ft w/ Rw and CGA. Instructed pt on a few standing LE exercises, and encouraged pt to ambulate w/ nsg during the day.     Time Calculation:         PT Charges       Row Name 12/23/23 1217             Time Calculation    Start Time 0932  -      Stop Time 1021  -      Time Calculation (min) 49 min  -      PT Received On 12/23/23  -      PT - Next Appointment 12/26/23  -                User Key  (r) = Recorded By, (t) = Taken By, (c) = Cosigned By      Initials Name Provider Type     Sunshine Hoyt PTA Physical Therapist Assistant                  Therapy Charges for Today       Code Description Service Date Service Provider Modifiers Qty    78572575871 HC GAIT TRAINING EA 15 MIN 12/23/2023 Sunshine Hoyt, PTA GP 1    42741614885 HC PT THERAPEUTIC ACT EA 15 MIN 12/23/2023 Sunshine Hoyt PTA GP 1    80943097026 HC PT THER PROC EA 15 MIN 12/23/2023 Sunshine Hoyt, [place] GP 1            PT G-Codes  Outcome Measure Options: AM-PAC 6 Clicks Basic Mobility (PT)  AM-PAC 6 Clicks Score (PT): 20  AM-PAC 6 Clicks Score (OT): 19  PT Discharge Summary  Anticipated Discharge Disposition (PT): inpatient rehabilitation facility    Sunshine Hoyt PTA  12/23/2023

## 2023-12-23 NOTE — PLAN OF CARE
Goal Outcome Evaluation:  Plan of Care Reviewed With: patient        Progress: improving  Outcome Evaluation: No c/o pain or soa. IV potassium phos replaced and PO phos replacement ordered. KCL given early this morning. Up to chair and ambulated in hallway with PT. Waiting on placement for discharge.

## 2023-12-23 NOTE — PLAN OF CARE
Goal Outcome Evaluation:   Still with scattered rashes all over his body. On room air the entire shift, Phosphorous result to 1.6 and K 3.2 , notified nephrology and ordered to give potasium tab 40 meq x 1 and potasium phosphate 30 mmol iv. VS stable so far.

## 2023-12-23 NOTE — PROGRESS NOTES
[facility] Progress Note      Patient Name: Sim Agustin  : 1955  MRN: [id number]  Primary Care Physician:  Seth Hester MD  Date of admission: 2023    Subjective     Interval History:   Follow-up ARLYN on CKD3b  Doing well overall   Noted lower extremities edema   Urine output of 1900 cc  Review of Systems:   As noted above    Objective     Vitals:   Temp:  [97.7 °F (36.5 °C)-98.6 °F (37 °C)] 98.2 °F (36.8 °C)  Heart Rate:  [65-86] 67  Resp:  [12-18] 16  BP: (116-143)/(57-72) 135/69    Intake/Output Summary (Last 24 hours) at 2023 1105  Last data filed at 2023 0945  Gross per 24 hour   Intake 700 ml   Output 2175 ml   Net -1475 ml       Physical Exam:    General Appearance: alert, oriented x 3, NAD  Skin: warm and dry; erythroderma left arm, chest, and face  HEENT: oral mucosa dry, nonicteric sclera  Neck: supple, no JVD  Lungs: Coarse upper airway rhonchi; no wheezing; not labored  Heart: RRR, normal S1 and S2  Abdomen: soft, nontender, nondistended.+bs  : no palpable bladder  Extremities: 1+ edema right lower extremity, left AKA.  Neuro: normal speech and mental status     Scheduled Meds:     allopurinol, 100 mg, Oral, Daily  ALPRAZolam, 0.25 mg, Oral, BID  aspirin, 81 mg, Oral, Nightly  brimonidine, 1 drop, Both Eyes, BID  budesonide-formoterol, 2 puff, Inhalation, BID - RT   And  tiotropium bromide monohydrate, 2 puff, Inhalation, Daily - RT  buPROPion XL, 300 mg, Oral, Daily  clopidogrel, 75 mg, Oral, Nightly  guaiFENesin, 600 mg, Oral, Q12H  heparin (porcine), 5,000 Units, Subcutaneous, Q8H  ipratropium-albuterol, 3 mL, Nebulization, 4x Daily - RT  isosorbide mononitrate, 30 mg, Oral, QAM  latanoprost, 1 drop, Both Eyes, Nightly  levothyroxine, 137 mcg, Oral, Daily  Menthol-Zinc Oxide, 1 application , Topical, Q12H  multivitamin with minerals, 1 tablet, Oral, Daily  potassium phosphate, 15 mmol, Intravenous, Q3H      IV Meds:          Results  Reviewed:   I have personally reviewed the results from the time of this admission to 12/23/2023 11:05 EST     Results from last 7 days   Lab Units 12/23/23  0401 12/22/23 0459 12/21/23 0456 12/20/23 0447   SODIUM mmol/L 142 142 139 140   POTASSIUM mmol/L 3.2* 2.9* 3.2* 3.3*   CHLORIDE mmol/L 109* 108* 106 106   CO2 mmol/L 20.0* 22.0 20.0* 19.8*   BUN mg/dL 26* 36* 36* 40*   CREATININE mg/dL 1.75* 2.45* 2.46* 2.70*   CALCIUM mg/dL 6.6* 7.3* 7.5* 7.7*   BILIRUBIN mg/dL  --  0.4 0.4 0.4   ALK PHOS U/L  --  174* 150* 140*   ALT (SGPT) U/L  --  45* 51* 49*   AST (SGOT) U/L  --  29 33 40   GLUCOSE mg/dL 84 96 105* 92       Estimated Creatinine Clearance: 42.4 mL/min (A) (by C-G formula based on SCr of 1.75 mg/dL (H)).    Results from last 7 days   Lab Units 12/23/23  0401 12/22/23 0459 12/21/23 0456 12/20/23 0447   MAGNESIUM mg/dL 1.8 1.6 1.7 1.6   PHOSPHORUS mg/dL 1.6* 1.2*  --  2.9             Results from last 7 days   Lab Units 12/23/23  0401 12/22/23 0459 12/21/23 0456 12/20/23 0447 12/19/23 0408   WBC 10*3/mm3 12.77* 14.15* 13.34* 15.04* 13.41*   HEMOGLOBIN g/dL 9.9* 10.9* 10.5* 10.1* 10.8*   PLATELETS 10*3/mm3 289 296 266 261 242             Assessment / Plan     ASSESSMENT:  ARLYN on CKD3b, nonoliguric, improving; his baseline SCr is 1.7.  ARLYN is multifactorial from hypotension, hypovolemia and hypercalcemia, with the latter resolved.  Severe hypophosphatemia and hypokalemia from poor nutrition.  Volume status improved.    Hypercalcemia, resolved, and now has hypocalcemia.  Serum calcium is still low when corrected for his low albumin  Hypotension, resolved  PAD, s/p left AKA  Anemia  Transaminitis, with statin on hold  CAD  Shortness of breath and abnormal CXR 12/18 with left lower lobe opacity.  Chest CT 11/18/2023 with 12 mm stellate left upper lobe nodule.  Reticulonodular infiltrate left base; has had 7 days of Zosyn as of today (12/22)  Erythroderma, possible drug reaction; Zosyn discontinued  12/22    PLAN:  1.  Potassium, magnesium, and phosphorus replacement  2.   Holding torsemide for electrolytes replacement   3. Labs in AM     Thank you for involving us in the care of Sim Agustin.  Please feel free to call with any questions.    Qi Haile MD  12/23/23  11:05 [facility]    [facility]  [phone]    Please note that portions of this note were completed with a voice recognition program.

## 2023-12-23 NOTE — PLAN OF CARE
Goal Outcome Evaluation:  Plan of Care Reviewed With: patient        Progress: improving  Outcome Evaluation: Pt tolerated treatment well this date. Required SBA for bed mobility and to stand after donning L prosthesis. Ambulated 20ft w/ Rw and CGA. Instructed pt on a few standing LE exercises, and encouraged pt to ambulate w/ nsg during the day.      Anticipated Discharge Disposition (PT): inpatient rehabilitation facility

## 2023-12-24 ENCOUNTER — READMISSION MANAGEMENT (OUTPATIENT)
Dept: CALL CENTER | Facility: HOSPITAL | Age: 68
End: 2023-12-24
Payer: MEDICARE

## 2023-12-24 VITALS
RESPIRATION RATE: 18 BRPM | OXYGEN SATURATION: 97 % | BODY MASS INDEX: 24.73 KG/M2 | TEMPERATURE: 98.4 F | DIASTOLIC BLOOD PRESSURE: 89 MMHG | WEIGHT: 162.6 LBS | SYSTOLIC BLOOD PRESSURE: 148 MMHG | HEART RATE: 73 BPM

## 2023-12-24 LAB
ALBUMIN SERPL-MCNC: 2.8 G/DL (ref 3.5–5.2)
ANION GAP SERPL CALCULATED.3IONS-SCNC: 14.2 MMOL/L (ref 5–15)
BUN SERPL-MCNC: 18 MG/DL (ref 8–23)
BUN/CREAT SERPL: 13.7 (ref 7–25)
CALCIUM SPEC-SCNC: 6.5 MG/DL (ref 8.6–10.5)
CHLORIDE SERPL-SCNC: 110 MMOL/L (ref 98–107)
CO2 SERPL-SCNC: 17.8 MMOL/L (ref 22–29)
CREAT SERPL-MCNC: 1.31 MG/DL (ref 0.76–1.27)
DEPRECATED RDW RBC AUTO: 48.6 FL (ref 37–54)
EGFRCR SERPLBLD CKD-EPI 2021: 59.3 ML/MIN/1.73
EOSINOPHIL # BLD MANUAL: 0.24 10*3/MM3 (ref 0–0.4)
EOSINOPHIL NFR BLD MANUAL: 2 % (ref 0.3–6.2)
ERYTHROCYTE [DISTWIDTH] IN BLOOD BY AUTOMATED COUNT: 14.5 % (ref 12.3–15.4)
GLUCOSE SERPL-MCNC: 81 MG/DL (ref 65–99)
HCT VFR BLD AUTO: 29.7 % (ref 37.5–51)
HGB BLD-MCNC: 10.1 G/DL (ref 13–17.7)
HYPOCHROMIA BLD QL: ABNORMAL
LYMPHOCYTES # BLD MANUAL: 2.55 10*3/MM3 (ref 0.7–3.1)
LYMPHOCYTES NFR BLD MANUAL: 3 % (ref 5–12)
MAGNESIUM SERPL-MCNC: 1.8 MG/DL (ref 1.6–2.4)
MCH RBC QN AUTO: 31.2 PG (ref 26.6–33)
MCHC RBC AUTO-ENTMCNC: 34 G/DL (ref 31.5–35.7)
MCV RBC AUTO: 91.7 FL (ref 79–97)
MONOCYTES # BLD: 0.36 10*3/MM3 (ref 0.1–0.9)
NEUTROPHILS # BLD AUTO: 8.97 10*3/MM3 (ref 1.7–7)
NEUTROPHILS NFR BLD MANUAL: 74 % (ref 42.7–76)
PHOSPHATE SERPL-MCNC: 2.3 MG/DL (ref 2.5–4.5)
PLAT MORPH BLD: NORMAL
PLATELET # BLD AUTO: 368 10*3/MM3 (ref 140–450)
PMV BLD AUTO: 9.8 FL (ref 6–12)
POTASSIUM SERPL-SCNC: 3.9 MMOL/L (ref 3.5–5.2)
RBC # BLD AUTO: 3.24 10*6/MM3 (ref 4.14–5.8)
SCHISTOCYTES BLD QL SMEAR: ABNORMAL
SODIUM SERPL-SCNC: 142 MMOL/L (ref 136–145)
VARIANT LYMPHS NFR BLD MANUAL: 21 % (ref 19.6–45.3)
WBC MORPH BLD: NORMAL
WBC NRBC COR # BLD AUTO: 12.12 10*3/MM3 (ref 3.4–10.8)

## 2023-12-24 PROCEDURE — 85025 COMPLETE CBC W/AUTO DIFF WBC: CPT | Performed by: STUDENT IN AN ORGANIZED HEALTH CARE EDUCATION/TRAINING PROGRAM

## 2023-12-24 PROCEDURE — 80069 RENAL FUNCTION PANEL: CPT | Performed by: HOSPITALIST

## 2023-12-24 PROCEDURE — 83735 ASSAY OF MAGNESIUM: CPT | Performed by: STUDENT IN AN ORGANIZED HEALTH CARE EDUCATION/TRAINING PROGRAM

## 2023-12-24 PROCEDURE — 85007 BL SMEAR W/DIFF WBC COUNT: CPT | Performed by: STUDENT IN AN ORGANIZED HEALTH CARE EDUCATION/TRAINING PROGRAM

## 2023-12-24 PROCEDURE — 94799 UNLISTED PULMONARY SVC/PX: CPT

## 2023-12-24 PROCEDURE — 25010000002 HEPARIN (PORCINE) PER 1000 UNITS: Performed by: STUDENT IN AN ORGANIZED HEALTH CARE EDUCATION/TRAINING PROGRAM

## 2023-12-24 RX ORDER — TORSEMIDE 20 MG/1
40 TABLET ORAL DAILY
Qty: 90 TABLET | Refills: 3 | Status: SHIPPED | OUTPATIENT
Start: 2023-12-24 | End: 2023-12-24 | Stop reason: SDUPTHER

## 2023-12-24 RX ORDER — TORSEMIDE 20 MG/1
40 TABLET ORAL DAILY
Qty: 90 TABLET | Refills: 3 | Status: SHIPPED | OUTPATIENT
Start: 2023-12-24 | End: 2024-01-04

## 2023-12-24 RX ORDER — GUAIFENESIN 600 MG/1
600 TABLET, EXTENDED RELEASE ORAL EVERY 12 HOURS SCHEDULED
Qty: 60 TABLET | Refills: 0 | Status: SHIPPED | OUTPATIENT
Start: 2023-12-24 | End: 2024-01-04

## 2023-12-24 RX ORDER — TORSEMIDE 20 MG/1
40 TABLET ORAL DAILY
Status: DISCONTINUED | OUTPATIENT
Start: 2023-12-24 | End: 2023-12-24 | Stop reason: HOSPADM

## 2023-12-24 RX ORDER — BENZONATATE 200 MG/1
200 CAPSULE ORAL 3 TIMES DAILY PRN
Qty: 60 CAPSULE | Refills: 0 | Status: SHIPPED | OUTPATIENT
Start: 2023-12-24 | End: 2024-01-04

## 2023-12-24 RX ADMIN — ALLOPURINOL 100 MG: 100 TABLET ORAL at 10:59

## 2023-12-24 RX ADMIN — BUPROPION HYDROCHLORIDE 300 MG: 300 TABLET, EXTENDED RELEASE ORAL at 10:58

## 2023-12-24 RX ADMIN — IPRATROPIUM BROMIDE AND ALBUTEROL SULFATE 3 ML: 2.5; .5 SOLUTION RESPIRATORY (INHALATION) at 11:39

## 2023-12-24 RX ADMIN — LEVOTHYROXINE SODIUM 137 MCG: 0.14 TABLET ORAL at 06:07

## 2023-12-24 RX ADMIN — ISOSORBIDE MONONITRATE 30 MG: 30 TABLET, EXTENDED RELEASE ORAL at 06:07

## 2023-12-24 RX ADMIN — BRIMONIDINE TARTRATE 1 DROP: 2 SOLUTION/ DROPS OPHTHALMIC at 10:59

## 2023-12-24 RX ADMIN — Medication 1 PACKET: at 10:58

## 2023-12-24 RX ADMIN — GUAIFENESIN 600 MG: 600 TABLET, EXTENDED RELEASE ORAL at 10:58

## 2023-12-24 RX ADMIN — IPRATROPIUM BROMIDE AND ALBUTEROL SULFATE 3 ML: 2.5; .5 SOLUTION RESPIRATORY (INHALATION) at 07:19

## 2023-12-24 RX ADMIN — ANORECTAL OINTMENT 1 APPLICATION: 15.7; .44; 24; 20.6 OINTMENT TOPICAL at 10:59

## 2023-12-24 RX ADMIN — ALPRAZOLAM 0.25 MG: 0.25 TABLET ORAL at 10:58

## 2023-12-24 RX ADMIN — Medication 1 TABLET: at 10:58

## 2023-12-24 RX ADMIN — BUDESONIDE AND FORMOTEROL FUMARATE DIHYDRATE 2 PUFF: 160; 4.5 AEROSOL RESPIRATORY (INHALATION) at 07:23

## 2023-12-24 RX ADMIN — TIOTROPIUM BROMIDE INHALATION SPRAY 2 PUFF: 3.12 SPRAY, METERED RESPIRATORY (INHALATION) at 07:23

## 2023-12-24 RX ADMIN — HEPARIN SODIUM 5000 UNITS: 5000 INJECTION INTRAVENOUS; SUBCUTANEOUS at 06:07

## 2023-12-24 NOTE — DISCHARGE SUMMARY
Patient Name: Sim Agustin  : 1955  MRN: [id number]    Date of Admission: 2023  Date of Discharge:  2023  Primary Care Physician: Seth Hester MD      Chief Complaint:   Hypotension      Discharge Diagnoses     Active Hospital Problems    Diagnosis  POA    **Acute kidney injury [N17.9]  Yes    Stage 3b chronic kidney disease (CKD) [N18.32]  Yes    Anemia [D64.9]  Yes    Leukocytosis [D72.829]  Yes    Hyponatremia [E87.1]  Yes    Hypokalemia [E87.6]  Yes    Lactic acidosis [E87.20]  Yes    Hypotension [I95.9]  Yes    Hypercalcemia [E83.52]  Yes    Hyperlipidemia [E78.5]  Yes    PRISCILLA treated with BiPAP [G47.33]  Yes    Hypothyroidism [E03.9]  Yes    CAD (coronary artery disease) [I25.10]  Yes    COPD (chronic obstructive pulmonary disease) [J44.9]  Yes    Essential hypertension [I10]  Yes      Resolved Hospital Problems   No resolved problems to display.        Hospital Course     This is a 67-year-old male with stage III CKD with a baseline creatinine around 1.7, peripheral vascular disease with left AKA, COPD and coronary artery disease who was admitted to the hospital after he experienced a fall and was unable to get back up.  He was found to be hypotensive with systolic blood pressure in the 70s and his creatinine was elevated to five 5.1 upon arrival with a calcium level of 17.  IV fluids were initiated, CT scan was negative for hydronephrosis, and the values SOC the patient in consultation.  His antihypertensives were initially discontinued to allow for adequate blood pressure and his diuretic was held while he was receiving fluid resuscitation.    His hospital course was complicated by development of acute respiratory failure with hypoxia secondary to suspected pneumonia.  Imaging showed a small left pleural effusion and the left lower lobe retrocardiac opacity concerning for an infectious process.  He completed a 7-day course of Zosyn for this however he did develop erythroderma that  resolved after discontinuation of Zosyn.    Due to right lower extremity edema, he has been started on torsemide again on 12/24/2023.  He will need blood work done on 12/26/2023 to evaluate his renal function as well as electrolytes.    He was evaluated by physical therapy with disposition recommendation of SNF.  However on the day of discharge the patient was adamant that he did not want to go to skilled nursing facility and instead went to be discharged home.  As he has decision-making capacity I cannot force him to follow the recommendations of the physical therapists.        Physical Exam:  Temp:  [97.7 °F (36.5 °C)-98.6 °F (37 °C)] 98.4 °F (36.9 °C)  Heart Rate:  [63-80] 73  Resp:  [16-18] 18  BP: (127-163)/(68-89) 148/89  Body mass index is 24.73 kg/m².  Physical Exam  Constitutional:       General: He is not in acute distress.  HENT:      Head: Normocephalic and atraumatic.   Eyes:      Extraocular Movements: Extraocular movements intact.      Pupils: Pupils are equal, round, and reactive to light.   Cardiovascular:      Rate and Rhythm: Normal rate and regular rhythm.   Pulmonary:      Effort: Pulmonary effort is normal. No respiratory distress.   Abdominal:      General: There is no distension.      Palpations: Abdomen is soft.      Tenderness: There is no abdominal tenderness.   Musculoskeletal:      Right lower leg: No edema.      Left lower leg: No edema.   Neurological:      General: No focal deficit present.      Mental Status: He is alert and oriented to person, place, and time.         Consultants     Consult Orders (all) (From admission, onward)       Start     Ordered    12/20/23 0837  Inpatient Rehab Admission Consult  Once        Provider:  (Not yet assigned)    12/20/23 0837    12/14/23 1942  Inpatient Nephrology Consult  Once        Specialty:  Nephrology  Provider:  Rajesh Sarabia MD    12/14/23 1941 12/14/23 7567  [facility] (on-call MD unless specified) Details  Once        Specialty:   Hospitalist  Provider:  Ruth Rosas MD    12/14/23 1856                  Procedures     Imaging Results (All)       Procedure Component Value Units Date/Time    XR Chest PA & Lateral [[id number]] Collected: 12/18/23 1446     Updated: 12/18/23 1455    Narrative:      XR CHEST PA AND LATERAL-     INDICATION: Follow-up pneumonia     COMPARISON: Chest radiographs dating back to 11/26/2015 CT abdomen  pelvis 12/14/2023       Impression:      Small left pleural effusion. Similar left lower lobe  retrocardiac opacity suggest infectious/inflammatory process. This is  hard to discriminate from the pleural fluid on lateral injection. No  pneumothorax. Stable normal size cardiomediastinal silhouette. No focal  osseous abnormality.     This report was finalized on 12/18/2023 2:52 PM by Dr. Marco Smalls M.D on Workstation: [id number]       XR Chest 1 View [[id number]] Collected: 12/16/23 0853     Updated: 12/16/23 0858    Narrative:      XR CHEST 1 VW-     HISTORY: Male who is 67 years-old, increased oxygen requirement     TECHNIQUE: Frontal view of the chest     COMPARISON: 12/14/2023     FINDINGS: Heart, mediastinum and pulmonary vasculature are unremarkable.  Small left basilar atelectasis or infiltrate. No large pleural effusion,  or pneumothorax. No acute osseous process.       Impression:      Small left basilar atelectasis or infiltrate.           This report was finalized on 12/16/2023 8:54 AM by Dr. Diogo Suarez M.D on Workstation: [id number]       CT Abdomen Pelvis Without Contrast [[id number]] Collected: 12/14/23 1951     Updated: 12/14/23 2005    Narrative:      CT ABDOMEN PELVIS WO CONTRAST-     INDICATIONS: Abdominal bruising     TECHNIQUE: Radiation dose reduction techniques were utilized, including  automated exposure control and exposure modulation based on body size.  Unenhanced ABDOMEN AND PELVIS CT     COMPARISON: 11/3/2017     FINDINGS:     Assessment of the solid organs for injury is  significant limited without  intravenous contrast material. Structures in the pelvis are partly  obscured by attenuation artifact from left hip arthroplasty hardware.     Cholelithiasis is evident.     Mild stable nonspecific thickening of the left adrenal gland is present.     Arterial calcification is seen at the left renal hilum. The distal  ureter and portions of the urinary bladder are obscured by hardware  artifact.     Pancreas is thinned.     A right renal cyst is demonstrated, 4 cm, showing interval increase,  previously 2.3 cm. Areas of cortical thinning are evident in the left  kidney.     Otherwise unremarkable appearance of the liver, spleen, adrenal glands,  pancreas, kidneys, bladder.     No bowel obstruction or abnormal bowel thickening is identified.     No free intraperitoneal gas or free fluid.     Scattered small mesenteric and para-aortic lymph nodes are seen that are  not significant by size criteria.     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 Aortic and other  arterial calcifications are present.     The lung bases show persistent reticulonodular infiltrate in the left  lower lobe, similar from 11/18/2023, taking into account motion artifact  that is present on the current exam. A 4 mm nodular density in the right  lower lobe axial image 9 is not seen on the recent prior exam, possibly  attributable to motion artifact or may be a new nodule. Chest CT  follow-up is advised as recommended on the recent prior exam (3 months  from the prior exam).     Degenerative changes are seen in the spine. No acute fracture is  identified. Subcutaneous stranding at the anterior abdominal wall may  reflect contusion, for example axial image 51.             Impression:         1. No acute inflammatory process of bowel is identified, follow-up as  indications  persist.     2. Cholelithiasis.     3. No urolithiasis or hydronephrosis. Increased right renal cyst.     4. Indeterminate nodularity in the lower lungs, chest CT follow-up  recommended.     5. Limited as described.     This report was finalized on 12/14/2023 8:01 PM by Dr. Diogo Suarez M.D on Workstation: [id number]       XR Chest 1 View [[id number]] Collected: 12/14/23 1708     Updated: 12/14/23 1713    Narrative:      XR CHEST 1 VW-12/14/2023     HISTORY: Cough.     Heart size is within normal limits. Patient is rotated slightly to the  left. Lungs appear clear. There is some aortic calcification.       Impression:      1. No acute process.        This report was finalized on 12/14/2023 5:09 PM by Dr. Yovanny Ruiz M.D on Workstation: [id number]               Pertinent Labs     Results from last 7 days   Lab Units 12/24/23  0551 12/23/23  0401 12/22/23  0459 12/21/23  0456   WBC 10*3/mm3 12.12* 12.77* 14.15* 13.34*   HEMOGLOBIN g/dL 10.1* 9.9* 10.9* 10.5*   PLATELETS 10*3/mm3 368 289 296 266     Results from last 7 days   Lab Units 12/24/23  0551 12/23/23  0401 12/22/23  0459 12/21/23  0456   SODIUM mmol/L 142 142 142 139   POTASSIUM mmol/L 3.9 3.2* 2.9* 3.2*   CHLORIDE mmol/L 110* 109* 108* 106   CO2 mmol/L 17.8* 20.0* 22.0 20.0*   BUN mg/dL 18 26* 36* 36*   CREATININE mg/dL 1.31* 1.75* 2.45* 2.46*   GLUCOSE mg/dL 81 84 96 105*   Estimated Creatinine Clearance: 56.3 mL/min (A) (by C-G formula based on SCr of 1.31 mg/dL (H)).  Results from last 7 days   Lab Units 12/24/23  0551 12/23/23  0401 12/22/23  0459 12/21/23  0456 12/20/23  0447 12/19/23  1258 12/19/23  0408   ALBUMIN g/dL 2.8* 2.7* 2.7* 2.4* 2.2*   < > 2.5*   BILIRUBIN mg/dL  --   --  0.4 0.4 0.4  --  0.3   ALK PHOS U/L  --   --  174* 150* 140*  --  145*   AST (SGOT) U/L  --   --  29 33 40  --  46*   ALT (SGPT) U/L  --   --  45* 51* 49*  --  51*    < > = values in this interval not displayed.     Results from last 7 days   Lab Units 12/24/23  0551  "12/23/23  0401 12/22/23  0459 12/21/23  0456 12/20/23  0447   CALCIUM mg/dL 6.5* 6.6* 7.3* 7.5* 7.7*   ALBUMIN g/dL 2.8* 2.7* 2.7* 2.4* 2.2*   MAGNESIUM mg/dL 1.8 1.8 1.6 1.7 1.6   PHOSPHORUS mg/dL 2.3* 1.6* 1.2*  --  2.9               Invalid input(s): \"LDLCALC\"  Results from last 7 days   Lab Units 12/17/23  1948 12/17/23  1730   BLOODCX  No growth at 5 days No growth at 5 days       Test Results Pending at Discharge       Discharge Details        Discharge Medications        New Medications        Instructions Start Date   benzonatate 200 MG capsule  Commonly known as: TESSALON   200 mg, Oral, 3 Times Daily PRN      guaiFENesin 600 MG 12 hr tablet  Commonly known as: MUCINEX   600 mg, Oral, Every 12 Hours Scheduled             Changes to Medications        Instructions Start Date   torsemide 20 MG tablet  Commonly known as: DEMADEX  What changed: how much to take   40 mg, Oral, Daily             Continue These Medications        Instructions Start Date   albuterol 0.63 MG/3ML nebulizer solution  Commonly known as: ACCUNEB   0.63 mg, Inhalation, Every 6 Hours PRN      allopurinol 100 MG tablet  Commonly known as: Zyloprim   Take 1 tab by mouth daily for gout      ALPRAZolam 1 MG tablet  Commonly known as: XANAX   Take 1 tablet twice daily      aspirin 81 MG chewable tablet   81 mg, Oral, Nightly      atorvastatin 40 MG tablet  Commonly known as: LIPITOR   TAKE 1 TABLET BY MOUTH EVERY DAY FOR CHOLESTEROL AND HEART HEALTH      brimonidine 0.2 % ophthalmic solution  Commonly known as: ALPHAGAN   instill 1 drop into both eyes twice a day      buPROPion  MG 24 hr tablet  Commonly known as: WELLBUTRIN XL   TAKE 1 TABLET BY MOUTH EVERY DAY      clopidogrel 75 MG tablet  Commonly known as: PLAVIX   75 mg, Oral, Nightly      Coenzyme Q10 400 MG capsule   400 mg, Oral, Every Evening      FERROUS SULFATE PO   65 mg, Oral, Nightly      fish oil 1000 MG capsule capsule   1,000 mg, Oral, Daily, Took this am    "   Fluticasone-Umeclidin-Vilant 100-62.5-25 MCG/INH inhaler  Commonly known as: Trelegy Ellipta   1 each, Inhalation, Daily      isosorbide mononitrate 30 MG 24 hr tablet  Commonly known as: IMDUR   TAKE 1 TABLET BY MOUTH EVERY DAY IN THE MORNING      latanoprost 0.005 % ophthalmic solution  Commonly known as: XALATAN   1 drop, Both Eyes, Every Night at Bedtime      levothyroxine 137 MCG tablet  Commonly known as: SYNTHROID, LEVOTHROID   137 mcg, Oral, Daily      multivitamin with minerals tablet tablet   1 tablet, Oral, Daily      traMADol 50 MG tablet  Commonly known as: ULTRAM   50 mg, Oral, 2 Times Daily      VITAMIN B-1 PO   1 tablet, Oral, Daily             Stop These Medications      amLODIPine 10 MG tablet  Commonly known as: NORVASC     gabapentin 300 MG capsule  Commonly known as: NEURONTIN              Allergies   Allergen Reactions    Augmentin [Amoxicillin-Pot Clavulanate] Hives and Rash     Tolerate cephalosporins         Discharge Disposition:  Home or Self Care    Discharge Diet:  Diet Order   Procedures    Diet: Cardiac Diets; Healthy Heart (2-3 Na+); Texture: Regular Texture (IDDSI 7); Fluid Consistency: Thin (IDDSI 0)       Discharge Activity:       CODE STATUS:    Code Status and Medical Interventions:   Ordered at: 12/14/23 1907     Code Status (Patient has no pulse and is not breathing):    CPR (Attempt to Resuscitate)     Medical Interventions (Patient has pulse or is breathing):    Full       Future Appointments   Date Time Provider Department Center   3/3/2024 11:30 AM [facility]   3/29/2024 10:30 AM Seth Hester MD [facility]     Additional Instructions for the Follow-ups that You Need to Schedule       Basic Metabolic Panel    Dec 26, 2023 (Approximate)      Release to patient: Routine Release               Follow-up Information       Seth Hester MD. Schedule an appointment as soon as possible for a visit in 1 week(s).    Specialties: Family Medicine,  Emergency Medicine, Urgent Care  Contact information:  [address] [place] [address] [phone]                             Additional Instructions for the Follow-ups that You Need to Schedule       Basic Metabolic Panel    Dec 26, 2023 (Approximate)      Release to patient: Routine Release            Time Spent on Discharge:  Greater than 30 minutes      Carl Fitch MD  [facility]  12/24/23  12:10 EST

## 2023-12-24 NOTE — PLAN OF CARE
Goal Outcome Evaluation:              Outcome Evaluation: Pt requested to to attending to be D/C home, prescription to be sent to home pharmacy

## 2023-12-24 NOTE — PLAN OF CARE
Goal Outcome Evaluation:  Plan of Care Reviewed With: patient         Patient prothesis is in his room, he refused his bed alarm to be turned on all night because of his frequent need to use the restroom despite education on the importance while in the hospital. Patient had a calm rest all through the shift. No new complain at this moment.

## 2023-12-24 NOTE — PROGRESS NOTES
[facility] Progress Note      Patient Name: Sim Agustin  : 1955  MRN: [id number]  Primary Care Physician:  Seth Hester MD  Date of admission: 2023    Subjective     Interval History:   Follow-up ARLYN on CKD3b  Doing well overall.  Has no nausea no vomiting.  Complaining of bilateral lower extremity edema.  Shortness of breath is improved.  Review of Systems:   As noted above    Objective     Vitals:   Temp:  [97.7 °F (36.5 °C)-98.6 °F (37 °C)] 97.7 °F (36.5 °C)  Heart Rate:  [63-81] 76  Resp:  [14-18] 18  BP: (127-163)/(61-81) 163/81    Intake/Output Summary (Last 24 hours) at 2023 1126  Last data filed at 2023 0910  Gross per 24 hour   Intake 650 ml   Output 2170 ml   Net -1520 ml       Physical Exam:    General Appearance: alert, oriented x 3, NAD  Skin: warm and dry; erythroderma left arm, chest, and face  HEENT: oral mucosa dry, nonicteric sclera  Neck: supple, no JVD  Lungs: Coarse upper airway rhonchi; no wheezing; not labored  Heart: RRR, normal S1 and S2  Abdomen: soft, nontender, nondistended.+bs  : no palpable bladder  Extremities: 1-2+ edema right lower extremity, left AKA.  Neuro: normal speech and mental status     Scheduled Meds:     allopurinol, 100 mg, Oral, Daily  ALPRAZolam, 0.25 mg, Oral, BID  aspirin, 81 mg, Oral, Nightly  brimonidine, 1 drop, Both Eyes, BID  budesonide-formoterol, 2 puff, Inhalation, BID - RT   And  tiotropium bromide monohydrate, 2 puff, Inhalation, Daily - RT  buPROPion XL, 300 mg, Oral, Daily  clopidogrel, 75 mg, Oral, Nightly  guaiFENesin, 600 mg, Oral, Q12H  heparin (porcine), 5,000 Units, Subcutaneous, Q8H  ipratropium-albuterol, 3 mL, Nebulization, 4x Daily - RT  isosorbide mononitrate, 30 mg, Oral, QAM  latanoprost, 1 drop, Both Eyes, Nightly  levothyroxine, 137 mcg, Oral, Daily  Menthol-Zinc Oxide, 1 application , Topical, Q12H  multivitamin with minerals, 1 tablet, Oral, Daily  potassium & sodium phosphates,  1 packet, Oral, BID AC      IV Meds:          Results Reviewed:   I have personally reviewed the results from the time of this admission to 12/24/2023 11:26 EST     Results from last 7 days   Lab Units 12/24/23  0551 12/23/23  0401 12/22/23 0459 12/21/23 0456 12/20/23 0447   SODIUM mmol/L 142 142 142 139 140   POTASSIUM mmol/L 3.9 3.2* 2.9* 3.2* 3.3*   CHLORIDE mmol/L 110* 109* 108* 106 106   CO2 mmol/L 17.8* 20.0* 22.0 20.0* 19.8*   BUN mg/dL 18 26* 36* 36* 40*   CREATININE mg/dL 1.31* 1.75* 2.45* 2.46* 2.70*   CALCIUM mg/dL 6.5* 6.6* 7.3* 7.5* 7.7*   BILIRUBIN mg/dL  --   --  0.4 0.4 0.4   ALK PHOS U/L  --   --  174* 150* 140*   ALT (SGPT) U/L  --   --  45* 51* 49*   AST (SGOT) U/L  --   --  29 33 40   GLUCOSE mg/dL 81 84 96 105* 92       Estimated Creatinine Clearance: 56.3 mL/min (A) (by C-G formula based on SCr of 1.31 mg/dL (H)).    Results from last 7 days   Lab Units 12/24/23  0551 12/23/23 0401 12/22/23 0459   MAGNESIUM mg/dL 1.8 1.8 1.6   PHOSPHORUS mg/dL 2.3* 1.6* 1.2*             Results from last 7 days   Lab Units 12/24/23  0551 12/23/23  0401 12/22/23 0459 12/21/23 0456 12/20/23 0447   WBC 10*3/mm3 12.12* 12.77* 14.15* 13.34* 15.04*   HEMOGLOBIN g/dL 10.1* 9.9* 10.9* 10.5* 10.1*   PLATELETS 10*3/mm3 368 289 296 266 261             Assessment / Plan     ASSESSMENT:  ARLYN on CKD3b, nonoliguric, improving; his baseline SCr is 1.7.  ARLYN is multifactorial from hypotension, hypovolemia and hypercalcemia, with the latter resolved.  Severe hypophosphatemia and hypokalemia from poor nutrition.  Creatinine is trending down  Hypercalcemia, resolved, and now has hypocalcemia.  Serum calcium is still low when corrected for his low albumin  Hypotension, resolved  PAD, s/p left AKA  Anemia  Transaminitis, with statin on hold  CAD  Shortness of breath and abnormal CXR 12/18 with left lower lobe opacity.  Chest CT 11/18/2023 with 12 mm stellate left upper lobe nodule.  Reticulonodular infiltrate left base; has  had 7 days of Zosyn as of today (12/22)  Erythroderma, possible drug reaction; Zosyn discontinued 12/22    PLAN:  1.  Hypervolemic today, we will restart torsemide  2.  Electrolyte replacement  3.  Labs in a.m.    Thank you for involving us in the care of Sim Agustin.  Please feel free to call with any questions.    Qi Haile MD  12/24/23  11:26 [facility]    [facility]  [phone]    Please note that portions of this note were completed with a voice recognition program.

## 2023-12-25 NOTE — OUTREACH NOTE
Prep Survey      Flowsheet Row Responses   [facility] patient discharged from? [facility]   Is LACE score < 7 ? No   Eligibility [facility]   Date of Admission 12/14/23   Date of Discharge 12/24/23   Discharge Disposition Home or Self Care   Discharge diagnosis *Acute kidney injury   Does the patient have one of the following disease processes/diagnoses(primary or secondary)? Other   Does the patient have Home health ordered? No   Is there a DME ordered? No   Prep survey completed? Yes            GHAZALA OLGUIN - Registered Nurse

## 2023-12-26 ENCOUNTER — TRANSITIONAL CARE MANAGEMENT TELEPHONE ENCOUNTER (OUTPATIENT)
Dept: CALL CENTER | Facility: HOSPITAL | Age: 68
End: 2023-12-26
Payer: MEDICARE

## 2023-12-26 NOTE — PAYOR COMM NOTE
"Sim Alonso \"Don Alonso\" (68 y.o. Male)        PLEASE SEE ATTACHED DC SUMMARY    REF#[id number]     THANK YOU    JENN ELLISON LPN [facility]   Date of Birth   1955    Social Security Number       Address   [address] [place], [place] [address]    Home Phone   [phone]    MRN   [id number]       Restorationist   Rastafari    Marital Status   Single                            Admission Date   12/14/23    Admission Type   Emergency    Admitting Provider   Ruth Rosas MD    Attending Provider       Department, Room/Bed   [address], E455/1       Discharge Date   12/24/2023    Discharge Disposition   Home or Self Care    Discharge Destination                                 Attending Provider: (none)   Allergies: Augmentin [Amoxicillin-pot Clavulanate]    Isolation: None   Infection: None   Code Status: Prior    Ht: 172.7 cm (67.99\")   Wt: 73.8 kg (162 lb 9.6 oz)    Admission Cmt: None   Principal Problem: Acute kidney injury [N17.9]                   Active Insurance as of 12/14/2023       Primary Coverage       Payor Plan Insurance Group Employer/Plan Group    AETNA MEDICARE REPLACEMENT AETNA MEDICARE REPLACEMENT [id number]       Payor Plan Address Payor Plan Phone Number Payor Plan Fax Number Effective Dates    [address] [phone]  7/1/2021 - None Entered    [place] [address]         Subscriber Name Subscriber Birth Date Member ID       SIM ALONSO 1955 [id number]               Secondary Coverage       Payor Plan Insurance Group Employer/Plan Group    [place]        Payor Plan Address Payor Plan Phone Number Payor Plan Fax Number Effective Dates    [address] [phone]  3/3/2020 - None Entered    [facility] [id number]         Subscriber Name Subscriber Birth Date Member ID       SIM ALONSO 1955 [id number]                     Emergency Contacts        (Rel.) Home Phone Work Phone Mobile Phone    Cassie Anthony " (Spouse) [phone]    John Agustin (Son) [phone] -- [phone]                 Discharge Summary        Carl Fitch MD at 23 1210              Patient Name: Sim Agustin  : 1955  MRN: [id number]    Date of Admission: 2023  Date of Discharge:  2023  Primary Care Physician: Seth Hester MD      Chief Complaint:   Hypotension      Discharge Diagnoses     Active Hospital Problems    Diagnosis  POA    **Acute kidney injury [N17.9]  Yes    Stage 3b chronic kidney disease (CKD) [N18.32]  Yes    Anemia [D64.9]  Yes    Leukocytosis [D72.829]  Yes    Hyponatremia [E87.1]  Yes    Hypokalemia [E87.6]  Yes    Lactic acidosis [E87.20]  Yes    Hypotension [I95.9]  Yes    Hypercalcemia [E83.52]  Yes    Hyperlipidemia [E78.5]  Yes    PRISCILLA treated with BiPAP [G47.33]  Yes    Hypothyroidism [E03.9]  Yes    CAD (coronary artery disease) [I25.10]  Yes    COPD (chronic obstructive pulmonary disease) [J44.9]  Yes    Essential hypertension [I10]  Yes      Resolved Hospital Problems   No resolved problems to display.        Hospital Course     This is a 67-year-old male with stage III CKD with a baseline creatinine around 1.7, peripheral vascular disease with left AKA, COPD and coronary artery disease who was admitted to the hospital after he experienced a fall and was unable to get back up.  He was found to be hypotensive with systolic blood pressure in the 70s and his creatinine was elevated to five 5.1 upon arrival with a calcium level of 17.  IV fluids were initiated, CT scan was negative for hydronephrosis, and the values SOC the patient in consultation.  His antihypertensives were initially discontinued to allow for adequate blood pressure and his diuretic was held while he was receiving fluid resuscitation.    His hospital course was complicated by development of acute respiratory failure with hypoxia secondary to suspected pneumonia.  Imaging showed a small left  pleural effusion and the left lower lobe retrocardiac opacity concerning for an infectious process.  He completed a 7-day course of Zosyn for this however he did develop erythroderma that resolved after discontinuation of Zosyn.    Due to right lower extremity edema, he has been started on torsemide again on 12/24/2023.  He will need blood work done on 12/26/2023 to evaluate his renal function as well as electrolytes.    He was evaluated by physical therapy with disposition recommendation of SNF.  However on the day of discharge the patient was adamant that he did not want to go to skilled nursing facility and instead went to be discharged home.  As he has decision-making capacity I cannot force him to follow the recommendations of the physical therapists.        Physical Exam:  Temp:  [97.7 °F (36.5 °C)-98.6 °F (37 °C)] 98.4 °F (36.9 °C)  Heart Rate:  [63-80] 73  Resp:  [16-18] 18  BP: (127-163)/(68-89) 148/89  Body mass index is 24.73 kg/m².  Physical Exam  Constitutional:       General: He is not in acute distress.  HENT:      Head: Normocephalic and atraumatic.   Eyes:      Extraocular Movements: Extraocular movements intact.      Pupils: Pupils are equal, round, and reactive to light.   Cardiovascular:      Rate and Rhythm: Normal rate and regular rhythm.   Pulmonary:      Effort: Pulmonary effort is normal. No respiratory distress.   Abdominal:      General: There is no distension.      Palpations: Abdomen is soft.      Tenderness: There is no abdominal tenderness.   Musculoskeletal:      Right lower leg: No edema.      Left lower leg: No edema.   Neurological:      General: No focal deficit present.      Mental Status: He is alert and oriented to person, place, and time.         Consultants     Consult Orders (all) (From admission, onward)       Start     Ordered    12/20/23 0837  Inpatient Rehab Admission Consult  Once        Provider:  (Not yet assigned)    12/20/23 0837 12/14/23 1942  Inpatient  Nephrology Consult  Once        Specialty:  Nephrology  Provider:  Rajesh Sarabia MD    12/14/23 1941 12/14/23 1857  [facility] (on-call MD unless specified) Details  Once        Specialty:  Hospitalist  Provider:  Ruth Rosas MD    12/14/23 1856                  Procedures     Imaging Results (All)       Procedure Component Value Units Date/Time    XR Chest PA & Lateral [[id number]] Collected: 12/18/23 1446     Updated: 12/18/23 1455    Narrative:      XR CHEST PA AND LATERAL-     INDICATION: Follow-up pneumonia     COMPARISON: Chest radiographs dating back to 11/26/2015 CT abdomen  pelvis 12/14/2023       Impression:      Small left pleural effusion. Similar left lower lobe  retrocardiac opacity suggest infectious/inflammatory process. This is  hard to discriminate from the pleural fluid on lateral injection. No  pneumothorax. Stable normal size cardiomediastinal silhouette. No focal  osseous abnormality.     This report was finalized on 12/18/2023 2:52 PM by Dr. Marco Smalls M.D on Workstation: [id number]       XR Chest 1 View [[id number]] Collected: 12/16/23 0853     Updated: 12/16/23 0858    Narrative:      XR CHEST 1 VW-     HISTORY: Male who is 67 years-old, increased oxygen requirement     TECHNIQUE: Frontal view of the chest     COMPARISON: 12/14/2023     FINDINGS: Heart, mediastinum and pulmonary vasculature are unremarkable.  Small left basilar atelectasis or infiltrate. No large pleural effusion,  or pneumothorax. No acute osseous process.       Impression:      Small left basilar atelectasis or infiltrate.           This report was finalized on 12/16/2023 8:54 AM by Dr. Diogo Suarez M.D on Workstation: [id number]       CT Abdomen Pelvis Without Contrast [[id number]] Collected: 12/14/23 1951     Updated: 12/14/23 2005    Narrative:      CT ABDOMEN PELVIS WO CONTRAST-     INDICATIONS: Abdominal bruising     TECHNIQUE: Radiation dose reduction techniques were utilized,  including  automated exposure control and exposure modulation based on body size.  Unenhanced ABDOMEN AND PELVIS CT     COMPARISON: 11/3/2017     FINDINGS:     Assessment of the solid organs for injury is significant limited without  intravenous contrast material. Structures in the pelvis are partly  obscured by attenuation artifact from left hip arthroplasty hardware.     Cholelithiasis is evident.     Mild stable nonspecific thickening of the left adrenal gland is present.     Arterial calcification is seen at the left renal hilum. The distal  ureter and portions of the urinary bladder are obscured by hardware  artifact.     Pancreas is thinned.     A right renal cyst is demonstrated, 4 cm, showing interval increase,  previously 2.3 cm. Areas of cortical thinning are evident in the left  kidney.     Otherwise unremarkable appearance of the liver, spleen, adrenal glands,  pancreas, kidneys, bladder.     No bowel obstruction or abnormal bowel thickening is identified.     No free intraperitoneal gas or free fluid.     Scattered small mesenteric and para-aortic lymph nodes are seen that are  not significant by size criteria.     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 Aortic and other  arterial calcifications are present.     The lung bases show persistent reticulonodular infiltrate in the left  lower lobe, similar from 11/18/2023, taking into account motion artifact  that is present on the current exam. A 4 mm nodular density in the right  lower lobe axial image 9 is not seen on the recent prior exam, possibly  attributable to motion artifact or may be a new nodule. Chest CT  follow-up is advised as recommended on the recent prior exam (3 months  from the prior exam).     Degenerative changes are seen in the spine. No acute fracture is  identified. Subcutaneous stranding  at the anterior abdominal wall may  reflect contusion, for example axial image 51.             Impression:         1. No acute inflammatory process of bowel is identified, follow-up as  indications persist.     2. Cholelithiasis.     3. No urolithiasis or hydronephrosis. Increased right renal cyst.     4. Indeterminate nodularity in the lower lungs, chest CT follow-up  recommended.     5. Limited as described.     This report was finalized on 12/14/2023 8:01 PM by Dr. Diogo Suaerz M.D on Workstation: [id number]       XR Chest 1 View [[id number]] Collected: 12/14/23 1708     Updated: 12/14/23 1713    Narrative:      XR CHEST 1 VW-12/14/2023     HISTORY: Cough.     Heart size is within normal limits. Patient is rotated slightly to the  left. Lungs appear clear. There is some aortic calcification.       Impression:      1. No acute process.        This report was finalized on 12/14/2023 5:09 PM by Dr. Yovanny Ruiz M.D on Workstation: [id number]               Pertinent Labs     Results from last 7 days   Lab Units 12/24/23  0551 12/23/23  0401 12/22/23 0459 12/21/23  0456   WBC 10*3/mm3 12.12* 12.77* 14.15* 13.34*   HEMOGLOBIN g/dL 10.1* 9.9* 10.9* 10.5*   PLATELETS 10*3/mm3 368 289 296 266     Results from last 7 days   Lab Units 12/24/23  0551 12/23/23  0401 12/22/23 0459 12/21/23  0456   SODIUM mmol/L 142 142 142 139   POTASSIUM mmol/L 3.9 3.2* 2.9* 3.2*   CHLORIDE mmol/L 110* 109* 108* 106   CO2 mmol/L 17.8* 20.0* 22.0 20.0*   BUN mg/dL 18 26* 36* 36*   CREATININE mg/dL 1.31* 1.75* 2.45* 2.46*   GLUCOSE mg/dL 81 84 96 105*   Estimated Creatinine Clearance: 56.3 mL/min (A) (by C-G formula based on SCr of 1.31 mg/dL (H)).  Results from last 7 days   Lab Units 12/24/23  0551 12/23/23 0401 12/22/23  0459 12/21/23  0456 12/20/23  0447 12/19/23  1258 12/19/23  0408   ALBUMIN g/dL 2.8* 2.7* 2.7* 2.4* 2.2*   < > 2.5*   BILIRUBIN mg/dL  --   --  0.4 0.4 0.4  --  0.3   ALK PHOS U/L  --   --  174* 150* 140*  --   "145*   AST (SGOT) U/L  --   --  29 33 40  --  46*   ALT (SGPT) U/L  --   --  45* 51* 49*  --  51*    < > = values in this interval not displayed.     Results from last 7 days   Lab Units 12/24/23  0551 12/23/23  0401 12/22/23  0459 12/21/23  0456 12/20/23  0447   CALCIUM mg/dL 6.5* 6.6* 7.3* 7.5* 7.7*   ALBUMIN g/dL 2.8* 2.7* 2.7* 2.4* 2.2*   MAGNESIUM mg/dL 1.8 1.8 1.6 1.7 1.6   PHOSPHORUS mg/dL 2.3* 1.6* 1.2*  --  2.9               Invalid input(s): \"LDLCALC\"  Results from last 7 days   Lab Units 12/17/23  1948 12/17/23  1730   BLOODCX  No growth at 5 days No growth at 5 days       Test Results Pending at Discharge       Discharge Details        Discharge Medications        New Medications        Instructions Start Date   benzonatate 200 MG capsule  Commonly known as: TESSALON   200 mg, Oral, 3 Times Daily PRN      guaiFENesin 600 MG 12 hr tablet  Commonly known as: MUCINEX   600 mg, Oral, Every 12 Hours Scheduled             Changes to Medications        Instructions Start Date   torsemide 20 MG tablet  Commonly known as: DEMADEX  What changed: how much to take   40 mg, Oral, Daily             Continue These Medications        Instructions Start Date   albuterol 0.63 MG/3ML nebulizer solution  Commonly known as: ACCUNEB   0.63 mg, Inhalation, Every 6 Hours PRN      allopurinol 100 MG tablet  Commonly known as: Zyloprim   Take 1 tab by mouth daily for gout      ALPRAZolam 1 MG tablet  Commonly known as: XANAX   Take 1 tablet twice daily      aspirin 81 MG chewable tablet   81 mg, Oral, Nightly      atorvastatin 40 MG tablet  Commonly known as: LIPITOR   TAKE 1 TABLET BY MOUTH EVERY DAY FOR CHOLESTEROL AND HEART HEALTH      brimonidine 0.2 % ophthalmic solution  Commonly known as: ALPHAGAN   instill 1 drop into both eyes twice a day      buPROPion  MG 24 hr tablet  Commonly known as: WELLBUTRIN XL   TAKE 1 TABLET BY MOUTH EVERY DAY      clopidogrel 75 MG tablet  Commonly known as: PLAVIX   75 mg, Oral, " Nightly      Coenzyme Q10 400 MG capsule   400 mg, Oral, Every Evening      FERROUS SULFATE PO   65 mg, Oral, Nightly      fish oil 1000 MG capsule capsule   1,000 mg, Oral, Daily, Took this am      Fluticasone-Umeclidin-Vilant 100-62.5-25 MCG/INH inhaler  Commonly known as: Trelegy Ellipta   1 each, Inhalation, Daily      isosorbide mononitrate 30 MG 24 hr tablet  Commonly known as: IMDUR   TAKE 1 TABLET BY MOUTH EVERY DAY IN THE MORNING      latanoprost 0.005 % ophthalmic solution  Commonly known as: XALATAN   1 drop, Both Eyes, Every Night at Bedtime      levothyroxine 137 MCG tablet  Commonly known as: SYNTHROID, LEVOTHROID   137 mcg, Oral, Daily      multivitamin with minerals tablet tablet   1 tablet, Oral, Daily      traMADol 50 MG tablet  Commonly known as: ULTRAM   50 mg, Oral, 2 Times Daily      VITAMIN B-1 PO   1 tablet, Oral, Daily             Stop These Medications      amLODIPine 10 MG tablet  Commonly known as: NORVASC     gabapentin 300 MG capsule  Commonly known as: NEURONTIN              Allergies   Allergen Reactions    Augmentin [Amoxicillin-Pot Clavulanate] Hives and Rash     Tolerate cephalosporins         Discharge Disposition:  Home or Self Care    Discharge Diet:  Diet Order   Procedures    Diet: Cardiac Diets; Healthy Heart (2-3 Na+); Texture: Regular Texture (IDDSI 7); Fluid Consistency: Thin (IDDSI 0)       Discharge Activity:       CODE STATUS:    Code Status and Medical Interventions:   Ordered at: 12/14/23 1907     Code Status (Patient has no pulse and is not breathing):    CPR (Attempt to Resuscitate)     Medical Interventions (Patient has pulse or is breathing):    Full       Future Appointments   Date Time Provider Department Center   3/3/2024 11:30 AM [facility] 1 [facility]   3/29/2024 10:30 AM Seth Hester MD [facility]     Additional Instructions for the Follow-ups that You Need to Schedule       Basic Metabolic Panel    Dec 26, 2023 (Approximate)      Release  to patient: Routine Release               Follow-up Information       Seth Hester MD. Schedule an appointment as soon as possible for a visit in 1 week(s).    Specialties: Family Medicine, Emergency Medicine, Urgent Care  Contact information:  [address] [place] [address] [phone]                             Additional Instructions for the Follow-ups that You Need to Schedule       Basic Metabolic Panel    Dec 26, 2023 (Approximate)      Release to patient: Routine Release            Time Spent on Discharge:  Greater than 30 minutes      Carl Fitch MD  [facility]  12/24/23  12:10 EST                Electronically signed by Carl Fitch MD at 12/24/23 9719

## 2023-12-26 NOTE — OUTREACH NOTE
Call Center TCM Note      Flowsheet Row Responses   [facility] patient discharged from? [place]   Does the patient have one of the following disease processes/diagnoses(primary or secondary)? Other   TCM attempt successful? No   Unsuccessful attempts Attempt 1   Call Status Left message            Kylee Davies RN    12/26/2023, 15:24 EST

## 2023-12-26 NOTE — OUTREACH NOTE
Call Center TCM Note      Flowsheet Row Responses   [facility] patient discharged from? [place]   Does the patient have one of the following disease processes/diagnoses(primary or secondary)? Other   TCM attempt successful? No   Unsuccessful attempts Attempt 2   Call Status Left message            Kylee Davies RN    12/26/2023, 16:49 EST

## 2023-12-27 ENCOUNTER — TRANSITIONAL CARE MANAGEMENT TELEPHONE ENCOUNTER (OUTPATIENT)
Dept: CALL CENTER | Facility: HOSPITAL | Age: 68
End: 2023-12-27
Payer: MEDICARE

## 2023-12-27 NOTE — OUTREACH NOTE
Call Center TCM Note      Flowsheet Row Responses   [facility] patient discharged from? [place]   Does the patient have one of the following disease processes/diagnoses(primary or secondary)? Other   TCM attempt successful? No  [No current VR]   Unsuccessful attempts Attempt 3            Deepika Mclaughlin RN    12/27/2023, 13:20 EST

## 2023-12-28 ENCOUNTER — LAB (OUTPATIENT)
Dept: LAB | Facility: HOSPITAL | Age: 68
End: 2023-12-28
Payer: MEDICARE

## 2023-12-28 DIAGNOSIS — N17.9 AKI (ACUTE KIDNEY INJURY): ICD-10-CM

## 2023-12-28 LAB
ANION GAP SERPL CALCULATED.3IONS-SCNC: 13.9 MMOL/L (ref 5–15)
BUN SERPL-MCNC: 18 MG/DL (ref 8–23)
BUN/CREAT SERPL: 10.2 (ref 7–25)
CALCIUM SPEC-SCNC: 10.8 MG/DL (ref 8.6–10.5)
CHLORIDE SERPL-SCNC: 98 MMOL/L (ref 98–107)
CO2 SERPL-SCNC: 25.1 MMOL/L (ref 22–29)
CREAT SERPL-MCNC: 1.77 MG/DL (ref 0.76–1.27)
EGFRCR SERPLBLD CKD-EPI 2021: 41.3 ML/MIN/1.73
GLUCOSE SERPL-MCNC: 74 MG/DL (ref 65–99)
POTASSIUM SERPL-SCNC: 4.9 MMOL/L (ref 3.5–5.2)
SODIUM SERPL-SCNC: 137 MMOL/L (ref 136–145)

## 2023-12-28 PROCEDURE — 36415 COLL VENOUS BLD VENIPUNCTURE: CPT

## 2023-12-28 PROCEDURE — 80048 BASIC METABOLIC PNL TOTAL CA: CPT

## 2023-12-28 NOTE — CASE MANAGEMENT/SOCIAL WORK
Case Management Discharge Note      Final Note: Home with wife. Called and attempted to contact wife/pt to see if HH needed to be setup . Left VM with no return call         Selected Continued Care - Discharged on 12/24/2023 Admission date: 12/14/2023 - Discharge disposition: Home or Self Care      Destination    No services have been selected for the patient.                Durable Medical Equipment    No services have been selected for the patient.                Dialysis/Infusion    No services have been selected for the patient.                Home Medical Care    No services have been selected for the patient.                Therapy    No services have been selected for the patient.                Community Resources    No services have been selected for the patient.                Community & DME    No services have been selected for the patient.                    Transportation Services  Private: Car    Final Discharge Disposition Code: 01 - home or self-care

## 2024-01-04 ENCOUNTER — OFFICE VISIT (OUTPATIENT)
Dept: INTERNAL MEDICINE | Facility: CLINIC | Age: 69
End: 2024-01-04
Payer: MEDICARE

## 2024-01-04 VITALS
BODY MASS INDEX: 23.04 KG/M2 | OXYGEN SATURATION: 95 % | WEIGHT: 152 LBS | HEART RATE: 81 BPM | HEIGHT: 68 IN | TEMPERATURE: 98.3 F | SYSTOLIC BLOOD PRESSURE: 124 MMHG | DIASTOLIC BLOOD PRESSURE: 70 MMHG

## 2024-01-04 DIAGNOSIS — E78.5 HYPERLIPIDEMIA, UNSPECIFIED HYPERLIPIDEMIA TYPE: ICD-10-CM

## 2024-01-04 DIAGNOSIS — I10 ESSENTIAL HYPERTENSION: ICD-10-CM

## 2024-01-04 DIAGNOSIS — I25.10 CORONARY ARTERY DISEASE INVOLVING NATIVE CORONARY ARTERY OF NATIVE HEART WITHOUT ANGINA PECTORIS: ICD-10-CM

## 2024-01-04 DIAGNOSIS — Z09 HOSPITAL DISCHARGE FOLLOW-UP: Primary | ICD-10-CM

## 2024-01-04 DIAGNOSIS — J18.9 PNEUMONIA OF LEFT LOWER LOBE DUE TO INFECTIOUS ORGANISM: ICD-10-CM

## 2024-01-04 PROBLEM — I95.9 HYPOTENSION: Status: RESOLVED | Noted: 2023-12-15 | Resolved: 2024-01-04

## 2024-01-04 RX ORDER — TORSEMIDE 20 MG/1
20 TABLET ORAL DAILY
Qty: 90 TABLET | Refills: 3 | Status: SHIPPED | OUTPATIENT
Start: 2024-01-04

## 2024-01-04 RX ORDER — ATORVASTATIN CALCIUM 40 MG/1
TABLET, FILM COATED ORAL
Qty: 135 TABLET | Refills: 3 | Status: SHIPPED | OUTPATIENT
Start: 2024-01-04

## 2024-01-04 RX ORDER — AMLODIPINE BESYLATE 10 MG/1
10 TABLET ORAL DAILY
COMMUNITY

## 2024-01-04 NOTE — PROGRESS NOTES
Date of Encounter: 2024  Patient: Sim Agustin,  1955    Subjective   History of Presenting Illness  Chief complaint: Hospital follow-up    Patient admitted to the hospital  -  with fall at home, he is unable to get up off the floor.  He was noted to be hypotensive with ARLYN, also was found to have an underlying pneumonia with chronic left pleural effusion that his pulmonologist is aware of.  He did require oxygen for several days.  He was treated with IV fluids, Zosyn, and his blood pressures were held.  He did develop erythroderma from the Zosyn.  His torsemide was increased to 20 mg to 40 mg due to lower extremity edema, his amlodipine was held on discharge but he restarted it almost immediately.    He was recommended to go to subacute rehab, but he declined and went home.    Since discharge he has been eating and drinking well with good hydration.  He has been doing his home exercises with no difficulty.  He has been taking his amlodipine at 10 mg without any dizziness or lightheadedness, torsemide at 40 mg with sustained resolution of his lower extremity edema.  He does have a Lungflute and an incentive spirometer that he is using regularly.  He does have a productive cough but the sputum is clear.  He is adherent to his inhalers.  He does have secondhand smoke exposure from his wife at home.     He had follow-up blood work on  which demonstrated improvement in kidney function with elevated creatinine being the only notable abnormality.  His nephrologist did review this and recommended routine follow-up.    The following portions of the patient's history were reviewed and updated as appropriate: allergies, current medications, past family history, past medical history, past social history, past surgical history and problem list.    Patient Active Problem List   Diagnosis    H/O angiodysplasia of intestinal tract    COPD (chronic obstructive pulmonary disease)    S/P colectomy     Status post above-knee amputation of left lower extremity    CAD (coronary artery disease)    PVD (peripheral vascular disease)    Essential hypertension    Cardiomyopathy, unspecified    PRISCILLA treated with BiPAP    Anxiety disorder    Chronic midline low back pain without sciatica    Long term prescription benzodiazepine use    History of bradycardia    Hypothyroidism    Vitamin B12 deficiency    Iron deficiency    History of smoking 30 or more pack years    High risk medication use    DNR (do not resuscitate)    Stage 3a chronic kidney disease    Hyperlipidemia    Proteinuria    Junctional cardiac arrhythmia    Acute kidney injury    Hypercalcemia    Stage 3b chronic kidney disease (CKD)    Anemia    Leukocytosis    Hyponatremia    Hypokalemia    Lactic acidosis     Past Medical History:   Diagnosis Date    Acute respiratory failure with hypoxia and hypercarbia 03/11/2019    Acute upper respiratory infection 02/06/2013    Amputee, above knee, left     Anemia     per [facility]    ARF (acute renal failure) 03/04/2016    Asthma 02/02/2018    Atelectasis, left 03/04/2016    Bradycardia 05/23/2019    CAD (coronary artery disease) 04/04/2016    Chronic obstructive pulmonary disease with acute exacerbation 02/06/2013    Clotting disorder 05/01/2003    Colon polyps     Compartment syndrome     AFTER ILIAC SURGERY. ABOVE KNEE AMPUTATION    COPD (chronic obstructive pulmonary disease)     COPD with hypoxia 05/23/2019    Cough     SINCE APRIL WITH PNEUMONIA.     Demand myocardial infarction 05/19/2022    Disease of thyroid gland     HYPOTHYRODISM    Diverticulosis     Elevated hematocrit 01/06/2021    Employs prosthetic leg     ESRD (end stage renal disease) on dialysis 04/06/2016    Fracture of patella 03/03/2015    History of acute renal failure     History of CHF (congestive heart failure)     History of GI bleed 02/2016    AORTODUOD FISTULA    History of sepsis 02/2016    History of transfusion     MULTIPLE, 2016     Hyperkalemia 04/11/2016    Hyperlipidemia     Hypertension     Hypotension     Hypotension 03/04/2016    Left lower lobe pneumonia 05/18/2022    Low back pain 10/2016    Nonischemic cardiomyopathy     Nosocomial pneumonia 04/06/2016    Phantom limb pain     SL, WITH LEFT ABOUVE KNEE    Pleural effusion on left 04/11/2016    Pneumonia     SPRING 2016  AFTER SURGERY    Pulmonary embolism     PVD (peripheral vascular disease)     Renal insufficiency     S/P thoracentesis 04/11/2016    Sepsis, unspecified organism 04/05/2016     Past Surgical History:   Procedure Laterality Date    ABOVE KNEE AMPUTATION Left 03/16/2016    Procedure: LT AMPUTATION ABOVE KNEE;  Surgeon: Jose Swann MD;  Location: [facility] OR;  Service:     ARTERIAL THROMBECTOMY  2001    throbectomy of arterial graft    AXILLARY FEMORAL BYPASS Right 02/15/2016    Exploratory lapartomy, repair aortoduodenal fistula, resection infected aortobifemoral bypass graft, axillobi-superficial femoral artery [place]-Aneudy bypass graft from right axillary artery, Repair of duodenotomy 4th portion duodenum-Dr. Jose Swann, Dr. Genaro Perrin    BRONCHOSCOPY Left 03/04/2016    Dr. NATALIIA Tate    BRONCHOSCOPY Left 05/20/2022    Procedure: BRONCHOSCOPY WITH BIOPSIES, BRUSHINGS, AND BAL UNDER FLUORO;  Surgeon: Ishmael Tate Jr., MD;  Location: [facility] ENDOSCOPY;  Service: Pulmonary;  Laterality: Left;  PRE OP - LLL CONSOLIDATION  POST OP - SAME    BRONCHOSCOPY RIGID / FLEXIBLE N/A 03/03/2016    Dr. NATALIIA Tate    BRONCHOSCOPY RIGID / FLEXIBLE N/A 02/26/2016    Dr. NATALIIA Tate    CARDIAC CATHETERIZATION N/A 03/18/2019    Procedure: Right and Left Heart Cath;  Surgeon: Nina Storey MD;  Location: [facility] CATH INVASIVE LOCATION;  Service: Cardiovascular    CATARACT EXTRACTION WITH INTRAOCULAR LENS IMPLANT Bilateral     COLON RESECTION WITH COLOSTOMY Left 02/28/2016    Exploratory laparotomy with open left hemicolectomy, end-colostomy, G-tube and  J-tube-Dr. Endy Chaney    COLON SURGERY  3/25/2015    COLOSTOMY    COLONOSCOPY N/A 2015    Dr. Laughlin    COLONOSCOPY N/A 10/12/2016    Procedure: COLONOSCOPY TO 20 CM AND PER STOMA TO 30CM;  Surgeon: Genaro Perrin MD;  Location: [facility] ENDOSCOPY;  Service:     COLONOSCOPY N/A 10/21/2016    Procedure: COLONOSCOPY DONE AT BEDSIDE ONTO CECEM;  Surgeon: Genaro Perrin MD;  Location: [facility] ENDOSCOPY;  Service:     COLONOSCOPY N/A 02/10/2016    Diverticulosis in the entire examined colon, non-bleeding internal hemorrhoids-Dr. Taylor Pina    COLONOSCOPY N/A 02/11/2016    Poor prep, blood in the entire examined colon, normal ileum-Dr. Taylor Pina    COLONOSCOPY W/ POLYPECTOMY N/A 07/27/2015    Normal ileum, diverticulosis in the sigmoid colon: resected and retrieved, one 6 mm polyp in the descending colon: resected and retrieved, the distal rectum and anal verge are normal on retroflexion view-Dr. Miguel Ángel Laughlin    COLOSTOMY CLOSURE N/A 10/13/2016    Procedure: COLOSTOMY TAKEDOWN OR CLOSURE, APPENDECTOMY;  Surgeon: Genaro Perrin MD;  Location: [facility] MAIN OR;  Service:     DEBRIDEMENT LEG Left 03/01/2016    Excisional debridement of the skin, subcutantous tissue, tendon and muscle left calf-Dr. Jose Swann    DEBRIDEMENT LEG Left 02/25/2016    Excisional debridement full-thcikness skin and subcutaneous tissue and tendon and muscle, left medial and lateral calf muscles-Dr. Jose Swann    ENDOSCOPY N/A 10/21/2016    Procedure: ESOPHAGOGASTRODUODENOSCOPY DONE AT BEDSIDE;  Surgeon: Genaro Perrin MD;  Location: [facility] ENDOSCOPY;  Service:     ENDOSCOPY AND COLONOSCOPY N/A 02/28/2016    EGD and Colonoscopy with Candy ink injection-Dr. Endy Chnaey    ENTEROSCOPY SMALL BOWEL N/A 02/11/2016    Normal esophagus, normal stomach, normal duodenum, portion of the jejunum normal-Dr. Taylor Pina    ILIAC ARTERY - FEMORAL ARTERY BYPASS GRAFT Bilateral 2002    ILIAC ARTERY STENT Bilateral 2001     INSERTION AND REMOVAL PERITONEAL DIALYSIS CATHETER Right 02/29/2016    Exchange right jugular 16 cm Mahurkar dialysis catheter-Dr. Jose Swann    INSERTION PERITONEAL DIALYSIS CATHETER Left 03/01/2016    Ultrasound-guided access of the left jugular vein, 23 cm left jugular palindrome dialysis catheter placement-Dr. Jose Swann    INSERTION PERITONEAL DIALYSIS CATHETER Right 02/18/2016    Right jugular Mahrkar catherer placement-Dr. Jose Swann    JOINT REPLACEMENT Left     THORACOSCOPY Left 04/18/2016    Procedure: LT THORACOSCOPY VIDEO ASSISTED WITH DECORDICATION;  Surgeon: Genaro Davila III, MD;  Location: [facility];  Service:     THROMBECTOMY Left 02/16/2016    Thombectomy of left femoral graft, left leg arteriogram, left calf forward compartment fasciotomy-Dr. Jose Swann    TOTAL HIP ARTHROPLASTY Left     UPPER GASTROINTESTINAL ENDOSCOPY N/A 02/09/2016    Normal UGI-Dr. Ajay Parkinson    UPPER GASTROINTESTINAL ENDOSCOPY N/A 11/28/2015    Small hiatal hernia, chronic gastritis: biopsied, non-bleeding angioectasias in the stomach: treated with argon plasma coagulation, multiple bleeding angioectasias in the dudenum: treated with argon plasma coagulation-Dr. Mykel Jaramillo    VASCULAR SURGERY      MULTIPLE    VENTRAL/INCISIONAL HERNIA REPAIR N/A 10/19/2017    Procedure: VENTRAL HERNIA REPAIR WITH MESH AND BILATERAL COMPONENT SEPARATION;  Surgeon: Genaro Perrin MD;  Location: [facility];  Service:     WISDOM TOOTH EXTRACTION       Family History   Problem Relation Age of Onset    Lung cancer Mother     Cancer Mother     Heart disease Father     Diabetes Father         He wasn't diagnosed until he was 78    Hypertension Father     Malig Hyperthermia Neg Hx        Current Outpatient Medications:     albuterol (ACCUNEB) 0.63 MG/3ML nebulizer solution, Inhale 3 mL Every 6 (Six) Hours As Needed., Disp: , Rfl:     allopurinol (Zyloprim) 100 MG tablet, Take 1 tab by mouth daily for  gout, Disp: 90 tablet, Rfl: 3    ALPRAZolam (XANAX) 1 MG tablet, Take 1 tablet twice daily, Disp: 180 tablet, Rfl: 2    aspirin 81 MG chewable tablet, Chew 1 tablet Every Night., Disp: , Rfl:     atorvastatin (LIPITOR) 40 MG tablet, TAKE 1 & 1/2 TABLETS BY MOUTH EVERY DAY FOR CHOLESTEROL AND HEART HEALTH, Disp: 135 tablet, Rfl: 3    brimonidine (ALPHAGAN) 0.2 % ophthalmic solution, instill 1 drop into both eyes twice a day, Disp: , Rfl:     buPROPion XL (WELLBUTRIN XL) 300 MG 24 hr tablet, TAKE 1 TABLET BY MOUTH EVERY DAY, Disp: 90 tablet, Rfl: 3    clopidogrel (PLAVIX) 75 MG tablet, Take 1 tablet by mouth Every Night., Disp: 90 tablet, Rfl: 3    Coenzyme Q10 400 MG capsule, Take 1 capsule by mouth Every Evening., Disp: , Rfl:     FERROUS SULFATE PO, Take 65 mg by mouth Every Night., Disp: , Rfl:     Fluticasone-Umeclidin-Vilant (TRELEGY ELLIPTA) 100-62.5-25 MCG/INH aerosol powder , Inhale 1 each Daily., Disp: 28 each, Rfl: 2    isosorbide mononitrate (IMDUR) 30 MG 24 hr tablet, TAKE 1 TABLET BY MOUTH EVERY DAY IN THE MORNING, Disp: 90 tablet, Rfl: 3    latanoprost (XALATAN) 0.005 % ophthalmic solution, Administer 1 drop to both eyes every night at bedtime., Disp: , Rfl:     levothyroxine (SYNTHROID, LEVOTHROID) 137 MCG tablet, Take 1 tablet by mouth Daily., Disp: 90 tablet, Rfl: 3    Multiple Vitamins-Minerals (MULTIVITAMIN ADULT PO), Take 1 tablet by mouth Daily., Disp: , Rfl:     Omega-3 Fatty Acids (fish oil) 1000 MG capsule capsule, Take 1 capsule by mouth Daily. Took this am, Disp: , Rfl:     Thiamine HCl (VITAMIN B-1 PO), Take 1 tablet by mouth Daily., Disp: , Rfl:     torsemide (DEMADEX) 20 MG tablet, Take 1 tablet by mouth Daily., Disp: 90 tablet, Rfl: 3    traMADol (ULTRAM) 50 MG tablet, Take 1 tablet by mouth 2 (Two) Times a Day., Disp: 180 tablet, Rfl: 0    amLODIPine (NORVASC) 10 MG tablet, Take 1 tablet by mouth Daily., Disp: , Rfl:   Allergies   Allergen Reactions    Augmentin [Amoxicillin-Pot  "Clavulanate] Hives and Rash     Tolerate cephalosporins    Zosyn [Piperacillin Sod-Tazobactam So] Rash     erythroderma     Social History     Tobacco Use    Smoking status: Former     Packs/day: 1.00     Years: 30.00     Additional pack years: 0.00     Total pack years: 30.00     Types: Cigarettes     Start date: 1974     Quit date: 2021     Years since quittin.6     Passive exposure: Never    Smokeless tobacco: Never    Tobacco comments:     caffeine - rarely   Vaping Use    Vaping Use: Never used   Substance Use Topics    Alcohol use: No    Drug use: Not Currently     Types: Other     Comment: THC gummies          Objective   Physical Exam  Vitals:    24 1101   BP: 124/70   BP Location: Left arm   Patient Position: Sitting   Cuff Size: Adult   Pulse: 81   Temp: 98.3 °F (36.8 °C)   TempSrc: Infrared   SpO2: 95%   Weight: 68.9 kg (152 lb)   Height: 172.7 cm (67.99\")     Body mass index is 23.12 kg/m².    Constitutional: NAD.  Cardiovascular: RRR. No murmurs. No LE edema in the right lower extremity  Pulmonary: Coarseness in the left lower lung field that resolves with deep coughing.  Good effort.  Otherwise clear to auscultation.  Integumentary: No rashes or wounds on face or upper extremities.  Psychiatric: Normal affect. Normal thought content.     Assessment & Plan   Assessment and Plan  Pleasant 68-year-old male smoker with PVD status post bypass, CAD, moderate to severe COPD, hypertension, hyperlipidemia, CKD with proteinuria, anxiety, chronic lower back pain on high risk medications, history of GERD, hypothyroidism, status post partial colectomy, anemia, B12 deficiency, who presents for the following:      Diagnoses and all orders for this visit:    1. Hospital discharge follow-up (Primary): Recovering well clinically, I do not think he needs home health or any further physical therapy.    2. Pneumonia of left lower lobe due to infectious organism: Still has some fluid collection in left " lower lung base that improves with deep coughing.  He needs to continue deep cough, if sputum were to change or his symptoms were to worsen low threshold for starting antibiotics and I would do this by phone call with follow-up afterwards.    3. Essential hypertension: He has restarted amlodipine and he has been tolerating this well with no hypotension or dizziness.  Okay to continue.  I would like him to reduce torsemide back from 40 mg daily to 20 mg daily as his edema has resolved, can increase back to 40 mg if needed.    4. Coronary artery disease involving native coronary artery of native heart without angina pectoris  -     torsemide (DEMADEX) 20 MG tablet; Take 1 tablet by mouth Daily.  Dispense: 90 tablet; Refill: 3    His CT of the chest is arranged for left upper lung stellate nodule follow-up    He does also have a follow-up of one of his PAD stents that may be partially restenosed through [facility].  No intervention yet.  Asymptomatic.  He will asked them to fax us records next time he sees them.    Seth Hester MD  Family Medicine  O: [phone]    Disclaimer: Parts of this note were dictated by speech recognition. Minor errors in transcription may be present. Please call if questions.

## 2024-01-12 DIAGNOSIS — F41.9 ANXIETY DISORDER, UNSPECIFIED TYPE: ICD-10-CM

## 2024-01-12 DIAGNOSIS — Z79.899 LONG TERM PRESCRIPTION BENZODIAZEPINE USE: ICD-10-CM

## 2024-01-12 RX ORDER — ALPRAZOLAM 1 MG/1
TABLET ORAL
Qty: 180 TABLET | Refills: 2 | Status: SHIPPED | OUTPATIENT
Start: 2024-01-12 | End: 2024-01-14 | Stop reason: SDUPTHER

## 2024-01-12 NOTE — TELEPHONE ENCOUNTER
Rx Refill Note  Requested Prescriptions     Pending Prescriptions Disp Refills    ALPRAZolam (XANAX) 1 MG tablet 180 tablet 2     Sig: Take 1 tablet twice daily      Last office visit with prescribing clinician: 1/4/2024   Last telemedicine visit with prescribing clinician: Visit date not found   Next office visit with prescribing clinician: 3/29/2024                         Would you like a call back once the refill request has been completed: [] Yes [] No    If the office needs to give you a call back, can they leave a voicemail: [] Yes [] No    Sharon Torres MA  01/12/24, 08:09 EST

## 2024-01-13 NOTE — PROGRESS NOTES
"Enter Query Response Below      Query Response: Sepsis ruled in, present on admission              If applicable, please update the problem list.     Patient: Sim Agustin \"Don Agustin\"        : 1955  Account: [id number]           Admit Date: 2023        How to Respond to this query:       a. Click New Note     b. Answer query within the yellow box.                c. Update the Problem List, if applicable.      If you have any questions about this query contact me at: [phone]     Dr. Fitch:    67 year old male presented  after being weak and had a fall and was unable to get up.  B/P in the 70s systolic when EMS arrived.  Patient was given IV fluids prior to arrival.  Patient found to have acute kidney injury.  IV fluids were given in the ED.  Vitals on admission B/P 96/60, temp 95.1, HR 60, resp 16.  Labs on admission WBC 16.45, lactate 4.0, procalcitonin 0.26, creatinine 5.09.  Progress note  includes \"Sepsis secondary to suspected pneumonia.\"  Patient was given zosyn IV -.  Discharge summary does not include sepsis.      Can this be further clarified as:    - sepsis ruled in, present on admission  - sepsis ruled in, not present on admission  - sepsis ruled out  - other (specify) _________  - unable to determine     By submitting this query, we are merely seeking further clarification of documentation to accurately reflect all conditions that you are monitoring, evaluating, treating or that extend the hospitalization or utilize additional resources of care. Please utilize your independent clinical judgment when addressing the question(s) above.     This query and your response, once completed, will be entered into the legal medical record.    Sincerely,  Xochilt Irving RN, MSN  Clinical Documentation Integrity Program   [email or web address]     "

## 2024-01-14 DIAGNOSIS — F41.9 ANXIETY DISORDER, UNSPECIFIED TYPE: ICD-10-CM

## 2024-01-14 DIAGNOSIS — Z79.899 LONG TERM PRESCRIPTION BENZODIAZEPINE USE: ICD-10-CM

## 2024-01-15 RX ORDER — ALPRAZOLAM 1 MG/1
TABLET ORAL
Qty: 180 TABLET | Refills: 2 | Status: SHIPPED | OUTPATIENT
Start: 2024-01-15

## 2024-01-15 NOTE — TELEPHONE ENCOUNTER
Rx Refill Note  Requested Prescriptions     Pending Prescriptions Disp Refills    ALPRAZolam (XANAX) 1 MG tablet 180 tablet 2     Sig: Take 1 tablet twice daily      Last office visit with prescribing clinician: 1/4/2024   Last telemedicine visit with prescribing clinician: Visit date not found   Next office visit with prescribing clinician: 3/29/2024     Urine Drug Screen - Urine, Clean Catch (03/28/2023 09:29)

## 2024-01-20 DIAGNOSIS — I73.9 PVD (PERIPHERAL VASCULAR DISEASE): ICD-10-CM

## 2024-01-20 DIAGNOSIS — I10 ESSENTIAL (PRIMARY) HYPERTENSION: ICD-10-CM

## 2024-01-22 RX ORDER — CLOPIDOGREL BISULFATE 75 MG/1
75 TABLET ORAL
Qty: 90 TABLET | Refills: 3 | Status: SHIPPED | OUTPATIENT
Start: 2024-01-22

## 2024-01-22 RX ORDER — AMLODIPINE BESYLATE 10 MG/1
10 TABLET ORAL DAILY
Qty: 90 TABLET | Refills: 3 | Status: SHIPPED | OUTPATIENT
Start: 2024-01-22

## 2024-01-22 NOTE — PROGRESS NOTES
"Enter Query Response Below      Query Response: Acute renal failure due to sepsis              If applicable, please update the problem list.     Patient: Sim Agustin \"Don Agustin\"        : 1955  Account: [id number]           Admit Date: 2023        How to Respond to this query:       a. Click New Note     b. Answer query within the yellow box.                c. Update the Problem List, if applicable.      If you have any questions about this query contact me at: [phone]     Dr. Fitch:    67 year old male with stage III CKD, COPD and coronary artery disease presented  presented with pneumonia and sepsis.  Labs on admission WBC 16.45, lactate 4.0, procalcitonin 0.26, creatinine 5.09.  Documentation includes acute respiratory failure with hypoxia, acute kidney injury and lactic acidosis.  Patient was given zosyn IV -.       Please clarify the following:    Organ dysfunction _________ (please specify), due to sepsis  Organ dysfunction not due to sepsis  Other- specify_______  Unable to determine      By submitting this query, we are merely seeking further clarification of documentation to accurately reflect all conditions that you are monitoring, evaluating, treating or that extend the hospitalization or utilize additional resources of care. Please utilize your independent clinical judgment when addressing the question(s) above.     This query and your response, once completed, will be entered into the legal medical record.    Sincerely,  Xochilt Irving RN, MSN  Clinical Documentation Integrity Program   [email or web address]   "

## 2024-01-22 NOTE — TELEPHONE ENCOUNTER
Rx Refill Note  Requested Prescriptions     Pending Prescriptions Disp Refills    clopidogrel (PLAVIX) 75 MG tablet [Pharmacy Med Name: CLOPIDOGREL 75 MG TABLET] 90 tablet 3     Sig: TAKE 1 TABLET BY MOUTH EVERY DAY AT NIGHT    amLODIPine (NORVASC) 10 MG tablet [Pharmacy Med Name: AMLODIPINE BESYLATE 10 MG TAB] 90 tablet 3     Sig: TAKE 1 TABLET BY MOUTH EVERY DAY      Last office visit with prescribing clinician: 1/4/2024   Last telemedicine visit with prescribing clinician: Visit date not found   Next office visit with prescribing clinician: 3/29/2024                         Would you like a call back once the refill request has been completed: [] Yes [] No    If the office needs to give you a call back, can they leave a voicemail: [] Yes [] No    Sharon Torres MA  01/22/24, 07:50 EST

## 2024-03-03 ENCOUNTER — HOSPITAL ENCOUNTER (OUTPATIENT)
Dept: CT IMAGING | Facility: HOSPITAL | Age: 69
Discharge: HOME | End: 2024-03-03
Admitting: FAMILY MEDICINE
Payer: MEDICARE

## 2024-03-03 DIAGNOSIS — R91.1 LEFT UPPER LOBE PULMONARY NODULE: ICD-10-CM

## 2024-03-03 DIAGNOSIS — Z87.891 HISTORY OF SMOKING 30 OR MORE PACK YEARS: ICD-10-CM

## 2024-03-03 PROCEDURE — 71250 CT THORAX DX C-: CPT

## 2024-03-05 DIAGNOSIS — M54.50 CHRONIC MIDLINE LOW BACK PAIN WITHOUT SCIATICA: ICD-10-CM

## 2024-03-05 DIAGNOSIS — G89.29 CHRONIC MIDLINE LOW BACK PAIN WITHOUT SCIATICA: ICD-10-CM

## 2024-03-06 DIAGNOSIS — R91.1 LUNG NODULE: Primary | ICD-10-CM

## 2024-03-06 RX ORDER — TRAMADOL HYDROCHLORIDE 50 MG/1
50 TABLET ORAL 2 TIMES DAILY
Qty: 180 TABLET | Refills: 0 | Status: SHIPPED | OUTPATIENT
Start: 2024-03-06

## 2024-03-06 NOTE — TELEPHONE ENCOUNTER
Rx Refill Note  Requested Prescriptions     Pending Prescriptions Disp Refills    traMADol (ULTRAM) 50 MG tablet 180 tablet 0     Sig: Take 1 tablet by mouth 2 (Two) Times a Day.      Last office visit with prescribing clinician: 1/4/2024   Last telemedicine visit with prescribing clinician: Visit date not found   Next office visit with prescribing clinician: 3/29/2024     NO CURRENT UDS

## 2024-03-13 ENCOUNTER — HOSPITAL ENCOUNTER (OUTPATIENT)
Dept: PET IMAGING | Facility: HOSPITAL | Age: 69
Discharge: HOME | End: 2024-03-13
Payer: MEDICARE

## 2024-03-13 DIAGNOSIS — R91.1 LUNG NODULE: ICD-10-CM

## 2024-03-13 LAB — GLUCOSE BLDC GLUCOMTR-MCNC: 95 MG/DL (ref 70–130)

## 2024-03-13 PROCEDURE — A9552 F18 FDG: HCPCS | Performed by: FAMILY MEDICINE

## 2024-03-13 PROCEDURE — 78815 PET IMAGE W/CT SKULL-THIGH: CPT

## 2024-03-13 PROCEDURE — 0 FLUDEOXYGLUCOSE F18 SOLUTION: Performed by: FAMILY MEDICINE

## 2024-03-13 PROCEDURE — 82948 REAGENT STRIP/BLOOD GLUCOSE: CPT

## 2024-03-13 RX ADMIN — FLUDEOXYGLUCOSE F 18 1 DOSE: 200 INJECTION, SOLUTION INTRAVENOUS at 12:40

## 2024-03-14 ENCOUNTER — TELEPHONE (OUTPATIENT)
Dept: INTERNAL MEDICINE | Facility: CLINIC | Age: 69
End: 2024-03-14
Payer: MEDICARE

## 2024-03-14 DIAGNOSIS — G89.29 CHRONIC MIDLINE LOW BACK PAIN WITHOUT SCIATICA: Primary | ICD-10-CM

## 2024-03-14 DIAGNOSIS — M54.50 CHRONIC MIDLINE LOW BACK PAIN WITHOUT SCIATICA: Primary | ICD-10-CM

## 2024-03-14 DIAGNOSIS — Z89.619 STATUS POST ABOVE KNEE AMPUTATION, UNSPECIFIED LATERALITY: ICD-10-CM

## 2024-03-14 RX ORDER — GABAPENTIN 300 MG/1
300 CAPSULE ORAL 3 TIMES DAILY
Qty: 270 CAPSULE | Refills: 2 | Status: SHIPPED | OUTPATIENT
Start: 2024-03-14

## 2024-03-14 NOTE — TELEPHONE ENCOUNTER
"Caller: Sim Agustin \"Don Agustin\"    Relationship: Self    Best call back number: [phone]     What medication are you requesting: GABAPENTIN 270MG    What are your current symptoms: PATIENT WAS TOLD BY NEPHROLOGY THEY DID NOT WRITE FOR THIS MEDICATION  HIS PRESCRIPTION BOTTLE IS LABELED UNDER S. KITA    If a prescription is needed, what is your preferred pharmacy and phone number: [facility] #[id number] - [place], [place] - [address] [phone] [facility] [phone] FX   "

## 2024-03-15 NOTE — PROGRESS NOTES
D/w pt  He has upcoming multidisc appt  Probable malignancy not a good location for biopsy  I'm going to cancel my appt with him in two weeks, we can push that off 3 months, everything is stable and I want to stay out of the way unless needed for now

## 2024-03-19 ENCOUNTER — OFFICE VISIT (OUTPATIENT)
Dept: OTHER | Facility: HOSPITAL | Age: 69
End: 2024-03-19
Payer: MEDICARE

## 2024-03-19 ENCOUNTER — PREP FOR SURGERY (OUTPATIENT)
Dept: OTHER | Facility: HOSPITAL | Age: 69
End: 2024-03-19
Payer: MEDICARE

## 2024-03-19 VITALS
HEART RATE: 69 BPM | OXYGEN SATURATION: 96 % | SYSTOLIC BLOOD PRESSURE: 132 MMHG | WEIGHT: 166 LBS | DIASTOLIC BLOOD PRESSURE: 63 MMHG | HEIGHT: 68 IN | BODY MASS INDEX: 25.16 KG/M2

## 2024-03-19 DIAGNOSIS — R91.1 SOLITARY PULMONARY NODULE: Primary | ICD-10-CM

## 2024-03-19 PROCEDURE — 99205 OFFICE O/P NEW HI 60 MIN: CPT | Performed by: STUDENT IN AN ORGANIZED HEALTH CARE EDUCATION/TRAINING PROGRAM

## 2024-03-19 PROCEDURE — 3078F DIAST BP <80 MM HG: CPT | Performed by: STUDENT IN AN ORGANIZED HEALTH CARE EDUCATION/TRAINING PROGRAM

## 2024-03-19 PROCEDURE — 3075F SYST BP GE 130 - 139MM HG: CPT | Performed by: STUDENT IN AN ORGANIZED HEALTH CARE EDUCATION/TRAINING PROGRAM

## 2024-03-19 NOTE — H&P (VIEW-ONLY)
"Chief Complaint  1.3 cm left upper lobe pulmonary nodule    Subjective        Sim Agustin presents to [facility]  History of Present Illness  Mr. Agustin is a pleasant 68-year-old gentleman who presents today with a 1.3 cm left upper lobe pulmonary nodule.  This nodule has mild activity on CT/PET scan with an SUV of 3.9 with mediastinal blood pool being an SUV of 1.8.  He does have a significant history of peripheral vascular disease with multiple stents and extremity bypass.  He takes Plavix and aspirin chronically for this.  He has a history of COPD.  He is status post left VATS with decortication for empyema that was performed on July 5, 2016.  He is an ex-smoker and smoked approximately 1 pack/day for roughly 35 years and he stopped smoking 3 years ago.  This nodule seen on low-dose screening CT scan and this prompted a CT/PET scan of the chest.  He presents today to discuss routes of obtaining a tissue biopsy of this.  Objective   Vital Signs:  /63 (BP Location: Right arm, Patient Position: Sitting, Cuff Size: Adult)   Pulse 69   Ht 172.7 cm (67.99\")   Wt 75.3 kg (166 lb)   SpO2 96%   BMI 25.25 kg/m²   Estimated body mass index is 25.25 kg/m² as calculated from the following:    Height as of this encounter: 172.7 cm (67.99\").    Weight as of this encounter: 75.3 kg (166 lb).           Physical Exam  Constitutional:       Appearance: Normal appearance.   Cardiovascular:      Rate and Rhythm: Normal rate and regular rhythm.      Pulses: Normal pulses.      Heart sounds: Normal heart sounds.   Pulmonary:      Effort: Pulmonary effort is normal.      Breath sounds: Normal breath sounds.   Skin:     Capillary Refill: Capillary refill takes less than 2 seconds.   Neurological:      General: No focal deficit present.      Mental Status: He is alert and oriented to person, place, and time.   Psychiatric:         Mood and Affect: Mood normal.         Behavior: " Behavior normal.         Thought Content: Thought content normal.         Judgment: Judgment normal.        Result Review :    CT/PET scan was visualized and reviewed by myself.  He has a centrally located left upper lobe nodule that is roughly 1.3 cm in size.  This has increased in size and density compared to prior scans.  He has an SUV of 3.9.         Assessment and Plan     Diagnoses and all orders for this visit:    1. Solitary pulmonary nodule (Primary)  -     CT Chest Without Contrast; Future    Mr. Agustin is a pleasant 68-year-old gentleman who presents today with a 1.3 cm left upper lobe nodule that has low PET avidity.  He is an ex smoker.  He has significant peripheral vascular disease and currently takes aspirin and Plavix for multiple stents and bypasses in the past.  In addition he has had a left lower extremity amputation due to complications from a prior bypass.  This lesion is concerning for a primary lung malignancy.  It is not a precarious location as I do not think this would be amenable to surgical wedge resection.  I do think this lesion is amenable to Ion bronchoscopy and biopsy, in addition although it might be a challenging CT-guided biopsy I think this would be ultimately amenable to CT-guided biopsy.  We did discuss multiple routes of biopsy today, we will proceed with Ion bronchoscopy and biopsy.  He will need to undergo a CT scan on protocol prior to undergoing Ion bronchoscopy and biopsy.  In addition, we will need to hold his Plavix for 7 days prior to the operation.  Due to the fact that he has multiple stents in place and significant history of clotting, we will need to hold his Plavix and have him take Lovenox during this time to help prevent clotting.  Once we have a date for his ion bronchoscopy and biopsy, we will have him hold Plavix 7 days prior and start Lovenox, prophylactic dosing, for 7 days.  He is to hold the the Lovenox the day of the procedure.  Preoperative orders have  been placed.  Once we have the date of the procedure we will transition him to Lovenox as described above.       I spent 60 minutes caring for Sim on this date of service. This time includes time spent by me in the following activities:preparing for the visit, reviewing tests, obtaining and/or reviewing a separately obtained history, performing a medically appropriate examination and/or evaluation , counseling and educating the patient/family/caregiver, ordering medications, tests, or procedures, referring and communicating with other health care professionals , documenting information in the medical record, independently interpreting results and communicating that information with the patient/family/caregiver, and care coordination  Follow Up     No follow-ups on file.  Patient was given instructions and counseling regarding his condition or for health maintenance advice. Please see specific information pulled into the AVS if appropriate.

## 2024-03-19 NOTE — PROGRESS NOTES
"Chief Complaint  1.3 cm left upper lobe pulmonary nodule    Subjective        Sim Agustin presents to [facility]  History of Present Illness  Mr. Agustin is a pleasant 68-year-old gentleman who presents today with a 1.3 cm left upper lobe pulmonary nodule.  This nodule has mild activity on CT/PET scan with an SUV of 3.9 with mediastinal blood pool being an SUV of 1.8.  He does have a significant history of peripheral vascular disease with multiple stents and extremity bypass.  He takes Plavix and aspirin chronically for this.  He has a history of COPD.  He is status post left VATS with decortication for empyema that was performed on July 5, 2016.  He is an ex-smoker and smoked approximately 1 pack/day for roughly 35 years and he stopped smoking 3 years ago.  This nodule seen on low-dose screening CT scan and this prompted a CT/PET scan of the chest.  He presents today to discuss routes of obtaining a tissue biopsy of this.  Objective   Vital Signs:  /63 (BP Location: Right arm, Patient Position: Sitting, Cuff Size: Adult)   Pulse 69   Ht 172.7 cm (67.99\")   Wt 75.3 kg (166 lb)   SpO2 96%   BMI 25.25 kg/m²   Estimated body mass index is 25.25 kg/m² as calculated from the following:    Height as of this encounter: 172.7 cm (67.99\").    Weight as of this encounter: 75.3 kg (166 lb).           Physical Exam  Constitutional:       Appearance: Normal appearance.   Cardiovascular:      Rate and Rhythm: Normal rate and regular rhythm.      Pulses: Normal pulses.      Heart sounds: Normal heart sounds.   Pulmonary:      Effort: Pulmonary effort is normal.      Breath sounds: Normal breath sounds.   Skin:     Capillary Refill: Capillary refill takes less than 2 seconds.   Neurological:      General: No focal deficit present.      Mental Status: He is alert and oriented to person, place, and time.   Psychiatric:         Mood and Affect: Mood normal.         Behavior: " Behavior normal.         Thought Content: Thought content normal.         Judgment: Judgment normal.        Result Review :    CT/PET scan was visualized and reviewed by myself.  He has a centrally located left upper lobe nodule that is roughly 1.3 cm in size.  This has increased in size and density compared to prior scans.  He has an SUV of 3.9.         Assessment and Plan     Diagnoses and all orders for this visit:    1. Solitary pulmonary nodule (Primary)  -     CT Chest Without Contrast; Future    Mr. Agustin is a pleasant 68-year-old gentleman who presents today with a 1.3 cm left upper lobe nodule that has low PET avidity.  He is an ex smoker.  He has significant peripheral vascular disease and currently takes aspirin and Plavix for multiple stents and bypasses in the past.  In addition he has had a left lower extremity amputation due to complications from a prior bypass.  This lesion is concerning for a primary lung malignancy.  It is not a precarious location as I do not think this would be amenable to surgical wedge resection.  I do think this lesion is amenable to Ion bronchoscopy and biopsy, in addition although it might be a challenging CT-guided biopsy I think this would be ultimately amenable to CT-guided biopsy.  We did discuss multiple routes of biopsy today, we will proceed with Ion bronchoscopy and biopsy.  He will need to undergo a CT scan on protocol prior to undergoing Ion bronchoscopy and biopsy.  In addition, we will need to hold his Plavix for 7 days prior to the operation.  Due to the fact that he has multiple stents in place and significant history of clotting, we will need to hold his Plavix and have him take Lovenox during this time to help prevent clotting.  Once we have a date for his ion bronchoscopy and biopsy, we will have him hold Plavix 7 days prior and start Lovenox, prophylactic dosing, for 7 days.  He is to hold the the Lovenox the day of the procedure.  Preoperative orders have  been placed.  Once we have the date of the procedure we will transition him to Lovenox as described above.       I spent 60 minutes caring for Sim on this date of service. This time includes time spent by me in the following activities:preparing for the visit, reviewing tests, obtaining and/or reviewing a separately obtained history, performing a medically appropriate examination and/or evaluation , counseling and educating the patient/family/caregiver, ordering medications, tests, or procedures, referring and communicating with other health care professionals , documenting information in the medical record, independently interpreting results and communicating that information with the patient/family/caregiver, and care coordination  Follow Up     No follow-ups on file.  Patient was given instructions and counseling regarding his condition or for health maintenance advice. Please see specific information pulled into the AVS if appropriate.

## 2024-03-20 ENCOUNTER — TELEPHONE (OUTPATIENT)
Dept: SURGERY | Facility: CLINIC | Age: 69
End: 2024-03-20

## 2024-03-20 DIAGNOSIS — I25.10 CORONARY ARTERY DISEASE INVOLVING NATIVE CORONARY ARTERY OF NATIVE HEART WITHOUT ANGINA PECTORIS: ICD-10-CM

## 2024-03-20 DIAGNOSIS — I73.9 PVD (PERIPHERAL VASCULAR DISEASE): ICD-10-CM

## 2024-03-20 DIAGNOSIS — I73.9 PVD (PERIPHERAL VASCULAR DISEASE): Primary | ICD-10-CM

## 2024-03-20 PROBLEM — R91.1 SOLITARY PULMONARY NODULE: Status: ACTIVE | Noted: 2024-03-19

## 2024-03-20 RX ORDER — ISOSORBIDE MONONITRATE 30 MG/1
TABLET, EXTENDED RELEASE ORAL
Qty: 90 TABLET | Refills: 3 | Status: SHIPPED | OUTPATIENT
Start: 2024-03-20

## 2024-03-20 RX ORDER — ENOXAPARIN SODIUM 100 MG/ML
INJECTION SUBCUTANEOUS
Qty: 2.8 ML | Refills: 0 | Status: SHIPPED | OUTPATIENT
Start: 2024-03-20

## 2024-03-20 NOTE — TELEPHONE ENCOUNTER
"  Caller: Sim Agustin \"Don Agustin\"    Relationship: Self    Best call back number: [phone]    What is the best time to reach you: ANY    Who are you requesting to speak with (clinical staff, provider,  specific staff member): CLINICAL      What was the call regarding: PATIENT WOULD LIKE TO KNOW IF PRE ADMIT TESTING CAN BE DONE IN [place] RATHER THAN THE [facility]    Is it okay if the provider responds through MyChart: NO      "

## 2024-03-22 ENCOUNTER — TELEPHONE (OUTPATIENT)
Dept: SURGERY | Facility: CLINIC | Age: 69
End: 2024-03-22
Payer: MEDICARE

## 2024-03-22 DIAGNOSIS — I70.213 ATHEROSCLEROSIS OF NATIVE ARTERY OF BOTH LOWER EXTREMITIES WITH INTERMITTENT CLAUDICATION: ICD-10-CM

## 2024-03-22 DIAGNOSIS — S45.091A: ICD-10-CM

## 2024-03-22 DIAGNOSIS — I73.9 PERIPHERAL VASCULAR DISEASE, UNSPECIFIED: Primary | ICD-10-CM

## 2024-03-22 DIAGNOSIS — M79.601 RIGHT ARM PAIN: Primary | ICD-10-CM

## 2024-03-22 NOTE — TELEPHONE ENCOUNTER
Called and left message for patient that his bronch has been rescheduled to 4/12/24 @ 1pm and that he needs to save his lovenox for 7 days before that. To please take plavix up til 7 days. To please give me a call back as soon as possible to discuss this.     OKAY FOR HUB TO READ.

## 2024-03-25 ENCOUNTER — LAB (OUTPATIENT)
Dept: LAB | Facility: HOSPITAL | Age: 69
End: 2024-03-25
Payer: MEDICARE

## 2024-03-25 DIAGNOSIS — R91.1 SOLITARY PULMONARY NODULE: ICD-10-CM

## 2024-03-25 LAB
ANION GAP SERPL CALCULATED.3IONS-SCNC: 11 MMOL/L (ref 5–15)
BUN SERPL-MCNC: 31 MG/DL (ref 8–23)
BUN/CREAT SERPL: 16.9 (ref 7–25)
CALCIUM SPEC-SCNC: 9.8 MG/DL (ref 8.6–10.5)
CHLORIDE SERPL-SCNC: 104 MMOL/L (ref 98–107)
CO2 SERPL-SCNC: 27 MMOL/L (ref 22–29)
CREAT SERPL-MCNC: 1.83 MG/DL (ref 0.76–1.27)
DEPRECATED RDW RBC AUTO: 51.9 FL (ref 37–54)
EGFRCR SERPLBLD CKD-EPI 2021: 39.7 ML/MIN/1.73
ERYTHROCYTE [DISTWIDTH] IN BLOOD BY AUTOMATED COUNT: 15.8 % (ref 12.3–15.4)
GLUCOSE SERPL-MCNC: 91 MG/DL (ref 65–99)
HCT VFR BLD AUTO: 38 % (ref 37.5–51)
HGB BLD-MCNC: 12.4 G/DL (ref 13–17.7)
MCH RBC QN AUTO: 29 PG (ref 26.6–33)
MCHC RBC AUTO-ENTMCNC: 32.6 G/DL (ref 31.5–35.7)
MCV RBC AUTO: 89 FL (ref 79–97)
PLATELET # BLD AUTO: 222 10*3/MM3 (ref 140–450)
PMV BLD AUTO: 9.6 FL (ref 6–12)
POTASSIUM SERPL-SCNC: 4.6 MMOL/L (ref 3.5–5.2)
RBC # BLD AUTO: 4.27 10*6/MM3 (ref 4.14–5.8)
SODIUM SERPL-SCNC: 142 MMOL/L (ref 136–145)
WBC NRBC COR # BLD AUTO: 7.47 10*3/MM3 (ref 3.4–10.8)

## 2024-03-25 PROCEDURE — 80048 BASIC METABOLIC PNL TOTAL CA: CPT

## 2024-03-25 PROCEDURE — 85027 COMPLETE CBC AUTOMATED: CPT

## 2024-03-25 PROCEDURE — 36415 COLL VENOUS BLD VENIPUNCTURE: CPT

## 2024-03-26 ENCOUNTER — HOSPITAL ENCOUNTER (OUTPATIENT)
Dept: PET IMAGING | Facility: HOSPITAL | Age: 69
Discharge: HOME | End: 2024-03-26
Admitting: STUDENT IN AN ORGANIZED HEALTH CARE EDUCATION/TRAINING PROGRAM
Payer: MEDICARE

## 2024-03-26 DIAGNOSIS — R91.1 SOLITARY PULMONARY NODULE: ICD-10-CM

## 2024-03-26 PROCEDURE — 71250 CT THORAX DX C-: CPT

## 2024-04-11 RX ORDER — VITAMIN B COMPLEX
1 CAPSULE ORAL DAILY
COMMUNITY

## 2024-04-12 ENCOUNTER — HOSPITAL ENCOUNTER (OUTPATIENT)
Facility: HOSPITAL | Age: 69
Setting detail: HOSPITAL OUTPATIENT SURGERY
Discharge: HOME | End: 2024-04-12
Attending: STUDENT IN AN ORGANIZED HEALTH CARE EDUCATION/TRAINING PROGRAM | Admitting: STUDENT IN AN ORGANIZED HEALTH CARE EDUCATION/TRAINING PROGRAM
Payer: MEDICARE

## 2024-04-12 ENCOUNTER — APPOINTMENT (OUTPATIENT)
Dept: GENERAL RADIOLOGY | Facility: HOSPITAL | Age: 69
End: 2024-04-12
Payer: MEDICARE

## 2024-04-12 ENCOUNTER — ANESTHESIA (OUTPATIENT)
Dept: GASTROENTEROLOGY | Facility: HOSPITAL | Age: 69
End: 2024-04-12
Payer: MEDICARE

## 2024-04-12 ENCOUNTER — ANESTHESIA EVENT (OUTPATIENT)
Dept: GASTROENTEROLOGY | Facility: HOSPITAL | Age: 69
End: 2024-04-12
Payer: MEDICARE

## 2024-04-12 VITALS
DIASTOLIC BLOOD PRESSURE: 69 MMHG | HEART RATE: 56 BPM | OXYGEN SATURATION: 94 % | RESPIRATION RATE: 17 BRPM | HEIGHT: 68 IN | SYSTOLIC BLOOD PRESSURE: 127 MMHG | WEIGHT: 151 LBS | BODY MASS INDEX: 22.88 KG/M2

## 2024-04-12 DIAGNOSIS — R91.1 SOLITARY PULMONARY NODULE: ICD-10-CM

## 2024-04-12 DIAGNOSIS — C34.92 NON-SMALL CELL CANCER OF LEFT LUNG: Primary | ICD-10-CM

## 2024-04-12 PROCEDURE — 71045 X-RAY EXAM CHEST 1 VIEW: CPT

## 2024-04-12 PROCEDURE — 88112 CYTOPATH CELL ENHANCE TECH: CPT | Performed by: STUDENT IN AN ORGANIZED HEALTH CARE EDUCATION/TRAINING PROGRAM

## 2024-04-12 PROCEDURE — 88173 CYTOPATH EVAL FNA REPORT: CPT | Performed by: STUDENT IN AN ORGANIZED HEALTH CARE EDUCATION/TRAINING PROGRAM

## 2024-04-12 PROCEDURE — 88333 PATH CONSLTJ SURG CYTO XM 1: CPT | Performed by: STUDENT IN AN ORGANIZED HEALTH CARE EDUCATION/TRAINING PROGRAM

## 2024-04-12 PROCEDURE — 31627 NAVIGATIONAL BRONCHOSCOPY: CPT | Performed by: STUDENT IN AN ORGANIZED HEALTH CARE EDUCATION/TRAINING PROGRAM

## 2024-04-12 PROCEDURE — 25810000003 SODIUM CHLORIDE 0.9 % SOLUTION: Performed by: STUDENT IN AN ORGANIZED HEALTH CARE EDUCATION/TRAINING PROGRAM

## 2024-04-12 PROCEDURE — 25010000002 PHENYLEPHRINE 10 MG/ML SOLUTION: Performed by: NURSE ANESTHETIST, CERTIFIED REGISTERED

## 2024-04-12 PROCEDURE — 88341 IMHCHEM/IMCYTCHM EA ADD ANTB: CPT | Performed by: STUDENT IN AN ORGANIZED HEALTH CARE EDUCATION/TRAINING PROGRAM

## 2024-04-12 PROCEDURE — 76000 FLUOROSCOPY <1 HR PHYS/QHP: CPT

## 2024-04-12 PROCEDURE — 25010000002 DEXAMETHASONE SODIUM PHOSPHATE 20 MG/5ML SOLUTION: Performed by: NURSE ANESTHETIST, CERTIFIED REGISTERED

## 2024-04-12 PROCEDURE — 25010000002 FENTANYL CITRATE (PF) 50 MCG/ML SOLUTION: Performed by: NURSE ANESTHETIST, CERTIFIED REGISTERED

## 2024-04-12 PROCEDURE — 25010000002 SUCCINYLCHOLINE PER 20 MG: Performed by: NURSE ANESTHETIST, CERTIFIED REGISTERED

## 2024-04-12 PROCEDURE — 31628 BRONCHOSCOPY/LUNG BX EACH: CPT | Performed by: STUDENT IN AN ORGANIZED HEALTH CARE EDUCATION/TRAINING PROGRAM

## 2024-04-12 PROCEDURE — 88342 IMHCHEM/IMCYTCHM 1ST ANTB: CPT | Performed by: STUDENT IN AN ORGANIZED HEALTH CARE EDUCATION/TRAINING PROGRAM

## 2024-04-12 PROCEDURE — 25010000002 PROPOFOL 10 MG/ML EMULSION: Performed by: NURSE ANESTHETIST, CERTIFIED REGISTERED

## 2024-04-12 PROCEDURE — 25010000002 PHENYLEPHRINE 10 MG/ML SOLUTION 5 ML VIAL: Performed by: NURSE ANESTHETIST, CERTIFIED REGISTERED

## 2024-04-12 PROCEDURE — 88305 TISSUE EXAM BY PATHOLOGIST: CPT | Performed by: STUDENT IN AN ORGANIZED HEALTH CARE EDUCATION/TRAINING PROGRAM

## 2024-04-12 PROCEDURE — 25810000003 LACTATED RINGERS PER 1000 ML: Performed by: NURSE ANESTHETIST, CERTIFIED REGISTERED

## 2024-04-12 PROCEDURE — 25810000003 SODIUM CHLORIDE 0.9 % SOLUTION 250 ML FLEX CONT: Performed by: NURSE ANESTHETIST, CERTIFIED REGISTERED

## 2024-04-12 PROCEDURE — 88172 CYTP DX EVAL FNA 1ST EA SITE: CPT | Performed by: STUDENT IN AN ORGANIZED HEALTH CARE EDUCATION/TRAINING PROGRAM

## 2024-04-12 RX ORDER — SODIUM CHLORIDE 0.9 % (FLUSH) 0.9 %
3 SYRINGE (ML) INJECTION EVERY 12 HOURS SCHEDULED
Status: CANCELLED | OUTPATIENT
Start: 2024-04-12

## 2024-04-12 RX ORDER — SODIUM CHLORIDE 9 MG/ML
1000 INJECTION, SOLUTION INTRAVENOUS CONTINUOUS
Status: DISCONTINUED | OUTPATIENT
Start: 2024-04-12 | End: 2024-04-12 | Stop reason: HOSPADM

## 2024-04-12 RX ORDER — NALOXONE HCL 0.4 MG/ML
0.2 VIAL (ML) INJECTION AS NEEDED
Status: DISCONTINUED | OUTPATIENT
Start: 2024-04-12 | End: 2024-04-12 | Stop reason: HOSPADM

## 2024-04-12 RX ORDER — LIDOCAINE HYDROCHLORIDE 10 MG/ML
0.5 INJECTION, SOLUTION INFILTRATION; PERINEURAL ONCE AS NEEDED
Status: DISCONTINUED | OUTPATIENT
Start: 2024-04-12 | End: 2024-04-12 | Stop reason: HOSPADM

## 2024-04-12 RX ORDER — FENTANYL CITRATE 50 UG/ML
INJECTION, SOLUTION INTRAMUSCULAR; INTRAVENOUS AS NEEDED
Status: DISCONTINUED | OUTPATIENT
Start: 2024-04-12 | End: 2024-04-12 | Stop reason: SURG

## 2024-04-12 RX ORDER — MIDAZOLAM HYDROCHLORIDE 1 MG/ML
0.5 INJECTION INTRAMUSCULAR; INTRAVENOUS
Status: CANCELLED | OUTPATIENT
Start: 2024-04-12

## 2024-04-12 RX ORDER — DEXAMETHASONE SODIUM PHOSPHATE 4 MG/ML
INJECTION, SOLUTION INTRA-ARTICULAR; INTRALESIONAL; INTRAMUSCULAR; INTRAVENOUS; SOFT TISSUE AS NEEDED
Status: DISCONTINUED | OUTPATIENT
Start: 2024-04-12 | End: 2024-04-12 | Stop reason: SURG

## 2024-04-12 RX ORDER — DROPERIDOL 2.5 MG/ML
0.62 INJECTION, SOLUTION INTRAMUSCULAR; INTRAVENOUS
Status: DISCONTINUED | OUTPATIENT
Start: 2024-04-12 | End: 2024-04-12 | Stop reason: HOSPADM

## 2024-04-12 RX ORDER — FENTANYL CITRATE 50 UG/ML
50 INJECTION, SOLUTION INTRAMUSCULAR; INTRAVENOUS
Status: DISCONTINUED | OUTPATIENT
Start: 2024-04-12 | End: 2024-04-12 | Stop reason: HOSPADM

## 2024-04-12 RX ORDER — ONDANSETRON 2 MG/ML
4 INJECTION INTRAMUSCULAR; INTRAVENOUS ONCE AS NEEDED
Status: DISCONTINUED | OUTPATIENT
Start: 2024-04-12 | End: 2024-04-12 | Stop reason: HOSPADM

## 2024-04-12 RX ORDER — HYDROCODONE BITARTRATE AND ACETAMINOPHEN 5; 325 MG/1; MG/1
1 TABLET ORAL ONCE AS NEEDED
Status: DISCONTINUED | OUTPATIENT
Start: 2024-04-12 | End: 2024-04-12 | Stop reason: HOSPADM

## 2024-04-12 RX ORDER — SUCCINYLCHOLINE CHLORIDE 20 MG/ML
INJECTION INTRAMUSCULAR; INTRAVENOUS AS NEEDED
Status: DISCONTINUED | OUTPATIENT
Start: 2024-04-12 | End: 2024-04-12 | Stop reason: SURG

## 2024-04-12 RX ORDER — SODIUM CHLORIDE 0.9 % (FLUSH) 0.9 %
10 SYRINGE (ML) INJECTION AS NEEDED
Status: DISCONTINUED | OUTPATIENT
Start: 2024-04-12 | End: 2024-04-12 | Stop reason: HOSPADM

## 2024-04-12 RX ORDER — HYDRALAZINE HYDROCHLORIDE 20 MG/ML
5 INJECTION INTRAMUSCULAR; INTRAVENOUS
Status: DISCONTINUED | OUTPATIENT
Start: 2024-04-12 | End: 2024-04-12 | Stop reason: HOSPADM

## 2024-04-12 RX ORDER — SODIUM CHLORIDE, SODIUM LACTATE, POTASSIUM CHLORIDE, CALCIUM CHLORIDE 600; 310; 30; 20 MG/100ML; MG/100ML; MG/100ML; MG/100ML
INJECTION, SOLUTION INTRAVENOUS CONTINUOUS PRN
Status: DISCONTINUED | OUTPATIENT
Start: 2024-04-12 | End: 2024-04-12 | Stop reason: SURG

## 2024-04-12 RX ORDER — LABETALOL HYDROCHLORIDE 5 MG/ML
5 INJECTION, SOLUTION INTRAVENOUS
Status: DISCONTINUED | OUTPATIENT
Start: 2024-04-12 | End: 2024-04-12 | Stop reason: HOSPADM

## 2024-04-12 RX ORDER — OXYCODONE AND ACETAMINOPHEN 7.5; 325 MG/1; MG/1
1 TABLET ORAL EVERY 4 HOURS PRN
Status: DISCONTINUED | OUTPATIENT
Start: 2024-04-12 | End: 2024-04-12 | Stop reason: HOSPADM

## 2024-04-12 RX ORDER — FAMOTIDINE 10 MG/ML
20 INJECTION, SOLUTION INTRAVENOUS ONCE
Status: CANCELLED | OUTPATIENT
Start: 2024-04-12 | End: 2024-04-12

## 2024-04-12 RX ORDER — LIDOCAINE HYDROCHLORIDE 20 MG/ML
INJECTION, SOLUTION INFILTRATION; PERINEURAL AS NEEDED
Status: DISCONTINUED | OUTPATIENT
Start: 2024-04-12 | End: 2024-04-12 | Stop reason: SURG

## 2024-04-12 RX ORDER — DIPHENHYDRAMINE HYDROCHLORIDE 50 MG/ML
12.5 INJECTION INTRAMUSCULAR; INTRAVENOUS
Status: DISCONTINUED | OUTPATIENT
Start: 2024-04-12 | End: 2024-04-12 | Stop reason: HOSPADM

## 2024-04-12 RX ORDER — PROMETHAZINE HYDROCHLORIDE 25 MG/1
25 SUPPOSITORY RECTAL ONCE AS NEEDED
Status: DISCONTINUED | OUTPATIENT
Start: 2024-04-12 | End: 2024-04-12 | Stop reason: HOSPADM

## 2024-04-12 RX ORDER — FLUMAZENIL 0.1 MG/ML
0.2 INJECTION INTRAVENOUS AS NEEDED
Status: DISCONTINUED | OUTPATIENT
Start: 2024-04-12 | End: 2024-04-12 | Stop reason: HOSPADM

## 2024-04-12 RX ORDER — SODIUM CHLORIDE 0.9 % (FLUSH) 0.9 %
3-10 SYRINGE (ML) INJECTION AS NEEDED
Status: CANCELLED | OUTPATIENT
Start: 2024-04-12

## 2024-04-12 RX ORDER — SODIUM CHLORIDE, SODIUM LACTATE, POTASSIUM CHLORIDE, CALCIUM CHLORIDE 600; 310; 30; 20 MG/100ML; MG/100ML; MG/100ML; MG/100ML
9 INJECTION, SOLUTION INTRAVENOUS CONTINUOUS
Status: CANCELLED | OUTPATIENT
Start: 2024-04-12

## 2024-04-12 RX ORDER — HYDROMORPHONE HYDROCHLORIDE 2 MG/ML
0.5 INJECTION, SOLUTION INTRAMUSCULAR; INTRAVENOUS; SUBCUTANEOUS
Status: DISCONTINUED | OUTPATIENT
Start: 2024-04-12 | End: 2024-04-12 | Stop reason: HOSPADM

## 2024-04-12 RX ORDER — EPHEDRINE SULFATE 50 MG/ML
5 INJECTION, SOLUTION INTRAVENOUS ONCE AS NEEDED
Status: DISCONTINUED | OUTPATIENT
Start: 2024-04-12 | End: 2024-04-12 | Stop reason: HOSPADM

## 2024-04-12 RX ORDER — PROMETHAZINE HYDROCHLORIDE 25 MG/1
25 TABLET ORAL ONCE AS NEEDED
Status: DISCONTINUED | OUTPATIENT
Start: 2024-04-12 | End: 2024-04-12 | Stop reason: HOSPADM

## 2024-04-12 RX ORDER — EPHEDRINE SULFATE 50 MG/ML
INJECTION, SOLUTION INTRAVENOUS AS NEEDED
Status: DISCONTINUED | OUTPATIENT
Start: 2024-04-12 | End: 2024-04-12 | Stop reason: SURG

## 2024-04-12 RX ORDER — FENTANYL CITRATE 50 UG/ML
50 INJECTION, SOLUTION INTRAMUSCULAR; INTRAVENOUS ONCE AS NEEDED
Status: CANCELLED | OUTPATIENT
Start: 2024-04-12

## 2024-04-12 RX ORDER — PHENYLEPHRINE HYDROCHLORIDE 10 MG/ML
INJECTION INTRAVENOUS AS NEEDED
Status: DISCONTINUED | OUTPATIENT
Start: 2024-04-12 | End: 2024-04-12 | Stop reason: SURG

## 2024-04-12 RX ORDER — PROPOFOL 10 MG/ML
VIAL (ML) INTRAVENOUS AS NEEDED
Status: DISCONTINUED | OUTPATIENT
Start: 2024-04-12 | End: 2024-04-12 | Stop reason: SURG

## 2024-04-12 RX ORDER — ROCURONIUM BROMIDE 10 MG/ML
INJECTION, SOLUTION INTRAVENOUS AS NEEDED
Status: DISCONTINUED | OUTPATIENT
Start: 2024-04-12 | End: 2024-04-12 | Stop reason: SURG

## 2024-04-12 RX ORDER — IPRATROPIUM BROMIDE AND ALBUTEROL SULFATE 2.5; .5 MG/3ML; MG/3ML
3 SOLUTION RESPIRATORY (INHALATION) ONCE AS NEEDED
Status: DISCONTINUED | OUTPATIENT
Start: 2024-04-12 | End: 2024-04-12 | Stop reason: HOSPADM

## 2024-04-12 RX ORDER — LIDOCAINE HYDROCHLORIDE 10 MG/ML
0.5 INJECTION, SOLUTION INFILTRATION; PERINEURAL ONCE AS NEEDED
Status: CANCELLED | OUTPATIENT
Start: 2024-04-12

## 2024-04-12 RX ADMIN — EPHEDRINE SULFATE 10 MG: 50 INJECTION INTRAVENOUS at 13:57

## 2024-04-12 RX ADMIN — PHENYLEPHRINE HYDROCHLORIDE 100 MCG: 10 INJECTION INTRAVENOUS at 13:19

## 2024-04-12 RX ADMIN — ROCURONIUM BROMIDE 5 MG: 10 INJECTION, SOLUTION INTRAVENOUS at 13:15

## 2024-04-12 RX ADMIN — SODIUM CHLORIDE 1000 ML: 9 INJECTION, SOLUTION INTRAVENOUS at 12:50

## 2024-04-12 RX ADMIN — SODIUM CHLORIDE, POTASSIUM CHLORIDE, SODIUM LACTATE AND CALCIUM CHLORIDE: 600; 310; 30; 20 INJECTION, SOLUTION INTRAVENOUS at 12:59

## 2024-04-12 RX ADMIN — PHENYLEPHRINE HYDROCHLORIDE 0.5 MCG/KG/MIN: 10 INJECTION, SOLUTION INTRAVENOUS at 13:21

## 2024-04-12 RX ADMIN — FENTANYL CITRATE 50 MCG: 50 INJECTION, SOLUTION INTRAMUSCULAR; INTRAVENOUS at 13:12

## 2024-04-12 RX ADMIN — PHENYLEPHRINE HYDROCHLORIDE 200 MCG: 10 INJECTION INTRAVENOUS at 13:16

## 2024-04-12 RX ADMIN — LIDOCAINE HYDROCHLORIDE 80 MG: 20 INJECTION, SOLUTION INFILTRATION; PERINEURAL at 13:15

## 2024-04-12 RX ADMIN — PROPOFOL 160 MG: 10 INJECTION, EMULSION INTRAVENOUS at 13:15

## 2024-04-12 RX ADMIN — ROCURONIUM BROMIDE 45 MG: 10 INJECTION, SOLUTION INTRAVENOUS at 13:24

## 2024-04-12 RX ADMIN — DEXAMETHASONE SODIUM PHOSPHATE 10 MG: 4 INJECTION, SOLUTION INTRAMUSCULAR; INTRAVENOUS at 13:40

## 2024-04-12 RX ADMIN — SUCCINYLCHOLINE CHLORIDE 120 MG: 20 INJECTION, SOLUTION INTRAMUSCULAR; INTRAVENOUS; PARENTERAL at 13:15

## 2024-04-12 RX ADMIN — PHENYLEPHRINE HYDROCHLORIDE 200 MCG: 10 INJECTION INTRAVENOUS at 13:22

## 2024-04-12 RX ADMIN — PROPOFOL 150 MCG/KG/MIN: 10 INJECTION, EMULSION INTRAVENOUS at 13:16

## 2024-04-12 NOTE — ANESTHESIA PREPROCEDURE EVALUATION
Anesthesia Evaluation                  Airway   Mallampati: IV  TM distance: >3 FB  Neck ROM: full  No difficulty expected and Small opening  Dental - normal exam     Pulmonary - normal exam   (+) pneumonia , pleural effusion, a smoker Former, COPD,recent URI, sleep apnea  Cardiovascular - normal exam    (+) hypertension 2 medications or greater, past MI , CAD, PVD, hyperlipidemia      Neuro/Psych  (+) numbness, psychiatric history Anxiety  GI/Hepatic/Renal/Endo    (+) renal disease- CRI and ARF, thyroid problem     Musculoskeletal     Abdominal    Substance History      OB/GYN          Other                          Anesthesia Plan    ASA 3     general     intravenous induction     Anesthetic plan, risks, benefits, and alternatives have been provided, discussed and informed consent has been obtained with: patient.        CODE STATUS:

## 2024-04-12 NOTE — ANESTHESIA PROCEDURE NOTES
Airway  Urgency: elective    Date/Time: 4/12/2024 1:15 PM  Airway not difficult    General Information and Staff    Patient location during procedure: OR  CRNA/CAA: Jack Zacarias CRNA    Indications and Patient Condition  Indications for airway management: airway protection    Preoxygenated: yes  Mask difficulty assessment: 0 - not attempted    Final Airway Details  Final airway type: endotracheal airway      Successful airway: ETT  Cuffed: yes   Successful intubation technique: direct laryngoscopy and RSI  Facilitating devices/methods: intubating stylet  Endotracheal tube insertion site: oral  Blade: Hoyt  Blade size: 3  ETT size (mm): 8.5  Cormack-Lehane Classification: grade I - full view of glottis  Placement verified by: chest auscultation and capnometry   Measured from: teeth  ETT/EBT  to teeth (cm): 22  Number of attempts at approach: 1  Assessment: lips, teeth, and gum same as pre-op and atraumatic intubation

## 2024-04-12 NOTE — OP NOTE
Operative Note     Date of procedure: 4/12/2024     Patient name: Sim Agustin  MRN: [id number]    Pre OP diagnosis:  1.4 cm spiculated left upper lobe pulmonary nodule.    Patient Active Problem List   Diagnosis    H/O angiodysplasia of intestinal tract    COPD (chronic obstructive pulmonary disease)    S/P colectomy    Status post above-knee amputation of left lower extremity    CAD (coronary artery disease)    PVD (peripheral vascular disease)    Essential hypertension    Cardiomyopathy, unspecified    PRISCILLA treated with BiPAP    Anxiety disorder    Chronic midline low back pain without sciatica    Long term prescription benzodiazepine use    History of bradycardia    Hypothyroidism    Vitamin B12 deficiency    Iron deficiency    History of smoking 30 or more pack years    High risk medication use    DNR (do not resuscitate)    Stage 3a chronic kidney disease    Hyperlipidemia    Proteinuria    Junctional cardiac arrhythmia    Acute kidney injury    Hypercalcemia    Stage 3b chronic kidney disease (CKD)    Anemia    Leukocytosis    Hyponatremia    Hypokalemia    Lactic acidosis    Solitary pulmonary nodule       Post OP diagnosis:  1.4 spiculated left upper lobe pulmonary nodule  Preliminary biopsy is consistent with possible non-small cell lung cancer, favoring adenocarcinoma    Procedure performed:   Flexible bronchoscopy.  Navigational bronchoscopy with ION robot with C-arm.  Interpretation of fluoroscopy images.  Radial endobronchial ultrasound.  Fine-needle aspiration of the HERLINDA pulmonary nodule.  Tissue biopsy with biopsy forceps of the left upper lobe nodule  Left upper lobe bronchoalveolar lavage.    Indications:   Mr. Agustin is a pleasant 68-year-old gentleman who presents today with a 1.3 cm left upper lobe pulmonary nodule.  This nodule has mild activity on CT/PET scan with an SUV of 3.9 with mediastinal blood pool being an SUV of 1.8. He is an ex-smoker and smoked approximately 1 pack/day for roughly  35 years and he stopped smoking 3 years ago. This nodule seen on low-dose screening CT scan and this prompted a CT/PET scan of the chest        Surgeon: Jono Rebolledo MD, PhD    Assistants: No qualified assistant was available for this procedure.      Anesthesia: General endotracheal anesthesia        Procedure Details   On 4/12/2024, the patient was brought to the operating room and placed in the supine position on the operating room table. Following an uneventful induction of general anesthesia, patient was intubated with a single endotracheal tube without incident.  Antibiotic for surgical prophylaxis was not indicated due to the nature of the procedure.  Prior to beginning the operation, a time-out was conducted with all members of surgical team present. The patient was identified as Sim Agustin, the procedure and the correct site were verified.     I began by performing flexible bronchoscopy.  A flexible adult bronchoscope was advanced through the endotracheal tube.  A complete examination of the distal trachea and bilateral mainstem and lobar bronchi and all segmental bronchial orifices was performed.  The patient has normal endobronchial anatomy.  All the tracheobronchial tree had a significant amount of mucus secretion.  There was no blood, endoluminal lesions or other abnormal findings.  The bronchoscope was removed.    Prior to the procedure, the patient CT scan was integrated into the PlanPoint interface that generated the patient's 3D airway tree.  The target nodule was identified and its anatomical borders were mapped.  A path to the target nodule was generated through the PlanPoint interface.  After the plan was finalized, the patient image and plan guide was transferred to the [employer] robotic platform.  Using the Ion's controller, the 3.5 mm robotic catheter was advanced with the Ion's vision probe and navigated to the target along the preplanned path.  The catheter was parked next to the target  lesion.  The Ion vision probe was removed and radial EBUS was used to confirm the presence of the target lesion.  The radial EBUS was removed and under fluoroscopic guidance, a 23 gauge Ion’s Flexision needle was passed into the robotic catheter.  The needle was advanced into the target lesion and the location of the needle was confirmed with the C-arm. Multiple passes of the needle were made into the target lesion and the aspirate was sent for Rapid on Site Evaluation (TIMMY). The needle was removed. I then performed cold forceps  biopsy through the working channel in the same location. The bronchioloalveolar lavage was performed in the segmental bronchi.  The aspirate was sent for cytology.  The robotic catheter was removed and an adult flexible bronchoscope was inserted.  There was no pooling of blood into the bronchial tree.  All tracheobronchial tree were cleared from secretions.      The patient was awakened from anesthesia, was extubated without incident, and was transported to the Post Anesthesia Care Unit in stable condition.    Findings:  Using 21-gauge needle, fine-needle aspiration of the HERLINDA was performed.  Forceps biopsy of the HERLINDA was performed.  Bronchoalveolar lavage of the HERLINDA was performed.  The pathologist reported suspicious for non-small cell lung cancer, favoring adenocarcinoma.  No evidence of significant active bleeding at the end of the procedure.    Estimated Blood Loss:  Minimal           Drains: None                 Specimens:   ID Type Source Tests Collected by Time   A : HERLINDA ION FNA Fine Needle Aspirate Lung, Left Upper Lobe FINE NEEDLE ASPIRATION Jono Rebolledo MD PhD 4/12/2024 1342   B : HERLINDA LUNG BIOPSY Tissue Lung, Left Upper Lobe TISSUE PATHOLOGY EXAM Jono Rebolledo MD PhD 4/12/2024 1402   C : HERLINDA BAL Lavage Lung, Left Upper Lobe NON-GYNECOLOGIC CYTOLOGY Jono Rebolledo MD PhD 4/12/2024 1417              Implants: None           Complications: None           Disposition:  PACU - hemodynamically stable.           Condition: Stable     Jono Rebolledo MD, PhD

## 2024-04-12 NOTE — DISCHARGE INSTRUCTIONS
For the next 24 hours patient needs to be with a responsible adult.    For 24 hours DO NOT drive, operate machinery, appliances, drink alcohol, make important decisions or sign legal documents.    Start with a light or bland diet if you are feeling sick to your stomach otherwise advance to regular diet as tolerated.    Follow recommendations on procedure report if provided by your doctor.    Call Dr Rebolledo for problems [phone]    Problems may include but not limited to: large amounts of bleeding, trouble breathing, repeated vomiting, severe unrelieved pain, fever or chills.

## 2024-04-12 NOTE — ANESTHESIA POSTPROCEDURE EVALUATION
"Patient: Sim Agustin    Procedure Summary       Date: 04/12/24 Room / Location:  [facility]    Anesthesia Start: 1259 Anesthesia Stop: 1427    Procedure: BRONCHOSCOPY-ION! with biopsy AND FNA AND BAL Diagnosis:       Solitary pulmonary nodule      (Solitary pulmonary nodule [R91.1])    Surgeons: Jono Rebolledo MD PhD Provider: Ross Vazquez MD    Anesthesia Type: general ASA Status: 3            Anesthesia Type: general    Vitals  Vitals Value Taken Time   /69 04/12/24 1451   Temp     Pulse 52 04/12/24 1508   Resp 17 04/12/24 1447   SpO2 93 % 04/12/24 1508   Vitals shown include unfiled device data.        Post Anesthesia Care and Evaluation    Patient location during evaluation: bedside  Patient participation: complete - patient participated  Level of consciousness: awake and alert  Pain management: adequate    Airway patency: patent  Anesthetic complications: No anesthetic complications    Cardiovascular status: acceptable  Respiratory status: acceptable  Hydration status: acceptable    Comments: /69 (BP Location: Left arm, Patient Position: Lying)   Pulse 56   Resp 17   Ht 172.7 cm (68\")   Wt 68.5 kg (151 lb)   SpO2 94%   BMI 22.96 kg/m²     "

## 2024-04-15 LAB
CYTO UR: NORMAL
CYTO UR: NORMAL
DX PRELIMINARY: NORMAL
LAB AP CASE REPORT: NORMAL
LAB AP CASE REPORT: NORMAL
LAB AP DIAGNOSIS COMMENT: NORMAL
LAB AP NON-GYN SPECIMEN ADEQUACY: NORMAL
PATH REPORT.FINAL DX SPEC: NORMAL
PATH REPORT.FINAL DX SPEC: NORMAL
PATH REPORT.GROSS SPEC: NORMAL
PATH REPORT.GROSS SPEC: NORMAL

## 2024-04-17 LAB
LAB AP CASE REPORT: NORMAL
LAB AP DIAGNOSIS COMMENT: NORMAL
LAB AP SPECIAL STAINS: NORMAL
PATH REPORT.ADDENDUM SPEC: NORMAL
PATH REPORT.FINAL DX SPEC: NORMAL
PATH REPORT.GROSS SPEC: NORMAL

## 2024-04-22 ENCOUNTER — PATIENT OUTREACH (OUTPATIENT)
Dept: OTHER | Facility: HOSPITAL | Age: 69
End: 2024-04-22
Payer: MEDICARE

## 2024-04-22 DIAGNOSIS — R91.1 SOLITARY PULMONARY NODULE: Primary | ICD-10-CM

## 2024-04-23 ENCOUNTER — PATIENT OUTREACH (OUTPATIENT)
Dept: OTHER | Facility: HOSPITAL | Age: 69
End: 2024-04-23
Payer: MEDICARE

## 2024-04-23 ENCOUNTER — TELEPHONE (OUTPATIENT)
Dept: RADIATION ONCOLOGY | Facility: HOSPITAL | Age: 69
End: 2024-04-23
Payer: MEDICARE

## 2024-04-23 NOTE — PROGRESS NOTES
Referral rec'd from Dr. Rebolledo. Reviewed chart    Called patient, left message introducing myself, requested cb to explain my role and the services available in the Cancer Center.    Call from patient.  Introduced myself and explained navigational services.      The patient met with Dr. Rebolledo in [facility] 3/19/24 for further work up of his left upper lobe nodule. The patient underwent ION bronchoscopy on 4/12/24 which revealed invasive moderately differentiated adenocarcinoma of pulmonary origin.  Dr. Rebolledo referred the patient to radiation oncology; he is scheduled to meet with Dr. Fuentes tomorrow.  The patient reviewed his pathology on line.     The patient is alert and oriented. The patient has a history of left AKA and previous aortofemoral bypass grafts.  He ambulates with a cane, denies recent falls (fell in Dec 2023) and is independent with ADLs. The patient lives with his wife who is a nurse in [place], [place].     The patient is a former 1 ppd smoker x35 years; he quit in 2021.     The patient has [employer] Medicare Replacement. He had Medicaid, although states he doesn't believe it is active any longer.  The patient denies any current BHE claim issues. We discussed financial navigation, cost estimates and possible financial assistance if needed in the future.     The patient denies transportation, financial or other resource needs at this time. He is able to drive although they have one car. The patient has transportation services through his insurance. He states he gets 60 rides per year.    The patient has been eating well and denies weight loss.    The patient does not have an ACP on file.  Provided additional ACP information.     The patient is coping well his diagnosis.  He reports an adequate support system.  We discussed OSW and Behavioral oncology services.  Patient expressed gratitude for my support and denied any additional needs at this time. We also discussed [email or web address] and [employer]. The patient  declined referrals to either organization at this time.    We discussed integrative therapies and other services at the [facility].  Will provide a navigation folder with the following information at his appointment: [employer] Cancer Support Network, Cancer and Restorative Exercise - CARE, [employer] exercise program, Living with a Diagnosis of Lung Cancer, Lung Cancer [place] Support Services, [facility], Message [facility], Reiki Therapy, [employer] [facility], Cancer Nutrition, Smoking Cessation and Survivorship Clinic.      I will call in a few weeks; provided my name and number and encouraged patient to call if any needs arise.

## 2024-04-24 ENCOUNTER — TELEPHONE (OUTPATIENT)
Dept: RADIATION ONCOLOGY | Facility: HOSPITAL | Age: 69
End: 2024-04-24
Payer: MEDICARE

## 2024-04-24 ENCOUNTER — PATIENT OUTREACH (OUTPATIENT)
Dept: OTHER | Facility: HOSPITAL | Age: 69
End: 2024-04-24
Payer: MEDICARE

## 2024-04-24 ENCOUNTER — CONSULT (OUTPATIENT)
Dept: RADIATION ONCOLOGY | Facility: HOSPITAL | Age: 69
End: 2024-04-24
Payer: MEDICARE

## 2024-04-24 VITALS
BODY MASS INDEX: 22.5 KG/M2 | SYSTOLIC BLOOD PRESSURE: 145 MMHG | DIASTOLIC BLOOD PRESSURE: 77 MMHG | HEART RATE: 75 BPM | OXYGEN SATURATION: 95 % | WEIGHT: 148 LBS

## 2024-04-24 DIAGNOSIS — R91.1 SOLITARY PULMONARY NODULE: Primary | ICD-10-CM

## 2024-04-24 PROCEDURE — G0463 HOSPITAL OUTPT CLINIC VISIT: HCPCS | Performed by: RADIOLOGY

## 2024-04-24 NOTE — TELEPHONE ENCOUNTER
Left voicemail for patient to inform him of appointment for pulmonary function testing at Dr. Tate's office on Monday 4/29 at 2:30. Requested patient return my call to filemon.

## 2024-04-24 NOTE — PROGRESS NOTES
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   The patient is a 68 y.o.  male with newly diagnosed non small cell lung cancer.  He has a past medical hx significant for peripheral vascular disease with multiple stents and extremity bypass, on Plavix and aspirin chronically for this. He has a history of COPD and is s/p left VATS with decortication for empyema in 2016. He is a previous smoker and smoked approximately 1 pack/day for roughly 35 years.  He stopped smoking 3 years ago.     He had a low dose screening lung CT on 11/22/23 which showed a new 12mm lesion left upper lobe with scattered miconodules in both lungs and diffuse reiculonodular infiltrate in left lower lobe.  He had a PET scan on 3/15/24 which showed spiculated 1.3 cm nodule in the perihilar region of the leftupper lobe has a maximal SUV of 3.9. The nodule has progressed in sizeand density since 2021 and is suspicious for a lung malignancy. The nodule is along vessels and is not easily accessible for CT-guidedbiopsy. There is no hypermetabolic mediastinal or hilar lymphadenopathy.  no significant change in the large irregularairspace consolidation throughout most of the left lower lobe and to amuch lesser degree at the lingula. The consolidations are new since11/18/2023 and are suspicious for pneumonia.    He had a repeat CT chest without contrast on 3/26/24 which showed no change in spiculated left upper lobe nodule 14mm in size suspicous for neoplasm and decreased consolidation in left lower lobe reflecting resolving pneumonia.     He underwent ion bronchoscopy on 4/12/24 with Dr. Rebolledo with pathology from fna of left upper lobe revealing adenocarcinoma, PDL 1 2%.      He is not sure when his last pulmonary function tests were. He  was  not considered a good candidate for surgery and thus is referred today for evaluation for evaluation for radiation.  He has some chronic cough and soa with exertion but is not on home oxygen.    Review of Systems   Constitutional: Negative.    HENT: Negative.     Respiratory:  Positive for cough.    Cardiovascular: Negative.    Gastrointestinal: Negative.    Genitourinary: Negative.    Musculoskeletal:  Positive for back pain.   Neurological: Negative.    Psychiatric/Behavioral: Negative.         Past Medical History:   Diagnosis Date    Acute respiratory failure with hypoxia and hypercarbia 03/11/2019    Acute upper respiratory infection 02/06/2013    Amputee, above knee, left     Anemia     per [facility] database    ARF (acute renal failure) 03/04/2016    Asthma 02/02/2018    Atelectasis, left 03/04/2016    Bradycardia 05/23/2019    CAD (coronary artery disease) 04/04/2016    Chronic obstructive pulmonary disease with acute exacerbation 02/06/2013    Clotting disorder 05/01/2003    Colon polyps     Compartment syndrome     AFTER ILIAC SURGERY. ABOVE KNEE AMPUTATION    COPD (chronic obstructive pulmonary disease)     COPD with hypoxia 05/23/2019    Cough     SINCE APRIL WITH PNEUMONIA.     Demand myocardial infarction 05/19/2022    Disease of thyroid gland     HYPOTHYRODISM    Diverticulosis     Elevated hematocrit 01/06/2021    Employs prosthetic leg     ESRD (end stage renal disease) on dialysis 04/06/2016    Fracture of patella 03/03/2015    History of acute renal failure     History of CHF (congestive heart failure)     History of GI bleed 02/2016    AORTODUOD FISTULA    History of sepsis 02/2016    History of transfusion     MULTIPLE, 2016    Hyperkalemia 04/11/2016    Hyperlipidemia     Hypertension     Hypotension     Hypotension 03/04/2016    Left lower lobe pneumonia 05/18/2022    Low back pain 10/2016    Lung neoplasm     Nonischemic cardiomyopathy     Nosocomial pneumonia 04/06/2016    Phantom limb pain      SL, WITH LEFT ABOUVE KNEE    Pleural effusion on left 04/11/2016    Pneumonia     SPRING 2016  AFTER SURGERY    Pulmonary embolism     PVD (peripheral vascular disease)     Renal insufficiency     S/P thoracentesis 04/11/2016    Sepsis, unspecified organism 04/05/2016         Past Surgical History:   Procedure Laterality Date    ABOVE KNEE AMPUTATION Left 03/16/2016    Procedure: LT AMPUTATION ABOVE KNEE;  Surgeon: Jose Swann MD;  Location: [facility] OR;  Service:     ARTERIAL THROMBECTOMY  2001    throbectomy of arterial graft    AXILLARY FEMORAL BYPASS Right 02/15/2016    Exploratory lapartomy, repair aortoduodenal fistula, resection infected aortobifemoral bypass graft, axillobi-superficial femoral artery [place]-Aneudy bypass graft from right axillary artery, Repair of duodenotomy 4th portion duodenum-Dr. Jose Swann, Dr. Genaro Perrin    BRONCHOSCOPY Left 03/04/2016    Dr. NATALIIA Tate    BRONCHOSCOPY Left 05/20/2022    Procedure: BRONCHOSCOPY WITH BIOPSIES, BRUSHINGS, AND BAL UNDER FLUORO;  Surgeon: Ishmael Tate Jr., MD;  Location: [facility] ENDOSCOPY;  Service: Pulmonary;  Laterality: Left;  PRE OP - LLL CONSOLIDATION  POST OP - SAME    BRONCHOSCOPY RIGID / FLEXIBLE N/A 03/03/2016    Dr. NATALIIA Tate    BRONCHOSCOPY RIGID / FLEXIBLE N/A 02/26/2016    Dr. NATALIIA Tate    BRONCHOSCOPY WITH ION ROBOTIC ASSIST N/A 4/12/2024    Procedure: BRONCHOSCOPY-ION! with biopsy AND FNA AND BAL;  Surgeon: Jono Rebolledo MD PhD;  Location: [facility] ENDOSCOPY;  Service: Robotics - Pulmonary;  Laterality: N/A;  PRE OP - PULMONARY NODULE  POST OP - SAME    CARDIAC CATHETERIZATION N/A 03/18/2019    Procedure: Right and Left Heart Cath;  Surgeon: Nina Storey MD;  Location: [facility] CATH INVASIVE LOCATION;  Service: Cardiovascular    CATARACT EXTRACTION WITH INTRAOCULAR LENS IMPLANT Bilateral     COLON RESECTION WITH COLOSTOMY Left 02/28/2016    Exploratory laparotomy with open left hemicolectomy,  end-colostomy, G-tube and J-tube-Dr. Endy Chaney    COLON SURGERY  3/25/2015    COLOSTOMY    COLONOSCOPY N/A 2015    Dr. Laughlin    COLONOSCOPY N/A 10/12/2016    Procedure: COLONOSCOPY TO 20 CM AND PER STOMA TO 30CM;  Surgeon: Genaro Perrin MD;  Location: [facility] ENDOSCOPY;  Service:     COLONOSCOPY N/A 10/21/2016    Procedure: COLONOSCOPY DONE AT BEDSIDE ONTO CECEM;  Surgeon: Genaro Perrin MD;  Location: [facility] ENDOSCOPY;  Service:     COLONOSCOPY N/A 02/10/2016    Diverticulosis in the entire examined colon, non-bleeding internal hemorrhoids-Dr. Taylor Pina    COLONOSCOPY N/A 02/11/2016    Poor prep, blood in the entire examined colon, normal ileum-Dr. Taylor Pina    COLONOSCOPY W/ POLYPECTOMY N/A 07/27/2015    Normal ileum, diverticulosis in the sigmoid colon: resected and retrieved, one 6 mm polyp in the descending colon: resected and retrieved, the distal rectum and anal verge are normal on retroflexion view-Dr. Miguel Ángel Laughlin    COLOSTOMY CLOSURE N/A 10/13/2016    Procedure: COLOSTOMY TAKEDOWN OR CLOSURE, APPENDECTOMY;  Surgeon: Genaro Perrin MD;  Location: [facility] MAIN OR;  Service:     DEBRIDEMENT LEG Left 03/01/2016    Excisional debridement of the skin, subcutantous tissue, tendon and muscle left calf-Dr. Jose Swann    DEBRIDEMENT LEG Left 02/25/2016    Excisional debridement full-thcikness skin and subcutaneous tissue and tendon and muscle, left medial and lateral calf muscles-Dr. Jose Sawnn    ENDOSCOPY N/A 10/21/2016    Procedure: ESOPHAGOGASTRODUODENOSCOPY DONE AT BEDSIDE;  Surgeon: Genaro Perrin MD;  Location: [facility] ENDOSCOPY;  Service:     ENDOSCOPY AND COLONOSCOPY N/A 02/28/2016    EGD and Colonoscopy with Candy ink injection-Dr. Endy Chaney    ENTEROSCOPY SMALL BOWEL N/A 02/11/2016    Normal esophagus, normal stomach, normal duodenum, portion of the jejunum normal-Dr. Taylor Pina    ILIAC ARTERY - FEMORAL ARTERY BYPASS GRAFT Bilateral 2002    ILIAC  ARTERY STENT Bilateral 2001    INSERTION AND REMOVAL PERITONEAL DIALYSIS CATHETER Right 2016    Exchange right jugular 16 cm Mahurkar dialysis catheter-Dr. Jose Swann    INSERTION PERITONEAL DIALYSIS CATHETER Left 2016    Ultrasound-guided access of the left jugular vein, 23 cm left jugular palindrome dialysis catheter placement-Dr. Jose Swann    INSERTION PERITONEAL DIALYSIS CATHETER Right 2016    Right jugular Mahrkar catherer placement-Dr. Jose Swann    JOINT REPLACEMENT Left     THORACOSCOPY Left 2016    Procedure: LT THORACOSCOPY VIDEO ASSISTED WITH DECORDICATION;  Surgeon: Genaro Davila III, MD;  Location: [facility];  Service:     THROMBECTOMY Left 2016    Thombectomy of left femoral graft, left leg arteriogram, left calf forward compartment fasciotomy-Dr. Jose Swann    TOTAL HIP ARTHROPLASTY Left     UPPER GASTROINTESTINAL ENDOSCOPY N/A 2016    Normal UGI-Dr. Ajay Parkinson    UPPER GASTROINTESTINAL ENDOSCOPY N/A 2015    Small hiatal hernia, chronic gastritis: biopsied, non-bleeding angioectasias in the stomach: treated with argon plasma coagulation, multiple bleeding angioectasias in the dudenum: treated with argon plasma coagulation-Dr. Mykel Jaramillo    VASCULAR SURGERY      MULTIPLE    VENTRAL/INCISIONAL HERNIA REPAIR N/A 10/19/2017    Procedure: VENTRAL HERNIA REPAIR WITH MESH AND BILATERAL COMPONENT SEPARATION;  Surgeon: Genaro Perrin MD;  Location: [facility];  Service:     WISDOM TOOTH EXTRACTION           Social History     Socioeconomic History    Marital status:    Tobacco Use    Smoking status: Former     Current packs/day: 0.00     Average packs/day: 1 pack/day for 47.0 years (47.0 ttl pk-yrs)     Types: Cigarettes     Start date: 1974     Quit date: 2021     Years since quittin.9     Passive exposure: Never    Smokeless tobacco: Never    Tobacco comments:     caffeine - rarely   Vaping Use     Vaping status: Never Used   Substance and Sexual Activity    Alcohol use: No    Drug use: Not Currently     Types: Other     Comment: THC gummies    Sexual activity: Not Currently     Partners: Female     Birth control/protection: Condom, Post-menopausal         Family History   Problem Relation Age of Onset    Lung cancer Mother     Cancer Mother     Heart disease Father     Diabetes Father         He wasn't diagnosed until he was 78    Hypertension Father     Malig Hyperthermia Neg Hx           Objective    Physical Exam  Constitutional:       Appearance: Normal appearance.   HENT:      Head: Atraumatic.   Eyes:      Extraocular Movements: Extraocular movements intact.   Pulmonary:      Effort: Pulmonary effort is normal. No respiratory distress.      Breath sounds: Wheezing present. No rhonchi.   Chest:      Chest wall: No tenderness.   Musculoskeletal:      Comments: Left bka   Neurological:      General: No focal deficit present.      Mental Status: He is alert.   Psychiatric:         Mood and Affect: Mood normal.         Current Outpatient Medications on File Prior to Visit   Medication Sig Dispense Refill    albuterol (ACCUNEB) 0.63 MG/3ML nebulizer solution Inhale 3 mL Every 6 (Six) Hours As Needed.      allopurinol (Zyloprim) 100 MG tablet Take 1 tab by mouth daily for gout 90 tablet 3    ALPRAZolam (XANAX) 1 MG tablet Take 1 tablet twice daily 180 tablet 2    amLODIPine (NORVASC) 10 MG tablet TAKE 1 TABLET BY MOUTH EVERY DAY 90 tablet 3    aspirin 81 MG chewable tablet Chew 1 tablet Every Night. HELD 1 WEEK AS DIRECTED      atorvastatin (LIPITOR) 40 MG tablet TAKE 1 & 1/2 TABLETS BY MOUTH EVERY DAY FOR CHOLESTEROL AND HEART HEALTH 135 tablet 3    B Complex Vitamins (vitamin b complex) capsule capsule Take 1 capsule by mouth Daily.      brimonidine (ALPHAGAN) 0.2 % ophthalmic solution instill 1 drop into both eyes twice a day      buPROPion XL (WELLBUTRIN XL) 300 MG 24 hr tablet TAKE 1 TABLET BY MOUTH EVERY  DAY 90 tablet 3    clopidogrel (PLAVIX) 75 MG tablet TAKE 1 TABLET BY MOUTH EVERY DAY AT NIGHT 90 tablet 3    Coenzyme Q10 400 MG capsule Take 1 capsule by mouth Every Evening.      Enoxaparin Sodium (LOVENOX) 40 MG/0.4ML solution prefilled syringe syringe Inject 0.4ml subcutaneously once daily for 7 days before bronchoscopy. (Patient taking differently: Inject 0.4ml subcutaneously once daily for 7 days before bronchoscopy.  LAST DOSE EVENING 4/11/24 AS DIRECTED) 2.8 mL 0    FERROUS SULFATE PO Take 65 mg by mouth Every Night.      Fluticasone-Umeclidin-Vilant (TRELEGY ELLIPTA) 100-62.5-25 MCG/INH aerosol powder  Inhale 1 each Daily. 28 each 2    gabapentin (NEURONTIN) 300 MG capsule Take 1 capsule by mouth 3 (Three) Times a Day. 270 capsule 2    isosorbide mononitrate (IMDUR) 30 MG 24 hr tablet TAKE 1 TABLET BY MOUTH EVERY DAY IN THE MORNING 90 tablet 3    latanoprost (XALATAN) 0.005 % ophthalmic solution Administer 1 drop to both eyes every night at bedtime.      levothyroxine (SYNTHROID, LEVOTHROID) 137 MCG tablet Take 1 tablet by mouth Daily. 90 tablet 3    Multiple Vitamins-Minerals (MULTIVITAMIN ADULT PO) Take 1 tablet by mouth Daily.      Omega-3 Fatty Acids (fish oil) 1000 MG capsule capsule Take 1 capsule by mouth Daily. Took this am      torsemide (DEMADEX) 20 MG tablet Take 1 tablet by mouth Daily. 90 tablet 3    traMADol (ULTRAM) 50 MG tablet Take 1 tablet by mouth 2 (Two) Times a Day. 180 tablet 0     No current facility-administered medications on file prior to visit.       ALLERGIES:    Allergies   Allergen Reactions    Augmentin [Amoxicillin-Pot Clavulanate] Hives and Rash     Tolerate cephalosporins    Zosyn [Piperacillin Sod-Tazobactam So] Rash     erythroderma       There were no vitals taken for this visit.          No data to display                  Assessment & Plan     Sim Agustin is a 68 y.o. male with newly diagnosed adenocarcinoma of left upper lobe lung [id number].  He is not a surgical  candidate. I discussed with him my recommendation for stereotactic radiation therapy. I discussed with him today the risks, benefits and rationale of radiation therapy to the lung.  I discussed both the acute and long term side effects to include but not limited to the following:    Acute:  skin erythema, fatigue, lowered blood counts, pneumonitis resulting in shortness of breath, cough or pain wtih inspiration,     Late:  permanent skin changes, late pneumonitis or pulmonary fibrosis resulting in permanent shortness of breath, chronic cough or oxygen dependency, late rib fracture, late cardiac damage and the remote risk of second malignancies.    Sim Agustin  voiced understanding and wishes to proceed with the treatments here at [facility] [place] operated by [facility].  I have scheduled him to return for CT simulation for treatment planning purposes next week.   I plan to deliver a dose of approximately 50Gy in 5 fractions.     I personally spent greater than 60 minutes today assessing, managing, discussing and documenting my visit with the patient. That time includes review of records, imaging and pathology reports, obtaining my own history, performing a medically appropriate evaluation, counseling and educating the patient, discussing goals, logistics, alternatives and risks of my recommendations, surveillance options and potential outcomes. It also includes the time documenting the clinical information in the EMR and communicating my recommendations to the other involved physicians.               Thank you very much for allowing me to participate in the care of this very pleasant patient.    Sincerely,      Yanet Fuentes MD

## 2024-04-24 NOTE — PROGRESS NOTES
Met patient briefly in radiation center prior to his appt with Dr. Fuentes. Introduced myself in person. Provided navigation folder.      I will call in 1-2 weeks; encouraged patient to call as needed.

## 2024-04-29 ENCOUNTER — HOSPITAL ENCOUNTER (OUTPATIENT)
Dept: RADIATION ONCOLOGY | Facility: HOSPITAL | Age: 69
Setting detail: RADIATION/ONCOLOGY SERIES
End: 2024-04-29
Payer: MEDICARE

## 2024-04-29 PROCEDURE — 77334 RADIATION TREATMENT AID(S): CPT | Performed by: RADIOLOGY

## 2024-05-01 ENCOUNTER — HOSPITAL ENCOUNTER (OUTPATIENT)
Dept: RADIATION ONCOLOGY | Facility: HOSPITAL | Age: 69
Setting detail: RADIATION/ONCOLOGY SERIES
End: 2024-05-01
Payer: MEDICARE

## 2024-05-03 ENCOUNTER — PATIENT OUTREACH (OUTPATIENT)
Dept: OTHER | Facility: HOSPITAL | Age: 69
End: 2024-05-03
Payer: MEDICARE

## 2024-05-03 NOTE — PROGRESS NOTES
Reviewed chart. Patient with newly diagnosed adenocarcinoma of left upper lobe lung [id number].  He is not a surgical candidate.  Met with  4/24, planning radiation-appears start date is 5/14.    Called patient. Left message that I was just touching base to see how he was doing. Wanted to know if he had any questions/concerns after his appt with Dr. Fuentes or resource/supportive care needs; requested cb.  If we don't connect, I will try to meet at upcoming radiation appt.     Call from patient. He had CT simulation last Wednesday, then PFTs. The patient states his PFTs were not good.     We discussed his upcoming radiation treatments as well as expected surveillance scans after completion of radiation therapy. All questions answered.    I will follow up in June; encouraged patient to call as needed.

## 2024-05-08 NOTE — PROGRESS NOTES
"    Outpatient Physical Therapy Neuro Treatment Note  [facility]     Patient Name: Sim Agustin  : 1955  MRN: [id number]  Today's Date: 3/3/2017      Visit Date: 2017    Visit Dx:    ICD-10-CM ICD-9-CM   1. Status post above knee amputation of left lower extremity Z89.612 V49.76   2. Encounter for prosthetic gait training Z47.89 V57.81   3. Functional gait abnormality R26.89 781.2       Patient Active Problem List   Diagnosis   • H/O angiodysplasia of intestinal tract   • Hypotension   • ARF (acute renal failure)   • COPD (chronic obstructive pulmonary disease)   • Atelectasis, left   • S/P colectomy   • Myonecrosis   • Peripheral arteriosclerosis   • S/P AKA (above knee amputation)   • Elevated troponin   • Chest pain   • CAD (coronary artery disease)   • Sepsis   • Nosocomial pneumonia   • ESRD (end stage renal disease) on dialysis   • Anemia in chronic kidney disease   • PVD (peripheral vascular disease)   • Hyperkalemia   • Pleural effusion on left   • S/P thoracentesis   • Acute upper respiratory infection   • Chronic obstructive pulmonary disease with acute exacerbation   • Urticaria   • Hypertension   • Fracture of patella                 PT Neuro       17 0933          Subjective Comments    Subjective Comments \"I have been trying to start with my right leg first.\" (1st step of ambulaiton)   -LB      Precautions and Contraindications    Precautions pt reports per vascular MD pt not allowed to use a gait belt   -LB      Subjective Pain    Able to rate subjective pain? yes  -LB      Pre-Treatment Pain Level 0  -LB      Post-Treatment Pain Level 0  -LB      Transfers    Transfers, Sit-Stand [place] supervision required;verbal cues required  -LB      Transfers, Stand-Sit [place] stand by assist;verbal cues required  -LB      Transfers, Sit-Stand-Sit, Assist Device straight cane;other (see comments)   and no device   -LB      Transfer, Comment Requires use of UE's due to above " Please rachid pt vascular appt    knee prosthesis  -LB      Gait Assessment/Treatment    Gait, [place] Level contact guard assist;minimum assist (75% patient effort)  -LB      Gait, Assistive Device prosthesis  -LB      Gait, Distance (Feet) 80   x 2  -LB      Gait, Gait Deviations forward flexed posture;swing-to-stance ratio decreased;right:;weight-shifting ability decreased;left:;step length decreased   R LE (prosthetic limb) with strong initial contact   -LB      Gait, Impairments postural control impaired  -LB      Gait, Comment Pt ambulated with single point cane with CGA with emphasis on increasing L LE step length and controlled right swing phase.  -LB      Stairs Assessment/Treatment    Number of Stairs 1   curb   -LB      Stairs, [place] Level contact guard assist  -LB      Stairs, Assistive Device straight cane  -LB      Stairs, Safety Issues weight-shifting ability decreased  -LB      Stairs, Impairments impaired balance  -LB      Orthotics Prosthetics    Additional Documentation Prosthetic Management (Group)  -LB      Prosthetic Management    Device (Prosthetic Management) transfemoral (above knee) prosthesis, left   new suction socket 02/08/2017, microprocessor knee )  -LB      Adjustment Detail (Prosthetic Management) Pt redonned prosthesis mid session secondary to minimal IR of prosthesis. Pt wearing a 1 ply sock.   -LB      Training (Prosthetic Management) other (see comments)   advised pt to advance R LE first w/ ambulation when possible  -LB      Detail (Prosthetic Management) residual limb well healed with small invagination posteriorly    no skin issues per pt   -LB        User Key  (r) = Recorded By, (t) = Taken By, (c) = Cosigned By    Initials Name Provider Type    LB Amanda Hancock, PT Physical Therapist                        PT Assessment/Plan       03/03/17 6721       PT Assessment    Assessment Comments Pt demonstrating a strong initial contact on the right LE when his left step length is not past his right toe.  "Pt eager to learn techniques to improve his gait quality.  -LB       User Key  (r) = Recorded By, (t) = Taken By, (c) = Cosigned By    Initials Name Provider Type    DAKOTA Hancock PT Physical Therapist                     Exercises       03/03/17 0933          Subjective Comments    Subjective Comments \"I have been trying to start with my right leg first.\" (1st step of ambulaiton)   -LB      Subjective Pain    Able to rate subjective pain? yes  -LB      Pre-Treatment Pain Level 0  -LB      Post-Treatment Pain Level 0  -LB        User Key  (r) = Recorded By, (t) = Taken By, (c) = Cosigned By    Initials Name Provider Type    DAKOTA Hancock PT Physical Therapist                                  Therapy Education       03/03/17 1621          Therapy Education    Given Mobility training   Emphasized control swing of the right LE to reduce impact of heelstrike.   -LB      How Provided Verbal  -LB      Provided to Patient  -LB      Level of Understanding Verbalized;Demonstrated  -LB        User Key  (r) = Recorded By, (t) = Taken By, (c) = Cosigned By    Initials Name Provider Type    DAKOTA Hancock PT Physical Therapist                Time Calculation:   Start Time: 0933  Stop Time: 1017  Time Calculation (min): 44 min     Therapy Charges for Today     Code Description Service Date Service Provider Modifiers Qty    25946874012 HC GAIT TRAINING EA 15 MIN 3/3/2017 Amanda Hancock, PT GP 3                    Amanda Hancock PT  3/3/2017     "

## 2024-05-10 PROCEDURE — 77338 DESIGN MLC DEVICE FOR IMRT: CPT | Performed by: RADIOLOGY

## 2024-05-10 PROCEDURE — 77301 RADIOTHERAPY DOSE PLAN IMRT: CPT | Performed by: RADIOLOGY

## 2024-05-10 PROCEDURE — 77300 RADIATION THERAPY DOSE PLAN: CPT | Performed by: RADIOLOGY

## 2024-05-10 PROCEDURE — 77293 RESPIRATOR MOTION MGMT SIMUL: CPT | Performed by: RADIOLOGY

## 2024-05-14 ENCOUNTER — HOSPITAL ENCOUNTER (OUTPATIENT)
Dept: RADIATION ONCOLOGY | Facility: HOSPITAL | Age: 69
Setting detail: RADIATION/ONCOLOGY SERIES
Discharge: HOME | End: 2024-05-14
Payer: MEDICARE

## 2024-05-14 LAB
RAD ONC ARIA COURSE ID: NORMAL
RAD ONC ARIA COURSE INTENT: NORMAL
RAD ONC ARIA COURSE LAST TREATMENT DATE: NORMAL
RAD ONC ARIA COURSE START DATE: NORMAL
RAD ONC ARIA COURSE TREATMENT ELAPSED DAYS: 0
RAD ONC ARIA FIRST TREATMENT DATE: NORMAL
RAD ONC ARIA PLAN FRACTIONS TREATED TO DATE: 1
RAD ONC ARIA PLAN ID: NORMAL
RAD ONC ARIA PLAN PRESCRIBED DOSE PER FRACTION: 10 GY
RAD ONC ARIA PLAN PRIMARY REFERENCE POINT: NORMAL
RAD ONC ARIA PLAN TOTAL FRACTIONS PRESCRIBED: 5
RAD ONC ARIA PLAN TOTAL PRESCRIBED DOSE: 5000 CGY
RAD ONC ARIA REFERENCE POINT DOSAGE GIVEN TO DATE: 10 GY
RAD ONC ARIA REFERENCE POINT ID: NORMAL
RAD ONC ARIA REFERENCE POINT SESSION DOSAGE GIVEN: 10 GY

## 2024-05-14 PROCEDURE — 77435 SBRT MANAGEMENT: CPT | Performed by: RADIOLOGY

## 2024-05-14 PROCEDURE — 77373 STRTCTC BDY RAD THER TX DLVR: CPT | Performed by: RADIOLOGY

## 2024-05-16 ENCOUNTER — HOSPITAL ENCOUNTER (OUTPATIENT)
Dept: RADIATION ONCOLOGY | Facility: HOSPITAL | Age: 69
Setting detail: RADIATION/ONCOLOGY SERIES
Discharge: HOME | End: 2024-05-16
Payer: MEDICARE

## 2024-05-16 LAB

## 2024-05-16 PROCEDURE — 77373 STRTCTC BDY RAD THER TX DLVR: CPT | Performed by: RADIOLOGY

## 2024-05-20 ENCOUNTER — HOSPITAL ENCOUNTER (OUTPATIENT)
Dept: RADIATION ONCOLOGY | Facility: HOSPITAL | Age: 69
Setting detail: RADIATION/ONCOLOGY SERIES
Discharge: HOME | End: 2024-05-20
Payer: MEDICARE

## 2024-05-20 LAB
RAD ONC ARIA COURSE ID: NORMAL
RAD ONC ARIA COURSE INTENT: NORMAL
RAD ONC ARIA COURSE LAST TREATMENT DATE: NORMAL
RAD ONC ARIA COURSE START DATE: NORMAL
RAD ONC ARIA COURSE TREATMENT ELAPSED DAYS: 6
RAD ONC ARIA FIRST TREATMENT DATE: NORMAL
RAD ONC ARIA PLAN FRACTIONS TREATED TO DATE: 3
RAD ONC ARIA PLAN ID: NORMAL
RAD ONC ARIA PLAN PRESCRIBED DOSE PER FRACTION: 10 GY
RAD ONC ARIA PLAN PRIMARY REFERENCE POINT: NORMAL
RAD ONC ARIA PLAN TOTAL FRACTIONS PRESCRIBED: 5
RAD ONC ARIA PLAN TOTAL PRESCRIBED DOSE: 5000 CGY
RAD ONC ARIA REFERENCE POINT DOSAGE GIVEN TO DATE: 30 GY
RAD ONC ARIA REFERENCE POINT ID: NORMAL
RAD ONC ARIA REFERENCE POINT SESSION DOSAGE GIVEN: 10 GY

## 2024-05-20 PROCEDURE — 77373 STRTCTC BDY RAD THER TX DLVR: CPT | Performed by: RADIOLOGY

## 2024-05-20 PROCEDURE — 77336 RADIATION PHYSICS CONSULT: CPT | Performed by: RADIOLOGY

## 2024-05-22 ENCOUNTER — RADIATION ONCOLOGY WEEKLY ASSESSMENT (OUTPATIENT)
Dept: RADIATION ONCOLOGY | Facility: HOSPITAL | Age: 69
End: 2024-05-22
Payer: MEDICARE

## 2024-05-22 ENCOUNTER — HOSPITAL ENCOUNTER (OUTPATIENT)
Dept: RADIATION ONCOLOGY | Facility: HOSPITAL | Age: 69
Setting detail: RADIATION/ONCOLOGY SERIES
Discharge: HOME | End: 2024-05-22
Payer: MEDICARE

## 2024-05-22 VITALS
HEART RATE: 69 BPM | SYSTOLIC BLOOD PRESSURE: 136 MMHG | DIASTOLIC BLOOD PRESSURE: 69 MMHG | WEIGHT: 164 LBS | OXYGEN SATURATION: 97 % | BODY MASS INDEX: 24.94 KG/M2

## 2024-05-22 DIAGNOSIS — R91.1 SOLITARY PULMONARY NODULE: Primary | ICD-10-CM

## 2024-05-22 LAB
RAD ONC ARIA COURSE ID: NORMAL
RAD ONC ARIA COURSE INTENT: NORMAL
RAD ONC ARIA COURSE LAST TREATMENT DATE: NORMAL
RAD ONC ARIA COURSE START DATE: NORMAL
RAD ONC ARIA COURSE TREATMENT ELAPSED DAYS: 8
RAD ONC ARIA FIRST TREATMENT DATE: NORMAL
RAD ONC ARIA PLAN FRACTIONS TREATED TO DATE: 4
RAD ONC ARIA PLAN ID: NORMAL
RAD ONC ARIA PLAN PRESCRIBED DOSE PER FRACTION: 10 GY
RAD ONC ARIA PLAN PRIMARY REFERENCE POINT: NORMAL
RAD ONC ARIA PLAN TOTAL FRACTIONS PRESCRIBED: 5
RAD ONC ARIA PLAN TOTAL PRESCRIBED DOSE: 5000 CGY
RAD ONC ARIA REFERENCE POINT DOSAGE GIVEN TO DATE: 40 GY
RAD ONC ARIA REFERENCE POINT ID: NORMAL
RAD ONC ARIA REFERENCE POINT SESSION DOSAGE GIVEN: 10 GY

## 2024-05-22 PROCEDURE — 77373 STRTCTC BDY RAD THER TX DLVR: CPT | Performed by: RADIOLOGY

## 2024-05-22 NOTE — PROGRESS NOTES
Radiation Oncology  On-Treatment Note      Patient: Sim Agustin    MRN: [id number]    Attending Physician: Yanet Fuentes MD     Diagnosis:     ICD-10-CM ICD-9-CM   1. Solitary pulmonary nodule  R91.1 793.11       Radiation Therapy Visit:  Continue radiation therapy, Dosimetry plan remains acceptable, Films reviewed and remains acceptable, Pain assessed, Pain management planned, Radiation dose schedule reviewed and remains acceptable, Radiation technique remains acceptable, and Symptoms within expected range    Radiation Treatments       Active   Plans   L Lung-SBRT   Most recent treatment: Dose planned: 1,000 cGy (fraction 4 on 5/22/2024)   Total: Dose planned: 5,000 cGy (5 fractions)   Elapsed Days: 8      Reference Points   Rx: SBRT L Lung   Most recent treatment: Dose given: 1,000 cGy (on 5/22/2024)   Total: Dose given: 4,000 cGy   Elapsed Days: 8                      Physical Examination:  Vitals: Blood pressure 136/69, pulse 69, weight 74.4 kg (164 lb), SpO2 97%.  Pain Score    05/22/24 0857   PainSc: 0-No pain       Fully active, able to carry on all pre-disease performance without restriction = 0    We examined the relevant areas: yes  Findings are within the expected range for this stage of treatment: yes  -------------------------------------------------------------------------------------------------------------------    ACTION ITEMS:  Patient tolerating treatment well and as expected for this stage in their treatment    Estimated Completion Date: Friday may 24      Keith Waddell MD  Radiation Oncology

## 2024-05-24 ENCOUNTER — HOSPITAL ENCOUNTER (OUTPATIENT)
Dept: RADIATION ONCOLOGY | Facility: HOSPITAL | Age: 69
Setting detail: RADIATION/ONCOLOGY SERIES
Discharge: HOME | End: 2024-05-24
Payer: MEDICARE

## 2024-05-24 LAB
RAD ONC ARIA COURSE ID: NORMAL
RAD ONC ARIA COURSE INTENT: NORMAL
RAD ONC ARIA COURSE LAST TREATMENT DATE: NORMAL
RAD ONC ARIA COURSE START DATE: NORMAL
RAD ONC ARIA COURSE TREATMENT ELAPSED DAYS: 10
RAD ONC ARIA FIRST TREATMENT DATE: NORMAL
RAD ONC ARIA PLAN FRACTIONS TREATED TO DATE: 5
RAD ONC ARIA PLAN ID: NORMAL
RAD ONC ARIA PLAN PRESCRIBED DOSE PER FRACTION: 10 GY
RAD ONC ARIA PLAN PRIMARY REFERENCE POINT: NORMAL
RAD ONC ARIA PLAN TOTAL FRACTIONS PRESCRIBED: 5
RAD ONC ARIA PLAN TOTAL PRESCRIBED DOSE: 5000 CGY
RAD ONC ARIA REFERENCE POINT DOSAGE GIVEN TO DATE: 50 GY
RAD ONC ARIA REFERENCE POINT ID: NORMAL
RAD ONC ARIA REFERENCE POINT SESSION DOSAGE GIVEN: 10 GY

## 2024-05-24 PROCEDURE — 77373 STRTCTC BDY RAD THER TX DLVR: CPT | Performed by: RADIOLOGY

## 2024-05-24 PROCEDURE — 77336 RADIATION PHYSICS CONSULT: CPT | Performed by: RADIOLOGY

## 2024-05-28 DIAGNOSIS — R91.1 SOLITARY PULMONARY NODULE: Primary | ICD-10-CM

## 2024-05-31 LAB
RAD ONC ARIA COURSE END DATE: NORMAL
RAD ONC ARIA COURSE ID: NORMAL
RAD ONC ARIA COURSE INTENT: NORMAL
RAD ONC ARIA COURSE LAST TREATMENT DATE: NORMAL
RAD ONC ARIA COURSE START DATE: NORMAL
RAD ONC ARIA COURSE TREATMENT ELAPSED DAYS: 10
RAD ONC ARIA FIRST TREATMENT DATE: NORMAL
RAD ONC ARIA PLAN FRACTIONS TREATED TO DATE: 5
RAD ONC ARIA PLAN ID: NORMAL
RAD ONC ARIA PLAN NAME: NORMAL
RAD ONC ARIA PLAN PRESCRIBED DOSE PER FRACTION: 10 GY
RAD ONC ARIA PLAN PRIMARY REFERENCE POINT: NORMAL
RAD ONC ARIA PLAN TOTAL FRACTIONS PRESCRIBED: 5
RAD ONC ARIA PLAN TOTAL PRESCRIBED DOSE: 5000 CGY
RAD ONC ARIA REFERENCE POINT DOSAGE GIVEN TO DATE: 50 GY
RAD ONC ARIA REFERENCE POINT ID: NORMAL

## 2024-06-05 ENCOUNTER — PATIENT OUTREACH (OUTPATIENT)
Dept: OTHER | Facility: HOSPITAL | Age: 69
End: 2024-06-05
Payer: MEDICARE

## 2024-06-05 DIAGNOSIS — G89.29 CHRONIC MIDLINE LOW BACK PAIN WITHOUT SCIATICA: ICD-10-CM

## 2024-06-05 DIAGNOSIS — M54.50 CHRONIC MIDLINE LOW BACK PAIN WITHOUT SCIATICA: ICD-10-CM

## 2024-06-05 RX ORDER — TRAMADOL HYDROCHLORIDE 50 MG/1
50 TABLET ORAL 2 TIMES DAILY
Qty: 180 TABLET | Refills: 0 | Status: SHIPPED | OUTPATIENT
Start: 2024-06-05

## 2024-06-05 NOTE — TELEPHONE ENCOUNTER
Rx Refill Note  Requested Prescriptions     Pending Prescriptions Disp Refills    traMADol (ULTRAM) 50 MG tablet 180 tablet 0     Sig: Take 1 tablet by mouth 2 (Two) Times a Day.      Last office visit with prescribing clinician: 1/4/2024   Last telemedicine visit with prescribing clinician: Visit date not found   Next office visit with prescribing clinician: Visit date not found    3/6/24

## 2024-06-05 NOTE — PROGRESS NOTES
Reviewed chart. Reviewed chart. Patient with newly diagnosed adenocarcinoma of left upper lobe lung [id number]. Rec'd radiation 5/14-5/24; CT scheduled 8/19, sees Dr. Fuentes 8/28    Called patient. Left message that I was just touching base to see how he was doing since completing treatment. Wanted to know if he had any questions/concerns or resource/supportive care needs; requested cb    Call from patient. He states he tolerated treatment fairly well. He complains of fatigue although denies any other side effects.  We discussed common side effects to radiation, average onset and duration.  Patient verbalized understanding.     The patient denies any questions/concerns or ongoing resource needs. He plans to call Winifred to schedule a massage appt soon.  I will call in August after his appts; encouraged patient to call as needed.

## 2024-06-28 PROBLEM — C34.92 ADENOCARCINOMA, LUNG, LEFT: Status: ACTIVE | Noted: 2024-06-28

## 2024-07-05 DIAGNOSIS — M1A.3490 CHRONIC GOUT OF HAND DUE TO RENAL IMPAIRMENT WITHOUT TOPHUS, UNSPECIFIED LATERALITY: ICD-10-CM

## 2024-07-05 RX ORDER — ALLOPURINOL 100 MG/1
TABLET ORAL
Qty: 90 TABLET | Refills: 3 | Status: SHIPPED | OUTPATIENT
Start: 2024-07-05

## 2024-07-05 NOTE — TELEPHONE ENCOUNTER
Rx Refill Note  Requested Prescriptions     Pending Prescriptions Disp Refills    allopurinol (ZYLOPRIM) 100 MG tablet [Pharmacy Med Name: ALLOPURINOL 100 MG TABLET] 90 tablet 3     Sig: TAKE 1 TABLET BY MOUTH DAILY FOR GOUT      Last office visit with prescribing clinician: 1/4/2024   Last telemedicine visit with prescribing clinician: Visit date not found   Next office visit with prescribing clinician: Visit date not found                         Would you like a call back once the refill request has been completed: [] Yes [] No    If the office needs to give you a call back, can they leave a voicemail: [] Yes [] No    Sahron Torres MA  07/05/24, 09:07 EDT

## 2024-07-10 ENCOUNTER — OFFICE VISIT (OUTPATIENT)
Dept: OTHER | Facility: HOSPITAL | Age: 69
End: 2024-07-10
Payer: MEDICARE

## 2024-07-10 DIAGNOSIS — C34.92 ADENOCARCINOMA, LUNG, LEFT: Primary | ICD-10-CM

## 2024-07-10 PROCEDURE — G0463 HOSPITAL OUTPT CLINIC VISIT: HCPCS | Performed by: NURSE PRACTITIONER

## 2024-07-10 NOTE — PROGRESS NOTES
[facility] MULTIDISCIPLINARY CLINIC   IN CLINIC Initial Visit  Survivorship Care Plan Treatment Summary      Sim Agustin is a pleasant 68 y.o. male being followed by Yanet Fuentes MD for left lung adenocarcinoma. Reviewed today in Multidisciplinary Clinic, for initial Survivorship Care Plan Treatment Summary    HPI  68 Year old with hypertension, hypoerlipidemiaCAD, PAD s/p stents on plavix, stage III CKD, hypothyroidism, COPD, history of cigarette smoking 30 pack years, quit 2021, s/p AKA, chronic back pain on tramadol, anxiety disorder on long term benzos unable to tolerate dose reductions in the past, PRISCILLA on bipap.    Diagnosed April after ION bronchoscopy with left lung adenocarcinoma, PD-L1 2%. Completed a course of SBRT 5/24/24.    Generally recovering well. Fatigue present but improving. Less appetite this week but did experience increased appetite after completion of radiation. Weight stable. Denies fevers, chills. Denies shortness of breath, chest pain or tightness.     Distress: 2  PHQ-9: 3 1-4 (minimal depression sx)  KHARI-7: 1 0-4 (minimal anxiety sx)    TREATMENT HISTORY:     Oncology/Hematology History   Adenocarcinoma, lung, left   5/20/2022 Biopsy    Final Diagnosis   1. Lung, Left Lower Lobe, Bronchoalveolar Lavage (BAL):               A. Negative for malignant cells.               B. Pulmonary macrophages, benign and reactive bronchial cells, mild acute inflammation, mucin, and blood.               C. Rare fungal element noted on GMS stain (control reacted appropriately); see comment.     2. Lung, Left Lower Lobe, Brushings:               A. Negative for malignant cells.               B. Rare pulmonary macrophage, benign bronchial cells, neutrophils, and blood.     Final Diagnosis   1. Lung, Left Lower Lobe, Transbronchial Biopsy:               A. Fragments of benign anthracotic small airway wall and alveolar parenchyma with organizing pneumonia.               B. Negative for  granulomas, viral cytopathic effect, vasculitis and neoplasm.        3/3/2024 Imaging    CT CHEST WITHOUT CONTRAST     HISTORY: Follow-up lung nodule     TECHNIQUE: Radiation dose reduction techniques were utilized, including  automated exposure control and exposure modulation based on body size.  CT scan of the chest without the administration of IV contrast was  performed. Coronal and sagittal reformatted images obtained.     COMPARISON: 11/18/2023, 5/18/2022, 5/19/2021     FINDINGS: There is a small 13 mm spiculated nodule in the central left  upper lobe that has shown progressive increase in size and density  compared with 2021, when at that time it measured only about 7 mm and  was more groundglass. This is suspicious for a small slow-growing  malignancy. There is some nodular infiltrate elsewhere in the left upper  lobe that was seen on the prior study that has since decreased with some  mild residual nodular infiltrate seen in the lingula. This nodular  infiltrate is most likely resolving infectious/inflammatory disease.  There is increased nodular infiltrate and consolidation located in the  left lower lobe. This is favored to be infectious/inflammatory. There is  a new micronodule in the right lower lobe on image 81. There is no  appreciable lymphadenopathy. Coronary artery calcifications are present.  No pleural effusion. Limited imaging the upper abdomen shows  cholelithiasis. Bone windows show old rib fractures on the left.     IMPRESSION:  1. There is a small 13 mm spiculated nodule in the central left upper  lobe that has shown progressive increase in size and density and is  suspicious for a slow-growing malignancy. Recommend further evaluation  with PET/CT and referral to [facility] multidisciplinary lung care clinic  2. There is increased nodular infiltrate and consolidation located in  the left lower lobe. This is favored to be infectious/inflammatory,  however could also potentially be malignancy and  can be evaluated on  PET/CT as well  3. Additional findings as described        3/13/2024 Imaging    F-18 FDG PET SKULL BASE TO MID THIGH WITH PET CT FUSION.     HISTORY: 68-year-old male with a pulmonary nodule.     TECHNIQUE: Radiation dose reduction techniques were utilized, including  automated exposure control and exposure modulation based on body size.   Blood glucose level at time of injection was 95 mg/dL. 6.8 mCi of F-18  FDG were injected and PET was performed from skull base to mid thigh. CT  was obtained for localization and attenuation correction. Time at  injection 12:40 p.m. PET start time 2 p.m. Compared with chest CT  03/03/2024.     FINDINGS: Mediastinal blood pool has a maximal SUV of 1.8.     1. The spiculated 1.3 cm nodule in the perihilar region of the left  upper lobe has a maximal SUV of 3.9. The nodule has progressed in size  and density since 2021 and is suspicious for a lung malignancy. The  nodule is along vessels and is not easily accessible for CT-guided  biopsy. There is no hypermetabolic mediastinal or hilar lymphadenopathy.  Mediastinal nodes have metabolic activity within blood pool range.     2. There is overall no significant change in the large irregular  airspace consolidation throughout most of the left lower lobe and to a  much lesser degree at the lingula. The consolidations are new since  11/18/2023 and are suspicious for pneumonia. Most intense activity is in  the perihilar region SUV 2.8 and the consolidation at the lung bases is  SUV 2.5. The lingular opacity is essentially photopenic.  Mucus/secretions are noted within the dependent aspect of the trachea.  Continued followup is recommended.     3. There is no suspicious hypermetabolic activity within the neck. There  is no suspicious hypermetabolic activity within the abdomen or pelvis.  Incidental findings include cholelithiasis, 3.6 cm right renal cyst,  moderate left renal atrophy, right axillary bifemoral bypass  and  occluded aorta distal to the renal arteries.     3/26/2024 Imaging    CT CHEST WITHOUT CONTRAST     HISTORY: Lung nodule, preop for bronchoscopy     TECHNIQUE: Radiation dose reduction techniques were utilized, including  automated exposure control and exposure modulation based on body size.  CT scan of the chest without the administration of IV contrast was  performed. Coronal and sagittal reformatted images obtained.     COMPARISON: 3/3/2024     FINDINGS: 14 mm spiculated central left upper lobe nodule is not  significantly changed in size and appearance and remains suspicious for  neoplasm. The nodular infiltrates and consolidations in the left lower  lobe have decreased and most likely reflecting resolving pneumonia. Few  tiny micronodules in the right lung are stable. No appreciable  lymphadenopathy. Coronary artery calcifications are present. No pleural  effusion. Limited imaging of the upper abdomen demonstrates  cholelithiasis. Bone windows show old rib fractures on the left     IMPRESSION:  No significant change in the spiculated central left upper lobe nodule  measuring about 14 mm which is suspicious for neoplasm. The nodular  infiltrates and consolidations in the left lower lobe have decreased and  most likely reflect resolving pneumonia     4/12/2024 Initial Diagnosis    Adenocarcinoma, lung, left     4/12/2024 Biopsy    Final Diagnosis   Fluid, Lung, Left Upper Lobe, Bronchoalveolar Lavage (BAL):   A. Rare cluster of atypical cells present, suspicious for adenocarcinoma.     Final Diagnosis   1.  Lung, left upper lobe, Ion-guided biopsies: INVASIVE MODERATELY DIFFERENTIATED ADENOCARCINOMA OF PULMONARY ORIGIN.     Final Diagnosis   Lung, Left Upper Lobe, Ion Robot-Guided Fine Needle Aspiration (ION FNA):  ADENOCARCINOMA.       PD-L1: 2%     5/14/2024 - 5/24/2024 Radiation    Radiation OncologyTreatment Course:  Sim Agustin received 5000 cGy in 5 fractions to left lung.         Past Medical  History:   Diagnosis Date    Acute respiratory failure with hypoxia and hypercarbia 03/11/2019    Acute upper respiratory infection 02/06/2013    Amputee, above knee, left     Anemia     per [place] database    ARF (acute renal failure) 03/04/2016    Asthma 02/02/2018    Atelectasis, left 03/04/2016    Bradycardia 05/23/2019    CAD (coronary artery disease) 04/04/2016    Chronic obstructive pulmonary disease with acute exacerbation 02/06/2013    Clotting disorder 05/01/2003    Colon polyps     Compartment syndrome     AFTER ILIAC SURGERY. ABOVE KNEE AMPUTATION    COPD (chronic obstructive pulmonary disease)     COPD with hypoxia 05/23/2019    Cough     SINCE APRIL WITH PNEUMONIA.     Demand myocardial infarction 05/19/2022    Disease of thyroid gland     HYPOTHYRODISM    Diverticulosis     Elevated hematocrit 01/06/2021    Employs prosthetic leg     ESRD (end stage renal disease) on dialysis 04/06/2016    Fracture of patella 03/03/2015    History of acute renal failure     History of CHF (congestive heart failure)     History of GI bleed 02/2016    AORTODUOD FISTULA    History of sepsis 02/2016    History of transfusion     MULTIPLE, 2016    Hyperkalemia 04/11/2016    Hyperlipidemia     Hypertension     Hypotension     Hypotension 03/04/2016    Left lower lobe pneumonia 05/18/2022    Low back pain 10/2016    Lung neoplasm     Nonischemic cardiomyopathy     Nosocomial pneumonia 04/06/2016    Phantom limb pain     SL, WITH LEFT ABOUVE KNEE    Pleural effusion on left 04/11/2016    Pneumonia     SPRING 2016  AFTER SURGERY    Pulmonary embolism     PVD (peripheral vascular disease)     Renal insufficiency     S/P thoracentesis 04/11/2016    Sepsis, unspecified organism 04/05/2016       Past Surgical History:   Procedure Laterality Date    ABOVE KNEE AMPUTATION Left 03/16/2016    Procedure: LT AMPUTATION ABOVE KNEE;  Surgeon: Jose Swann MD;  Location: [facility];  Service:     ARTERIAL THROMBECTOMY  2001     throbectomy of arterial graft    AXILLARY FEMORAL BYPASS Right 02/15/2016    Exploratory lapartomy, repair aortoduodenal fistula, resection infected aortobifemoral bypass graft, axillobi-superficial femoral artery [place]-Aneudy bypass graft from right axillary artery, Repair of duodenotomy 4th portion duodenum-Dr. Jose Swann, Dr. Genaro Perrin    BRONCHOSCOPY Left 03/04/2016    Dr. NATALIIA Tate    BRONCHOSCOPY Left 05/20/2022    Procedure: BRONCHOSCOPY WITH BIOPSIES, BRUSHINGS, AND BAL UNDER FLUORO;  Surgeon: Ishmael Tate Jr., MD;  Location: [facility] ENDOSCOPY;  Service: Pulmonary;  Laterality: Left;  PRE OP - LLL CONSOLIDATION  POST OP - SAME    BRONCHOSCOPY RIGID / FLEXIBLE N/A 03/03/2016    Dr. NATALIIA Tate    BRONCHOSCOPY RIGID / FLEXIBLE N/A 02/26/2016    Dr. NATALIIA Tate    BRONCHOSCOPY WITH ION ROBOTIC ASSIST N/A 4/12/2024    Procedure: BRONCHOSCOPY-ION! with biopsy AND FNA AND BAL;  Surgeon: Jono Rebolledo MD PhD;  Location: [facility] ENDOSCOPY;  Service: Robotics - Pulmonary;  Laterality: N/A;  PRE OP - PULMONARY NODULE  POST OP - SAME    CARDIAC CATHETERIZATION N/A 03/18/2019    Procedure: Right and Left Heart Cath;  Surgeon: Nina Storey MD;  Location: [facility] CATH INVASIVE LOCATION;  Service: Cardiovascular    CATARACT EXTRACTION WITH INTRAOCULAR LENS IMPLANT Bilateral     COLON RESECTION WITH COLOSTOMY Left 02/28/2016    Exploratory laparotomy with open left hemicolectomy, end-colostomy, G-tube and J-tube-Dr. Endy Chaney    COLON SURGERY  3/25/2015    COLOSTOMY    COLONOSCOPY N/A 2015    Dr. Laughlin    COLONOSCOPY N/A 10/12/2016    Procedure: COLONOSCOPY TO 20 CM AND PER STOMA TO 30CM;  Surgeon: Genaro Perrin MD;  Location: [facility] ENDOSCOPY;  Service:     COLONOSCOPY N/A 10/21/2016    Procedure: COLONOSCOPY DONE AT BEDSIDE ONTO CECEM;  Surgeon: Genaro Perrin MD;  Location: [facility] ENDOSCOPY;  Service:     COLONOSCOPY N/A 02/10/2016    Diverticulosis in the entire examined  colon, non-bleeding internal hemorrhoids-Dr. Taylor Pina    COLONOSCOPY N/A 02/11/2016    Poor prep, blood in the entire examined colon, normal ileum-Dr. Taylor Pina    COLONOSCOPY W/ POLYPECTOMY N/A 07/27/2015    Normal ileum, diverticulosis in the sigmoid colon: resected and retrieved, one 6 mm polyp in the descending colon: resected and retrieved, the distal rectum and anal verge are normal on retroflexion view-Dr. Miguel Ángel Laughlin    COLOSTOMY CLOSURE N/A 10/13/2016    Procedure: COLOSTOMY TAKEDOWN OR CLOSURE, APPENDECTOMY;  Surgeon: Genaro Perrin MD;  Location: [facility] OR;  Service:     DEBRIDEMENT LEG Left 03/01/2016    Excisional debridement of the skin, subcutantous tissue, tendon and muscle left calf-Dr. Jose Swann    DEBRIDEMENT LEG Left 02/25/2016    Excisional debridement full-thcikness skin and subcutaneous tissue and tendon and muscle, left medial and lateral calf muscles-Dr. Jose Swann    ENDOSCOPY N/A 10/21/2016    Procedure: ESOPHAGOGASTRODUODENOSCOPY DONE AT BEDSIDE;  Surgeon: Genaro Perrin MD;  Location: [facility] ENDOSCOPY;  Service:     ENDOSCOPY AND COLONOSCOPY N/A 02/28/2016    EGD and Colonoscopy with Candy ink injection-Dr. Endy Chaney    ENTEROSCOPY SMALL BOWEL N/A 02/11/2016    Normal esophagus, normal stomach, normal duodenum, portion of the jejunum normal-Dr. Taylor Pina    ILIAC ARTERY - FEMORAL ARTERY BYPASS GRAFT Bilateral 2002    ILIAC ARTERY STENT Bilateral 2001    INSERTION AND REMOVAL PERITONEAL DIALYSIS CATHETER Right 02/29/2016    Exchange right jugular 16 cm [place] dialysis catheter-Dr. Jose Swann    INSERTION PERITONEAL DIALYSIS CATHETER Left 03/01/2016    Ultrasound-guided access of the left jugular vein, 23 cm left jugular palindrome dialysis catheter placement-Dr. Jose Swann    INSERTION PERITONEAL DIALYSIS CATHETER Right 02/18/2016    Right jugular Mahrkar catherer placement-Dr. Jose Swann    JOINT REPLACEMENT Left      THORACOSCOPY Left 04/18/2016    Procedure: LT THORACOSCOPY VIDEO ASSISTED WITH DECORDICATION;  Surgeon: Genaro Davila III, MD;  [facility];  Service:     THROMBECTOMY Left 02/16/2016    Thombectomy of left femoral graft, left leg arteriogram, left calf forward compartment fasciotomy-Dr. Jose Swann    TOTAL HIP ARTHROPLASTY Left     UPPER GASTROINTESTINAL ENDOSCOPY N/A 02/09/2016    Normal UGI-Dr. Ajay Parkinson    UPPER GASTROINTESTINAL ENDOSCOPY N/A 11/28/2015    Small hiatal hernia, chronic gastritis: biopsied, non-bleeding angioectasias in the stomach: treated with argon plasma coagulation, multiple bleeding angioectasias in the dudenum: treated with argon plasma coagulation-Dr. Mykel Jaramillo    VASCULAR SURGERY      MULTIPLE    VENTRAL/INCISIONAL HERNIA REPAIR N/A 10/19/2017    Procedure: VENTRAL HERNIA REPAIR WITH MESH AND BILATERAL COMPONENT SEPARATION;  Surgeon: Genaro Perrin MD;  Location: [facility];  Service:     WISDOM TOOTH EXTRACTION         Social History     Socioeconomic History    Marital status:    Tobacco Use    Smoking status: Former     Current packs/day: 0.00     Average packs/day: 1 pack/day for 47.0 years (47.0 ttl pk-yrs)     Types: Cigarettes     Start date: 5/1/1974     Quit date: 5/1/2021     Years since quitting: 3.1     Passive exposure: Never    Smokeless tobacco: Never    Tobacco comments:     caffeine - rarely   Vaping Use    Vaping status: Never Used   Substance and Sexual Activity    Alcohol use: No    Drug use: Not Currently     Types: Other     Comment: THC gummies    Sexual activity: Not Currently     Partners: Female     Birth control/protection: Condom, Post-menopausal         LDH   Date Value Ref Range Status   04/15/2016 165 121 - 224 IU/L Final     Uric Acid   Date Value Ref Range Status   03/02/2023 7.6 (H) 3.4 - 7.0 mg/dL Final         Lab Results   Component Value Date    GLUCOSE 91 03/25/2024    BUN 31 (H) 03/25/2024    CREATININE  1.83 (H) 03/25/2024    EGFRIFNONA 44 (L) 09/10/2021    EGFRIFAFRI 51 (L) 09/10/2021    BCR 16.9 03/25/2024    K 4.6 03/25/2024    CO2 27.0 03/25/2024    CALCIUM 9.8 03/25/2024    PROTENTOTREF 6.9 09/15/2022    ALBUMIN 2.8 (L) 12/24/2023    LABIL2 2.0 09/15/2022    AST 29 12/22/2023    ALT 45 (H) 12/22/2023       CBC w/diff          12/23/2023    04:01 12/24/2023    05:51 3/25/2024    09:16   CBC w/Diff   WBC 12.77  12.12  7.47    RBC 3.22  3.24  4.27    Hemoglobin 9.9  10.1  12.4    Hematocrit 29.2  29.7  38.0    MCV 90.7  91.7  89.0    MCH 30.7  31.2  29.0    MCHC 33.9  34.0  32.6    RDW 14.3  14.5  15.8    Platelets 289  368  222        Allergies as of 07/10/2024 - Reviewed 07/10/2024   Allergen Reaction Noted    Augmentin [amoxicillin-pot clavulanate] Hives and Rash 02/08/2014    Zosyn [piperacillin sod-tazobactam so] Rash 01/04/2024        MEDICATIONS:  Patient medication list reviewed today    Review of Systems   Constitutional:  Positive for appetite change and fatigue. Negative for activity change and unexpected weight change.   Respiratory:  Positive for cough. Negative for chest tightness and shortness of breath.    Cardiovascular:  Negative for chest pain and leg swelling.   Gastrointestinal:  Negative for blood in stool, constipation and diarrhea.   Genitourinary:  Negative for dysuria and hematuria.   Musculoskeletal:  Positive for back pain (chronic). Negative for arthralgias and myalgias.   Neurological:  Negative for dizziness, weakness, light-headedness and numbness.   Psychiatric/Behavioral:  Negative for decreased concentration, dysphoric mood and sleep disturbance. The patient is not nervous/anxious.        There were no vitals taken for this visit.    Wt Readings from Last 3 Encounters:   05/22/24 74.4 kg (164 lb)   04/24/24 67.1 kg (148 lb)   04/12/24 68.5 kg (151 lb)        There were no vitals filed for this visit.        Physical Exam  Constitutional:       Appearance: Normal appearance. He  is well-groomed.   HENT:      Head: Normocephalic and atraumatic.   Cardiovascular:      Rate and Rhythm: Normal rate and regular rhythm.   Pulmonary:      Effort: No tachypnea or respiratory distress.      Breath sounds: Examination of the right-upper field reveals rhonchi. Examination of the left-upper field reveals rhonchi. Rhonchi present.   Abdominal:      General: Abdomen is flat. There is no distension.   Musculoskeletal:      Right ankle: No swelling. No tenderness. Normal pulse.      Left ankle: No swelling. No tenderness. Normal pulse.      Comments: VIKI PUENTE with prosthetic   Skin:     General: Skin is warm and dry.   Neurological:      Mental Status: He is alert and oriented to person, place, and time.      Gait: Gait abnormal (stiff, uses cane).   Psychiatric:         Attention and Perception: Attention and perception normal.         Mood and Affect: Mood and affect normal.         Speech: Speech normal.         Behavior: Behavior normal. Behavior is cooperative.         Thought Content: Thought content normal.           Advance Care Planning     Patient does not have advance care planning complete, we do not have a copy on file.    Patient has not designated a healthcare surrogate. He thinks he had completed this in the past with his prior PCP.     He has had conversations with current PCP Dr Hetser regarding this and plans to complete at his next follow up there    Arrangements for further assistance with advance care planning can be made by scheduling an appointment with myself or another advance care planning facilitator at their convenience. Patients may also call the toll free [facility] ACP Help Line at [phone].           DISCUSSION HELD TODAY:   Discussed NCCN recommendations for all cancer survivors of 150 minutes/week moderate intensity exercise, achieve and maintain a healthy weight, plants-based whole-foods diet, avoid tobacco and second hand smoke, avoid alcohol or minimize alcohol  intake - no more than 1 drink in a day for adults.     A copy of the Survivorship Treatment Summary & Care Plan for Mr. Agustin was provided to and forwarded to the providers identified on the care team.    Fatigue: Mild fatigue, definitely improving since completion of radiation. Sleeping well. Having to get his PAP device mask updated this week.  Dyspnea: He feels this is at baseline. Reports a congested cough that is his baseline. Productive at times, denies hemoptysis. Denies chest pain or tightness.   Appetite: Had increased significantly since radiation completion but the last week has decreased. Reports no changes in bowel or bladder, denies blood in urine or stool. Weight has remained stable   Surveillance: Discussed he will continue routine imaging of his chest to monitor his progress. Last c-scope 2016 10-year recall. Last PSA reviewed 3/2023 0.7     Plan and recommendations:  Continue physical activity as tolerated  Surveillance as above  PET/CT 8/19  FU Dr Fuentes 8/28  [facility] Nephrology September FU Pulmonary Dr Tate September FU Vascular Dr Lind 10/16  Surveillance as above  No formal follow up arranged for now. Return to clinic as needed.  Call my office as needed at any point for additional information, resources or support at [phone]      Diagnoses and all orders for this visit:    1. Adenocarcinoma, lung, left (Primary)            I spent 40 minutes caring for this patient on this date of service by face-to-face counseling. This time includes time spent by me in the following activities: preparing for the visit, reviewing tests, performing a medically appropriate examination and/or evaluation, counseling and educating the patient/family/caregiver, referring and communicating with other health care professionals, documenting information in the medical record, care coordination, obtaining a separately obtained history, and reviewing a separately obtained history

## 2024-08-19 ENCOUNTER — HOSPITAL ENCOUNTER (OUTPATIENT)
Dept: PET IMAGING | Facility: HOSPITAL | Age: 69
Discharge: HOME | End: 2024-08-19
Admitting: RADIOLOGY
Payer: MEDICARE

## 2024-08-19 DIAGNOSIS — R91.1 SOLITARY PULMONARY NODULE: ICD-10-CM

## 2024-08-19 LAB — CREAT BLDA-MCNC: 2.1 MG/DL (ref 0.6–1.3)

## 2024-08-19 PROCEDURE — 71250 CT THORAX DX C-: CPT

## 2024-08-19 PROCEDURE — 82565 ASSAY OF CREATININE: CPT

## 2024-08-23 ENCOUNTER — DOCUMENTATION (OUTPATIENT)
Dept: RADIATION ONCOLOGY | Facility: HOSPITAL | Age: 69
End: 2024-08-23
Payer: MEDICARE

## 2024-08-23 DIAGNOSIS — C34.92 ADENOCARCINOMA, LUNG, LEFT: Primary | ICD-10-CM

## 2024-08-23 NOTE — PROGRESS NOTES
Radiation Treatment Summary Note      Patient Name: Sim Agustin  : 1955    Attending Provider: Yanet Fuentes MD      Diagnosis:     ICD-10-CM ICD-9-CM   1. Adenocarcinoma, lung, left  C34.92 162.9       Radiation Start Date: 24    Radiation Completion Date: 24      Prescription:     He received a dose of 50Gy in 5 fractions to the Left lung nodule using SBRT with 6 mv photons     Final Delivered Dose Deviated From Initially Prescribed Dose: No    Concurrent Chemotherapy: No    Patient Tolerated Treatment Without Unexpected Side Effects/Complications: Yes    ECOG: Restricted in physically strenuous activity but ambulatory and able to carry out work of a light or sedentary nature, e.g., light house work, office work = 1    Pain Management Plan: None Indicated/PRN OTC    Follow-Up Plan: 3 months    Imaging Ordered for Follow-Up: Yes, describe: CT priior to follow up         Yanet Fuentes MD

## 2024-08-27 ENCOUNTER — TELEPHONE (OUTPATIENT)
Dept: RADIATION ONCOLOGY | Facility: HOSPITAL | Age: 69
End: 2024-08-27
Payer: MEDICARE

## 2024-08-28 ENCOUNTER — OFFICE VISIT (OUTPATIENT)
Dept: RADIATION ONCOLOGY | Facility: HOSPITAL | Age: 69
End: 2024-08-28
Payer: MEDICARE

## 2024-08-28 VITALS
HEART RATE: 74 BPM | DIASTOLIC BLOOD PRESSURE: 66 MMHG | SYSTOLIC BLOOD PRESSURE: 171 MMHG | WEIGHT: 166 LBS | OXYGEN SATURATION: 92 % | BODY MASS INDEX: 25.24 KG/M2

## 2024-08-28 DIAGNOSIS — C34.92 ADENOCARCINOMA, LUNG, LEFT: Primary | ICD-10-CM

## 2024-08-28 PROCEDURE — G0463 HOSPITAL OUTPT CLINIC VISIT: HCPCS | Performed by: RADIOLOGY

## 2024-08-28 NOTE — PROGRESS NOTES
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  The patient is a 68 y.o. male with cT1N0 non small cell lung cancer.  He has a past medical hx significant for peripheral vascular disease with multiple stents and extremity bypass, on Plavix and aspirin chronically for this. He has a history of COPD and is s/p left VATS with decortication for empyema in 2016. He is a previous smoker and smoked approximately 1 pack/day for roughly 35 years.  He stopped smoking 3 years ago.      He had a low dose screening lung CT on 11/22/23 which showed a new 12mm lesion left upper lobe with scattered miconodules in both lungs and diffuse reiculonodular infiltrate in left lower lobe.  He had a PET scan on 3/15/24 which showed spiculated 1.3 cm nodule in the perihilar region of the leftupper lobe has a maximal SUV of 3.9. The nodule has progressed in sizeand density since 2021 and is suspicious for a lung malignancy. The nodule is along vessels and is not easily accessible for CT-guidedbiopsy. There is no hypermetabolic mediastinal or hilar lymphadenopathy.  no significant change in the large irregularairspace consolidation throughout most of the left lower lobe and to amuch lesser degree at the lingula. The consolidations are new since11/18/2023 and are suspicious for pneumonia.     He had a repeat CT chest without contrast on 3/26/24 which showed no change in spiculated left upper lobe nodule 14mm in size suspicous for neoplasm and decreased consolidation in left lower lobe reflecting resolving pneumonia.      He underwent ion bronchoscopy on 4/12/24 with Dr. Rebolledo with pathology from fna of left upper lobe revealing adenocarcinoma, PDL 1 2%.       He returns today for follow up now 3 months out from completion of his stereotactic radiotherapy to the left lung  cancer.  He completed a dose of 50gy in 5 fractions on 5/24/24.   He has been doing well since I last saw him.    He had a CT chest on 8/19/24 which showed development of left upper lobe dense consolidation extending from the left hilum to the posterior lateral pleural surface where there is thickening. This area envelops the previously demonstrated spiculated left upper lobe tumor.   Improving bronchial wall thickening, reticulonodular infiltrate andnodular foci of consolidation within the left lower lobe. Near completeresolution of the reticulonodular densities at the base of the leftupper lobe. The right lung remains clear. No new pulmonary noduledemonstrated within the right or left lung    Review of Systems      Past Medical History:   Diagnosis Date    Acute respiratory failure with hypoxia and hypercarbia 03/11/2019    Acute upper respiratory infection 02/06/2013    Amputee, above knee, left     Anemia     [facility]    ARF (acute renal failure) 03/04/2016    Asthma 02/02/2018    Atelectasis, left 03/04/2016    Bradycardia 05/23/2019    CAD (coronary artery disease) 04/04/2016    Chronic obstructive pulmonary disease with acute exacerbation 02/06/2013    Clotting disorder 05/01/2003    Colon polyps     Compartment syndrome     AFTER ILIAC SURGERY. ABOVE KNEE AMPUTATION    COPD (chronic obstructive pulmonary disease)     COPD with hypoxia 05/23/2019    Cough     SINCE APRIL WITH PNEUMONIA.     Demand myocardial infarction 05/19/2022    Disease of thyroid gland     HYPOTHYRODISM    Diverticulosis     Elevated hematocrit 01/06/2021    Employs prosthetic leg     ESRD (end stage renal disease) on dialysis 04/06/2016    Fracture of patella 03/03/2015    History of acute renal failure     History of CHF (congestive heart failure)     History of GI bleed 02/2016    AORTODUOD FISTULA    History of sepsis 02/2016    History of transfusion     MULTIPLE, 2016    Hyperkalemia 04/11/2016    Hyperlipidemia      Hypertension     Hypotension     Hypotension 03/04/2016    Left lower lobe pneumonia 05/18/2022    Low back pain 10/2016    Lung neoplasm     Nonischemic cardiomyopathy     Nosocomial pneumonia 04/06/2016    Phantom limb pain     SL, WITH LEFT ABOUVE KNEE    Pleural effusion on left 04/11/2016    Pneumonia     SPRING 2016  AFTER SURGERY    Pulmonary embolism     PVD (peripheral vascular disease)     Renal insufficiency     S/P thoracentesis 04/11/2016    Sepsis, unspecified organism 04/05/2016         Past Surgical History:   Procedure Laterality Date    ABOVE KNEE AMPUTATION Left 03/16/2016    Procedure: LT AMPUTATION ABOVE KNEE;  Surgeon: Jose Swann MD;  Location: [facility] MAIN OR;  Service:     ARTERIAL THROMBECTOMY  2001    throbectomy of arterial graft    AXILLARY FEMORAL BYPASS Right 02/15/2016    Exploratory lapartomy, repair aortoduodenal fistula, resection infected aortobifemoral bypass graft, axillobi-superficial femoral artery [place]-Aneudy bypass graft from right axillary artery, Repair of duodenotomy 4th portion duodenum-Dr. Jose Swann, Dr. Genaro Perrin    BRONCHOSCOPY Left 03/04/2016    Dr. NATALIIA Tate    BRONCHOSCOPY Left 05/20/2022    Procedure: BRONCHOSCOPY WITH BIOPSIES, BRUSHINGS, AND BAL UNDER FLUORO;  Surgeon: Ishmael Tate Jr., MD;  Location: [facility] ENDOSCOPY;  Service: Pulmonary;  Laterality: Left;  PRE OP - LLL CONSOLIDATION  POST OP - SAME    BRONCHOSCOPY RIGID / FLEXIBLE N/A 03/03/2016    Dr. NATALIIA Tate    BRONCHOSCOPY RIGID / FLEXIBLE N/A 02/26/2016    Dr. NATALIIA Tate    BRONCHOSCOPY WITH ION ROBOTIC ASSIST N/A 4/12/2024    Procedure: BRONCHOSCOPY-ION! with biopsy AND FNA AND BAL;  Surgeon: Jono Rebolledo MD PhD;  Location: [facility] ENDOSCOPY;  Service: Robotics - Pulmonary;  Laterality: N/A;  PRE OP - PULMONARY NODULE  POST OP - SAME    CARDIAC CATHETERIZATION N/A 03/18/2019    Procedure: Right and Left Heart Cath;  Surgeon: Nina Storey MD;  Location: [facility]  CATH INVASIVE LOCATION;  Service: Cardiovascular    CATARACT EXTRACTION WITH INTRAOCULAR LENS IMPLANT Bilateral     COLON RESECTION WITH COLOSTOMY Left 02/28/2016    Exploratory laparotomy with open left hemicolectomy, end-colostomy, G-tube and J-tube-Dr. Endy Chaney    COLON SURGERY  3/25/2015    COLOSTOMY    COLONOSCOPY N/A 2015    Dr. Laughlin    COLONOSCOPY N/A 10/12/2016    Procedure: COLONOSCOPY TO 20 CM AND PER STOMA TO 30CM;  Surgeon: Genaro Perrin MD;  Location: [facility] ENDOSCOPY;  Service:     COLONOSCOPY N/A 10/21/2016    Procedure: COLONOSCOPY DONE AT BEDSIDE ONTO CECEM;  Surgeon: Genaro Perrin MD;  Location: [facility] ENDOSCOPY;  Service:     COLONOSCOPY N/A 02/10/2016    Diverticulosis in the entire examined colon, non-bleeding internal hemorrhoids-Dr. Taylor Pina    COLONOSCOPY N/A 02/11/2016    Poor prep, blood in the entire examined colon, normal ileum-Dr. Taylor Pina    COLONOSCOPY W/ POLYPECTOMY N/A 07/27/2015    Normal ileum, diverticulosis in the sigmoid colon: resected and retrieved, one 6 mm polyp in the descending colon: resected and retrieved, the distal rectum and anal verge are normal on retroflexion view-Dr. Miguel Ángel Laughlin    COLOSTOMY CLOSURE N/A 10/13/2016    Procedure: COLOSTOMY TAKEDOWN OR CLOSURE, APPENDECTOMY;  Surgeon: Genaro Perrin MD;  Location: [facility] MAIN OR;  Service:     DEBRIDEMENT LEG Left 03/01/2016    Excisional debridement of the skin, subcutantous tissue, tendon and muscle left calf-Dr. Jose Swann    DEBRIDEMENT LEG Left 02/25/2016    Excisional debridement full-thcikness skin and subcutaneous tissue and tendon and muscle, left medial and lateral calf muscles-Dr. Jose Swann    ENDOSCOPY N/A 10/21/2016    Procedure: ESOPHAGOGASTRODUODENOSCOPY DONE AT BEDSIDE;  Surgeon: Genaro Perrin MD;  Location: [facility] ENDOSCOPY;  Service:     ENDOSCOPY AND COLONOSCOPY N/A 02/28/2016    EGD and Colonoscopy with Candy ink injection-Dr. Schumacher  Ritu    ENTEROSCOPY SMALL BOWEL N/A 02/11/2016    Normal esophagus, normal stomach, normal duodenum, portion of the jejunum normal-Dr. Taylor Pina    ILIAC ARTERY - FEMORAL ARTERY BYPASS GRAFT Bilateral 2002    ILIAC ARTERY STENT Bilateral 2001    INSERTION AND REMOVAL PERITONEAL DIALYSIS CATHETER Right 02/29/2016    Exchange right jugular 16 cm [place]kar dialysis catheter-Dr. Jose Swann    INSERTION PERITONEAL DIALYSIS CATHETER Left 03/01/2016    Ultrasound-guided access of the left jugular vein, 23 cm left jugular palindrome dialysis catheter placement-Dr. Jose Swann    INSERTION PERITONEAL DIALYSIS CATHETER Right 02/18/2016    Right jugular Mahrkar catherer placement-Dr. Jose Swann    JOINT REPLACEMENT Left     THORACOSCOPY Left 04/18/2016    Procedure: LT THORACOSCOPY VIDEO ASSISTED WITH DECORDICATION;  Surgeon: Genaro Davila III, MD;  Location: [facility];  Service:     THROMBECTOMY Left 02/16/2016    Thombectomy of left femoral graft, left leg arteriogram, left calf forward compartment fasciotomy-Dr. Jose Swann    TOTAL HIP ARTHROPLASTY Left     UPPER GASTROINTESTINAL ENDOSCOPY N/A 02/09/2016    Normal UGI-Dr. Ajay Parkinson    UPPER GASTROINTESTINAL ENDOSCOPY N/A 11/28/2015    Small hiatal hernia, chronic gastritis: biopsied, non-bleeding angioectasias in the stomach: treated with argon plasma coagulation, multiple bleeding angioectasias in the dudenum: treated with argon plasma coagulation-Dr. Mykel Jaramillo    VASCULAR SURGERY      MULTIPLE    VENTRAL/INCISIONAL HERNIA REPAIR N/A 10/19/2017    Procedure: VENTRAL HERNIA REPAIR WITH MESH AND BILATERAL COMPONENT SEPARATION;  Surgeon: Genaro ePrrin MD;  Location: [facility];  Service:     WISDOM TOOTH EXTRACTION           Social History     Socioeconomic History    Marital status:    Tobacco Use    Smoking status: Former     Current packs/day: 0.00     Average packs/day: 1 pack/day for 47.0 years (47.0 ttl  pk-yrs)     Types: Cigarettes     Start date: 5/1/1974     Quit date: 5/1/2021     Years since quitting: 3.3     Passive exposure: Never    Smokeless tobacco: Never    Tobacco comments:     caffeine - rarely   Vaping Use    Vaping status: Never Used   Substance and Sexual Activity    Alcohol use: No    Drug use: Not Currently     Types: Other     Comment: THC gummies    Sexual activity: Not Currently     Partners: Female     Birth control/protection: Condom, Post-menopausal         Family History   Problem Relation Age of Onset    Lung cancer Mother     Cancer Mother     Heart disease Father     Diabetes Father         He wasn't diagnosed until he was 78    Hypertension Father     Malig Hyperthermia Neg Hx           Objective    Physical Exam      Current Outpatient Medications on File Prior to Visit   Medication Sig Dispense Refill    albuterol (ACCUNEB) 0.63 MG/3ML nebulizer solution Inhale 3 mL Every 6 (Six) Hours As Needed.      allopurinol (ZYLOPRIM) 100 MG tablet TAKE 1 TABLET BY MOUTH DAILY FOR GOUT 90 tablet 3    ALPRAZolam (XANAX) 1 MG tablet Take 1 tablet twice daily 180 tablet 2    amLODIPine (NORVASC) 10 MG tablet TAKE 1 TABLET BY MOUTH EVERY DAY 90 tablet 3    aspirin 81 MG chewable tablet Chew 1 tablet Every Night. HELD 1 WEEK AS DIRECTED      atorvastatin (LIPITOR) 40 MG tablet TAKE 1 & 1/2 TABLETS BY MOUTH EVERY DAY FOR CHOLESTEROL AND HEART HEALTH 135 tablet 3    B Complex Vitamins (vitamin b complex) capsule capsule Take 1 capsule by mouth Daily.      brimonidine (ALPHAGAN) 0.2 % ophthalmic solution instill 1 drop into both eyes twice a day      buPROPion XL (WELLBUTRIN XL) 300 MG 24 hr tablet TAKE 1 TABLET BY MOUTH EVERY DAY 90 tablet 3    clopidogrel (PLAVIX) 75 MG tablet TAKE 1 TABLET BY MOUTH EVERY DAY AT NIGHT 90 tablet 3    Coenzyme Q10 400 MG capsule Take 1 capsule by mouth Every Evening.      Enoxaparin Sodium (LOVENOX) 40 MG/0.4ML solution prefilled syringe syringe Inject 0.4ml  subcutaneously once daily for 7 days before bronchoscopy. (Patient taking differently: Inject 0.4ml subcutaneously once daily for 7 days before bronchoscopy.  LAST DOSE EVENING 4/11/24 AS DIRECTED) 2.8 mL 0    FERROUS SULFATE PO Take 65 mg by mouth Every Night.      Fluticasone-Umeclidin-Vilant (TRELEGY ELLIPTA) 100-62.5-25 MCG/INH aerosol powder  Inhale 1 each Daily. 28 each 2    gabapentin (NEURONTIN) 300 MG capsule Take 1 capsule by mouth 3 (Three) Times a Day. 270 capsule 2    isosorbide mononitrate (IMDUR) 30 MG 24 hr tablet TAKE 1 TABLET BY MOUTH EVERY DAY IN THE MORNING 90 tablet 3    latanoprost (XALATAN) 0.005 % ophthalmic solution Administer 1 drop to both eyes every night at bedtime.      levothyroxine (SYNTHROID, LEVOTHROID) 137 MCG tablet Take 1 tablet by mouth Daily. 90 tablet 3    Multiple Vitamins-Minerals (MULTIVITAMIN ADULT PO) Take 1 tablet by mouth Daily.      Omega-3 Fatty Acids (fish oil) 1000 MG capsule capsule Take 1 capsule by mouth Daily. Took this am      torsemide (DEMADEX) 20 MG tablet Take 1 tablet by mouth Daily. 90 tablet 3    traMADol (ULTRAM) 50 MG tablet Take 1 tablet by mouth 2 (Two) Times a Day. 180 tablet 0     No current facility-administered medications on file prior to visit.       ALLERGIES:    Allergies   Allergen Reactions    Augmentin [Amoxicillin-Pot Clavulanate] Hives and Rash     Tolerate cephalosporins    Zosyn [Piperacillin Sod-Tazobactam So] Rash     erythroderma       There were no vitals taken for this visit.          No data to display                  Assessment & Plan     68 year old male with cT1N0 adenocarcinoma of left upper lobe now 3 months out from SBRT.  I have personally reviewed his most recent CT chest with post radiation changes in left upper lobe.   I will order a  repeat chest CT in 4 months and see him back one week later to review. I asked him to call with any questions or concerns in the interim.     I personally spent greater than 30 minutes  today assessing, managing, discussing and documenting my visit with the patient. That time includes review of records, imaging and pathology reports, obtaining my own history, performing a medically appropriate evaluation, counseling and educating the patient, discussing goals, logistics, alternatives and risks of my recommendations, surveillance options and potential outcomes. It also includes the time documenting the clinical information in the EMR and communicating my recommendations to the other involved physicians.                Thank you very much for allowing me to participate in the care of this very pleasant patient.    Sincerely,      Yanet Fuentes MD

## 2024-08-30 ENCOUNTER — PATIENT OUTREACH (OUTPATIENT)
Dept: OTHER | Facility: HOSPITAL | Age: 69
End: 2024-08-30
Payer: MEDICARE

## 2024-08-30 NOTE — PROGRESS NOTES
Reviewed chart. Patient with newly diagnosed adenocarcinoma of left upper lobe lung [id number]. Rec'd radiation, 50Gy in 5 fractions to the Left lung nodule using SBRT with 6 mv photons 5/14-5/24. Met with Dr. Fuentes 8/28, plan CT 4 mths. Sees Dr. Fuentes again 1/6/25.     Called patient. Left message that I was just touching base to see how he was doing. Wanted to know if he had any questions/concerns after his appt with Dr. Fuentes or resource/supportive care needs; requested cb

## 2024-09-03 ENCOUNTER — PATIENT OUTREACH (OUTPATIENT)
Dept: OTHER | Facility: HOSPITAL | Age: 69
End: 2024-09-03
Payer: MEDICARE

## 2024-09-03 NOTE — PROGRESS NOTES
Call from patient; stated that he is doing well. He had CT chest w/o contrast because his creatinine was too high. He met with Dr. Fuentes last week.  She ordered Ct w/o contrast in 4 mths. The patient denies shortness of breath. He has ongoing appts with Dr. Tate.    Will continue to monitor

## 2024-09-04 DIAGNOSIS — M54.50 CHRONIC MIDLINE LOW BACK PAIN WITHOUT SCIATICA: ICD-10-CM

## 2024-09-04 DIAGNOSIS — G89.29 CHRONIC MIDLINE LOW BACK PAIN WITHOUT SCIATICA: ICD-10-CM

## 2024-09-04 RX ORDER — TRAMADOL HYDROCHLORIDE 50 MG/1
50 TABLET ORAL 2 TIMES DAILY
Qty: 180 TABLET | Refills: 0 | Status: SHIPPED | OUTPATIENT
Start: 2024-09-04

## 2024-09-11 DIAGNOSIS — G89.29 CHRONIC MIDLINE LOW BACK PAIN WITHOUT SCIATICA: ICD-10-CM

## 2024-09-11 DIAGNOSIS — Z89.619 STATUS POST ABOVE KNEE AMPUTATION, UNSPECIFIED LATERALITY: ICD-10-CM

## 2024-09-11 DIAGNOSIS — M54.50 CHRONIC MIDLINE LOW BACK PAIN WITHOUT SCIATICA: ICD-10-CM

## 2024-09-11 DIAGNOSIS — E03.9 HYPOTHYROIDISM, UNSPECIFIED TYPE: ICD-10-CM

## 2024-09-12 RX ORDER — LEVOTHYROXINE SODIUM 137 UG/1
137 TABLET ORAL DAILY
Qty: 90 TABLET | Refills: 3 | Status: SHIPPED | OUTPATIENT
Start: 2024-09-12

## 2024-09-12 RX ORDER — GABAPENTIN 300 MG/1
300 CAPSULE ORAL 3 TIMES DAILY
Qty: 270 CAPSULE | Refills: 2 | OUTPATIENT
Start: 2024-09-12

## 2024-09-12 RX ORDER — GABAPENTIN 300 MG/1
300 CAPSULE ORAL 3 TIMES DAILY
Qty: 270 CAPSULE | Refills: 2 | Status: SHIPPED | OUTPATIENT
Start: 2024-09-12

## 2024-09-12 NOTE — TELEPHONE ENCOUNTER
Rx Refill Note  Requested Prescriptions     Pending Prescriptions Disp Refills    levothyroxine (SYNTHROID, LEVOTHROID) 137 MCG tablet [Pharmacy Med Name: LEVOTHYROXINE 137 MCG TABLET] 90 tablet 3     Sig: TAKE 1 TABLET BY MOUTH EVERY DAY    gabapentin (NEURONTIN) 300 MG capsule [Pharmacy Med Name: GABAPENTIN 300 MG CAPSULE] 270 capsule      Sig: TAKE 1 CAPSULE BY MOUTH THREE TIMES A DAY      Last office visit with prescribing clinician: 1/4/2024   Last telemedicine visit with prescribing clinician: Visit date not found   Next office visit with prescribing clinician: 9/11/2024    3/14/24

## 2024-10-03 DIAGNOSIS — E78.5 HYPERLIPIDEMIA, UNSPECIFIED HYPERLIPIDEMIA TYPE: ICD-10-CM

## 2024-10-03 DIAGNOSIS — Z79.899 LONG TERM PRESCRIPTION BENZODIAZEPINE USE: ICD-10-CM

## 2024-10-03 DIAGNOSIS — F41.9 ANXIETY DISORDER, UNSPECIFIED TYPE: ICD-10-CM

## 2024-10-03 RX ORDER — ALPRAZOLAM 1 MG
TABLET ORAL
Qty: 180 TABLET | Refills: 2 | Status: SHIPPED | OUTPATIENT
Start: 2024-10-04

## 2024-10-03 RX ORDER — ATORVASTATIN CALCIUM 40 MG/1
TABLET, FILM COATED ORAL
Qty: 90 TABLET | Refills: 3 | Status: SHIPPED | OUTPATIENT
Start: 2024-10-03

## 2024-10-03 NOTE — TELEPHONE ENCOUNTER
Rx Refill Note  Requested Prescriptions     Pending Prescriptions Disp Refills    atorvastatin (LIPITOR) 40 MG tablet [Pharmacy Med Name: ATORVASTATIN 40 MG TABLET] 90 tablet 3     Sig: TAKE 1 TABLET BY MOUTH EVERY DAY FOR CHOLESTEROL AND HEART HEALTH      Last office visit with prescribing clinician: 1/4/2024   Last telemedicine visit with prescribing clinician: Visit date not found   Next office visit with prescribing clinician: Visit date not found          Lipid Panel (03/28/2023 09:29)     Ester Shah LPN  10/03/24, 08:02 EDT

## 2024-10-03 NOTE — TELEPHONE ENCOUNTER
"Message from pt:  \"    Patient comment: Dr Hester,  could you send this refill to [facility]  sometime tomorrow please,  thank you,  I've got my yearly ultrasound on October 16 of the stents Dr Swann did in 2016, he's retired so I'm seeinocg Dr Libertad Lind,  then on October 29th I'm seeing my 6 month Prosthetics check, after that I've got nothing until December 12th for my 6 month dental visit,  I was planning on making an appointment with your office in late November if that works with you,  thanks     "

## 2024-10-11 NOTE — PROGRESS NOTES
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  He had extensive left calf muscle necrosis and had an above-knee amputation on March 16, 2016.  He had a ischemic colon and colon resection with colostomy, reversed in 2017.  He does not have any rest pain on the right or tissue loss.  No cerebrovascular symptoms.  GENIE on the right on November 22, 2023 was 0.9.  He was noted to have stenosis at the proximal anastomosis.  The crossover graft is patent without stenosis.  He was seen on February 14, 2024 and the GENIE on the right was 0.84.  Duplex scan demonstrated angulation at the axillary artery anastomosis but no significant stenosis, crossover graft patent without stenosis.  He returned on October 16, 2024 with a graft scan and GENIE.  His graft scan is unchanged with angulation proximally but no other stenotic changes.  GENIE 0.82, and unchanged.  Unfortunately, he continues to smoke.    Past History:  Medical History: has a past medical history of Acute respiratory failure with hypoxia and hypercarbia (03/11/2019), Acute upper respiratory infection (02/06/2013), Amputee, above knee, left, Anemia, ARF (acute renal failure) (03/04/2016), Asthma (02/02/2018), Atelectasis, left (03/04/2016), Bradycardia (05/23/2019), CAD (coronary artery disease) (04/04/2016), Chronic obstructive pulmonary disease with acute exacerbation (02/06/2013), Clotting disorder (05/01/2003), Colon polyps, Compartment syndrome, COPD (chronic obstructive pulmonary disease), COPD with hypoxia (05/23/2019), Cough, Demand  myocardial infarction (05/19/2022), Disease of thyroid gland, Diverticulosis, Elevated hematocrit (01/06/2021), Employs prosthetic leg, ESRD (end stage renal disease) on dialysis (04/06/2016), Fracture of patella (03/03/2015), History of acute renal failure, History of CHF (congestive heart failure), History of GI bleed (02/2016), History of sepsis (02/2016), History of transfusion, Hyperkalemia (04/11/2016), Hyperlipidemia, Hypertension, Hypotension, Hypotension (03/04/2016), Left lower lobe pneumonia (05/18/2022), Low back pain (10/2016), Lung neoplasm, Nonischemic cardiomyopathy, Nosocomial pneumonia (04/06/2016), Phantom limb pain, Pleural effusion on left (04/11/2016), Pneumonia, Pulmonary embolism, PVD (peripheral vascular disease), Renal insufficiency, S/P thoracentesis (04/11/2016), and Sepsis, unspecified organism (04/05/2016).   Surgical History: has a past surgical history that includes Iliac artery stent (Bilateral, 2001); Iliac artery - femoral artery bypass graft (Bilateral, 2002); Arterial thrombectomy (2001); Leg amputation through knee (Left, 03/16/2016); Thoracoscopy (Left, 04/18/2016); Colonoscopy (N/A, 2015); Joint replacement (Left); Hemicolectomy w/ ostomy (Left, 02/28/2016); Total hip arthroplasty (Left); Cataract extraction w/  intraocular lens implant (Bilateral); Vascular surgery; Colonoscopy (N/A, 10/12/2016); Colostomy closure (N/A, 10/13/2016); Esophagogastroduodenoscopy (N/A, 10/21/2016); Colonoscopy (N/A, 10/21/2016); Axillary Femoral Bypass (Right, 02/15/2016); Thrombectomy (Left, 02/16/2016); Upper gastrointestinal endoscopy (N/A, 02/09/2016); Colonoscopy (N/A, 02/10/2016); Small bowel enteroscopy (N/A, 02/11/2016); Colonoscopy (N/A, 02/11/2016); Upper gastrointestinal endoscopy (N/A, 11/28/2015); Colonoscopy w/ polypectomy (N/A, 07/27/2015); endoscopy and colonoscopy (N/A, 02/28/2016); Bronchoscopy (Left, 03/04/2016); Rigid / flex bronch w/ lavage / Bx / Fb removal (N/A,  "03/03/2016); Debridement leg (Left, 03/01/2016); Peritoneal Dialysis Catheter (Left, 03/01/2016); insertion and removal peritoneal dialysis catheter (Right, 02/29/2016); Rigid / flex bronch w/ lavage / Bx / Fb removal (N/A, 02/26/2016); Debridement leg (Left, 02/25/2016); Peritoneal Dialysis Catheter (Right, 02/18/2016); Cordell tooth extraction; ventral/incisional hernia repair (N/A, 10/19/2017); Cardiac catheterization (N/A, 03/18/2019); Bronchoscopy (Left, 05/20/2022); Colon surgery (3/25/2015); BRONCHOSCOPY WITH ION ROBOTIC ASSIST (N/A, 04/12/2024); and Axillary artery - femoral artery bypass graft (02/2016).   Family History: family history includes Cancer in his mother; Diabetes in his father; Heart disease in his father; Hypertension in his father; Lung cancer in his mother.   Social History: reports that he quit smoking about 3 years ago. His smoking use included cigarettes. He started smoking about 50 years ago. He has a 47 pack-year smoking history. He has never been exposed to tobacco smoke. He has never used smokeless tobacco. He reports that he does not currently use drugs after having used the following drugs: Other. He reports that he does not drink alcohol.    (Not in a hospital admission)     Allergies: Augmentin [amoxicillin-pot clavulanate] and Zosyn [piperacillin sod-tazobactam so]   Objective   Vital Signs:  /80   Ht 172.7 cm (68\")   Wt 75.3 kg (166 lb)   BMI 25.24 kg/m²   Estimated body mass index is 25.24 kg/m² as calculated from the following:    Height as of this encounter: 172.7 cm (68\").    Weight as of this encounter: 75.3 kg (166 lb).     BMI is >= 25 and <30. (Overweight) The following options were offered after discussion;: Nothing needed    Sim Agustin  reports that he quit smoking about 3 years ago. His smoking use included cigarettes. He started smoking about 50 years ago. He has a 47 pack-year smoking history. He has never been exposed to tobacco smoke. He has never " used smokeless tobacco.          Physical Exam bruit noted in the axillobifemoral graft.  No palpable pedal pulses noted on the right.  Well-healed AKA stump on the left.  Result Review :        Data reviewed : Graft scan and carotid scan from November 22, 2023 and graft scan and ABIs from February 14, 2024, findings as above             Assessment and Plan     Diagnoses and all orders for this visit:    1. Peripheral artery disease (Primary)  -     Doppler Ankle Brachial Index Single Level CAR; Future  -     Duplex Lower Extremity Art / Grafts - Bilateral CAR; Future    2. Aortoduodenal fistula    3. 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 Simple chronic bronchitis    5. Adenocarcinoma, lung, left    6. Carotid stenosis, asymptomatic, bilateral  -     Duplex Carotid Ultrasound CAR; Future    He continues to do satisfactorily after this series of major procedures.  Unfortunately, he continues to smoke and is bronchitis and lung cancer are result and not improving.  There is angulation of the proximal anastomosis of his graft but not real stenosis.  Therefore, we will continue to monitor.  I will see him in follow-up in 8 months and he will be due for a carotid duplex scan at that time as well.         Follow Up     No follow-ups on file.  Patient was given instructions and counseling regarding his condition or for health maintenance advice. Please see specific information pulled into the AVS if appropriate.

## 2024-10-16 ENCOUNTER — OFFICE VISIT (OUTPATIENT)
Age: 69
End: 2024-10-16
Payer: MEDICARE

## 2024-10-16 ENCOUNTER — HOSPITAL ENCOUNTER (OUTPATIENT)
Facility: HOSPITAL | Age: 69
Discharge: HOME | End: 2024-10-16
Payer: MEDICARE

## 2024-10-16 VITALS
WEIGHT: 166 LBS | DIASTOLIC BLOOD PRESSURE: 80 MMHG | SYSTOLIC BLOOD PRESSURE: 180 MMHG | HEIGHT: 68 IN | BODY MASS INDEX: 25.16 KG/M2

## 2024-10-16 DIAGNOSIS — I77.2: ICD-10-CM

## 2024-10-16 DIAGNOSIS — I73.9 PERIPHERAL ARTERY DISEASE: Primary | ICD-10-CM

## 2024-10-16 DIAGNOSIS — C34.92 ADENOCARCINOMA, LUNG, LEFT: ICD-10-CM

## 2024-10-16 DIAGNOSIS — I73.9 PERIPHERAL VASCULAR DISEASE, UNSPECIFIED: ICD-10-CM

## 2024-10-16 DIAGNOSIS — I70.213 ATHEROSCLEROSIS OF NATIVE ARTERY OF BOTH LOWER EXTREMITIES WITH INTERMITTENT CLAUDICATION: ICD-10-CM

## 2024-10-16 DIAGNOSIS — I65.23 CAROTID STENOSIS, ASYMPTOMATIC, BILATERAL: ICD-10-CM

## 2024-10-16 DIAGNOSIS — Z89.612 ACQUIRED ABSENCE OF LEFT LEG ABOVE KNEE: ICD-10-CM

## 2024-10-16 DIAGNOSIS — Z87.891 PERSONAL HISTORY OF NICOTINE DEPENDENCE: ICD-10-CM

## 2024-10-16 DIAGNOSIS — J41.0 SIMPLE CHRONIC BRONCHITIS: Chronic | ICD-10-CM

## 2024-10-16 LAB
BH CV GRAFT 1 - DISTAL ANASTAMOSIS PSV-RIGHT: 189 CM/S
BH CV GRAFT 1 - DISTAL GRAFT PSV-LEFT: 78 CM/S
BH CV GRAFT 1 - DISTAL GRAFT PSV-RIGHT: 201 CM/S
BH CV GRAFT 1 - INFLOW ARTERY PSV- LEFT: 251 CM/S
BH CV GRAFT 1 - INFLOW ARTERY PSV-RIGHT: 103 CM/S
BH CV GRAFT 1 - MID DISTAL GRAFT PSV-LEFT: 81 CM/S
BH CV GRAFT 1 - MID DISTAL GRAFT PSV-RIGHT: 286 CM/S
BH CV GRAFT 1 - MID GRAFT PSV-LEFT: 93 CM/S
BH CV GRAFT 1 - MID GRAFT PSV-RIGHT: 255 CM/S
BH CV GRAFT 1 - OUTFLOW ARTERY PSV-RIGHT: 217 CM/S
BH CV GRAFT 1 - PROX GRAFT PSV-LEFT: 210 CM/S
BH CV GRAFT 1 - PROX GRAFT PSV-RIGHT: 218 CM/S
BH CV GRAFT 1 - PROX MID GRAFT PSV-LEFT: 93 CM/S
BH CV GRAFT 1 - PROX MID GRAFT PSV-RIGHT: 269 CM/S
BH CV GRAFT 1 - PROXIMAL ANASTAMOSIS PSV-LEFT: 191 CM/S
BH CV GRAFT 1 - PROXIMAL ANASTAMOSIS PSV-RIGHT: 480 CM/S
BH CV GRAFT 1- OUTFLOW ARTERY EDV-RIGHT: 17 CM/S
BH CV GRAFT 1- PROXIMAL ANASTAMOSIS RATIO-RIGHT: 4.66
BH CV LOWER ARTERIAL LEFT ABI RATIO: NORMAL
BH CV LOWER ARTERIAL RIGHT ABI RATIO: 0.82
BH CV LOWER ARTERIAL RIGHT DORSALIS PEDIS SYS MAX: 140
BH CV LOWER ARTERIAL RIGHT POST TIBIAL SYS MAX: 152
BH CV VAS LEA LEFT HIDDEN GRAFT ALERT: 33
BH CV VAS LEA RIGHT HIDDEN GRAFT ALERT: 11
UPPER ARTERIAL LEFT ARM BRACHIAL SYS MAX: NORMAL
UPPER ARTERIAL RIGHT ARM BRACHIAL SYS MAX: NORMAL

## 2024-10-16 PROCEDURE — 1159F MED LIST DOCD IN RCRD: CPT | Performed by: SURGERY

## 2024-10-16 PROCEDURE — 93925 LOWER EXTREMITY STUDY: CPT | Performed by: SURGERY

## 2024-10-16 PROCEDURE — 3079F DIAST BP 80-89 MM HG: CPT | Performed by: SURGERY

## 2024-10-16 PROCEDURE — 93922 UPR/L XTREMITY ART 2 LEVELS: CPT | Performed by: SURGERY

## 2024-10-16 PROCEDURE — 3077F SYST BP >= 140 MM HG: CPT | Performed by: SURGERY

## 2024-10-16 PROCEDURE — 93925 LOWER EXTREMITY STUDY: CPT

## 2024-10-16 PROCEDURE — 99214 OFFICE O/P EST MOD 30 MIN: CPT | Performed by: SURGERY

## 2024-10-16 PROCEDURE — 93922 UPR/L XTREMITY ART 2 LEVELS: CPT

## 2024-10-16 PROCEDURE — 1160F RVW MEDS BY RX/DR IN RCRD: CPT | Performed by: SURGERY

## 2024-11-15 DIAGNOSIS — I10 ESSENTIAL (PRIMARY) HYPERTENSION: ICD-10-CM

## 2024-11-15 RX ORDER — AMLODIPINE BESYLATE 10 MG/1
10 TABLET ORAL DAILY
Qty: 90 TABLET | Refills: 3 | Status: SHIPPED | OUTPATIENT
Start: 2024-11-15

## 2024-11-21 DIAGNOSIS — F17.200 SMOKES TOBACCO DAILY: ICD-10-CM

## 2024-11-21 DIAGNOSIS — F41.9 ANXIETY DISORDER, UNSPECIFIED TYPE: ICD-10-CM

## 2024-11-21 RX ORDER — BUPROPION HYDROCHLORIDE 300 MG/1
TABLET ORAL
Qty: 90 TABLET | Refills: 3 | Status: SHIPPED | OUTPATIENT
Start: 2024-11-21

## 2024-12-06 DIAGNOSIS — M54.50 CHRONIC MIDLINE LOW BACK PAIN WITHOUT SCIATICA: ICD-10-CM

## 2024-12-06 DIAGNOSIS — E78.5 HYPERLIPIDEMIA, UNSPECIFIED HYPERLIPIDEMIA TYPE: ICD-10-CM

## 2024-12-06 DIAGNOSIS — G89.29 CHRONIC MIDLINE LOW BACK PAIN WITHOUT SCIATICA: ICD-10-CM

## 2024-12-06 RX ORDER — ATORVASTATIN CALCIUM 40 MG/1
TABLET, FILM COATED ORAL
Qty: 135 TABLET | Refills: 3 | Status: SHIPPED | OUTPATIENT
Start: 2024-12-06

## 2024-12-09 RX ORDER — TRAMADOL HYDROCHLORIDE 50 MG/1
50 TABLET ORAL 2 TIMES DAILY
Qty: 180 TABLET | Refills: 0 | Status: SHIPPED | OUTPATIENT
Start: 2024-12-09

## 2024-12-09 NOTE — TELEPHONE ENCOUNTER
Rx Refill Note  Requested Prescriptions     Pending Prescriptions Disp Refills    traMADol (ULTRAM) 50 MG tablet [Pharmacy Med Name: TRAMADOL HCL 50 MG TABLET] 180 tablet 0     Sig: TAKE 1 TABLET BY MOUTH TWICE A DAY      Last office visit with prescribing clinician: 1/4/2024   Last telemedicine visit with prescribing clinician: Visit date not found   Next office visit with prescribing clinician: Visit date not found     UDS NEEDS UPDATED  CONTRACT NEEDS UPDATED    Ester Shah LPN  12/09/24, 14:30 EST

## 2024-12-10 DIAGNOSIS — G89.29 CHRONIC MIDLINE LOW BACK PAIN WITHOUT SCIATICA: ICD-10-CM

## 2024-12-10 DIAGNOSIS — M54.50 CHRONIC MIDLINE LOW BACK PAIN WITHOUT SCIATICA: ICD-10-CM

## 2024-12-10 RX ORDER — TRAMADOL HYDROCHLORIDE 50 MG/1
50 TABLET ORAL 2 TIMES DAILY
Qty: 180 TABLET | Refills: 0 | OUTPATIENT
Start: 2024-12-10

## 2024-12-27 ENCOUNTER — HOSPITAL ENCOUNTER (OUTPATIENT)
Dept: PET IMAGING | Facility: HOSPITAL | Age: 69
Discharge: HOME | End: 2024-12-27
Payer: MEDICARE

## 2024-12-27 DIAGNOSIS — C34.92 ADENOCARCINOMA, LUNG, LEFT: ICD-10-CM

## 2024-12-27 DIAGNOSIS — I25.10 CORONARY ARTERY DISEASE INVOLVING NATIVE CORONARY ARTERY OF NATIVE HEART WITHOUT ANGINA PECTORIS: ICD-10-CM

## 2024-12-27 PROCEDURE — 71250 CT THORAX DX C-: CPT

## 2024-12-27 RX ORDER — TORSEMIDE 20 MG/1
20 TABLET ORAL DAILY
Qty: 90 TABLET | Refills: 1 | Status: SHIPPED | OUTPATIENT
Start: 2024-12-27

## 2025-01-01 ENCOUNTER — APPOINTMENT (OUTPATIENT)
Dept: GENERAL RADIOLOGY | Facility: HOSPITAL | Age: 70
DRG: 871 | End: 2025-01-01
Payer: MEDICARE

## 2025-01-01 ENCOUNTER — HOSPITAL ENCOUNTER (INPATIENT)
Facility: HOSPITAL | Age: 70
LOS: 1 days | DRG: 871 | End: 2025-04-14
Attending: EMERGENCY MEDICINE | Admitting: INTERNAL MEDICINE
Payer: MEDICARE

## 2025-01-01 VITALS
SYSTOLIC BLOOD PRESSURE: 59 MMHG | DIASTOLIC BLOOD PRESSURE: 47 MMHG | WEIGHT: 165 LBS | HEIGHT: 68 IN | OXYGEN SATURATION: 97 % | TEMPERATURE: 98.1 F | HEART RATE: 93 BPM | BODY MASS INDEX: 25.01 KG/M2 | RESPIRATION RATE: 33 BRPM

## 2025-01-01 DIAGNOSIS — I46.9 CARDIAC ARREST: ICD-10-CM

## 2025-01-01 DIAGNOSIS — I73.9 PVD (PERIPHERAL VASCULAR DISEASE): ICD-10-CM

## 2025-01-01 DIAGNOSIS — N17.9 ACUTE KIDNEY INJURY: ICD-10-CM

## 2025-01-01 DIAGNOSIS — R79.89 ELEVATED TROPONIN: ICD-10-CM

## 2025-01-01 DIAGNOSIS — J96.00 ACUTE RESPIRATORY FAILURE, UNSPECIFIED WHETHER WITH HYPOXIA OR HYPERCAPNIA: Primary | ICD-10-CM

## 2025-01-01 LAB
ALBUMIN SERPL-MCNC: 4.6 G/DL (ref 3.5–5.2)
ALBUMIN/GLOB SERPL: 1.1 G/DL
ALP SERPL-CCNC: 148 U/L (ref 39–117)
ALT SERPL W P-5'-P-CCNC: 27 U/L (ref 1–41)
ANION GAP SERPL CALCULATED.3IONS-SCNC: 23 MMOL/L (ref 5–15)
ARTERIAL PATENCY WRIST A: ABNORMAL
AST SERPL-CCNC: 36 U/L (ref 1–40)
ATMOSPHERIC PRESS: 743.5 MMHG
B PARAPERT DNA SPEC QL NAA+PROBE: NOT DETECTED
B PERT DNA SPEC QL NAA+PROBE: NOT DETECTED
BASE EXCESS BLDA CALC-SCNC: -16.5 MMOL/L (ref 0–2)
BASOPHILS # BLD MANUAL: 0 10*3/MM3 (ref 0–0.2)
BASOPHILS NFR BLD MANUAL: 0 % (ref 0–1.5)
BDY SITE: ABNORMAL
BILIRUB SERPL-MCNC: 1.3 MG/DL (ref 0–1.2)
BUN BLDA-MCNC: 79 MG/DL (ref 8–26)
BUN SERPL-MCNC: 76 MG/DL (ref 8–23)
BUN/CREAT SERPL: 22.5 (ref 7–25)
C PNEUM DNA NPH QL NAA+NON-PROBE: NOT DETECTED
CA-I BLDA-SCNC: 1.82 MMOL/L (ref 2.2–2.55)
CALCIUM SPEC-SCNC: 12.8 MG/DL (ref 8.6–10.5)
CHLORIDE BLDA-SCNC: 111 MMOL/L (ref 98–107)
CHLORIDE SERPL-SCNC: 94 MMOL/L (ref 98–107)
CO2 BLDA-SCNC: 20.7 MMOL/L (ref 23–27)
CO2 SERPL-SCNC: 20 MMOL/L (ref 22–29)
CREAT BLDA-MCNC: 3.39 MG/DL (ref 0.6–1.3)
CREAT SERPL-MCNC: 3.38 MG/DL (ref 0.76–1.27)
D-LACTATE SERPL-SCNC: 4.6 MMOL/L (ref 0.5–2)
D-LACTATE SERPL-SCNC: 7.2 MMOL/L
DEPRECATED RDW RBC AUTO: 45.9 FL (ref 37–54)
EGFRCR SERPLBLD CKD-EPI 2021: 18.9 ML/MIN/1.73
EOSINOPHIL # BLD MANUAL: 0 10*3/MM3 (ref 0–0.4)
EOSINOPHIL NFR BLD MANUAL: 0 % (ref 0.3–6.2)
ERYTHROCYTE [DISTWIDTH] IN BLOOD BY AUTOMATED COUNT: 13.5 % (ref 12.3–15.4)
FLUAV SUBTYP SPEC NAA+PROBE: NOT DETECTED
FLUBV RNA ISLT QL NAA+PROBE: NOT DETECTED
GEN 5 1HR TROPONIN T REFLEX: 168 NG/L
GLOBULIN UR ELPH-MCNC: 4.3 GM/DL
GLUCOSE BLDC GLUCOMTR-MCNC: 106 MG/DL (ref 65–99)
GLUCOSE SERPL-MCNC: 185 MG/DL (ref 65–99)
HADV DNA SPEC NAA+PROBE: NOT DETECTED
HCO3 BLDA-SCNC: 19.5 MMOL/L (ref 22–28)
HCOV 229E RNA SPEC QL NAA+PROBE: NOT DETECTED
HCOV HKU1 RNA SPEC QL NAA+PROBE: NOT DETECTED
HCOV NL63 RNA SPEC QL NAA+PROBE: NOT DETECTED
HCOV OC43 RNA SPEC QL NAA+PROBE: NOT DETECTED
HCT VFR BLD AUTO: 56.5 % (ref 37.5–51)
HCT VFR BLDA CALC: 54 % (ref 38–51)
HEMODILUTION: NO
HGB BLD-MCNC: 18.9 G/DL (ref 13–17.7)
HGB BLDA-MCNC: 18.3 G/DL (ref 12–17)
HMPV RNA NPH QL NAA+NON-PROBE: NOT DETECTED
HOLD SPECIMEN: NORMAL
HPIV1 RNA ISLT QL NAA+PROBE: NOT DETECTED
HPIV2 RNA SPEC QL NAA+PROBE: NOT DETECTED
HPIV3 RNA NPH QL NAA+PROBE: NOT DETECTED
HPIV4 P GENE NPH QL NAA+PROBE: NOT DETECTED
INHALED O2 CONCENTRATION: 100 %
LIPASE SERPL-CCNC: 16 U/L (ref 13–60)
LYMPHOCYTES # BLD MANUAL: 0.69 10*3/MM3 (ref 0.7–3.1)
LYMPHOCYTES NFR BLD MANUAL: 2 % (ref 5–12)
Lab: ABNORMAL
Lab: ABNORMAL
M PNEUMO IGG SER IA-ACNC: NOT DETECTED
MCH RBC QN AUTO: 31.3 PG (ref 26.6–33)
MCHC RBC AUTO-ENTMCNC: 33.5 G/DL (ref 31.5–35.7)
MCV RBC AUTO: 93.5 FL (ref 79–97)
METAMYELOCYTES NFR BLD MANUAL: 2 % (ref 0–0)
MODALITY: ABNORMAL
MONOCYTES # BLD: 0.17 10*3/MM3 (ref 0.1–0.9)
MYELOCYTES NFR BLD MANUAL: 1 % (ref 0–0)
NEUTROPHILS # BLD AUTO: 7.55 10*3/MM3 (ref 1.7–7)
NEUTROPHILS NFR BLD MANUAL: 87 % (ref 42.7–76)
NOTIFIED WHO: ABNORMAL
NRBC BLD AUTO-RTO: 0.3 /100 WBC (ref 0–0.2)
NT-PROBNP SERPL-MCNC: 5067 PG/ML (ref 0–900)
O2 A-A PPRESDIFF RESPIRATORY: 0.3 MMHG
PCO2 BLDA: 98.9 MM HG (ref 35–45)
PEEP RESPIRATORY: 8 CM[H2O]
PH BLDA: 6.9 PH UNITS (ref 7.35–7.45)
PLAT MORPH BLD: NORMAL
PLATELET # BLD AUTO: 303 10*3/MM3 (ref 140–450)
PMV BLD AUTO: 10 FL (ref 6–12)
PO2 BLD: 158 MM[HG] (ref 0–500)
PO2 BLDA: 157.7 MM HG (ref 80–100)
POTASSIUM BLDA-SCNC: 4.9 MMOL/L (ref 3.5–5.2)
POTASSIUM SERPL-SCNC: 5.1 MMOL/L (ref 3.5–5.2)
PROCALCITONIN SERPL-MCNC: 1.23 NG/ML (ref 0–0.25)
PROT SERPL-MCNC: 8.9 G/DL (ref 6–8.5)
QT INTERVAL: 412 MS
QTC INTERVAL: 532 MS
RBC # BLD AUTO: 6.04 10*6/MM3 (ref 4.14–5.8)
RBC MORPH BLD: NORMAL
READ BACK: YES
RHINOVIRUS RNA SPEC NAA+PROBE: NOT DETECTED
RSV RNA NPH QL NAA+NON-PROBE: NOT DETECTED
SAO2 % BLDCOA: 97.2 % (ref 92–98.5)
SARS-COV-2 RNA NPH QL NAA+NON-PROBE: NOT DETECTED
SET MECH RESP RATE: 20
SODIUM BLD-SCNC: 144 MMOL/L (ref 136–145)
SODIUM SERPL-SCNC: 137 MMOL/L (ref 136–145)
TOTAL RATE: 20 BREATHS/MINUTE
TROPONIN T % DELTA: 7
TROPONIN T NUMERIC DELTA: 11 NG/L
TROPONIN T SERPL HS-MCNC: 157 NG/L
VARIANT LYMPHS NFR BLD MANUAL: 8 % (ref 19.6–45.3)
VENTILATOR MODE: AC
VT ON VENT VENT: 450 ML
WBC MORPH BLD: NORMAL
WBC NRBC COR # BLD AUTO: 8.68 10*3/MM3 (ref 3.4–10.8)
WHOLE BLOOD HOLD COAG: NORMAL
WHOLE BLOOD HOLD SPECIMEN: NORMAL

## 2025-01-01 PROCEDURE — 94799 UNLISTED PULMONARY SVC/PX: CPT

## 2025-01-01 PROCEDURE — 83605 ASSAY OF LACTIC ACID: CPT

## 2025-01-01 PROCEDURE — 83605 ASSAY OF LACTIC ACID: CPT | Performed by: EMERGENCY MEDICINE

## 2025-01-01 PROCEDURE — 25010000002 CEFTRIAXONE PER 250 MG: Performed by: EMERGENCY MEDICINE

## 2025-01-01 PROCEDURE — 36600 WITHDRAWAL OF ARTERIAL BLOOD: CPT

## 2025-01-01 PROCEDURE — 84484 ASSAY OF TROPONIN QUANT: CPT | Performed by: EMERGENCY MEDICINE

## 2025-01-01 PROCEDURE — 84145 PROCALCITONIN (PCT): CPT | Performed by: INTERNAL MEDICINE

## 2025-01-01 PROCEDURE — 99291 CRITICAL CARE FIRST HOUR: CPT

## 2025-01-01 PROCEDURE — 31500 INSERT EMERGENCY AIRWAY: CPT

## 2025-01-01 PROCEDURE — 83690 ASSAY OF LIPASE: CPT | Performed by: EMERGENCY MEDICINE

## 2025-01-01 PROCEDURE — 85025 COMPLETE CBC W/AUTO DIFF WBC: CPT | Performed by: EMERGENCY MEDICINE

## 2025-01-01 PROCEDURE — 71045 X-RAY EXAM CHEST 1 VIEW: CPT

## 2025-01-01 PROCEDURE — 36415 COLL VENOUS BLD VENIPUNCTURE: CPT | Performed by: EMERGENCY MEDICINE

## 2025-01-01 PROCEDURE — 5A1935Z RESPIRATORY VENTILATION, LESS THAN 24 CONSECUTIVE HOURS: ICD-10-PCS | Performed by: EMERGENCY MEDICINE

## 2025-01-01 PROCEDURE — 93010 ELECTROCARDIOGRAM REPORT: CPT | Performed by: STUDENT IN AN ORGANIZED HEALTH CARE EDUCATION/TRAINING PROGRAM

## 2025-01-01 PROCEDURE — 80047 BASIC METABLC PNL IONIZED CA: CPT

## 2025-01-01 PROCEDURE — 94640 AIRWAY INHALATION TREATMENT: CPT

## 2025-01-01 PROCEDURE — 82803 BLOOD GASES ANY COMBINATION: CPT

## 2025-01-01 PROCEDURE — 0BH17EZ INSERTION OF ENDOTRACHEAL AIRWAY INTO TRACHEA, VIA NATURAL OR ARTIFICIAL OPENING: ICD-10-PCS | Performed by: EMERGENCY MEDICINE

## 2025-01-01 PROCEDURE — 25010000002 LIDOCAINE (CARDIAC): Performed by: EMERGENCY MEDICINE

## 2025-01-01 PROCEDURE — 83880 ASSAY OF NATRIURETIC PEPTIDE: CPT | Performed by: EMERGENCY MEDICINE

## 2025-01-01 PROCEDURE — 25010000002 EPINEPHRINE 1 MG/10ML SOLUTION PREFILLED SYRINGE: Performed by: EMERGENCY MEDICINE

## 2025-01-01 PROCEDURE — 0202U NFCT DS 22 TRGT SARS-COV-2: CPT | Performed by: EMERGENCY MEDICINE

## 2025-01-01 PROCEDURE — 25010000002 PROPOFOL 10 MG/ML EMULSION: Performed by: EMERGENCY MEDICINE

## 2025-01-01 PROCEDURE — 92950 HEART/LUNG RESUSCITATION CPR: CPT

## 2025-01-01 PROCEDURE — 87040 BLOOD CULTURE FOR BACTERIA: CPT | Performed by: EMERGENCY MEDICINE

## 2025-01-01 PROCEDURE — 5A12012 PERFORMANCE OF CARDIAC OUTPUT, SINGLE, MANUAL: ICD-10-PCS | Performed by: EMERGENCY MEDICINE

## 2025-01-01 PROCEDURE — 94660 CPAP INITIATION&MGMT: CPT

## 2025-01-01 PROCEDURE — 94761 N-INVAS EAR/PLS OXIMETRY MLT: CPT

## 2025-01-01 PROCEDURE — 94002 VENT MGMT INPAT INIT DAY: CPT

## 2025-01-01 PROCEDURE — 85007 BL SMEAR W/DIFF WBC COUNT: CPT | Performed by: EMERGENCY MEDICINE

## 2025-01-01 PROCEDURE — 36415 COLL VENOUS BLD VENIPUNCTURE: CPT

## 2025-01-01 PROCEDURE — 85018 HEMOGLOBIN: CPT

## 2025-01-01 PROCEDURE — 80053 COMPREHEN METABOLIC PANEL: CPT | Performed by: EMERGENCY MEDICINE

## 2025-01-01 PROCEDURE — 93005 ELECTROCARDIOGRAM TRACING: CPT | Performed by: EMERGENCY MEDICINE

## 2025-01-01 PROCEDURE — 94664 DEMO&/EVAL PT USE INHALER: CPT

## 2025-01-01 RX ORDER — LORAZEPAM 2 MG/ML
0.5 CONCENTRATE ORAL
Status: DISCONTINUED | OUTPATIENT
Start: 2025-01-01 | End: 2025-01-01 | Stop reason: HOSPADM

## 2025-01-01 RX ORDER — IPRATROPIUM BROMIDE AND ALBUTEROL SULFATE 2.5; .5 MG/3ML; MG/3ML
3 SOLUTION RESPIRATORY (INHALATION) ONCE
Status: COMPLETED | OUTPATIENT
Start: 2025-01-01 | End: 2025-01-01

## 2025-01-01 RX ORDER — LORAZEPAM 2 MG/ML
0.5 INJECTION INTRAMUSCULAR
Status: DISCONTINUED | OUTPATIENT
Start: 2025-01-01 | End: 2025-01-01 | Stop reason: HOSPADM

## 2025-01-01 RX ORDER — LORAZEPAM 2 MG/ML
2 CONCENTRATE ORAL
Status: DISCONTINUED | OUTPATIENT
Start: 2025-01-01 | End: 2025-01-01 | Stop reason: HOSPADM

## 2025-01-01 RX ORDER — CALCIUM CHLORIDE 100 MG/ML
INJECTION INTRAVENOUS; INTRAVENTRICULAR
Status: COMPLETED | OUTPATIENT
Start: 2025-01-01 | End: 2025-01-01

## 2025-01-01 RX ORDER — INDOMETHACIN 25 MG/1
CAPSULE ORAL
Status: COMPLETED | OUTPATIENT
Start: 2025-01-01 | End: 2025-01-01

## 2025-01-01 RX ORDER — ACETAMINOPHEN 160 MG/5ML
650 SOLUTION ORAL EVERY 4 HOURS PRN
Status: DISCONTINUED | OUTPATIENT
Start: 2025-01-01 | End: 2025-01-01 | Stop reason: HOSPADM

## 2025-01-01 RX ORDER — MORPHINE SULFATE 20 MG/ML
10 SOLUTION ORAL
Refills: 0 | Status: DISCONTINUED | OUTPATIENT
Start: 2025-01-01 | End: 2025-01-01 | Stop reason: HOSPADM

## 2025-01-01 RX ORDER — LORAZEPAM 2 MG/ML
1 INJECTION INTRAMUSCULAR
Status: DISCONTINUED | OUTPATIENT
Start: 2025-01-01 | End: 2025-01-01 | Stop reason: HOSPADM

## 2025-01-01 RX ORDER — LORAZEPAM 2 MG/ML
1 CONCENTRATE ORAL
Status: DISCONTINUED | OUTPATIENT
Start: 2025-01-01 | End: 2025-01-01 | Stop reason: HOSPADM

## 2025-01-01 RX ORDER — MORPHINE SULFATE 20 MG/ML
5 SOLUTION ORAL
Refills: 0 | Status: DISCONTINUED | OUTPATIENT
Start: 2025-01-01 | End: 2025-01-01 | Stop reason: HOSPADM

## 2025-01-01 RX ORDER — ETOMIDATE 2 MG/ML
0.3 INJECTION INTRAVENOUS ONCE
Status: COMPLETED | OUTPATIENT
Start: 2025-01-01 | End: 2025-01-01

## 2025-01-01 RX ORDER — SODIUM CHLORIDE 0.9 % (FLUSH) 0.9 %
10 SYRINGE (ML) INJECTION AS NEEDED
Status: DISCONTINUED | OUTPATIENT
Start: 2025-01-01 | End: 2025-01-01 | Stop reason: HOSPADM

## 2025-01-01 RX ORDER — ROCURONIUM BROMIDE 10 MG/ML
1 INJECTION, SOLUTION INTRAVENOUS ONCE
Status: COMPLETED | OUTPATIENT
Start: 2025-01-01 | End: 2025-01-01

## 2025-01-01 RX ORDER — SODIUM CHLORIDE 9 MG/ML
40 INJECTION, SOLUTION INTRAVENOUS AS NEEDED
Status: DISCONTINUED | OUTPATIENT
Start: 2025-01-01 | End: 2025-01-01 | Stop reason: HOSPADM

## 2025-01-01 RX ORDER — KETOROLAC TROMETHAMINE 30 MG/ML
15 INJECTION, SOLUTION INTRAMUSCULAR; INTRAVENOUS EVERY 6 HOURS PRN
Status: DISCONTINUED | OUTPATIENT
Start: 2025-01-01 | End: 2025-01-01 | Stop reason: HOSPADM

## 2025-01-01 RX ORDER — LIDOCAINE HYDROCHLORIDE 10 MG/ML
20 INJECTION, SOLUTION INFILTRATION; PERINEURAL ONCE
Status: DISCONTINUED | OUTPATIENT
Start: 2025-01-01 | End: 2025-01-01 | Stop reason: HOSPADM

## 2025-01-01 RX ORDER — MORPHINE SULFATE 2 MG/ML
2 INJECTION, SOLUTION INTRAMUSCULAR; INTRAVENOUS
Status: DISCONTINUED | OUTPATIENT
Start: 2025-01-01 | End: 2025-01-01 | Stop reason: HOSPADM

## 2025-01-01 RX ORDER — MORPHINE SULFATE 2 MG/ML
4 INJECTION, SOLUTION INTRAMUSCULAR; INTRAVENOUS
Status: DISCONTINUED | OUTPATIENT
Start: 2025-01-01 | End: 2025-01-01 | Stop reason: HOSPADM

## 2025-01-01 RX ORDER — NITROGLYCERIN 0.4 MG/1
0.4 TABLET SUBLINGUAL
Status: DISCONTINUED | OUTPATIENT
Start: 2025-01-01 | End: 2025-01-01 | Stop reason: HOSPADM

## 2025-01-01 RX ORDER — NOREPINEPHRINE BITARTRATE 0.03 MG/ML
.02-.3 INJECTION, SOLUTION INTRAVENOUS
Status: DISCONTINUED | OUTPATIENT
Start: 2025-01-01 | End: 2025-01-01 | Stop reason: HOSPADM

## 2025-01-01 RX ORDER — LORAZEPAM 2 MG/ML
2 INJECTION INTRAMUSCULAR
Status: DISCONTINUED | OUTPATIENT
Start: 2025-01-01 | End: 2025-01-01 | Stop reason: HOSPADM

## 2025-01-01 RX ORDER — MORPHINE SULFATE 20 MG/ML
20 SOLUTION ORAL
Refills: 0 | Status: DISCONTINUED | OUTPATIENT
Start: 2025-01-01 | End: 2025-01-01 | Stop reason: HOSPADM

## 2025-01-01 RX ORDER — ACETAMINOPHEN 325 MG/1
650 TABLET ORAL EVERY 4 HOURS PRN
Status: DISCONTINUED | OUTPATIENT
Start: 2025-01-01 | End: 2025-01-01 | Stop reason: HOSPADM

## 2025-01-01 RX ORDER — ACETAMINOPHEN 650 MG/1
650 SUPPOSITORY RECTAL EVERY 4 HOURS PRN
Status: DISCONTINUED | OUTPATIENT
Start: 2025-01-01 | End: 2025-01-01 | Stop reason: HOSPADM

## 2025-01-01 RX ORDER — DIPHENOXYLATE HYDROCHLORIDE AND ATROPINE SULFATE 2.5; .025 MG/1; MG/1
1 TABLET ORAL
Status: DISCONTINUED | OUTPATIENT
Start: 2025-01-01 | End: 2025-01-01 | Stop reason: HOSPADM

## 2025-01-01 RX ORDER — MORPHINE SULFATE 10 MG/ML
6 INJECTION, SOLUTION INTRAMUSCULAR; INTRAVENOUS
Status: DISCONTINUED | OUTPATIENT
Start: 2025-01-01 | End: 2025-01-01 | Stop reason: HOSPADM

## 2025-01-01 RX ORDER — HYDROMORPHONE HYDROCHLORIDE 2 MG/ML
1.5 INJECTION, SOLUTION INTRAMUSCULAR; INTRAVENOUS; SUBCUTANEOUS
Status: DISCONTINUED | OUTPATIENT
Start: 2025-01-01 | End: 2025-01-01 | Stop reason: HOSPADM

## 2025-01-01 RX ORDER — HYDROMORPHONE HYDROCHLORIDE 1 MG/ML
0.5 INJECTION, SOLUTION INTRAMUSCULAR; INTRAVENOUS; SUBCUTANEOUS
Refills: 0 | Status: DISCONTINUED | OUTPATIENT
Start: 2025-01-01 | End: 2025-01-01 | Stop reason: HOSPADM

## 2025-01-01 RX ORDER — SODIUM CHLORIDE 0.9 % (FLUSH) 0.9 %
10 SYRINGE (ML) INJECTION EVERY 12 HOURS SCHEDULED
Status: DISCONTINUED | OUTPATIENT
Start: 2025-01-01 | End: 2025-01-01 | Stop reason: HOSPADM

## 2025-01-01 RX ADMIN — NOREPINEPHRINE BITARTRATE 0.3 MCG/KG/MIN: 0.03 INJECTION, SOLUTION INTRAVENOUS at 14:31

## 2025-01-01 RX ADMIN — SODIUM CHLORIDE, SODIUM LACTATE, POTASSIUM CHLORIDE, AND CALCIUM CHLORIDE 2244 ML: 600; 310; 30; 20 INJECTION, SOLUTION INTRAVENOUS at 14:01

## 2025-01-01 RX ADMIN — CEFTRIAXONE 2000 MG: 2 INJECTION, POWDER, FOR SOLUTION INTRAMUSCULAR; INTRAVENOUS at 14:03

## 2025-01-01 RX ADMIN — LIDOCAINE HYDROCHLORIDE 100 MG: 20 INJECTION, SOLUTION INTRAVENOUS at 14:26

## 2025-01-01 RX ADMIN — EPINEPHRINE 1 MG: 0.1 INJECTION INTRACARDIAC; INTRAVENOUS at 14:24

## 2025-01-01 RX ADMIN — EPINEPHRINE 1 MG: 0.1 INJECTION INTRACARDIAC; INTRAVENOUS at 14:18

## 2025-01-01 RX ADMIN — EPINEPHRINE 1 MG: 0.1 INJECTION INTRACARDIAC; INTRAVENOUS at 14:21

## 2025-01-01 RX ADMIN — ETOMIDATE INJECTION 20 MG: 2 SOLUTION INTRAVENOUS at 13:47

## 2025-01-01 RX ADMIN — CALCIUM CHLORIDE 1 G: 100 INJECTION, SOLUTION INTRAVENOUS at 14:20

## 2025-01-01 RX ADMIN — EPINEPHRINE 1 MG: 0.1 INJECTION INTRACARDIAC; INTRAVENOUS at 14:28

## 2025-01-01 RX ADMIN — PROPOFOL 20 MCG/KG/MIN: 10 INJECTION, EMULSION INTRAVENOUS at 13:56

## 2025-01-01 RX ADMIN — ROCURONIUM BROMIDE 80 MG: 10 INJECTION, SOLUTION INTRAVENOUS at 13:48

## 2025-01-01 RX ADMIN — SODIUM BICARBONATE 50 MEQ: 84 INJECTION, SOLUTION INTRAVENOUS at 14:20

## 2025-01-01 RX ADMIN — EPINEPHRINE 1 MG: 0.1 INJECTION INTRACARDIAC; INTRAVENOUS at 14:15

## 2025-01-01 RX ADMIN — IPRATROPIUM BROMIDE AND ALBUTEROL SULFATE 3 ML: .5; 3 SOLUTION RESPIRATORY (INHALATION) at 13:01

## 2025-01-02 RX ORDER — ISOSORBIDE MONONITRATE 30 MG/1
TABLET, EXTENDED RELEASE ORAL
Qty: 90 TABLET | Refills: 3 | Status: SHIPPED | OUTPATIENT
Start: 2025-01-02

## 2025-01-02 RX ORDER — CLOPIDOGREL BISULFATE 75 MG/1
75 TABLET ORAL
Qty: 90 TABLET | Refills: 3 | Status: SHIPPED | OUTPATIENT
Start: 2025-01-02

## 2025-01-03 ENCOUNTER — TELEPHONE (OUTPATIENT)
Dept: RADIATION ONCOLOGY | Facility: HOSPITAL | Age: 70
End: 2025-01-03
Payer: MEDICARE

## 2025-01-03 NOTE — TELEPHONE ENCOUNTER
Pt called about his appt on Monday and what we were doing about the weather. I told him that we would be open as of right now, but that we would call him if that changed. I asked him to give us a call if he feels he is unable to make it.

## 2025-01-07 ENCOUNTER — TELEPHONE (OUTPATIENT)
Dept: RADIATION ONCOLOGY | Facility: HOSPITAL | Age: 70
End: 2025-01-07
Payer: MEDICARE

## 2025-01-07 ENCOUNTER — PATIENT OUTREACH (OUTPATIENT)
Dept: OTHER | Facility: HOSPITAL | Age: 70
End: 2025-01-07
Payer: MEDICARE

## 2025-01-07 NOTE — PROGRESS NOTES
Reviewed chart. Patient with adenocarcinoma of left upper lobe lung, s/p SBRT x5 completed 5/24/24. Had CT scan 12/27/24    Called patient. Left message that I was just touching base to see how he was doing. Wanted to know if he had any questions/concerns or resource/supportive care needs; requested cb    Call from patient.  He did not see Dr. Fuentes yesterday since the center was closed due to weather. Explained the office should call him to reschedule. The patient states he read his CT results. Explained that Dileep Sanches will review them with him at his appt and set up the next surveillance scan. Patient verbalized understanding.    The patient denies any questions/concerns or ongoing resource needs. Since he is stable with no active cancer treatment and no ongoing resource needs, I will close his case to navigation although reminded him that he can access the services in the Cancer Center even with his navigation case being closed. Encouraged patient to call in the future if the need arises. Patient verbalized understanding.

## 2025-01-21 ENCOUNTER — TELEPHONE (OUTPATIENT)
Dept: RADIATION ONCOLOGY | Facility: HOSPITAL | Age: 70
End: 2025-01-21
Payer: MEDICARE

## 2025-01-21 NOTE — TELEPHONE ENCOUNTER
Called pt to move f/u with Dr. Fuentes up to within the next week or so (OK to put in consult spot per nurse). Pt did not answer, LVM.

## 2025-01-30 ENCOUNTER — TELEPHONE (OUTPATIENT)
Dept: RADIATION ONCOLOGY | Facility: HOSPITAL | Age: 70
End: 2025-01-30
Payer: MEDICARE

## 2025-01-31 ENCOUNTER — OFFICE VISIT (OUTPATIENT)
Dept: RADIATION ONCOLOGY | Facility: HOSPITAL | Age: 70
End: 2025-01-31
Payer: MEDICARE

## 2025-01-31 VITALS
WEIGHT: 171 LBS | OXYGEN SATURATION: 94 % | SYSTOLIC BLOOD PRESSURE: 169 MMHG | HEART RATE: 83 BPM | DIASTOLIC BLOOD PRESSURE: 90 MMHG | BODY MASS INDEX: 26 KG/M2

## 2025-01-31 DIAGNOSIS — C34.92 ADENOCARCINOMA, LUNG, LEFT: Primary | ICD-10-CM

## 2025-01-31 PROCEDURE — G0463 HOSPITAL OUTPT CLINIC VISIT: HCPCS | Performed by: RADIOLOGY

## 2025-01-31 NOTE — PROGRESS NOTES
CC: non small cell carcinoma of left upper lobe lung cT 1N0                             S:        I had the pleasure of seeing Sim Agustin  today in the [facility]iation [facility].  The patient is a 69 y.o. male with cT1N0 non small cell lung cancer.  He has a past medical hx significant for peripheral vascular disease with multiple stents and extremity bypass, on Plavix and aspirin chronically for this. He has a history of COPD and is s/p left VATS with decortication for empyema in 2016. He is a previous smoker and smoked approximately 1 pack/day for roughly 35 years.  He stopped smoking 3 years ago.      He returns today for follow up now 8 months out from completion of his stereotactic radiotherapy to the left lung cancer.  He completed a dose of 50gy in 5 fractions on 5/24/24.   He has been doing well since I last saw him.     He had a CT chest on 8/19/24 which showed development of left upper lobe dense consolidation extending from the left hilum to the posterior lateral pleural surface where there is thickening. This area envelops the previously demonstrated spiculated left upper lobe tumor.   Improving bronchial wall thickening, reticulonodular infiltrate andnodular foci of consolidation within the left lower lobe. Near completeresolution of the reticulonodular densities at the base of the leftupper lobe. The right lung remains clear. No new pulmonary noduledemonstrated within the right or left lung.    He had a repeat CT chest on 12/27/24 which showed decreased size and conspicuity of masslike consolidation in the leftupper lobe at a site of known malignancy, likely due to evolvingtreatment related changes.  New somewhat groundglass nodular opacities in the posterior lowerlobes, which are nonspecific. Recommend short-term follow-up. Additional scattered nodules are similar. Recommend attention on follow-up      He denies any worsening soa.  He has chronic cough       Review of Systems   Constitutional:  Positive for  fatigue.   Respiratory:  Positive for cough. Negative for shortness of breath.    Psychiatric/Behavioral: Negative.         Past Medical History:   Diagnosis Date   • Acute respiratory failure with hypoxia and hypercarbia 03/11/2019   • Acute upper respiratory infection 02/06/2013   • Amputee, above knee, left    • Anemia     per [facility] database   • ARF (acute renal failure) 03/04/2016   • Asthma 02/02/2018   • Atelectasis, left 03/04/2016   • Bradycardia 05/23/2019   • CAD (coronary artery disease) 04/04/2016   • Chronic obstructive pulmonary disease with acute exacerbation 02/06/2013   • Clotting disorder 05/01/2003   • Colon polyps    • Compartment syndrome     AFTER ILIAC SURGERY. ABOVE KNEE AMPUTATION   • COPD (chronic obstructive pulmonary disease)    • COPD with hypoxia 05/23/2019   • Cough     SINCE APRIL WITH PNEUMONIA.    • Demand myocardial infarction 05/19/2022   • Disease of thyroid gland     HYPOTHYRODISM   • Diverticulosis    • Elevated hematocrit 01/06/2021   • Employs prosthetic leg    • ESRD (end stage renal disease) on dialysis 04/06/2016   • Fracture of patella 03/03/2015   • History of acute renal failure    • History of CHF (congestive heart failure)    • History of GI bleed 02/2016    AORTODUOD FISTULA   • History of sepsis 02/2016   • History of transfusion     MULTIPLE, 2016   • Hyperkalemia 04/11/2016   • Hyperlipidemia    • Hypertension    • Hypotension    • Hypotension 03/04/2016   • Left lower lobe pneumonia 05/18/2022   • Low back pain 10/2016   • Lung neoplasm    • Nonischemic cardiomyopathy    • Nosocomial pneumonia 04/06/2016   • Phantom limb pain     SL, WITH LEFT ABOUVE KNEE   • Pleural effusion on left 04/11/2016   • Pneumonia     SPRING 2016  AFTER SURGERY   • Pulmonary embolism    • PVD (peripheral vascular disease)    • Renal insufficiency    • S/P thoracentesis 04/11/2016   • Sepsis, unspecified organism 04/05/2016         Past Surgical History:   Procedure Laterality Date    • ABOVE KNEE AMPUTATION Left 03/16/2016    Procedure: LT AMPUTATION ABOVE KNEE;  Surgeon: Jose Swann MD;  Location: [facility] OR;  Service:    • ARTERIAL THROMBECTOMY  2001    throbectomy of arterial graft   • AXILLARY ARTERY - FEMORAL ARTERY BYPASS GRAFT  02/2016    Revision of ABF to Right Axillary to Bilateral Superficial Femoral Artery bypass graft   • AXILLARY FEMORAL BYPASS Right 02/15/2016    Exploratory lapartomy, repair aortoduodenal fistula, resection infected aortobifemoral bypass graft, axillobi-superficial femoral artery Radisson-Aneudy bypass graft from right axillary artery, Repair of duodenotomy 4th portion duodenum-Dr. Jose Swann, Dr. Genaro Perrin   • BRONCHOSCOPY Left 03/04/2016    Dr. NATALIIA Tate   • BRONCHOSCOPY Left 05/20/2022    Procedure: BRONCHOSCOPY WITH BIOPSIES, BRUSHINGS, AND BAL UNDER FLUORO;  Surgeon: Ishmael Tate Jr., MD;  Location: [facility] ENDOSCOPY;  Service: Pulmonary;  Laterality: Left;  PRE OP - LLL CONSOLIDATION  POST OP - SAME   • BRONCHOSCOPY RIGID / FLEXIBLE N/A 03/03/2016    Dr. NATALIIA Tate   • BRONCHOSCOPY RIGID / FLEXIBLE N/A 02/26/2016    Dr. NATALIIA Tate   • BRONCHOSCOPY WITH ION ROBOTIC ASSIST N/A 04/12/2024    Procedure: BRONCHOSCOPY-ION! with biopsy AND FNA AND BAL;  Surgeon: Jono Rebolledo MD PhD;  Location: [facility] ENDOSCOPY;  Service: Robotics - Pulmonary;  Laterality: N/A;  PRE OP - PULMONARY NODULE  POST OP - SAME   • CARDIAC CATHETERIZATION N/A 03/18/2019    Procedure: Right and Left Heart Cath;  Surgeon: Nina Storey MD;  Location: [facility] INVASIVE LOCATION;  Service: Cardiovascular   • CATARACT EXTRACTION WITH INTRAOCULAR LENS IMPLANT Bilateral    • COLON RESECTION WITH COLOSTOMY Left 02/28/2016    Exploratory laparotomy with open left hemicolectomy, end-colostomy, G-tube and J-tube-Dr. Endy Chaney   • COLON SURGERY  3/25/2015    COLOSTOMY   • COLONOSCOPY N/A 2015    Dr. Laughlin   • COLONOSCOPY N/A 10/12/2016     Procedure: COLONOSCOPY TO 20 CM AND PER STOMA TO 30CM;  Surgeon: Genaro Perrin MD;  Location: [facility] ENDOSCOPY;  Service:    • COLONOSCOPY N/A 10/21/2016    Procedure: COLONOSCOPY DONE AT BEDSIDE ONTO CECEM;  Surgeon: Genaro Perrin MD;  Location: [facility] ENDOSCOPY;  Service:    • COLONOSCOPY N/A 02/10/2016    Diverticulosis in the entire examined colon, non-bleeding internal hemorrhoids-Dr. Taylor Pina   • COLONOSCOPY N/A 02/11/2016    Poor prep, blood in the entire examined colon, normal ileum-Dr. Taylor Pina   • COLONOSCOPY W/ POLYPECTOMY N/A 07/27/2015    Normal ileum, diverticulosis in the sigmoid colon: resected and retrieved, one 6 mm polyp in the descending colon: resected and retrieved, the distal rectum and anal verge are normal on retroflexion view-Dr. Miguel Ángel Laughlin   • COLOSTOMY CLOSURE N/A 10/13/2016    Procedure: COLOSTOMY TAKEDOWN OR CLOSURE, APPENDECTOMY;  Surgeon: Genaro Perrin MD;  Location: [facility] MAIN OR;  Service:    • DEBRIDEMENT LEG Left 03/01/2016    Excisional debridement of the skin, subcutantous tissue, tendon and muscle left calf-Dr. Jose Swann   • DEBRIDEMENT LEG Left 02/25/2016    Excisional debridement full-thcikness skin and subcutaneous tissue and tendon and muscle, left medial and lateral calf muscles-Dr. Jose Swann   • ENDOSCOPY N/A 10/21/2016    Procedure: ESOPHAGOGASTRODUODENOSCOPY DONE AT BEDSIDE;  Surgeon: Genaro Perrin MD;  Location: [facility] ENDOSCOPY;  Service:    • ENDOSCOPY AND COLONOSCOPY N/A 02/28/2016    EGD and Colonoscopy with Candy ink injection-Dr. Endy Chaney   • ENTEROSCOPY SMALL BOWEL N/A 02/11/2016    Normal esophagus, normal stomach, normal duodenum, portion of the jejunum normal-Dr. Taylor Pina   • ILIAC ARTERY - FEMORAL ARTERY BYPASS GRAFT Bilateral 2002   • ILIAC ARTERY STENT Bilateral 2001   • INSERTION AND REMOVAL PERITONEAL DIALYSIS CATHETER Right 02/29/2016    Exchange right jugular 16 cm Mahurkar dialysis  catheter-Dr. Jose Swann   • INSERTION PERITONEAL DIALYSIS CATHETER Left 03/01/2016    Ultrasound-guided access of the left jugular vein, 23 cm left jugular palindrome dialysis catheter placement-Dr. Jose Swann   • INSERTION PERITONEAL DIALYSIS CATHETER Right 02/18/2016    Right jugular Mahrkar catherer placement-Dr. Jose Swann   • JOINT REPLACEMENT Left    • THORACOSCOPY Left 04/18/2016    Procedure: LT THORACOSCOPY VIDEO ASSISTED WITH DECORDICATION;  Surgeon: Genaro Davila III, MD;  Location: [facility];  Service:    • THROMBECTOMY Left 02/16/2016    Thombectomy of left femoral graft, left leg arteriogram, left calf forward compartment fasciotomy-Dr. Jose Swann   • TOTAL HIP ARTHROPLASTY Left    • UPPER GASTROINTESTINAL ENDOSCOPY N/A 02/09/2016    Normal UGI-Dr. Ajay Parkinson   • UPPER GASTROINTESTINAL ENDOSCOPY N/A 11/28/2015    Small hiatal hernia, chronic gastritis: biopsied, non-bleeding angioectasias in the stomach: treated with argon plasma coagulation, multiple bleeding angioectasias in the dudenum: treated with argon plasma coagulation-Dr. Mykel Jaramillo   • VASCULAR SURGERY      MULTIPLE   • VENTRAL/INCISIONAL HERNIA REPAIR N/A 10/19/2017    Procedure: VENTRAL HERNIA REPAIR WITH MESH AND BILATERAL COMPONENT SEPARATION;  Surgeon: Genaro Perrin MD;  Location: [facility];  Service:    • WISDOM TOOTH EXTRACTION           Social History     Socioeconomic History   • Marital status:    Tobacco Use   • Smoking status: Former     Current packs/day: 0.00     Average packs/day: 1 pack/day for 47.0 years (47.0 ttl pk-yrs)     Types: Cigarettes     Start date: 5/1/1974     Quit date: 5/1/2021     Years since quitting: 3.7     Passive exposure: Never   • Smokeless tobacco: Never   • Tobacco comments:     caffeine - rarely   Vaping Use   • Vaping status: Never Used   Substance and Sexual Activity   • Alcohol use: No   • Drug use: Not Currently     Types: Other     Comment:  THC gummies   • Sexual activity: Not Currently     Partners: Female     Birth control/protection: Condom, Post-menopausal         Family History   Problem Relation Age of Onset   • Lung cancer Mother    • Cancer Mother    • Heart disease Father    • Diabetes Father         He wasn't diagnosed until he was 78   • Hypertension Father    • Malig Hyperthermia Neg Hx           Objective    Physical Exam  Constitutional:       Appearance: Normal appearance.   HENT:      Head: Atraumatic.   Eyes:      Extraocular Movements: Extraocular movements intact.   Pulmonary:      Effort: Pulmonary effort is normal. No respiratory distress.      Breath sounds: Wheezing present.   Neurological:      Mental Status: He is alert.   Psychiatric:         Mood and Affect: Mood normal.         Behavior: Behavior normal.         Current Outpatient Medications on File Prior to Visit   Medication Sig Dispense Refill   • albuterol (ACCUNEB) 0.63 MG/3ML nebulizer solution Inhale 3 mL Every 6 (Six) Hours As Needed.     • allopurinol (ZYLOPRIM) 100 MG tablet TAKE 1 TABLET BY MOUTH DAILY FOR GOUT 90 tablet 3   • ALPRAZolam (XANAX) 1 MG tablet Take 1 tablet twice daily 180 tablet 2   • amLODIPine (NORVASC) 10 MG tablet TAKE 1 TABLET BY MOUTH EVERY DAY 90 tablet 3   • aspirin 81 MG chewable tablet Chew 1 tablet Every Night. HELD 1 WEEK AS DIRECTED     • atorvastatin (LIPITOR) 40 MG tablet TAKE 1 & 1/2 TABLETS BY MOUTH EVERY DAY FOR CHOLESTEROL AND HEART HEALTH 135 tablet 3   • B Complex Vitamins (vitamin b complex) capsule capsule Take 1 capsule by mouth Daily.     • brimonidine (ALPHAGAN) 0.2 % ophthalmic solution instill 1 drop into both eyes twice a day     • buPROPion XL (WELLBUTRIN XL) 300 MG 24 hr tablet TAKE 1 TABLET BY MOUTH EVERY DAY 90 tablet 3   • clopidogrel (PLAVIX) 75 MG tablet TAKE 1 TABLET BY MOUTH EVERY DAY AT NIGHT 90 tablet 3   • Coenzyme Q10 400 MG capsule Take 1 capsule by mouth Every Evening.     • FERROUS SULFATE PO Take 65 mg  by mouth Every Night.     • Fluticasone-Umeclidin-Vilant (TRELEGY ELLIPTA) 100-62.5-25 MCG/INH aerosol powder  Inhale 1 each Daily. 28 each 2   • gabapentin (NEURONTIN) 300 MG capsule TAKE 1 CAPSULE BY MOUTH THREE TIMES A  capsule 2   • isosorbide mononitrate (IMDUR) 30 MG 24 hr tablet TAKE 1 TABLET BY MOUTH EVERY DAY IN THE MORNING 90 tablet 3   • latanoprost (XALATAN) 0.005 % ophthalmic solution Administer 1 drop to both eyes every night at bedtime.     • levothyroxine (SYNTHROID, LEVOTHROID) 137 MCG tablet TAKE 1 TABLET BY MOUTH EVERY DAY 90 tablet 3   • Multiple Vitamins-Minerals (MULTIVITAMIN ADULT PO) Take 1 tablet by mouth Daily.     • Omega-3 Fatty Acids (fish oil) 1000 MG capsule capsule Take 1 capsule by mouth Daily. Took this am     • torsemide (DEMADEX) 20 MG tablet TAKE 1 TABLET BY MOUTH EVERY DAY 90 tablet 1   • traMADol (ULTRAM) 50 MG tablet TAKE 1 TABLET BY MOUTH TWICE A  tablet 0     No current facility-administered medications on file prior to visit.       ALLERGIES:    Allergies   Allergen Reactions   • Augmentin [Amoxicillin-Pot Clavulanate] Hives and Rash     Tolerate cephalosporins   • Zosyn [Piperacillin Sod-Tazobactam So] Rash     erythroderma       There were no vitals taken for this visit.          No data to display                  Assessment & Plan   69 year old male with cT1N0 adenocarcinoma of left upper lobe now 8 months out from SBRT. I have personally reviewed his most recent CT chest with post radiation changes in left upper lobe. I will order a repeat chest CT in 4 months and see him back one week later to review. I asked him to call with any questions or concerns in the interim.     I personally spent greater than 35 minutes today assessing, managing, discussing and documenting my visit with the patient. That time includes review of records, imaging and pathology reports, obtaining my own history, performing a medically appropriate evaluation, counseling and  educating the patient, discussing goals, logistics, alternatives and risks of my recommendations, surveillance options and potential outcomes. It also includes the time documenting the clinical information in the EMR and communicating my recommendations to the other involved physicians.       Thank you very much for allowing me to participate in the care of this very pleasant patient.    Sincerely,      Yanet Fuentes MD

## 2025-02-06 ENCOUNTER — OFFICE VISIT (OUTPATIENT)
Dept: INTERNAL MEDICINE | Facility: CLINIC | Age: 70
End: 2025-02-06
Payer: MEDICARE

## 2025-02-06 VITALS
HEART RATE: 73 BPM | DIASTOLIC BLOOD PRESSURE: 68 MMHG | WEIGHT: 172 LBS | TEMPERATURE: 97.5 F | HEIGHT: 68 IN | BODY MASS INDEX: 26.07 KG/M2 | OXYGEN SATURATION: 95 % | SYSTOLIC BLOOD PRESSURE: 132 MMHG

## 2025-02-06 DIAGNOSIS — E53.8 VITAMIN B12 DEFICIENCY: ICD-10-CM

## 2025-02-06 DIAGNOSIS — Z12.5 SCREENING FOR MALIGNANT NEOPLASM OF PROSTATE: ICD-10-CM

## 2025-02-06 DIAGNOSIS — E03.9 HYPOTHYROIDISM, UNSPECIFIED TYPE: ICD-10-CM

## 2025-02-06 DIAGNOSIS — R80.9 PROTEINURIA, UNSPECIFIED TYPE: ICD-10-CM

## 2025-02-06 DIAGNOSIS — N18.32 STAGE 3B CHRONIC KIDNEY DISEASE (CKD): ICD-10-CM

## 2025-02-06 DIAGNOSIS — Z79.899 HIGH RISK MEDICATION USE: ICD-10-CM

## 2025-02-06 DIAGNOSIS — R73.09 ELEVATED GLUCOSE: ICD-10-CM

## 2025-02-06 DIAGNOSIS — E78.5 HYPERLIPIDEMIA, UNSPECIFIED HYPERLIPIDEMIA TYPE: ICD-10-CM

## 2025-02-06 DIAGNOSIS — J44.1 COPD WITH EXACERBATION: Primary | ICD-10-CM

## 2025-02-06 DIAGNOSIS — I10 ESSENTIAL HYPERTENSION: ICD-10-CM

## 2025-02-06 PROCEDURE — 99214 OFFICE O/P EST MOD 30 MIN: CPT | Performed by: FAMILY MEDICINE

## 2025-02-06 PROCEDURE — 3078F DIAST BP <80 MM HG: CPT | Performed by: FAMILY MEDICINE

## 2025-02-06 PROCEDURE — 1126F AMNT PAIN NOTED NONE PRSNT: CPT | Performed by: FAMILY MEDICINE

## 2025-02-06 PROCEDURE — 3075F SYST BP GE 130 - 139MM HG: CPT | Performed by: FAMILY MEDICINE

## 2025-02-06 RX ORDER — AZITHROMYCIN 250 MG/1
TABLET, FILM COATED ORAL
Qty: 6 TABLET | Refills: 0 | Status: SHIPPED | OUTPATIENT
Start: 2025-02-06

## 2025-02-06 NOTE — PROGRESS NOTES
Date of Encounter: 2025  Patient: Sim Agustin,  1955    Subjective   History of Presenting Illness  Chief complaint: General Follow-up    Patient presents for general follow-up of hypertension, hyperlipidemia, CKD 3, chronic pain    Fortunately no recent changes in since we last saw him    Calcium a little elevated with Dr. Sarabia and he has presented me with some blood work which I will order in addition to my blood work to get near the end of the month.  He was taking supplements with else extra calcium.  CT of the chest 2024 demonstrated decreased size of left upper lobe known malignancy cT1 N0 adenocarcinoma 10 months status post SBRT.  He did have some consolidations in the posterior lower lobes that were nonspecific.  He has had a intermittently productive cough recently and feels that it is a little worse than his baseline.    Chronic pain is stable.  A little bit more achy after doing errands on  but otherwise no issues.  He did suffer a fall after getting tangled in his oxygen cord at home but no injury.  He does have a walker and a cane that he uses    No recent episodes of gout    The following portions of the patient's history were reviewed and updated as appropriate: allergies, current medications, past family history, past medical history, past social history, past surgical history and problem list.    Patient Active Problem List   Diagnosis    H/O angiodysplasia of intestinal tract    COPD (chronic obstructive pulmonary disease)    S/P colectomy    Status post above-knee amputation of left lower extremity    CAD (coronary artery disease)    PVD (peripheral vascular disease)    Essential hypertension    Cardiomyopathy, unspecified    PRISCILLA treated with BiPAP    Anxiety disorder    Chronic midline low back pain without sciatica    Long term prescription benzodiazepine use    History of bradycardia    Hypothyroidism    Vitamin B12 deficiency    Iron deficiency    History of  smoking 30 or more pack years    High risk medication use    DNR (do not resuscitate)    Stage 3a chronic kidney disease    Hyperlipidemia    Proteinuria    Junctional cardiac arrhythmia    Acute kidney injury    Hypercalcemia    Stage 3b chronic kidney disease (CKD)    Anemia    Leukocytosis    Hyponatremia    Hypokalemia    Lactic acidosis    Solitary pulmonary nodule    Adenocarcinoma, lung, left     Past Medical History:   Diagnosis Date    Acute respiratory failure with hypoxia and hypercarbia 03/11/2019    Acute upper respiratory infection 02/06/2013    Amputee, above knee, left     Anemia     per [facility] database    ARF (acute renal failure) 03/04/2016    Asthma 02/02/2018    Atelectasis, left 03/04/2016    Bradycardia 05/23/2019    CAD (coronary artery disease) 04/04/2016    Cancer 2024    Small cell adenocarcinoma    CHF (congestive heart failure) 2015    Chronic obstructive pulmonary disease with acute exacerbation 02/06/2013    Clotting disorder 05/01/2003    Colon polyps     Compartment syndrome     AFTER ILIAC SURGERY. ABOVE KNEE AMPUTATION    COPD (chronic obstructive pulmonary disease)     COPD with hypoxia 05/23/2019    Cough     SINCE APRIL WITH PNEUMONIA.     Deep vein thrombosis     Demand myocardial infarction 05/19/2022    Disease of thyroid gland     HYPOTHYRODISM    Diverticulosis     Elevated hematocrit 01/06/2021    Employs prosthetic leg     ESRD (end stage renal disease) on dialysis 04/06/2016    Fracture of patella 03/03/2015    Glaucoma 2018    History of acute renal failure     History of CHF (congestive heart failure)     History of GI bleed 02/2016    AORTODUOD FISTULA    History of sepsis 02/2016    History of transfusion     MULTIPLE, 2016    Hyperkalemia 04/11/2016    Hyperlipidemia     Hypertension     Hypotension     Hypotension 03/04/2016    Hypothyroidism     Left lower lobe pneumonia 05/18/2022    Low back pain 10/2016    Lung neoplasm     Nonischemic cardiomyopathy      Nosocomial pneumonia 04/06/2016    Phantom limb pain     SL, WITH LEFT ABOUVE KNEE    Pleural effusion on left 04/11/2016    Pneumonia     SPRING 2016  AFTER SURGERY    Pulmonary embolism     PVD (peripheral vascular disease)     Renal insufficiency     S/P thoracentesis 04/11/2016    Sepsis, unspecified organism 04/05/2016     Past Surgical History:   Procedure Laterality Date    ABOVE KNEE AMPUTATION Left 03/16/2016    Procedure: LT AMPUTATION ABOVE KNEE;  Surgeon: Jose Swann MD;  Location: [facility] OR;  Service:     ARTERIAL THROMBECTOMY  2001    throbectomy of arterial graft    AXILLARY ARTERY - FEMORAL ARTERY BYPASS GRAFT  02/2016    Revision of ABF to Right Axillary to Bilateral Superficial Femoral Artery bypass graft    AXILLARY FEMORAL BYPASS Right 02/15/2016    Exploratory lapartomy, repair aortoduodenal fistula, resection infected aortobifemoral bypass graft, axillobi-superficial femoral artery Apex-Aneudy bypass graft from right axillary artery, Repair of duodenotomy 4th portion duodenum-Dr. Jose Swann, Dr. Genaro Perrin    BRONCHOSCOPY Left 03/04/2016    Dr. NATALIIA Tate    BRONCHOSCOPY Left 05/20/2022    Procedure: BRONCHOSCOPY WITH BIOPSIES, BRUSHINGS, AND BAL UNDER FLUORO;  Surgeon: Ishmael Tate Jr., MD;  Location: [facility] ENDOSCOPY;  Service: Pulmonary;  Laterality: Left;  PRE OP - LLL CONSOLIDATION  POST OP - SAME    BRONCHOSCOPY RIGID / FLEXIBLE N/A 03/03/2016    Dr. NATALIIA Tate    BRONCHOSCOPY RIGID / FLEXIBLE N/A 02/26/2016    Dr. NATALIIA Tate    BRONCHOSCOPY WITH ION ROBOTIC ASSIST N/A 04/12/2024    Procedure: BRONCHOSCOPY-ION! with biopsy AND FNA AND BAL;  Surgeon: Jono Rebolledo MD PhD;  Location: [facility] ENDOSCOPY;  Service: Robotics - Pulmonary;  Laterality: N/A;  PRE OP - PULMONARY NODULE  POST OP - SAME    CARDIAC CATHETERIZATION N/A 03/18/2019    Procedure: Right and Left Heart Cath;  Surgeon: Nina Storey MD;  Location: [facility] CATH INVASIVE LOCATION;   Service: Cardiovascular    CATARACT EXTRACTION WITH INTRAOCULAR LENS IMPLANT Bilateral     COLON RESECTION WITH COLOSTOMY Left 02/28/2016    Exploratory laparotomy with open left hemicolectomy, end-colostomy, G-tube and J-tube-Dr. Endy Chaney    COLON SURGERY  3/25/2015    COLOSTOMY    COLONOSCOPY N/A 2015    Dr. Laughlin    COLONOSCOPY N/A 10/12/2016    Procedure: COLONOSCOPY TO 20 CM AND PER STOMA TO 30CM;  Surgeon: Genaro Perrin MD;  Location: [facility] ENDOSCOPY;  Service:     COLONOSCOPY N/A 10/21/2016    Procedure: COLONOSCOPY DONE AT BEDSIDE ONTO CECEM;  Surgeon: Genaro Perrin MD;  Location: [facility] ENDOSCOPY;  Service:     COLONOSCOPY N/A 02/10/2016    Diverticulosis in the entire examined colon, non-bleeding internal hemorrhoids-Dr. Taylor Pina    COLONOSCOPY N/A 02/11/2016    Poor prep, blood in the entire examined colon, normal ileum-Dr. Taylor Pina    COLONOSCOPY W/ POLYPECTOMY N/A 07/27/2015    Normal ileum, diverticulosis in the sigmoid colon: resected and retrieved, one 6 mm polyp in the descending colon: resected and retrieved, the distal rectum and anal verge are normal on retroflexion view-Dr. Miguel Ángel Laughlin    COLOSTOMY CLOSURE N/A 10/13/2016    Procedure: COLOSTOMY TAKEDOWN OR CLOSURE, APPENDECTOMY;  Surgeon: Genaro Perrin MD;  Location: [facility] MAIN OR;  Service:     DEBRIDEMENT LEG Left 03/01/2016    Excisional debridement of the skin, subcutantous tissue, tendon and muscle left calf-Dr. Jose Swann    DEBRIDEMENT LEG Left 02/25/2016    Excisional debridement full-thcikness skin and subcutaneous tissue and tendon and muscle, left medial and lateral calf muscles-Dr. Jose Swann    ENDOSCOPY N/A 10/21/2016    Procedure: ESOPHAGOGASTRODUODENOSCOPY DONE AT BEDSIDE;  Surgeon: Genaro Perrin MD;  Location: [facility] ENDOSCOPY;  Service:     ENDOSCOPY AND COLONOSCOPY N/A 02/28/2016    EGD and Colonoscopy with Candy ink injection-Dr. Endy Chaney    ENTEROSCOPY SMALL  BOWEL N/A 02/11/2016    Normal esophagus, normal stomach, normal duodenum, portion of the jejunum normal-Dr. Taylor Pina    EYE SURGERY  2017    Cataract surgery    ILIAC ARTERY - FEMORAL ARTERY BYPASS GRAFT Bilateral 2002    ILIAC ARTERY STENT Bilateral 2001    INSERTION AND REMOVAL PERITONEAL DIALYSIS CATHETER Right 02/29/2016    Exchange right jugular 16 cm Mahurkar dialysis catheter-Dr. Jose Swann    INSERTION PERITONEAL DIALYSIS CATHETER Left 03/01/2016    Ultrasound-guided access of the left jugular vein, 23 cm left jugular palindrome dialysis catheter placement-Dr. Jose Swann    INSERTION PERITONEAL DIALYSIS CATHETER Right 02/18/2016    Right jugular Mahrkar catherer placement-Dr. Jose Swann    JOINT REPLACEMENT Left     THORACOSCOPY Left 04/18/2016    Procedure: LT THORACOSCOPY VIDEO ASSISTED WITH DECORDICATION;  Surgeon: Genaro Davila III, MD;  Location: [facility];  Service:     THROMBECTOMY Left 02/16/2016    Thombectomy of left femoral graft, left leg arteriogram, left calf forward compartment fasciotomy-Dr. Jose Swann    TOTAL HIP ARTHROPLASTY Left     UPPER GASTROINTESTINAL ENDOSCOPY N/A 02/09/2016    Normal UGI-Dr. Ajay Parkinson    UPPER GASTROINTESTINAL ENDOSCOPY N/A 11/28/2015    Small hiatal hernia, chronic gastritis: biopsied, non-bleeding angioectasias in the stomach: treated with argon plasma coagulation, multiple bleeding angioectasias in the dudenum: treated with argon plasma coagulation-Dr. Mykel Jaramillo    VASCULAR SURGERY      MULTIPLE    VENTRAL/INCISIONAL HERNIA REPAIR N/A 10/19/2017    Procedure: VENTRAL HERNIA REPAIR WITH MESH AND BILATERAL COMPONENT SEPARATION;  Surgeon: Genaro Perrin MD;  Location: [facility];  Service:     WISDOM TOOTH EXTRACTION       Family History   Problem Relation Age of Onset    Lung cancer Mother     Cancer Mother     Heart disease Father     Diabetes Father         He wasn't diagnosed until he was 78    Hypertension  Father     Malig Hyperthermia Neg Hx        Current Outpatient Medications:     albuterol (ACCUNEB) 0.63 MG/3ML nebulizer solution, Inhale 3 mL Every 6 (Six) Hours As Needed., Disp: , Rfl:     allopurinol (ZYLOPRIM) 100 MG tablet, TAKE 1 TABLET BY MOUTH DAILY FOR GOUT, Disp: 90 tablet, Rfl: 3    ALPRAZolam (XANAX) 1 MG tablet, Take 1 tablet twice daily, Disp: 180 tablet, Rfl: 2    amLODIPine (NORVASC) 10 MG tablet, TAKE 1 TABLET BY MOUTH EVERY DAY, Disp: 90 tablet, Rfl: 3    aspirin 81 MG chewable tablet, Chew 1 tablet Every Night. HELD 1 WEEK AS DIRECTED, Disp: , Rfl:     atorvastatin (LIPITOR) 40 MG tablet, TAKE 1 & 1/2 TABLETS BY MOUTH EVERY DAY FOR CHOLESTEROL AND HEART HEALTH, Disp: 135 tablet, Rfl: 3    B Complex Vitamins (vitamin b complex) capsule capsule, Take 1 capsule by mouth Daily., Disp: , Rfl:     brimonidine (ALPHAGAN) 0.2 % ophthalmic solution, instill 1 drop into both eyes twice a day, Disp: , Rfl:     buPROPion XL (WELLBUTRIN XL) 300 MG 24 hr tablet, TAKE 1 TABLET BY MOUTH EVERY DAY, Disp: 90 tablet, Rfl: 3    clopidogrel (PLAVIX) 75 MG tablet, TAKE 1 TABLET BY MOUTH EVERY DAY AT NIGHT, Disp: 90 tablet, Rfl: 3    Coenzyme Q10 400 MG capsule, Take 1 capsule by mouth Every Evening., Disp: , Rfl:     FERROUS SULFATE PO, Take 65 mg by mouth Every Night., Disp: , Rfl:     Fluticasone-Umeclidin-Vilant (TRELEGY ELLIPTA) 100-62.5-25 MCG/INH aerosol powder , Inhale 1 each Daily., Disp: 28 each, Rfl: 2    gabapentin (NEURONTIN) 300 MG capsule, TAKE 1 CAPSULE BY MOUTH THREE TIMES A DAY, Disp: 270 capsule, Rfl: 2    isosorbide mononitrate (IMDUR) 30 MG 24 hr tablet, TAKE 1 TABLET BY MOUTH EVERY DAY IN THE MORNING, Disp: 90 tablet, Rfl: 3    latanoprost (XALATAN) 0.005 % ophthalmic solution, Administer 1 drop to both eyes every night at bedtime., Disp: , Rfl:     levothyroxine (SYNTHROID, LEVOTHROID) 137 MCG tablet, TAKE 1 TABLET BY MOUTH EVERY DAY, Disp: 90 tablet, Rfl: 3    Multiple Vitamins-Minerals  "(MULTIVITAMIN ADULT PO), Take 1 tablet by mouth Daily., Disp: , Rfl:     Omega-3 Fatty Acids (fish oil) 1000 MG capsule capsule, Take 1 capsule by mouth Daily. Took this am, Disp: , Rfl:     torsemide (DEMADEX) 20 MG tablet, TAKE 1 TABLET BY MOUTH EVERY DAY (Patient taking differently: Take 1 tablet by mouth Every Other Day.), Disp: 90 tablet, Rfl: 1    traMADol (ULTRAM) 50 MG tablet, TAKE 1 TABLET BY MOUTH TWICE A DAY, Disp: 180 tablet, Rfl: 0    azithromycin (Zithromax) 250 MG tablet, Take 2 tablets the first day, then 1 tablet daily for 4 days., Disp: 6 tablet, Rfl: 0  Allergies   Allergen Reactions    Augmentin [Amoxicillin-Pot Clavulanate] Hives and Rash     Tolerate cephalosporins    Zosyn [Piperacillin Sod-Tazobactam So] Rash     erythroderma     Social History     Tobacco Use    Smoking status: Former     Current packs/day: 0.00     Average packs/day: 1 pack/day for 47.0 years (47.0 ttl pk-yrs)     Types: Cigarettes     Start date: 5/1/1974     Quit date: 5/1/2021     Years since quitting: 3.7     Passive exposure: Never    Smokeless tobacco: Never    Tobacco comments:     caffeine - rarely   Vaping Use    Vaping status: Never Used   Substance Use Topics    Alcohol use: No    Drug use: Not Currently     Types: Other     Comment: THC gummies          Objective   Physical Exam  Vitals:    02/06/25 1029   BP: 132/68   Pulse: 73   Temp: 97.5 °F (36.4 °C)   TempSrc: Infrared   SpO2: 95%   Weight: 78 kg (172 lb)   Height: 172.7 cm (68\")     Body mass index is 26.15 kg/m².    Constitutional: NAD.  Eyes: Normal conjunctiva.  Ear, nose, mouth, throat: No tonsillar exudates or erythema.   Normal nasal mucosa. Normal external ear canals and TMs bilaterally.  Cardiovascular: RRR. No murmurs. No LE edema b/l. Radial pulses 2+ bilaterally.  Pulmonary: Reduced aeration globally, with significant coarseness with coughing bilaterally that is worse than his baseline.  No significant wheezing.  Integumentary: No rashes or " wounds on face or upper extremities.  Lymphatic: No anterior cervical lymphadenopathy.  Endocrine: No thyromegaly or palpable thyroid nodules.  Psychiatric: Normal affect. Normal thought content.     Assessment & Plan   Assessment and Plan  Pleasant 68-year-old male former heavy smoker with adenocarcinoma of the lung, PVD status post bypass, CAD, moderate to severe COPD, hypertension, hyperlipidemia, CKD with proteinuria, anxiety, chronic lower back pain on high risk medications, history of GERD, hypothyroidism, status post partial colectomy, anemia, B12 deficiency, who presents for the following:      Diagnoses and all orders for this visit:    1. COPD with exacerbation (Primary): We will treat him with azithromycin for mild bronchitis compared to his baseline.  Continue to follow closely with pulmonology.  -     azithromycin (Zithromax) 250 MG tablet; Take 2 tablets the first day, then 1 tablet daily for 4 days.  Dispense: 6 tablet; Refill: 0    2. Essential hypertension: Controlled    3. Hyperlipidemia, unspecified hyperlipidemia type: Tolerating statin  -     TSH  -     T4, Free  -     T3, Free  -     Lipid Panel    4. Stage 3b chronic kidney disease (CKD): Will arrange blood work for Dr. Sarabia a few weeks from now  -     Urinalysis With Microscopic - Urine, Clean Catch  -     Comprehensive Metabolic Panel  -     CBC & Differential  -     Uric Acid  -     Vitamin D,25-Hydroxy  -     PTH, Intact & Calcium  -     Renal Function Panel    5. Vitamin B12 deficiency    6. Hypothyroidism, unspecified type    7. Proteinuria, unspecified type    8. Screening for malignant neoplasm of prostate  -     PSA Screen    9. Elevated glucose  -     Hemoglobin A1c    10. High risk medication use  -     Urine Drug Screen - Urine, Clean Catch    I discussed with him that I will be leaving [facility].  We have given him a list of providers she can follow with.  From my perspective his medications are stable including his pain  and mood medications, and with his comorbidities and consultants I feel very little needs to be actively managed from a primary care perspective other than routine follow-up    Seth Hester MD  Family Medicine  O: [phone]    Disclaimer: Parts of this note were dictated by speech recognition. Minor errors in transcription may be present. Please call if questions.

## 2025-03-07 LAB
25(OH)D3+25(OH)D2 SERPL-MCNC: 59.7 NG/ML (ref 30–100)
ALBUMIN SERPL-MCNC: 4.6 G/DL (ref 3.9–4.9)
ALP SERPL-CCNC: 85 IU/L (ref 44–121)
ALT SERPL-CCNC: 17 IU/L (ref 0–44)
APPEARANCE UR: CLEAR
AST SERPL-CCNC: 18 IU/L (ref 0–40)
BACTERIA #/AREA URNS HPF: NORMAL /[HPF]
BASOPHILS # BLD AUTO: 0 X10E3/UL (ref 0–0.2)
BASOPHILS NFR BLD AUTO: 0 %
BILIRUB SERPL-MCNC: 0.6 MG/DL (ref 0–1.2)
BILIRUB UR QL STRIP: NEGATIVE
BUN SERPL-MCNC: 29 MG/DL (ref 8–27)
BUN/CREAT SERPL: 17 (ref 10–24)
CALCIUM SERPL-MCNC: 11.4 MG/DL (ref 8.6–10.2)
CASTS URNS QL MICRO: NORMAL /LPF
CHLORIDE SERPL-SCNC: 100 MMOL/L (ref 96–106)
CHOLEST SERPL-MCNC: 131 MG/DL (ref 100–199)
CO2 SERPL-SCNC: 29 MMOL/L (ref 20–29)
COLOR UR: YELLOW
CREAT SERPL-MCNC: 1.72 MG/DL (ref 0.76–1.27)
EGFRCR SERPLBLD CKD-EPI 2021: 42 ML/MIN/1.73
EOSINOPHIL # BLD AUTO: 0.1 X10E3/UL (ref 0–0.4)
EOSINOPHIL NFR BLD AUTO: 2 %
EPI CELLS #/AREA URNS HPF: NORMAL /HPF (ref 0–10)
ERYTHROCYTE [DISTWIDTH] IN BLOOD BY AUTOMATED COUNT: 15 % (ref 11.6–15.4)
GLOBULIN SER CALC-MCNC: 2.7 G/DL (ref 1.5–4.5)
GLUCOSE SERPL-MCNC: 89 MG/DL (ref 70–99)
GLUCOSE UR QL STRIP: NEGATIVE
HBA1C MFR BLD: 5.1 % (ref 4.8–5.6)
HCT VFR BLD AUTO: 43.6 % (ref 37.5–51)
HDLC SERPL-MCNC: 56 MG/DL
HGB BLD-MCNC: 14.2 G/DL (ref 13–17.7)
HGB UR QL STRIP: NEGATIVE
IMM GRANULOCYTES # BLD AUTO: 0 X10E3/UL (ref 0–0.1)
IMM GRANULOCYTES NFR BLD AUTO: 0 %
INTACT PTH: NORMAL
KETONES UR QL STRIP: NEGATIVE
LDLC SERPL CALC-MCNC: 52 MG/DL (ref 0–99)
LEUKOCYTE ESTERASE UR QL STRIP: NEGATIVE
LYMPHOCYTES # BLD AUTO: 1.5 X10E3/UL (ref 0.7–3.1)
LYMPHOCYTES NFR BLD AUTO: 21 %
MCH RBC QN AUTO: 30.1 PG (ref 26.6–33)
MCHC RBC AUTO-ENTMCNC: 32.6 G/DL (ref 31.5–35.7)
MCV RBC AUTO: 93 FL (ref 79–97)
MICRO URNS: NORMAL
MICRO URNS: NORMAL
MONOCYTES # BLD AUTO: 0.6 X10E3/UL (ref 0.1–0.9)
MONOCYTES NFR BLD AUTO: 8 %
NEUTROPHILS # BLD AUTO: 5.1 X10E3/UL (ref 1.4–7)
NEUTROPHILS NFR BLD AUTO: 69 %
NITRITE UR QL STRIP: NEGATIVE
PH UR STRIP: 6 [PH] (ref 5–7.5)
PHOSPHATE SERPL-MCNC: 3.9 MG/DL (ref 2.8–4.1)
PLATELET # BLD AUTO: 230 X10E3/UL (ref 150–450)
POTASSIUM SERPL-SCNC: 4.3 MMOL/L (ref 3.5–5.2)
PROT SERPL-MCNC: 7.3 G/DL (ref 6–8.5)
PROT UR QL STRIP: NORMAL
PSA SERPL-MCNC: 0.7 NG/ML (ref 0–4)
PTH-INTACT SERPL-MCNC: 17 PG/ML (ref 15–65)
RBC # BLD AUTO: 4.71 X10E6/UL (ref 4.14–5.8)
RBC #/AREA URNS HPF: NORMAL /HPF (ref 0–2)
SODIUM SERPL-SCNC: 144 MMOL/L (ref 134–144)
SP GR UR STRIP: 1.01 (ref 1–1.03)
T3FREE SERPL-MCNC: 3.1 PG/ML (ref 2–4.4)
T4 FREE SERPL-MCNC: 1.87 NG/DL (ref 0.82–1.77)
TRIGL SERPL-MCNC: 134 MG/DL (ref 0–149)
TSH SERPL DL<=0.005 MIU/L-ACNC: 0.04 UIU/ML (ref 0.45–4.5)
URATE SERPL-MCNC: 6.4 MG/DL (ref 3.8–8.4)
UROBILINOGEN UR STRIP-MCNC: 0.2 MG/DL (ref 0.2–1)
VLDLC SERPL CALC-MCNC: 23 MG/DL (ref 5–40)
WBC # BLD AUTO: 7.4 X10E3/UL (ref 3.4–10.8)
WBC #/AREA URNS HPF: NORMAL /HPF (ref 0–5)

## 2025-03-08 LAB
AMPHETAMINES UR QL SCN: NEGATIVE NG/ML
BARBITURATES UR QL SCN: NEGATIVE NG/ML
BENZODIAZ UR QL SCN: POSITIVE NG/ML
BZE UR QL SCN: NEGATIVE NG/ML
CANNABINOIDS UR QL SCN: POSITIVE NG/ML
CREAT UR-MCNC: 19.7 MG/DL (ref 20–300)
LABORATORY COMMENT REPORT: ABNORMAL
METHADONE UR QL SCN: NEGATIVE NG/ML
OPIATES UR QL SCN: NEGATIVE NG/ML
OXYCODONE+OXYMORPHONE UR QL SCN: NEGATIVE NG/ML
PCP UR QL: NEGATIVE NG/ML
PH UR: 5.5 [PH] (ref 4.5–8.9)
PROPOXYPH UR QL SCN: NEGATIVE NG/ML
SP GR UR: 1.01

## 2025-03-10 DIAGNOSIS — G89.29 CHRONIC MIDLINE LOW BACK PAIN WITHOUT SCIATICA: ICD-10-CM

## 2025-03-10 DIAGNOSIS — M54.50 CHRONIC MIDLINE LOW BACK PAIN WITHOUT SCIATICA: ICD-10-CM

## 2025-03-10 RX ORDER — TRAMADOL HYDROCHLORIDE 50 MG/1
50 TABLET ORAL 2 TIMES DAILY
Qty: 180 TABLET | Refills: 0 | Status: SHIPPED | OUTPATIENT
Start: 2025-06-08

## 2025-03-10 RX ORDER — TRAMADOL HYDROCHLORIDE 50 MG/1
50 TABLET ORAL 2 TIMES DAILY
Qty: 180 TABLET | Refills: 0 | Status: SHIPPED | OUTPATIENT
Start: 2025-03-10

## 2025-04-14 PROBLEM — J96.00 ACUTE RESPIRATORY FAILURE: Status: ACTIVE | Noted: 2025-01-01

## 2025-04-14 NOTE — ED TRIAGE NOTES
Patient to ED via EMS from home. Patient's wife called EMS for abdominal pain x 1 week. Patient's wife reports that the patient vomited today with coffee-ground appearance. Patient O2 was mid 70's% room air, patient was placed on 4L O2 and oxygen only improved to low 80's %. Patient taken to ED room, RN notified.

## 2025-04-14 NOTE — ED PROVIDER NOTES
EMERGENCY DEPARTMENT ENCOUNTER    Room Number:  I383/1  PCP: Provider, No Known  Historian: Patient, EMS    I initially evaluated the patient at 12:44 PM    HPI:  Chief Complaint: Shortness of breath  A complete HPI/ROS/PMH/PSH/SH/FH are unobtainable due to: Shortness of breath  Context: Sim Agustin is a 69 y.o. male with a medical history of chronic kidney disease, COPD, hypertension, anemia, PVD, CHF who presents to the ED from home by EMS c/o acute shortness of breath for the past 3 days.  Per EMS, patient's oxygen saturation was in the mid 70s on room air.  He was placed on 4 L and saturation improved into the low 80s.  He is on BiPAP at home but is not on supplemental oxygen.  Shortness of breath is constant and worse with exertion.  Denies cough, fever, or chest pain.  He had 1 episode of vomiting this morning.  Wife told EMS that his vomitus looked like coffee grounds.  Patient currently denies nausea or abdominal pain.            PAST MEDICAL HISTORY  Active Ambulatory Problems     Diagnosis Date Noted    H/O angiodysplasia of intestinal tract 03/04/2016    COPD (chronic obstructive pulmonary disease) 03/04/2016    S/P colectomy 03/04/2016    Status post above-knee amputation of left lower extremity 03/28/2016    CAD (coronary artery disease) 04/04/2016    PVD (peripheral vascular disease) 04/06/2016    Essential hypertension 01/31/2014    Cardiomyopathy, unspecified 03/28/2019    PRISCILLA treated with BiPAP 03/03/2020    Anxiety disorder 03/03/2020    Chronic midline low back pain without sciatica 03/03/2020    Long term prescription benzodiazepine use 03/03/2020    History of bradycardia 03/03/2020    Hypothyroidism 03/03/2020    Vitamin B12 deficiency 03/03/2020    Iron deficiency 03/03/2020    History of smoking 30 or more pack years 03/03/2020    High risk medication use 03/03/2020    DNR (do not resuscitate) 09/01/2020    Stage 3a chronic kidney disease 01/06/2021    Hyperlipidemia 09/09/2021     Proteinuria 09/12/2021    Junctional cardiac arrhythmia 05/22/2022    Acute kidney injury 12/14/2023    Hypercalcemia 12/14/2023    Stage 3b chronic kidney disease (CKD) 12/15/2023    Anemia 12/15/2023    Leukocytosis 12/15/2023    Hyponatremia 12/15/2023    Hypokalemia 12/15/2023    Lactic acidosis 12/15/2023    Solitary pulmonary nodule 03/19/2024    Adenocarcinoma, lung, left 06/28/2024     Resolved Ambulatory Problems     Diagnosis Date Noted    Acute upper GI bleed 03/04/2016    Hypotension 03/04/2016    ARF (acute renal failure) 03/04/2016    Atelectasis, left 03/04/2016    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    Compartment syndrome of left lower extremity 03/05/2016    Peripheral arteriosclerosis 03/17/2016    Sepsis, unspecified organism 04/05/2016    Nosocomial pneumonia 04/06/2016    ESRD (end stage renal disease) on dialysis 04/06/2016    Anemia in chronic kidney disease 04/06/2016    Hyperkalemia 04/11/2016    Pleural effusion on left 04/11/2016    S/P thoracentesis 04/11/2016    Ischemic colitis 05/24/2016    Colostomy prolapse 05/24/2016    Acute upper respiratory infection 02/06/2013    Chronic obstructive pulmonary disease with acute exacerbation 02/06/2013    Fracture of patella 03/03/2015    Ischemia of large intestine 10/13/2016    Incisional hernia, without obstruction or gangrene 07/13/2017    Acute respiratory failure with hypoxia and hypercarbia 03/11/2019    Bradycardia 05/23/2019    COPD with hypoxia 05/23/2019    Vitamin D deficiency 03/03/2020    Elevated hematocrit 01/06/2021    Acute exacerbation of chronic obstructive pulmonary disease (COPD) 04/04/2022    Influenza A 04/05/2022    Acute respiratory failure with hypoxia 04/05/2022    Hyperkalemia 04/05/2022    Left lower lobe pneumonia 05/18/2022    Demand myocardial infarction 05/19/2022    Acute UTI 02/28/2023    ARLYN (acute kidney injury) 03/01/2023    Stage 3b chronic kidney disease (CKD)  03/01/2023    Klebsiella infection 03/02/2023    Hypotension 12/15/2023     Past Medical History:   Diagnosis Date    Amputee, above knee, left     Asthma 02/02/2018    Cancer 2024    CHF (congestive heart failure) 2015    Clotting disorder 05/01/2003    Colon polyps     Compartment syndrome     Cough     Deep vein thrombosis     Disease of thyroid gland     Diverticulosis     Employs prosthetic leg     Glaucoma 2018    History of acute renal failure     History of CHF (congestive heart failure)     History of GI bleed 02/2016    History of sepsis 02/2016    History of transfusion     Hypertension     Low back pain 10/2016    Lung neoplasm     Nonischemic cardiomyopathy     Phantom limb pain     Pneumonia     Pulmonary embolism     Renal insufficiency          PAST SURGICAL HISTORY  Past Surgical History:   Procedure Laterality Date    ABOVE KNEE AMPUTATION Left 03/16/2016    Procedure: LT AMPUTATION ABOVE KNEE;  Surgeon: Jose Swann MD;  Location: [facility] MAIN OR;  Service:     ARTERIAL THROMBECTOMY  2001    throbectomy of arterial graft    AXILLARY ARTERY - FEMORAL ARTERY BYPASS GRAFT  02/2016    Revision of ABF to Right Axillary to Bilateral Superficial Femoral Artery bypass graft    AXILLARY FEMORAL BYPASS Right 02/15/2016    Exploratory lapartomy, repair aortoduodenal fistula, resection infected aortobifemoral bypass graft, axillobi-superficial femoral artery [place]-Aneudy bypass graft from right axillary artery, Repair of duodenotomy 4th portion duodenum-Dr. Jose Swann, Dr. Genaro Perrin    BRONCHOSCOPY Left 03/04/2016    Dr. NATALIIA Tate    BRONCHOSCOPY Left 05/20/2022    Procedure: BRONCHOSCOPY WITH BIOPSIES, BRUSHINGS, AND BAL UNDER FLUORO;  Surgeon: Ishmael Tate Jr., MD;  Location: [facility] ENDOSCOPY;  Service: Pulmonary;  Laterality: Left;  PRE OP - LLL CONSOLIDATION  POST OP - SAME    BRONCHOSCOPY RIGID / FLEXIBLE N/A 03/03/2016    Dr. ANTALIIA Tate    BRONCHOSCOPY RIGID / FLEXIBLE N/A  02/26/2016    Dr. NATALIIA Tate    BRONCHOSCOPY WITH ION ROBOTIC ASSIST N/A 04/12/2024    Procedure: BRONCHOSCOPY-ION! with biopsy AND FNA AND BAL;  Surgeon: Jono Rebolledo MD PhD;  Location: [facility] ENDOSCOPY;  Service: Robotics - Pulmonary;  Laterality: N/A;  PRE OP - PULMONARY NODULE  POST OP - SAME    CARDIAC CATHETERIZATION N/A 03/18/2019    Procedure: Right and Left Heart Cath;  Surgeon: Nina Storey MD;  Location: [facility] CATH INVASIVE LOCATION;  Service: Cardiovascular    CATARACT EXTRACTION WITH INTRAOCULAR LENS IMPLANT Bilateral     COLON RESECTION WITH COLOSTOMY Left 02/28/2016    Exploratory laparotomy with open left hemicolectomy, end-colostomy, G-tube and J-tube-Dr. Endy Chaney    COLON SURGERY  3/25/2015    COLOSTOMY    COLONOSCOPY N/A 2015    Dr. Laughlin    COLONOSCOPY N/A 10/12/2016    Procedure: COLONOSCOPY TO 20 CM AND PER STOMA TO 30CM;  Surgeon: Genaro Perrin MD;  Location: [facility] ENDOSCOPY;  Service:     COLONOSCOPY N/A 10/21/2016    Procedure: COLONOSCOPY DONE AT BEDSIDE ONTO CECEM;  Surgeon: Genaro Perrin MD;  Location: [facility] ENDOSCOPY;  Service:     COLONOSCOPY N/A 02/10/2016    Diverticulosis in the entire examined colon, non-bleeding internal hemorrhoids-Dr. Taylor Pina    COLONOSCOPY N/A 02/11/2016    Poor prep, blood in the entire examined colon, normal ileum-Dr. Taylor Pina    COLONOSCOPY W/ POLYPECTOMY N/A 07/27/2015    Normal ileum, diverticulosis in the sigmoid colon: resected and retrieved, one 6 mm polyp in the descending colon: resected and retrieved, the distal rectum and anal verge are normal on retroflexion view-Dr. Miguel Ángel Laughlin    COLOSTOMY CLOSURE N/A 10/13/2016    Procedure: COLOSTOMY TAKEDOWN OR CLOSURE, APPENDECTOMY;  Surgeon: Genaro Perrin MD;  Location: [facility] OR;  Service:     DEBRIDEMENT LEG Left 03/01/2016    Excisional debridement of the skin, subcutantous tissue, tendon and muscle left calf-Dr. Jose Swann    DEBRIDEMENT  LEG Left 02/25/2016    Excisional debridement full-thcikness skin and subcutaneous tissue and tendon and muscle, left medial and lateral calf muscles-Dr. Jose Swann    ENDOSCOPY N/A 10/21/2016    Procedure: ESOPHAGOGASTRODUODENOSCOPY DONE AT BEDSIDE;  Surgeon: Genaro Perrin MD;  Location: [facility] ENDOSCOPY;  Service:     ENDOSCOPY AND COLONOSCOPY N/A 02/28/2016    EGD and Colonoscopy with Candy ink injection-Dr. Endy Chaney    ENTEROSCOPY SMALL BOWEL N/A 02/11/2016    Normal esophagus, normal stomach, normal duodenum, portion of the jejunum normal-Dr. Taylor Pina    EYE SURGERY  2017    Cataract surgery    ILIAC ARTERY - FEMORAL ARTERY BYPASS GRAFT Bilateral 2002    ILIAC ARTERY STENT Bilateral 2001    INSERTION AND REMOVAL PERITONEAL DIALYSIS CATHETER Right 02/29/2016    Exchange right jugular 16 cm Mahurkar dialysis catheter-Dr. Jose Swann    INSERTION PERITONEAL DIALYSIS CATHETER Left 03/01/2016    Ultrasound-guided access of the left jugular vein, 23 cm left jugular palindrome dialysis catheter placement-Dr. Jose Swann    INSERTION PERITONEAL DIALYSIS CATHETER Right 02/18/2016    Right jugular Mahrkar catherer placement-Dr. Jose Swann    JOINT REPLACEMENT Left     THORACOSCOPY Left 04/18/2016    Procedure: LT THORACOSCOPY VIDEO ASSISTED WITH DECORDICATION;  Surgeon: Genaro Davila III, MD;  Location: [facility] OR;  Service:     THROMBECTOMY Left 02/16/2016    Thombectomy of left femoral graft, left leg arteriogram, left calf forward compartment fasciotomy-Dr. Jose Swann    TOTAL HIP ARTHROPLASTY Left     UPPER GASTROINTESTINAL ENDOSCOPY N/A 02/09/2016    Normal UGI-Dr. Ajay Parkinson    UPPER GASTROINTESTINAL ENDOSCOPY N/A 11/28/2015    Small hiatal hernia, chronic gastritis: biopsied, non-bleeding angioectasias in the stomach: treated with argon plasma coagulation, multiple bleeding angioectasias in the dudenum: treated with argon plasma coagulation-Dr. Mykel Jaramillo     VASCULAR SURGERY      MULTIPLE    VENTRAL/INCISIONAL HERNIA REPAIR N/A 10/19/2017    Procedure: VENTRAL HERNIA REPAIR WITH MESH AND BILATERAL COMPONENT SEPARATION;  Surgeon: Genaro Perrin MD;  Location: [facility] OR;  Service:     WISDOM TOOTH EXTRACTION           FAMILY HISTORY  Family History   Problem Relation Age of Onset    Lung cancer Mother     Cancer Mother     Heart disease Father     Diabetes Father         He wasn't diagnosed until he was 78    Hypertension Father     Malig Hyperthermia Neg Hx          SOCIAL HISTORY  Social History     Socioeconomic History    Marital status:    Tobacco Use    Smoking status: Former     Current packs/day: 0.00     Average packs/day: 1 pack/day for 47.0 years (47.0 ttl pk-yrs)     Types: Cigarettes     Start date: 5/1/1974     Quit date: 5/1/2021     Years since quitting: 3.9     Passive exposure: Never    Smokeless tobacco: Never    Tobacco comments:     caffeine - rarely   Vaping Use    Vaping status: Never Used   Substance and Sexual Activity    Alcohol use: No    Drug use: Not Currently     Types: Other     Comment: THC gummies    Sexual activity: Not Currently     Partners: Female     Birth control/protection: Condom, Post-menopausal         ALLERGIES  Augmentin [amoxicillin-pot clavulanate] and Zosyn [piperacillin sod-tazobactam so]    REVIEW OF SYSTEMS  Review of Systems  Included in HPI  All systems reviewed and negative except for those discussed in HPI.      PHYSICAL EXAM  ED Triage Vitals   Temp Heart Rate Resp BP SpO2   04/14/25 1236 04/14/25 1236 04/14/25 1241 04/14/25 1236 04/14/25 1236   98.1 °F (36.7 °C) 108 18 102/72 (!) 81 %      Temp src Heart Rate Source Patient Position BP Location FiO2 (%)   -- -- -- -- --              Physical Exam      GENERAL: Awake but somewhat slow to respond.  Chronically ill-appearing elderly male.  Appears older than stated age.  Is in moderate respiratory distress  HENT: NCAT, nares patent  EYES: no  scleral icterus  CV: regular rhythm, tachycardic  RESPIRATORY: Tachypneic.  Speaking in short phrases.  There are diffuse bilateral rales  ABDOMEN: soft, obese, nontender  MUSCULOSKELETAL: Extremities are nontender with full range of motion.  There is a left AKA  NEURO: Speech is normal.  No facial droop.  Follows commands  PSYCH:  calm, cooperative  SKIN: warm, dry    Vital signs and nursing notes reviewed.          LAB RESULTS  Recent Results (from the past 24 hours)   Comprehensive Metabolic Panel    Collection Time: 04/14/25  1:01 PM    Specimen: Blood   Result Value Ref Range    Glucose 185 (H) 65 - 99 mg/dL    BUN 76 (H) 8 - 23 mg/dL    Creatinine 3.38 (H) 0.76 - 1.27 mg/dL    Sodium 137 136 - 145 mmol/L    Potassium 5.1 3.5 - 5.2 mmol/L    Chloride 94 (L) 98 - 107 mmol/L    CO2 20.0 (L) 22.0 - 29.0 mmol/L    Calcium 12.8 (H) 8.6 - 10.5 mg/dL    Total Protein 8.9 (H) 6.0 - 8.5 g/dL    Albumin 4.6 3.5 - 5.2 g/dL    ALT (SGPT) 27 1 - 41 U/L    AST (SGOT) 36 1 - 40 U/L    Alkaline Phosphatase 148 (H) 39 - 117 U/L    Total Bilirubin 1.3 (H) 0.0 - 1.2 mg/dL    Globulin 4.3 gm/dL    A/G Ratio 1.1 g/dL    BUN/Creatinine Ratio 22.5 7.0 - 25.0    Anion Gap 23.0 (H) 5.0 - 15.0 mmol/L    eGFR 18.9 (L) >60.0 mL/min/1.73   BNP    Collection Time: 04/14/25  1:01 PM    Specimen: Blood   Result Value Ref Range    proBNP 5,067.0 (H) 0.0 - 900.0 pg/mL   High Sensitivity Troponin T    Collection Time: 04/14/25  1:01 PM    Specimen: Blood   Result Value Ref Range    HS Troponin T 157 (C) <22 ng/L   Lipase    Collection Time: 04/14/25  1:01 PM    Specimen: Blood   Result Value Ref Range    Lipase 16 13 - 60 U/L   Lactic Acid, Plasma    Collection Time: 04/14/25  1:01 PM    Specimen: Blood   Result Value Ref Range    Lactate 4.6 (C) 0.5 - 2.0 mmol/L   Respiratory Panel PCR w/COVID-19(SARS-CoV-2) PORFIRIO/MARKUS/MARISOL/PAD/COR/MADDY In-House, NP Swab in UTM/VTM, 2 HR TAT - Swab, Nasopharynx    Collection Time: 04/14/25  1:01 PM    Specimen:  Nasopharynx; Swab   Result Value Ref Range    ADENOVIRUS, PCR Not Detected Not Detected    Coronavirus 229E Not Detected Not Detected    Coronavirus HKU1 Not Detected Not Detected    Coronavirus NL63 Not Detected Not Detected    Coronavirus OC43 Not Detected Not Detected    COVID19 Not Detected Not Detected - Ref. Range    Human Metapneumovirus Not Detected Not Detected    Human Rhinovirus/Enterovirus Not Detected Not Detected    Influenza A PCR Not Detected Not Detected    Influenza B PCR Not Detected Not Detected    Parainfluenza Virus 1 Not Detected Not Detected    Parainfluenza Virus 2 Not Detected Not Detected    Parainfluenza Virus 3 Not Detected Not Detected    Parainfluenza Virus 4 Not Detected Not Detected    RSV, PCR Not Detected Not Detected    Bordetella pertussis pcr Not Detected Not Detected    Bordetella parapertussis PCR Not Detected Not Detected    Chlamydophila pneumoniae PCR Not Detected Not Detected    Mycoplasma pneumo by PCR Not Detected Not Detected   CBC Auto Differential    Collection Time: 04/14/25  1:01 PM    Specimen: Blood   Result Value Ref Range    WBC 8.68 3.40 - 10.80 10*3/mm3    RBC 6.04 (H) 4.14 - 5.80 10*6/mm3    Hemoglobin 18.9 (H) 13.0 - 17.7 g/dL    Hematocrit 56.5 (H) 37.5 - 51.0 %    MCV 93.5 79.0 - 97.0 fL    MCH 31.3 26.6 - 33.0 pg    MCHC 33.5 31.5 - 35.7 g/dL    RDW 13.5 12.3 - 15.4 %    RDW-SD 45.9 37.0 - 54.0 fl    MPV 10.0 6.0 - 12.0 fL    Platelets 303 140 - 450 10*3/mm3    nRBC 0.3 (H) 0.0 - 0.2 /100 WBC   Green Top (Gel)    Collection Time: 04/14/25  1:01 PM   Result Value Ref Range    Extra Tube Hold for add-ons.    Lavender Top    Collection Time: 04/14/25  1:01 PM   Result Value Ref Range    Extra Tube hold for add-on    Light Blue Top    Collection Time: 04/14/25  1:01 PM   Result Value Ref Range    Extra Tube Hold for add-ons.    Manual Differential    Collection Time: 04/14/25  1:01 PM    Specimen: Blood   Result Value Ref Range    Neutrophil % 87.0 (H) 42.7 -  76.0 %    Lymphocyte % 8.0 (L) 19.6 - 45.3 %    Monocyte % 2.0 (L) 5.0 - 12.0 %    Eosinophil % 0.0 (L) 0.3 - 6.2 %    Basophil % 0.0 0.0 - 1.5 %    Metamyelocyte % 2.0 (H) 0.0 - 0.0 %    Myelocyte % 1.0 (H) 0.0 - 0.0 %    Neutrophils Absolute 7.55 (H) 1.70 - 7.00 10*3/mm3    Lymphocytes Absolute 0.69 (L) 0.70 - 3.10 10*3/mm3    Monocytes Absolute 0.17 0.10 - 0.90 10*3/mm3    Eosinophils Absolute 0.00 0.00 - 0.40 10*3/mm3    Basophils Absolute 0.00 0.00 - 0.20 10*3/mm3    RBC Morphology Normal Normal    WBC Morphology Normal Normal    Platelet Morphology Normal Normal   ECG 12 Lead Dyspnea    Collection Time: 04/14/25  1:09 PM   Result Value Ref Range    QT Interval 412 ms    QTC Interval 532 ms   High Sensitivity Troponin T 1Hr    Collection Time: 04/14/25  2:04 PM    Specimen: Arm, Right; Blood   Result Value Ref Range    HS Troponin T 168 (C) <22 ng/L    Troponin T Numeric Delta 11 ng/L    Troponin T % Delta 7 Abnormal if >/= 20%   Procalcitonin    Collection Time: 04/14/25  2:04 PM    Specimen: Arm, Right; Blood   Result Value Ref Range    Procalcitonin 1.23 (H) 0.00 - 0.25 ng/mL   POC Lactate    Collection Time: 04/14/25  3:08 PM    Specimen: Arterial Blood   Result Value Ref Range    Lactate 7.2 (C) <=2.0 mmol/L    Notified Time      Notified Who      Read Back Yes    POC Chem Panel    Collection Time: 04/14/25  3:08 PM    Specimen: Arterial Blood   Result Value Ref Range    Glucose 106 (H) 65 - 99 mg/dL    Sodium 144 136 - 145 mmol/L    POC Potassium 4.9 3.5 - 5.2 mmol/L    Ionized Calcium 1.82 (L) 2.20 - 2.55 mmol/L    Chloride 111 (H) 98 - 107 mmol/L    Creatinine 3.39 (H) 0.60 - 1.30 mg/dL    BUN 79 (H) 8 - 26 mg/dL    CO2 Content 20.7 (L) 23 - 27 mmol/L    Notified Who      Read Back Yes    POC H&H    Collection Time: 04/14/25  3:08 PM    Specimen: Arterial Blood   Result Value Ref Range    Hemoglobin 18.3 (H) 12.0 - 17.0 g/dL    Hematocrit 54 (H) 38 - 51 %   Blood Gas, Arterial -     Collection Time: 04/14/25  3:08 PM    Specimen: Arterial Blood   Result Value Ref Range    Site Right Femoral     Shaq's Test N/A     pH, Arterial 6.903 (C) 7.350 - 7.450 pH units    pCO2, Arterial 98.9 (C) 35.0 - 45.0 mm Hg    pO2, Arterial 157.7 (H) 80.0 - 100.0 mm Hg    HCO3, Arterial 19.5 (L) 22.0 - 28.0 mmol/L    Base Excess, Arterial -16.5 (L) 0.0 - 2.0 mmol/L    O2 Saturation, Arterial 97.2 92.0 - 98.5 %    A-a DO2 0.3 mmHg    Barometric Pressure for Blood Gas 743.5000 mmHg    Modality Adult Vent     FIO2 100 %    Ventilator Mode AC     Set Tidal Volume 450     Set Mech Resp Rate 20     Rate 20 Breaths/minute    PEEP 8     Notified Who      Read Back Yes     Notified Time      Hemodilution No     PO2/FIO2 158 0 - 500       Ordered the above labs and reviewed the results.        RADIOLOGY  XR Chest 1 View  Result Date: 4/14/2025  XR CHEST 1 VW-  HISTORY: Male who is 69 years-old, endotracheal intubation  TECHNIQUE: Frontal view of the chest  COMPARISON: 4/14/2025 at 1306 hours  FINDINGS: Endotracheal tube tip is 5.1 cm above the junior. The heart size is normal. Aorta is calcified. Pulmonary vasculature is unremarkable. Persistent patchy density is seen peripherally at the right lower lung, continued follow-up recommended. No large pleural effusion, or pneumothorax. No acute osseous process.      As described.  This report was finalized on 4/14/2025 2:17 PM by Dr. Diogo Suarez M.D on Workstation: [id number]      XR Chest AP  Result Date: 4/14/2025  XR CHEST AP-  INDICATIONS: Cough and fever, COVID evaluation.  TECHNIQUE: Frontal view of the chest  COMPARISON: 4/12/2024  FINDINGS:  The heart size is normal. Aorta is calcified. Pulmonary vasculature is unremarkable. Patchy density at the peripheral right lower lung may represent developing infiltrate, follow-up recommended. No large pleural effusion or pneumothorax.       As described.    This report was finalized on 4/14/2025 1:26 PM by Dr. Lind  SANDEEP Suarez M.D on Workstation: [id number]        Ordered the above noted radiological studies. Reviewed by me in PACS.            PROCEDURES  Intubation    Date/Time: 4/14/2025 6:39 PM    Performed by: Ross Parada MD  Authorized by: Ross Parada MD    Consent:     Consent obtained:  Emergent situation  Universal protocol:     Patient identity confirmed:  Arm band  Pre-procedure details:     Indications: altered consciousness and respiratory failure      Patient status:  Unresponsive    Look externally: no concerns      Obstruction: none      Neck mobility: normal      Pharmacologic strategy: RSI      Induction agents:  Etomidate    Paralytics:  Rocuronium  Procedure details:     Preoxygenation:  BiLevel    CPR in progress: no      Number of attempts:  1  Successful intubation attempt details:     Intubation method:  Oral    Intubation technique: direct and video assisted      Laryngoscope blade:  Mac 3    Bougie used: no      Tube size (mm):  7.5    Tube type:  Cuffed    Tube visualized through cords: yes    Placement assessment:     ETT at teeth/gumline (cm):  25    Tube secured with:  ETT perry    Breath sounds:  Equal and absent over the epigastrium    Placement verification: chest rise, colorimetric ETCO2, CXR verification, direct visualization and equal breath sounds      CXR findings:  Appropriate position  Post-procedure details:     Procedure completion:  Tolerated well, no immediate complications  Critical Care    Performed by: Ross Parada MD  Authorized by: Ross Parada MD    Critical care provider statement:     Critical care time (minutes):  33    Critical care time was exclusive of:  Separately billable procedures and treating other patients    Critical care was necessary to treat or prevent imminent or life-threatening deterioration of the following conditions:  Cardiac failure and respiratory failure    Critical care was time spent personally by me on the following  activities:  Development of treatment plan with patient or surrogate, discussions with consultants, evaluation of patient's response to treatment, examination of patient, obtaining history from patient or surrogate, ordering and performing treatments and interventions, ordering and review of laboratory studies, ordering and review of radiographic studies, pulse oximetry, re-evaluation of patient's condition and review of old charts    I assumed direction of critical care for this patient from another provider in my specialty: no      Care discussed with: admitting provider      Care discussed with comment:  Dr. Lux          OUTPATIENT MEDICATION MANAGEMENT:  Current Facility-Administered Medications Ordered in Epic   Medication Dose Route Frequency Provider Last Rate Last Admin    acetaminophen (TYLENOL) tablet 650 mg  650 mg Oral Q4H PRN Sukhwinder Kwon MD        Or    acetaminophen (TYLENOL) 160 MG/5ML oral solution 650 mg  650 mg Oral Q4H PRN Sukhwinder Kwon MD        Or    acetaminophen (TYLENOL) suppository 650 mg  650 mg Rectal Q4H PRN Sukhwinder Kwon MD        diphenoxylate-atropine (LOMOTIL) 2.5-0.025 MG per tablet 1 tablet  1 tablet Oral Q2H PRN Sukhwinder Kwon MD        EPINEPHrine 5 mg in 250 mL NS infusion  0.02-0.3 mcg/kg/min Intravenous Titrated Sukhwinder Kwon MD        morphine injection 2 mg  2 mg Intravenous Q1H PRN Sukhwinder Kwon MD        Or    morphine concentrated solution 5 mg  5 mg Sublingual Q1H PRN Sukhwinder Kwon MD        Or    HYDROmorphone (DILAUDID) injection 0.5 mg  0.5 mg Intravenous Q1H PRN Sukhwinder Kwon MD        morphine injection 4 mg  4 mg Intravenous Q1H PRN Sukhwinder Kwon MD        Or    morphine concentrated solution 10 mg  10 mg Sublingual Q1H PRN Sukhwinder Kwon MD        Or    HYDROmorphone (DILAUDID) injection 1 mg  1 mg Intravenous Q1H PRN Sukhwinder Kwon MD        Morphine (PF) injection 6 mg  6 mg Intravenous Q1H PRN Sukhwinder Kwon MD        Or     morphine concentrated solution 20 mg  20 mg Sublingual Q1H PRN Sukhwinder Kwon MD        Or    HYDROmorphone (DILAUDID) injection 1.5 mg  1.5 mg Intravenous Q1H PRN Sukhwinder Kwon MD        ketorolac (TORADOL) injection 15 mg  15 mg Intravenous Q6H PRN Sukhwinder Kwon MD        lidocaine (XYLOCAINE) 1 % injection 20 mL  20 mL Infiltration Once Henry Lux MD        LORazepam (ATIVAN) injection 0.5 mg  0.5 mg Intravenous Q1H PRN Sukhwinder Kwon MD        Or    LORazepam (ATIVAN) injection 0.5 mg  0.5 mg Subcutaneous Q1H PRN Sukhwinder Kwon MD        Or    LORazepam (ATIVAN) 2 MG/ML concentrated solution 0.5 mg  0.5 mg Sublingual Q1H PRN Sukhwinder Kwon MD        LORazepam (ATIVAN) injection 1 mg  1 mg Intravenous Q1H PRN Sukhwinder Kwon MD        Or    LORazepam (ATIVAN) injection 1 mg  1 mg Subcutaneous Q1H PRN Sukhwinder Kwon MD        Or    LORazepam (ATIVAN) 2 MG/ML concentrated solution 1 mg  1 mg Sublingual Q1H PRN Sukhwinder Kwon MD        LORazepam (ATIVAN) injection 2 mg  2 mg Intravenous Q1H PRN Sukhwinder Kwon MD        Or    LORazepam (ATIVAN) injection 2 mg  2 mg Subcutaneous Q1H PRN Sukhwinder Kwon MD        Or    LORazepam (ATIVAN) 2 MG/ML concentrated solution 2 mg  2 mg Sublingual Q1H PRN Sukhwinder Kwon MD        mupirocin (BACTROBAN) 2 % nasal ointment 1 Application  1 Application Each Nare BID Sukhwinder Kwon MD        nitroglycerin (NITROSTAT) SL tablet 0.4 mg  0.4 mg Sublingual Q5 Min PRN Sukhwinder Kwon MD        norepinephrine (LEVOPHED) 8 mg in 250 mL NS infusion (premix)  0.02-0.3 mcg/kg/min Intravenous Titrated Ross Parada MD 42.1 mL/hr at 04/14/25 1431 0.3 mcg/kg/min at 04/14/25 1431    Polyvinyl Alcohol-Povidone PF (ARTIFICIAL TEARS) 1.4-0.6 % ophthalmic solution 1 drop  1 drop Both Eyes Q30 Min PRN Sukhwinder Kwon MD        propofol (DIPRIVAN) infusion 10 mg/mL 100 mL  5-50 mcg/kg/min Intravenous Titrated Ross Parada MD 8.98 mL/hr at 04/14/25 1356 08  mcg/kg/min at 04/14/25 1356    sodium chloride 0.9 % flush 10 mL  10 mL Intravenous Q12H Sukhwinder Kwon MD        sodium chloride 0.9 % flush 10 mL  10 mL Intravenous PRN Sukhwinder Kwon MD        sodium chloride 0.9 % infusion 40 mL  40 mL Intravenous PRN Sukhwinder Kwon MD        vasopressin (PITRESSIN) 20 units in 100 mL NS infusion  0.03 Units/min Intravenous Continuous Sukhwinder Kwon MD         No current [facility]-ordered outpatient medications on file.           MEDICATIONS GIVEN IN ER  Medications   propofol (DIPRIVAN) infusion 10 mg/mL 100 mL (20 mcg/kg/min × 74.8 kg Intravenous New Bag 4/14/25 1356)   lidocaine (XYLOCAINE) 1 % injection 20 mL (has no administration in time range)   norepinephrine (LEVOPHED) 8 mg in 250 mL NS infusion (premix) (0.3 mcg/kg/min × 74.8 kg Intravenous New Bag 4/14/25 1431)   EPINEPHrine 5 mg in 250 mL NS infusion (has no administration in time range)   vasopressin (PITRESSIN) 20 units in 100 mL NS infusion (has no administration in time range)   nitroglycerin (NITROSTAT) SL tablet 0.4 mg (has no administration in time range)   sodium chloride 0.9 % flush 10 mL (has no administration in time range)   sodium chloride 0.9 % flush 10 mL (has no administration in time range)   sodium chloride 0.9 % infusion 40 mL (has no administration in time range)   mupirocin (BACTROBAN) 2 % nasal ointment 1 Application (has no administration in time range)   LORazepam (ATIVAN) injection 0.5 mg (has no administration in time range)     Or   LORazepam (ATIVAN) injection 0.5 mg (has no administration in time range)     Or   LORazepam (ATIVAN) 2 MG/ML concentrated solution 0.5 mg (has no administration in time range)   LORazepam (ATIVAN) injection 1 mg (has no administration in time range)     Or   LORazepam (ATIVAN) injection 1 mg (has no administration in time range)     Or   LORazepam (ATIVAN) 2 MG/ML concentrated solution 1 mg (has no administration in time range)   LORazepam (ATIVAN) injection  2 mg (has no administration in time range)     Or   LORazepam (ATIVAN) injection 2 mg (has no administration in time range)     Or   LORazepam (ATIVAN) 2 MG/ML concentrated solution 2 mg (has no administration in time range)   morphine injection 2 mg (has no administration in time range)     Or   morphine concentrated solution 5 mg (has no administration in time range)     Or   HYDROmorphone (DILAUDID) injection 0.5 mg (has no administration in time range)   ketorolac (TORADOL) injection 15 mg (has no administration in time range)   morphine injection 4 mg (has no administration in time range)     Or   morphine concentrated solution 10 mg (has no administration in time range)     Or   HYDROmorphone (DILAUDID) injection 1 mg (has no administration in time range)   Morphine (PF) injection 6 mg (has no administration in time range)     Or   morphine concentrated solution 20 mg (has no administration in time range)     Or   HYDROmorphone (DILAUDID) injection 1.5 mg (has no administration in time range)   diphenoxylate-atropine (LOMOTIL) 2.5-0.025 MG per tablet 1 tablet (has no administration in time range)   acetaminophen (TYLENOL) tablet 650 mg (has no administration in time range)     Or   acetaminophen (TYLENOL) 160 MG/5ML oral solution 650 mg (has no administration in time range)     Or   acetaminophen (TYLENOL) suppository 650 mg (has no administration in time range)   Polyvinyl Alcohol-Povidone PF (ARTIFICIAL TEARS) 1.4-0.6 % ophthalmic solution 1 drop (has no administration in time range)   ipratropium-albuterol (DUO-NEB) nebulizer solution 3 mL (3 mL Nebulization Given 4/14/25 1301)   rocuronium (ZEMURON) injection 75 mg (80 mg Intravenous Given 4/14/25 1348)   etomidate (AMIDATE) injection 22 mg (20 mg Intravenous Given 4/14/25 1347)   sepsis fluid LR bolus 2,244 mL (2,244 mL Intravenous New Bag 4/14/25 1401)   cefTRIAXone (ROCEPHIN) 2,000 mg in sodium chloride 0.9 % 100 mL MBP (2,000 mg Intravenous New Bag  4/14/25 1403)   EPINEPHrine (ADRENALIN) injection (1 mg Intravenous Given 4/14/25 1428)   calcium chloride injection (1 g Intravenous Given 4/14/25 1420)   sodium bicarbonate injection 8.4% (50 mEq Intravenous Given 4/14/25 1420)   lidocaine (cardiac) (XYLOCAINE) injection (100 mg Intravenous Given 4/14/25 1426)                   MEDICAL DECISION MAKING, PROGRESS, and CONSULTS    All labs have been independently reviewed by me.  All radiology studies have been reviewed by me and I have also reviewed the radiology report.   EKG's independently viewed and interpreted by me.  Discussion below represents my analysis of pertinent findings related to patient's condition, differential diagnosis, treatment plan and final disposition.      Additional sources:    - Discussed/ obtained information from independent historians: EMS, wife and son    - External (non-ED) record review: Patient was last admitted here in December 2023 for acute kidney injury and hypotension.  He saw his PCP in February 2025 for follow-up for COPD, hypertension, hyperlipidemia, chronic kidney disease, and chronic pain.  CT chest done in December 2024 showed decreased size of masslike consolidation in the left upper lobe at site of known malignancy.  There were new groundglass nodular opacities in the lower lobes.    Echocardiogram done in January 2020 showed an EF of 50%.  There was mild concentric LVH    -Prescription drug monitoring program review:     N/A    - Chronic or social conditions impacting patient care (Social Determinants of Health): None          Orders placed during this visit:  Orders Placed This Encounter   Procedures    Intubation    Critical Care    Blood Culture - Blood,    Blood Culture - Blood,    Respiratory Panel PCR w/COVID-19(SARS-CoV-2) PORFIRIO/MARKUS/MARISOL/PAD/COR/MADDY In-House, NP Swab in UTM/VTM, 2 HR TAT - Swab, Nasopharynx    XR Chest AP    XR Chest 1 View    CT Chest Without Contrast Diagnostic    CT Abdomen Pelvis Without  Contrast    Comprehensive Metabolic Panel    BNP    High Sensitivity Troponin T    Lipase    Lactic Acid, Plasma    CBC Auto Differential    Blood Gas, Arterial -    [place] Draw    Manual Differential    STAT Lactic Acid, Reflex    High Sensitivity Troponin T 1Hr    Blood Gas, Arterial -    Procalcitonin    NPO Diet NPO Type: Strict NPO    Monitor Blood Pressure    Vital Signs Per hospital policy    Telemetry - Place Orders & Notify Provider of Results When Patient Experiences Acute Chest Pain, Dysrhythmia or Respiratory Distress    Continuous Pulse Oximetry    Height and weight    Daily Weights    Intake and Output    Oral Care - Patient Not on NPPV & Not Intubated    Target Arousal Level RASS 0 to -2    If Patient has BG of < 80 and is symptomatic but not on an IV insulin protocol then use the Adult Hypoglycemia Treatment Orders.    Place Order to Consult Intensivist For Critical Care Management (If Patient Not Admitted to Cardiology for Primary Cardiology Condition)    Use Mobility Guidelines for Advancement of Activity    Daily CHG [place] While in Critical Care    Maintain IV Access    Up With Assistance    Vital Signs Per Unit Protocol    Code Status and Medical Interventions: No CPR (Do Not Attempt to Resuscitate); Comfort Measures    Inpatient Palliative Care Team Consult    Inpatient Hospice / [facility] Consult    BIPAP    Ventilator - Vent Mode: AC/VC; Rate: Other; Rate: 28; FiO2: Titrate Per SpO2; Titrate Oxygen for SpO2: 90 - 95%; PEEP: 7.5; Tidal Volume: mL; TV: 450    Oxygen Therapy- Nasal Cannula; Titrate 1-6 LPM Per SpO2; 90 - 95%    POC Lactate    POC Chem Panel    POC H&H    POC Glucose Once    ECG 12 Lead Dyspnea    Insert Peripheral IV    Inpatient Admission    CBC & Differential    Green Top (Gel)    Lavender Top    Light Blue Top         Additional orders considered but not ordered:          Differential diagnosis includes, but is not limited to:    Differential diagnosis includes but is not  limited to CHF, acute coronary syndrome, pulmonary embolism, pneumothorax, pneumonia, asthma/COPD, deconditioning, anemia, anxiety.         Independent interpretation of labs, radiology studies, and discussions with consultants:  ED Course as of 04/14/25 1847   Mon Apr 14, 2025   1243 BP: 102/72 [WH]   1243 Temp: 98.1 °F (36.7 °C) [WH]   1243 Heart Rate: 108 [WH]   1243 Resp: 18 [WH]   1243 SpO2(!): 81 %  On 4 L [WH]   1252 Oxygen saturation is currently 89% on 8 L per high flow cannula.  Patient will be placed on BiPAP.  Will obtain chest x-ray, EKG, and labs for further evaluation. [WH]   1310 Patient is breathing more comfortably on BiPAP. [WH]   1340 Patient is on BiPAP but is no longer responding.  He will need to be intubated. [WH]   1352 Patient was orally intubated by me.  There was foul-smelling brown-green fluid coming from the ET tube.  See procedure note for details.  Dr. Lux, intensivist, is at bedside [WH]   1353 HS Troponin T(!!): 157 [WH]   1353 Lactate(!!): 4.6 [WH]   1353 Glucose(!): 185 [WH]   1353 BUN(!): 76 [WH]   1353 Creatinine(!): 3.38  BUN 29 and creatinine 1.72 one month ago [WH]   1354 proBNP(!): 5,067.0 [WH]   1354 WBC: 8.68 [WH]   1354 Hemoglobin(!): 18.9 [WH]   1354 HS Troponin T(!!): 157 [WH]   1400 EKG personally interpreted by me at 1313.  My personal interpretation is:          EKG time: 1309  Rhythm/Rate: Sinus tachycardia, rate 100  P waves and NE: Normal  QRS, axis: Inferior Q waves  ST and T waves: ST depression in the lateral leads    Interpreted Contemporaneously by me, independently viewed  EKG is changed compared to prior EKG done on 12/20/2023   [WH]   1401 Dr. Lux will admit the patient to the ICU.  He is planning on performing a bronchoscopy.  Patient will be given septic fluid bolus and started on IV Rocephin.  Will obtain CT scans of the chest/abdomen/pelvis. [WH]   1402 Chest x-ray personally interpreted by me.  My personal interpretation is: Heart size is normal.   Lungs are clear.  There is a patchy density in the right lung base.  No pleural effusion. [WH]   1438 Patient became progressively bradycardic.  He was given a dose of atropine.  He then became pulseless.  CPR/ACLS was initiated.  He was given multiple doses of epinephrine.  He was also given bicarb and calcium chloride.  Initial rhythm checks showed asystole.  He then went into V-fib and was defibrillated x 2.  He was given IV lidocaine.  End-tidal CO2 then increased into the 70s.  He then had ROSC and was in a wide-complex tachycardia.  He was hypertensive and was started on a Levophed drip.  He is now being transferred to the ICU.  Approximately 800 cc of brown liquid has been suctioned from the patient's oropharynx     I spoke with the patient's wife and son by phone and informed them of the patient's critical condition.  They confirmed that he is a full code. [WH]   1443 HS Troponin T(!!): 168  Delta +7% [WH]   1443 Respiratory panel is negative [WH]   1500 MDM: Patient presented to the ED with acute respiratory distress.  He was initially placed on BiPAP with some improvement but then became acutely unresponsive.  He was orally intubated by me.  Shortly after this, he became bradycardic and then pulseless.  ACLS was initiated.  He was given atropine, sodium bicarb, lidocaine, calcium chloride, and multiple doses of epinephrine.  He had a few episodes of V-fib and was defibrillated x 2.  He eventually had ROSC and appeared to be in a wide-complex tachycardia.  Large amount of brown liquid was suctioned from the patient's oropharynx and was also coming from the ET tube.  This was most likely due to aspiration.  He had reportedly vomited earlier today.  He was found to have acute kidney injury and elevated lactic.  He was given IV fluids and started on IV antibiotics.  He will be admitted to the ICU.  Prognosis is guarded.  Critical care was performed on this patient. [WH]      ED Course User Index  [WH] Tal  Ross SALOMON MD         COMPLEXITY OF CARE  The patient requires admission.      DIAGNOSIS  Final diagnoses:   Acute respiratory failure, unspecified whether with hypoxia or hypercapnia   Acute kidney injury   Cardiac arrest   Elevated troponin         DISPOSITION  ADMISSION    Discussed treatment plan and reason for admission with pt/family and admitting physician.  Pt/family voiced understanding of the plan for admission for further testing/treatment as needed.               Latest Documented Vital Signs:  AS OF 18:47 EDT VITALS:    BP - (!) 59/47  HR - 93  TEMP - 98.1 °F (36.7 °C)  O2 SATS - 97%            --    Please note that portions of this were completed with a voice recognition program.       Note Disclaimer: At [facility], we believe that sharing information builds trust and better relationships. You are receiving this note because you are receiving care at [facility] or recently visited. It is possible you will see health information before a provider has talked with you about it. This kind of information can be easy to misunderstand. To help you fully understand what it means for your health, we urge you to discuss this note with your provider.             Ross Parada MD  04/14/25 9987

## 2025-04-14 NOTE — ED NOTES
"Nursing report ED to floor  Sim Agustin  69 y.o.  male    HPI :  HPI  Stated Reason for Visit: abdominal pain, vomiting, hypoxia.    Chief Complaint  Chief Complaint   Patient presents with    Abdominal Pain    Vomiting       Admitting doctor:   Henry Lux MD    Admitting diagnosis:   The primary encounter diagnosis was Acute respiratory failure, unspecified whether with hypoxia or hypercapnia. A diagnosis of Acute kidney injury was also pertinent to this visit.    Code status:   Current Code Status       Date Active Code Status Order ID Comments User Context       Prior            Allergies:   Augmentin [amoxicillin-pot clavulanate] and Zosyn [piperacillin sod-tazobactam so]    Isolation:   Enhanced Droplet/Contact     Intake and Output  No intake or output data in the 24 hours ending 04/14/25 1404    Weight:       04/14/25  1257   Weight: 74.8 kg (165 lb)       Most recent vitals:   Vitals:    04/14/25 1236 04/14/25 1241 04/14/25 1257 04/14/25 1350   BP: 102/72      Pulse: 108   98   Resp:  18 28    Temp: 98.1 °F (36.7 °C)      SpO2: (!) 81%  90%    Weight:   74.8 kg (165 lb)    Height:   172.7 cm (68\")        Active LDAs/IV Access:   Lines, Drains & Airways       Active LDAs       Name Placement date Placement time Site Days    Peripheral IV 04/14/25 1320 Anterior;Distal;Left;Upper Arm 04/14/25  1320  Arm  less than 1    Peripheral IV 04/14/25 1359 Anterior;Right;Upper Arm 04/14/25  1359  Arm  less than 1    ETT  04/14/25  1349  -- less than 1                    Labs (abnormal labs have a star):   Labs Reviewed   COMPREHENSIVE METABOLIC PANEL - Abnormal; Notable for the following components:       Result Value    Glucose 185 (*)     BUN 76 (*)     Creatinine 3.38 (*)     Chloride 94 (*)     CO2 20.0 (*)     Calcium 12.8 (*)     Total Protein 8.9 (*)     Alkaline Phosphatase 148 (*)     Total Bilirubin 1.3 (*)     Anion Gap 23.0 (*)     eGFR 18.9 (*)     All other components within normal limits    " Narrative:     GFR Categories in Chronic Kidney Disease (CKD)      GFR Category          GFR (mL/min/1.73)    Interpretation  G1                     90 or greater         Normal or high (1)  G2                      60-89                Mild decrease (1)  G3a                   45-59                Mild to moderate decrease  G3b                   30-44                Moderate to severe decrease  G4                    15-29                Severe decrease  G5                    14 or less           Kidney failure          (1)In the absence of evidence of kidney disease, neither GFR category G1 or G2 fulfill the criteria for CKD.    eGFR calculation 2021 CKD-EPI creatinine equation, which does not include race as a factor   BNP (IN-HOUSE) - Abnormal; Notable for the following components:    proBNP 5,067.0 (*)     All other components within normal limits    Narrative:     This assay is used as an aid in the diagnosis of individuals suspected of having heart failure. It can be used as an aid in the diagnosis of acute decompensated heart failure (ADHF) in patients presenting with signs and symptoms of ADHF to the emergency department (ED). In addition, NT-proBNP of <300 pg/mL indicates ADHF is not likely.    Age Range Result Interpretation  NT-proBNP Concentration (pg/mL:      <50             Positive            >450                   Gray                 300-450                    Negative             <300    50-75           Positive            >900                  Gray                300-900                  Negative            <300      >75             Positive            >1800                  Gray                300-1800                  Negative            <300   TROPONIN - Abnormal; Notable for the following components:    HS Troponin T 157 (*)     All other components within normal limits    Narrative:     High Sensitive Troponin T Reference Range:  <14.0 ng/L- Negative Female for AMI  <22.0 ng/L- Negative Male  for AMI  >=14 - Abnormal Female indicating possible myocardial injury.  >=22 - Abnormal Male indicating possible myocardial injury.   Clinicians would have to utilize clinical acumen, EKG, Troponin, and serial changes to determine if it is an Acute Myocardial Infarction or myocardial injury due to an underlying chronic condition.        LACTIC ACID, PLASMA - Abnormal; Notable for the following components:    Lactate 4.6 (*)     All other components within normal limits   CBC WITH AUTO DIFFERENTIAL - Abnormal; Notable for the following components:    RBC 6.04 (*)     Hemoglobin 18.9 (*)     Hematocrit 56.5 (*)     nRBC 0.3 (*)     All other components within normal limits   MANUAL DIFFERENTIAL - Abnormal; Notable for the following components:    Neutrophil % 87.0 (*)     Lymphocyte % 8.0 (*)     Monocyte % 2.0 (*)     Eosinophil % 0.0 (*)     Metamyelocyte % 2.0 (*)     Myelocyte % 1.0 (*)     Neutrophils Absolute 7.55 (*)     Lymphocytes Absolute 0.69 (*)     All other components within normal limits   LIPASE - Normal   BLOOD CULTURE   BLOOD CULTURE   RESPIRATORY PANEL PCR W/ COVID-19 (SARS-COV-2), NP SWAB IN UT/VTP, 2 HR TAT   BLOOD GAS, ARTERIAL   RAINBOW DRAW    Narrative:     The following orders were created for panel order [place] Draw.  Procedure                               Abnormality         Status                     ---------                               -----------         ------                     Green Top (Gel)[[id number]]                                  Final result               Lavender Top[[id number]]                                     Final result               Gold Top - SST[[id number]]                                                              Light Blue Top[[id number]]                                   Final result                 Please view results for these tests on the individual orders.   LACTIC ACID, REFLEX   HIGH SENSITIVITIY TROPONIN T 1HR   CBC AND DIFFERENTIAL    Narrative:      The following orders were created for panel order CBC & Differential.  Procedure                               Abnormality         Status                     ---------                               -----------         ------                     CBC Auto Differential[[id number]]        Abnormal            Final result                 Please view results for these tests on the individual orders.   GREEN TOP   LAVENDER TOP   LIGHT BLUE TOP   GOLD TOP - SST       EKG:   ECG 12 Lead Dyspnea   Preliminary Result   HEART LQKD=441  bpm   RR Kfawvxbv=790  ms   [place] Zsheqsbs=013  ms   P Horizontal Axis=8  deg   P Front Axis=84  deg   QRSD Vwjlbhsf=596  ms   QT Lxpsbrty=054  ms   APqI=950  ms   QRS Axis=30  deg   T Wave Axis=87  deg   - ABNORMAL ECG -   Sinus tachycardia   Consider right atrial enlargement   Inferior infarct, old   Consider anterior infarct   Nonspecific T abnormalities, lateral leads   Prolonged QT interval   Date and Time of Study:2025-04-14 13:09:06          Meds given in ED:   Medications   ipratropium-albuterol (DUO-NEB) nebulizer solution 3 mL (has no administration in time range)   propofol (DIPRIVAN) infusion 10 mg/mL 100 mL (20 mcg/kg/min × 74.8 kg Intravenous New Bag 4/14/25 1356)   sepsis fluid LR bolus 2,244 mL (2,244 mL Intravenous New Bag 4/14/25 1401)   cefTRIAXone (ROCEPHIN) 2,000 mg in sodium chloride 0.9 % 100 mL MBP (2,000 mg Intravenous New Bag 4/14/25 1403)   rocuronium (ZEMURON) injection 75 mg (80 mg Intravenous Given 4/14/25 1348)   etomidate (AMIDATE) injection 22 mg (20 mg Intravenous Given 4/14/25 1347)       Imaging results:  XR Chest AP  Result Date: 4/14/2025   As described.    This report was finalized on 4/14/2025 1:26 PM by Dr. Diogo Suarez M.D on Workstation: [id number]        Ambulatory status:   - bedrest     Social issues:   Social History     Socioeconomic History    Marital status:    Tobacco Use    Smoking status: Former     Current packs/day: 0.00     Average  packs/day: 1 pack/day for 47.0 years (47.0 ttl pk-yrs)     Types: Cigarettes     Start date: 5/1/1974     Quit date: 5/1/2021     Years since quitting: 3.9     Passive exposure: Never    Smokeless tobacco: Never    Tobacco comments:     caffeine - rarely   Vaping Use    Vaping status: Never Used   Substance and Sexual Activity    Alcohol use: No    Drug use: Not Currently     Types: Other     Comment: THC gummies    Sexual activity: Not Currently     Partners: Female     Birth control/protection: Condom, Post-menopausal       Peripheral Neurovascular  Peripheral Neurovascular (Adult)  Peripheral Neurovascular WDL: WDL    Neuro Cognitive  Neuro Cognitive (Adult)  Cognitive/Neuro/Behavioral WDL: WDL  Sedation Group  RASS (Romero Agitation-Sedation Scale): -3-->moderate sedation    Learning  Learning Assessment  Learning Readiness and Ability: no barriers identified    Respiratory  Respiratory  Airway WDL: WDL  Additional Documentation: Breath Sounds (Group)  Respiratory WDL  Respiratory WDL: .WDL except, rhythm/pattern, mucous membranes  Rhythm/Pattern, Respiratory: tachypneic, shortness of breath, labored, shallow  Mucous Membranes: dry  Breath Sounds  All Lung Fields Breath Sounds: All Fields  All Lung Fields Breath Sounds: Anterior:, wheezes, expiratory, rhonchi    Abdominal Pain       Pain Assessments  Pain (Adult)  (0-10) Pain Rating: Rest: 7  (0-10) Pain Rating: Activity: 7  Pain Location: abdomen    NIH Stroke Scale       Mari Lemon RN  04/14/25 14:04 EDT

## 2025-04-14 NOTE — CONSULTS
Purpose of the visit was to evaluate for: support for patient/family, withdrawal of interventions, and comfort care. Spoke with RN as well as family and discussed palliative care, goals of care, and care options.        Assessment:  Patient is palliative care appropriate for inpatient care given mechanical ventilation, profound hypotension despite multiple IV vasopressors, pupils fixed and dilated, lactic acidosis, and severe metabolic acidosis, along with other comorbid conditions. PPS 10%      Cultural and spiritual needs have been assessed.  paged to bedside for support.      Recommendations/Plan: Compassionate extubation, comfort measures only, consult [facility]       Other Comments: Upon my arrival, family had already decided on comfort measures only. I explained the process including medications, compassionate extubation, and discontinuation of vasopressors and other medications. All questions answered satisfactorily. IVON Valentino updated. Palliative care will continue to follow for support.

## 2025-04-14 NOTE — H&P
Group: [facility]         Critical care admission note    Patient Identification:  Sim Agustin  69 y.o.  male  1955  [id number]              CC: Nausea vomiting    History of Present Illness:  69-year-old male patient who presented with shortness of breath, nausea and vomiting.  He was extremely short of breath and upon arrival in the emergency room he vomited and aspirated.  His son tells me that the shortness of breath has been present for the past 3 days.  During EMS transport the patient's oxygen saturation was in the 70s.  At home he is not usually on oxygen.  Apparently earlier this morning he vomited and it looked like coffee-ground.  This is a patient vomited he had to be intubated in the ER to protect his airway.  Since then his pupils have been fixed and dilated, he has had severe hypotension refractory to 4 IV pressors, lactic acidosis and severe metabolic acidosis.  I conveyed all of this to the family at the bedside in the intensive care unit.  I reviewed the patient's chart, discussed the case with my colleague, Dr. Lux.  Last seen at this hospital in December 2023.  At that time he had acute kidney injury, hypercalcemia, hypertension, anemia, hyponatremia, coronary artery disease.  Patient is left above-the-knee amputee      Review of Systems   Unable to perform ROS: Intubated       Past Medical History:  Past Medical History:   Diagnosis Date    Acute respiratory failure with hypoxia and hypercarbia 03/11/2019    Acute upper respiratory infection 02/06/2013    Amputee, above knee, left     Anemia     per [place] [facility]    ARF (acute renal failure) 03/04/2016    Asthma 02/02/2018    Atelectasis, left 03/04/2016    Bradycardia 05/23/2019    CAD (coronary artery disease) 04/04/2016    Cancer 2024    Small cell adenocarcinoma    CHF (congestive heart failure) 2015    Chronic obstructive pulmonary disease with acute exacerbation 02/06/2013    Clotting disorder 05/01/2003     Colon polyps     Compartment syndrome     AFTER ILIAC SURGERY. ABOVE KNEE AMPUTATION    COPD (chronic obstructive pulmonary disease)     COPD with hypoxia 05/23/2019    Cough     SINCE APRIL WITH PNEUMONIA.     Deep vein thrombosis     Demand myocardial infarction 05/19/2022    Disease of thyroid gland     HYPOTHYRODISM    Diverticulosis     Elevated hematocrit 01/06/2021    Employs prosthetic leg     ESRD (end stage renal disease) on dialysis 04/06/2016    Fracture of patella 03/03/2015    Glaucoma 2018    History of acute renal failure     History of CHF (congestive heart failure)     History of GI bleed 02/2016    AORTODUOD FISTULA    History of sepsis 02/2016    History of transfusion     MULTIPLE, 2016    Hyperkalemia 04/11/2016    Hyperlipidemia     Hypertension     Hypotension     Hypotension 03/04/2016    Hypothyroidism     Left lower lobe pneumonia 05/18/2022    Low back pain 10/2016    Lung neoplasm     Nonischemic cardiomyopathy     Nosocomial pneumonia 04/06/2016    Phantom limb pain     SL, WITH LEFT ABOUVE KNEE    Pleural effusion on left 04/11/2016    Pneumonia     SPRING 2016  AFTER SURGERY    Pulmonary embolism     PVD (peripheral vascular disease)     Renal insufficiency     S/P thoracentesis 04/11/2016    Sepsis, unspecified organism 04/05/2016       Past Surgical History:  Past Surgical History:   Procedure Laterality Date    ABOVE KNEE AMPUTATION Left 03/16/2016    Procedure: LT AMPUTATION ABOVE KNEE;  Surgeon: Jose Swann MD;  Location: [facility];  Service:     ARTERIAL THROMBECTOMY  2001    throbectomy of arterial graft    AXILLARY ARTERY - FEMORAL ARTERY BYPASS GRAFT  02/2016    Revision of ABF to Right Axillary to Bilateral Superficial Femoral Artery bypass graft    AXILLARY FEMORAL BYPASS Right 02/15/2016    Exploratory lapartomy, repair aortoduodenal fistula, resection infected aortobifemoral bypass graft, axillobi-superficial femoral artery [place]-Aneudy bypass graft from right  axillary artery, Repair of duodenotomy 4th portion duodenum-Dr. Jose Swann, Dr. Genaro Perrin    BRONCHOSCOPY Left 03/04/2016    Dr. NATALIIA Tate    BRONCHOSCOPY Left 05/20/2022    Procedure: BRONCHOSCOPY WITH BIOPSIES, BRUSHINGS, AND BAL UNDER FLUORO;  Surgeon: Ishmael Tate Jr., MD;  Location: [facility] ENDOSCOPY;  Service: Pulmonary;  Laterality: Left;  PRE OP - LLL CONSOLIDATION  POST OP - SAME    BRONCHOSCOPY RIGID / FLEXIBLE N/A 03/03/2016    Dr. NATALIIA Tate    BRONCHOSCOPY RIGID / FLEXIBLE N/A 02/26/2016    Dr. NATALIIA Tate    BRONCHOSCOPY WITH ION ROBOTIC ASSIST N/A 04/12/2024    Procedure: BRONCHOSCOPY-ION! with biopsy AND FNA AND BAL;  Surgeon: Jono Rebolledo MD PhD;  Location: [facility] ENDOSCOPY;  Service: Robotics - Pulmonary;  Laterality: N/A;  PRE OP - PULMONARY NODULE  POST OP - SAME    CARDIAC CATHETERIZATION N/A 03/18/2019    Procedure: Right and Left Heart Cath;  Surgeon: Nina Storey MD;  Location: [facility] CATH INVASIVE LOCATION;  Service: Cardiovascular    CATARACT EXTRACTION WITH INTRAOCULAR LENS IMPLANT Bilateral     COLON RESECTION WITH COLOSTOMY Left 02/28/2016    Exploratory laparotomy with open left hemicolectomy, end-colostomy, G-tube and J-tube-Dr. Endy Chaney    COLON SURGERY  3/25/2015    COLOSTOMY    COLONOSCOPY N/A 2015    Dr. Laughlin    COLONOSCOPY N/A 10/12/2016    Procedure: COLONOSCOPY TO 20 CM AND PER STOMA TO 30CM;  Surgeon: Genaro Perrin MD;  Location: [facility] ENDOSCOPY;  Service:     COLONOSCOPY N/A 10/21/2016    Procedure: COLONOSCOPY DONE AT BEDSIDE ONTO CECEM;  Surgeon: Genaro Perrin MD;  Location: [facility] ENDOSCOPY;  Service:     COLONOSCOPY N/A 02/10/2016    Diverticulosis in the entire examined colon, non-bleeding internal hemorrhoids-Dr. Taylor Pina    COLONOSCOPY N/A 02/11/2016    Poor prep, blood in the entire examined colon, normal ileum-Dr. Taylor Pina    COLONOSCOPY W/ POLYPECTOMY N/A 07/27/2015    Normal ileum, diverticulosis  in the sigmoid colon: resected and retrieved, one 6 mm polyp in the descending colon: resected and retrieved, the distal rectum and anal verge are normal on retroflexion view-Dr. Miguel Ángel Laughlin    COLOSTOMY CLOSURE N/A 10/13/2016    Procedure: COLOSTOMY TAKEDOWN OR CLOSURE, APPENDECTOMY;  Surgeon: Genaro Perrin MD;  Location: [facility] OR;  Service:     DEBRIDEMENT LEG Left 03/01/2016    Excisional debridement of the skin, subcutantous tissue, tendon and muscle left calf-Dr. Jose Swann    DEBRIDEMENT LEG Left 02/25/2016    Excisional debridement full-thcikness skin and subcutaneous tissue and tendon and muscle, left medial and lateral calf muscles-Dr. Jose Swann    ENDOSCOPY N/A 10/21/2016    Procedure: ESOPHAGOGASTRODUODENOSCOPY DONE AT BEDSIDE;  Surgeon: Genaro Perrin MD;  Location: [facility] ENDOSCOPY;  Service:     ENDOSCOPY AND COLONOSCOPY N/A 02/28/2016    EGD and Colonoscopy with Candy ink injection-Dr. Endy Chaney    ENTEROSCOPY SMALL BOWEL N/A 02/11/2016    Normal esophagus, normal stomach, normal duodenum, portion of the jejunum normal-Dr. Taylor Pina    EYE SURGERY  2017    Cataract surgery    ILIAC ARTERY - FEMORAL ARTERY BYPASS GRAFT Bilateral 2002    ILIAC ARTERY STENT Bilateral 2001    INSERTION AND REMOVAL PERITONEAL DIALYSIS CATHETER Right 02/29/2016    Exchange right jugular 16 cm Mahurkar dialysis catheter-Dr. Jose Swann    INSERTION PERITONEAL DIALYSIS CATHETER Left 03/01/2016    Ultrasound-guided access of the left jugular vein, 23 cm left jugular palindrome dialysis catheter placement-Dr. Jose Swann    INSERTION PERITONEAL DIALYSIS CATHETER Right 02/18/2016    Right jugular Mahrkar catherer placement-Dr. Jose Swann    JOINT REPLACEMENT Left     THORACOSCOPY Left 04/18/2016    Procedure: LT THORACOSCOPY VIDEO ASSISTED WITH DECORDICATION;  Surgeon: Genaro Davila III, MD;  Location: [facility] OR;  Service:     THROMBECTOMY Left 02/16/2016     Thombectomy of left femoral graft, left leg arteriogram, left calf forward compartment fasciotomy-Dr. Jose Swann    TOTAL HIP ARTHROPLASTY Left     UPPER GASTROINTESTINAL ENDOSCOPY N/A 02/09/2016    Normal UGI-Dr. Ajay Parkinson    UPPER GASTROINTESTINAL ENDOSCOPY N/A 11/28/2015    Small hiatal hernia, chronic gastritis: biopsied, non-bleeding angioectasias in the stomach: treated with argon plasma coagulation, multiple bleeding angioectasias in the dudenum: treated with argon plasma coagulation-Dr. Mykel Jaramillo    VASCULAR SURGERY      MULTIPLE    VENTRAL/INCISIONAL HERNIA REPAIR N/A 10/19/2017    Procedure: VENTRAL HERNIA REPAIR WITH MESH AND BILATERAL COMPONENT SEPARATION;  Surgeon: Genaro Perrin MD;  Location: [facility] OR;  Service:     WISDOM TOOTH EXTRACTION          Home Meds:  Medications Prior to Admission   Medication Sig Dispense Refill Last Dose/Taking    albuterol (ACCUNEB) 0.63 MG/3ML nebulizer solution Inhale 3 mL Every 6 (Six) Hours As Needed.       allopurinol (ZYLOPRIM) 100 MG tablet TAKE 1 TABLET BY MOUTH DAILY FOR GOUT 90 tablet 3     ALPRAZolam (XANAX) 1 MG tablet Take 1 tablet twice daily 180 tablet 2     amLODIPine (NORVASC) 10 MG tablet TAKE 1 TABLET BY MOUTH EVERY DAY 90 tablet 3     aspirin 81 MG chewable tablet Chew 1 tablet Every Night. HELD 1 WEEK AS DIRECTED       atorvastatin (LIPITOR) 40 MG tablet TAKE 1 & 1/2 TABLETS BY MOUTH EVERY DAY FOR CHOLESTEROL AND HEART HEALTH 135 tablet 3     azithromycin (Zithromax) 250 MG tablet Take 2 tablets the first day, then 1 tablet daily for 4 days. 6 tablet 0     B Complex Vitamins (vitamin b complex) capsule capsule Take 1 capsule by mouth Daily.       brimonidine (ALPHAGAN) 0.2 % ophthalmic solution instill 1 drop into both eyes twice a day       buPROPion XL (WELLBUTRIN XL) 300 MG 24 hr tablet TAKE 1 TABLET BY MOUTH EVERY DAY 90 tablet 3     clopidogrel (PLAVIX) 75 MG tablet TAKE 1 TABLET BY MOUTH EVERY DAY AT NIGHT 90 tablet 3      Coenzyme Q10 400 MG capsule Take 1 capsule by mouth Every Evening.       FERROUS SULFATE PO Take 65 mg by mouth Every Night.       Fluticasone-Umeclidin-Vilant (TRELEGY ELLIPTA) 100-62.5-25 MCG/INH aerosol powder  Inhale 1 each Daily. 28 each 2     gabapentin (NEURONTIN) 300 MG capsule TAKE 1 CAPSULE BY MOUTH THREE TIMES A  capsule 2     isosorbide mononitrate (IMDUR) 30 MG 24 hr tablet TAKE 1 TABLET BY MOUTH EVERY DAY IN THE MORNING 90 tablet 3     latanoprost (XALATAN) 0.005 % ophthalmic solution Administer 1 drop to both eyes every night at bedtime.       levothyroxine (SYNTHROID, LEVOTHROID) 125 MCG tablet Take 1 tablet by mouth Daily. 90 tablet 4     Multiple Vitamins-Minerals (MULTIVITAMIN ADULT PO) Take 1 tablet by mouth Daily.       Omega-3 Fatty Acids (fish oil) 1000 MG capsule capsule Take 1 capsule by mouth Daily. Took this am       torsemide (DEMADEX) 20 MG tablet TAKE 1 TABLET BY MOUTH EVERY DAY (Patient taking differently: Take 1 tablet by mouth Every Other Day.) 90 tablet 1     traMADol (ULTRAM) 50 MG tablet Take 1 tablet by mouth 2 (Two) Times a Day. 180 tablet 0     [START ON 6/8/2025] traMADol (ULTRAM) 50 MG tablet Take 1 tablet by mouth 2 (Two) Times a Day. 180 tablet 0        Allergies:  Allergies   Allergen Reactions    Augmentin [Amoxicillin-Pot Clavulanate] Hives and Rash     Tolerate cephalosporins    Zosyn [Piperacillin Sod-Tazobactam So] Rash     erythroderma       Social History:   Social History     Socioeconomic History    Marital status:    Tobacco Use    Smoking status: Former     Current packs/day: 0.00     Average packs/day: 1 pack/day for 47.0 years (47.0 ttl pk-yrs)     Types: Cigarettes     Start date: 5/1/1974     Quit date: 5/1/2021     Years since quitting: 3.9     Passive exposure: Never    Smokeless tobacco: Never    Tobacco comments:     caffeine - rarely   Vaping Use    Vaping status: Never Used   Substance and Sexual Activity    Alcohol use: No    Drug use:  "Not Currently     Types: Other     Comment: THC gummies    Sexual activity: Not Currently     Partners: Female     Birth control/protection: Condom, Post-menopausal       Family History:  Family History   Problem Relation Age of Onset    Lung cancer Mother     Cancer Mother     Heart disease Father     Diabetes Father         He wasn't diagnosed until he was 78    Hypertension Father     Malig Hyperthermia Neg Hx        Physical Exam:  /72   Pulse (!) 152   Temp 98.1 °F (36.7 °C)   Resp 28   Ht 172.7 cm (68\")   Wt 74.8 kg (165 lb)   SpO2 98%   BMI 25.09 kg/m²  Body mass index is 25.09 kg/m². 98% 74.8 kg (165 lb)  Physical Exam  Constitutional:       General: He is in acute distress.      Appearance: He is well-developed. He is ill-appearing and toxic-appearing.   HENT:      Right Ear: External ear normal.      Left Ear: External ear normal.      Nose: Nose normal.      Mouth/Throat:      Comments: Putrid smelling vomitus in oral cavity, ET tube orally in place  Eyes:      General: No scleral icterus.     Conjunctiva/sclera: Conjunctivae normal.      Comments: Pupils are equally fixed and dilated, nonreactive to light   Neck:      Thyroid: No thyromegaly.      Vascular: No JVD.   Cardiovascular:      Rate and Rhythm: Normal rate and regular rhythm.      Heart sounds: No murmur heard.     Comments: No edema  Pulmonary:      Effort: Pulmonary effort is normal.      Breath sounds: No wheezing or rales.   Abdominal:      General: There is no distension.      Palpations: Abdomen is soft.      Comments: No liver or spleen enlargment palpable   Musculoskeletal:         General: Deformity present.      Cervical back: Neck supple. No rigidity.      Comments: Above-the-knee amputee on the left   Skin:     Comments: Cold and clammy skin, cyanosis in hands and feet   Neurological:      Comments: Comatose, unarousable, intubated   Psychiatric:      Comments: Comatose and intubated         LABS:  COVID19   Date Value " Ref Range Status   04/14/2025 Not Detected Not Detected - Ref. Range Final       Lab Results   Component Value Date    CALCIUM 12.8 (H) 04/14/2025    PHOS 2.3 (L) 12/24/2023     Results from last 7 days   Lab Units 04/14/25  1508 04/14/25  1301   SODIUM mmol/L  --  137   POTASSIUM mmol/L  --  5.1   CHLORIDE mmol/L  --  94*   CO2 mmol/L  --  20.0*   BUN mg/dL  --  76*   CREATININE mg/dL 3.39* 3.38*   GLUCOSE mg/dL  --  185*   CALCIUM mg/dL  --  12.8*   WBC 10*3/mm3  --  8.68   HEMOGLOBIN g/dL  --  18.9*   HEMOGLOBIN, POC g/dL 18.3*  --    PLATELETS 10*3/mm3  --  303   ALT (SGPT) U/L  --  27   AST (SGOT) U/L  --  36   PROBNP pg/mL  --  5,067.0*     Lab Results   Component Value Date    CKTOTAL 25 04/03/2016    CKMB 2.0 04/03/2016    TROPONINT 168 (C) 04/14/2025     Results from last 7 days   Lab Units 04/14/25  1404 04/14/25  1301   HSTROP T ng/L 168* 157*         Results from last 7 days   Lab Units 04/14/25  1508 04/14/25  1301   LACTATE mmol/L 7.2* 4.6*     Results from last 7 days   Lab Units 04/14/25  1508   PH, ARTERIAL pH units 6.903*   PCO2, ARTERIAL mm Hg 98.9*   PO2 ART mm Hg 157.7*   O2 SATURATION ART % 97.2   MODALITY  Adult Vent     Results from last 7 days   Lab Units 04/14/25  1301   ADENOVIRUS DETECTION BY PCR  Not Detected   CORONAVIRUS 229E  Not Detected   CORONAVIRUS HKU1  Not Detected   CORONAVIRUS NL63  Not Detected   CORONAVIRUS OC43  Not Detected   HUMAN METAPNEUMOVIRUS  Not Detected   HUMAN RHINOVIRUS/ENTEROVIRUS  Not Detected   INFLUENZA B PCR  Not Detected   PARAINFLUENZA 1  Not Detected   PARAINFLUENZA VIRUS 2  Not Detected   PARAINFLUENZA VIRUS 3  Not Detected   PARAINFLUENZA VIRUS 4  Not Detected   BORDETELLA PERTUSSIS PCR  Not Detected   BORDETELLA PARAPERTUSSIS PCR  Not Detected   CHLAMYDOPHILA PNEUMONIAE PCR  Not Detected   MYCOPLAMA PNEUMO PCR  Not Detected   RSV, PCR  Not Detected             Lab Results   Component Value Date    TSH 0.043 (L) 03/06/2025     Estimated Creatinine  Clearance: 21.8 mL/min (A) (by C-G formula based on SCr of 3.39 mg/dL (H)).      Microbiology Results (last 10 days)       Procedure Component Value - Date/Time    Respiratory Panel PCR w/COVID-19(SARS-CoV-2) PORFIRIO/MARKUS/MARISOL/PAD/COR/MADDY In-House, NP Swab in UTM/VTM, 2 HR TAT - Swab, Nasopharynx [[id number]]  (Normal) Collected: 04/14/25 1301    Lab Status: Final result Specimen: Swab from Nasopharynx Updated: 04/14/25 1418     ADENOVIRUS, PCR Not Detected     Coronavirus 229E Not Detected     Coronavirus HKU1 Not Detected     Coronavirus NL63 Not Detected     Coronavirus OC43 Not Detected     COVID19 Not Detected     Human Metapneumovirus Not Detected     Human Rhinovirus/Enterovirus Not Detected     Influenza A PCR Not Detected     Influenza B PCR Not Detected     Parainfluenza Virus 1 Not Detected     Parainfluenza Virus 2 Not Detected     Parainfluenza Virus 3 Not Detected     Parainfluenza Virus 4 Not Detected     RSV, PCR Not Detected     Bordetella pertussis pcr Not Detected     Bordetella parapertussis PCR Not Detected     Chlamydophila pneumoniae PCR Not Detected     Mycoplasma pneumo by PCR Not Detected    Narrative:      In the setting of a positive respiratory panel with a viral infection PLUS a negative procalcitonin without other underlying concern for bacterial infection, consider observing off antibiotics or discontinuation of antibiotics and continue supportive care. If the respiratory panel is positive for atypical bacterial infection (Bordetella pertussis, Chlamydophila pneumoniae, or Mycoplasma pneumoniae), consider antibiotic de-escalation to target atypical bacterial infection.               Imaging: I personally visualized the images of chest x-ray showing small infiltrate over the left base, endotracheal tube in good position.      Assessment:  Acute respiratory failure  Aspiration of vomitus in the airways  Septic shock present on admission, source likely abdomen  Intra-abdominal infection  GI  bleed  Acute hypoxic respiratory failure due to aspiration of vomitus in the airways  Severe metabolic acidosis with lactic acidosis  Severe respiratory acidosis  Erythrocytosis  Hypercalcemia  Peripheral vascular disease  Chronic kidney disease  Acute kidney injury  Probable brain anoxic injury      Recommendations:  Admit to ICU.  Continue adjusting mechanical ventilation.  Patient aspirated vomitus into the airways and now was profoundly hypoxic as well as hypercapnic, difficulty ventilating patient.  His pupils are fixed and dilated and there could be brain injury.  According to family the patient was ill for 3 days at home but refused to come to the hospital.  His blood chemistries show severe lactic acidosis and respiratory acidosis.  He is already on 3 IV pressors and still has a MAP of only 45.  We are starting the fourth IV pressor.  After significant discussion with family members at bedside and discussion of prognosis, they want to stop all medical treatment and provide only comfort measures and allow for natural death.    Total critical care time 40 minutes    Sukhwinder Kwon MD  4/14/2025  15:29 EDT      Much of this encounter note is an electronic transcription/translation of spoken language to printed text using Dragon Software.

## 2025-04-14 NOTE — SIGNIFICANT NOTE
Family doesn't have the resources to have a . Lesli with Palliative contacted and number given to family to contact her tomorrow for help with this. Palliative folder also given to family.

## 2025-04-15 NOTE — PAYOR COMM NOTE
"Sim Alonso W \"Don Alonso\" (Dcsd. Male)     PLEASE SEE ATTACHED FOR INPT AUTH     REF #   [id number]    PLEASE CALL SUSAN JONES RN/ DEPT @ [phone]  OR FAX  DEPARTMENT @  [phone]    THANK YOU   SUSAN JONES RN  [facility]          Date of Birth   1955    Social Security Number       Address   [address] [place] [address]    Home Phone   [phone]    MRN   [id number]       Quaker   Christian    Marital Status                               Admission Date   2025    Admission Type   Emergency    Admitting Provider   Henry Lux MD    Attending Provider       Department, Room/Bed   [facility] INTENSIVE CARE, I383/1       Discharge Date   2025    Discharge Disposition       Discharge Destination                                 Attending Provider: (none)   Allergies: Augmentin [Amoxicillin-pot Clavulanate], Zosyn [Piperacillin Sod-tazobactam So]    Isolation: None   Infection: None   Code Status: Prior    Ht: 172.7 cm (68\")   Wt: 74.8 kg (165 lb)    Admission Cmt: None   Principal Problem: Acute respiratory failure [J96.00]                   Active Insurance as of 2025       Primary Coverage       Payor Plan Insurance Group Employer/Plan Group    AETNA MEDICARE REPLACEMENT AETNA MEDICARE ADVANTAGE [id number]       Payor Plan Address Payor Plan Phone Number Payor Plan Fax Number Effective Dates    [address] [phone]  2021 - None Entered    [facility] [id number]         Subscriber Name Subscriber Birth Date Member ID       SIM ALONSO 1955 [id number]                     Emergency Contacts        (Rel.) Home Phone Work Phone Mobile Phone    John Alonso (Son) [phone] -- [phone]              [place]: NPI [id number]  Tax ID [id number]     History & Physical        Sukhwinder Kwon MD at 25 1529          Group: [facility]         Critical care admission " note    Patient Identification:  Sim Agustin  69 y.o.  male  1955  [id number]              CC: Nausea vomiting    History of Present Illness:  69-year-old male patient who presented with shortness of breath, nausea and vomiting.  He was extremely short of breath and upon arrival in the emergency room he vomited and aspirated.  His son tells me that the shortness of breath has been present for the past 3 days.  During EMS transport the patient's oxygen saturation was in the 70s.  At home he is not usually on oxygen.  Apparently earlier this morning he vomited and it looked like coffee-ground.  This is a patient vomited he had to be intubated in the ER to protect his airway.  Since then his pupils have been fixed and dilated, he has had severe hypotension refractory to 4 IV pressors, lactic acidosis and severe metabolic acidosis.  I conveyed all of this to the family at the bedside in the intensive care unit.  I reviewed the patient's chart, discussed the case with my colleague, Dr. Lux.  Last seen at this hospital in December 2023.  At that time he had acute kidney injury, hypercalcemia, hypertension, anemia, hyponatremia, coronary artery disease.  Patient is left above-the-knee amputee      Review of Systems   Unable to perform ROS: Intubated       Past Medical History:  Past Medical History:   Diagnosis Date    Acute respiratory failure with hypoxia and hypercarbia 03/11/2019    Acute upper respiratory infection 02/06/2013    Amputee, above knee, left     Anemia     per [place] database    ARF (acute renal failure) 03/04/2016    Asthma 02/02/2018    Atelectasis, left 03/04/2016    Bradycardia 05/23/2019    CAD (coronary artery disease) 04/04/2016    Cancer 2024    Small cell adenocarcinoma    CHF (congestive heart failure) 2015    Chronic obstructive pulmonary disease with acute exacerbation 02/06/2013    Clotting disorder 05/01/2003    Colon polyps     Compartment syndrome     AFTER ILIAC SURGERY.  ABOVE KNEE AMPUTATION    COPD (chronic obstructive pulmonary disease)     COPD with hypoxia 05/23/2019    Cough     SINCE APRIL WITH PNEUMONIA.     Deep vein thrombosis     Demand myocardial infarction 05/19/2022    Disease of thyroid gland     HYPOTHYRODISM    Diverticulosis     Elevated hematocrit 01/06/2021    Employs prosthetic leg     ESRD (end stage renal disease) on dialysis 04/06/2016    Fracture of patella 03/03/2015    Glaucoma 2018    History of acute renal failure     History of CHF (congestive heart failure)     History of GI bleed 02/2016    AORTODUOD FISTULA    History of sepsis 02/2016    History of transfusion     MULTIPLE, 2016    Hyperkalemia 04/11/2016    Hyperlipidemia     Hypertension     Hypotension     Hypotension 03/04/2016    Hypothyroidism     Left lower lobe pneumonia 05/18/2022    Low back pain 10/2016    Lung neoplasm     Nonischemic cardiomyopathy     Nosocomial pneumonia 04/06/2016    Phantom limb pain     SL, WITH LEFT ABOUVE KNEE    Pleural effusion on left 04/11/2016    Pneumonia     SPRING 2016  AFTER SURGERY    Pulmonary embolism     PVD (peripheral vascular disease)     Renal insufficiency     S/P thoracentesis 04/11/2016    Sepsis, unspecified organism 04/05/2016       Past Surgical History:  Past Surgical History:   Procedure Laterality Date    ABOVE KNEE AMPUTATION Left 03/16/2016    Procedure: LT AMPUTATION ABOVE KNEE;  Surgeon: Jose Swann MD;  Location: [facility];  Service:     ARTERIAL THROMBECTOMY  2001    throbectomy of arterial graft    AXILLARY ARTERY - FEMORAL ARTERY BYPASS GRAFT  02/2016    Revision of ABF to Right Axillary to Bilateral Superficial Femoral Artery bypass graft    AXILLARY FEMORAL BYPASS Right 02/15/2016    Exploratory lapartomy, repair aortoduodenal fistula, resection infected aortobifemoral bypass graft, axillobi-superficial femoral artery [place]-Aneudy bypass graft from right axillary artery, Repair of duodenotomy 4th portion duodenum-  Jose Swann, Dr. Genaro Perrin    BRONCHOSCOPY Left 03/04/2016    Dr. NATALIIA Tate    BRONCHOSCOPY Left 05/20/2022    Procedure: BRONCHOSCOPY WITH BIOPSIES, BRUSHINGS, AND BAL UNDER FLUORO;  Surgeon: Ishmael Tate Jr., MD;  Location: [facility] ENDOSCOPY;  Service: Pulmonary;  Laterality: Left;  PRE OP - LLL CONSOLIDATION  POST OP - SAME    BRONCHOSCOPY RIGID / FLEXIBLE N/A 03/03/2016    Dr. NATALIIA Tate    BRONCHOSCOPY RIGID / FLEXIBLE N/A 02/26/2016    Dr. NATALIIA Tate    BRONCHOSCOPY WITH ION ROBOTIC ASSIST N/A 04/12/2024    Procedure: BRONCHOSCOPY-ION! with biopsy AND FNA AND BAL;  Surgeon: Jono Rebolledo MD PhD;  Location: [facility] ENDOSCOPY;  Service: Robotics - Pulmonary;  Laterality: N/A;  PRE OP - PULMONARY NODULE  POST OP - SAME    CARDIAC CATHETERIZATION N/A 03/18/2019    Procedure: Right and Left Heart Cath;  Surgeon: Nina Storey MD;  Location: [facility] INVASIVE LOCATION;  Service: Cardiovascular    CATARACT EXTRACTION WITH INTRAOCULAR LENS IMPLANT Bilateral     COLON RESECTION WITH COLOSTOMY Left 02/28/2016    Exploratory laparotomy with open left hemicolectomy, end-colostomy, G-tube and J-tube-Dr. Endy Chaney    COLON SURGERY  3/25/2015    COLOSTOMY    COLONOSCOPY N/A 2015    Dr. Laughlin    COLONOSCOPY N/A 10/12/2016    Procedure: COLONOSCOPY TO 20 CM AND PER STOMA TO 30CM;  Surgeon: Genaro Perrin MD;  Location: [facility] ENDOSCOPY;  Service:     COLONOSCOPY N/A 10/21/2016    Procedure: COLONOSCOPY DONE AT BEDSIDE ONTO CECEM;  Surgeon: Genaro Perrin MD;  Location: [facility] ENDOSCOPY;  Service:     COLONOSCOPY N/A 02/10/2016    Diverticulosis in the entire examined colon, non-bleeding internal hemorrhoids-Dr. Taylor Pina    COLONOSCOPY N/A 02/11/2016    Poor prep, blood in the entire examined colon, normal ileum-Dr. Taylor Pina    COLONOSCOPY W/ POLYPECTOMY N/A 07/27/2015    Normal ileum, diverticulosis in the sigmoid colon: resected and retrieved, one 6 mm polyp in  the descending colon: resected and retrieved, the distal rectum and anal verge are normal on retroflexion view-Dr. Miguel Ángel Laughlin    COLOSTOMY CLOSURE N/A 10/13/2016    Procedure: COLOSTOMY TAKEDOWN OR CLOSURE, APPENDECTOMY;  Surgeon: Genaro Perrin MD;  Location: [facility] OR;  Service:     DEBRIDEMENT LEG Left 03/01/2016    Excisional debridement of the skin, subcutantous tissue, tendon and muscle left calf-Dr. Jose Swann    DEBRIDEMENT LEG Left 02/25/2016    Excisional debridement full-thcikness skin and subcutaneous tissue and tendon and muscle, left medial and lateral calf muscles-Dr. Jose Swann    ENDOSCOPY N/A 10/21/2016    Procedure: ESOPHAGOGASTRODUODENOSCOPY DONE AT BEDSIDE;  Surgeon: Genaro Perrni MD;  Location: [facility] ENDOSCOPY;  Service:     ENDOSCOPY AND COLONOSCOPY N/A 02/28/2016    EGD and Colonoscopy with Candy ink injection-Dr. Endy Chaney    ENTEROSCOPY SMALL BOWEL N/A 02/11/2016    Normal esophagus, normal stomach, normal duodenum, portion of the jejunum normal-Dr. Taylor Pina    EYE SURGERY  2017    Cataract surgery    ILIAC ARTERY - FEMORAL ARTERY BYPASS GRAFT Bilateral 2002    ILIAC ARTERY STENT Bilateral 2001    INSERTION AND REMOVAL PERITONEAL DIALYSIS CATHETER Right 02/29/2016    Exchange right jugular 16 cm Mahurkar dialysis catheter-Dr. Jose Swann    INSERTION PERITONEAL DIALYSIS CATHETER Left 03/01/2016    Ultrasound-guided access of the left jugular vein, 23 cm left jugular palindrome dialysis catheter placement-Dr. Jose Swann    INSERTION PERITONEAL DIALYSIS CATHETER Right 02/18/2016    Right jugular Mahrkar catherer placement-Dr. Jose Swann    JOINT REPLACEMENT Left     THORACOSCOPY Left 04/18/2016    Procedure: LT THORACOSCOPY VIDEO ASSISTED WITH DECORDICATION;  Surgeon: Genaro Davila III, MD;  Location: [facility] OR;  Service:     THROMBECTOMY Left 02/16/2016    Thombectomy of left femoral graft, left leg arteriogram, left calf  forward compartment fasciotomy-Dr. Jose Swann    TOTAL HIP ARTHROPLASTY Left     UPPER GASTROINTESTINAL ENDOSCOPY N/A 02/09/2016    Normal UGI-Dr. Ajay Parkinson    UPPER GASTROINTESTINAL ENDOSCOPY N/A 11/28/2015    Small hiatal hernia, chronic gastritis: biopsied, non-bleeding angioectasias in the stomach: treated with argon plasma coagulation, multiple bleeding angioectasias in the dudenum: treated with argon plasma coagulation-Dr. Mykel Jaramillo    VASCULAR SURGERY      MULTIPLE    VENTRAL/INCISIONAL HERNIA REPAIR N/A 10/19/2017    Procedure: VENTRAL HERNIA REPAIR WITH MESH AND BILATERAL COMPONENT SEPARATION;  Surgeon: Genaro Perrin MD;  Location: [facility] OR;  Service:     WISDOM TOOTH EXTRACTION          Home Meds:  Medications Prior to Admission   Medication Sig Dispense Refill Last Dose/Taking    albuterol (ACCUNEB) 0.63 MG/3ML nebulizer solution Inhale 3 mL Every 6 (Six) Hours As Needed.       allopurinol (ZYLOPRIM) 100 MG tablet TAKE 1 TABLET BY MOUTH DAILY FOR GOUT 90 tablet 3     ALPRAZolam (XANAX) 1 MG tablet Take 1 tablet twice daily 180 tablet 2     amLODIPine (NORVASC) 10 MG tablet TAKE 1 TABLET BY MOUTH EVERY DAY 90 tablet 3     aspirin 81 MG chewable tablet Chew 1 tablet Every Night. HELD 1 WEEK AS DIRECTED       atorvastatin (LIPITOR) 40 MG tablet TAKE 1 & 1/2 TABLETS BY MOUTH EVERY DAY FOR CHOLESTEROL AND HEART HEALTH 135 tablet 3     azithromycin (Zithromax) 250 MG tablet Take 2 tablets the first day, then 1 tablet daily for 4 days. 6 tablet 0     B Complex Vitamins (vitamin b complex) capsule capsule Take 1 capsule by mouth Daily.       brimonidine (ALPHAGAN) 0.2 % ophthalmic solution instill 1 drop into both eyes twice a day       buPROPion XL (WELLBUTRIN XL) 300 MG 24 hr tablet TAKE 1 TABLET BY MOUTH EVERY DAY 90 tablet 3     clopidogrel (PLAVIX) 75 MG tablet TAKE 1 TABLET BY MOUTH EVERY DAY AT NIGHT 90 tablet 3     Coenzyme Q10 400 MG capsule Take 1 capsule by mouth Every Evening.        FERROUS SULFATE PO Take 65 mg by mouth Every Night.       Fluticasone-Umeclidin-Vilant (TRELEGY ELLIPTA) 100-62.5-25 MCG/INH aerosol powder  Inhale 1 each Daily. 28 each 2     gabapentin (NEURONTIN) 300 MG capsule TAKE 1 CAPSULE BY MOUTH THREE TIMES A  capsule 2     isosorbide mononitrate (IMDUR) 30 MG 24 hr tablet TAKE 1 TABLET BY MOUTH EVERY DAY IN THE MORNING 90 tablet 3     latanoprost (XALATAN) 0.005 % ophthalmic solution Administer 1 drop to both eyes every night at bedtime.       levothyroxine (SYNTHROID, LEVOTHROID) 125 MCG tablet Take 1 tablet by mouth Daily. 90 tablet 4     Multiple Vitamins-Minerals (MULTIVITAMIN ADULT PO) Take 1 tablet by mouth Daily.       Omega-3 Fatty Acids (fish oil) 1000 MG capsule capsule Take 1 capsule by mouth Daily. Took this am       torsemide (DEMADEX) 20 MG tablet TAKE 1 TABLET BY MOUTH EVERY DAY (Patient taking differently: Take 1 tablet by mouth Every Other Day.) 90 tablet 1     traMADol (ULTRAM) 50 MG tablet Take 1 tablet by mouth 2 (Two) Times a Day. 180 tablet 0     [START ON 6/8/2025] traMADol (ULTRAM) 50 MG tablet Take 1 tablet by mouth 2 (Two) Times a Day. 180 tablet 0        Allergies:  Allergies   Allergen Reactions    Augmentin [Amoxicillin-Pot Clavulanate] Hives and Rash     Tolerate cephalosporins    Zosyn [Piperacillin Sod-Tazobactam So] Rash     erythroderma       Social History:   Social History     Socioeconomic History    Marital status:    Tobacco Use    Smoking status: Former     Current packs/day: 0.00     Average packs/day: 1 pack/day for 47.0 years (47.0 ttl pk-yrs)     Types: Cigarettes     Start date: 5/1/1974     Quit date: 5/1/2021     Years since quitting: 3.9     Passive exposure: Never    Smokeless tobacco: Never    Tobacco comments:     caffeine - rarely   Vaping Use    Vaping status: Never Used   Substance and Sexual Activity    Alcohol use: No    Drug use: Not Currently     Types: Other     Comment: THC gummies    Sexual  "activity: Not Currently     Partners: Female     Birth control/protection: Condom, Post-menopausal       Family History:  Family History   Problem Relation Age of Onset    Lung cancer Mother     Cancer Mother     Heart disease Father     Diabetes Father         He wasn't diagnosed until he was 78    Hypertension Father     Malig Hyperthermia Neg Hx        Physical Exam:  /72   Pulse (!) 152   Temp 98.1 °F (36.7 °C)   Resp 28   Ht 172.7 cm (68\")   Wt 74.8 kg (165 lb)   SpO2 98%   BMI 25.09 kg/m²  Body mass index is 25.09 kg/m². 98% 74.8 kg (165 lb)  Physical Exam  Constitutional:       General: He is in acute distress.      Appearance: He is well-developed. He is ill-appearing and toxic-appearing.   HENT:      Right Ear: External ear normal.      Left Ear: External ear normal.      Nose: Nose normal.      Mouth/Throat:      Comments: Putrid smelling vomitus in oral cavity, ET tube orally in place  Eyes:      General: No scleral icterus.     Conjunctiva/sclera: Conjunctivae normal.      Comments: Pupils are equally fixed and dilated, nonreactive to light   Neck:      Thyroid: No thyromegaly.      Vascular: No JVD.   Cardiovascular:      Rate and Rhythm: Normal rate and regular rhythm.      Heart sounds: No murmur heard.     Comments: No edema  Pulmonary:      Effort: Pulmonary effort is normal.      Breath sounds: No wheezing or rales.   Abdominal:      General: There is no distension.      Palpations: Abdomen is soft.      Comments: No liver or spleen enlargment palpable   Musculoskeletal:         General: Deformity present.      Cervical back: Neck supple. No rigidity.      Comments: Above-the-knee amputee on the left   Skin:     Comments: Cold and clammy skin, cyanosis in hands and feet   Neurological:      Comments: Comatose, unarousable, intubated   Psychiatric:      Comments: Comatose and intubated         LABS:  COVID19   Date Value Ref Range Status   04/14/2025 Not Detected Not Detected - Ref. " Range Final       Lab Results   Component Value Date    CALCIUM 12.8 (H) 04/14/2025    PHOS 2.3 (L) 12/24/2023     Results from last 7 days   Lab Units 04/14/25  1508 04/14/25  1301   SODIUM mmol/L  --  137   POTASSIUM mmol/L  --  5.1   CHLORIDE mmol/L  --  94*   CO2 mmol/L  --  20.0*   BUN mg/dL  --  76*   CREATININE mg/dL 3.39* 3.38*   GLUCOSE mg/dL  --  185*   CALCIUM mg/dL  --  12.8*   WBC 10*3/mm3  --  8.68   HEMOGLOBIN g/dL  --  18.9*   HEMOGLOBIN, POC g/dL 18.3*  --    PLATELETS 10*3/mm3  --  303   ALT (SGPT) U/L  --  27   AST (SGOT) U/L  --  36   PROBNP pg/mL  --  5,067.0*     Lab Results   Component Value Date    CKTOTAL 25 04/03/2016    CKMB 2.0 04/03/2016    TROPONINT 168 (C) 04/14/2025     Results from last 7 days   Lab Units 04/14/25  1404 04/14/25  1301   HSTROP T ng/L 168* 157*         Results from last 7 days   Lab Units 04/14/25  1508 04/14/25  1301   LACTATE mmol/L 7.2* 4.6*     Results from last 7 days   Lab Units 04/14/25  1508   PH, ARTERIAL pH units 6.903*   PCO2, ARTERIAL mm Hg 98.9*   PO2 ART mm Hg 157.7*   O2 SATURATION ART % 97.2   MODALITY  Adult Vent     Results from last 7 days   Lab Units 04/14/25  1301   ADENOVIRUS DETECTION BY PCR  Not Detected   CORONAVIRUS 229E  Not Detected   CORONAVIRUS HKU1  Not Detected   CORONAVIRUS NL63  Not Detected   CORONAVIRUS OC43  Not Detected   HUMAN METAPNEUMOVIRUS  Not Detected   HUMAN RHINOVIRUS/ENTEROVIRUS  Not Detected   INFLUENZA B PCR  Not Detected   PARAINFLUENZA 1  Not Detected   PARAINFLUENZA VIRUS 2  Not Detected   PARAINFLUENZA VIRUS 3  Not Detected   PARAINFLUENZA VIRUS 4  Not Detected   BORDETELLA PERTUSSIS PCR  Not Detected   BORDETELLA PARAPERTUSSIS PCR  Not Detected   CHLAMYDOPHILA PNEUMONIAE PCR  Not Detected   MYCOPLAMA PNEUMO PCR  Not Detected   RSV, PCR  Not Detected             Lab Results   Component Value Date    TSH 0.043 (L) 03/06/2025     Estimated Creatinine Clearance: 21.8 mL/min (A) (by C-G formula based on SCr of 3.39  mg/dL (H)).      Microbiology Results (last 10 days)       Procedure Component Value - Date/Time    Respiratory Panel PCR w/COVID-19(SARS-CoV-2) PORFIRIO/MARKUS/MARISOL/PAD/COR/MADDY In-House, NP Swab in UTM/VTM, 2 HR TAT - Swab, Nasopharynx [[id number]]  (Normal) Collected: 04/14/25 1301    Lab Status: Final result Specimen: Swab from Nasopharynx Updated: 04/14/25 1418     ADENOVIRUS, PCR Not Detected     Coronavirus 229E Not Detected     Coronavirus HKU1 Not Detected     Coronavirus NL63 Not Detected     Coronavirus OC43 Not Detected     COVID19 Not Detected     Human Metapneumovirus Not Detected     Human Rhinovirus/Enterovirus Not Detected     Influenza A PCR Not Detected     Influenza B PCR Not Detected     Parainfluenza Virus 1 Not Detected     Parainfluenza Virus 2 Not Detected     Parainfluenza Virus 3 Not Detected     Parainfluenza Virus 4 Not Detected     RSV, PCR Not Detected     Bordetella pertussis pcr Not Detected     Bordetella parapertussis PCR Not Detected     Chlamydophila pneumoniae PCR Not Detected     Mycoplasma pneumo by PCR Not Detected    Narrative:      In the setting of a positive respiratory panel with a viral infection PLUS a negative procalcitonin without other underlying concern for bacterial infection, consider observing off antibiotics or discontinuation of antibiotics and continue supportive care. If the respiratory panel is positive for atypical bacterial infection (Bordetella pertussis, Chlamydophila pneumoniae, or Mycoplasma pneumoniae), consider antibiotic de-escalation to target atypical bacterial infection.               Imaging: I personally visualized the images of chest x-ray showing small infiltrate over the left base, endotracheal tube in good position.      Assessment:  Acute respiratory failure  Aspiration of vomitus in the airways  Septic shock present on admission, source likely abdomen  Intra-abdominal infection  GI bleed  Acute hypoxic respiratory failure due to aspiration of  vomitus in the airways  Severe metabolic acidosis with lactic acidosis  Severe respiratory acidosis  Erythrocytosis  Hypercalcemia  Peripheral vascular disease  Chronic kidney disease  Acute kidney injury  Probable brain anoxic injury      Recommendations:  Admit to ICU.  Continue adjusting mechanical ventilation.  Patient aspirated vomitus into the airways and now was profoundly hypoxic as well as hypercapnic, difficulty ventilating patient.  His pupils are fixed and dilated and there could be brain injury.  According to family the patient was ill for 3 days at home but refused to come to the hospital.  His blood chemistries show severe lactic acidosis and respiratory acidosis.  He is already on 3 IV pressors and still has a MAP of only 45.  We are starting the fourth IV pressor.  After significant discussion with family members at bedside and discussion of prognosis, they want to stop all medical treatment and provide only comfort measures and allow for natural death.    Total critical care time 40 minutes    Sukhwinder Kwon MD  4/14/2025  15:29 EDT      Much of this encounter note is an electronic transcription/translation of spoken language to printed text using Dragon Software.    Electronically signed by Sukhwinder Kwon MD at 04/14/25 1307       Discharge Summary    No notes of this type exist for this encounter.

## 2025-04-15 NOTE — CASE MANAGEMENT/SOCIAL WORK
Case Management Discharge Note      Final Note: Patient  25         Selected Continued Care - Discharged on 2025 Admission date: 2025 - Discharge disposition:       Destination    No services have been selected for the patient.                Durable Medical Equipment    No services have been selected for the patient.                Dialysis/Infusion    No services have been selected for the patient.                Home Medical Care    No services have been selected for the patient.                Therapy    No services have been selected for the patient.                Community Resources    No services have been selected for the patient.                Community & DME    No services have been selected for the patient.                         Final Discharge Disposition Code: 20 -

## 2025-04-16 NOTE — DISCHARGE SUMMARY
Patient Identification:  Name: Sim Agustin  Age: 69 y.o.  Sex: male  :  1955  MRN: [id number]  Primary Care Physician: Provider, No Known    Admit date: 2025  Discharge date and time: 2025  7:00 PM   Discharged Condition:      Discharge Diagnoses:  Acute respiratory failure  Aspiration of vomitus in the airways  Septic shock present on admission, source likely abdomen  Intra-abdominal infection  GI bleed  Acute hypoxic respiratory failure due to aspiration of vomitus in the airways  Severe metabolic acidosis with lactic acidosis  Severe respiratory acidosis  Erythrocytosis  Hypercalcemia  Peripheral vascular disease  Chronic kidney disease  Acute kidney injury          History of Present Illness:  69-year-old male patient who presented with shortness of breath, nausea and vomiting.  He was extremely short of breath and upon arrival in the emergency room he vomited and aspirated.  His son tells me that the shortness of breath has been present for the past 3 days.  During EMS transport the patient's oxygen saturation was in the 70s.  At home he is not usually on oxygen.  Apparently earlier this morning he vomited and it looked like coffee-ground.  This is a patient vomited he had to be intubated in the ER to protect his airway.  Since then his pupils have been fixed and dilated, he has had severe hypotension refractory to 4 IV pressors, lactic acidosis and severe metabolic acidosis.  I conveyed all of this to the family at the bedside in the intensive care unit.  I reviewed the patient's chart, discussed the case with my colleague, Dr. Lux.  Last seen at this hospital in 2023.  At that time he had acute kidney injury, hypercalcemia, hypertension, anemia, hyponatremia, coronary artery disease.  Patient is left above-the-knee amputee      Hospital Course: Sim Agustin presented to [facility] with the above-mentioned complaints.  Unfortunately, the  episode of vomiting and aspiration caused significant acute respiratory failure and he did not respond to intubation or mechanical ventilation.  He also did not respond to treatment of sepsis with IV fluids, IV pressors and antibiotics.  All of this was communicated to the family over the hours following his admission.  The family discussed goals of care with us and they requested that we stop all medical interventions and stop treatment and provide palliative care and comfort measures only.  The patient  shortly thereafter.      Consults:   IP CONSULT TO PALLIATIVE CARE TEAM    Significant Diagnostic Studies:   Imaging Results (Most Recent)       Procedure Component Value Units Date/Time    XR Chest 1 View [[id number]] Collected: 25 1416     Updated: 25 1421    Narrative:      XR CHEST 1 VW-     HISTORY: Male who is 69 years-old, endotracheal intubation     TECHNIQUE: Frontal view of the chest     COMPARISON: 2025 at 1306 hours     FINDINGS: Endotracheal tube tip is 5.1 cm above the junior. The heart  size is normal. Aorta is calcified. Pulmonary vasculature is  unremarkable. Persistent patchy density is seen peripherally at the  right lower lung, continued follow-up recommended. No large pleural  effusion, or pneumothorax. No acute osseous process.       Impression:      As described.     This report was finalized on 2025 2:17 PM by Dr. Diogo Suarez M.D on Workstation: [id number]       XR Chest AP [[id number]] Collected: 25 132     Updated: 25 1329    Narrative:      XR CHEST AP-     INDICATIONS: Cough and fever, COVID evaluation.     TECHNIQUE: Frontal view of the chest     COMPARISON: 2024     FINDINGS:     The heart size is normal. Aorta is calcified. Pulmonary vasculature is  unremarkable. Patchy density at the peripheral right lower lung may  represent developing infiltrate, follow-up recommended. No large pleural  effusion or pneumothorax.       Impression:          As described.           This report was finalized on 4/14/2025 1:26 PM by Dr. Diogo Suarez M.D on Workstation: [id number]             Results from last 7 days   Lab Units 04/14/25  1301   ADENOVIRUS DETECTION BY PCR  Not Detected   CORONAVIRUS 229E  Not Detected   CORONAVIRUS HKU1  Not Detected   CORONAVIRUS NL63  Not Detected   CORONAVIRUS OC43  Not Detected   HUMAN METAPNEUMOVIRUS  Not Detected   HUMAN RHINOVIRUS/ENTEROVIRUS  Not Detected   INFLUENZA B PCR  Not Detected   PARAINFLUENZA 1  Not Detected   PARAINFLUENZA VIRUS 2  Not Detected   PARAINFLUENZA VIRUS 3  Not Detected   PARAINFLUENZA VIRUS 4  Not Detected   BORDETELLA PERTUSSIS PCR  Not Detected   CHLAMYDOPHILA PNEUMONIAE PCR  Not Detected   MYCOPLAMA PNEUMO PCR  Not Detected   INFLUENZA A PCR  Not Detected   RSV, PCR  Not Detected     Results from last 7 days   Lab Units 04/14/25  1341 04/14/25  1305   BLOODCX  No growth at 2 days No growth at 2 days     TEST  RESULTS PENDING AT DISCHARGE  Pending Labs       Order Current Status    Blood Culture - Blood, Arm, Right Preliminary result    Blood Culture - Blood, Arm, Right Preliminary result                Disposition:  [facility]            Total time spent discharging patient less than 30 minutes.    Signed:  Sukhwinder Kwno MD  4/16/2025  15:38 EDT

## 2025-04-19 LAB
BACTERIA SPEC AEROBE CULT: NORMAL
BACTERIA SPEC AEROBE CULT: NORMAL

## 2025-04-22 NOTE — PAYOR COMM NOTE
"Sim Alonso \"Don Alonso\" (Dcsd. Male)     PLEASE SEE ATTACHED FOR DC NOTICE    REF #[id number]     THANK YOU  SUSAN JONES RN/ DEPT  [facility]   [phone]  [phone]        Date of Birth   1955    Social Security Number       Address   [address] [place] [address]    Home Phone   [phone]    MRN   [id number]       [employer]   Faith    Marital Status                               Admission Date   2025    Admission Type   Emergency    Admitting Provider   Henry Lux MD    Attending Provider       Department, Room/Bed   [facility] INTENSIVE CARE, I383/1       Discharge Date   2025    Discharge Disposition       Discharge Destination                                 Attending Provider: (none)   Allergies: Augmentin [Amoxicillin-pot Clavulanate], Zosyn [Piperacillin Sod-tazobactam So]    Isolation: None   Infection: None   Code Status: Prior    Ht: 172.7 cm (68\")   Wt: 74.8 kg (165 lb)    Admission Cmt: None   Principal Problem: Acute respiratory failure [J96.00]                   Active Insurance as of 2025       Primary Coverage       Payor Plan Insurance Group Employer/Plan Group    AETNA MEDICARE REPLACEMENT AETNA MEDICARE ADVANTAGE [id number]       Payor Plan Address Payor Plan Phone Number Payor Plan Fax Number Effective Dates    [address] [phone]  2021 - None Entered    [facility] [id number]         Subscriber Name Subscriber Birth Date Member ID       SIM ALONSO 1955 [id number]                     Emergency Contacts        (Rel.) Home Phone Work Phone Mobile Phone    John Alonso (Son) [phone] -- [phone]              [place]: NPI [id number]  Tax ID [id number]     Discharge Summary        Sukhwinder Kwno MD at 25 1900          Patient Identification:  Name: Sim Alonso  Age: 69 y.o.  Sex: male  :  1955  MRN: [id number]  Primary Care " Physician: Provider, No Known    Admit date: 2025  Discharge date and time: 2025  7:00 PM   Discharged Condition:      Discharge Diagnoses:  Acute respiratory failure  Aspiration of vomitus in the airways  Septic shock present on admission, source likely abdomen  Intra-abdominal infection  GI bleed  Acute hypoxic respiratory failure due to aspiration of vomitus in the airways  Severe metabolic acidosis with lactic acidosis  Severe respiratory acidosis  Erythrocytosis  Hypercalcemia  Peripheral vascular disease  Chronic kidney disease  Acute kidney injury          History of Present Illness:  69-year-old male patient who presented with shortness of breath, nausea and vomiting.  He was extremely short of breath and upon arrival in the emergency room he vomited and aspirated.  His son tells me that the shortness of breath has been present for the past 3 days.  During EMS transport the patient's oxygen saturation was in the 70s.  At home he is not usually on oxygen.  Apparently earlier this morning he vomited and it looked like coffee-ground.  This is a patient vomited he had to be intubated in the ER to protect his airway.  Since then his pupils have been fixed and dilated, he has had severe hypotension refractory to 4 IV pressors, lactic acidosis and severe metabolic acidosis.  I conveyed all of this to the family at the bedside in the intensive care unit.  I reviewed the patient's chart, discussed the case with my colleague, Dr. Lux.  Last seen at this hospital in 2023.  At that time he had acute kidney injury, hypercalcemia, hypertension, anemia, hyponatremia, coronary artery disease.  Patient is left above-the-knee amputee      Hospital Course: Sim Agustin presented to [facility] with the above-mentioned complaints.  Unfortunately, the episode of vomiting and aspiration caused significant acute respiratory failure and he did not respond to intubation or  mechanical ventilation.  He also did not respond to treatment of sepsis with IV fluids, IV pressors and antibiotics.  All of this was communicated to the family over the hours following his admission.  The family discussed goals of care with us and they requested that we stop all medical interventions and stop treatment and provide palliative care and comfort measures only.  The patient  shortly thereafter.      Consults:   IP CONSULT TO PALLIATIVE CARE TEAM    Significant Diagnostic Studies:   Imaging Results (Most Recent)       Procedure Component Value Units Date/Time    XR Chest 1 View [[id number]] Collected: 25 1416     Updated: 25 1421    Narrative:      XR CHEST 1 VW-     HISTORY: Male who is 69 years-old, endotracheal intubation     TECHNIQUE: Frontal view of the chest     COMPARISON: 2025 at 1306 hours     FINDINGS: Endotracheal tube tip is 5.1 cm above the junior. The heart  size is normal. Aorta is calcified. Pulmonary vasculature is  unremarkable. Persistent patchy density is seen peripherally at the  right lower lung, continued follow-up recommended. No large pleural  effusion, or pneumothorax. No acute osseous process.       Impression:      As described.     This report was finalized on 2025 2:17 PM by Dr. Diogo Suarez M.D on Workstation: [id number]       XR Chest AP [[id number]] Collected: 25 1324     Updated: 25 1329    Narrative:      XR CHEST AP-     INDICATIONS: Cough and fever, COVID evaluation.     TECHNIQUE: Frontal view of the chest     COMPARISON: 2024     FINDINGS:     The heart size is normal. Aorta is calcified. Pulmonary vasculature is  unremarkable. Patchy density at the peripheral right lower lung may  represent developing infiltrate, follow-up recommended. No large pleural  effusion or pneumothorax.       Impression:         As described.           This report was finalized on 2025 1:26 PM by Dr. Diogo Suarez M.D on  Workstation: [id number]             Results from last 7 days   Lab Units 04/14/25  1301   ADENOVIRUS DETECTION BY PCR  Not Detected   CORONAVIRUS 229E  Not Detected   CORONAVIRUS HKU1  Not Detected   CORONAVIRUS NL63  Not Detected   CORONAVIRUS OC43  Not Detected   HUMAN METAPNEUMOVIRUS  Not Detected   HUMAN RHINOVIRUS/ENTEROVIRUS  Not Detected   INFLUENZA B PCR  Not Detected   PARAINFLUENZA 1  Not Detected   PARAINFLUENZA VIRUS 2  Not Detected   PARAINFLUENZA VIRUS 3  Not Detected   PARAINFLUENZA VIRUS 4  Not Detected   BORDETELLA PERTUSSIS PCR  Not Detected   CHLAMYDOPHILA PNEUMONIAE PCR  Not Detected   MYCOPLAMA PNEUMO PCR  Not Detected   INFLUENZA A PCR  Not Detected   RSV, PCR  Not Detected     Results from last 7 days   Lab Units 04/14/25  1341 04/14/25  1305   BLOODCX  No growth at 2 days No growth at 2 days     TEST  RESULTS PENDING AT DISCHARGE  Pending Labs       Order Current Status    Blood Culture - Blood, Arm, Right Preliminary result    Blood Culture - Blood, Arm, Right Preliminary result                [place]  [facility] [place]            Total time spent discharging patient less than 30 minutes.    Signed:  Sukhwinder Kwon MD  4/16/2025  15:38 EDT    Electronically signed by Sukhwinder Kwon MD at 04/16/25 0533

## (undated) DEVICE — ADAPT CLN BIOGUARD AIR/H2O DISP

## (undated) DEVICE — MSK AIRWY LARYNG LMA PILOT SZ4

## (undated) DEVICE — Device

## (undated) DEVICE — JACKSON-PRATT 100CC BULB RESERVOIR: Brand: CARDINAL HEALTH

## (undated) DEVICE — INTENDED FOR TISSUE SEPARATION, AND OTHER PROCEDURES THAT REQUIRE A SHARP SURGICAL BLADE TO PUNCTURE OR CUT.: Brand: BARD-PARKER ® CARBON RIB-BACK BLADES

## (undated) DEVICE — FRCP BIOP SUPER TRAX 1.7MM 110CM

## (undated) DEVICE — STPLR SKIN VISISTAT WD 35CT

## (undated) DEVICE — SENSR O2 OXIMAX FNGR A/ 18IN NONSTR

## (undated) DEVICE — KT MANIFLD CARDIAC

## (undated) DEVICE — IRRIGATOR BULB ASEPTO 60CC STRL

## (undated) DEVICE — BALN PRESS WEDGE 5F 110CM

## (undated) DEVICE — CATH DIAG IMPULSE FL3.5 5F 100CM

## (undated) DEVICE — SUT PDS LP 0 CTX VIO 60IN Z990G

## (undated) DEVICE — ADAPT SWVL FIBROPTIC BRONCH

## (undated) DEVICE — SINGLE USE SUCTION VALVE MAJ-209: Brand: SINGLE USE SUCTION VALVE (STERILE)

## (undated) DEVICE — SPNG GZ WOVN 4X4IN 12PLY 10/BX STRL

## (undated) DEVICE — BIOPSY NEEDLE, 21G: Brand: FLEXISION

## (undated) DEVICE — SUT PDS 0 CT1 36IN Z346H

## (undated) DEVICE — MARKR SKIN W/RULR AND LBL

## (undated) DEVICE — ELECTRD BLD EXT EDGE/INSUL 6IN

## (undated) DEVICE — TRAP SPECI MUCUS 80CC

## (undated) DEVICE — SUT VIC 2/0 TIES 18IN J111T

## (undated) DEVICE — CONMED DISPOSABLE BRONCHIAL CYTOLOGY BRUSH, STRAIGHT HANDLE, 3 MM X 120 CM: Brand: CONMED

## (undated) DEVICE — TUBING, SUCTION, 1/4" X 10', STRAIGHT: Brand: MEDLINE

## (undated) DEVICE — DRSNG SURESITE WNDW 4X4.5

## (undated) DEVICE — KT EXP CATH ONQ SILVERSOAKER 10IN

## (undated) DEVICE — GW EMR FIX EXCHG J STD .035 3MM 260CM

## (undated) DEVICE — GLV SURG BIOGEL LTX PF 8

## (undated) DEVICE — Device: Brand: ION

## (undated) DEVICE — CATH DIAG IMPULSE FR4 5F 100CM

## (undated) DEVICE — PK CATH CARD 40

## (undated) DEVICE — GLIDESHEATH SLENDER STAINLESS STEEL KIT: Brand: GLIDESHEATH SLENDER

## (undated) DEVICE — FRCP BX RADJAW4 PULM WO NDL STD1.8X2 100

## (undated) DEVICE — 3M™ IOBAN™ 2 ANTIMICROBIAL INCISE DRAPE 6650EZ: Brand: IOBAN™ 2

## (undated) DEVICE — SWIVEL CONNECTOR

## (undated) DEVICE — PK PROC MAJ 40

## (undated) DEVICE — 3M™ STERI-STRIP™ REINFORCED ADHESIVE SKIN CLOSURES, R1547, 1/2 IN X 4 IN (12 MM X 100 MM), 6 STRIPS/ENVELOPE: Brand: 3M™ STERI-STRIP™

## (undated) DEVICE — VITAL SIGNS™ JACKSON-REES CIRCUITS: Brand: VITAL SIGNS™

## (undated) DEVICE — GLIDESHEATH BASIC HYDROPHILIC COATED INTRODUCER SHEATH: Brand: GLIDESHEATH

## (undated) DEVICE — CATH VENT MIV RADL PIG ST TIP 5F 110CM

## (undated) DEVICE — SINGLE USE BIOPSY VALVE MAJ-210: Brand: SINGLE USE BIOPSY VALVE (STERILE)

## (undated) DEVICE — DRSNG WND BORDR/ADHS NONADHR/GZ LF 4X14IN STRL

## (undated) DEVICE — NDL HYPO PRECISIONGLIDE REG 25G 1 1/2

## (undated) DEVICE — APPL CHLORAPREP W/TINT 26ML ORNG

## (undated) DEVICE — VISION PROBE ADAPTER AND SUCTION ADAPTER